# Patient Record
Sex: FEMALE | Race: WHITE | NOT HISPANIC OR LATINO | Employment: OTHER | URBAN - METROPOLITAN AREA
[De-identification: names, ages, dates, MRNs, and addresses within clinical notes are randomized per-mention and may not be internally consistent; named-entity substitution may affect disease eponyms.]

---

## 2023-03-10 ENCOUNTER — TELEPHONE (OUTPATIENT)
Dept: PRIMARY CARE | Facility: CLINIC | Age: 63
End: 2023-03-10
Payer: COMMERCIAL

## 2023-03-10 DIAGNOSIS — J06.9 UPPER RESPIRATORY TRACT INFECTION, UNSPECIFIED TYPE: Primary | ICD-10-CM

## 2023-03-10 DIAGNOSIS — R05.1 ACUTE COUGH: ICD-10-CM

## 2023-03-10 PROBLEM — U07.1 COVID-19: Status: ACTIVE | Noted: 2023-03-10

## 2023-03-10 PROBLEM — H81.93 VESTIBULAR DYSFUNCTION OF BOTH EARS: Status: ACTIVE | Noted: 2023-03-10

## 2023-03-10 PROBLEM — F41.9 ANXIETY DISORDER: Status: ACTIVE | Noted: 2023-03-10

## 2023-03-10 PROBLEM — M50.30 BULGING OF CERVICAL INTERVERTEBRAL DISC: Status: ACTIVE | Noted: 2023-03-10

## 2023-03-10 PROBLEM — M79.89 SOFT TISSUE MASS: Status: ACTIVE | Noted: 2023-03-10

## 2023-03-10 PROBLEM — E55.9 VITAMIN D DEFICIENCY: Status: ACTIVE | Noted: 2023-03-10

## 2023-03-10 PROBLEM — E78.00 HYPERCHOLESTEREMIA: Status: ACTIVE | Noted: 2023-03-10

## 2023-03-10 PROBLEM — H81.90 UNSPECIFIED DISORDER OF VESTIBULAR FUNCTION, UNSPECIFIED EAR: Status: ACTIVE | Noted: 2023-03-10

## 2023-03-10 PROBLEM — M54.12 CERVICAL RADICULOPATHY DUE TO TRAUMA: Status: ACTIVE | Noted: 2023-03-10

## 2023-03-10 PROBLEM — H81.13 BENIGN PAROXYSMAL POSITIONAL VERTIGO DUE TO BILATERAL VESTIBULAR DISORDER: Status: ACTIVE | Noted: 2023-03-10

## 2023-03-10 PROBLEM — I10 HYPERTENSION: Status: ACTIVE | Noted: 2023-03-10

## 2023-03-10 PROBLEM — M54.2 CHRONIC NECK PAIN: Status: ACTIVE | Noted: 2023-03-10

## 2023-03-10 PROBLEM — F43.21 GRIEF REACTION: Status: ACTIVE | Noted: 2023-03-10

## 2023-03-10 PROBLEM — M62.838 CERVICAL PARASPINOUS MUSCLE SPASM: Status: ACTIVE | Noted: 2023-03-10

## 2023-03-10 PROBLEM — S16.1XXA NECK STRAIN: Status: ACTIVE | Noted: 2023-03-10

## 2023-03-10 PROBLEM — F34.1: Status: ACTIVE | Noted: 2023-03-10

## 2023-03-10 PROBLEM — G89.29 CHRONIC NECK PAIN: Status: ACTIVE | Noted: 2023-03-10

## 2023-03-10 PROBLEM — H04.123 BILATERAL DRY EYES: Status: ACTIVE | Noted: 2023-03-10

## 2023-03-10 PROBLEM — F32.A DEPRESSION: Status: ACTIVE | Noted: 2023-03-10

## 2023-03-10 PROBLEM — H53.9 VISUAL CHANGES: Status: ACTIVE | Noted: 2023-03-10

## 2023-03-10 PROBLEM — S13.4XXA WHIPLASH INJURY TO NECK: Status: ACTIVE | Noted: 2023-03-10

## 2023-03-10 PROBLEM — G47.00 INSOMNIA: Status: ACTIVE | Noted: 2023-03-10

## 2023-03-10 PROBLEM — F43.89 STRESS REACTION, CHRONIC: Status: ACTIVE | Noted: 2023-03-10

## 2023-03-10 PROBLEM — F43.10 POST TRAUMATIC STRESS DISORDER (PTSD): Status: ACTIVE | Noted: 2023-03-10

## 2023-03-10 PROBLEM — F43.20 GRIEF REACTION: Status: ACTIVE | Noted: 2023-03-10

## 2023-03-10 RX ORDER — DOXYCYCLINE 100 MG/1
100 CAPSULE ORAL 2 TIMES DAILY
Qty: 14 CAPSULE | Refills: 0 | Status: SHIPPED | OUTPATIENT
Start: 2023-03-10 | End: 2023-03-17

## 2023-03-10 NOTE — TELEPHONE ENCOUNTER
Pt saw you last week for cold symptoms - she feels like its getting worse and moving down to her chest. Can feel rumbling and wheezing. Coughing up mucus. Would like to know if you could send in abx to pharmacy

## 2023-03-14 DIAGNOSIS — J06.9 UPPER RESPIRATORY TRACT INFECTION, UNSPECIFIED TYPE: ICD-10-CM

## 2023-03-14 DIAGNOSIS — R05.1 ACUTE COUGH: ICD-10-CM

## 2023-03-14 DIAGNOSIS — M50.30 BULGING OF CERVICAL INTERVERTEBRAL DISC: ICD-10-CM

## 2023-03-14 DIAGNOSIS — M54.12 CERVICAL RADICULOPATHY DUE TO TRAUMA: Primary | ICD-10-CM

## 2023-03-14 RX ORDER — PREGABALIN 150 MG/1
150 CAPSULE ORAL 2 TIMES DAILY
Qty: 60 CAPSULE | Refills: 2 | Status: CANCELLED | OUTPATIENT
Start: 2023-03-14

## 2023-03-14 RX ORDER — PREGABALIN 150 MG/1
1 CAPSULE ORAL 2 TIMES DAILY
COMMUNITY
Start: 2023-02-01 | End: 2023-10-26 | Stop reason: SINTOL

## 2023-03-14 RX ORDER — OXYCODONE AND ACETAMINOPHEN 10; 325 MG/1; MG/1
1 TABLET ORAL EVERY 6 HOURS PRN
Qty: 120 TABLET | Refills: 0 | Status: SHIPPED | OUTPATIENT
Start: 2023-03-14 | End: 2023-04-13 | Stop reason: SDUPTHER

## 2023-03-14 RX ORDER — DULOXETINE HYDROCHLORIDE 60 MG/1
60 CAPSULE, DELAYED RELEASE ORAL DAILY
COMMUNITY
End: 2023-05-23

## 2023-03-14 RX ORDER — OXYCODONE AND ACETAMINOPHEN 10; 325 MG/1; MG/1
1 TABLET ORAL EVERY 6 HOURS PRN
Qty: 120 TABLET | Refills: 0 | Status: CANCELLED | OUTPATIENT
Start: 2023-03-14

## 2023-03-14 RX ORDER — LISINOPRIL AND HYDROCHLOROTHIAZIDE 10; 12.5 MG/1; MG/1
1 TABLET ORAL EVERY MORNING
COMMUNITY
End: 2023-10-25

## 2023-03-14 RX ORDER — LAMOTRIGINE 100 MG/1
1.5 TABLET ORAL NIGHTLY
COMMUNITY

## 2023-03-14 RX ORDER — OXYCODONE AND ACETAMINOPHEN 10; 325 MG/1; MG/1
1 TABLET ORAL EVERY 6 HOURS PRN
COMMUNITY
End: 2023-03-14 | Stop reason: SDUPTHER

## 2023-03-14 NOTE — TELEPHONE ENCOUNTER
Pt called and stated insurance will not cover inhaler for 6 puffs a day, but will cover it for 4. Can you please re-write the rx for 4x a day?  (Combivent)  I see frequency says 4 times, but in the sig it looks like 6

## 2023-03-19 DIAGNOSIS — E11.9 TYPE 2 DIABETES MELLITUS WITHOUT COMPLICATIONS (MULTI): ICD-10-CM

## 2023-03-20 RX ORDER — BLOOD-GLUCOSE METER
EACH MISCELLANEOUS
Qty: 50 STRIP | Refills: 2 | Status: SHIPPED | OUTPATIENT
Start: 2023-03-20 | End: 2023-09-06

## 2023-04-13 DIAGNOSIS — M54.12 CERVICAL RADICULOPATHY DUE TO TRAUMA: ICD-10-CM

## 2023-04-13 DIAGNOSIS — M50.30 BULGING OF CERVICAL INTERVERTEBRAL DISC: ICD-10-CM

## 2023-04-13 RX ORDER — OXYCODONE AND ACETAMINOPHEN 10; 325 MG/1; MG/1
1 TABLET ORAL EVERY 6 HOURS PRN
Qty: 120 TABLET | Refills: 0 | Status: SHIPPED | OUTPATIENT
Start: 2023-04-13 | End: 2023-05-09 | Stop reason: SDUPTHER

## 2023-05-05 ENCOUNTER — APPOINTMENT (OUTPATIENT)
Dept: PRIMARY CARE | Facility: CLINIC | Age: 63
End: 2023-05-05
Payer: COMMERCIAL

## 2023-05-09 ENCOUNTER — OFFICE VISIT (OUTPATIENT)
Dept: PRIMARY CARE | Facility: CLINIC | Age: 63
End: 2023-05-09
Payer: COMMERCIAL

## 2023-05-09 VITALS
OXYGEN SATURATION: 94 % | TEMPERATURE: 97.5 F | BODY MASS INDEX: 31.76 KG/M2 | WEIGHT: 185 LBS | DIASTOLIC BLOOD PRESSURE: 80 MMHG | HEART RATE: 98 BPM | SYSTOLIC BLOOD PRESSURE: 138 MMHG

## 2023-05-09 DIAGNOSIS — E11.9 CONTROLLED TYPE 2 DIABETES MELLITUS WITHOUT COMPLICATION, WITHOUT LONG-TERM CURRENT USE OF INSULIN (MULTI): ICD-10-CM
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
## 2023-05-09 DIAGNOSIS — R07.81 RIB PAIN ON RIGHT SIDE: ICD-10-CM

## 2023-05-09 DIAGNOSIS — G89.29 CHRONIC NECK PAIN: Primary | ICD-10-CM

## 2023-05-09 DIAGNOSIS — M50.30 BULGING OF CERVICAL INTERVERTEBRAL DISC: ICD-10-CM

## 2023-05-09 DIAGNOSIS — M54.12 CERVICAL RADICULOPATHY DUE TO TRAUMA: ICD-10-CM

## 2023-05-09 DIAGNOSIS — M54.2 CHRONIC NECK PAIN: Primary | ICD-10-CM

## 2023-05-09 DIAGNOSIS — E11.9 TYPE 2 DIABETES MELLITUS WITHOUT COMPLICATION, WITHOUT LONG-TERM CURRENT USE OF INSULIN (MULTI): ICD-10-CM

## 2023-05-09 PROCEDURE — 1036F TOBACCO NON-USER: CPT | Performed by: STUDENT IN AN ORGANIZED HEALTH CARE EDUCATION/TRAINING PROGRAM

## 2023-05-09 PROCEDURE — 3079F DIAST BP 80-89 MM HG: CPT | Performed by: STUDENT IN AN ORGANIZED HEALTH CARE EDUCATION/TRAINING PROGRAM

## 2023-05-09 PROCEDURE — 3046F HEMOGLOBIN A1C LEVEL >9.0%: CPT | Performed by: STUDENT IN AN ORGANIZED HEALTH CARE EDUCATION/TRAINING PROGRAM

## 2023-05-09 PROCEDURE — 3075F SYST BP GE 130 - 139MM HG: CPT | Performed by: STUDENT IN AN ORGANIZED HEALTH CARE EDUCATION/TRAINING PROGRAM

## 2023-05-09 PROCEDURE — 99214 OFFICE O/P EST MOD 30 MIN: CPT | Performed by: STUDENT IN AN ORGANIZED HEALTH CARE EDUCATION/TRAINING PROGRAM

## 2023-05-09 PROCEDURE — 3008F BODY MASS INDEX DOCD: CPT | Performed by: STUDENT IN AN ORGANIZED HEALTH CARE EDUCATION/TRAINING PROGRAM

## 2023-05-09 RX ORDER — OXYCODONE AND ACETAMINOPHEN 10; 325 MG/1; MG/1
1 TABLET ORAL EVERY 6 HOURS PRN
Qty: 120 TABLET | Refills: 0 | Status: SHIPPED | OUTPATIENT
Start: 2023-05-12 | End: 2023-05-23 | Stop reason: SDUPTHER

## 2023-05-09 ASSESSMENT — ENCOUNTER SYMPTOMS
BACK PAIN: 1
ARTHRALGIAS: 1
SHORTNESS OF BREATH: 0
ACTIVITY CHANGE: 1
NECK PAIN: 1
NUMBNESS: 0
CONSTIPATION: 0
DYSPHORIC MOOD: 1
FATIGUE: 1
COUGH: 0
NERVOUS/ANXIOUS: 1
PALPITATIONS: 0
DIARRHEA: 0
DIZZINESS: 0
NECK STIFFNESS: 1
SLEEP DISTURBANCE: 1
CHEST TIGHTNESS: 0
HEADACHES: 0

## 2023-05-09 ASSESSMENT — PATIENT HEALTH QUESTIONNAIRE - PHQ9
SUM OF ALL RESPONSES TO PHQ9 QUESTIONS 1 AND 2: 0
1. LITTLE INTEREST OR PLEASURE IN DOING THINGS: NOT AT ALL
2. FEELING DOWN, DEPRESSED OR HOPELESS: NOT AT ALL

## 2023-05-09 NOTE — PROGRESS NOTES
"Subjective   Patient ID: Cristal Pollard is a 62 y.o. female who presents for Follow-up (Follow up visit /Recent fall/Contusion on rib/ cartilage tear).  Does not want her a1c checked today. States she knows she has been off track. Forgets her metformin sometimes. Stress eating, eating late at night. Fasting blood sugars 160. Averages around 112-120's. Initial a1c last visit 10.7%    Fell 2 weeks ago on her right ribs. Scans were unremarkable in ER. Feeling dizzy more often since her fall. Did not hit her head.     Did sweeping the day prior and thinks she \"ripped something.\" Notes ribs are starting to feel better. Fall did aggravate her chronic right hip and right neck pain.     Not tolerating Lyrica, making her very sleepy. Stopped taking this several weeks ago. Has not followed up with her pain management doctor.     Feeling better mentally, joined a [employer] and met a lot of new people. Thinks this is helping.       Opioids:  What is the patient's goal of therapy? Improved pain, functioning, and increased mobility  Is this being achieved with current treatment? yes    Currently sees specialist and pain management    I feel that it is clinically indicated to continue this current medication regimen after consideration of alternative therapies, and other non-opioid treatment.    Opioid Risk Screening:  No data recorded    Pain Assessment:  No data recorded    Review of Systems   Constitutional:  Positive for activity change and fatigue.   Eyes:  Negative for visual disturbance.   Respiratory:  Negative for cough, chest tightness and shortness of breath.    Cardiovascular:  Negative for chest pain and palpitations.   Gastrointestinal:  Negative for constipation and diarrhea.   Musculoskeletal:  Positive for arthralgias, back pain, neck pain and neck stiffness.   Neurological:  Negative for dizziness, numbness and headaches.   Psychiatric/Behavioral:  Positive for dysphoric mood and sleep disturbance. The patient is " nervous/anxious.        Objective   Physical Exam  Constitutional:       General: She is not in acute distress.     Appearance: She is not toxic-appearing.   HENT:      Head: Normocephalic.   Eyes:      Pupils: Pupils are equal, round, and reactive to light.   Cardiovascular:      Rate and Rhythm: Normal rate and regular rhythm.   Pulmonary:      Effort: Pulmonary effort is normal. No respiratory distress.      Breath sounds: Normal breath sounds. No wheezing or rhonchi.   Abdominal:      Palpations: Abdomen is soft.      Tenderness: There is no abdominal tenderness.   Musculoskeletal:         General: Tenderness present.      Comments: Right ribs, mild tenderness to palpation   Neurological:      General: No focal deficit present.      Mental Status: She is alert. Mental status is at baseline.   Psychiatric:         Mood and Affect: Mood normal.         Behavior: Behavior normal.       Current Outpatient Medications:     blood sugar diagnostic (OneTouch Verio test strips) strip, TEST ONCE DAILY, Disp: 50 strip, Rfl: 2    Cymbalta 60 mg DR capsule, Take 1 capsule (60 mg) by mouth once daily., Disp: , Rfl:     ipratropium-albuteroL (Combivent Respimat)  mcg/actuation inhaler, 2 puffs twice daily, Disp: 41.4 g, Rfl: 0    lamoTRIgine (LaMICtal) 100 mg tablet, Take 1.5 tablets (150 mg) by mouth once daily. Take 1 tablet by mouth in the Morning and 1/2 tablet at bedtime, Disp: , Rfl:     lisinopriL-hydrochlorothiazide 10-12.5 mg tablet, Take 1 tablet by mouth once daily., Disp: , Rfl:     pregabalin (Lyrica) 150 mg capsule, Take 1 capsule (150 mg) by mouth 2 times a day., Disp: , Rfl:     [START ON 5/12/2023] oxyCODONE-acetaminophen (Percocet)  mg tablet, Take 1 tablet by mouth every 6 hours if needed for severe pain (7 - 10). Do not start before May 12, 2023., Disp: 120 tablet, Rfl: 0      Assessment/Plan   Diagnoses and all orders for this visit:  Chronic neck pain  Type 2 diabetes mellitus without  complication, without long-term current use of insulin (CMS/[facility])  Comments:  diagnosed last visit  mostly compliant with metformin  Average -160  A1c next month, deferred today  Controlled type 2 diabetes mellitus without complication, without long-term current use of insulin (CMS/[facility])  Bulging of cervical intervertebral disc  -     oxyCODONE-acetaminophen (Percocet)  mg tablet; Take 1 tablet by mouth every 6 hours if needed for severe pain (7 - 10). Do not start before May 12, 2023.  Cervical radiculopathy due to trauma  -     oxyCODONE-acetaminophen (Percocet)  mg tablet; Take 1 tablet by mouth every 6 hours if needed for severe pain (7 - 10). Do not start before May 12, 2023.  BMI 31.0-31.9,adult    The patient received Provided instructions on dietary changes because they have an above normal BMI.      OARRS report reviewed for Cristal Pollard today and is consistent with prescribed therapy.  We weighed the risks and benefit of this therapy and will continue with the current plan      Follow up in 1 month    Jessica Diehl DO

## 2023-05-23 DIAGNOSIS — M50.30 BULGING OF CERVICAL INTERVERTEBRAL DISC: ICD-10-CM

## 2023-05-23 DIAGNOSIS — M54.12 CERVICAL RADICULOPATHY DUE TO TRAUMA: ICD-10-CM

## 2023-05-23 DIAGNOSIS — F41.9 ANXIETY DISORDER, UNSPECIFIED: ICD-10-CM

## 2023-05-23 RX ORDER — DULOXETIN HYDROCHLORIDE 60 MG/1
CAPSULE, DELAYED RELEASE ORAL
Qty: 90 CAPSULE | Refills: 3 | Status: SHIPPED | OUTPATIENT
Start: 2023-05-23 | End: 2024-02-06 | Stop reason: ALTCHOICE

## 2023-05-23 RX ORDER — OXYCODONE AND ACETAMINOPHEN 10; 325 MG/1; MG/1
1 TABLET ORAL EVERY 6 HOURS PRN
Qty: 28 TABLET | Refills: 0 | Status: SHIPPED | OUTPATIENT
Start: 2023-05-23 | End: 2023-05-30 | Stop reason: SDUPTHER

## 2023-05-30 DIAGNOSIS — M54.12 CERVICAL RADICULOPATHY DUE TO TRAUMA: ICD-10-CM

## 2023-05-30 DIAGNOSIS — M50.30 BULGING OF CERVICAL INTERVERTEBRAL DISC: ICD-10-CM

## 2023-05-30 RX ORDER — OXYCODONE AND ACETAMINOPHEN 10; 325 MG/1; MG/1
1 TABLET ORAL EVERY 6 HOURS PRN
Qty: 120 TABLET | Refills: 0 | Status: SHIPPED | OUTPATIENT
Start: 2023-05-30 | End: 2023-06-30 | Stop reason: SDUPTHER

## 2023-06-30 ENCOUNTER — CLINICAL SUPPORT (OUTPATIENT)
Dept: PRIMARY CARE | Facility: CLINIC | Age: 63
End: 2023-06-30
Payer: COMMERCIAL

## 2023-06-30 DIAGNOSIS — E11.9 TYPE 2 DIABETES MELLITUS WITHOUT COMPLICATION, WITHOUT LONG-TERM CURRENT USE OF INSULIN (MULTI): ICD-10-CM

## 2023-06-30 DIAGNOSIS — M54.12 CERVICAL RADICULOPATHY DUE TO TRAUMA: ICD-10-CM

## 2023-06-30 DIAGNOSIS — M50.30 BULGING OF CERVICAL INTERVERTEBRAL DISC: ICD-10-CM

## 2023-06-30 LAB — HBA1C MFR BLD: 7.3 % (ref 4.2–6.5)

## 2023-06-30 PROCEDURE — 83036 HEMOGLOBIN GLYCOSYLATED A1C: CPT

## 2023-06-30 RX ORDER — OXYCODONE AND ACETAMINOPHEN 10; 325 MG/1; MG/1
1 TABLET ORAL EVERY 6 HOURS PRN
Qty: 120 TABLET | Refills: 0 | Status: SHIPPED | OUTPATIENT
Start: 2023-06-30 | End: 2023-08-04 | Stop reason: SDUPTHER

## 2023-06-30 NOTE — PROGRESS NOTES
Patient ID: Cristal Pollard is a 63 y.o. female.    Procedures Patient had blood drawn for testing. Tolerated well.

## 2023-07-10 ENCOUNTER — TELEPHONE (OUTPATIENT)
Dept: PRIMARY CARE | Facility: CLINIC | Age: 63
End: 2023-07-10
Payer: COMMERCIAL

## 2023-07-10 NOTE — TELEPHONE ENCOUNTER
Patient calling- Went to the bathroom just now and there was a lot of blood in the toilet ( urine)   She said it was extremely painful to urinate. She doesn't know what to do

## 2023-07-12 ENCOUNTER — OFFICE VISIT (OUTPATIENT)
Dept: PRIMARY CARE | Facility: CLINIC | Age: 63
End: 2023-07-12
Payer: COMMERCIAL

## 2023-07-12 VITALS
DIASTOLIC BLOOD PRESSURE: 70 MMHG | OXYGEN SATURATION: 98 % | HEART RATE: 94 BPM | SYSTOLIC BLOOD PRESSURE: 128 MMHG | WEIGHT: 181 LBS | BODY MASS INDEX: 31.07 KG/M2

## 2023-07-12 DIAGNOSIS — M50.30 BULGING OF CERVICAL INTERVERTEBRAL DISC: ICD-10-CM

## 2023-07-12 DIAGNOSIS — M54.12 CERVICAL RADICULOPATHY DUE TO TRAUMA: ICD-10-CM

## 2023-07-12 DIAGNOSIS — Z79.891 CHRONIC PRESCRIPTION OPIATE USE: ICD-10-CM

## 2023-07-12 DIAGNOSIS — M54.2 CHRONIC NECK PAIN: ICD-10-CM

## 2023-07-12 DIAGNOSIS — E11.9 TYPE 2 DIABETES MELLITUS WITHOUT COMPLICATION, WITHOUT LONG-TERM CURRENT USE OF INSULIN (MULTI): ICD-10-CM

## 2023-07-12 DIAGNOSIS — N30.91 HEMATURIA DUE TO CYSTITIS: ICD-10-CM

## 2023-07-12 DIAGNOSIS — B37.2 YEAST DERMATITIS: Primary | ICD-10-CM

## 2023-07-12 DIAGNOSIS — G89.29 CHRONIC NECK PAIN: ICD-10-CM

## 2023-07-12 PROCEDURE — 3008F BODY MASS INDEX DOCD: CPT | Performed by: STUDENT IN AN ORGANIZED HEALTH CARE EDUCATION/TRAINING PROGRAM

## 2023-07-12 PROCEDURE — 3051F HG A1C>EQUAL 7.0%<8.0%: CPT | Performed by: STUDENT IN AN ORGANIZED HEALTH CARE EDUCATION/TRAINING PROGRAM

## 2023-07-12 PROCEDURE — 3074F SYST BP LT 130 MM HG: CPT | Performed by: STUDENT IN AN ORGANIZED HEALTH CARE EDUCATION/TRAINING PROGRAM

## 2023-07-12 PROCEDURE — 99214 OFFICE O/P EST MOD 30 MIN: CPT | Performed by: STUDENT IN AN ORGANIZED HEALTH CARE EDUCATION/TRAINING PROGRAM

## 2023-07-12 PROCEDURE — 1036F TOBACCO NON-USER: CPT | Performed by: STUDENT IN AN ORGANIZED HEALTH CARE EDUCATION/TRAINING PROGRAM

## 2023-07-12 PROCEDURE — 3078F DIAST BP <80 MM HG: CPT | Performed by: STUDENT IN AN ORGANIZED HEALTH CARE EDUCATION/TRAINING PROGRAM

## 2023-07-12 RX ORDER — NYSTATIN AND TRIAMCINOLONE ACETONIDE 100000; 1 [USP'U]/G; MG/G
OINTMENT TOPICAL
Qty: 15 G | Refills: 1 | Status: ON HOLD | OUTPATIENT
Start: 2023-07-12 | End: 2024-03-15 | Stop reason: ALTCHOICE

## 2023-07-12 RX ORDER — NYSTATIN 100000 [USP'U]/G
POWDER TOPICAL
Qty: 30 G | Refills: 2 | Status: SHIPPED | OUTPATIENT
Start: 2023-07-12 | End: 2023-11-07

## 2023-07-12 RX ORDER — CEFDINIR 300 MG/1
300 CAPSULE ORAL EVERY 12 HOURS
Qty: 14 CAPSULE | Refills: 0 | COMMUNITY
Start: 2023-07-10 | End: 2023-07-17

## 2023-07-12 ASSESSMENT — ENCOUNTER SYMPTOMS
PSYCHIATRIC NEGATIVE: 1
NEUROLOGICAL NEGATIVE: 1
HEMATURIA: 1
DYSURIA: 1
ARTHRALGIAS: 1

## 2023-07-12 NOTE — PROGRESS NOTES
Subjective   Patient ID: Cristal Pollard is a 63 y.o. female who presents for Rash (Rash on lower stomach , between skin.  Describes it as painful and tender. ).  Rash under pannus. Has been there for several months. Has tried otc creams and keeping it dry. Not improving. Is tender and painful. Weeps sometimes. Has had similar rash in the past after playing tennis or sweating more.     Robert hematuria on Monday. Went to urgent care was given an antibiotic cefdinir. No further episodes. Symptoms improving.     Last A1c improved from 10.1% to 7.7%. Blood sugars have been better lately.     Compliant with her chronic pain medications.             Review of Systems   Genitourinary:  Positive for dysuria and hematuria.   Musculoskeletal:  Positive for arthralgias.   Skin:  Positive for rash.   Neurological: Negative.    Psychiatric/Behavioral: Negative.     All other systems reviewed and are negative.      Objective   Physical Exam  Vitals reviewed.   Constitutional:       Appearance: Normal appearance.   HENT:      Head: Normocephalic.   Eyes:      Pupils: Pupils are equal, round, and reactive to light.   Pulmonary:      Effort: Pulmonary effort is normal.   Abdominal:      Palpations: Abdomen is soft.   Skin:     Comments: Erythematous rash under abdominal pannus with darkened border and slight weeping   Neurological:      General: No focal deficit present.      Mental Status: She is alert.   Psychiatric:         Mood and Affect: Mood normal.         Behavior: Behavior normal.           Current Outpatient Medications:     blood sugar diagnostic (OneTouch Verio test strips) strip, TEST ONCE DAILY, Disp: 50 strip, Rfl: 2    cefdinir (Omnicef) 300 mg capsule, Take 1 capsule (300 mg) by mouth every 12 hours., Disp: 14 capsule, Rfl: 0    DULoxetine (Cymbalta) 60 mg DR capsule, TAKE 1 CAPSULE BY MOUTH EVERY DAY, Disp: 90 capsule, Rfl: 3    ipratropium-albuteroL (Combivent Respimat)  mcg/actuation inhaler, 2 puffs twice  daily, Disp: 41.4 g, Rfl: 0    lamoTRIgine (LaMICtal) 100 mg tablet, Take 1.5 tablets (150 mg) by mouth once daily. Take 1 tablet by mouth in the Morning and 1/2 tablet at bedtime, Disp: , Rfl:     lisinopriL-hydrochlorothiazide 10-12.5 mg tablet, Take 1 tablet by mouth once daily., Disp: , Rfl:     oxyCODONE-acetaminophen (Percocet)  mg tablet, Take 1 tablet by mouth every 6 hours if needed for severe pain (7 - 10)., Disp: 120 tablet, Rfl: 0    pregabalin (Lyrica) 150 mg capsule, Take 1 capsule (150 mg) by mouth 2 times a day., Disp: , Rfl:     nystatin (Mycostatin) 100,000 unit/gram powder, Apply layer of powder over affected area twice daily, Disp: 30 g, Rfl: 2    nystatin-triamcinolone (Mycolog II) ointment, Apply thin layer to affected area two times daily, Disp: 15 g, Rfl: 1      Assessment/Plan   Diagnoses and all orders for this visit:  Yeast dermatitis  -     nystatin (Mycostatin) 100,000 unit/gram powder; Apply layer of powder over affected area twice daily  -     nystatin-triamcinolone (Mycolog II) ointment; Apply thin layer to affected area two times daily  Hematuria due to cystitis  Comments:  doing better on cefdinir  Type 2 diabetes mellitus without complication, without long-term current use of insulin (CMS/[facility])  Comments:  a1c decreased from 10.1 to 7.7%  congratulated her efforts   BG in the 120's-140's typically  Cervical radiculopathy due to trauma  Bulging of cervical intervertebral disc  Chronic neck pain  Chronic prescription opiate use  Comments:  compliant with CSA  UDS 1/2023    The patient received Provided instructions on dietary changes because they have an above normal BMI. Doing well with her diabetic control.       Follow up in 3 months    Jessica Diehl DO

## 2023-07-14 ENCOUNTER — TELEPHONE (OUTPATIENT)
Dept: PRIMARY CARE | Facility: CLINIC | Age: 63
End: 2023-07-14
Payer: COMMERCIAL

## 2023-07-14 RX ORDER — CIPROFLOXACIN 500 MG/1
500 TABLET ORAL 2 TIMES DAILY
Qty: 10 TABLET | Refills: 0 | Status: SHIPPED | OUTPATIENT
Start: 2023-07-14 | End: 2023-07-19

## 2023-07-14 NOTE — TELEPHONE ENCOUNTER
Patient calling- is having bad reaction to antibiotic given- feeling very sick. Wants to know if you can send something different in  to the pharmacy?

## 2023-07-24 ENCOUNTER — APPOINTMENT (OUTPATIENT)
Dept: PRIMARY CARE | Facility: CLINIC | Age: 63
End: 2023-07-24
Payer: COMMERCIAL

## 2023-08-04 DIAGNOSIS — M50.30 BULGING OF CERVICAL INTERVERTEBRAL DISC: ICD-10-CM

## 2023-08-04 DIAGNOSIS — M54.12 CERVICAL RADICULOPATHY DUE TO TRAUMA: ICD-10-CM

## 2023-08-04 RX ORDER — OXYCODONE AND ACETAMINOPHEN 10; 325 MG/1; MG/1
1 TABLET ORAL EVERY 6 HOURS PRN
Qty: 120 TABLET | Refills: 0 | Status: SHIPPED | OUTPATIENT
Start: 2023-08-04 | End: 2023-09-05 | Stop reason: SDUPTHER

## 2023-08-26 DIAGNOSIS — E11.9 TYPE 2 DIABETES MELLITUS WITHOUT COMPLICATIONS (MULTI): ICD-10-CM

## 2023-08-26 RX ORDER — METFORMIN HYDROCHLORIDE 500 MG/1
500 TABLET, EXTENDED RELEASE ORAL
Qty: 90 TABLET | Refills: 1 | Status: SHIPPED | OUTPATIENT
Start: 2023-08-26 | End: 2024-02-23

## 2023-09-05 DIAGNOSIS — M54.12 CERVICAL RADICULOPATHY DUE TO TRAUMA: ICD-10-CM

## 2023-09-05 DIAGNOSIS — M50.30 BULGING OF CERVICAL INTERVERTEBRAL DISC: ICD-10-CM

## 2023-09-05 RX ORDER — OXYCODONE AND ACETAMINOPHEN 10; 325 MG/1; MG/1
1 TABLET ORAL EVERY 6 HOURS PRN
Qty: 120 TABLET | Refills: 0 | Status: SHIPPED | OUTPATIENT
Start: 2023-09-05 | End: 2023-10-09 | Stop reason: SDUPTHER

## 2023-09-06 DIAGNOSIS — E11.9 TYPE 2 DIABETES MELLITUS WITHOUT COMPLICATIONS (MULTI): ICD-10-CM

## 2023-09-06 RX ORDER — BLOOD-GLUCOSE METER
EACH MISCELLANEOUS
Qty: 50 STRIP | Refills: 2 | Status: SHIPPED | OUTPATIENT
Start: 2023-09-06 | End: 2024-02-04

## 2023-09-29 DIAGNOSIS — E11.9 TYPE 2 DIABETES MELLITUS WITHOUT COMPLICATION, WITHOUT LONG-TERM CURRENT USE OF INSULIN (MULTI): Primary | ICD-10-CM

## 2023-09-29 RX ORDER — LANCETS 33 GAUGE
EACH MISCELLANEOUS
Qty: 90 EACH | Refills: 1 | Status: SHIPPED | OUTPATIENT
Start: 2023-09-29

## 2023-10-06 DIAGNOSIS — R52 PAIN: Primary | ICD-10-CM

## 2023-10-09 DIAGNOSIS — M50.30 BULGING OF CERVICAL INTERVERTEBRAL DISC: ICD-10-CM

## 2023-10-09 DIAGNOSIS — M54.12 CERVICAL RADICULOPATHY DUE TO TRAUMA: ICD-10-CM

## 2023-10-09 RX ORDER — OXYCODONE AND ACETAMINOPHEN 10; 325 MG/1; MG/1
1 TABLET ORAL EVERY 6 HOURS PRN
Qty: 120 TABLET | Refills: 0 | Status: SHIPPED | OUTPATIENT
Start: 2023-10-09 | End: 2023-11-07 | Stop reason: SDUPTHER

## 2023-10-13 ENCOUNTER — TELEPHONE (OUTPATIENT)
Dept: NEUROSURGERY | Facility: CLINIC | Age: 63
End: 2023-10-13
Payer: COMMERCIAL

## 2023-10-13 ENCOUNTER — HOSPITAL ENCOUNTER (OUTPATIENT)
Dept: RADIOLOGY | Facility: HOSPITAL | Age: 63
Discharge: HOME | End: 2023-10-13
Payer: COMMERCIAL

## 2023-10-13 DIAGNOSIS — R29.898 OTHER SYMPTOMS AND SIGNS INVOLVING THE MUSCULOSKELETAL SYSTEM: ICD-10-CM

## 2023-10-13 DIAGNOSIS — M54.12 CERVICAL RADICULOPATHY DUE TO TRAUMA: Primary | ICD-10-CM

## 2023-10-19 ASSESSMENT — ENCOUNTER SYMPTOMS
SEIZURES: 0
BLACKOUTS: 0
TREMORS: 0
HEADACHES: 0
BLURRED VISION: 0
NERVOUS/ANXIOUS: 1
DIZZINESS: 1
POLYDIPSIA: 0
POLYPHAGIA: 0
CONFUSION: 1
FATIGUE: 1
HUNGER: 1
VISUAL CHANGE: 0
SWEATS: 1
SPEECH DIFFICULTY: 0
WEIGHT LOSS: 0
WEAKNESS: 1

## 2023-10-20 ENCOUNTER — TELEPHONE (OUTPATIENT)
Dept: NEUROLOGY | Facility: CLINIC | Age: 63
End: 2023-10-20
Payer: COMMERCIAL

## 2023-10-22 PROCEDURE — 99285 EMERGENCY DEPT VISIT HI MDM: CPT | Mod: 25

## 2023-10-22 ASSESSMENT — COLUMBIA-SUICIDE SEVERITY RATING SCALE - C-SSRS
6. HAVE YOU EVER DONE ANYTHING, STARTED TO DO ANYTHING, OR PREPARED TO DO ANYTHING TO END YOUR LIFE?: NO
1. IN THE PAST MONTH, HAVE YOU WISHED YOU WERE DEAD OR WISHED YOU COULD GO TO SLEEP AND NOT WAKE UP?: NO
2. HAVE YOU ACTUALLY HAD ANY THOUGHTS OF KILLING YOURSELF?: NO

## 2023-10-22 ASSESSMENT — PAIN SCALES - GENERAL: PAINLEVEL_OUTOF10: 7

## 2023-10-22 ASSESSMENT — PAIN - FUNCTIONAL ASSESSMENT: PAIN_FUNCTIONAL_ASSESSMENT: 0-10

## 2023-10-23 ENCOUNTER — APPOINTMENT (OUTPATIENT)
Dept: CARDIOLOGY | Facility: HOSPITAL | Age: 63
End: 2023-10-23
Payer: COMMERCIAL

## 2023-10-23 ENCOUNTER — APPOINTMENT (OUTPATIENT)
Dept: RADIOLOGY | Facility: HOSPITAL | Age: 63
End: 2023-10-23
Payer: COMMERCIAL

## 2023-10-23 ENCOUNTER — HOSPITAL ENCOUNTER (OUTPATIENT)
Facility: HOSPITAL | Age: 63
Setting detail: OBSERVATION
Discharge: HOME | End: 2023-10-24
Attending: STUDENT IN AN ORGANIZED HEALTH CARE EDUCATION/TRAINING PROGRAM
Payer: COMMERCIAL

## 2023-10-23 DIAGNOSIS — R07.9 CHEST PAIN, UNSPECIFIED TYPE: Primary | ICD-10-CM

## 2023-10-23 LAB
ALBUMIN SERPL BCP-MCNC: 4.5 G/DL (ref 3.4–5)
ALP SERPL-CCNC: 59 U/L (ref 33–136)
ALT SERPL W P-5'-P-CCNC: 24 U/L (ref 7–45)
ANION GAP SERPL CALC-SCNC: 13 MMOL/L (ref 10–20)
AST SERPL W P-5'-P-CCNC: 18 U/L (ref 9–39)
BASOPHILS # BLD AUTO: 0.08 X10*3/UL (ref 0–0.1)
BASOPHILS NFR BLD AUTO: 0.8 %
BILIRUB SERPL-MCNC: 0.4 MG/DL (ref 0–1.2)
BUN SERPL-MCNC: 14 MG/DL (ref 6–23)
CALCIUM SERPL-MCNC: 10.3 MG/DL (ref 8.6–10.3)
CARDIAC TROPONIN I PNL SERPL HS: <3 NG/L (ref 0–13)
CHLORIDE SERPL-SCNC: 103 MMOL/L (ref 98–107)
CHOLEST SERPL-MCNC: 183 MG/DL (ref 0–199)
CHOLESTEROL/HDL RATIO: 6.4
CO2 SERPL-SCNC: 24 MMOL/L (ref 21–32)
CREAT SERPL-MCNC: 0.7 MG/DL (ref 0.5–1.05)
EOSINOPHIL # BLD AUTO: 0.1 X10*3/UL (ref 0–0.7)
EOSINOPHIL NFR BLD AUTO: 1 %
ERYTHROCYTE [DISTWIDTH] IN BLOOD BY AUTOMATED COUNT: 12.8 % (ref 11.5–14.5)
EST. AVERAGE GLUCOSE BLD GHB EST-MCNC: 174 MG/DL
GFR SERPL CREATININE-BSD FRML MDRD: >90 ML/MIN/1.73M*2
GLUCOSE BLD MANUAL STRIP-MCNC: 173 MG/DL (ref 74–99)
GLUCOSE SERPL-MCNC: 154 MG/DL (ref 74–99)
HBA1C MFR BLD: 7.7 %
HCT VFR BLD AUTO: 42.4 % (ref 36–46)
HDLC SERPL-MCNC: 28.7 MG/DL
HGB BLD-MCNC: 14.4 G/DL (ref 12–16)
IMM GRANULOCYTES # BLD AUTO: 0.04 X10*3/UL (ref 0–0.7)
IMM GRANULOCYTES NFR BLD AUTO: 0.4 % (ref 0–0.9)
LDLC SERPL CALC-MCNC: ABNORMAL MG/DL
LYMPHOCYTES # BLD AUTO: 3.24 X10*3/UL (ref 1.2–4.8)
LYMPHOCYTES NFR BLD AUTO: 32.8 %
MAGNESIUM SERPL-MCNC: 1.81 MG/DL (ref 1.6–2.4)
MCH RBC QN AUTO: 31 PG (ref 26–34)
MCHC RBC AUTO-ENTMCNC: 34 G/DL (ref 32–36)
MCV RBC AUTO: 91 FL (ref 80–100)
MONOCYTES # BLD AUTO: 0.58 X10*3/UL (ref 0.1–1)
MONOCYTES NFR BLD AUTO: 5.9 %
NEUTROPHILS # BLD AUTO: 5.84 X10*3/UL (ref 1.2–7.7)
NEUTROPHILS NFR BLD AUTO: 59.1 %
NON HDL CHOLESTEROL: 154 MG/DL (ref 0–149)
NRBC BLD-RTO: 0 /100 WBCS (ref 0–0)
PLATELET # BLD AUTO: 269 X10*3/UL (ref 150–450)
PMV BLD AUTO: 8.9 FL (ref 7.5–11.5)
POTASSIUM SERPL-SCNC: 4.2 MMOL/L (ref 3.5–5.3)
PROT SERPL-MCNC: 7.1 G/DL (ref 6.4–8.2)
RBC # BLD AUTO: 4.64 X10*6/UL (ref 4–5.2)
SODIUM SERPL-SCNC: 136 MMOL/L (ref 136–145)
TRIGL SERPL-MCNC: 437 MG/DL (ref 0–149)
TSH SERPL-ACNC: 2.35 MIU/L (ref 0.44–3.98)
VLDL: ABNORMAL
WBC # BLD AUTO: 9.9 X10*3/UL (ref 4.4–11.3)

## 2023-10-23 PROCEDURE — 2500000001 HC RX 250 WO HCPCS SELF ADMINISTERED DRUGS (ALT 637 FOR MEDICARE OP)

## 2023-10-23 PROCEDURE — 83036 HEMOGLOBIN GLYCOSYLATED A1C: CPT

## 2023-10-23 PROCEDURE — 71046 X-RAY EXAM CHEST 2 VIEWS: CPT | Mod: FOREIGN READ | Performed by: RADIOLOGY

## 2023-10-23 PROCEDURE — 2500000004 HC RX 250 GENERAL PHARMACY W/ HCPCS (ALT 636 FOR OP/ED): Performed by: PHYSICIAN ASSISTANT

## 2023-10-23 PROCEDURE — 84484 ASSAY OF TROPONIN QUANT: CPT | Performed by: PHYSICIAN ASSISTANT

## 2023-10-23 PROCEDURE — 99223 1ST HOSP IP/OBS HIGH 75: CPT

## 2023-10-23 PROCEDURE — 84484 ASSAY OF TROPONIN QUANT: CPT | Mod: 59

## 2023-10-23 PROCEDURE — 2500000001 HC RX 250 WO HCPCS SELF ADMINISTERED DRUGS (ALT 637 FOR MEDICARE OP): Performed by: STUDENT IN AN ORGANIZED HEALTH CARE EDUCATION/TRAINING PROGRAM

## 2023-10-23 PROCEDURE — 85025 COMPLETE CBC W/AUTO DIFF WBC: CPT | Performed by: PHYSICIAN ASSISTANT

## 2023-10-23 PROCEDURE — 80053 COMPREHEN METABOLIC PANEL: CPT | Performed by: PHYSICIAN ASSISTANT

## 2023-10-23 PROCEDURE — 93005 ELECTROCARDIOGRAM TRACING: CPT

## 2023-10-23 PROCEDURE — 93005 ELECTROCARDIOGRAM TRACING: CPT | Mod: 59

## 2023-10-23 PROCEDURE — 96375 TX/PRO/DX INJ NEW DRUG ADDON: CPT

## 2023-10-23 PROCEDURE — 36415 COLL VENOUS BLD VENIPUNCTURE: CPT | Performed by: PHYSICIAN ASSISTANT

## 2023-10-23 PROCEDURE — 2500000001 HC RX 250 WO HCPCS SELF ADMINISTERED DRUGS (ALT 637 FOR MEDICARE OP): Performed by: PHYSICIAN ASSISTANT

## 2023-10-23 PROCEDURE — 71046 X-RAY EXAM CHEST 2 VIEWS: CPT | Mod: FY,FR

## 2023-10-23 PROCEDURE — 96374 THER/PROPH/DIAG INJ IV PUSH: CPT

## 2023-10-23 PROCEDURE — 82947 ASSAY GLUCOSE BLOOD QUANT: CPT | Mod: 59

## 2023-10-23 PROCEDURE — 99223 1ST HOSP IP/OBS HIGH 75: CPT | Performed by: STUDENT IN AN ORGANIZED HEALTH CARE EDUCATION/TRAINING PROGRAM

## 2023-10-23 PROCEDURE — 96372 THER/PROPH/DIAG INJ SC/IM: CPT | Mod: XU

## 2023-10-23 PROCEDURE — 80061 LIPID PANEL: CPT

## 2023-10-23 PROCEDURE — 2500000004 HC RX 250 GENERAL PHARMACY W/ HCPCS (ALT 636 FOR OP/ED)

## 2023-10-23 PROCEDURE — G0378 HOSPITAL OBSERVATION PER HR: HCPCS

## 2023-10-23 PROCEDURE — 2500000002 HC RX 250 W HCPCS SELF ADMINISTERED DRUGS (ALT 637 FOR MEDICARE OP, ALT 636 FOR OP/ED)

## 2023-10-23 PROCEDURE — 83735 ASSAY OF MAGNESIUM: CPT | Performed by: PHYSICIAN ASSISTANT

## 2023-10-23 PROCEDURE — 84443 ASSAY THYROID STIM HORMONE: CPT

## 2023-10-23 RX ORDER — HEPARIN SODIUM 5000 [USP'U]/ML
5000 INJECTION, SOLUTION INTRAVENOUS; SUBCUTANEOUS EVERY 8 HOURS SCHEDULED
Status: DISCONTINUED | OUTPATIENT
Start: 2023-10-23 | End: 2023-10-24 | Stop reason: HOSPADM

## 2023-10-23 RX ORDER — BISMUTH SUBSALICYLATE 262 MG
1 TABLET,CHEWABLE ORAL DAILY
COMMUNITY

## 2023-10-23 RX ORDER — OXYCODONE AND ACETAMINOPHEN 5; 325 MG/1; MG/1
2 TABLET ORAL ONCE
Status: COMPLETED | OUTPATIENT
Start: 2023-10-23 | End: 2023-10-23

## 2023-10-23 RX ORDER — HYDROCHLOROTHIAZIDE 25 MG/1
12.5 TABLET ORAL DAILY
Status: DISCONTINUED | OUTPATIENT
Start: 2023-10-23 | End: 2023-10-24 | Stop reason: HOSPADM

## 2023-10-23 RX ORDER — DEXTROSE 50 % IN WATER (D50W) INTRAVENOUS SYRINGE
25
Status: DISCONTINUED | OUTPATIENT
Start: 2023-10-23 | End: 2023-10-24 | Stop reason: HOSPADM

## 2023-10-23 RX ORDER — METFORMIN HYDROCHLORIDE 500 MG/1
500 TABLET, EXTENDED RELEASE ORAL
Status: DISCONTINUED | OUTPATIENT
Start: 2023-10-24 | End: 2023-10-24 | Stop reason: HOSPADM

## 2023-10-23 RX ORDER — NITROGLYCERIN 0.4 MG/1
0.4 TABLET SUBLINGUAL ONCE
Status: DISCONTINUED | OUTPATIENT
Start: 2023-10-23 | End: 2023-10-23

## 2023-10-23 RX ORDER — INSULIN LISPRO 100 [IU]/ML
0-10 INJECTION, SOLUTION INTRAVENOUS; SUBCUTANEOUS
Status: DISCONTINUED | OUTPATIENT
Start: 2023-10-23 | End: 2023-10-24 | Stop reason: HOSPADM

## 2023-10-23 RX ORDER — NYSTATIN AND TRIAMCINOLONE ACETONIDE 100000; 1 [USP'U]/G; MG/G
OINTMENT TOPICAL 2 TIMES DAILY
Status: DISCONTINUED | OUTPATIENT
Start: 2023-10-23 | End: 2023-10-24 | Stop reason: HOSPADM

## 2023-10-23 RX ORDER — NAPROXEN SODIUM 220 MG/1
324 TABLET, FILM COATED ORAL ONCE
Status: COMPLETED | OUTPATIENT
Start: 2023-10-23 | End: 2023-10-23

## 2023-10-23 RX ORDER — NYSTATIN 100000 [USP'U]/G
POWDER TOPICAL 2 TIMES DAILY
Status: DISCONTINUED | OUTPATIENT
Start: 2023-10-23 | End: 2023-10-24 | Stop reason: HOSPADM

## 2023-10-23 RX ORDER — OXYCODONE HYDROCHLORIDE 5 MG/1
10 TABLET ORAL ONCE
Status: COMPLETED | OUTPATIENT
Start: 2023-10-23 | End: 2023-10-23

## 2023-10-23 RX ORDER — DEXTROSE MONOHYDRATE 100 MG/ML
0.3 INJECTION, SOLUTION INTRAVENOUS ONCE AS NEEDED
Status: DISCONTINUED | OUTPATIENT
Start: 2023-10-23 | End: 2023-10-24 | Stop reason: HOSPADM

## 2023-10-23 RX ORDER — DULOXETIN HYDROCHLORIDE 30 MG/1
60 CAPSULE, DELAYED RELEASE ORAL DAILY
Status: DISCONTINUED | OUTPATIENT
Start: 2023-10-23 | End: 2023-10-24 | Stop reason: HOSPADM

## 2023-10-23 RX ORDER — LISINOPRIL 10 MG/1
10 TABLET ORAL DAILY
Status: DISCONTINUED | OUTPATIENT
Start: 2023-10-23 | End: 2023-10-24 | Stop reason: HOSPADM

## 2023-10-23 RX ORDER — ONDANSETRON HYDROCHLORIDE 2 MG/ML
4 INJECTION, SOLUTION INTRAVENOUS ONCE
Status: COMPLETED | OUTPATIENT
Start: 2023-10-23 | End: 2023-10-23

## 2023-10-23 RX ORDER — POLYETHYLENE GLYCOL 3350 17 G/17G
17 POWDER, FOR SOLUTION ORAL DAILY
Status: DISCONTINUED | OUTPATIENT
Start: 2023-10-23 | End: 2023-10-24 | Stop reason: HOSPADM

## 2023-10-23 RX ORDER — MORPHINE SULFATE 4 MG/ML
4 INJECTION, SOLUTION INTRAMUSCULAR; INTRAVENOUS ONCE
Status: COMPLETED | OUTPATIENT
Start: 2023-10-23 | End: 2023-10-23

## 2023-10-23 RX ORDER — OXYCODONE HYDROCHLORIDE 5 MG/1
5 TABLET ORAL ONCE
Status: DISCONTINUED | OUTPATIENT
Start: 2023-10-23 | End: 2023-10-23

## 2023-10-23 RX ADMIN — OXYCODONE HYDROCHLORIDE AND ACETAMINOPHEN 2 TABLET: 5; 325 TABLET ORAL at 06:33

## 2023-10-23 RX ADMIN — HEPARIN SODIUM 5000 UNITS: 5000 INJECTION INTRAVENOUS; SUBCUTANEOUS at 05:11

## 2023-10-23 RX ADMIN — ASPIRIN 81 MG 324 MG: 81 TABLET ORAL at 00:55

## 2023-10-23 RX ADMIN — MORPHINE SULFATE 4 MG: 4 INJECTION, SOLUTION INTRAMUSCULAR; INTRAVENOUS at 02:36

## 2023-10-23 RX ADMIN — ONDANSETRON 4 MG: 2 INJECTION INTRAMUSCULAR; INTRAVENOUS at 02:36

## 2023-10-23 RX ADMIN — LAMOTRIGINE 100 MG: 100 TABLET ORAL at 09:15

## 2023-10-23 RX ADMIN — LISINOPRIL 10 MG: 10 TABLET ORAL at 09:16

## 2023-10-23 RX ADMIN — INSULIN LISPRO 2 UNITS: 100 INJECTION, SOLUTION INTRAVENOUS; SUBCUTANEOUS at 09:17

## 2023-10-23 RX ADMIN — HYDROCHLOROTHIAZIDE 12.5 MG: 25 TABLET ORAL at 09:17

## 2023-10-23 RX ADMIN — HEPARIN SODIUM 5000 UNITS: 5000 INJECTION INTRAVENOUS; SUBCUTANEOUS at 14:56

## 2023-10-23 RX ADMIN — OXYCODONE HYDROCHLORIDE 10 MG: 5 TABLET ORAL at 23:51

## 2023-10-23 SDOH — SOCIAL STABILITY: SOCIAL INSECURITY: ARE YOU OR HAVE YOU BEEN THREATENED OR ABUSED PHYSICALLY, EMOTIONALLY, OR SEXUALLY BY ANYONE?: NO

## 2023-10-23 SDOH — SOCIAL STABILITY: SOCIAL INSECURITY: ABUSE: ADULT

## 2023-10-23 SDOH — SOCIAL STABILITY: SOCIAL INSECURITY: WERE YOU ABLE TO COMPLETE ALL THE BEHAVIORAL HEALTH SCREENINGS?: YES

## 2023-10-23 SDOH — SOCIAL STABILITY: SOCIAL INSECURITY: DO YOU FEEL UNSAFE GOING BACK TO THE PLACE WHERE YOU ARE LIVING?: NO

## 2023-10-23 SDOH — SOCIAL STABILITY: SOCIAL INSECURITY: HAS ANYONE EVER THREATENED TO HURT YOUR FAMILY OR YOUR PETS?: NO

## 2023-10-23 SDOH — SOCIAL STABILITY: SOCIAL INSECURITY: HAVE YOU HAD THOUGHTS OF HARMING ANYONE ELSE?: NO

## 2023-10-23 SDOH — SOCIAL STABILITY: SOCIAL INSECURITY: DOES ANYONE TRY TO KEEP YOU FROM HAVING/CONTACTING OTHER FRIENDS OR DOING THINGS OUTSIDE YOUR HOME?: NO

## 2023-10-23 SDOH — SOCIAL STABILITY: SOCIAL INSECURITY: ARE THERE ANY APPARENT SIGNS OF INJURIES/BEHAVIORS THAT COULD BE RELATED TO ABUSE/NEGLECT?: NO

## 2023-10-23 SDOH — SOCIAL STABILITY: SOCIAL INSECURITY: DO YOU FEEL ANYONE HAS EXPLOITED OR TAKEN ADVANTAGE OF YOU FINANCIALLY OR OF YOUR PERSONAL PROPERTY?: NO

## 2023-10-23 ASSESSMENT — COGNITIVE AND FUNCTIONAL STATUS - GENERAL
MOBILITY SCORE: 22
CLIMB 3 TO 5 STEPS WITH RAILING: A LITTLE
PATIENT BASELINE BEDBOUND: NO
DAILY ACTIVITIY SCORE: 24
WALKING IN HOSPITAL ROOM: A LITTLE

## 2023-10-23 ASSESSMENT — LIFESTYLE VARIABLES
SUBSTANCE_ABUSE_PAST_12_MONTHS: NO
HOW MANY STANDARD DRINKS CONTAINING ALCOHOL DO YOU HAVE ON A TYPICAL DAY: PATIENT DOES NOT DRINK
HAVE PEOPLE ANNOYED YOU BY CRITICIZING YOUR DRINKING: NO
HOW OFTEN DO YOU HAVE 6 OR MORE DRINKS ON ONE OCCASION: NEVER
PRESCIPTION_ABUSE_PAST_12_MONTHS: NO
HOW OFTEN DO YOU HAVE A DRINK CONTAINING ALCOHOL: NEVER
EVER FELT BAD OR GUILTY ABOUT YOUR DRINKING: NO
SKIP TO QUESTIONS 9-10: 1
AUDIT-C TOTAL SCORE: 0
AUDIT-C TOTAL SCORE: 0
HAVE YOU EVER FELT YOU SHOULD CUT DOWN ON YOUR DRINKING: NO
EVER HAD A DRINK FIRST THING IN THE MORNING TO STEADY YOUR NERVES TO GET RID OF A HANGOVER: NO
REASON UNABLE TO ASSESS: NO

## 2023-10-23 ASSESSMENT — HEART SCORE
HISTORY: MODERATELY SUSPICIOUS
TROPONIN: LESS THAN OR EQUAL TO NORMAL LIMIT
HEART SCORE: 4
ECG: NON-SPECIFIC REPOLARIZATION DISTURBANCE
RISK FACTORS: 1-2 RISK FACTORS
AGE: 45-64

## 2023-10-23 ASSESSMENT — ACTIVITIES OF DAILY LIVING (ADL)
FEEDING YOURSELF: INDEPENDENT
TOILETING: INDEPENDENT
DRESSING YOURSELF: INDEPENDENT
ADEQUATE_TO_COMPLETE_ADL: YES
PATIENT'S MEMORY ADEQUATE TO SAFELY COMPLETE DAILY ACTIVITIES?: YES
WALKS IN HOME: INDEPENDENT
HEARING - LEFT EAR: FUNCTIONAL
HEARING - RIGHT EAR: FUNCTIONAL
LACK_OF_TRANSPORTATION: NO
GROOMING: INDEPENDENT
JUDGMENT_ADEQUATE_SAFELY_COMPLETE_DAILY_ACTIVITIES: YES
BATHING: INDEPENDENT

## 2023-10-23 ASSESSMENT — PATIENT HEALTH QUESTIONNAIRE - PHQ9
SUM OF ALL RESPONSES TO PHQ9 QUESTIONS 1 & 2: 0
2. FEELING DOWN, DEPRESSED OR HOPELESS: NOT AT ALL
1. LITTLE INTEREST OR PLEASURE IN DOING THINGS: NOT AT ALL

## 2023-10-23 ASSESSMENT — PAIN SCALES - GENERAL: PAINLEVEL_OUTOF10: 10 - WORST POSSIBLE PAIN
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
## 2023-10-23 NOTE — PROGRESS NOTES
This TCC met with patient at bedside, introduced self and explained role.  Demographic information and insurance verified.  Patient is independent, from home w/ significant other; drives.  Denies SW needs at this time.  Patient plans to return home at discharge, no needs, with transportation provided by patient's significant other.  TCC will continue to follow care progression for discharge planning needs.     10/23/23 1224   Discharge Planning   Living Arrangements Spouse/significant other   Support Systems Spouse/significant other;Children;Friends/neighbors  (1 daughter in [place]; 1 daughter in [place])   Assistance Needed Independent, drives. Denies use of assistive devices.   Type of Residence Private residence  (Ranch w/basement)   Number of Stairs to Enter Residence 5   Number of Stairs Within Residence 12   Do you have animals or pets at home? Yes   Type of Animals or Pets Cat   Who is requesting discharge planning?   (CCT workflow)   Home or Post Acute Services None   Patient expects to be discharged to: Home   Does the patient need discharge transport arranged? No   Financial Resource Strain   How hard is it for you to pay for the very basics like food, housing, medical care, and heating? Not hard   Housing Stability   In the last 12 months, was there a time when you were not able to pay the mortgage or rent on time? N   In the last 12 months, how many places have you lived? 1   In the last 12 months, was there a time when you did not have a steady place to sleep or slept in a shelter (including now)? N   Transportation Needs   In the past 12 months, has lack of transportation kept you from medical appointments or from getting medications? no   In the past 12 months, has lack of transportation kept you from meetings, work, or from getting things needed for daily living? No       Milka Gonsales, W[facility] ED TCC
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
## 2023-10-23 NOTE — PROGRESS NOTES
Pharmacy Medication History Review    Cristal Pollard is a 63 y.o. female admitted for Chest pain. Pharmacy reviewed the patient's [id number] medications and allergies for accuracy.    The list below reflectives the updated PTA list. Please review each medication in order reconciliation for additional clarification and justification.  Prior to Admission Medications   Prescriptions Last Dose Informant Patient Reported? Taking?   DULoxetine (Cymbalta) 60 mg DR capsule 10/21/2023 Self No No   Sig: TAKE 1 CAPSULE BY MOUTH EVERY DAY   Patient taking differently: Take 1 capsule (60 mg) by mouth once daily at bedtime.   OneTouch Delica Plus Lancet 30 gauge misc n/a  No No   Sig: USE TO TEST ONCE DAILY   OneTouch Verio test strips strip n/a  No No   Sig: USE TO TEST ONCE DAILY   calcium carbonate-vitamin D3 600 mg-10 mcg (400 unit) chewable tablet 10/22/2023 Self Yes No   Sig: Chew 1 tablet 2 times a day.   ipratropium-albuteroL (Combivent Respimat)  mcg/actuation inhaler   No No   Si puffs twice daily   lamoTRIgine (LaMICtal) 100 mg tablet 10/21/2023 Self Yes No   Sig: Take 1.5 tablets (150 mg) by mouth once daily at bedtime. Take 1 tablet by mouth in the Morning and 1 and 1/2 tablet at bedtime   lisinopriL-hydrochlorothiazide 10-12.5 mg tablet 10/21/2023 Self Yes No   Sig: Take 1 tablet by mouth once daily in the morning.   metFORMIN XR (Glucophage-XR) 500 mg 24 hr tablet 10/21/2023 Self No No   Sig: TAKE 1 TABLET BY MOUTH EVERY DAY WITH EVENING MEAL   Patient taking differently: Take 1 tablet (500 mg) by mouth once daily at bedtime.   multivitamin tablet 10/22/2023 Self Yes No   Sig: Take 1 tablet by mouth once daily.   nystatin (Mycostatin) 100,000 unit/gram powder n/a  No No   Sig: Apply layer of powder over affected area twice daily   nystatin-triamcinolone (Mycolog II) ointment   No No   Sig: Apply thin layer to affected area two times daily   oxyCODONE-acetaminophen (Percocet)  mg tablet  10/22/2023 at 2300  No No   Sig: Take 1 tablet by mouth every 6 hours if needed for severe pain (7 - 10).   pregabalin (Lyrica) 150 mg capsule   Yes No   Sig: Take 1 capsule (150 mg) by mouth 2 times a day.      Facility-Administered Medications: None        The list below reflectives the updated allergy list. Please review each documented allergy for additional clarification and justification.  Allergies  Reviewed by Shayan Christopher RN on 10/22/2023        Severity Reactions Comments    Hydrocodone Not Specified Nausea Only     Ultram [tramadol] Not Specified Seizure     Amoxicillin Low Rash     Lidocaine Low Hives, Rash patch            Below are additional concerns with the patient's PTA list.      Piper Stewart CPhT
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
## 2023-10-24 ENCOUNTER — APPOINTMENT (OUTPATIENT)
Dept: PRIMARY CARE | Facility: CLINIC | Age: 63
End: 2023-10-24
Payer: COMMERCIAL

## 2023-10-24 VITALS
RESPIRATION RATE: 18 BRPM | WEIGHT: 180 LBS | HEART RATE: 87 BPM | HEIGHT: 64 IN | OXYGEN SATURATION: 97 % | TEMPERATURE: 97.9 F | SYSTOLIC BLOOD PRESSURE: 144 MMHG | DIASTOLIC BLOOD PRESSURE: 89 MMHG | BODY MASS INDEX: 30.73 KG/M2

## 2023-10-24 LAB — GLUCOSE BLD MANUAL STRIP-MCNC: 127 MG/DL (ref 74–99)

## 2023-10-24 PROCEDURE — 2500000001 HC RX 250 WO HCPCS SELF ADMINISTERED DRUGS (ALT 637 FOR MEDICARE OP): Performed by: STUDENT IN AN ORGANIZED HEALTH CARE EDUCATION/TRAINING PROGRAM

## 2023-10-24 PROCEDURE — 2500000001 HC RX 250 WO HCPCS SELF ADMINISTERED DRUGS (ALT 637 FOR MEDICARE OP)

## 2023-10-24 PROCEDURE — G0378 HOSPITAL OBSERVATION PER HR: HCPCS

## 2023-10-24 PROCEDURE — 82947 ASSAY GLUCOSE BLOOD QUANT: CPT

## 2023-10-24 PROCEDURE — 99222 1ST HOSP IP/OBS MODERATE 55: CPT | Performed by: INTERNAL MEDICINE

## 2023-10-24 PROCEDURE — 99238 HOSP IP/OBS DSCHRG MGMT 30/<: CPT | Performed by: STUDENT IN AN ORGANIZED HEALTH CARE EDUCATION/TRAINING PROGRAM

## 2023-10-24 RX ORDER — LAMOTRIGINE 100 MG/1
100 TABLET ORAL DAILY
Status: DISCONTINUED | OUTPATIENT
Start: 2023-10-24 | End: 2023-10-24 | Stop reason: HOSPADM

## 2023-10-24 RX ORDER — OXYCODONE HYDROCHLORIDE 5 MG/1
10 TABLET ORAL EVERY 4 HOURS PRN
Status: DISCONTINUED | OUTPATIENT
Start: 2023-10-24 | End: 2023-10-24 | Stop reason: HOSPADM

## 2023-10-24 RX ORDER — OXYCODONE HYDROCHLORIDE 5 MG/1
10 TABLET ORAL ONCE
Status: DISCONTINUED | OUTPATIENT
Start: 2023-10-24 | End: 2023-10-24

## 2023-10-24 RX ORDER — ATORVASTATIN CALCIUM 40 MG/1
40 TABLET, FILM COATED ORAL NIGHTLY
Qty: 90 TABLET | Refills: 0 | Status: SHIPPED | OUTPATIENT
Start: 2023-10-24 | End: 2024-02-06 | Stop reason: WASHOUT

## 2023-10-24 RX ORDER — ATORVASTATIN CALCIUM 40 MG/1
40 TABLET, FILM COATED ORAL NIGHTLY
Status: DISCONTINUED | OUTPATIENT
Start: 2023-10-24 | End: 2023-10-24 | Stop reason: HOSPADM

## 2023-10-24 RX ADMIN — LISINOPRIL 10 MG: 10 TABLET ORAL at 09:31

## 2023-10-24 RX ADMIN — LAMOTRIGINE 100 MG: 100 TABLET ORAL at 09:47

## 2023-10-24 RX ADMIN — HYDROCHLOROTHIAZIDE 12.5 MG: 25 TABLET ORAL at 09:31

## 2023-10-24 ASSESSMENT — PAIN SCALES - GENERAL
PAINLEVEL_OUTOF10: 0 - NO PAIN

## 2023-10-24 ASSESSMENT — PAIN - FUNCTIONAL ASSESSMENT: PAIN_FUNCTIONAL_ASSESSMENT: 0-10

## 2023-10-24 NOTE — DISCHARGE SUMMARY
Discharge Diagnosis  Chest pain    Issues Requiring Follow-Up  Chest pain    Test Results Pending At Discharge  Pending Labs       No current pending labs.            Hospital Course   63 year old female with past medical history of DMII, BPPV, HTN, HLD, anxiety, depression, seizure disorder, who [facility] from [facility] with chest pain. States left sided chest pain started this morning and radiated to left arm and left jaw with mild dyspnea with exertion and dizziness. Rates the pain 8/10. States she had palpitations for about 10 minutes but resolved on its own. States she was napping for 6 hours and woke up with the chest pain. She takes Percocet for chronic pain and this helped the chest pain also. Cardiology consulted. Patient will be hospitalized for further medical management.     #Chest Pain   Admit OBS/Tele per Dr. Mckinley  Consult cardiology and appreciate recs  See imaging results  Aspirin given in ED  Troponin negative x 2. Will trend.  Echocardiogram 2/17/23: EF 60-65%  NPO. Attending to resume as appropriate.  Repeat labs (TSH, lipid panel, hemoglobin a1c) in AM     Chronic issues  #HTN  #HLD  #DMII  #BPPV  #Anxiety  #Depression  #Seizure disorder  Continue home meds as appropriate when nursing completes home med rec  SSI with hypoglycemic protocol  Full code     #DVT prophylaxis  Heparin subcutaneous    Cleard by cards    Pertinent Physical Exam At Time of Discharge  Physical Exam  Constitutional:       Appearance: Normal appearance.   HENT:      Head: Normocephalic and atraumatic.      Right Ear: Tympanic membrane and ear canal normal.      Left Ear: Tympanic membrane and ear canal normal.      Mouth/Throat:      Mouth: Mucous membranes are moist.      Pharynx: Oropharynx is clear.   Eyes:      Extraocular Movements: Extraocular movements intact.      Conjunctiva/sclera: Conjunctivae normal.      Pupils: Pupils are equal, round, and reactive to light.   Cardiovascular:      Rate and Rhythm:  Normal rate and regular rhythm.      Pulses: Normal pulses.      Heart sounds: Normal heart sounds.   Pulmonary:      Effort: Pulmonary effort is normal.      Breath sounds: Normal breath sounds.   Abdominal:      General: Abdomen is flat. Bowel sounds are normal.      Palpations: Abdomen is soft.   Musculoskeletal:         General: Normal range of motion.      Cervical back: Normal range of motion and neck supple.   Skin:     General: Skin is warm and dry.      Capillary Refill: Capillary refill takes 2 to 3 seconds.   Neurological:      General: No focal deficit present.      Mental Status: She is alert and oriented to person, place, and time. Mental status is at baseline.   Psychiatric:         Mood and Affect: Mood normal.         Behavior: Behavior normal.         Thought Content: Thought content normal.         Judgment: Judgment normal.         Home Medications     Medication List      START taking these medications     atorvastatin 40 mg tablet; Commonly known as: Lipitor; Take 1 tablet (40   mg) by mouth once daily at bedtime.     CHANGE how you take these medications     DULoxetine 60 mg DR capsule; Commonly known as: Cymbalta; TAKE 1 CAPSULE   BY MOUTH EVERY DAY; What changed: when to take this   metFORMIN  mg 24 hr tablet; Commonly known as: Glucophage-XR; TAKE   1 TABLET BY MOUTH EVERY DAY WITH EVENING MEAL; What changed: when to take   this     CONTINUE taking these medications     calcium carbonate-vitamin D3 600 mg-10 mcg (400 unit) chewable tablet   ipratropium-albuteroL  mcg/actuation inhaler; Commonly known as:   Combivent Respimat; 2 puffs twice daily   lamoTRIgine 100 mg tablet; Commonly known as: LaMICtal   lisinopriL-hydrochlorothiazide 10-12.5 mg tablet   multivitamin tablet   nystatin 100,000 unit/gram powder; Commonly known as: Mycostatin; Apply   layer of powder over affected area twice daily   nystatin-triamcinolone ointment; Commonly known as: Mycolog II; Apply   thin layer  to affected area two times daily   OneTouch Delica Plus Lancet 30 gauge misc; Generic drug: lancets; USE TO   TEST ONCE DAILY   OneTouch Verio test strips strip; Generic drug: blood sugar diagnostic;   USE TO TEST ONCE DAILY   oxyCODONE-acetaminophen  mg tablet; Commonly known as: Percocet;   Take 1 tablet by mouth every 6 hours if needed for severe pain (7 - 10).   pregabalin 150 mg capsule; Commonly known as: Lyrica       Outpatient Follow-Up  Future Appointments   Date Time Provider Department Center   11/20/2023  9:20 AM Arnulfo Reynolds MD [id number]       Khari Mckinley DO
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
## 2023-10-25 ENCOUNTER — PATIENT OUTREACH (OUTPATIENT)
Dept: CARE COORDINATION | Facility: CLINIC | Age: 63
End: 2023-10-25
Payer: COMMERCIAL

## 2023-10-25 DIAGNOSIS — I10 ESSENTIAL (PRIMARY) HYPERTENSION: ICD-10-CM

## 2023-10-25 DIAGNOSIS — R07.9 CHEST PAIN, UNSPECIFIED TYPE: ICD-10-CM

## 2023-10-25 RX ORDER — LISINOPRIL AND HYDROCHLOROTHIAZIDE 10; 12.5 MG/1; MG/1
1 TABLET ORAL DAILY
Qty: 90 TABLET | Refills: 2 | Status: SHIPPED | OUTPATIENT
Start: 2023-10-25

## 2023-10-25 NOTE — PROGRESS NOTES
Discharge Facility:[facility]  Discharge Diagnosis:chest pain  Admission Date:10.22.23  Discharge Date: 10.23.23    PCP Appointment Date:messaged office  Specialist Appointment Date:   Hospital Encounter and Summary: Linked   See discharge assessment below for further details   Engagement  Call Start Time: 1524 (10/25/2023  3:24 PM)    Medications  Medications reviewed with patient/caregiver?: Yes (10/25/2023  3:24 PM)  Is the patient having any side effects they believe may be caused by any medication additions or changes?: No (10/25/2023  3:24 PM)  Does the patient have all medications ordered at discharge?: Yes (10/25/2023  3:24 PM)  Care Management Interventions: No intervention needed (10/25/2023  3:24 PM)  Is the patient taking all medications as directed (includes completed medication regime)?: Yes (10/25/2023  3:24 PM)  Care Management Interventions: Provided patient education (10/25/2023  3:24 PM)    Appointments  Does the patient have a primary care provider?: Yes (10/25/2023  3:24 PM)  Care Management Interventions: Educated patient on importance of making appointment (appt not available within 14 days. Messaged office) (10/25/2023  3:24 PM)  Has the patient kept scheduled appointments due by today?: Yes (10/25/2023  3:24 PM)    Self Management  What is the home health agency?: n/a (10/25/2023  3:24 PM)  What Durable Medical Equipment (DME) was ordered?: n/a (10/25/2023  3:24 PM)    Patient Teaching  What is the patient's perception of their health status since discharge?: Same (10/25/2023  3:24 PM)  Is the patient/caregiver able to teach back the hierarchy of who to call/visit for symptoms/problems? PCP, Specialist, Home Health nurse, Urgent Care, ED, 911: Yes (10/25/2023  3:24 PM)  Patient/Caregiver Education Comments: Spoke with patient. Continues to have CP. States diagnosis from hospital not available to her. No med changes except addition of Lipitor. Tried to make an appt with PCP but no available  appts. Messaged office to follow up. (10/25/2023  3:24 PM)

## 2023-10-26 ENCOUNTER — TELEPHONE (OUTPATIENT)
Dept: PRIMARY CARE | Facility: CLINIC | Age: 63
End: 2023-10-26
Payer: COMMERCIAL

## 2023-10-26 ENCOUNTER — HOSPITAL ENCOUNTER (OUTPATIENT)
Dept: CARDIOLOGY | Facility: HOSPITAL | Age: 63
Discharge: HOME | End: 2023-10-26
Payer: COMMERCIAL

## 2023-10-26 DIAGNOSIS — R00.2 PALPITATIONS: Primary | ICD-10-CM

## 2023-10-26 DIAGNOSIS — R42 DIZZINESS: ICD-10-CM

## 2023-10-26 LAB
ATRIAL RATE: 74 BPM
P AXIS: -19 DEGREES
PR INTERVAL: 198 MS
Q ONSET: 249 MS
QRS COUNT: 12 BEATS
QRS DURATION: 92 MS
QT INTERVAL: 382 MS
QTC CALCULATION(BAZETT): 421 MS
QTC FREDERICIA: 408 MS
R AXIS: -28 DEGREES
T AXIS: 37 DEGREES
T OFFSET: 440 MS
VENTRICULAR RATE: 73 BPM

## 2023-10-26 PROCEDURE — 93005 ELECTROCARDIOGRAM TRACING: CPT

## 2023-10-26 NOTE — PROGRESS NOTES
Subjective   Patient ID: Cristal Pollard is a 63 y.o. female who presents for No chief complaint on file..  HPI    Review of Systems    Objective   Physical Exam    Assessment/Plan

## 2023-10-26 NOTE — TELEPHONE ENCOUNTER
Patient was recently in the hospital - Is not feeling well still. Feeling SOB , rapid heart rate dizzy and tired. She wants to see you asap.   I see she is currently checked in with a cardiology apt so I am hoping they are taking care of this problem for her.   Please let me know what you think

## 2023-10-29 PROBLEM — M77.8 SHOULDER TENDINITIS, RIGHT: Status: ACTIVE | Noted: 2023-10-29

## 2023-10-29 PROBLEM — Z86.0100 HISTORY OF COLON POLYPS: Status: ACTIVE | Noted: 2023-10-29

## 2023-10-29 PROBLEM — E11.9 TYPE 2 DIABETES MELLITUS (MULTI): Status: ACTIVE | Noted: 2023-10-29

## 2023-10-29 PROBLEM — R29.898 WEAKNESS OF BOTH UPPER EXTREMITIES: Status: ACTIVE | Noted: 2023-10-29

## 2023-10-29 PROBLEM — D18.01 HEMANGIOMA OF SKIN AND SUBCUTANEOUS TISSUE: Status: ACTIVE | Noted: 2021-08-26

## 2023-10-29 PROBLEM — L85.3 XEROSIS CUTIS: Status: ACTIVE | Noted: 2021-08-26

## 2023-10-29 PROBLEM — R53.83 FATIGUE: Status: ACTIVE | Noted: 2023-10-29

## 2023-10-29 PROBLEM — M51.379 DDD (DEGENERATIVE DISC DISEASE), LUMBOSACRAL: Status: ACTIVE | Noted: 2023-10-29

## 2023-10-29 PROBLEM — D49.2 NEOPLASM OF UNSPECIFIED BEHAVIOR OF BONE, SOFT TISSUE, AND SKIN: Status: ACTIVE | Noted: 2021-08-26

## 2023-10-29 PROBLEM — Z98.890 STATUS POST SURGERY: Status: ACTIVE | Noted: 2023-10-29

## 2023-10-29 PROBLEM — M75.101 ROTATOR CUFF SYNDROME OF RIGHT SHOULDER: Status: ACTIVE | Noted: 2023-10-29

## 2023-10-29 PROBLEM — F54 PSYCHOLOGICAL FACTOR AFFECTING PHYSICAL CONDITION: Status: ACTIVE | Noted: 2023-10-29

## 2023-10-29 PROBLEM — M77.01 MEDIAL EPICONDYLITIS OF RIGHT ELBOW: Status: ACTIVE | Noted: 2023-10-29

## 2023-10-29 PROBLEM — M54.17 RIGHT LUMBOSACRAL RADICULOPATHY: Status: ACTIVE | Noted: 2023-10-29

## 2023-10-29 PROBLEM — D22.10 MELANOCYTIC NEVI OF UNSPECIFIED EYELID, INCLUDING CANTHUS: Status: ACTIVE | Noted: 2021-08-26

## 2023-10-29 PROBLEM — L57.0 ACTINIC KERATOSIS: Status: ACTIVE | Noted: 2021-08-26

## 2023-10-29 PROBLEM — M47.816 LUMBAR FACET ARTHROPATHY: Status: ACTIVE | Noted: 2023-10-29

## 2023-10-29 PROBLEM — R41.3 SHORT-TERM MEMORY LOSS: Status: ACTIVE | Noted: 2023-10-29

## 2023-10-29 PROBLEM — L57.3 POIKILODERMA OF CIVATTE: Status: ACTIVE | Noted: 2021-08-26

## 2023-10-29 PROBLEM — Z86.010 HISTORY OF COLON POLYPS: Status: ACTIVE | Noted: 2023-10-29

## 2023-10-29 PROBLEM — M50.20 HNP (HERNIATED NUCLEUS PULPOSUS), CERVICAL: Status: ACTIVE | Noted: 2023-10-29

## 2023-10-29 PROBLEM — H43.812 PVD (POSTERIOR VITREOUS DETACHMENT), LEFT EYE: Status: ACTIVE | Noted: 2023-10-29

## 2023-10-29 PROBLEM — M51.37 DDD (DEGENERATIVE DISC DISEASE), LUMBOSACRAL: Status: ACTIVE | Noted: 2023-10-29

## 2023-10-29 PROBLEM — D22.60 MELANOCYTIC NEVI OF UNSPECIFIED UPPER LIMB, INCLUDING SHOULDER: Status: ACTIVE | Noted: 2021-08-26

## 2023-10-29 PROBLEM — M48.062 SPINAL STENOSIS OF LUMBAR REGION WITH NEUROGENIC CLAUDICATION: Status: ACTIVE | Noted: 2023-10-29

## 2023-10-29 PROBLEM — M47.812 DJD (DEGENERATIVE JOINT DISEASE) OF CERVICAL SPINE: Status: ACTIVE | Noted: 2023-10-29

## 2023-10-29 PROBLEM — F45.42 PAIN DISORDER ASSOCIATED WITH PSYCHOLOGICAL AND PHYSICAL FACTORS: Status: ACTIVE | Noted: 2023-10-29

## 2023-10-29 PROBLEM — R10.13 DYSPEPSIA: Status: ACTIVE | Noted: 2023-10-29

## 2023-10-29 PROBLEM — D22.30 MELANOCYTIC NEVI OF UNSPECIFIED PART OF FACE: Status: ACTIVE | Noted: 2021-08-26

## 2023-10-29 PROBLEM — M75.41 INTERNAL IMPINGEMENT OF RIGHT SHOULDER: Status: ACTIVE | Noted: 2023-10-29

## 2023-10-29 PROBLEM — G40.909 SEIZURE DISORDER (MULTI): Status: ACTIVE | Noted: 2023-10-29

## 2023-10-29 PROBLEM — L82.1 OTHER SEBORRHEIC KERATOSIS: Status: ACTIVE | Noted: 2021-08-26

## 2023-10-29 PROBLEM — H02.889 MGD (MEIBOMIAN GLAND DISEASE): Status: ACTIVE | Noted: 2023-10-29

## 2023-10-29 PROBLEM — L71.9 ROSACEA, UNSPECIFIED: Status: ACTIVE | Noted: 2021-08-26

## 2023-10-29 PROBLEM — D22.5 MELANOCYTIC NEVI OF TRUNK: Status: ACTIVE | Noted: 2021-08-26

## 2023-10-29 PROBLEM — R00.2 PALPITATIONS: Status: ACTIVE | Noted: 2023-10-29

## 2023-10-29 PROBLEM — R73.9 ELEVATED BLOOD SUGAR: Status: ACTIVE | Noted: 2023-10-29

## 2023-10-29 PROBLEM — F41.0 PANIC ATTACKS: Status: ACTIVE | Noted: 2023-10-29

## 2023-10-29 PROBLEM — D17.21 BENIGN LIPOMATOUS NEOPLASM OF SKIN AND SUBCUTANEOUS TISSUE OF RIGHT ARM: Status: ACTIVE | Noted: 2021-08-26

## 2023-10-29 PROBLEM — E66.01 MORBID OBESITY DUE TO EXCESS CALORIES (MULTI): Status: ACTIVE | Noted: 2023-10-29

## 2023-10-29 PROBLEM — D22.20 MELANOCYTIC NEVI OF UNSPECIFIED EAR AND EXTERNAL AURICULAR CANAL: Status: ACTIVE | Noted: 2021-08-26

## 2023-10-29 PROBLEM — R51.9 HEADACHE: Status: ACTIVE | Noted: 2023-10-29

## 2023-10-29 PROBLEM — L57.8 OTHER SKIN CHANGES DUE TO CHRONIC EXPOSURE TO NONIONIZING RADIATION: Status: ACTIVE | Noted: 2021-08-26

## 2023-10-29 PROBLEM — M48.00 CENTRAL STENOSIS OF SPINAL CANAL: Status: ACTIVE | Noted: 2023-10-29

## 2023-10-29 PROBLEM — L81.4 OTHER MELANIN HYPERPIGMENTATION: Status: ACTIVE | Noted: 2021-08-26

## 2023-10-29 PROBLEM — L30.4 ERYTHEMA INTERTRIGO: Status: ACTIVE | Noted: 2021-08-26

## 2023-10-29 PROBLEM — D22.70 MELANOCYTIC NEVI OF UNSPECIFIED LOWER LIMB, INCLUDING HIP: Status: ACTIVE | Noted: 2021-08-26

## 2023-10-29 PROBLEM — D22.4 MELANOCYTIC NEVI OF SCALP AND NECK: Status: ACTIVE | Noted: 2021-08-26

## 2023-10-29 RX ORDER — PANTOPRAZOLE SODIUM 40 MG/1
TABLET, DELAYED RELEASE ORAL AS NEEDED
COMMUNITY
Start: 2018-04-09 | End: 2023-10-31 | Stop reason: ALTCHOICE

## 2023-10-29 RX ORDER — METRONIDAZOLE 7.5 MG/G
1 CREAM TOPICAL
COMMUNITY
Start: 2020-11-07 | End: 2023-10-31 | Stop reason: ALTCHOICE

## 2023-10-29 RX ORDER — DICYCLOMINE HYDROCHLORIDE 10 MG/1
10 CAPSULE ORAL
COMMUNITY
Start: 2018-04-12 | End: 2023-10-31 | Stop reason: ALTCHOICE

## 2023-10-29 RX ORDER — ERGOCALCIFEROL 1.25 MG/1
50000 CAPSULE ORAL
COMMUNITY
Start: 2020-02-05 | End: 2024-02-16 | Stop reason: ENTERED-IN-ERROR

## 2023-10-29 RX ORDER — ASPIRIN 81 MG/1
1 TABLET ORAL DAILY
COMMUNITY
Start: 2018-06-11 | End: 2023-10-31 | Stop reason: ALTCHOICE

## 2023-10-29 RX ORDER — CLOPIDOGREL BISULFATE 75 MG/1
75 TABLET ORAL
COMMUNITY
Start: 2014-10-10 | End: 2023-10-31 | Stop reason: ALTCHOICE

## 2023-10-29 RX ORDER — NITROGLYCERIN 0.4 MG/1
1 TABLET SUBLINGUAL EVERY 5 MIN PRN
COMMUNITY

## 2023-10-29 RX ORDER — ONDANSETRON 4 MG/1
1 TABLET, FILM COATED ORAL EVERY 6 HOURS PRN
COMMUNITY
Start: 2022-02-11 | End: 2023-10-31 | Stop reason: ALTCHOICE

## 2023-10-29 RX ORDER — OXYCODONE AND ACETAMINOPHEN 7.5; 325 MG/1; MG/1
1 TABLET ORAL
COMMUNITY
End: 2023-10-31 | Stop reason: ALTCHOICE

## 2023-10-29 RX ORDER — LISINOPRIL 10 MG/1
10 TABLET ORAL DAILY
COMMUNITY
End: 2023-10-31 | Stop reason: ALTCHOICE

## 2023-10-29 RX ORDER — GABAPENTIN 300 MG/1
300 CAPSULE ORAL 3 TIMES DAILY
COMMUNITY
End: 2023-10-31 | Stop reason: ALTCHOICE

## 2023-10-29 RX ORDER — SERTRALINE HYDROCHLORIDE 100 MG/1
100 TABLET, FILM COATED ORAL
COMMUNITY
End: 2023-10-31 | Stop reason: ALTCHOICE

## 2023-10-29 RX ORDER — MELOXICAM 15 MG/1
1 TABLET ORAL DAILY
COMMUNITY
Start: 2022-02-28 | End: 2023-10-31 | Stop reason: ALTCHOICE

## 2023-10-29 RX ORDER — METOPROLOL SUCCINATE 25 MG/1
25 TABLET, EXTENDED RELEASE ORAL
COMMUNITY
Start: 2014-10-10 | End: 2023-10-31 | Stop reason: ALTCHOICE

## 2023-10-31 ENCOUNTER — OFFICE VISIT (OUTPATIENT)
Dept: CARDIOLOGY | Facility: CLINIC | Age: 63
End: 2023-10-31
Payer: COMMERCIAL

## 2023-10-31 ENCOUNTER — HOSPITAL ENCOUNTER (OUTPATIENT)
Dept: CARDIOLOGY | Facility: CLINIC | Age: 63
Discharge: HOME | End: 2023-10-31
Payer: COMMERCIAL

## 2023-10-31 VITALS
HEIGHT: 65 IN | SYSTOLIC BLOOD PRESSURE: 144 MMHG | OXYGEN SATURATION: 96 % | DIASTOLIC BLOOD PRESSURE: 80 MMHG | WEIGHT: 185.4 LBS | BODY MASS INDEX: 30.89 KG/M2

## 2023-10-31 DIAGNOSIS — E11.9 TYPE 2 DIABETES MELLITUS WITHOUT COMPLICATION, WITHOUT LONG-TERM CURRENT USE OF INSULIN (MULTI): ICD-10-CM

## 2023-10-31 DIAGNOSIS — R00.2 PALPITATIONS: ICD-10-CM

## 2023-10-31 DIAGNOSIS — I10 PRIMARY HYPERTENSION: ICD-10-CM

## 2023-10-31 DIAGNOSIS — R07.2 PRECORDIAL PAIN: Primary | ICD-10-CM

## 2023-10-31 DIAGNOSIS — E78.00 HYPERCHOLESTEREMIA: ICD-10-CM

## 2023-10-31 DIAGNOSIS — R42 DIZZINESS: ICD-10-CM

## 2023-10-31 DIAGNOSIS — F41.9 ANXIETY DISORDER, UNSPECIFIED TYPE: ICD-10-CM

## 2023-10-31 DIAGNOSIS — D15.1 ATRIAL MYXOMA (HHS-HCC): ICD-10-CM

## 2023-10-31 DIAGNOSIS — E66.09 CLASS 1 OBESITY DUE TO EXCESS CALORIES WITHOUT SERIOUS COMORBIDITY WITH BODY MASS INDEX (BMI) OF 30.0 TO 30.9 IN ADULT: ICD-10-CM

## 2023-10-31 PROBLEM — E66.811 CLASS 1 OBESITY DUE TO EXCESS CALORIES WITHOUT SERIOUS COMORBIDITY WITH BODY MASS INDEX (BMI) OF 30.0 TO 30.9 IN ADULT: Status: ACTIVE | Noted: 2023-10-31

## 2023-10-31 PROCEDURE — 99215 OFFICE O/P EST HI 40 MIN: CPT | Performed by: INTERNAL MEDICINE

## 2023-10-31 PROCEDURE — 93225 XTRNL ECG REC<48 HRS REC: CPT

## 2023-10-31 PROCEDURE — 3051F HG A1C>EQUAL 7.0%<8.0%: CPT | Performed by: INTERNAL MEDICINE

## 2023-10-31 PROCEDURE — 1036F TOBACCO NON-USER: CPT | Performed by: INTERNAL MEDICINE

## 2023-10-31 PROCEDURE — 3077F SYST BP >= 140 MM HG: CPT | Performed by: INTERNAL MEDICINE

## 2023-10-31 PROCEDURE — 3008F BODY MASS INDEX DOCD: CPT | Performed by: INTERNAL MEDICINE

## 2023-10-31 PROCEDURE — 3079F DIAST BP 80-89 MM HG: CPT | Performed by: INTERNAL MEDICINE

## 2023-10-31 ASSESSMENT — PAIN SCALES - GENERAL: PAINLEVEL: 0-NO PAIN

## 2023-10-31 NOTE — PROGRESS NOTES
Notes  chewt pressure with heavy heart beat.  She also noted left chest pressure. With left arm numbness..  Under a rmenous amout of stress.  Also has neck pain.  Not able to wlak on treadmill      Review of Systems   All other systems reviewed and are negative.       Physical Exam  Constitutional:       Appearance: She is obese.   HENT:      Head: Normocephalic and atraumatic.   Cardiovascular:      Rate and Rhythm: Normal rate and regular rhythm.      Heart sounds: S1 normal and S2 normal.   Musculoskeletal:      Right lower leg: No edema.      Left lower leg: No edema.   Neurological:      Mental Status: She is alert.          Problem List Items Addressed This Visit          Cardiac and Vasculature    Hypercholesteremia    Hypertension    Chest pain - Primary    Relevant Orders    Nuclear Stress Test    Follow Up In Cardiology    Palpitations    Relevant Orders    Holter or Event Cardiac Monitor    Follow Up In Cardiology       Endocrine/Metabolic    Type 2 diabetes mellitus (CMS/HCC)    Class 1 obesity due to excess calories without serious comorbidity with body mass index (BMI) of 30.0 to 30.9 in adult       Hematology and Neoplasia    Atrial myxoma    Overview     Formatting of this note might be different from the original. s/p resection 9/11         Current Assessment & Plan     Left atrial myxoma remove 10 years ago with waire left in place.  2/17/23 echocardiogram no myxoma            Mental Health    Anxiety disorder     Other Visit Diagnoses       Dizziness

## 2023-10-31 NOTE — ASSESSMENT & PLAN NOTE
Left atrial myxoma remove 10 years ago with waire left in place.  2/17/23 echocardiogram no myxoma

## 2023-11-07 DIAGNOSIS — M50.30 BULGING OF CERVICAL INTERVERTEBRAL DISC: ICD-10-CM

## 2023-11-07 DIAGNOSIS — M54.12 CERVICAL RADICULOPATHY DUE TO TRAUMA: ICD-10-CM

## 2023-11-07 DIAGNOSIS — B37.2 YEAST DERMATITIS: ICD-10-CM

## 2023-11-07 RX ORDER — NYSTATIN 100000 [USP'U]/G
POWDER TOPICAL
Qty: 30 G | Refills: 2 | Status: ON HOLD | OUTPATIENT
Start: 2023-11-07 | End: 2024-03-15 | Stop reason: WASHOUT

## 2023-11-07 RX ORDER — OXYCODONE AND ACETAMINOPHEN 10; 325 MG/1; MG/1
1 TABLET ORAL EVERY 6 HOURS PRN
Qty: 120 TABLET | Refills: 0 | Status: SHIPPED | OUTPATIENT
Start: 2023-11-07 | End: 2023-12-11 | Stop reason: SDUPTHER

## 2023-11-07 NOTE — TELEPHONE ENCOUNTER
Left voicemail with patient that she was overdue. You have a slot open 12/11 at 3:15 that I put her in and let her know I did that- so the spot wasn't missed. If it doesn't work she was alerted to let me know.

## 2023-11-08 ENCOUNTER — PATIENT OUTREACH (OUTPATIENT)
Dept: CARE COORDINATION | Facility: CLINIC | Age: 63
End: 2023-11-08
Payer: COMMERCIAL

## 2023-11-08 LAB — BODY SURFACE AREA: 1.96 M2

## 2023-11-08 PROCEDURE — 93227 XTRNL ECG REC<48 HR R&I: CPT | Performed by: INTERNAL MEDICINE

## 2023-11-08 NOTE — PROGRESS NOTES
Call regarding appt. with PCP on 11.8.23 after hospitalization.  At time of outreach call the patient feels as if their condition has improved returned to baseline) since last visit.  Reviewed with patient the PCP appointment and any questions or concerns regarding the appt.

## 2023-11-10 DIAGNOSIS — M54.12 CERVICAL RADICULOPATHY: Primary | ICD-10-CM

## 2023-11-13 DIAGNOSIS — M54.12 CERVICAL RADICULOPATHY DUE TO TRAUMA: ICD-10-CM

## 2023-11-13 DIAGNOSIS — R29.898 WEAKNESS OF BOTH LOWER EXTREMITIES: Primary | ICD-10-CM

## 2023-11-20 ENCOUNTER — APPOINTMENT (OUTPATIENT)
Dept: NEUROSURGERY | Facility: CLINIC | Age: 63
End: 2023-11-20
Payer: COMMERCIAL

## 2023-11-21 ENCOUNTER — PATIENT OUTREACH (OUTPATIENT)
Dept: CARE COORDINATION | Facility: CLINIC | Age: 63
End: 2023-11-21
Payer: COMMERCIAL

## 2023-11-21 NOTE — PROGRESS NOTES
Unable to reach patient for one month post discharge follow up call.               LVM with call back number for patient to call if needed

## 2023-11-22 ENCOUNTER — APPOINTMENT (OUTPATIENT)
Dept: RADIOLOGY | Facility: CLINIC | Age: 63
End: 2023-11-22
Payer: COMMERCIAL

## 2023-11-22 ENCOUNTER — APPOINTMENT (OUTPATIENT)
Dept: CARDIOLOGY | Facility: CLINIC | Age: 63
End: 2023-11-22
Payer: COMMERCIAL

## 2023-11-27 DIAGNOSIS — M48.00 SPINAL STENOSIS, SITE UNSPECIFIED: ICD-10-CM

## 2023-11-28 RX ORDER — GABAPENTIN 300 MG/1
300 CAPSULE ORAL 3 TIMES DAILY
Qty: 90 CAPSULE | Refills: 0 | Status: SHIPPED | OUTPATIENT
Start: 2023-11-28 | End: 2023-12-30

## 2023-12-01 DIAGNOSIS — Z87.828 HISTORY OF CERVICAL SPINE TRAUMA: ICD-10-CM

## 2023-12-01 DIAGNOSIS — R29.898 BILATERAL ARM WEAKNESS: ICD-10-CM

## 2023-12-01 DIAGNOSIS — M54.12 CERVICAL RADICULOPATHY: Primary | ICD-10-CM

## 2023-12-11 ENCOUNTER — OFFICE VISIT (OUTPATIENT)
Dept: PRIMARY CARE | Facility: CLINIC | Age: 63
End: 2023-12-11
Payer: COMMERCIAL

## 2023-12-11 VITALS
WEIGHT: 178 LBS | BODY MASS INDEX: 29.66 KG/M2 | DIASTOLIC BLOOD PRESSURE: 76 MMHG | HEIGHT: 65 IN | SYSTOLIC BLOOD PRESSURE: 124 MMHG

## 2023-12-11 DIAGNOSIS — G89.29 CHRONIC NECK PAIN: ICD-10-CM

## 2023-12-11 DIAGNOSIS — M54.2 CHRONIC NECK PAIN: ICD-10-CM

## 2023-12-11 DIAGNOSIS — G40.309 GENERALIZED TONIC CLONIC EPILEPSY (MULTI): ICD-10-CM

## 2023-12-11 DIAGNOSIS — Z79.891 CHRONIC PRESCRIPTION OPIATE USE: ICD-10-CM

## 2023-12-11 DIAGNOSIS — F41.9 ANXIETY DISORDER, UNSPECIFIED TYPE: ICD-10-CM

## 2023-12-11 DIAGNOSIS — E11.9 CONTROLLED TYPE 2 DIABETES MELLITUS WITHOUT COMPLICATION, WITHOUT LONG-TERM CURRENT USE OF INSULIN (MULTI): Chronic | ICD-10-CM

## 2023-12-11 DIAGNOSIS — M50.30 BULGING OF CERVICAL INTERVERTEBRAL DISC: Chronic | ICD-10-CM

## 2023-12-11 DIAGNOSIS — M54.12 CERVICAL RADICULOPATHY DUE TO TRAUMA: Primary | ICD-10-CM

## 2023-12-11 PROCEDURE — 3051F HG A1C>EQUAL 7.0%<8.0%: CPT | Performed by: STUDENT IN AN ORGANIZED HEALTH CARE EDUCATION/TRAINING PROGRAM

## 2023-12-11 PROCEDURE — 3078F DIAST BP <80 MM HG: CPT | Performed by: STUDENT IN AN ORGANIZED HEALTH CARE EDUCATION/TRAINING PROGRAM

## 2023-12-11 PROCEDURE — 99213 OFFICE O/P EST LOW 20 MIN: CPT | Performed by: STUDENT IN AN ORGANIZED HEALTH CARE EDUCATION/TRAINING PROGRAM

## 2023-12-11 PROCEDURE — 1036F TOBACCO NON-USER: CPT | Performed by: STUDENT IN AN ORGANIZED HEALTH CARE EDUCATION/TRAINING PROGRAM

## 2023-12-11 PROCEDURE — 3074F SYST BP LT 130 MM HG: CPT | Performed by: STUDENT IN AN ORGANIZED HEALTH CARE EDUCATION/TRAINING PROGRAM

## 2023-12-11 PROCEDURE — 3008F BODY MASS INDEX DOCD: CPT | Performed by: STUDENT IN AN ORGANIZED HEALTH CARE EDUCATION/TRAINING PROGRAM

## 2023-12-11 RX ORDER — OXYCODONE AND ACETAMINOPHEN 10; 325 MG/1; MG/1
1 TABLET ORAL EVERY 6 HOURS PRN
Qty: 120 TABLET | Refills: 0 | Status: SHIPPED | OUTPATIENT
Start: 2023-12-11 | End: 2024-01-09 | Stop reason: SDUPTHER

## 2023-12-11 ASSESSMENT — ENCOUNTER SYMPTOMS
ARTHRALGIAS: 1
NECK PAIN: 1
CARDIOVASCULAR NEGATIVE: 1
NECK STIFFNESS: 1
RESPIRATORY NEGATIVE: 1
PSYCHIATRIC NEGATIVE: 1
CONSTITUTIONAL NEGATIVE: 1
NUMBNESS: 1

## 2023-12-11 NOTE — PROGRESS NOTES
Subjective   Patient ID: Cristal Pollard is a 63 y.o. female who presents for Follow-up (Neck issue).  Chronic severe neck pain. Says she has been doing more lately.     UDS 1/2023. CSA 5/2023    Chronic opioid use. Percocet working well for her. Controlling her pain. Bowels normal, no side effects.     Still trying to get the myelogram scheduled.     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     Went to ER in October for chest pain. Normal troponins. Says the chest pain is related to stress at home.     Seeing a counselor regularly. Every 2 weeks. This has been helping.             Review of Systems   Constitutional: Negative.    HENT: Negative.     Respiratory: Negative.     Cardiovascular: Negative.    Musculoskeletal:  Positive for arthralgias, neck pain and neck stiffness.   Neurological:  Positive for numbness.   Psychiatric/Behavioral: Negative.     All other systems reviewed and are negative.      Objective   Physical Exam  Vitals reviewed.   Constitutional:       Appearance: Normal appearance.   HENT:      Head: Normocephalic.      Mouth/Throat:      Mouth: Mucous membranes are moist.   Eyes:      Pupils: Pupils are equal, round, and reactive to light.   Cardiovascular:      Rate and Rhythm: Normal rate and regular rhythm.   Pulmonary:      Effort: Pulmonary effort is normal. No respiratory distress.      Breath sounds: Normal breath sounds. No wheezing or rhonchi.   Musculoskeletal:         General: Tenderness present.   Skin:     General: Skin is warm and dry.   Neurological:      Mental Status: She is alert. Mental status is at baseline.   Psychiatric:         Mood and Affect: Mood normal.         Behavior: Behavior normal.         Body mass index is 30.08 kg/m².      Current Outpatient Medications:     atorvastatin (Lipitor) 40 mg tablet, Take 1 tablet (40 mg) by mouth once daily at bedtime., Disp: 90 tablet, Rfl: 0    benzoyl peroxide 5 % lotion, 1 Application., Disp: , Rfl:     calcium  carbonate-vitamin D3 600 mg-10 mcg (400 unit) chewable tablet, Chew 1 tablet 2 times a day., Disp: , Rfl:     DULoxetine (Cymbalta) 60 mg DR capsule, TAKE 1 CAPSULE BY MOUTH EVERY DAY (Patient taking differently: Take 1 capsule (60 mg) by mouth once daily at bedtime.), Disp: 90 capsule, Rfl: 3    ergocalciferol (Vitamin D-2) 1.25 MG (54409 UT) capsule, Take 1 capsule (50,000 Units) by mouth 1 (one) time per week., Disp: , Rfl:     gabapentin (Neurontin) 300 mg capsule, TAKE 1 CAPSULE BY MOUTH THREE TIMES A DAY, Disp: 90 capsule, Rfl: 0    lamoTRIgine (LaMICtal) 100 mg tablet, Take 1.5 tablets (150 mg) by mouth once daily at bedtime. Take 1 tablet by mouth in the Morning and 1 and 1/2 tablet at bedtime, Disp: , Rfl:     lisinopriL-hydrochlorothiazide 10-12.5 mg tablet, TAKE 1 TABLET BY MOUTH EVERY DAY, Disp: 90 tablet, Rfl: 2    metFORMIN XR (Glucophage-XR) 500 mg 24 hr tablet, TAKE 1 TABLET BY MOUTH EVERY DAY WITH EVENING MEAL (Patient taking differently: Take 1 tablet (500 mg) by mouth once daily at bedtime.), Disp: 90 tablet, Rfl: 1    multivitamin tablet, Take 1 tablet by mouth once daily., Disp: , Rfl:     nitroglycerin (Nitrostat) 0.4 mg SL tablet, Place 1 tablet (0.4 mg) under the tongue every 5 minutes if needed (FOR UP TO 3 DOSES AS NEEDED FOR CHEST PAIN.CALL 911 IF PAIN PERSISTS.)., Disp: , Rfl:     nystatin (Mycostatin) 100,000 unit/gram powder, APPLY LAYER OF POWDER OVER AFFECTED AREA TWICE DAILY, Disp: 30 g, Rfl: 2    nystatin-triamcinolone (Mycolog II) ointment, Apply thin layer to affected area two times daily, Disp: 15 g, Rfl: 1    OneTouch Delica Plus Lancet 30 gauge misc, USE TO TEST ONCE DAILY, Disp: 90 each, Rfl: 1    OneTouch Verio test strips strip, USE TO TEST ONCE DAILY, Disp: 50 strip, Rfl: 2    oxyCODONE-acetaminophen (Percocet)  mg tablet, Take 1 tablet by mouth every 6 hours if needed for severe pain (7 - 10)., Disp: 120 tablet, Rfl: 0      Assessment/Plan   Diagnoses and all orders  for this visit:  Cervical radiculopathy due to trauma  -     oxyCODONE-acetaminophen (Percocet)  mg tablet; Take 1 tablet by mouth every 6 hours if needed for severe pain (7 - 10).  Bulging of cervical intervertebral disc  Comments:  UTD on CSA, due 5/24  UTD on UDS, due next visit  no side effects from meds  Orders:  -     oxyCODONE-acetaminophen (Percocet)  mg tablet; Take 1 tablet by mouth every 6 hours if needed for severe pain (7 - 10).  Generalized tonic clonic epilepsy (CMS/HCC)  Comments:  no recent events  Controlled type 2 diabetes mellitus without complication, without long-term current use of insulin (CMS/HCC)  Comments:  doing better  last a1c 7.7  Chronic neck pain  Chronic prescription opiate use  Anxiety disorder, unspecified type  Comments:  seeing counselor now, this has been helping    [facility] report reviewed for Cristal Pollard today and is consistent with prescribed therapy.  We weighed the risks and benefit of this therapy and will continue with the current plan       Follow up in 3 months    Jessica Diehl DO 12/11/23 4:46 PM

## 2023-12-13 DIAGNOSIS — M54.12 CERVICAL RADICULOPATHY DUE TO TRAUMA: Primary | ICD-10-CM

## 2023-12-19 LAB
ATRIAL RATE: 69 BPM
ATRIAL RATE: 81 BPM
P AXIS: 12 DEGREES
P AXIS: 13 DEGREES
P OFFSET: 175 MS
P ONSET: 126 MS
PR INTERVAL: 174 MS
PR INTERVAL: 207 MS
Q ONSET: 213 MS
Q ONSET: 249 MS
QRS COUNT: 11 BEATS
QRS COUNT: 13 BEATS
QRS DURATION: 84 MS
QRS DURATION: 94 MS
QT INTERVAL: 360 MS
QT INTERVAL: 386 MS
QTC CALCULATION(BAZETT): 414 MS
QTC CALCULATION(BAZETT): 418 MS
QTC FREDERICIA: 397 MS
QTC FREDERICIA: 404 MS
R AXIS: -19 DEGREES
R AXIS: -20 DEGREES
T AXIS: 42 DEGREES
T AXIS: 44 DEGREES
T OFFSET: 393 MS
T OFFSET: 442 MS
VENTRICULAR RATE: 69 BPM
VENTRICULAR RATE: 81 BPM

## 2023-12-27 DIAGNOSIS — S13.4XXS WHIPLASH INJURY TO NECK, SEQUELA: ICD-10-CM

## 2023-12-27 DIAGNOSIS — S16.1XXS STRAIN OF NECK MUSCLE, SEQUELA: Primary | ICD-10-CM

## 2023-12-28 ENCOUNTER — LAB (OUTPATIENT)
Dept: LAB | Facility: LAB | Age: 63
End: 2023-12-28
Payer: COMMERCIAL

## 2023-12-28 DIAGNOSIS — S13.4XXS WHIPLASH INJURY TO NECK, SEQUELA: ICD-10-CM

## 2023-12-28 DIAGNOSIS — R29.898 BILATERAL ARM WEAKNESS: ICD-10-CM

## 2023-12-28 DIAGNOSIS — S16.1XXS STRAIN OF NECK MUSCLE, SEQUELA: ICD-10-CM

## 2023-12-28 DIAGNOSIS — Z87.828 HISTORY OF CERVICAL SPINE TRAUMA: ICD-10-CM

## 2023-12-28 DIAGNOSIS — M54.12 CERVICAL RADICULOPATHY DUE TO TRAUMA: ICD-10-CM

## 2023-12-28 DIAGNOSIS — M54.12 CERVICAL RADICULOPATHY: ICD-10-CM

## 2023-12-28 LAB
CREAT SERPL-MCNC: 0.8 MG/DL (ref 0.5–1.05)
ERYTHROCYTE [DISTWIDTH] IN BLOOD BY AUTOMATED COUNT: 12.7 % (ref 11.5–14.5)
GFR SERPL CREATININE-BSD FRML MDRD: 82 ML/MIN/1.73M*2
HCT VFR BLD AUTO: 45.1 % (ref 36–46)
HGB BLD-MCNC: 15 G/DL (ref 12–16)
INR PPP: 1.1 (ref 0.9–1.1)
MCH RBC QN AUTO: 30.8 PG (ref 26–34)
MCHC RBC AUTO-ENTMCNC: 33.3 G/DL (ref 32–36)
MCV RBC AUTO: 93 FL (ref 80–100)
NRBC BLD-RTO: 0 /100 WBCS (ref 0–0)
PLATELET # BLD AUTO: 318 X10*3/UL (ref 150–450)
PROTHROMBIN TIME: 11.9 SECONDS (ref 9.8–12.8)
RBC # BLD AUTO: 4.87 X10*6/UL (ref 4–5.2)
WBC # BLD AUTO: 10.3 X10*3/UL (ref 4.4–11.3)

## 2023-12-28 PROCEDURE — 36415 COLL VENOUS BLD VENIPUNCTURE: CPT

## 2023-12-28 PROCEDURE — 85027 COMPLETE CBC AUTOMATED: CPT

## 2023-12-28 PROCEDURE — 85610 PROTHROMBIN TIME: CPT

## 2023-12-28 PROCEDURE — 82565 ASSAY OF CREATININE: CPT

## 2023-12-29 ENCOUNTER — HOSPITAL ENCOUNTER (OUTPATIENT)
Dept: RADIOLOGY | Facility: HOSPITAL | Age: 63
Discharge: HOME | End: 2023-12-29
Payer: COMMERCIAL

## 2023-12-29 ENCOUNTER — ANESTHESIA (OUTPATIENT)
Dept: RADIOLOGY | Facility: HOSPITAL | Age: 63
End: 2023-12-29
Payer: COMMERCIAL

## 2023-12-29 ENCOUNTER — ANESTHESIA EVENT (OUTPATIENT)
Dept: RADIOLOGY | Facility: HOSPITAL | Age: 63
End: 2023-12-29
Payer: COMMERCIAL

## 2023-12-29 VITALS
DIASTOLIC BLOOD PRESSURE: 87 MMHG | SYSTOLIC BLOOD PRESSURE: 138 MMHG | RESPIRATION RATE: 16 BRPM | OXYGEN SATURATION: 96 % | TEMPERATURE: 97.3 F | HEART RATE: 99 BPM | BODY MASS INDEX: 30.07 KG/M2 | WEIGHT: 177.91 LBS

## 2023-12-29 VITALS
DIASTOLIC BLOOD PRESSURE: 55 MMHG | RESPIRATION RATE: 13 BRPM | SYSTOLIC BLOOD PRESSURE: 105 MMHG | HEART RATE: 61 BPM | OXYGEN SATURATION: 98 % | TEMPERATURE: 97.2 F

## 2023-12-29 DIAGNOSIS — Z87.828 HISTORY OF CERVICAL SPINE TRAUMA: ICD-10-CM

## 2023-12-29 DIAGNOSIS — M54.12 CERVICAL RADICULOPATHY: ICD-10-CM

## 2023-12-29 DIAGNOSIS — M54.12 CERVICAL RADICULOPATHY DUE TO TRAUMA: ICD-10-CM

## 2023-12-29 DIAGNOSIS — R29.898 BILATERAL ARM WEAKNESS: ICD-10-CM

## 2023-12-29 PROCEDURE — 2500000004 HC RX 250 GENERAL PHARMACY W/ HCPCS (ALT 636 FOR OP/ED): Mod: SE

## 2023-12-29 PROCEDURE — 3700000001 HC GENERAL ANESTHESIA TIME - INITIAL BASE CHARGE

## 2023-12-29 PROCEDURE — 2500000001 HC RX 250 WO HCPCS SELF ADMINISTERED DRUGS (ALT 637 FOR MEDICARE OP): Mod: SE | Performed by: STUDENT IN AN ORGANIZED HEALTH CARE EDUCATION/TRAINING PROGRAM

## 2023-12-29 PROCEDURE — 2550000001 HC RX 255 CONTRASTS: Mod: SE | Performed by: STUDENT IN AN ORGANIZED HEALTH CARE EDUCATION/TRAINING PROGRAM

## 2023-12-29 PROCEDURE — 72126 CT NECK SPINE W/DYE: CPT | Performed by: RADIOLOGY

## 2023-12-29 PROCEDURE — A72126: Performed by: STUDENT IN AN ORGANIZED HEALTH CARE EDUCATION/TRAINING PROGRAM

## 2023-12-29 PROCEDURE — 72126 CT NECK SPINE W/DYE: CPT

## 2023-12-29 PROCEDURE — 62302 MYELOGRAPHY LUMBAR INJECTION: CPT

## 2023-12-29 PROCEDURE — 62302 MYELOGRAPHY LUMBAR INJECTION: CPT | Performed by: RADIOLOGY

## 2023-12-29 PROCEDURE — 3700000002 HC GENERAL ANESTHESIA TIME - EACH INCREMENTAL 1 MINUTE

## 2023-12-29 PROCEDURE — A72126

## 2023-12-29 PROCEDURE — 2500000004 HC RX 250 GENERAL PHARMACY W/ HCPCS (ALT 636 FOR OP/ED): Mod: SE | Performed by: STUDENT IN AN ORGANIZED HEALTH CARE EDUCATION/TRAINING PROGRAM

## 2023-12-29 PROCEDURE — 2500000005 HC RX 250 GENERAL PHARMACY W/O HCPCS: Mod: SE

## 2023-12-29 RX ORDER — ALBUTEROL SULFATE 0.83 MG/ML
2.5 SOLUTION RESPIRATORY (INHALATION) ONCE AS NEEDED
Status: CANCELLED | OUTPATIENT
Start: 2023-12-29

## 2023-12-29 RX ORDER — LIDOCAINE HYDROCHLORIDE 10 MG/ML
10 INJECTION, SOLUTION EPIDURAL; INFILTRATION; INTRACAUDAL; PERINEURAL ONCE
Status: DISCONTINUED | OUTPATIENT
Start: 2023-12-29 | End: 2023-12-30 | Stop reason: HOSPADM

## 2023-12-29 RX ORDER — ROCURONIUM BROMIDE 10 MG/ML
INJECTION, SOLUTION INTRAVENOUS AS NEEDED
Status: DISCONTINUED | OUTPATIENT
Start: 2023-12-29 | End: 2023-12-29

## 2023-12-29 RX ORDER — ACETAMINOPHEN 325 MG/1
650 TABLET ORAL EVERY 4 HOURS PRN
Status: DISCONTINUED | OUTPATIENT
Start: 2023-12-29 | End: 2023-12-30 | Stop reason: HOSPADM

## 2023-12-29 RX ORDER — OXYCODONE HYDROCHLORIDE 10 MG/1
10 TABLET ORAL EVERY 4 HOURS PRN
Status: CANCELLED | OUTPATIENT
Start: 2023-12-29

## 2023-12-29 RX ORDER — FENTANYL CITRATE 50 UG/ML
INJECTION, SOLUTION INTRAMUSCULAR; INTRAVENOUS AS NEEDED
Status: DISCONTINUED | OUTPATIENT
Start: 2023-12-29 | End: 2023-12-29

## 2023-12-29 RX ORDER — NORETHINDRONE AND ETHINYL ESTRADIOL 0.5-0.035
KIT ORAL CONTINUOUS PRN
Status: DISCONTINUED | OUTPATIENT
Start: 2023-12-29 | End: 2023-12-29

## 2023-12-29 RX ORDER — HYDROMORPHONE HYDROCHLORIDE 1 MG/ML
0.5 INJECTION, SOLUTION INTRAMUSCULAR; INTRAVENOUS; SUBCUTANEOUS EVERY 5 MIN PRN
Status: CANCELLED | OUTPATIENT
Start: 2023-12-29

## 2023-12-29 RX ORDER — ONDANSETRON HYDROCHLORIDE 2 MG/ML
4 INJECTION, SOLUTION INTRAVENOUS ONCE AS NEEDED
Status: CANCELLED | OUTPATIENT
Start: 2023-12-29

## 2023-12-29 RX ORDER — LIDOCAINE HYDROCHLORIDE 20 MG/ML
INJECTION, SOLUTION INFILTRATION; PERINEURAL AS NEEDED
Status: DISCONTINUED | OUTPATIENT
Start: 2023-12-29 | End: 2023-12-29

## 2023-12-29 RX ORDER — PHENYLEPHRINE HYDROCHLORIDE 10 MG/ML
INJECTION INTRAVENOUS AS NEEDED
Status: DISCONTINUED | OUTPATIENT
Start: 2023-12-29 | End: 2023-12-29

## 2023-12-29 RX ORDER — ONDANSETRON HYDROCHLORIDE 2 MG/ML
4 INJECTION, SOLUTION INTRAVENOUS ONCE AS NEEDED
Status: COMPLETED | OUTPATIENT
Start: 2023-12-29 | End: 2023-12-29

## 2023-12-29 RX ORDER — MIDAZOLAM HYDROCHLORIDE 1 MG/ML
INJECTION, SOLUTION INTRAMUSCULAR; INTRAVENOUS AS NEEDED
Status: DISCONTINUED | OUTPATIENT
Start: 2023-12-29 | End: 2023-12-29

## 2023-12-29 RX ORDER — OXYCODONE HYDROCHLORIDE 5 MG/1
5 TABLET ORAL EVERY 4 HOURS PRN
Status: DISCONTINUED | OUTPATIENT
Start: 2023-12-29 | End: 2023-12-30 | Stop reason: HOSPADM

## 2023-12-29 RX ORDER — ALBUTEROL SULFATE 0.83 MG/ML
2.5 SOLUTION RESPIRATORY (INHALATION) ONCE AS NEEDED
Status: DISCONTINUED | OUTPATIENT
Start: 2023-12-29 | End: 2023-12-30 | Stop reason: HOSPADM

## 2023-12-29 RX ORDER — PROPOFOL 10 MG/ML
INJECTION, EMULSION INTRAVENOUS AS NEEDED
Status: DISCONTINUED | OUTPATIENT
Start: 2023-12-29 | End: 2023-12-29

## 2023-12-29 RX ORDER — OXYCODONE HYDROCHLORIDE 10 MG/1
10 TABLET ORAL EVERY 4 HOURS PRN
Status: DISCONTINUED | OUTPATIENT
Start: 2023-12-29 | End: 2023-12-30 | Stop reason: HOSPADM

## 2023-12-29 RX ORDER — OXYCODONE AND ACETAMINOPHEN 5; 325 MG/1; MG/1
1 TABLET ORAL ONCE
Status: COMPLETED | OUTPATIENT
Start: 2023-12-29 | End: 2023-12-29

## 2023-12-29 RX ORDER — SODIUM CHLORIDE, SODIUM LACTATE, POTASSIUM CHLORIDE, CALCIUM CHLORIDE 600; 310; 30; 20 MG/100ML; MG/100ML; MG/100ML; MG/100ML
100 INJECTION, SOLUTION INTRAVENOUS CONTINUOUS
Status: DISCONTINUED | OUTPATIENT
Start: 2023-12-29 | End: 2023-12-30 | Stop reason: HOSPADM

## 2023-12-29 RX ORDER — SODIUM CHLORIDE, SODIUM LACTATE, POTASSIUM CHLORIDE, CALCIUM CHLORIDE 600; 310; 30; 20 MG/100ML; MG/100ML; MG/100ML; MG/100ML
100 INJECTION, SOLUTION INTRAVENOUS CONTINUOUS
Status: CANCELLED | OUTPATIENT
Start: 2023-12-29

## 2023-12-29 RX ORDER — METHOCARBAMOL 100 MG/ML
500 INJECTION, SOLUTION INTRAMUSCULAR; INTRAVENOUS ONCE
Status: DISCONTINUED | OUTPATIENT
Start: 2023-12-29 | End: 2023-12-30 | Stop reason: HOSPADM

## 2023-12-29 RX ORDER — HYDROMORPHONE HYDROCHLORIDE 1 MG/ML
0.5 INJECTION, SOLUTION INTRAMUSCULAR; INTRAVENOUS; SUBCUTANEOUS EVERY 5 MIN PRN
Status: DISCONTINUED | OUTPATIENT
Start: 2023-12-29 | End: 2023-12-30 | Stop reason: HOSPADM

## 2023-12-29 RX ORDER — ONDANSETRON HYDROCHLORIDE 2 MG/ML
INJECTION, SOLUTION INTRAVENOUS AS NEEDED
Status: DISCONTINUED | OUTPATIENT
Start: 2023-12-29 | End: 2023-12-29

## 2023-12-29 RX ORDER — MIDAZOLAM HYDROCHLORIDE 1 MG/ML
1 INJECTION, SOLUTION INTRAMUSCULAR; INTRAVENOUS ONCE
Status: DISCONTINUED | OUTPATIENT
Start: 2023-12-29 | End: 2023-12-30 | Stop reason: HOSPADM

## 2023-12-29 RX ORDER — ACETAMINOPHEN 325 MG/1
650 TABLET ORAL EVERY 4 HOURS PRN
Status: CANCELLED | OUTPATIENT
Start: 2023-12-29

## 2023-12-29 RX ORDER — OXYCODONE HYDROCHLORIDE 5 MG/1
5 TABLET ORAL EVERY 4 HOURS PRN
Status: CANCELLED | OUTPATIENT
Start: 2023-12-29

## 2023-12-29 RX ORDER — PROPOFOL 10 MG/ML
INJECTION, EMULSION INTRAVENOUS CONTINUOUS PRN
Status: DISCONTINUED | OUTPATIENT
Start: 2023-12-29 | End: 2023-12-29

## 2023-12-29 RX ADMIN — HYDROMORPHONE HYDROCHLORIDE 0.5 MG: 1 INJECTION, SOLUTION INTRAMUSCULAR; INTRAVENOUS; SUBCUTANEOUS at 12:57

## 2023-12-29 RX ADMIN — OXYCODONE HYDROCHLORIDE 10 MG: 5 TABLET ORAL at 13:02

## 2023-12-29 RX ADMIN — SODIUM CHLORIDE, SODIUM LACTATE, POTASSIUM CHLORIDE, AND CALCIUM CHLORIDE: 600; 310; 30; 20 INJECTION, SOLUTION INTRAVENOUS at 10:16

## 2023-12-29 RX ADMIN — SUGAMMADEX 200 MG: 100 INJECTION, SOLUTION INTRAVENOUS at 11:19

## 2023-12-29 RX ADMIN — ONDANSETRON 4 MG: 2 INJECTION INTRAMUSCULAR; INTRAVENOUS at 12:57

## 2023-12-29 RX ADMIN — ROCURONIUM BROMIDE 50 MG: 10 INJECTION INTRAVENOUS at 10:26

## 2023-12-29 RX ADMIN — ACETAMINOPHEN 650 MG: 325 TABLET ORAL at 13:02

## 2023-12-29 RX ADMIN — HYDROMORPHONE HYDROCHLORIDE 0.5 MG: 1 INJECTION, SOLUTION INTRAMUSCULAR; INTRAVENOUS; SUBCUTANEOUS at 13:45

## 2023-12-29 RX ADMIN — MIDAZOLAM 1 MG: 1 INJECTION INTRAMUSCULAR; INTRAVENOUS at 10:11

## 2023-12-29 RX ADMIN — PROPOFOL 150 MG: 10 INJECTION, EMULSION INTRAVENOUS at 10:25

## 2023-12-29 RX ADMIN — FENTANYL CITRATE 50 MCG: 50 INJECTION, SOLUTION INTRAMUSCULAR; INTRAVENOUS at 11:30

## 2023-12-29 RX ADMIN — IOHEXOL 57600 MG: 240 INJECTION, SOLUTION INTRATHECAL; INTRAVASCULAR; INTRAVENOUS; ORAL at 11:37

## 2023-12-29 RX ADMIN — PHENYLEPHRINE HYDROCHLORIDE 80 MCG: 10 INJECTION INTRAVENOUS at 10:49

## 2023-12-29 RX ADMIN — ROCURONIUM BROMIDE 10 MG: 10 INJECTION INTRAVENOUS at 10:46

## 2023-12-29 RX ADMIN — OXYCODONE HYDROCHLORIDE AND ACETAMINOPHEN 1 TABLET: 5; 325 TABLET ORAL at 09:45

## 2023-12-29 RX ADMIN — FENTANYL CITRATE 50 MCG: 50 INJECTION, SOLUTION INTRAMUSCULAR; INTRAVENOUS at 10:25

## 2023-12-29 RX ADMIN — ONDANSETRON 4 MG: 2 INJECTION INTRAMUSCULAR; INTRAVENOUS at 11:12

## 2023-12-29 RX ADMIN — SODIUM CHLORIDE, POTASSIUM CHLORIDE, SODIUM LACTATE AND CALCIUM CHLORIDE 100 ML/HR: 600; 310; 30; 20 INJECTION, SOLUTION INTRAVENOUS at 13:06

## 2023-12-29 RX ADMIN — MIDAZOLAM 1 MG: 1 INJECTION INTRAMUSCULAR; INTRAVENOUS at 10:18

## 2023-12-29 RX ADMIN — PROPOFOL 100 MCG/KG/MIN: 10 INJECTION, EMULSION INTRAVENOUS at 10:25

## 2023-12-29 RX ADMIN — LIDOCAINE HYDROCHLORIDE 50 ML: 20 INJECTION, SOLUTION INFILTRATION; PERINEURAL at 10:25

## 2023-12-29 RX ADMIN — SUGAMMADEX 200 MG: 100 INJECTION, SOLUTION INTRAVENOUS at 11:27

## 2023-12-29 SDOH — HEALTH STABILITY: MENTAL HEALTH: CURRENT SMOKER: 0

## 2023-12-29 ASSESSMENT — PAIN SCALES - GENERAL
PAINLEVEL_OUTOF10: 9
PAINLEVEL_OUTOF10: 6
PAINLEVEL_OUTOF10: 9
PAINLEVEL_OUTOF10: 7
PAINLEVEL_OUTOF10: 0 - NO PAIN

## 2023-12-29 ASSESSMENT — PAIN - FUNCTIONAL ASSESSMENT
PAIN_FUNCTIONAL_ASSESSMENT: 0-10

## 2023-12-29 NOTE — POST-PROCEDURE NOTE
Fluoroscopic Guided Lumbar Puncture Postprocedure Note    Attending: Elvis Hatfield MD    Resident: Chava Barrera MD    Diagnosis:   1. Cervical radiculopathy due to trauma  FL myelogram cervical w lumbar puncture    FL myelogram cervical w lumbar puncture          Description of procedure: Written and verbal informed consent was obtained from the patient. The patient was placed in the prone position on the exam table. A timeout was performed prior to the procedure. Using fluoroscopic guidance, appropriate anatomic landmarks were identified and surgical site was marked. Site was prepped and draped in the usual sterile fashion. Local anesthesia was obtained using approximately 5 mL 1% Lidocaine.  Under intermittent fluoroscopic guidance, a 22 Gauge  3.5 inch spinal needle was advanced into the thecal sac at the L3-L4, however with no return of CSF.     A second site was prepped and draped in the usual sterile fashion. Local anesthesia was obtained using approximately 5 mL 1% Lidocaine.  Under intermittent fluoroscopic guidance, a 22 Gauge  3.5 inch spinal needle was advanced into the thecal sac at the L5-S1 until return of cerebral spinal fluid. 10 ml of Omnipaque 300 was then injected under intermittent fluoroscopic imaging.    The stylet was replaced and the needle was removed. The patient tolerated procedure well without any immediate or clinically apparent complications.     Estimated Blood Loss: None.    IMPRESSION:   Successful fluoroscopic guided lumbar puncture and fluoroscopic myelogram. See detailed result report with images in PACS.    The patient tolerated the procedure well without incident or complication and is in stable condition.
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
## 2023-12-29 NOTE — ANESTHESIA POSTPROCEDURE EVALUATION
Patient: Cristal Pollard    Procedure Summary       Date: 12/29/23 Room / Location: [facility]    Anesthesia Start: 1009 Anesthesia Stop: 1212    Procedure: FL MYELOGRAM CERVICAL W/ LUMBAR PUNCTURE Diagnosis:       Cervical radiculopathy due to trauma      (weakness)    Scheduled Providers:  Responsible Provider: Prosper Downs DO    Anesthesia Type: general ASA Status: 3            Anesthesia Type: general    Vitals Value Taken Time   /62 12/29/23 1215   Temp 36.2 °C (97.2 °F) 12/29/23 1215   Pulse 61 12/29/23 1215   Resp 14 12/29/23 1215   SpO2 100 12/29/23 1219       Anesthesia Post Evaluation    Patient location during evaluation: PACU  Patient participation: complete - patient participated  Level of consciousness: awake  Pain management: adequate  Multimodal analgesia pain management approach  Airway patency: patent  Two or more strategies used to mitigate risk of obstructive sleep apnea  Cardiovascular status: acceptable  Respiratory status: face mask  Hydration status: acceptable  Postoperative Nausea and Vomiting: none        There were no known notable events for this encounter.

## 2023-12-29 NOTE — ANESTHESIA PROCEDURE NOTES
Airway  Date/Time: 12/29/2023 10:27 AM  Urgency: elective    Airway not difficult    Staffing  Performed: CRNA   Authorized by: Prosper Downs DO    Performed by: DREW Frankel-SARAH  Patient location during procedure: OR    Indications and Patient Condition  Indications for airway management: anesthesia and airway protection  Spontaneous Ventilation: absent  Sedation level: deep  Preoxygenated: yes  Patient position: sniffing  MILS maintained throughout  Mask difficulty assessment: 1 - vent by mask  Planned trial extubation    Final Airway Details  Final airway type: endotracheal airway      Successful airway: ETT  Cuffed: yes   Successful intubation technique: video laryngoscopy  Facilitating devices/methods: intubating stylet  Endotracheal tube insertion site: oral  Blade: Shanta  Blade size: #3  ETT size (mm): 7.0  Cormack-Lehane Classification: grade I - full view of glottis  Placement verified by: chest auscultation and capnometry   Cuff volume (mL): 7  Measured from: lips  ETT to lips (cm): 21  Number of attempts at approach: 1

## 2023-12-29 NOTE — ADDENDUM NOTE
Addendum  created 12/29/23 1235 by DREW Frankel-CRNA    Narcotic reconciliation edited, Orders acknowledged in Narrator

## 2023-12-30 DIAGNOSIS — M48.00 SPINAL STENOSIS, SITE UNSPECIFIED: ICD-10-CM

## 2023-12-30 RX ORDER — GABAPENTIN 300 MG/1
300 CAPSULE ORAL 3 TIMES DAILY
Qty: 270 CAPSULE | Refills: 1 | Status: SHIPPED | OUTPATIENT
Start: 2023-12-30 | End: 2024-03-15 | Stop reason: WASHOUT

## 2024-01-03 ENCOUNTER — TELEPHONE (OUTPATIENT)
Dept: PRIMARY CARE | Facility: CLINIC | Age: 64
End: 2024-01-03
Payer: COMMERCIAL

## 2024-01-03 DIAGNOSIS — M50.30 BULGING OF CERVICAL INTERVERTEBRAL DISC: ICD-10-CM

## 2024-01-03 DIAGNOSIS — M54.12 CERVICAL RADICULOPATHY DUE TO TRAUMA: Primary | ICD-10-CM

## 2024-01-03 NOTE — TELEPHONE ENCOUNTER
Cristal Calling- Letting me know about her second myelogram recently. She woke up during the procedure for the 2nd time. She said she has been in so much pain- has been going through her medication faster than usual. She said at the rate she's going she will run out before the 10th ( which is when she is due for her next fill)   She wants to know if you will re fill her pain medication for her

## 2024-01-05 ENCOUNTER — TELEPHONE (OUTPATIENT)
Dept: PAIN MEDICINE | Facility: CLINIC | Age: 64
End: 2024-01-05
Payer: COMMERCIAL

## 2024-01-05 RX ORDER — OXYCODONE AND ACETAMINOPHEN 10; 325 MG/1; MG/1
1 TABLET ORAL EVERY 6 HOURS PRN
Qty: 10 TABLET | Refills: 0 | Status: SHIPPED | OUTPATIENT
Start: 2024-01-05 | End: 2024-01-12

## 2024-01-09 DIAGNOSIS — M50.30 BULGING OF CERVICAL INTERVERTEBRAL DISC: Chronic | ICD-10-CM

## 2024-01-09 DIAGNOSIS — M54.12 CERVICAL RADICULOPATHY DUE TO TRAUMA: ICD-10-CM

## 2024-01-09 RX ORDER — OXYCODONE AND ACETAMINOPHEN 10; 325 MG/1; MG/1
1 TABLET ORAL EVERY 6 HOURS PRN
Qty: 120 TABLET | Refills: 0 | Status: SHIPPED | OUTPATIENT
Start: 2024-01-09 | End: 2024-02-07 | Stop reason: SDUPTHER

## 2024-01-15 ENCOUNTER — OFFICE VISIT (OUTPATIENT)
Dept: NEUROSURGERY | Facility: CLINIC | Age: 64
End: 2024-01-15
Payer: COMMERCIAL

## 2024-01-15 VITALS
WEIGHT: 180 LBS | HEART RATE: 77 BPM | SYSTOLIC BLOOD PRESSURE: 142 MMHG | BODY MASS INDEX: 30.73 KG/M2 | HEIGHT: 64 IN | DIASTOLIC BLOOD PRESSURE: 82 MMHG | TEMPERATURE: 97.8 F

## 2024-01-15 DIAGNOSIS — M54.12 CERVICAL RADICULOPATHY: Primary | ICD-10-CM

## 2024-01-15 PROCEDURE — 3008F BODY MASS INDEX DOCD: CPT | Performed by: STUDENT IN AN ORGANIZED HEALTH CARE EDUCATION/TRAINING PROGRAM

## 2024-01-15 PROCEDURE — 1036F TOBACCO NON-USER: CPT | Performed by: STUDENT IN AN ORGANIZED HEALTH CARE EDUCATION/TRAINING PROGRAM

## 2024-01-15 PROCEDURE — 3077F SYST BP >= 140 MM HG: CPT | Performed by: STUDENT IN AN ORGANIZED HEALTH CARE EDUCATION/TRAINING PROGRAM

## 2024-01-15 PROCEDURE — 3079F DIAST BP 80-89 MM HG: CPT | Performed by: STUDENT IN AN ORGANIZED HEALTH CARE EDUCATION/TRAINING PROGRAM

## 2024-01-15 PROCEDURE — 99214 OFFICE O/P EST MOD 30 MIN: CPT | Performed by: STUDENT IN AN ORGANIZED HEALTH CARE EDUCATION/TRAINING PROGRAM

## 2024-01-15 ASSESSMENT — PATIENT HEALTH QUESTIONNAIRE - PHQ9
8. MOVING OR SPEAKING SO SLOWLY THAT OTHER PEOPLE COULD HAVE NOTICED. OR THE OPPOSITE, BEING SO FIGETY OR RESTLESS THAT YOU HAVE BEEN MOVING AROUND A LOT MORE THAN USUAL: NEARLY EVERY DAY
3. TROUBLE FALLING OR STAYING ASLEEP OR SLEEPING TOO MUCH: NEARLY EVERY DAY
7. TROUBLE CONCENTRATING ON THINGS, SUCH AS READING THE NEWSPAPER OR WATCHING TELEVISION: NEARLY EVERY DAY
1. LITTLE INTEREST OR PLEASURE IN DOING THINGS: NEARLY EVERY DAY
2. FEELING DOWN, DEPRESSED OR HOPELESS: NEARLY EVERY DAY
SUM OF ALL RESPONSES TO PHQ QUESTIONS 1-9: 24
SUM OF ALL RESPONSES TO PHQ9 QUESTIONS 1 AND 2: 6
10. IF YOU CHECKED OFF ANY PROBLEMS, HOW DIFFICULT HAVE THESE PROBLEMS MADE IT FOR YOU TO DO YOUR WORK, TAKE CARE OF THINGS AT HOME, OR GET ALONG WITH OTHER PEOPLE: SOMEWHAT DIFFICULT
4. FEELING TIRED OR HAVING LITTLE ENERGY: NEARLY EVERY DAY
6. FEELING BAD ABOUT YOURSELF - OR THAT YOU ARE A FAILURE OR HAVE LET YOURSELF OR YOUR FAMILY DOWN: NEARLY EVERY DAY
5. POOR APPETITE OR OVEREATING: NEARLY EVERY DAY
9. THOUGHTS THAT YOU WOULD BE BETTER OFF DEAD, OR OF HURTING YOURSELF: NOT AT ALL

## 2024-01-15 ASSESSMENT — PAIN SCALES - GENERAL: PAINLEVEL: 8

## 2024-01-15 ASSESSMENT — ENCOUNTER SYMPTOMS: OCCASIONAL FEELINGS OF UNSTEADINESS: 0
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
## 2024-01-16 ENCOUNTER — TELEPHONE (OUTPATIENT)
Dept: CARDIOLOGY | Facility: CLINIC | Age: 64
End: 2024-01-16
Payer: COMMERCIAL

## 2024-01-16 DIAGNOSIS — R07.2 PRECORDIAL PAIN: ICD-10-CM

## 2024-01-16 NOTE — TELEPHONE ENCOUNTER
Clearance received from Dr. Arnulfo Reynolds's office for procedure. Reviewed with Dr. Fall. Per Dr. Fall, patient needs stress done that was ordered last OV. Spoke to patient and made aware. Patient unable to do exercise test d/t pain. Per Dr. Fall: Ok to order Lexiscan.

## 2024-01-17 NOTE — TELEPHONE ENCOUNTER
Discussed with patient. Reviewed with Dr. Fall. Ok to reorder exercise stress test. Patient aware.

## 2024-01-23 ENCOUNTER — TELEPHONE (OUTPATIENT)
Dept: PRIMARY CARE | Facility: CLINIC | Age: 64
End: 2024-01-23
Payer: COMMERCIAL

## 2024-01-23 NOTE — TELEPHONE ENCOUNTER
Patient calling stating she has been very constipated- she believes its been over a week since she's had a bowel movement. Has been using dulcolax at home but not having any luck. She wanted to know if there was something prescription strength you could send to the pharmacy for her to help get things moving for her

## 2024-01-24 DIAGNOSIS — M54.12 CERVICAL RADICULOPATHY: Primary | ICD-10-CM

## 2024-01-24 RX ORDER — ACETAMINOPHEN 325 MG/1
975 TABLET ORAL ONCE
Status: CANCELLED | OUTPATIENT
Start: 2024-01-24 | End: 2024-01-24

## 2024-01-24 RX ORDER — CELECOXIB 50 MG/1
400 CAPSULE ORAL ONCE
Status: CANCELLED | OUTPATIENT
Start: 2024-01-24 | End: 2024-01-24

## 2024-01-24 RX ORDER — GABAPENTIN 300 MG/1
600 CAPSULE ORAL ONCE
Status: CANCELLED | OUTPATIENT
Start: 2024-01-24 | End: 2024-01-24

## 2024-01-24 RX ORDER — TRANEXAMIC ACID 650 MG/1
1300 TABLET ORAL ONCE
Status: CANCELLED | OUTPATIENT
Start: 2024-01-24 | End: 2024-01-24

## 2024-01-29 ENCOUNTER — OFFICE VISIT (OUTPATIENT)
Dept: NEUROSURGERY | Facility: CLINIC | Age: 64
End: 2024-01-29
Payer: COMMERCIAL

## 2024-01-29 VITALS
BODY MASS INDEX: 30.39 KG/M2 | OXYGEN SATURATION: 94 % | TEMPERATURE: 96.8 F | RESPIRATION RATE: 16 BRPM | HEART RATE: 78 BPM | HEIGHT: 64 IN | WEIGHT: 178 LBS | SYSTOLIC BLOOD PRESSURE: 126 MMHG | DIASTOLIC BLOOD PRESSURE: 86 MMHG

## 2024-01-29 DIAGNOSIS — G40.919 INTRACTABLE EPILEPSY WITHOUT STATUS EPILEPTICUS, UNSPECIFIED EPILEPSY TYPE (MULTI): Primary | ICD-10-CM

## 2024-01-29 PROCEDURE — 1036F TOBACCO NON-USER: CPT | Performed by: NURSE PRACTITIONER

## 2024-01-29 PROCEDURE — 3074F SYST BP LT 130 MM HG: CPT | Performed by: NURSE PRACTITIONER

## 2024-01-29 PROCEDURE — 3079F DIAST BP 80-89 MM HG: CPT | Performed by: NURSE PRACTITIONER

## 2024-01-29 PROCEDURE — 99214 OFFICE O/P EST MOD 30 MIN: CPT | Performed by: NURSE PRACTITIONER

## 2024-01-29 PROCEDURE — 3008F BODY MASS INDEX DOCD: CPT | Performed by: NURSE PRACTITIONER

## 2024-01-29 ASSESSMENT — PATIENT HEALTH QUESTIONNAIRE - PHQ9
SUM OF ALL RESPONSES TO PHQ9 QUESTIONS 1 AND 2: 3
9. THOUGHTS THAT YOU WOULD BE BETTER OFF DEAD, OR OF HURTING YOURSELF: NOT AT ALL
8. MOVING OR SPEAKING SO SLOWLY THAT OTHER PEOPLE COULD HAVE NOTICED. OR THE OPPOSITE, BEING SO FIGETY OR RESTLESS THAT YOU HAVE BEEN MOVING AROUND A LOT MORE THAN USUAL: SEVERAL DAYS
4. FEELING TIRED OR HAVING LITTLE ENERGY: NOT AT ALL
SUM OF ALL RESPONSES TO PHQ QUESTIONS 1-9: 5
5. POOR APPETITE OR OVEREATING: NOT AT ALL
6. FEELING BAD ABOUT YOURSELF - OR THAT YOU ARE A FAILURE OR HAVE LET YOURSELF OR YOUR FAMILY DOWN: NOT AT ALL
1. LITTLE INTEREST OR PLEASURE IN DOING THINGS: SEVERAL DAYS
3. TROUBLE FALLING OR STAYING ASLEEP OR SLEEPING TOO MUCH: NOT AT ALL
2. FEELING DOWN, DEPRESSED OR HOPELESS: MORE THAN HALF THE DAYS
7. TROUBLE CONCENTRATING ON THINGS, SUCH AS READING THE NEWSPAPER OR WATCHING TELEVISION: SEVERAL DAYS

## 2024-01-29 ASSESSMENT — COLUMBIA-SUICIDE SEVERITY RATING SCALE - C-SSRS
2. HAVE YOU ACTUALLY HAD ANY THOUGHTS OF KILLING YOURSELF?: NO
6. HAVE YOU EVER DONE ANYTHING, STARTED TO DO ANYTHING, OR PREPARED TO DO ANYTHING TO END YOUR LIFE?: NO
1. IN THE PAST MONTH, HAVE YOU WISHED YOU WERE DEAD OR WISHED YOU COULD GO TO SLEEP AND NOT WAKE UP?: NO

## 2024-01-29 ASSESSMENT — ENCOUNTER SYMPTOMS
DEPRESSION: 1
LOSS OF SENSATION IN FEET: 0
OCCASIONAL FEELINGS OF UNSTEADINESS: 1

## 2024-01-29 NOTE — PROGRESS NOTES
"Cristal Pollard is here today in follow up for cervical radiculopathy and to discuss questions she has prior to surgery. To review, she was last evaluated on 01/15/2024, by Dr Arnulfo Reynolds who recommended ACDF C4 - 7. She reported progression of her symptoms. She is scheduled for ACDF on 02/28/2024.    Today, has a few questions about what to expect after surgery. I answered her questions to her verbal satisfaction and let her ask any questions not related to the surgery, that she had as well. She reports that she had a seizure during the myelopathy, and wishes to see a Neurologist and I said I will refer her to Dr. Jessica Greco or someone Dr. Greco's on team.     TREATMENTS:  Pain Management: Cervical JAMIN  PT  Home Exercise Program    SMOKER: No  ANTICOAGULANT USE: No    ROS x 10 is, otherwise, negative unless documented in HPI above    /86   Pulse 78   Temp 36 °C (96.8 °F)   Resp 16   Ht 1.626 m (5' 4\")   Wt 80.7 kg (178 lb)   SpO2 94%   BMI 30.55 kg/m²     On Exam: Appears comfortable  A&O x 4, speech clear / fluent  Respirations even / unlabored  Abdomen without distension  GRIFFIN  Gait steady    Ms Pollard agrees to let me know if she has any questions prior to her surgery, through [employer] or she can call with questions that I'd he happy to answer through our office team. She verbalizes understanding and agreement.  Torrie Kim, APRN-CNP           "

## 2024-02-01 ENCOUNTER — PATIENT OUTREACH (OUTPATIENT)
Dept: CARE COORDINATION | Facility: CLINIC | Age: 64
End: 2024-02-01
Payer: COMMERCIAL

## 2024-02-01 NOTE — PROGRESS NOTES
At time of outreach call the patient feels as if their condition has              returned to baseline since initial visit with PCP or specialist.             Questions or concerns regarding recovery period addressed at this time.              Reviewed any PCP or specialists progress notes/labs/radiology reports if applicable             and addressed any questions or concerns.  
[facility]

## 2024-02-03 DIAGNOSIS — E11.9 TYPE 2 DIABETES MELLITUS WITHOUT COMPLICATIONS (MULTI): ICD-10-CM

## 2024-02-04 RX ORDER — BLOOD-GLUCOSE METER
EACH MISCELLANEOUS
Qty: 50 STRIP | Refills: 2 | Status: SHIPPED | OUTPATIENT
Start: 2024-02-04 | End: 2024-05-31

## 2024-02-05 ENCOUNTER — APPOINTMENT (OUTPATIENT)
Dept: PREADMISSION TESTING | Facility: HOSPITAL | Age: 64
End: 2024-02-05
Payer: COMMERCIAL

## 2024-02-06 ENCOUNTER — TELEPHONE (OUTPATIENT)
Dept: PRIMARY CARE | Facility: CLINIC | Age: 64
End: 2024-02-06

## 2024-02-06 ENCOUNTER — OFFICE VISIT (OUTPATIENT)
Dept: PRIMARY CARE | Facility: CLINIC | Age: 64
End: 2024-02-06
Payer: COMMERCIAL

## 2024-02-06 VITALS
DIASTOLIC BLOOD PRESSURE: 80 MMHG | SYSTOLIC BLOOD PRESSURE: 120 MMHG | WEIGHT: 182 LBS | HEART RATE: 65 BPM | BODY MASS INDEX: 31.07 KG/M2 | OXYGEN SATURATION: 99 % | HEIGHT: 64 IN

## 2024-02-06 DIAGNOSIS — Z79.891 CHRONIC PRESCRIPTION OPIATE USE: ICD-10-CM

## 2024-02-06 DIAGNOSIS — M54.12 CERVICAL RADICULOPATHY DUE TO TRAUMA: Primary | ICD-10-CM

## 2024-02-06 DIAGNOSIS — M50.30 BULGING OF CERVICAL INTERVERTEBRAL DISC: ICD-10-CM

## 2024-02-06 DIAGNOSIS — E11.9 TYPE 2 DIABETES MELLITUS WITHOUT COMPLICATION, WITHOUT LONG-TERM CURRENT USE OF INSULIN (MULTI): ICD-10-CM

## 2024-02-06 DIAGNOSIS — D15.1 ATRIAL MYXOMA (HHS-HCC): ICD-10-CM

## 2024-02-06 LAB — HBA1C MFR BLD: 7.6 % (ref 4.2–6.5)

## 2024-02-06 PROCEDURE — 99214 OFFICE O/P EST MOD 30 MIN: CPT | Performed by: STUDENT IN AN ORGANIZED HEALTH CARE EDUCATION/TRAINING PROGRAM

## 2024-02-06 PROCEDURE — 3079F DIAST BP 80-89 MM HG: CPT | Performed by: STUDENT IN AN ORGANIZED HEALTH CARE EDUCATION/TRAINING PROGRAM

## 2024-02-06 PROCEDURE — 3074F SYST BP LT 130 MM HG: CPT | Performed by: STUDENT IN AN ORGANIZED HEALTH CARE EDUCATION/TRAINING PROGRAM

## 2024-02-06 PROCEDURE — 3051F HG A1C>EQUAL 7.0%<8.0%: CPT | Performed by: STUDENT IN AN ORGANIZED HEALTH CARE EDUCATION/TRAINING PROGRAM

## 2024-02-06 PROCEDURE — 1036F TOBACCO NON-USER: CPT | Performed by: STUDENT IN AN ORGANIZED HEALTH CARE EDUCATION/TRAINING PROGRAM

## 2024-02-06 PROCEDURE — 3008F BODY MASS INDEX DOCD: CPT | Performed by: STUDENT IN AN ORGANIZED HEALTH CARE EDUCATION/TRAINING PROGRAM

## 2024-02-06 PROCEDURE — 83036 HEMOGLOBIN GLYCOSYLATED A1C: CPT | Mod: CLIA WAIVED TEST | Performed by: STUDENT IN AN ORGANIZED HEALTH CARE EDUCATION/TRAINING PROGRAM

## 2024-02-06 RX ORDER — OXYCODONE AND ACETAMINOPHEN 5; 325 MG/1; MG/1
1 TABLET ORAL EVERY 6 HOURS PRN
Qty: 12 TABLET | Refills: 0 | Status: SHIPPED | OUTPATIENT
Start: 2024-02-06 | End: 2024-02-09

## 2024-02-06 ASSESSMENT — ENCOUNTER SYMPTOMS
NAUSEA: 1
ARTHRALGIAS: 1
NECK PAIN: 1
SLEEP DISTURBANCE: 1
CARDIOVASCULAR NEGATIVE: 1
NECK STIFFNESS: 1
BACK PAIN: 1
RESPIRATORY NEGATIVE: 1
NERVOUS/ANXIOUS: 1
DYSPHORIC MOOD: 1
FATIGUE: 1

## 2024-02-06 ASSESSMENT — PATIENT HEALTH QUESTIONNAIRE - PHQ9
1. LITTLE INTEREST OR PLEASURE IN DOING THINGS: NOT AT ALL
2. FEELING DOWN, DEPRESSED OR HOPELESS: NOT AT ALL
SUM OF ALL RESPONSES TO PHQ9 QUESTIONS 1 AND 2: 0

## 2024-02-06 NOTE — TELEPHONE ENCOUNTER
Cristal left a vm stating she forgot to mention today that she was also having some hip issues. She said she can come in for a separate apt for this if you would like.     She is also very concerned about going down to the 5mg of oxycodone. She feels like this is a drastic jump down, and is wanting to consider the 7.5 instead. She would like to know your thoughts

## 2024-02-06 NOTE — PROGRESS NOTES
"Subjective   Patient ID: Cristal Pollard is a 63 y.o. female who presents for surgical clearance (Follow up/ surgical clearance).  Last A1c 7.3%.     BG has been between 112-120. Has been stress eating.     Nervous about her upcoming surgery 2/28. Is hopeful this will improve her pain.     Pain and nausea has been very bad. Is completely out of her percocet. Took her last one this morning. Has taken 12 extra pills. This happened last month too. She was told that this was not appropriate at that time and failed to notify again.     States she \"is just trying to survive\" and does not think about calling or messaging when her pain is worse and she starts taking more than allotted. Despite this happening one month ago.     Talks to her therapist every 2 weeks. Has a good support network through her [employer].     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 Is requesting an extension on her credit card payment.     States significant amount of emotional distress due to home situation and lack of support from partner. Also due to financial issues.         Review of Systems   Constitutional:  Positive for fatigue.   HENT: Negative.     Respiratory: Negative.     Cardiovascular: Negative.    Gastrointestinal:  Positive for nausea.   Musculoskeletal:  Positive for arthralgias, back pain, neck pain and neck stiffness.   Psychiatric/Behavioral:  Positive for dysphoric mood and sleep disturbance. The patient is nervous/anxious.    All other systems reviewed and are negative.      Objective   Physical Exam  Vitals reviewed.   Constitutional:       Appearance: Normal appearance.   HENT:      Head: Normocephalic.      Mouth/Throat:      Mouth: Mucous membranes are moist.   Eyes:      Pupils: Pupils are equal, round, and reactive to light.   Pulmonary:      Effort: No respiratory distress.   Skin:     General: Skin is warm and dry.   Neurological:      General: No focal deficit present.      Mental Status: She is alert. Mental status is at " baseline.   Psychiatric:         Mood and Affect: Mood normal.         Behavior: Behavior normal.         Body mass index is 31.24 kg/m².      Current Outpatient Medications:     calcium carbonate-vitamin D3 600 mg-10 mcg (400 unit) chewable tablet, Chew 1 tablet 2 times a day., Disp: , Rfl:     ergocalciferol (Vitamin D-2) 1.25 MG (15122 UT) capsule, Take 1 capsule (50,000 Units) by mouth 1 (one) time per week., Disp: , Rfl:     gabapentin (Neurontin) 300 mg capsule, TAKE 1 CAPSULE BY MOUTH THREE TIMES A DAY (Patient taking differently: Take 1 capsule (300 mg) by mouth 2 times a day.), Disp: 270 capsule, Rfl: 1    lamoTRIgine (LaMICtal) 100 mg tablet, Take 1.5 tablets (150 mg) by mouth once daily at bedtime. Take 1 tablet by mouth in the Morning and 1 and 1/2 tablet at bedtime, Disp: , Rfl:     lisinopriL-hydrochlorothiazide 10-12.5 mg tablet, TAKE 1 TABLET BY MOUTH EVERY DAY, Disp: 90 tablet, Rfl: 2    metFORMIN XR (Glucophage-XR) 500 mg 24 hr tablet, TAKE 1 TABLET BY MOUTH EVERY DAY WITH EVENING MEAL (Patient taking differently: Take 1 tablet (500 mg) by mouth once daily at bedtime.), Disp: 90 tablet, Rfl: 1    multivitamin tablet, Take 1 tablet by mouth once daily., Disp: , Rfl:     nitroglycerin (Nitrostat) 0.4 mg SL tablet, Place 1 tablet (0.4 mg) under the tongue every 5 minutes if needed (FOR UP TO 3 DOSES AS NEEDED FOR CHEST PAIN.CALL 911 IF PAIN PERSISTS.)., Disp: , Rfl:     nystatin (Mycostatin) 100,000 unit/gram powder, APPLY LAYER OF POWDER OVER AFFECTED AREA TWICE DAILY, Disp: 30 g, Rfl: 2    OneTouch Delica Plus Lancet 30 gauge misc, USE TO TEST ONCE DAILY, Disp: 90 each, Rfl: 1    OneTouch Verio test strips strip, USE TO TEST ONCE DAILY, Disp: 50 strip, Rfl: 2    oxyCODONE-acetaminophen (Percocet)  mg tablet, Take 1 tablet by mouth every 6 hours if needed for severe pain (7 - 10)., Disp: 120 tablet, Rfl: 0    nystatin-triamcinolone (Mycolog II) ointment, Apply thin layer to affected area two  times daily, Disp: 15 g, Rfl: 1    oxyCODONE-acetaminophen (Percocet) 5-325 mg tablet, Take 1 tablet by mouth every 6 hours if needed for severe pain (7 - 10) for up to 3 days., Disp: 12 tablet, Rfl: 0      Assessment/Plan   Diagnoses and all orders for this visit:  Cervical radiculopathy due to trauma  -     oxyCODONE-acetaminophen (Percocet) 5-325 mg tablet; Take 1 tablet by mouth every 6 hours if needed for severe pain (7 - 10) for up to 3 days.  Type 2 diabetes mellitus without complication, without long-term current use of insulin (CMS/[facility])  Comments:  a1c increased from 7.3 to 7.6  has been stress eating  taking her metformin  Orders:  -     POCT Glycosylated Hemoglobin (HGB A1C) docked device  Atrial myxoma  Bulging of cervical intervertebral disc  -     oxyCODONE-acetaminophen (Percocet) 5-325 mg tablet; Take 1 tablet by mouth every 6 hours if needed for severe pain (7 - 10) for up to 3 days.  Chronic prescription opiate use  Comments:  long discussion about medication overuse   discussed this is her final warning  will NEVER refill early again  will need to go to ER  Other orders  -     POCT GLYCOSYLATED HEMOGLOBIN (HGB A1C)      High potential for abuse of controlled substances. Second month in a row she has overused her meds. First time this occurred was related to severe pain following myelogram. Discussed with her to avoid withdrawal will send 12 lower dose percocet and that this is her final warning. If she overuses again she will be discharged as a patient.     OARRS reviewed.      Follow up in 3 months    Jessica Diehl DO 02/06/24 12:53 PM

## 2024-02-07 DIAGNOSIS — M25.551 RIGHT HIP PAIN: Primary | ICD-10-CM

## 2024-02-07 DIAGNOSIS — M50.30 BULGING OF CERVICAL INTERVERTEBRAL DISC: Chronic | ICD-10-CM

## 2024-02-07 DIAGNOSIS — M54.12 CERVICAL RADICULOPATHY DUE TO TRAUMA: ICD-10-CM

## 2024-02-07 RX ORDER — OXYCODONE AND ACETAMINOPHEN 10; 325 MG/1; MG/1
1 TABLET ORAL EVERY 6 HOURS PRN
Qty: 120 TABLET | Refills: 0 | Status: SHIPPED | OUTPATIENT
Start: 2024-02-07 | End: 2024-03-08 | Stop reason: SDUPTHER

## 2024-02-09 ENCOUNTER — APPOINTMENT (OUTPATIENT)
Dept: CARDIOLOGY | Facility: CLINIC | Age: 64
End: 2024-02-09
Payer: COMMERCIAL

## 2024-02-09 ENCOUNTER — APPOINTMENT (OUTPATIENT)
Dept: RADIOLOGY | Facility: CLINIC | Age: 64
End: 2024-02-09
Payer: COMMERCIAL

## 2024-02-12 ENCOUNTER — PRE-ADMISSION TESTING (OUTPATIENT)
Dept: PREADMISSION TESTING | Facility: HOSPITAL | Age: 64
End: 2024-02-12
Payer: COMMERCIAL

## 2024-02-12 ENCOUNTER — ANESTHESIA EVENT (OUTPATIENT)
Dept: OPERATING ROOM | Facility: HOSPITAL | Age: 64
End: 2024-02-12
Payer: COMMERCIAL

## 2024-02-12 VITALS
TEMPERATURE: 98.3 F | HEIGHT: 64 IN | BODY MASS INDEX: 31.26 KG/M2 | DIASTOLIC BLOOD PRESSURE: 73 MMHG | WEIGHT: 183.1 LBS | SYSTOLIC BLOOD PRESSURE: 115 MMHG | HEART RATE: 91 BPM | OXYGEN SATURATION: 97 %

## 2024-02-12 DIAGNOSIS — M54.12 CERVICAL RADICULOPATHY: ICD-10-CM

## 2024-02-12 DIAGNOSIS — Z01.818 PREOPERATIVE CLEARANCE: Primary | ICD-10-CM

## 2024-02-12 LAB
ABO GROUP (TYPE) IN BLOOD: NORMAL
ANION GAP SERPL CALC-SCNC: 16 MMOL/L (ref 10–20)
ANTIBODY SCREEN: NORMAL
APPEARANCE UR: ABNORMAL
APTT PPP: 34 SECONDS (ref 27–38)
BASOPHILS # BLD AUTO: 0.09 X10*3/UL (ref 0–0.1)
BASOPHILS NFR BLD AUTO: 1 %
BILIRUB UR STRIP.AUTO-MCNC: NEGATIVE MG/DL
BUN SERPL-MCNC: 11 MG/DL (ref 6–23)
CALCIUM SERPL-MCNC: 10.7 MG/DL (ref 8.6–10.6)
CHLORIDE SERPL-SCNC: 100 MMOL/L (ref 98–107)
CO2 SERPL-SCNC: 28 MMOL/L (ref 21–32)
COLOR UR: ABNORMAL
CREAT SERPL-MCNC: 0.72 MG/DL (ref 0.5–1.05)
EGFRCR SERPLBLD CKD-EPI 2021: >90 ML/MIN/1.73M*2
EOSINOPHIL # BLD AUTO: 0.17 X10*3/UL (ref 0–0.7)
EOSINOPHIL NFR BLD AUTO: 1.8 %
ERYTHROCYTE [DISTWIDTH] IN BLOOD BY AUTOMATED COUNT: 12.8 % (ref 11.5–14.5)
EST. AVERAGE GLUCOSE BLD GHB EST-MCNC: 180 MG/DL
GLUCOSE SERPL-MCNC: 174 MG/DL (ref 74–99)
GLUCOSE UR STRIP.AUTO-MCNC: ABNORMAL MG/DL
HBA1C MFR BLD: 7.9 %
HCT VFR BLD AUTO: 44.1 % (ref 36–46)
HGB BLD-MCNC: 14.7 G/DL (ref 12–16)
IMM GRANULOCYTES # BLD AUTO: 0.05 X10*3/UL (ref 0–0.7)
IMM GRANULOCYTES NFR BLD AUTO: 0.5 % (ref 0–0.9)
INR PPP: 1.1 (ref 0.9–1.1)
KETONES UR STRIP.AUTO-MCNC: NEGATIVE MG/DL
LEUKOCYTE ESTERASE UR QL STRIP.AUTO: NEGATIVE
LYMPHOCYTES # BLD AUTO: 3.16 X10*3/UL (ref 1.2–4.8)
LYMPHOCYTES NFR BLD AUTO: 34.3 %
MCH RBC QN AUTO: 30.6 PG (ref 26–34)
MCHC RBC AUTO-ENTMCNC: 33.3 G/DL (ref 32–36)
MCV RBC AUTO: 92 FL (ref 80–100)
MONOCYTES # BLD AUTO: 0.57 X10*3/UL (ref 0.1–1)
MONOCYTES NFR BLD AUTO: 6.2 %
NEUTROPHILS # BLD AUTO: 5.18 X10*3/UL (ref 1.2–7.7)
NEUTROPHILS NFR BLD AUTO: 56.2 %
NITRITE UR QL STRIP.AUTO: NEGATIVE
NRBC BLD-RTO: 0 /100 WBCS (ref 0–0)
PH UR STRIP.AUTO: 5 [PH]
PLATELET # BLD AUTO: 290 X10*3/UL (ref 150–450)
POTASSIUM SERPL-SCNC: 4.6 MMOL/L (ref 3.5–5.3)
PREALB SERPL-MCNC: 28.1 MG/DL (ref 18–40)
PROT UR STRIP.AUTO-MCNC: NEGATIVE MG/DL
PROTHROMBIN TIME: 12.2 SECONDS (ref 9.8–12.8)
RBC # BLD AUTO: 4.81 X10*6/UL (ref 4–5.2)
RBC # UR STRIP.AUTO: NEGATIVE /UL
RH FACTOR (ANTIGEN D): NORMAL
SODIUM SERPL-SCNC: 139 MMOL/L (ref 136–145)
SP GR UR STRIP.AUTO: 1.02
UROBILINOGEN UR STRIP.AUTO-MCNC: NORMAL MG/DL
WBC # BLD AUTO: 9.2 X10*3/UL (ref 4.4–11.3)

## 2024-02-12 PROCEDURE — 36415 COLL VENOUS BLD VENIPUNCTURE: CPT

## 2024-02-12 PROCEDURE — 83036 HEMOGLOBIN GLYCOSYLATED A1C: CPT

## 2024-02-12 PROCEDURE — 81003 URINALYSIS AUTO W/O SCOPE: CPT

## 2024-02-12 PROCEDURE — 87081 CULTURE SCREEN ONLY: CPT

## 2024-02-12 PROCEDURE — 99205 OFFICE O/P NEW HI 60 MIN: CPT | Performed by: NURSE ANESTHETIST, CERTIFIED REGISTERED

## 2024-02-12 PROCEDURE — 85610 PROTHROMBIN TIME: CPT

## 2024-02-12 PROCEDURE — 84134 ASSAY OF PREALBUMIN: CPT

## 2024-02-12 PROCEDURE — 85025 COMPLETE CBC W/AUTO DIFF WBC: CPT

## 2024-02-12 PROCEDURE — 86901 BLOOD TYPING SEROLOGIC RH(D): CPT

## 2024-02-12 PROCEDURE — 80048 BASIC METABOLIC PNL TOTAL CA: CPT

## 2024-02-12 RX ORDER — CHLORHEXIDINE GLUCONATE ORAL RINSE 1.2 MG/ML
15 SOLUTION DENTAL AS NEEDED
Qty: 120 ML | Refills: 0 | Status: SHIPPED | OUTPATIENT
Start: 2024-02-12 | End: 2024-03-01 | Stop reason: HOSPADM

## 2024-02-12 RX ORDER — CHLORHEXIDINE GLUCONATE 40 MG/ML
SOLUTION TOPICAL DAILY PRN
Qty: 473 ML | Refills: 0 | Status: SHIPPED | OUTPATIENT
Start: 2024-02-12 | End: 2024-02-15

## 2024-02-12 ASSESSMENT — DUKE ACTIVITY SCORE INDEX (DASI)
CAN YOU PARTICIPATE IN STRENOUS SPORTS LIKE SWIMMING, SINGLES TENNIS, FOOTBALL, BASKETBALL, OR SKIING: NO
TOTAL_SCORE: 18.95
CAN YOU PARTICIPATE IN MODERATE RECREATIONAL ACTIVITIES LIKE GOLF, BOWLING, DANCING, DOUBLES TENNIS OR THROWING A BASEBALL OR FOOTBALL: NO
CAN YOU CLIMB A FLIGHT OF STAIRS OR WALK UP A HILL: YES
CAN YOU DO YARD WORK LIKE RAKING LEAVES, WEEDING OR PUSHING A MOWER: NO
CAN YOU WALK A BLOCK OR TWO ON LEVEL GROUND: YES
CAN YOU DO HEAVY WORK AROUND THE HOUSE LIKE SCRUBBING FLOORS OR LIFTING AND MOVING HEAVY FURNITURE: NO
CAN YOU DO MODERATE WORK AROUND THE HOUSE LIKE VACUUMING, SWEEPING FLOORS OR CARRYING GROCERIES: YES
CAN YOU WALK INDOORS, SUCH AS AROUND YOUR HOUSE: YES
CAN YOU TAKE CARE OF YOURSELF (EAT, DRESS, BATHE, OR USE TOILET): YES
CAN YOU HAVE SEXUAL RELATIONS: NO
CAN YOU DO LIGHT WORK AROUND THE HOUSE LIKE DUSTING OR WASHING DISHES: YES
CAN YOU RUN A SHORT DISTANCE: NO
DASI METS SCORE: 5.1

## 2024-02-12 ASSESSMENT — ENCOUNTER SYMPTOMS
GASTROINTESTINAL NEGATIVE: 1
CARDIOVASCULAR NEGATIVE: 1
MUSCULOSKELETAL NEGATIVE: 1
TROUBLE SWALLOWING: 1
CONSTITUTIONAL NEGATIVE: 1
ENDOCRINE NEGATIVE: 1
EYES NEGATIVE: 1
LIMITED RANGE OF MOTION: 1
NECK NEGATIVE: 1
NUMBNESS: 1
RESPIRATORY NEGATIVE: 1

## 2024-02-12 ASSESSMENT — CHADS2 SCORE
HYPERTENSION: YES
DIABETES: YES
CHADS2 SCORE: 4
PRIOR STROKE OR TIA OR THROMBOEMBOLISM: YES
AGE GREATER THAN OR EQUAL TO 75: NO
CHF: NO

## 2024-02-12 ASSESSMENT — ACTIVITIES OF DAILY LIVING (ADL): ADL_SCORE: 0

## 2024-02-12 ASSESSMENT — LIFESTYLE VARIABLES: SMOKING_STATUS: NONSMOKER
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
## 2024-02-12 NOTE — PREPROCEDURE INSTRUCTIONS
NPO Instructions:    Do not eat any food after midnight the night before your surgery/procedure.  You may have clear liquids until TWO hours before surgery/procedure. This includes water, black tea/coffee, (no milk or cream) apple juice and electrolyte drinks (Gatorade).    Additional Instructions:     Five Days before Surgery:  Review your medication instructions, stop indicated medications  Begin using your Hibiclens
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
## 2024-02-13 LAB — HOLD SPECIMEN: NORMAL

## 2024-02-14 LAB — STAPHYLOCOCCUS SPEC CULT: NORMAL

## 2024-02-16 ENCOUNTER — HOSPITAL ENCOUNTER (EMERGENCY)
Facility: HOSPITAL | Age: 64
Discharge: HOME | End: 2024-02-16
Attending: EMERGENCY MEDICINE
Payer: COMMERCIAL

## 2024-02-16 ENCOUNTER — APPOINTMENT (OUTPATIENT)
Dept: RADIOLOGY | Facility: CLINIC | Age: 64
End: 2024-02-16
Payer: COMMERCIAL

## 2024-02-16 ENCOUNTER — APPOINTMENT (OUTPATIENT)
Dept: RADIOLOGY | Facility: HOSPITAL | Age: 64
End: 2024-02-16
Payer: COMMERCIAL

## 2024-02-16 ENCOUNTER — APPOINTMENT (OUTPATIENT)
Dept: CARDIOLOGY | Facility: CLINIC | Age: 64
End: 2024-02-16
Payer: COMMERCIAL

## 2024-02-16 VITALS
SYSTOLIC BLOOD PRESSURE: 141 MMHG | BODY MASS INDEX: 31.24 KG/M2 | RESPIRATION RATE: 21 BRPM | HEIGHT: 64 IN | DIASTOLIC BLOOD PRESSURE: 69 MMHG | TEMPERATURE: 96.8 F | HEART RATE: 66 BPM | OXYGEN SATURATION: 95 % | WEIGHT: 183 LBS

## 2024-02-16 DIAGNOSIS — R11.2 NAUSEA AND VOMITING, UNSPECIFIED VOMITING TYPE: Primary | ICD-10-CM

## 2024-02-16 LAB
ALBUMIN SERPL BCP-MCNC: 4.6 G/DL (ref 3.4–5)
ALP SERPL-CCNC: 56 U/L (ref 33–136)
ALT SERPL W P-5'-P-CCNC: 23 U/L (ref 7–45)
ANION GAP SERPL CALC-SCNC: 20 MMOL/L (ref 10–20)
AST SERPL W P-5'-P-CCNC: 21 U/L (ref 9–39)
BASOPHILS # BLD AUTO: 0.07 X10*3/UL (ref 0–0.1)
BASOPHILS NFR BLD AUTO: 0.7 %
BILIRUB SERPL-MCNC: 0.6 MG/DL (ref 0–1.2)
BUN SERPL-MCNC: 11 MG/DL (ref 6–23)
CALCIUM SERPL-MCNC: 10.5 MG/DL (ref 8.6–10.3)
CHLORIDE SERPL-SCNC: 103 MMOL/L (ref 98–107)
CO2 SERPL-SCNC: 22 MMOL/L (ref 21–32)
CREAT SERPL-MCNC: 0.74 MG/DL (ref 0.5–1.05)
EGFRCR SERPLBLD CKD-EPI 2021: 90 ML/MIN/1.73M*2
EOSINOPHIL # BLD AUTO: 0.09 X10*3/UL (ref 0–0.7)
EOSINOPHIL NFR BLD AUTO: 0.9 %
ERYTHROCYTE [DISTWIDTH] IN BLOOD BY AUTOMATED COUNT: 12.8 % (ref 11.5–14.5)
GLUCOSE SERPL-MCNC: 131 MG/DL (ref 74–99)
HCT VFR BLD AUTO: 43 % (ref 36–46)
HGB BLD-MCNC: 15 G/DL (ref 12–16)
IMM GRANULOCYTES # BLD AUTO: 0.03 X10*3/UL (ref 0–0.7)
IMM GRANULOCYTES NFR BLD AUTO: 0.3 % (ref 0–0.9)
LIPASE SERPL-CCNC: 6 U/L (ref 9–82)
LYMPHOCYTES # BLD AUTO: 3.21 X10*3/UL (ref 1.2–4.8)
LYMPHOCYTES NFR BLD AUTO: 33.3 %
MCH RBC QN AUTO: 31.5 PG (ref 26–34)
MCHC RBC AUTO-ENTMCNC: 34.9 G/DL (ref 32–36)
MCV RBC AUTO: 90 FL (ref 80–100)
MONOCYTES # BLD AUTO: 0.6 X10*3/UL (ref 0.1–1)
MONOCYTES NFR BLD AUTO: 6.2 %
NEUTROPHILS # BLD AUTO: 5.63 X10*3/UL (ref 1.2–7.7)
NEUTROPHILS NFR BLD AUTO: 58.6 %
NRBC BLD-RTO: 0 /100 WBCS (ref 0–0)
PLATELET # BLD AUTO: 279 X10*3/UL (ref 150–450)
POTASSIUM SERPL-SCNC: 3.5 MMOL/L (ref 3.5–5.3)
PROT SERPL-MCNC: 7.6 G/DL (ref 6.4–8.2)
RBC # BLD AUTO: 4.76 X10*6/UL (ref 4–5.2)
SODIUM SERPL-SCNC: 141 MMOL/L (ref 136–145)
WBC # BLD AUTO: 9.6 X10*3/UL (ref 4.4–11.3)

## 2024-02-16 PROCEDURE — 96374 THER/PROPH/DIAG INJ IV PUSH: CPT

## 2024-02-16 PROCEDURE — 2500000001 HC RX 250 WO HCPCS SELF ADMINISTERED DRUGS (ALT 637 FOR MEDICARE OP): Performed by: EMERGENCY MEDICINE

## 2024-02-16 PROCEDURE — 85025 COMPLETE CBC W/AUTO DIFF WBC: CPT

## 2024-02-16 PROCEDURE — 2550000001 HC RX 255 CONTRASTS: Performed by: EMERGENCY MEDICINE

## 2024-02-16 PROCEDURE — 2500000004 HC RX 250 GENERAL PHARMACY W/ HCPCS (ALT 636 FOR OP/ED)

## 2024-02-16 PROCEDURE — 99284 EMERGENCY DEPT VISIT MOD MDM: CPT | Mod: 25 | Performed by: EMERGENCY MEDICINE

## 2024-02-16 PROCEDURE — 99284 EMERGENCY DEPT VISIT MOD MDM: CPT | Mod: 25

## 2024-02-16 PROCEDURE — 74176 CT ABD & PELVIS W/O CONTRAST: CPT

## 2024-02-16 PROCEDURE — 74176 CT ABD & PELVIS W/O CONTRAST: CPT | Mod: FOREIGN READ | Performed by: RADIOLOGY

## 2024-02-16 PROCEDURE — 36415 COLL VENOUS BLD VENIPUNCTURE: CPT

## 2024-02-16 PROCEDURE — 83690 ASSAY OF LIPASE: CPT

## 2024-02-16 PROCEDURE — 80053 COMPREHEN METABOLIC PANEL: CPT

## 2024-02-16 PROCEDURE — 96361 HYDRATE IV INFUSION ADD-ON: CPT

## 2024-02-16 RX ORDER — DOCUSATE SODIUM 100 MG/1
100 CAPSULE, LIQUID FILLED ORAL EVERY 12 HOURS
Qty: 28 CAPSULE | Refills: 0 | Status: SHIPPED | OUTPATIENT
Start: 2024-02-16 | End: 2024-03-01 | Stop reason: HOSPADM

## 2024-02-16 RX ORDER — ADHESIVE BANDAGE
5 BANDAGE TOPICAL DAILY PRN
Qty: 360 ML | Refills: 0 | Status: ON HOLD | OUTPATIENT
Start: 2024-02-16 | End: 2024-02-28 | Stop reason: ALTCHOICE

## 2024-02-16 RX ORDER — ONDANSETRON 4 MG/1
4 TABLET, FILM COATED ORAL EVERY 6 HOURS
Qty: 52 TABLET | Refills: 0 | Status: SHIPPED | OUTPATIENT
Start: 2024-02-16 | End: 2024-03-01 | Stop reason: HOSPADM

## 2024-02-16 RX ORDER — DULOXETIN HYDROCHLORIDE 60 MG/1
60 CAPSULE, DELAYED RELEASE ORAL DAILY
COMMUNITY
End: 2024-05-16 | Stop reason: WASHOUT

## 2024-02-16 RX ORDER — OXYCODONE AND ACETAMINOPHEN 5; 325 MG/1; MG/1
2 TABLET ORAL EVERY 6 HOURS PRN
Status: DISCONTINUED | OUTPATIENT
Start: 2024-02-16 | End: 2024-02-17 | Stop reason: HOSPADM

## 2024-02-16 RX ORDER — LAMOTRIGINE 100 MG/1
1 TABLET ORAL EVERY MORNING
COMMUNITY

## 2024-02-16 RX ORDER — ONDANSETRON HYDROCHLORIDE 2 MG/ML
4 INJECTION, SOLUTION INTRAVENOUS ONCE
Status: COMPLETED | OUTPATIENT
Start: 2024-02-16 | End: 2024-02-16

## 2024-02-16 RX ADMIN — IOHEXOL 75 ML: 350 INJECTION, SOLUTION INTRAVENOUS at 20:04

## 2024-02-16 RX ADMIN — ONDANSETRON 4 MG: 2 INJECTION INTRAMUSCULAR; INTRAVENOUS at 20:01

## 2024-02-16 RX ADMIN — OXYCODONE HYDROCHLORIDE AND ACETAMINOPHEN 2 TABLET: 5; 325 TABLET ORAL at 21:44

## 2024-02-16 RX ADMIN — SODIUM CHLORIDE, POTASSIUM CHLORIDE, SODIUM LACTATE AND CALCIUM CHLORIDE 500 ML: 600; 310; 30; 20 INJECTION, SOLUTION INTRAVENOUS at 20:01

## 2024-02-16 ASSESSMENT — PAIN - FUNCTIONAL ASSESSMENT
PAIN_FUNCTIONAL_ASSESSMENT: 0-10
PAIN_FUNCTIONAL_ASSESSMENT: 0-10

## 2024-02-16 ASSESSMENT — LIFESTYLE VARIABLES
HAVE PEOPLE ANNOYED YOU BY CRITICIZING YOUR DRINKING: NO
EVER HAD A DRINK FIRST THING IN THE MORNING TO STEADY YOUR NERVES TO GET RID OF A HANGOVER: NO
EVER FELT BAD OR GUILTY ABOUT YOUR DRINKING: NO
HAVE YOU EVER FELT YOU SHOULD CUT DOWN ON YOUR DRINKING: NO

## 2024-02-16 ASSESSMENT — PAIN DESCRIPTION - PAIN TYPE: TYPE: ACUTE PAIN

## 2024-02-16 ASSESSMENT — PAIN SCALES - GENERAL
PAINLEVEL_OUTOF10: 7
PAINLEVEL_OUTOF10: 8
PAINLEVEL_OUTOF10: 6

## 2024-02-16 ASSESSMENT — PAIN DESCRIPTION - LOCATION
LOCATION: ABDOMEN
LOCATION: ABDOMEN

## 2024-02-16 ASSESSMENT — PAIN DESCRIPTION - DESCRIPTORS
DESCRIPTORS: ACHING;STABBING
DESCRIPTORS: BURNING;PRESSURE

## 2024-02-16 ASSESSMENT — PAIN DESCRIPTION - FREQUENCY: FREQUENCY: CONSTANT/CONTINUOUS

## 2024-02-17 NOTE — ED PROVIDER NOTES
HPI   Chief Complaint   Patient presents with    Vomiting     Pt was told to come here by her PCP. Pt has been on pain meds for awhile and now her PCP thinks she has an SBO. Pt hasn't had a normal BM in quite some time. Pt having surgery on feb 28 on neck.        HPI    Cristal Pollard is a 63-year-old female with a past medical history of type 2 diabetes, atrial myxoma, anxiety/depression, hypertension, seizure, TIA, cholecystectomy.  Patient presents to the ED at the advice of her PCP for concern of an SBO.  Patient reports that for the last month she has been having epigastric abdominal pain with right upper quadrant pain as well as nausea and several episodes of vomiting.  Patient reports that over the last 2 weeks she has not had a bowel movement. Reports passing gas.  Patient currently takes Percocet daily and has been doing so for the last year for neck pain, she is undergoing surgery of the neck on February 28.  Patient states prior to the past month she has a baseline constipation of 1 bowel movement every 1-3 days.  Patient endorses increased distention in her abdomen.  Patient endorses decreased appetite and several episodes of vomiting over the past month.  Patient denies any chest pain, shortness of breath, cough, fevers, chills, urinary changes, or edema.  Patient denies any smoking or drinking history.                  [place] Coma Scale Score: 15                     Patient History   Past Medical History:   Diagnosis Date    Abnormal ECG 10/26/2023    Sinus rhythm LVH by voltage Inferior infarct, old Anterior Q waves, possibly due to LVH    Angina pectoris (CMS/HCC)     Anxiety     Cervical disc disease     Cervical radiculopathy     Neuro: Arnulfo PEREZ 1/15/24    Depression     Diabetes mellitus (CMS/HCC)     A1C: 2/6/24 -7.6%    History of Holter monitoring 10/2023    24 -48 hour monitor on 10/31/23, No abnormal findings    Hypertension     Incisional hernia with obstruction, without gangrene  05/24/2017    Incarcerated incisional hernia    Joint pain     Mastodynia 05/14/2020    Breast pain in female    Palpitations     Stress test scheduled for 2/9/24    PONV (postoperative nausea and vomiting)     Seizure disorder (CMS/HCC)     Last seizure 2/2023    TIA (transient ischemic attack)     Several years ago, no residual symptoms    Vertigo      Past Surgical History:   Procedure Laterality Date    CARPAL TUNNEL RELEASE Right     CATARACT EXTRACTION      CHOLECYSTECTOMY  11/14/2014    Cholecystectomy    COLONOSCOPY  05/17/2018    Complete Colonoscopy    CT CHEST WO IV CONTRAST  04/21/2023    No acute intrathoracic abnormalities. No thoracic aortic aneurysm or subintimal hematoma.    ECHOCARDIOGRAM 2 D M MODE PANEL  02/17/2023    Left ventricular systolic function is normal with a 60-65% estimated ejection fraction.    HERNIA REPAIR  05/24/2017    Hernia Repair    MANDIBLE SURGERY Bilateral     OTHER SURGICAL HISTORY  11/14/2014    Surgery Intracardiac Mass Removal Left Atrial Myxoma    OTHER SURGICAL HISTORY      Xiphoidectomy    STERNOTOMY  2011     Family History   Problem Relation Name Age of Onset    Cancer Mother      Hypertension Mother      Heart disease Father      Cancer Father      Hypertension Father      Glaucoma Father      Macular degeneration Father      Heart attack Father      Cancer Sister      Diabetes Sister      Diabetes Maternal Grandfather       Social History     Tobacco Use    Smoking status: Never    Smokeless tobacco: Never   Vaping Use    Vaping Use: Never used   Substance Use Topics    Alcohol use: Yes     Comment: Rarelu    Drug use: Never       Physical Exam   ED Triage Vitals [02/16/24 1825]   Temperature Heart Rate Respirations BP   36 °C (96.8 °F) 90 18 160/70      Pulse Ox Temp src Heart Rate Source Patient Position   98 % -- -- --      BP Location FiO2 (%)     -- --       Physical Exam    Physical Exam:  General:  Pleasant and cooperative. No apparent distress.   Overweight.  HEENT:  Normocephalic, atraumatic  Chest:  Clear to auscultation bilaterally. No wheezes, rales, or rhonchi.  CV:  Regular rate and rhythm. No murmurs    Abdomen: Abdomen is soft, tender to epigastrium and right upper quadrant, distended, bowel sounds positive  Extremities: Bilateral lower extremity edema  Neurological:  AAOx3. No focal deficits.  Skin:  Warm and dry.    Diagnoses as of 02/23/24 1816   Nausea and vomiting, unspecified vomiting type         Medical Decision Making    Patient is a 63-year-old female who presented with concern of SBO from PCP.  Patient was hemodynamically stable during course of hospitalization patient's CBC and CMP were within normal limit.  Patient's CT abdomen pelvis revealed moderate amounts of stool.  Patient was discussed with ED attending who continued care.    Procedure  Procedures     Yosvany Casanova MD  Resident  02/23/24 1816

## 2024-02-19 ENCOUNTER — TELEPHONE (OUTPATIENT)
Dept: PRIMARY CARE | Facility: CLINIC | Age: 64
End: 2024-02-19
Payer: COMMERCIAL

## 2024-02-19 DIAGNOSIS — T40.2X5A CONSTIPATION DUE TO OPIOID THERAPY: Primary | ICD-10-CM

## 2024-02-19 DIAGNOSIS — K59.03 CONSTIPATION DUE TO OPIOID THERAPY: Primary | ICD-10-CM

## 2024-02-19 NOTE — TELEPHONE ENCOUNTER
Please place referral to GI- Patient went to hospital as directed for extreme constipation. The magnesium had a bad reaction with her gabapentin and she was very sick  Was given milk of magnesia to help with her stomach- is still not getting relief. She is asking at this time for a referral to someone and for help with directions for the milk of magnesia because the directions on bottle are conflicting with directions she was given in the hospital

## 2024-02-20 ENCOUNTER — HOSPITAL ENCOUNTER (OUTPATIENT)
Dept: CARDIOLOGY | Facility: HOSPITAL | Age: 64
Discharge: HOME | End: 2024-02-20
Payer: COMMERCIAL

## 2024-02-20 ENCOUNTER — HOSPITAL ENCOUNTER (OUTPATIENT)
Dept: RADIOLOGY | Facility: HOSPITAL | Age: 64
Discharge: HOME | End: 2024-02-20
Payer: COMMERCIAL

## 2024-02-20 DIAGNOSIS — I10 HYPERTENSION: ICD-10-CM

## 2024-02-20 DIAGNOSIS — R07.9 CHEST PAIN: ICD-10-CM

## 2024-02-20 DIAGNOSIS — E78.00 HYPERCHOLESTEREMIA: ICD-10-CM

## 2024-02-20 DIAGNOSIS — E78.5 HYPERLIPIDEMIA, UNSPECIFIED: ICD-10-CM

## 2024-02-20 DIAGNOSIS — M54.12 CERVICAL RADICULOPATHY: ICD-10-CM

## 2024-02-20 PROCEDURE — 71046 X-RAY EXAM CHEST 2 VIEWS: CPT

## 2024-02-20 PROCEDURE — 93017 CV STRESS TEST TRACING ONLY: CPT

## 2024-02-20 PROCEDURE — 93018 CV STRESS TEST I&R ONLY: CPT | Performed by: INTERNAL MEDICINE

## 2024-02-20 PROCEDURE — 93016 CV STRESS TEST SUPVJ ONLY: CPT | Performed by: INTERNAL MEDICINE

## 2024-02-22 ENCOUNTER — OFFICE VISIT (OUTPATIENT)
Dept: GASTROENTEROLOGY | Facility: CLINIC | Age: 64
End: 2024-02-22
Payer: COMMERCIAL

## 2024-02-22 VITALS
OXYGEN SATURATION: 95 % | SYSTOLIC BLOOD PRESSURE: 118 MMHG | HEIGHT: 64 IN | WEIGHT: 179.6 LBS | TEMPERATURE: 96.9 F | DIASTOLIC BLOOD PRESSURE: 78 MMHG | BODY MASS INDEX: 30.66 KG/M2 | HEART RATE: 88 BPM | RESPIRATION RATE: 18 BRPM

## 2024-02-22 DIAGNOSIS — K59.03 CONSTIPATION DUE TO OPIOID THERAPY: Primary | ICD-10-CM

## 2024-02-22 DIAGNOSIS — R11.0 NAUSEA: ICD-10-CM

## 2024-02-22 DIAGNOSIS — T40.2X5A CONSTIPATION DUE TO OPIOID THERAPY: Primary | ICD-10-CM

## 2024-02-22 DIAGNOSIS — Z86.010 HISTORY OF COLON POLYPS: ICD-10-CM

## 2024-02-22 PROCEDURE — 3008F BODY MASS INDEX DOCD: CPT | Performed by: INTERNAL MEDICINE

## 2024-02-22 PROCEDURE — 3074F SYST BP LT 130 MM HG: CPT | Performed by: INTERNAL MEDICINE

## 2024-02-22 PROCEDURE — 3078F DIAST BP <80 MM HG: CPT | Performed by: INTERNAL MEDICINE

## 2024-02-22 PROCEDURE — 3051F HG A1C>EQUAL 7.0%<8.0%: CPT | Performed by: INTERNAL MEDICINE

## 2024-02-22 PROCEDURE — 99204 OFFICE O/P NEW MOD 45 MIN: CPT | Performed by: INTERNAL MEDICINE

## 2024-02-22 RX ORDER — OMEPRAZOLE 40 MG/1
40 CAPSULE, DELAYED RELEASE ORAL DAILY
Qty: 30 CAPSULE | Refills: 2 | Status: SHIPPED | OUTPATIENT
Start: 2024-02-22 | End: 2024-03-24

## 2024-02-22 NOTE — PATIENT INSTRUCTIONS
Constipation due to opioid therapy  -     Referral to Gastroenterology  Nausea  History of colon polyps  Keep taking colace.  1 capsule 1-2 times day.   Keep taking prune daily.    You will need upper endoscopy and colonoscopy after the surgery.   Call to schedule.

## 2024-02-22 NOTE — PROGRESS NOTES
Subjective   Patient ID: Cristal Pollard is a 63 y.o. female who presents for Constipation (Has been constipation for d/t taking percocet for 2 years. Has tried OTC medications for constipation with no relief. ED visit on 02/16/24).  HPI    On chronic narcotics for abdominal pain. Understands that narcotics is causing constipation. However she is going for neck surgery for management.   She had a sz during procedure ( Myelogram)  Recent CT scan   BOWEL:  Moderate amount of stool throughout the colon is present.   Constipation is not excluded. There is no evidence of acute  appendicitis.   Previous laxatives.   Miralax   Milk of magnesia.   She is so sick to stomach.     She has nausea all the time.     Father had colon cancer. In 60's  Colonoscopy 2018: multiple polyps  Recommended repeat in 3 years.   Current Outpatient Medications on File Prior to Visit   Medication Sig Dispense Refill    chlorhexidine (Peridex) 0.12 % solution Use 15 mL in the mouth or throat if needed for wound care (swish for 30 seconds and spit 2 times a day) for up to 14 days. 120 mL 0    docusate sodium (Colace) 100 mg capsule Take 1 capsule (100 mg) by mouth every 12 hours for 14 days. 28 capsule 0    lamoTRIgine (LaMICtal) 100 mg tablet Take 1.5 tablets (150 mg) by mouth once daily at bedtime.      lamoTRIgine (LaMICtal) 100 mg tablet Take 1 tablet (100 mg) by mouth once daily in the morning.      lisinopriL-hydrochlorothiazide 10-12.5 mg tablet TAKE 1 TABLET BY MOUTH EVERY DAY 90 tablet 2    metFORMIN XR (Glucophage-XR) 500 mg 24 hr tablet TAKE 1 TABLET BY MOUTH EVERY DAY WITH EVENING MEAL (Patient taking differently: Take 1 tablet (500 mg) by mouth once daily at bedtime.) 90 tablet 1    multivitamin tablet Take 1 tablet by mouth once daily.      nitroglycerin (Nitrostat) 0.4 mg SL tablet Place 1 tablet (0.4 mg) under the tongue every 5 minutes if needed (FOR UP TO 3 DOSES AS NEEDED FOR CHEST PAIN.CALL 911 IF PAIN PERSISTS.).      ondansetron  (Zofran) 4 mg tablet Take 1 tablet (4 mg) by mouth every 6 hours for 14 days. 52 tablet 0    OneTouch Delica Plus Lancet 30 gauge misc USE TO TEST ONCE DAILY 90 each 1    OneTouch Verio test strips strip USE TO TEST ONCE DAILY 50 strip 2    oxyCODONE-acetaminophen (Percocet)  mg tablet Take 1 tablet by mouth every 6 hours if needed for severe pain (7 - 10). 120 tablet 0    calcium carbonate-vitamin D3 600 mg-10 mcg (400 unit) chewable tablet Chew 1 tablet 2 times a day.      [] chlorhexidine (Hibiclens) 4 % external liquid Apply topically once daily as needed for wound care for up to 3 days. 473 mL 0    DULoxetine (Cymbalta) 60 mg DR capsule Take 1 capsule (60 mg) by mouth once daily. Do not crush or chew.      gabapentin (Neurontin) 300 mg capsule TAKE 1 CAPSULE BY MOUTH THREE TIMES A DAY (Patient not taking: Reported on 2024) 270 capsule 1    magnesium hydroxide (Milk of Magnesia) 400 mg/5 mL suspension Take 5 mL by mouth once daily as needed for constipation for up to 14 days. (Patient not taking: Reported on 2024) 360 mL 0    nystatin (Mycostatin) 100,000 unit/gram powder APPLY LAYER OF POWDER OVER AFFECTED AREA TWICE DAILY (Patient not taking: Reported on 2024) 30 g 2    nystatin-triamcinolone (Mycolog II) ointment Apply thin layer to affected area two times daily (Patient not taking: Reported on 2024) 15 g 1    [DISCONTINUED] ergocalciferol (Vitamin D-2) 1.25 MG (80954 UT) capsule Take 1 capsule (50,000 Units) by mouth 1 (one) time per week.       No current facility-administered medications on file prior to visit.        Review of Systems   Constitutional: Negative.    Respiratory: Negative.     Cardiovascular: Negative.    Gastrointestinal:  Positive for constipation.   Endocrine: Negative.    Genitourinary: Negative.    Musculoskeletal:  Positive for neck pain.   Skin: Negative.    Neurological: Negative.        Objective   Physical Exam  Vitals reviewed.   Constitutional:   "     General: She is awake.      Appearance: Normal appearance.   HENT:      Head: Normocephalic and atraumatic.      Nose: Nose normal.      Mouth/Throat:      Mouth: Mucous membranes are moist.   Eyes:      Pupils: Pupils are equal, round, and reactive to light.   Cardiovascular:      Rate and Rhythm: Normal rate.   Pulmonary:      Effort: Pulmonary effort is normal.   Neurological:      Mental Status: She is alert and oriented to person, place, and time. Mental status is at baseline.   Psychiatric:         Attention and Perception: Attention and perception normal.         Mood and Affect: Mood normal.         Behavior: Behavior normal.       /78 (BP Location: Right arm, Patient Position: Sitting, BP Cuff Size: Adult)   Pulse 88   Temp 36.1 °C (96.9 °F) (Temporal)   Resp 18   Ht 1.626 m (5' 4\")   Wt 81.5 kg (179 lb 9.6 oz)   SpO2 95%   BMI 30.83 kg/m²      Lab Results   Component Value Date    WBC 9.6 02/16/2024    HGB 15.0 02/16/2024    HCT 43.0 02/16/2024    MCV 90 02/16/2024     02/16/2024     Results from last 7 days   Lab Units 02/16/24  1943   SODIUM mmol/L 141   POTASSIUM mmol/L 3.5   CHLORIDE mmol/L 103   CO2 mmol/L 22   BUN mg/dL 11   CREATININE mg/dL 0.74   CALCIUM mg/dL 10.5*   PROTEIN TOTAL g/dL 7.6   BILIRUBIN TOTAL mg/dL 0.6   ALK PHOS U/L 56   ALT U/L 23   AST U/L 21   GLUCOSE mg/dL 131*       No results found for: \"AFP\"  Lab Results   Component Value Date    TSH 2.35 10/23/2023         Assessment/Plan   Diagnoses and all orders for this visit:  Constipation due to opioid therapy  -     Referral to Gastroenterology  Nausea  History of colon polyps  Keep taking colace.  1 capsule 1-2 times day.   Keep taking prune daily.    You will need upper endoscopy and colonoscopy after the surgery.   Call to schedule.              "

## 2024-02-23 DIAGNOSIS — E11.9 TYPE 2 DIABETES MELLITUS WITHOUT COMPLICATIONS (MULTI): ICD-10-CM

## 2024-02-23 DIAGNOSIS — G40.909 EPILEPSY, UNSPECIFIED, NOT INTRACTABLE, WITHOUT STATUS EPILEPTICUS (MULTI): ICD-10-CM

## 2024-02-23 RX ORDER — METFORMIN HYDROCHLORIDE 500 MG/1
500 TABLET, EXTENDED RELEASE ORAL
Qty: 90 TABLET | Refills: 1 | Status: SHIPPED | OUTPATIENT
Start: 2024-02-23

## 2024-02-23 RX ORDER — LAMOTRIGINE 100 MG/1
TABLET ORAL
Qty: 225 TABLET | Refills: 3 | Status: ON HOLD | OUTPATIENT
Start: 2024-02-23 | End: 2024-02-28 | Stop reason: ALTCHOICE

## 2024-02-28 ENCOUNTER — ANESTHESIA (OUTPATIENT)
Dept: OPERATING ROOM | Facility: HOSPITAL | Age: 64
End: 2024-02-28
Payer: COMMERCIAL

## 2024-02-28 ENCOUNTER — HOSPITAL ENCOUNTER (OUTPATIENT)
Facility: HOSPITAL | Age: 64
Discharge: HOME HEALTH | End: 2024-03-01
Attending: STUDENT IN AN ORGANIZED HEALTH CARE EDUCATION/TRAINING PROGRAM | Admitting: STUDENT IN AN ORGANIZED HEALTH CARE EDUCATION/TRAINING PROGRAM
Payer: COMMERCIAL

## 2024-02-28 ENCOUNTER — APPOINTMENT (OUTPATIENT)
Dept: RADIOLOGY | Facility: HOSPITAL | Age: 64
End: 2024-02-28
Payer: COMMERCIAL

## 2024-02-28 DIAGNOSIS — G89.18 POSTOPERATIVE PAIN: ICD-10-CM

## 2024-02-28 DIAGNOSIS — M54.12 CERVICAL RADICULOPATHY: Primary | ICD-10-CM

## 2024-02-28 LAB
ABO GROUP (TYPE) IN BLOOD: NORMAL
GLUCOSE BLD MANUAL STRIP-MCNC: 142 MG/DL (ref 74–99)
GLUCOSE BLD MANUAL STRIP-MCNC: 201 MG/DL (ref 74–99)
GLUCOSE BLD MANUAL STRIP-MCNC: 202 MG/DL (ref 74–99)
POC FINGERSTICK BLOOD GLUCOSE: 142 MG/DL (ref 70–100)
RH FACTOR (ANTIGEN D): NORMAL

## 2024-02-28 PROCEDURE — A22551 PR ARTHRODESIS ANT INTERBODY INC DISCECTOMY, CERVICAL BELOW C2: Performed by: ANESTHESIOLOGY

## 2024-02-28 PROCEDURE — 2500000002 HC RX 250 W HCPCS SELF ADMINISTERED DRUGS (ALT 637 FOR MEDICARE OP, ALT 636 FOR OP/ED): Mod: SE

## 2024-02-28 PROCEDURE — 2500000002 HC RX 250 W HCPCS SELF ADMINISTERED DRUGS (ALT 637 FOR MEDICARE OP, ALT 636 FOR OP/ED): Mod: SE | Performed by: STUDENT IN AN ORGANIZED HEALTH CARE EDUCATION/TRAINING PROGRAM

## 2024-02-28 PROCEDURE — A22551 PR ARTHRODESIS ANT INTERBODY INC DISCECTOMY, CERVICAL BELOW C2

## 2024-02-28 PROCEDURE — 2500000004 HC RX 250 GENERAL PHARMACY W/ HCPCS (ALT 636 FOR OP/ED): Mod: SE | Performed by: ANESTHESIOLOGY

## 2024-02-28 PROCEDURE — 2500000001 HC RX 250 WO HCPCS SELF ADMINISTERED DRUGS (ALT 637 FOR MEDICARE OP): Mod: SE | Performed by: STUDENT IN AN ORGANIZED HEALTH CARE EDUCATION/TRAINING PROGRAM

## 2024-02-28 PROCEDURE — 7100000011 HC EXTENDED STAY RECOVERY HOURLY - NURSING UNIT

## 2024-02-28 PROCEDURE — 22551 ARTHRD ANT NTRBDY CERVICAL: CPT | Performed by: STUDENT IN AN ORGANIZED HEALTH CARE EDUCATION/TRAINING PROGRAM

## 2024-02-28 PROCEDURE — 2500000001 HC RX 250 WO HCPCS SELF ADMINISTERED DRUGS (ALT 637 FOR MEDICARE OP): Mod: SE

## 2024-02-28 PROCEDURE — 82947 ASSAY GLUCOSE BLOOD QUANT: CPT | Mod: 91

## 2024-02-28 PROCEDURE — 2500000005 HC RX 250 GENERAL PHARMACY W/O HCPCS: Mod: SE

## 2024-02-28 PROCEDURE — 2500000004 HC RX 250 GENERAL PHARMACY W/ HCPCS (ALT 636 FOR OP/ED): Mod: SE

## 2024-02-28 PROCEDURE — 20931 SP BONE ALGRFT STRUCT ADD-ON: CPT | Performed by: STUDENT IN AN ORGANIZED HEALTH CARE EDUCATION/TRAINING PROGRAM

## 2024-02-28 PROCEDURE — 7100000002 HC RECOVERY ROOM TIME - EACH INCREMENTAL 1 MINUTE: Performed by: STUDENT IN AN ORGANIZED HEALTH CARE EDUCATION/TRAINING PROGRAM

## 2024-02-28 PROCEDURE — 22552 ARTHRD ANT NTRBD CERVICAL EA: CPT | Performed by: STUDENT IN AN ORGANIZED HEALTH CARE EDUCATION/TRAINING PROGRAM

## 2024-02-28 PROCEDURE — 82962 GLUCOSE BLOOD TEST: CPT | Performed by: STUDENT IN AN ORGANIZED HEALTH CARE EDUCATION/TRAINING PROGRAM

## 2024-02-28 PROCEDURE — 2500000005 HC RX 250 GENERAL PHARMACY W/O HCPCS: Mod: SE | Performed by: STUDENT IN AN ORGANIZED HEALTH CARE EDUCATION/TRAINING PROGRAM

## 2024-02-28 PROCEDURE — 3600000018 HC OR TIME - INITIAL BASE CHARGE - PROCEDURE LEVEL SIX: Performed by: STUDENT IN AN ORGANIZED HEALTH CARE EDUCATION/TRAINING PROGRAM

## 2024-02-28 PROCEDURE — 3700000001 HC GENERAL ANESTHESIA TIME - INITIAL BASE CHARGE: Performed by: STUDENT IN AN ORGANIZED HEALTH CARE EDUCATION/TRAINING PROGRAM

## 2024-02-28 PROCEDURE — 7100000001 HC RECOVERY ROOM TIME - INITIAL BASE CHARGE: Performed by: STUDENT IN AN ORGANIZED HEALTH CARE EDUCATION/TRAINING PROGRAM

## 2024-02-28 PROCEDURE — 2500000004 HC RX 250 GENERAL PHARMACY W/ HCPCS (ALT 636 FOR OP/ED): Mod: SE | Performed by: STUDENT IN AN ORGANIZED HEALTH CARE EDUCATION/TRAINING PROGRAM

## 2024-02-28 PROCEDURE — 3600000017 HC OR TIME - EACH INCREMENTAL 1 MINUTE - PROCEDURE LEVEL SIX: Performed by: STUDENT IN AN ORGANIZED HEALTH CARE EDUCATION/TRAINING PROGRAM

## 2024-02-28 PROCEDURE — 2780000003 HC OR 278 NO HCPCS: Performed by: STUDENT IN AN ORGANIZED HEALTH CARE EDUCATION/TRAINING PROGRAM

## 2024-02-28 PROCEDURE — 3700000002 HC GENERAL ANESTHESIA TIME - EACH INCREMENTAL 1 MINUTE: Performed by: STUDENT IN AN ORGANIZED HEALTH CARE EDUCATION/TRAINING PROGRAM

## 2024-02-28 PROCEDURE — 2720000007 HC OR 272 NO HCPCS: Performed by: STUDENT IN AN ORGANIZED HEALTH CARE EDUCATION/TRAINING PROGRAM

## 2024-02-28 PROCEDURE — C9359 IMPLNT,BON VOID FILLER-PUTTY: HCPCS | Performed by: STUDENT IN AN ORGANIZED HEALTH CARE EDUCATION/TRAINING PROGRAM

## 2024-02-28 PROCEDURE — 22846 INSERT SPINE FIXATION DEVICE: CPT | Performed by: STUDENT IN AN ORGANIZED HEALTH CARE EDUCATION/TRAINING PROGRAM

## 2024-02-28 PROCEDURE — C1713 ANCHOR/SCREW BN/BN,TIS/BN: HCPCS | Performed by: STUDENT IN AN ORGANIZED HEALTH CARE EDUCATION/TRAINING PROGRAM

## 2024-02-28 DEVICE — SCREW, ACP, SELF DRILL, 3.5 X 17MM, VARIABLE: Type: IMPLANTABLE DEVICE | Site: SPINE CERVICAL | Status: FUNCTIONAL

## 2024-02-28 DEVICE — SCREW, ACP, SELF DRILL, 3.5 X 15MM, VARIABLE: Type: IMPLANTABLE DEVICE | Site: SPINE CERVICAL | Status: FUNCTIONAL

## 2024-02-28 DEVICE — ALLOGRAFT, TRIAD LORDOTIC 7 X 11 X 14: Type: IMPLANTABLE DEVICE | Site: SPINE CERVICAL | Status: FUNCTIONAL

## 2024-02-28 DEVICE — IMPLANTABLE DEVICE: Type: IMPLANTABLE DEVICE | Site: SPINE CERVICAL | Status: FUNCTIONAL

## 2024-02-28 RX ORDER — DEXTROSE 50 % IN WATER (D50W) INTRAVENOUS SYRINGE
25
Status: DISCONTINUED | OUTPATIENT
Start: 2024-02-28 | End: 2024-03-01 | Stop reason: HOSPADM

## 2024-02-28 RX ORDER — OXYCODONE HYDROCHLORIDE 5 MG/1
5 TABLET ORAL EVERY 4 HOURS PRN
Status: DISCONTINUED | OUTPATIENT
Start: 2024-02-28 | End: 2024-02-28 | Stop reason: HOSPADM

## 2024-02-28 RX ORDER — DEXTROSE MONOHYDRATE 100 MG/ML
0.3 INJECTION, SOLUTION INTRAVENOUS ONCE AS NEEDED
Status: DISCONTINUED | OUTPATIENT
Start: 2024-02-28 | End: 2024-02-28 | Stop reason: SDUPTHER

## 2024-02-28 RX ORDER — ONDANSETRON 4 MG/1
4 TABLET, FILM COATED ORAL EVERY 8 HOURS PRN
Status: DISCONTINUED | OUTPATIENT
Start: 2024-02-28 | End: 2024-03-01 | Stop reason: HOSPADM

## 2024-02-28 RX ORDER — ROCURONIUM BROMIDE 10 MG/ML
INJECTION, SOLUTION INTRAVENOUS AS NEEDED
Status: DISCONTINUED | OUTPATIENT
Start: 2024-02-28 | End: 2024-02-28

## 2024-02-28 RX ORDER — HYDROMORPHONE HYDROCHLORIDE 1 MG/ML
INJECTION, SOLUTION INTRAMUSCULAR; INTRAVENOUS; SUBCUTANEOUS AS NEEDED
Status: DISCONTINUED | OUTPATIENT
Start: 2024-02-28 | End: 2024-02-28

## 2024-02-28 RX ORDER — METHOCARBAMOL 100 MG/ML
1000 INJECTION, SOLUTION INTRAMUSCULAR; INTRAVENOUS ONCE
Status: COMPLETED | OUTPATIENT
Start: 2024-02-28 | End: 2024-02-28

## 2024-02-28 RX ORDER — CEFAZOLIN 1 G/1
INJECTION, POWDER, FOR SOLUTION INTRAVENOUS AS NEEDED
Status: DISCONTINUED | OUTPATIENT
Start: 2024-02-28 | End: 2024-02-28

## 2024-02-28 RX ORDER — LIDOCAINE HYDROCHLORIDE 10 MG/ML
0.1 INJECTION INFILTRATION; PERINEURAL ONCE
Status: DISCONTINUED | OUTPATIENT
Start: 2024-02-28 | End: 2024-02-28 | Stop reason: HOSPADM

## 2024-02-28 RX ORDER — ALBUTEROL SULFATE 0.83 MG/ML
2.5 SOLUTION RESPIRATORY (INHALATION) ONCE AS NEEDED
Status: DISCONTINUED | OUTPATIENT
Start: 2024-02-28 | End: 2024-02-28 | Stop reason: HOSPADM

## 2024-02-28 RX ORDER — ONDANSETRON HYDROCHLORIDE 2 MG/ML
INJECTION, SOLUTION INTRAVENOUS AS NEEDED
Status: DISCONTINUED | OUTPATIENT
Start: 2024-02-28 | End: 2024-02-28

## 2024-02-28 RX ORDER — TRANEXAMIC ACID 650 MG/1
1300 TABLET ORAL ONCE
Status: COMPLETED | OUTPATIENT
Start: 2024-02-28 | End: 2024-02-28

## 2024-02-28 RX ORDER — POLYETHYLENE GLYCOL 3350 17 G/17G
17 POWDER, FOR SOLUTION ORAL DAILY
Status: DISCONTINUED | OUTPATIENT
Start: 2024-02-28 | End: 2024-02-29

## 2024-02-28 RX ORDER — LIDOCAINE 560 MG/1
1 PATCH PERCUTANEOUS; TOPICAL; TRANSDERMAL DAILY
Status: DISCONTINUED | OUTPATIENT
Start: 2024-02-28 | End: 2024-03-01

## 2024-02-28 RX ORDER — DEXTROSE 50 % IN WATER (D50W) INTRAVENOUS SYRINGE
25
Status: DISCONTINUED | OUTPATIENT
Start: 2024-02-28 | End: 2024-02-28 | Stop reason: SDUPTHER

## 2024-02-28 RX ORDER — PROPOFOL 10 MG/ML
INJECTION, EMULSION INTRAVENOUS CONTINUOUS PRN
Status: DISCONTINUED | OUTPATIENT
Start: 2024-02-28 | End: 2024-02-28

## 2024-02-28 RX ORDER — GABAPENTIN 300 MG/1
300 CAPSULE ORAL 3 TIMES DAILY
Status: DISCONTINUED | OUTPATIENT
Start: 2024-02-28 | End: 2024-03-01 | Stop reason: HOSPADM

## 2024-02-28 RX ORDER — ONDANSETRON HYDROCHLORIDE 2 MG/ML
4 INJECTION, SOLUTION INTRAVENOUS ONCE AS NEEDED
Status: DISCONTINUED | OUTPATIENT
Start: 2024-02-28 | End: 2024-02-28 | Stop reason: HOSPADM

## 2024-02-28 RX ORDER — HYDROMORPHONE HYDROCHLORIDE 1 MG/ML
0.4 INJECTION, SOLUTION INTRAMUSCULAR; INTRAVENOUS; SUBCUTANEOUS EVERY 4 HOURS PRN
Status: DISCONTINUED | OUTPATIENT
Start: 2024-02-28 | End: 2024-03-01 | Stop reason: HOSPADM

## 2024-02-28 RX ORDER — DEXTROSE MONOHYDRATE 100 MG/ML
0.3 INJECTION, SOLUTION INTRAVENOUS ONCE AS NEEDED
Status: DISCONTINUED | OUTPATIENT
Start: 2024-02-28 | End: 2024-03-01 | Stop reason: HOSPADM

## 2024-02-28 RX ORDER — MIDAZOLAM HYDROCHLORIDE 1 MG/ML
INJECTION INTRAMUSCULAR; INTRAVENOUS AS NEEDED
Status: DISCONTINUED | OUTPATIENT
Start: 2024-02-28 | End: 2024-02-28

## 2024-02-28 RX ORDER — DIPHENHYDRAMINE HYDROCHLORIDE 50 MG/ML
25 INJECTION INTRAMUSCULAR; INTRAVENOUS ONCE AS NEEDED
Status: DISCONTINUED | OUTPATIENT
Start: 2024-02-28 | End: 2024-02-28 | Stop reason: HOSPADM

## 2024-02-28 RX ORDER — OXYCODONE HYDROCHLORIDE 5 MG/1
10 TABLET ORAL EVERY 4 HOURS PRN
Status: DISCONTINUED | OUTPATIENT
Start: 2024-02-28 | End: 2024-03-01 | Stop reason: HOSPADM

## 2024-02-28 RX ORDER — CELECOXIB 200 MG/1
400 CAPSULE ORAL ONCE
Status: COMPLETED | OUTPATIENT
Start: 2024-02-28 | End: 2024-02-28

## 2024-02-28 RX ORDER — PHENYLEPHRINE HCL IN 0.9% NACL 0.4MG/10ML
SYRINGE (ML) INTRAVENOUS AS NEEDED
Status: DISCONTINUED | OUTPATIENT
Start: 2024-02-28 | End: 2024-02-28

## 2024-02-28 RX ORDER — SODIUM CHLORIDE 9 MG/ML
75 INJECTION, SOLUTION INTRAVENOUS CONTINUOUS
Status: ACTIVE | OUTPATIENT
Start: 2024-02-28 | End: 2024-02-29

## 2024-02-28 RX ORDER — ACETAMINOPHEN 325 MG/1
975 TABLET ORAL ONCE
Status: COMPLETED | OUTPATIENT
Start: 2024-02-28 | End: 2024-02-28

## 2024-02-28 RX ORDER — DEXMEDETOMIDINE HYDROCHLORIDE 4 UG/ML
INJECTION, SOLUTION INTRAVENOUS CONTINUOUS PRN
Status: DISCONTINUED | OUTPATIENT
Start: 2024-02-28 | End: 2024-02-28

## 2024-02-28 RX ORDER — APREPITANT 40 MG/1
CAPSULE ORAL AS NEEDED
Status: DISCONTINUED | OUTPATIENT
Start: 2024-02-28 | End: 2024-02-28

## 2024-02-28 RX ORDER — LAMOTRIGINE 150 MG/1
150 TABLET ORAL NIGHTLY
Status: DISCONTINUED | OUTPATIENT
Start: 2024-02-28 | End: 2024-03-01 | Stop reason: HOSPADM

## 2024-02-28 RX ORDER — LIDOCAINE HYDROCHLORIDE 20 MG/ML
INJECTION, SOLUTION INFILTRATION; PERINEURAL AS NEEDED
Status: DISCONTINUED | OUTPATIENT
Start: 2024-02-28 | End: 2024-02-28

## 2024-02-28 RX ORDER — ACETAMINOPHEN 325 MG/1
650 TABLET ORAL EVERY 6 HOURS
Status: DISCONTINUED | OUTPATIENT
Start: 2024-02-28 | End: 2024-03-01 | Stop reason: HOSPADM

## 2024-02-28 RX ORDER — OXYCODONE HYDROCHLORIDE 5 MG/1
5 TABLET ORAL EVERY 4 HOURS PRN
Status: DISCONTINUED | OUTPATIENT
Start: 2024-02-28 | End: 2024-03-01 | Stop reason: HOSPADM

## 2024-02-28 RX ORDER — DEXAMETHASONE SODIUM PHOSPHATE 4 MG/ML
INJECTION, SOLUTION INTRA-ARTICULAR; INTRALESIONAL; INTRAMUSCULAR; INTRAVENOUS; SOFT TISSUE AS NEEDED
Status: DISCONTINUED | OUTPATIENT
Start: 2024-02-28 | End: 2024-02-28

## 2024-02-28 RX ORDER — NITROGLYCERIN 0.4 MG/1
0.4 TABLET SUBLINGUAL EVERY 5 MIN PRN
Status: DISCONTINUED | OUTPATIENT
Start: 2024-02-28 | End: 2024-03-01 | Stop reason: HOSPADM

## 2024-02-28 RX ORDER — LAMOTRIGINE 100 MG/1
100 TABLET ORAL EVERY MORNING
Status: DISCONTINUED | OUTPATIENT
Start: 2024-02-29 | End: 2024-03-01 | Stop reason: HOSPADM

## 2024-02-28 RX ORDER — LABETALOL HYDROCHLORIDE 5 MG/ML
5 INJECTION, SOLUTION INTRAVENOUS ONCE AS NEEDED
Status: DISCONTINUED | OUTPATIENT
Start: 2024-02-28 | End: 2024-02-28 | Stop reason: HOSPADM

## 2024-02-28 RX ORDER — HYDROMORPHONE HYDROCHLORIDE 1 MG/ML
0.2 INJECTION, SOLUTION INTRAMUSCULAR; INTRAVENOUS; SUBCUTANEOUS EVERY 4 HOURS PRN
Status: DISCONTINUED | OUTPATIENT
Start: 2024-02-28 | End: 2024-02-28

## 2024-02-28 RX ORDER — SODIUM CHLORIDE, SODIUM LACTATE, POTASSIUM CHLORIDE, CALCIUM CHLORIDE 600; 310; 30; 20 MG/100ML; MG/100ML; MG/100ML; MG/100ML
100 INJECTION, SOLUTION INTRAVENOUS CONTINUOUS
Status: DISCONTINUED | OUTPATIENT
Start: 2024-02-28 | End: 2024-02-28 | Stop reason: HOSPADM

## 2024-02-28 RX ORDER — HYDROMORPHONE HYDROCHLORIDE 1 MG/ML
0.5 INJECTION, SOLUTION INTRAMUSCULAR; INTRAVENOUS; SUBCUTANEOUS EVERY 5 MIN PRN
Status: DISCONTINUED | OUTPATIENT
Start: 2024-02-28 | End: 2024-02-28 | Stop reason: HOSPADM

## 2024-02-28 RX ORDER — SODIUM CHLORIDE, SODIUM LACTATE, POTASSIUM CHLORIDE, CALCIUM CHLORIDE 600; 310; 30; 20 MG/100ML; MG/100ML; MG/100ML; MG/100ML
INJECTION, SOLUTION INTRAVENOUS CONTINUOUS PRN
Status: DISCONTINUED | OUTPATIENT
Start: 2024-02-28 | End: 2024-02-28

## 2024-02-28 RX ORDER — ONDANSETRON HYDROCHLORIDE 2 MG/ML
4 INJECTION, SOLUTION INTRAVENOUS EVERY 8 HOURS PRN
Status: DISCONTINUED | OUTPATIENT
Start: 2024-02-28 | End: 2024-03-01 | Stop reason: HOSPADM

## 2024-02-28 RX ORDER — HEPARIN SODIUM 5000 [USP'U]/ML
5000 INJECTION, SOLUTION INTRAVENOUS; SUBCUTANEOUS EVERY 8 HOURS
Status: DISCONTINUED | OUTPATIENT
Start: 2024-02-29 | End: 2024-03-01 | Stop reason: HOSPADM

## 2024-02-28 RX ORDER — POLYMYXIN B 500000 [USP'U]/1
INJECTION, POWDER, LYOPHILIZED, FOR SOLUTION INTRAMUSCULAR; INTRATHECAL; INTRAVENOUS; OPHTHALMIC AS NEEDED
Status: DISCONTINUED | OUTPATIENT
Start: 2024-02-28 | End: 2024-02-28 | Stop reason: HOSPADM

## 2024-02-28 RX ORDER — DULOXETIN HYDROCHLORIDE 60 MG/1
60 CAPSULE, DELAYED RELEASE ORAL DAILY
Status: DISCONTINUED | OUTPATIENT
Start: 2024-02-28 | End: 2024-03-01 | Stop reason: HOSPADM

## 2024-02-28 RX ORDER — NALOXONE HYDROCHLORIDE 0.4 MG/ML
0.2 INJECTION, SOLUTION INTRAMUSCULAR; INTRAVENOUS; SUBCUTANEOUS EVERY 5 MIN PRN
Status: DISCONTINUED | OUTPATIENT
Start: 2024-02-28 | End: 2024-03-01 | Stop reason: HOSPADM

## 2024-02-28 RX ORDER — CYCLOBENZAPRINE HCL 10 MG
5 TABLET ORAL 3 TIMES DAILY
Status: DISCONTINUED | OUTPATIENT
Start: 2024-02-28 | End: 2024-03-01 | Stop reason: HOSPADM

## 2024-02-28 RX ORDER — HYDROMORPHONE HYDROCHLORIDE 1 MG/ML
0.2 INJECTION, SOLUTION INTRAMUSCULAR; INTRAVENOUS; SUBCUTANEOUS EVERY 5 MIN PRN
Status: DISCONTINUED | OUTPATIENT
Start: 2024-02-28 | End: 2024-02-28 | Stop reason: HOSPADM

## 2024-02-28 RX ORDER — GABAPENTIN 300 MG/1
600 CAPSULE ORAL ONCE
Status: DISCONTINUED | OUTPATIENT
Start: 2024-02-28 | End: 2024-02-28

## 2024-02-28 RX ORDER — PANTOPRAZOLE SODIUM 40 MG/1
40 TABLET, DELAYED RELEASE ORAL
Status: DISCONTINUED | OUTPATIENT
Start: 2024-02-29 | End: 2024-03-01 | Stop reason: HOSPADM

## 2024-02-28 RX ORDER — FENTANYL CITRATE 50 UG/ML
INJECTION, SOLUTION INTRAMUSCULAR; INTRAVENOUS AS NEEDED
Status: DISCONTINUED | OUTPATIENT
Start: 2024-02-28 | End: 2024-02-28

## 2024-02-28 RX ORDER — AMOXICILLIN 250 MG
2 CAPSULE ORAL 2 TIMES DAILY
Status: DISCONTINUED | OUTPATIENT
Start: 2024-02-28 | End: 2024-03-01 | Stop reason: HOSPADM

## 2024-02-28 RX ORDER — SCOLOPAMINE TRANSDERMAL SYSTEM 1 MG/1
PATCH, EXTENDED RELEASE TRANSDERMAL AS NEEDED
Status: DISCONTINUED | OUTPATIENT
Start: 2024-02-28 | End: 2024-02-28

## 2024-02-28 RX ORDER — PROPOFOL 10 MG/ML
INJECTION, EMULSION INTRAVENOUS AS NEEDED
Status: DISCONTINUED | OUTPATIENT
Start: 2024-02-28 | End: 2024-02-28

## 2024-02-28 RX ADMIN — LAMOTRIGINE 150 MG: 150 TABLET ORAL at 23:14

## 2024-02-28 RX ADMIN — ACETAMINOPHEN 650 MG: 325 TABLET ORAL at 20:54

## 2024-02-28 RX ADMIN — SODIUM CHLORIDE 75 ML/HR: 9 INJECTION, SOLUTION INTRAVENOUS at 15:28

## 2024-02-28 RX ADMIN — INSULIN HUMAN 6 UNITS: 100 INJECTION, SOLUTION PARENTERAL at 16:40

## 2024-02-28 RX ADMIN — Medication 80 MCG: at 11:39

## 2024-02-28 RX ADMIN — HYDROMORPHONE HYDROCHLORIDE 0.5 MG: 1 INJECTION, SOLUTION INTRAMUSCULAR; INTRAVENOUS; SUBCUTANEOUS at 13:12

## 2024-02-28 RX ADMIN — TRANEXAMIC ACID 1300 MG: 650 TABLET ORAL at 07:01

## 2024-02-28 RX ADMIN — ROCURONIUM BROMIDE 20 MG: 10 INJECTION INTRAVENOUS at 08:50

## 2024-02-28 RX ADMIN — HYDROMORPHONE HYDROCHLORIDE 0.5 MG: 1 INJECTION, SOLUTION INTRAMUSCULAR; INTRAVENOUS; SUBCUTANEOUS at 11:55

## 2024-02-28 RX ADMIN — ROCURONIUM BROMIDE 10 MG: 10 INJECTION INTRAVENOUS at 11:15

## 2024-02-28 RX ADMIN — CEFAZOLIN 2 G: 330 INJECTION, POWDER, FOR SOLUTION INTRAMUSCULAR; INTRAVENOUS at 08:40

## 2024-02-28 RX ADMIN — OXYCODONE HYDROCHLORIDE 10 MG: 5 TABLET ORAL at 23:14

## 2024-02-28 RX ADMIN — FENTANYL CITRATE 50 MCG: 50 INJECTION, SOLUTION INTRAMUSCULAR; INTRAVENOUS at 08:38

## 2024-02-28 RX ADMIN — DEXAMETHASONE SODIUM PHOSPHATE 8 MG: 4 INJECTION, SOLUTION INTRA-ARTICULAR; INTRALESIONAL; INTRAMUSCULAR; INTRAVENOUS; SOFT TISSUE at 08:50

## 2024-02-28 RX ADMIN — CELECOXIB 400 MG: 200 CAPSULE ORAL at 07:01

## 2024-02-28 RX ADMIN — ONDANSETRON 4 MG: 2 INJECTION INTRAMUSCULAR; INTRAVENOUS at 21:32

## 2024-02-28 RX ADMIN — SUGAMMADEX 200 MG: 100 INJECTION, SOLUTION INTRAVENOUS at 12:01

## 2024-02-28 RX ADMIN — CYCLOBENZAPRINE 5 MG: 10 TABLET, FILM COATED ORAL at 20:54

## 2024-02-28 RX ADMIN — FENTANYL CITRATE 50 MCG: 50 INJECTION, SOLUTION INTRAMUSCULAR; INTRAVENOUS at 09:26

## 2024-02-28 RX ADMIN — HYDROMORPHONE HYDROCHLORIDE 0.5 MG: 1 INJECTION, SOLUTION INTRAMUSCULAR; INTRAVENOUS; SUBCUTANEOUS at 12:58

## 2024-02-28 RX ADMIN — SODIUM CHLORIDE, POTASSIUM CHLORIDE, SODIUM LACTATE AND CALCIUM CHLORIDE: 600; 310; 30; 20 INJECTION, SOLUTION INTRAVENOUS at 08:32

## 2024-02-28 RX ADMIN — ONDANSETRON 4 MG: 2 INJECTION INTRAMUSCULAR; INTRAVENOUS at 11:42

## 2024-02-28 RX ADMIN — ONDANSETRON 4 MG: 2 INJECTION INTRAMUSCULAR; INTRAVENOUS at 15:27

## 2024-02-28 RX ADMIN — LIDOCAINE HYDROCHLORIDE 100 MG: 20 INJECTION, SOLUTION INFILTRATION; PERINEURAL at 08:38

## 2024-02-28 RX ADMIN — HYDROMORPHONE HYDROCHLORIDE 0.5 MG: 1 INJECTION, SOLUTION INTRAMUSCULAR; INTRAVENOUS; SUBCUTANEOUS at 13:05

## 2024-02-28 RX ADMIN — SCOPALAMINE 1 PATCH: 1 PATCH, EXTENDED RELEASE TRANSDERMAL at 08:15

## 2024-02-28 RX ADMIN — HYDROMORPHONE HYDROCHLORIDE 0.4 MG: 1 INJECTION, SOLUTION INTRAMUSCULAR; INTRAVENOUS; SUBCUTANEOUS at 21:31

## 2024-02-28 RX ADMIN — HYDROMORPHONE HYDROCHLORIDE 0.2 MG: 1 INJECTION, SOLUTION INTRAMUSCULAR; INTRAVENOUS; SUBCUTANEOUS at 15:27

## 2024-02-28 RX ADMIN — PROPOFOL 50 MCG/KG/MIN: 10 INJECTION, EMULSION INTRAVENOUS at 08:39

## 2024-02-28 RX ADMIN — HYDROMORPHONE HYDROCHLORIDE 0.5 MG: 1 INJECTION, SOLUTION INTRAMUSCULAR; INTRAVENOUS; SUBCUTANEOUS at 12:50

## 2024-02-28 RX ADMIN — Medication 80 MCG: at 11:15

## 2024-02-28 RX ADMIN — HYDROMORPHONE HYDROCHLORIDE 0.5 MG: 1 INJECTION, SOLUTION INTRAMUSCULAR; INTRAVENOUS; SUBCUTANEOUS at 09:48

## 2024-02-28 RX ADMIN — OXYCODONE HYDROCHLORIDE 10 MG: 5 TABLET ORAL at 16:40

## 2024-02-28 RX ADMIN — ROCURONIUM BROMIDE 60 MG: 10 INJECTION INTRAVENOUS at 08:39

## 2024-02-28 RX ADMIN — POLYETHYLENE GLYCOL 3350 17 G: 17 POWDER, FOR SOLUTION ORAL at 15:32

## 2024-02-28 RX ADMIN — ROCURONIUM BROMIDE 30 MG: 10 INJECTION INTRAVENOUS at 09:45

## 2024-02-28 RX ADMIN — MINERAL OIL AND PETROLATUM 1 APPLICATION: 150; 830 OINTMENT OPHTHALMIC at 08:38

## 2024-02-28 RX ADMIN — MIDAZOLAM HYDROCHLORIDE 2 MG: 1 INJECTION, SOLUTION INTRAMUSCULAR; INTRAVENOUS at 08:25

## 2024-02-28 RX ADMIN — ACETAMINOPHEN 975 MG: 325 TABLET ORAL at 07:02

## 2024-02-28 RX ADMIN — METHOCARBAMOL 1000 MG: 1000 INJECTION, SOLUTION INTRAMUSCULAR; INTRAVENOUS at 13:22

## 2024-02-28 RX ADMIN — HEPARIN SODIUM 5000 UNITS: 5000 INJECTION INTRAVENOUS; SUBCUTANEOUS at 23:14

## 2024-02-28 RX ADMIN — APREPITANT 40 MG: 40 CAPSULE ORAL at 08:15

## 2024-02-28 RX ADMIN — PROPOFOL 200.5 MG: 10 INJECTION, EMULSION INTRAVENOUS at 08:38

## 2024-02-28 RX ADMIN — DEXMEDETOMIDINE HYDROCHLORIDE 0.4 MCG/KG/HR: 400 INJECTION INTRAVENOUS at 10:20

## 2024-02-28 RX ADMIN — SENNOSIDES AND DOCUSATE SODIUM 2 TABLET: 8.6; 5 TABLET ORAL at 15:27

## 2024-02-28 SDOH — SOCIAL STABILITY: SOCIAL INSECURITY: WERE YOU ABLE TO COMPLETE ALL THE BEHAVIORAL HEALTH SCREENINGS?: YES

## 2024-02-28 SDOH — SOCIAL STABILITY: SOCIAL INSECURITY: DOES ANYONE TRY TO KEEP YOU FROM HAVING/CONTACTING OTHER FRIENDS OR DOING THINGS OUTSIDE YOUR HOME?: NO

## 2024-02-28 SDOH — SOCIAL STABILITY: SOCIAL INSECURITY: HAS ANYONE EVER THREATENED TO HURT YOUR FAMILY OR YOUR PETS?: NO

## 2024-02-28 SDOH — SOCIAL STABILITY: SOCIAL INSECURITY: DO YOU FEEL UNSAFE GOING BACK TO THE PLACE WHERE YOU ARE LIVING?: NO

## 2024-02-28 SDOH — SOCIAL STABILITY: SOCIAL INSECURITY: ARE YOU OR HAVE YOU BEEN THREATENED OR ABUSED PHYSICALLY, EMOTIONALLY, OR SEXUALLY BY ANYONE?: NO

## 2024-02-28 SDOH — SOCIAL STABILITY: SOCIAL INSECURITY: DO YOU FEEL ANYONE HAS EXPLOITED OR TAKEN ADVANTAGE OF YOU FINANCIALLY OR OF YOUR PERSONAL PROPERTY?: NO

## 2024-02-28 SDOH — SOCIAL STABILITY: SOCIAL INSECURITY: HAVE YOU HAD THOUGHTS OF HARMING ANYONE ELSE?: NO

## 2024-02-28 SDOH — SOCIAL STABILITY: SOCIAL INSECURITY: ABUSE: ADULT

## 2024-02-28 SDOH — SOCIAL STABILITY: SOCIAL INSECURITY: ARE THERE ANY APPARENT SIGNS OF INJURIES/BEHAVIORS THAT COULD BE RELATED TO ABUSE/NEGLECT?: NO

## 2024-02-28 ASSESSMENT — PATIENT HEALTH QUESTIONNAIRE - PHQ9
1. LITTLE INTEREST OR PLEASURE IN DOING THINGS: NOT AT ALL
2. FEELING DOWN, DEPRESSED OR HOPELESS: NOT AT ALL
SUM OF ALL RESPONSES TO PHQ9 QUESTIONS 1 & 2: 0
2. FEELING DOWN, DEPRESSED OR HOPELESS: NOT AT ALL
SUM OF ALL RESPONSES TO PHQ9 QUESTIONS 1 & 2: 0
1. LITTLE INTEREST OR PLEASURE IN DOING THINGS: NOT AT ALL

## 2024-02-28 ASSESSMENT — LIFESTYLE VARIABLES
HOW OFTEN DO YOU HAVE 6 OR MORE DRINKS ON ONE OCCASION: NEVER
AUDIT-C TOTAL SCORE: 0
AUDIT-C TOTAL SCORE: 0
HOW MANY STANDARD DRINKS CONTAINING ALCOHOL DO YOU HAVE ON A TYPICAL DAY: PATIENT DOES NOT DRINK
HOW OFTEN DO YOU HAVE A DRINK CONTAINING ALCOHOL: NEVER
SKIP TO QUESTIONS 9-10: 1

## 2024-02-28 ASSESSMENT — PAIN - FUNCTIONAL ASSESSMENT
PAIN_FUNCTIONAL_ASSESSMENT: 0-10

## 2024-02-28 ASSESSMENT — COGNITIVE AND FUNCTIONAL STATUS - GENERAL
EATING MEALS: A LITTLE
TURNING FROM BACK TO SIDE WHILE IN FLAT BAD: A LITTLE
DRESSING REGULAR LOWER BODY CLOTHING: A LITTLE
HELP NEEDED FOR BATHING: A LITTLE
MOVING FROM LYING ON BACK TO SITTING ON SIDE OF FLAT BED WITH BEDRAILS: A LITTLE
MOBILITY SCORE: 18
CLIMB 3 TO 5 STEPS WITH RAILING: A LITTLE
PERSONAL GROOMING: A LITTLE
STANDING UP FROM CHAIR USING ARMS: A LITTLE
DAILY ACTIVITIY SCORE: 18
TOILETING: A LITTLE
WALKING IN HOSPITAL ROOM: A LITTLE
MOVING TO AND FROM BED TO CHAIR: A LITTLE
DRESSING REGULAR UPPER BODY CLOTHING: A LITTLE

## 2024-02-28 ASSESSMENT — PAIN SCALES - GENERAL
PAINLEVEL_OUTOF10: 10 - WORST POSSIBLE PAIN
PAINLEVEL_OUTOF10: 9
PAINLEVEL_OUTOF10: 9
PAINLEVEL_OUTOF10: 6
PAINLEVEL_OUTOF10: 10 - WORST POSSIBLE PAIN
PAINLEVEL_OUTOF10: 6
PAINLEVEL_OUTOF10: 6
PAINLEVEL_OUTOF10: 10 - WORST POSSIBLE PAIN
PAINLEVEL_OUTOF10: 5 - MODERATE PAIN

## 2024-02-28 ASSESSMENT — PAIN DESCRIPTION - LOCATION: LOCATION: NECK

## 2024-02-28 ASSESSMENT — PAIN DESCRIPTION - ORIENTATION: ORIENTATION: RIGHT;LEFT

## 2024-02-28 NOTE — ANESTHESIA POSTPROCEDURE EVALUATION
Patient: Cristal Pollard    Procedure Summary       Date: 02/28/24 Room / Location: [facility] OR 26 / [facility] OR    Anesthesia Start: 0815 Anesthesia Stop: 1303    Procedure: C4-C5, C5-C6, C6-C7 Anterior Cervical Discectomy and Fusion with plating Diagnosis:       Cervical radiculopathy      (Cervical radiculopathy [M54.12])    Surgeons: Arnulfo Reynolds MD Responsible Provider: Gold Arceo MD    Anesthesia Type: general ASA Status: 3            Anesthesia Type: general    Vitals Value Taken Time   /63 02/28/24 1300   Temp 36.3 °C (97.3 °F) 02/28/24 1215   Pulse 58 02/28/24 1314   Resp 6 02/28/24 1314   SpO2 97 % 02/28/24 1314   Vitals shown include unvalidated device data.    Anesthesia Post Evaluation    Patient location during evaluation: PACU  Patient participation: complete - patient participated  Level of consciousness: awake  Pain management: adequate  Airway patency: patent  Cardiovascular status: acceptable  Respiratory status: acceptable  Hydration status: acceptable  Postoperative Nausea and Vomiting: none        No notable events documented.

## 2024-02-28 NOTE — ANESTHESIA PREPROCEDURE EVALUATION
Patient: Cristal Pollard    Procedure Information       Date/Time: 02/28/24 0815    Procedure: C4-C5, C5-C6, C6-C7 Anterior Cervical Discectomy and Fusion with plating - 05023 x's 2, 83962, 81893 x's 3    Location: [facility] OR 26 / [facility] OR    Surgeons: Arnulfo Reynolds MD            Relevant Problems   Cardiovascular   (+) Chest pain   (+) Hypercholesteremia   (+) Hypertension      Endocrine   (+) Class 1 obesity due to excess calories without serious comorbidity with body mass index (BMI) of 30.0 to 30.9 in adult   (+) Morbid obesity due to excess calories (CMS/HCC)   (+) Type 2 diabetes mellitus (CMS/HCC)      Neuro/Psych   (+) Anxiety disorder   (+) Carpal tunnel syndrome on right   (+) Cervical radiculopathy   (+) Cervical radiculopathy due to trauma   (+) Depression   (+) Depressive personality disorder   (+) Generalized tonic clonic epilepsy (CMS/HCC)   (+) Occipital neuralgia   (+) Panic attacks   (+) Post traumatic stress disorder (PTSD)   (+) Right lumbosacral radiculopathy   (+) Seizure disorder (CMS/HCC)   (+) Stress reaction, chronic      Musculoskeletal   (+) Carpal tunnel syndrome on right   (+) Central stenosis of spinal canal   (+) Chronic neck pain   (+) DDD (degenerative disc disease), lumbosacral   (+) DJD (degenerative joint disease) of cervical spine   (+) Spinal stenosis of lumbar region with neurogenic claudication       Clinical information reviewed:   Tobacco  Allergies  Meds   Med Hx  Surg Hx  OB Status  Fam Hx  Soc   Hx        NPO Detail:  NPO/Void Status  Carbohydrate Drink Given Prior to Surgery? : N  Date of Last Liquid: 02/27/24  Time of Last Liquid: 1900  Date of Last Solid: 02/27/24  Time of Last Solid: 1900  Last Intake Type: Clear fluids  Time of Last Void: 0652         Physical Exam    Airway  Mallampati: III  TM distance: >3 FB  Neck ROM: limited     Cardiovascular - normal exam     Dental    Pulmonary - normal exam     Abdominal            Anesthesia  Plan    History of general anesthesia?: yes  History of complications of general anesthesia?: no    ASA 3     general     Anesthetic plan and risks discussed with patient.  Use of blood products discussed with patient who.    Plan discussed with CAA.

## 2024-02-28 NOTE — ANESTHESIA PROCEDURE NOTES
Airway  Date/Time: 2/28/2024 9:04 AM  Urgency: elective    Airway not difficult    Staffing  Performed: ALEXA   Authorized by: Gold Arceo MD    Performed by: ABEL Kapadia  Patient location during procedure: OR    Indications and Patient Condition  Indications for airway management: anesthesia  Spontaneous Ventilation: absent  Sedation level: deep  Preoxygenated: yes  Patient position: sniffing  MILS maintained throughout  Mask difficulty assessment: 1 - vent by mask    Final Airway Details  Final airway type: endotracheal airway      Successful airway: ETT  Cuffed: yes   Successful intubation technique: video laryngoscopy  Facilitating devices/methods: intubating stylet  Endotracheal tube insertion site: oral  Blade: Shanta  Blade size: #3  ETT size (mm): 7.0  Cormack-Lehane Classification: grade I - full view of glottis  Placement verified by: chest auscultation and capnometry   Measured from: lips  ETT to lips (cm): 22  Number of attempts at approach: 1    Additional Comments  Did not move neck to intubate with videoscope. Ointment was placed on cracked lip prior to intubation.

## 2024-02-28 NOTE — OP NOTE
C4-C5, C5-C6, C6-C7 Anterior Cervical Discectomy and Fusion with plating Operative Note     Date: 2024  OR Location: [facility] OR    Name: Cristal Pollard, : 1960, Age: 63 y.o., MRN: [id number], Sex: female    Diagnosis  Pre-op Diagnosis     * Cervical radiculopathy [M54.12] Post-op Diagnosis     * Cervical radiculopathy [M54.12]     Procedures  C4-C5, C5-C6, C6-C7 Anterior Cervical Discectomy and Fusion with plating  84158 - WI ARTHRD ANT INTERBODY DECOMPRESS CERVICAL BELW C2    WI ARTHRD ANT INTERDY CERVCL BELW C2 EA ADDL NTRSPC [20972]  WI ANTERIOR INSTRUMENTATION 4-7 VERTEBRAL SEGMENTS [38670]  WI ALLOGRAFT FOR SPINE SURGERY ONLY STRUCTURAL []  Surgeons      * Arnulfo Reynolds - Primary    Resident/Fellow/Other Assistant:  Surgeon(s) and Role:     * Hay Montejo MD - Fellow    Procedure Summary  Anesthesia: General  ASA: III  Anesthesia Staff: Anesthesiologist: Gold Arceo MD  C-AA: ABEL Cortés; ABEL Kapadia  ALEXA: Azra Alvarez  Estimated Blood Loss: 50mL  Intra-op Medications:   Administrations occurring from 0815 to 1130 on 24:   Medication Name Total Dose   lidocaine-epinephrine PF (Xylocaine W/EPI) 1 %-1:200,000 injection 2 mL   polymyxin B injection 500,000 Units              Anesthesia Record               Intraprocedure I/O Totals          Intake    Dexmedetomidine 0.00 mL    The total shown is the total volume documented since Anesthesia Start was filed.    Propofol Drip 0.00 mL    The total shown is the total volume documented since Anesthesia Start was filed.    Total Intake 0 mL       Output    Urine 200 mL    Est. Blood Loss 50 mL    Total Output 250 mL       Net    Net Volume -250 mL          Specimen: No specimens collected     Staff:   Circulator: Marychuy Patel RN  Relief Scrub: Angelita Thacker RN  Scrub Person: Pratibha Angeles; Maggie Beauchamp RN         Drains and/or Catheters:   Closed/Suction Drain Anterior Neck Bulb 10 Fr. (Active)       [REMOVED] Urethral  Catheter Double-lumen 16 Fr. (Removed)       Implants:  Implants       Type Name Action Serial No.      Spinal Hardware ALLOGRAFT, TRIAD LORDOTIC 7 X 11 X 14 - [id number] - [id number] Implanted [id number]     Spinal Hardware ALLOGRAFT, TRIAD LORDOTIC 7 X 11 X 14 - [id number] - [id number] Implanted [id number]     Spinal Hardware ALLOGRAFT, TRIAD LORDOTIC 7 X 11 X 14 - [id number] - [id number] Implanted [id number]     Spinal Hardware PLATE, ACP, 1.9H, 3 LEVEL, 54MM - [id number] Implanted      Spinal Hardware SCREW, ACP, SELF DRILL, 3.5 X 15MM, VARIABLE - [id number] Implanted      Spinal Hardware SCREW, ACP, SELF DRILL, 3.5 X 17MM, VARIABLE - [id number] Implanted                     Informed Consent:  The risks, benefits, complications, and alternatives were discussed with the patient. I have explained the surgical procedure in detail with expected duration and extent of recovery along risks of surgery that include, but is not limited to bleeding, infection, blood vessel injury or damage, loss of sensation, loss of bladder, bowel or sexual function, nerve injury/damage resulting in weakness/paralysis, malunion, nonunion, CSF leak, brachial plexus injury, peripheral vision blindness, failure of implants/fusion, failure to relieve symptoms, recurrent disease, adjacent segment disease, need to reoperate for any reason and general anesthesia reaction such as stroke, coma, heart attack, delirium, confusion, death as well as worsening of preexisted medical conditions.     I clearly emphasized that while the goal of surgery is to decompress the spinal cord so as to ARREST the progression of neurological deficits - preexisting deficits may or may not improve after surgery. We discussed that many patients do clinically improve in functional and neurological outcomes following decompression of the spinal elements in patients but the extent of which is variable and depends on the severity of pain, numbness, tingling, or weakness.  With improvement seen of those symptoms in that order. We did discuss the goal of surgery to alleviate pain first and foremost and hope for recovery of all neurologic function with time.     All questions were answered and the patient was amenable to proceed.     INDICATIONS FOR THE PROCEDURE: Cristal Pollard is an 63 y.o. female who is having surgery for Cervical radiculopathy [M54.12].       DESCRIPTION OF THE OPERATION:   The patient was brought to the operating room theater. A verbal Huddle was performed confirming the patient by name, date of birth, medical record number, site of surgery. After all team members were in agreement, they underwent anesthesia induction without complication. Two large bore IVs were placed as well as endotracheal tube. Perioperative antibiotic administration was confirmed. A horizontal incision from the midline of the trachea over to the medial aspect of the sternocleidomastoid was marked at approximately the C4-C7 disc spaces and this was confirmed with lateral fluoroscopy. Next, we prepped and draped the neck in a sterile fashion and infiltrated the skin with lidocaine with epinephrine.    We then began the procedure by opening the skin with a 15 blade and using combination of Bovie electrocautery and blunt dissection, we exposed the platysma muscle and this was bisected in a horizontal fashion. Staying along the medial aspect of the sternocleidomastoid we used blunt dissection to retract the carotid sheath medially, omohyoid muscle inferiorly, and trachea/esophagus medially exposing the precervical fascia. We then exposed the anterior aspect of the cervical spine with blunt dissection. Using Bovie electrocautery, we reflected the longus colli muscles over the our disc space and used lateral fluoroscopy to confirm our level of interest.     We then placed our self-retaining retractor in and [place] pins in and placed the C6-7 disc space under distraction. We then performed an annulotomy  with an 11 blade followed by Kerrison rongeurs and curettes. We performed a complete total discectomy under microscopic assistance. After completion of the discectomy, we removed the posterior longitudinal ligament using microsurgical technique with a nerve hook, curettes, and kerrison rongeurs. We then burred out the uncinate processes bilaterally and performed foraminotomies with a Kerrison rongeur. This was confirmed with a large blunt nerve hook bilaterally.    FloSeal and bipolar cautery were next used to achieve hemostasis. Contouring and arthrodesis of the disc space was then finally performed with a high-speed drill. Anterior osteophytes were removed, and a trial was then inserted in the disc space and a 7mm allograft interbody was placed without complication under fluoroscopic guidance.    We then turned our attention to C5-6 and performed the same surgical technique for discectomy, foraminotomy, and confirmation of our decompression. Contouring and arthrodesis of the disc space was then finally performed with a high-speed drill. Anterior osteophytes were removed, and a trial was then inserted in the disc space and a 7mm allograft interbody was placed without complication under fluoroscopic guidance at the second level as well.    We then turned our attention to C4-5 and performed the same surgical technique for discectomy, foraminotomy, and confirmation of our decompression. Contouring and arthrodesis of the disc space was then finally performed with a high-speed drill. Anterior osteophytes were removed, and a trial was then inserted in the disc space and a 7mm allograft interbody was placed without complication under fluoroscopic guidance at the second level as well.    We then used an anterior plate and secured this with 17 and 15mm screws to fixate the spine in place. This was final tightened and secured to the vertebral bodies of C4 - C7. Copious amounts of irrigation were used and FloSeal and Bipolar  caudery for hemostasis. Final x-rays confirmed accurate placement of all hardware across the C4-C5, C5-6, C6-7 disc spaces. The platysma and dermal layers were then approximated with 3-0 Vicryl sutures. Skin was approximated with a Biosyn stitch in a subcuticular fashion followed by Dermabond on the skin. A 10fr drain was left in the subplatysmal space. The patient was then extubated and returned to PACU in stable condition.    Disposition: PACU - hemodynamically stable.    Condition: stable    Attending Attestation: I was present and scrubbed for the key portions of the procedure.      Arnulfo Reynolds M.D.  Director of Spine [place]  [facility]   of Neurological Surgery  [employer]  Office: [phone]  Fax: [phone]

## 2024-02-28 NOTE — CARE PLAN
Problem: Pain  Goal: My pain/discomfort is manageable  Outcome: Progressing     Problem: Safety  Goal: I will remain free of falls  Outcome: Progressing

## 2024-02-29 ENCOUNTER — APPOINTMENT (OUTPATIENT)
Dept: RADIOLOGY | Facility: HOSPITAL | Age: 64
End: 2024-02-29
Payer: COMMERCIAL

## 2024-02-29 LAB
ANION GAP SERPL CALC-SCNC: 12 MMOL/L (ref 10–20)
BUN SERPL-MCNC: 11 MG/DL (ref 6–23)
CALCIUM SERPL-MCNC: 9.6 MG/DL (ref 8.6–10.6)
CHLORIDE SERPL-SCNC: 105 MMOL/L (ref 98–107)
CO2 SERPL-SCNC: 26 MMOL/L (ref 21–32)
CREAT SERPL-MCNC: 0.68 MG/DL (ref 0.5–1.05)
EGFRCR SERPLBLD CKD-EPI 2021: >90 ML/MIN/1.73M*2
ERYTHROCYTE [DISTWIDTH] IN BLOOD BY AUTOMATED COUNT: 13.2 % (ref 11.5–14.5)
GLUCOSE BLD MANUAL STRIP-MCNC: 116 MG/DL (ref 74–99)
GLUCOSE BLD MANUAL STRIP-MCNC: 150 MG/DL (ref 74–99)
GLUCOSE SERPL-MCNC: 104 MG/DL (ref 74–99)
HCT VFR BLD AUTO: 37.6 % (ref 36–46)
HGB BLD-MCNC: 11.8 G/DL (ref 12–16)
MCH RBC QN AUTO: 29.8 PG (ref 26–34)
MCHC RBC AUTO-ENTMCNC: 31.4 G/DL (ref 32–36)
MCV RBC AUTO: 95 FL (ref 80–100)
NRBC BLD-RTO: 0 /100 WBCS (ref 0–0)
PLATELET # BLD AUTO: 245 X10*3/UL (ref 150–450)
POTASSIUM SERPL-SCNC: 3.9 MMOL/L (ref 3.5–5.3)
RBC # BLD AUTO: 3.96 X10*6/UL (ref 4–5.2)
SODIUM SERPL-SCNC: 139 MMOL/L (ref 136–145)
WBC # BLD AUTO: 10.4 X10*3/UL (ref 4.4–11.3)

## 2024-02-29 PROCEDURE — 2500000004 HC RX 250 GENERAL PHARMACY W/ HCPCS (ALT 636 FOR OP/ED): Mod: SE | Performed by: STUDENT IN AN ORGANIZED HEALTH CARE EDUCATION/TRAINING PROGRAM

## 2024-02-29 PROCEDURE — 2500000004 HC RX 250 GENERAL PHARMACY W/ HCPCS (ALT 636 FOR OP/ED): Mod: SE | Performed by: NURSE PRACTITIONER

## 2024-02-29 PROCEDURE — 2500000002 HC RX 250 W HCPCS SELF ADMINISTERED DRUGS (ALT 637 FOR MEDICARE OP, ALT 636 FOR OP/ED): Mod: SE | Performed by: STUDENT IN AN ORGANIZED HEALTH CARE EDUCATION/TRAINING PROGRAM

## 2024-02-29 PROCEDURE — 2500000001 HC RX 250 WO HCPCS SELF ADMINISTERED DRUGS (ALT 637 FOR MEDICARE OP): Mod: SE

## 2024-02-29 PROCEDURE — 72040 X-RAY EXAM NECK SPINE 2-3 VW: CPT

## 2024-02-29 PROCEDURE — 7100000011 HC EXTENDED STAY RECOVERY HOURLY - NURSING UNIT

## 2024-02-29 PROCEDURE — 36415 COLL VENOUS BLD VENIPUNCTURE: CPT | Performed by: STUDENT IN AN ORGANIZED HEALTH CARE EDUCATION/TRAINING PROGRAM

## 2024-02-29 PROCEDURE — 92610 EVALUATE SWALLOWING FUNCTION: CPT | Mod: GN

## 2024-02-29 PROCEDURE — 80048 BASIC METABOLIC PNL TOTAL CA: CPT | Performed by: STUDENT IN AN ORGANIZED HEALTH CARE EDUCATION/TRAINING PROGRAM

## 2024-02-29 PROCEDURE — 2500000004 HC RX 250 GENERAL PHARMACY W/ HCPCS (ALT 636 FOR OP/ED): Mod: SE

## 2024-02-29 PROCEDURE — 2500000005 HC RX 250 GENERAL PHARMACY W/O HCPCS: Mod: SE

## 2024-02-29 PROCEDURE — 97165 OT EVAL LOW COMPLEX 30 MIN: CPT | Mod: GO

## 2024-02-29 PROCEDURE — 2500000001 HC RX 250 WO HCPCS SELF ADMINISTERED DRUGS (ALT 637 FOR MEDICARE OP): Mod: SE | Performed by: STUDENT IN AN ORGANIZED HEALTH CARE EDUCATION/TRAINING PROGRAM

## 2024-02-29 PROCEDURE — 82947 ASSAY GLUCOSE BLOOD QUANT: CPT | Mod: 91

## 2024-02-29 PROCEDURE — 85027 COMPLETE CBC AUTOMATED: CPT | Performed by: STUDENT IN AN ORGANIZED HEALTH CARE EDUCATION/TRAINING PROGRAM

## 2024-02-29 PROCEDURE — 72040 X-RAY EXAM NECK SPINE 2-3 VW: CPT | Performed by: RADIOLOGY

## 2024-02-29 PROCEDURE — 97161 PT EVAL LOW COMPLEX 20 MIN: CPT | Mod: GP

## 2024-02-29 RX ORDER — POLYETHYLENE GLYCOL 3350 17 G/17G
17 POWDER, FOR SOLUTION ORAL EVERY 12 HOURS
Status: DISCONTINUED | OUTPATIENT
Start: 2024-02-29 | End: 2024-03-01 | Stop reason: HOSPADM

## 2024-02-29 RX ORDER — DEXAMETHASONE SODIUM PHOSPHATE 4 MG/ML
2 INJECTION, SOLUTION INTRA-ARTICULAR; INTRALESIONAL; INTRAMUSCULAR; INTRAVENOUS; SOFT TISSUE EVERY 8 HOURS
Status: DISCONTINUED | OUTPATIENT
Start: 2024-03-01 | End: 2024-03-01 | Stop reason: HOSPADM

## 2024-02-29 RX ORDER — SODIUM CHLORIDE 9 MG/ML
75 INJECTION, SOLUTION INTRAVENOUS CONTINUOUS
Status: DISCONTINUED | OUTPATIENT
Start: 2024-02-29 | End: 2024-03-01

## 2024-02-29 RX ORDER — DEXAMETHASONE SODIUM PHOSPHATE 100 MG/10ML
10 INJECTION INTRAMUSCULAR; INTRAVENOUS ONCE
Status: COMPLETED | OUTPATIENT
Start: 2024-02-29 | End: 2024-02-29

## 2024-02-29 RX ADMIN — LIDOCAINE 1 PATCH: 4 PATCH TOPICAL at 08:22

## 2024-02-29 RX ADMIN — CYCLOBENZAPRINE 5 MG: 10 TABLET, FILM COATED ORAL at 08:19

## 2024-02-29 RX ADMIN — POLYETHYLENE GLYCOL 3350 17 G: 17 POWDER, FOR SOLUTION ORAL at 20:36

## 2024-02-29 RX ADMIN — GABAPENTIN 300 MG: 300 CAPSULE ORAL at 20:36

## 2024-02-29 RX ADMIN — ACETAMINOPHEN 650 MG: 325 TABLET ORAL at 03:31

## 2024-02-29 RX ADMIN — LAMOTRIGINE 100 MG: 100 TABLET ORAL at 08:19

## 2024-02-29 RX ADMIN — HEPARIN SODIUM 5000 UNITS: 5000 INJECTION INTRAVENOUS; SUBCUTANEOUS at 23:05

## 2024-02-29 RX ADMIN — HEPARIN SODIUM 5000 UNITS: 5000 INJECTION INTRAVENOUS; SUBCUTANEOUS at 08:20

## 2024-02-29 RX ADMIN — SENNOSIDES AND DOCUSATE SODIUM 2 TABLET: 8.6; 5 TABLET ORAL at 08:20

## 2024-02-29 RX ADMIN — ACETAMINOPHEN 650 MG: 325 TABLET ORAL at 20:35

## 2024-02-29 RX ADMIN — HYDROMORPHONE HYDROCHLORIDE 0.4 MG: 1 INJECTION, SOLUTION INTRAMUSCULAR; INTRAVENOUS; SUBCUTANEOUS at 16:06

## 2024-02-29 RX ADMIN — CYCLOBENZAPRINE 5 MG: 10 TABLET, FILM COATED ORAL at 20:36

## 2024-02-29 RX ADMIN — GABAPENTIN 300 MG: 300 CAPSULE ORAL at 08:19

## 2024-02-29 RX ADMIN — ACETAMINOPHEN 650 MG: 325 TABLET ORAL at 08:15

## 2024-02-29 RX ADMIN — SODIUM CHLORIDE 75 ML/HR: 9 INJECTION, SOLUTION INTRAVENOUS at 14:45

## 2024-02-29 RX ADMIN — DEXAMETHASONE SODIUM PHOSPHATE 10 MG: 10 INJECTION INTRAMUSCULAR; INTRAVENOUS at 12:37

## 2024-02-29 RX ADMIN — OXYCODONE HYDROCHLORIDE 10 MG: 5 TABLET ORAL at 03:31

## 2024-02-29 RX ADMIN — SENNOSIDES AND DOCUSATE SODIUM 2 TABLET: 8.6; 5 TABLET ORAL at 20:36

## 2024-02-29 RX ADMIN — LAMOTRIGINE 150 MG: 150 TABLET ORAL at 20:34

## 2024-02-29 RX ADMIN — POLYETHYLENE GLYCOL 3350 17 G: 17 POWDER, FOR SOLUTION ORAL at 08:15

## 2024-02-29 RX ADMIN — HYDROMORPHONE HYDROCHLORIDE 0.4 MG: 1 INJECTION, SOLUTION INTRAMUSCULAR; INTRAVENOUS; SUBCUTANEOUS at 10:15

## 2024-02-29 RX ADMIN — OXYCODONE HYDROCHLORIDE 10 MG: 5 TABLET ORAL at 17:53

## 2024-02-29 RX ADMIN — OXYCODONE HYDROCHLORIDE 10 MG: 5 TABLET ORAL at 08:20

## 2024-02-29 RX ADMIN — OXYCODONE HYDROCHLORIDE 10 MG: 5 TABLET ORAL at 12:37

## 2024-02-29 RX ADMIN — HEPARIN SODIUM 5000 UNITS: 5000 INJECTION INTRAVENOUS; SUBCUTANEOUS at 16:05

## 2024-02-29 RX ADMIN — OXYCODONE HYDROCHLORIDE 10 MG: 5 TABLET ORAL at 22:08

## 2024-02-29 RX ADMIN — HYDROMORPHONE HYDROCHLORIDE 0.4 MG: 1 INJECTION, SOLUTION INTRAMUSCULAR; INTRAVENOUS; SUBCUTANEOUS at 01:33

## 2024-02-29 RX ADMIN — HYDROMORPHONE HYDROCHLORIDE 0.4 MG: 1 INJECTION, SOLUTION INTRAMUSCULAR; INTRAVENOUS; SUBCUTANEOUS at 20:42

## 2024-02-29 SDOH — ECONOMIC STABILITY: HOUSING INSECURITY: IN THE LAST 12 MONTHS, HOW MANY PLACES HAVE YOU LIVED?: 1

## 2024-02-29 SDOH — ECONOMIC STABILITY: FOOD INSECURITY: WITHIN THE PAST 12 MONTHS, THE FOOD YOU BOUGHT JUST DIDN'T LAST AND YOU DIDN'T HAVE MONEY TO GET MORE.: NEVER TRUE

## 2024-02-29 SDOH — ECONOMIC STABILITY: FOOD INSECURITY: WITHIN THE PAST 12 MONTHS, YOU WORRIED THAT YOUR FOOD WOULD RUN OUT BEFORE YOU GOT MONEY TO BUY MORE.: NEVER TRUE

## 2024-02-29 SDOH — ECONOMIC STABILITY: TRANSPORTATION INSECURITY
IN THE PAST 12 MONTHS, HAS LACK OF TRANSPORTATION KEPT YOU FROM MEETINGS, WORK, OR FROM GETTING THINGS NEEDED FOR DAILY LIVING?: NO

## 2024-02-29 SDOH — SOCIAL STABILITY: SOCIAL INSECURITY: WITHIN THE LAST YEAR, HAVE YOU BEEN AFRAID OF YOUR PARTNER OR EX-PARTNER?: NO

## 2024-02-29 SDOH — SOCIAL STABILITY: SOCIAL INSECURITY
WITHIN THE LAST YEAR, HAVE TO BEEN RAPED OR FORCED TO HAVE ANY KIND OF SEXUAL ACTIVITY BY YOUR PARTNER OR EX-PARTNER?: NO

## 2024-02-29 SDOH — SOCIAL STABILITY: SOCIAL INSECURITY: WITHIN THE LAST YEAR, HAVE YOU BEEN HUMILIATED OR EMOTIONALLY ABUSED IN OTHER WAYS BY YOUR PARTNER OR EX-PARTNER?: YES

## 2024-02-29 SDOH — SOCIAL STABILITY: SOCIAL INSECURITY
WITHIN THE LAST YEAR, HAVE YOU BEEN KICKED, HIT, SLAPPED, OR OTHERWISE PHYSICALLY HURT BY YOUR PARTNER OR EX-PARTNER?: NO

## 2024-02-29 SDOH — ECONOMIC STABILITY: TRANSPORTATION INSECURITY
IN THE PAST 12 MONTHS, HAS THE LACK OF TRANSPORTATION KEPT YOU FROM MEDICAL APPOINTMENTS OR FROM GETTING MEDICATIONS?: NO

## 2024-02-29 SDOH — ECONOMIC STABILITY: HOUSING INSECURITY
IN THE LAST 12 MONTHS, WAS THERE A TIME WHEN YOU DID NOT HAVE A STEADY PLACE TO SLEEP OR SLEPT IN A SHELTER (INCLUDING NOW)?: NO

## 2024-02-29 SDOH — ECONOMIC STABILITY: INCOME INSECURITY: IN THE LAST 12 MONTHS, WAS THERE A TIME WHEN YOU WERE NOT ABLE TO PAY THE MORTGAGE OR RENT ON TIME?: NO

## 2024-02-29 SDOH — ECONOMIC STABILITY: INCOME INSECURITY: HOW HARD IS IT FOR YOU TO PAY FOR THE VERY BASICS LIKE FOOD, HOUSING, MEDICAL CARE, AND HEATING?: NOT HARD AT ALL

## 2024-02-29 ASSESSMENT — PAIN DESCRIPTION - ORIENTATION
ORIENTATION: POSTERIOR
ORIENTATION: ANTERIOR
ORIENTATION: ANTERIOR

## 2024-02-29 ASSESSMENT — PAIN DESCRIPTION - LOCATION
LOCATION: NECK

## 2024-02-29 ASSESSMENT — PAIN SCALES - GENERAL
PAINLEVEL_OUTOF10: 7
PAINLEVEL_OUTOF10: 8
PAINLEVEL_OUTOF10: 2
PAINLEVEL_OUTOF10: 2
PAINLEVEL_OUTOF10: 7
PAINLEVEL_OUTOF10: 6
PAINLEVEL_OUTOF10: 6
PAINLEVEL_OUTOF10: 7
PAINLEVEL_OUTOF10: 8
PAINLEVEL_OUTOF10: 8
PAINLEVEL_OUTOF10: 9
PAINLEVEL_OUTOF10: 7
PAINLEVEL_OUTOF10: 8
PAINLEVEL_OUTOF10: 6
PAINLEVEL_OUTOF10: 6
PAINLEVEL_OUTOF10: 7
PAINLEVEL_OUTOF10: 7
PAINLEVEL_OUTOF10: 6

## 2024-02-29 ASSESSMENT — PAIN - FUNCTIONAL ASSESSMENT

## 2024-02-29 ASSESSMENT — COGNITIVE AND FUNCTIONAL STATUS - GENERAL
DRESSING REGULAR UPPER BODY CLOTHING: A LOT
PERSONAL GROOMING: A LITTLE
MOBILITY SCORE: 11
TOILETING: A LITTLE
WALKING IN HOSPITAL ROOM: A LOT
DRESSING REGULAR LOWER BODY CLOTHING: A LOT
CLIMB 3 TO 5 STEPS WITH RAILING: TOTAL
DAILY ACTIVITIY SCORE: 16
STANDING UP FROM CHAIR USING ARMS: A LOT
TURNING FROM BACK TO SIDE WHILE IN FLAT BAD: A LOT
MOVING TO AND FROM BED TO CHAIR: A LOT
PERSONAL GROOMING: A LITTLE
DAILY ACTIVITIY SCORE: 16
MOVING TO AND FROM BED TO CHAIR: A LOT
MOVING FROM LYING ON BACK TO SITTING ON SIDE OF FLAT BED WITH BEDRAILS: A LOT
TOILETING: A LITTLE
CLIMB 3 TO 5 STEPS WITH RAILING: TOTAL
HELP NEEDED FOR BATHING: A LOT
STANDING UP FROM CHAIR USING ARMS: A LOT
WALKING IN HOSPITAL ROOM: A LOT
MOBILITY SCORE: 11
HELP NEEDED FOR BATHING: A LOT
MOVING FROM LYING ON BACK TO SITTING ON SIDE OF FLAT BED WITH BEDRAILS: A LOT
DRESSING REGULAR LOWER BODY CLOTHING: A LOT
TURNING FROM BACK TO SIDE WHILE IN FLAT BAD: A LOT
DRESSING REGULAR UPPER BODY CLOTHING: A LOT

## 2024-02-29 ASSESSMENT — ACTIVITIES OF DAILY LIVING (ADL)
ADL_ASSISTANCE: INDEPENDENT
ADL_ASSISTANCE: INDEPENDENT
BATHING_ASSISTANCE: MODERATE

## 2024-02-29 NOTE — PROGRESS NOTES
I met with Cristal at the bedside regarding discharge planning and home going needs. Patient lives at home with her significant other [phone] where she is usually independent with ADL's without assistive devices. Patient is pending post- op x-ray and therapy evaluation for discharge recommendations. I will continue to follow with a safe discharge plan.
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
## 2024-02-29 NOTE — PROGRESS NOTES
"Physical Therapy    Physical Therapy Evaluation    Patient Name: Cristal Pollard  MRN: [id number]  Today's Date: 2/29/2024   Time Calculation  Start Time: 1135  Stop Time: 1155  Time Calculation (min): 20 min    Assessment/Plan   PT Assessment  PT Assessment Results: Decreased strength, Decreased range of motion, Decreased endurance, Impaired balance, Decreased mobility, Impaired judgement  Rehab Prognosis: Good  Barriers to Discharge: none  Evaluation/Treatment Tolerance: Patient limited by fatigue, Patient limited by pain  Medical Staff Made Aware: Yes  End of Session Communication: Bedside nurse  Assessment Comment: 63 y.o. s/p cervical ACDF now demonstrating impaired strength, balance, and function. Will benefit from continued PT in house and after discharge to address deficits.  End of Session Patient Position: Bed, 3 rail up, Alarm off, not on at start of session  IP OR SWING BED PT PLAN  Inpatient or Swing Bed: Inpatient  PT Plan  Treatment/Interventions: Bed mobility, Transfer training, Gait training, Balance training, Strengthening, Endurance training, Therapeutic exercise, Therapeutic activity  PT Plan: Skilled PT  PT Frequency: 6 times per week  PT Discharge Recommendations: Moderate intensity level of continued care  PT - OK to Discharge: Yes      Subjective   General Visit Information:  General  Reason for Referral: ACDF on 2/28/24  Past Medical History Relevant to Rehab: 63-year-old female with a past medical history of type 2 diabetes, atrial myxoma, anxiety/depression, hypertension, seizure, TIA, cholecystectomy.  Prior to Session Communication: Bedside nurse  General Comment: Pt resting in bed upon entry. Reports having been up multiple times this morning to x-ray, to bathroom, up in chair with OT. Willing to work with PT though limited by fatigue and neck discomfort.  Home Living:  Home Living  Type of Home: House  Lives With:  (Pt reports boyfriend, \"Carlitos\" though per patient is unreliable and unable to " "provide sufficient care.)  Home Adaptive Equipment: None  Home Layout: One level  Prior Level of Function:  Prior Function Per Pt/Caregiver Report  Level of [place]: Independent with ADLs and functional transfers  ADL Assistance: Independent  Homemaking Assistance: Independent  Ambulatory Assistance: Independent  Precautions:  Precautions  Medical Precautions: Fall precautions  Post-Surgical Precautions: Spinal precautions  Precautions Comment: Pt reports having fallen 3 months ago and suffered rib injuries.  Vital Signs:       Objective   Pain:  Pain Assessment  Pain Assessment: 0-10  Pain Score:  (\"9.5\")  Pain Type: Surgical pain  Pain Location: Neck  Cognition:  Cognition  Overall Cognitive Status: Within Functional Limits  Orientation Level: Oriented X4    General Assessments:    Activity Tolerance  Endurance: Decreased tolerance for upright activites    Sensation  Light Touch: No apparent deficits    Strength  Strength Comments: BLE grossly 4/5  Strength  Strength Comments: BLE grossly 4/5    Coordination  Movements are Fluid and Coordinated: Yes    Postural Control  Postural Control: Within Functional Limits    Static Sitting Balance  Static Sitting-Balance Support: Bilateral upper extremity supported  Static Sitting-Level of Assistance: Contact guard  Static Sitting-Comment/Number of Minutes: 5 minutes EOB prior to return to supine due to persistent fatigue/discomfort.    Static Standing Balance  Static Standing-Balance Support:  (Pt not able to tolerate attempts on this visit w PT though reportedly required assistance and a wheeled walker.)  Functional Assessments:       Bed Mobility  Bed Mobility: Yes  Bed Mobility 1  Bed Mobility 1: Supine to sitting, Sitting to supine  Level of Assistance 1: Moderate assistance    Transfers  Transfer: No  Transfer 1  Transfer Level of Assistance 1:  (Pt unable to tolerate attempts though anticipate would require min/mod assist.)    Outcome Measures:  [facility] Basic " Mobility  Turning from your back to your side while in a flat bed without using bedrails: A lot  Moving from lying on your back to sitting on the side of a flat bed without using bedrails: A lot  Moving to and from bed to chair (including a wheelchair): A lot  Standing up from a chair using your arms (e.g. wheelchair or bedside chair): A lot  To walk in hospital room: A lot  Climbing 3-5 steps with railing: Total  Basic Mobility - Total Score: 11    Encounter Problems       Encounter Problems (Active)       Mobility       STG - Patient will ambulate >50ft, wheeled walker, CGA       Start:  02/29/24    Expected End:  03/14/24               Transfers       STG - Patient to transfer to and from sit to supine min assist       Start:  02/29/24    Expected End:  03/14/24            STG - Patient will transfer sit to and from stand min assist       Start:  02/29/24    Expected End:  03/14/24                   Education Documentation  Precautions, taught by Yair Mack, PT at 2/29/2024 12:26 PM.  Learner: Patient  Readiness: Acceptance  Method: Explanation  Response: Verbalizes Understanding  Comment: log roll, up with assist, DC recs    Mobility Training, taught by Yair Mack, PT at 2/29/2024 12:26 PM.  Learner: Patient  Readiness: Acceptance  Method: Explanation  Response: Verbalizes Understanding  Comment: log roll, up with assist, DC recs    Education Comments  No comments found.

## 2024-02-29 NOTE — CARE PLAN
The patient's goals for the shift include      The clinical goals for the shift include pt will remain free from injury thoughout shift      Problem: Pain  Goal: My pain/discomfort is manageable  Outcome: Progressing     Problem: Safety  Goal: I will remain free of falls  Outcome: Progressing

## 2024-02-29 NOTE — HOSPITAL COURSE
63 year old female, with Hx of HTN, HLD, seizures who presented to clinic with BUE radiculopathy.  She was admitted for planned OR.    Hospital Course as follows:  2/28 C4-7 ACDF  2/29 Uprights completed--> hardware in position; drain removed    PT/OT eval recommend SNF but patient will be discharged to home with Home Care    Discharged with scheduled follow up.

## 2024-02-29 NOTE — CARE PLAN
The patient's goals for the shift include      The clinical goals for the shift include Patient will remain fall free through end of shift.    Over the shift, the patient did not make progress toward the following goals. Barriers to progression include . Recommendations to address these barriers include .

## 2024-02-29 NOTE — PROGRESS NOTES
"Occupational Therapy    Occupational Therapy    Evaluation    Patient Name: Cristal Pollard  MRN: [id number]  Today's Date: 2/29/2024  Time Calculation  Start Time: 0805  Stop Time: 0830  Time Calculation (min): 25 min    Assessment  IP OT Assessment  OT Assessment: Pt presents with impaired ROM, strength, balance and act tolerance, pt reports limited assistance at home, requires to be fully independent with ADLs and functional mobility prior returning home.  Prognosis: Good  End of Session Communication: Bedside nurse  End of Session Patient Position: Up in chair  Plan:  Treatment Interventions: ADL retraining, Functional transfer training, Endurance training, Compensatory technique education, UE strengthening/ROM  OT Frequency: 3 times per week  OT Discharge Recommendations: Moderate intensity level of continued care  OT - OK to Discharge: Yes    Subjective   Current Problem:    General:  Reason for Referral: s/p C4-7 ACDF  Past Medical History Relevant to Rehab: HTN, HLD, Seizures, p/w B UE radiculopathy  Prior to Session Communication: Bedside nurse  Patient Position Received: Bed, 2 rail up  Family/Caregiver Present: No  General Comment: Pt in supine on arrival, cooperative and willing to particiapte in OT eval.   Precautions:  Hearing/Visual Limitations: WFL  Medical Precautions: Fall precautions  Post-Surgical Precautions: Spinal precautions  Precautions Comment: Pt edcuated on C spine precautions and log roll technique.  Vital Signs:  Heart Rate: 62  SpO2: 96 %  Pain:  Pain Assessment  Pain Assessment: 0-10  Pain Score: 9  Pain Type: Acute pain  Pain Location: Neck  Pain Interventions: Repositioned (RN medicated at end of session)  Lines/Tubes/Drains:         Objective   Cognition:  Overall Cognitive Status: Within Functional Limits  Orientation Level: Oriented X4  Cognition Test Scores  Cognition Tests:  (4 AT negative)        Home Living:  Type of Home: House  Lives With:  (\"Carlitos\")  Home Adaptive Equipment: " "None  Home Layout: One level, Laundry in basement  Home Access: Stairs to enter with rails  Entrance Stairs-Number of Steps: 4  Bathroom Shower/Tub: Tub/shower unit  Bathroom Toilet: Standard  Bathroom Equipment: None   Prior Function:  Level of [place]: Independent with ADLs and functional transfers  ADL Assistance: Independent  Homemaking Assistance: Independent  Ambulatory Assistance: Independent  Vocational: Retired  Hand Dominance: Right  IADL History:  Homemaking Responsibilities: Yes  Meal Prep Responsibility: Primary  Laundry Responsibility: Primary  Cleaning Responsibility: Primary  Bill Paying/Finance Responsibility: Primary  Shopping Responsibility: Primary  Homemaking Comments: Pt reports \"Carlitos helps on \"his terms\" and is not reliable helper  Current License: Yes  Mode of Transportation: Car  ADL:  Eating Assistance: Stand by  Eating Deficit: Setup  Grooming Assistance: Minimal  Bathing Assistance: Moderate  UE Dressing Assistance: Moderate  LE Dressing Assistance: Moderate  Toileting Assistance with Device: Moderate  Activity Tolerance:  Endurance: Tolerates 10 - 20 min exercise with multiple rests  Balance:     Bed Mobility/Transfers: Bed Mobility  Bed Mobility: Yes  Bed Mobility 1  Bed Mobility 1: Supine to sitting  Level of Assistance 1: Moderate assistance  Bed Mobility Comments 1: pt used HOB elevated ~ 45 degree, step by step cues for log roll   and Transfers  Transfer: Yes  Transfer 1  Transfer From 1: Sit to  Transfer to 1: Stand  Technique 1: Sit to stand  Transfer Device 1: Walker  Transfer Level of Assistance 1: Minimum assistance  Transfers 2  Transfer From 2: Stand to  Transfer to 2: Sit  Technique 2: Stand to sit  Transfer Device 2: Walker  Transfer Level of Assistance 2: Minimum assistance  IADL's:   Homemaking Responsibilities: Yes  Meal Prep Responsibility: Primary  Laundry Responsibility: Primary  Cleaning Responsibility: Primary  Bill Paying/Finance Responsibility: " "Primary  Shopping Responsibility: Primary  Homemaking Comments: Pt reports \"Carlitos helps on \"his terms\" and is not reliable helper  Current License: Yes  Mode of Transportation: Car  Vision: Vision - Basic Assessment  Current Vision: No visual deficits   and    Sensation:  Light Touch:  (pt reports numbness in B hands)  Strength:  Strength Comments: fromal MMT deferred 2/2 C spine precautions, pt reports weakness in shoulders, presents with limited AROM        Hand Function:  Hand Function  Gross Grasp: Functional  Coordination: Functional  Extremities: RUE   RUE : Exceptions to WFL  RUE AROM (degrees)  R Shoulder Extension  0-60:  (limited to ~ 60 d/t pain and spasms), LUE   LUE: Exceptions to WFL  LUE AROM (degrees)  L Shoulder Extension  0-60:  (limited to ~ 60 degree 2/2 pain and spasms), RLE   RLE : Within Functional Limits, and LLE   LLE : Within Functional Limits    Outcome Measures: [facility] Daily Activity  Putting on and taking off regular lower body clothing: A lot  Bathing (including washing, rinsing, drying): A lot  Putting on and taking off regular upper body clothing: A lot  Toileting, which includes using toilet, bedpan or urinal: A little  Taking care of personal grooming such as brushing teeth: A little  Eating Meals: None  Daily Activity - Total Score: 16         ,          Education Documentation  Body Mechanics, taught by Jacqueline Currie OT at 2/29/2024 11:08 AM.  Learner: Patient  Readiness: Eager  Method: Explanation  Response: Needs Reinforcement    Precautions, taught by Jacqueline Currie OT at 2/29/2024 11:08 AM.  Learner: Patient  Readiness: Eager  Method: Explanation  Response: Needs Reinforcement    ADL Training, taught by Jacqueline Currie OT at 2/29/2024 11:08 AM.  Learner: Patient  Readiness: Eager  Method: Explanation  Response: Needs Reinforcement    Education Comments  No comments found.        Goals:     Encounter Problems       Encounter Problems (Active)       ADLs       Patient " will perform UB and LB bathing  with stand by assist level of assistance and shower chair and long-handled sponge. (Progressing)       Start:  02/29/24    Expected End:  03/14/24            Patient with complete upper body dressing with stand by assist level of assistance donning and doffing all UE clothes with PRN adaptive equipment while edge of bed  (Progressing)       Start:  02/29/24    Expected End:  03/14/24            Patient with complete lower body dressing with stand by assist level of assistance donning and doffing all LE clothes  with PRN adaptive equipment while edge of bed  (Progressing)       Start:  02/29/24    Expected End:  03/14/24            Patient will complete daily grooming tasks brushing teeth and washing face/hair with independent level of assistance and PRN adaptive equipment while standing. (Progressing)       Start:  02/29/24    Expected End:  03/14/24            Patient will complete toileting including hygiene clothing management/hygiene with stand by assist level of assistance and raised toilet seat. (Progressing)       Start:  02/29/24    Expected End:  03/14/24               COGNITION/SAFETY       Patient will recall and adhere to C spine precautions during all functional mobility/ADL tasks in order to demonstrate improved understanding and promote healing post op (Progressing)       Start:  02/29/24    Expected End:  03/14/24               EXERCISE/STRENGTHENING       Patient will increase BUE AROM to WFL for independence with ADLs and functional transfers. (Progressing)       Start:  02/29/24    Expected End:  03/14/24               MOBILITY       Patient will perform Functional mobility Household distances with stand by assist level of assistance and least restrictive device in order to improve safety and functional mobility. (Progressing)       Start:  02/29/24    Expected End:  03/14/24 02/29/24 at 11:11 AM   Jacqueline Currie OT   Rehab Office: [phone]

## 2024-02-29 NOTE — PROGRESS NOTES
"Cristal Pollard is a 63 y.o. female on day 0 of admission presenting with Cervical radiculopathy.    Subjective   Doing well this morning. No events overnight. Pain is well controlled. Radiculopathy  is same to preop       Objective     Physical Exam  AOx3  RUE D5 / B5 / T5 / HG 5/ IO 5  LUE D5 / B5 / T5 / HG 5/ IO 5  Negative richardson    RLE HF5 / KE 5/ DF 5/ PF 5  LLE HF5 / KE 5/ DF 5/ PF 5  No clonus    Incision is c/d/i    Last Recorded Vitals  Blood pressure 109/66, pulse 56, temperature 35.7 °C (96.3 °F), temperature source Tympanic, resp. rate 16, height 1.63 m (5' 4.17\"), weight 82.4 kg (181 lb 10.5 oz), SpO2 97 %.  Intake/Output last 3 Shifts:  I/O last 3 completed shifts:  In: 250 (3 mL/kg) [I.V.:250 (3 mL/kg)]  Out: 285 (3.5 mL/kg) [Urine:200 (0.1 mL/kg/hr); Drains:35; Blood:50]  Weight: 82.4 kg     Relevant Results                  Results for orders placed or performed during the hospital encounter of 02/28/24 (from the past 24 hour(s))   POCT GLUCOSE   Result Value Ref Range    POCT Glucose 142 (H) 74 - 99 mg/dL   POCT glucose bedside   Result Value Ref Range    POC Fingerstick Blood Glucose 142 (A) 70 - 100 mg/dl   Verify Abo/Rh Group Test (VERAB)   Result Value Ref Range    ABO TYPE A     Rh TYPE NEG    POCT GLUCOSE   Result Value Ref Range    POCT Glucose 202 (H) 74 - 99 mg/dL   POCT GLUCOSE   Result Value Ref Range    POCT Glucose 201 (H) 74 - 99 mg/dL                Assessment/Plan   Principal Problem:    Cervical radiculopathy    Patient is 63F with h/o HTN, HLD, seziures (on lamictal), p/w BUE radiculopathy, 2/28 s/p C4-7 ACDF     Plan  Floor  Uprights this AM  AM labs  Dc drain  PTOT  SCDs, SQH  Out of bedx3    Danie Kumar MD      "

## 2024-03-01 ENCOUNTER — DOCUMENTATION (OUTPATIENT)
Dept: HOME HEALTH SERVICES | Facility: HOME HEALTH | Age: 64
End: 2024-03-01
Payer: COMMERCIAL

## 2024-03-01 ENCOUNTER — HOME HEALTH ADMISSION (OUTPATIENT)
Dept: HOME HEALTH SERVICES | Facility: HOME HEALTH | Age: 64
End: 2024-03-01
Payer: COMMERCIAL

## 2024-03-01 VITALS
TEMPERATURE: 97.9 F | HEIGHT: 64 IN | SYSTOLIC BLOOD PRESSURE: 100 MMHG | RESPIRATION RATE: 18 BRPM | WEIGHT: 181.66 LBS | OXYGEN SATURATION: 95 % | DIASTOLIC BLOOD PRESSURE: 59 MMHG | HEART RATE: 61 BPM | BODY MASS INDEX: 31.01 KG/M2

## 2024-03-01 LAB
GLUCOSE BLD MANUAL STRIP-MCNC: 163 MG/DL (ref 74–99)
GLUCOSE BLD MANUAL STRIP-MCNC: 97 MG/DL (ref 74–99)

## 2024-03-01 PROCEDURE — 2500000004 HC RX 250 GENERAL PHARMACY W/ HCPCS (ALT 636 FOR OP/ED): Mod: SE | Performed by: STUDENT IN AN ORGANIZED HEALTH CARE EDUCATION/TRAINING PROGRAM

## 2024-03-01 PROCEDURE — 82947 ASSAY GLUCOSE BLOOD QUANT: CPT

## 2024-03-01 PROCEDURE — 2500000005 HC RX 250 GENERAL PHARMACY W/O HCPCS: Mod: SE

## 2024-03-01 PROCEDURE — 2500000002 HC RX 250 W HCPCS SELF ADMINISTERED DRUGS (ALT 637 FOR MEDICARE OP, ALT 636 FOR OP/ED): Mod: SE | Performed by: STUDENT IN AN ORGANIZED HEALTH CARE EDUCATION/TRAINING PROGRAM

## 2024-03-01 PROCEDURE — 2500000004 HC RX 250 GENERAL PHARMACY W/ HCPCS (ALT 636 FOR OP/ED): Mod: SE | Performed by: NURSE PRACTITIONER

## 2024-03-01 PROCEDURE — 2500000001 HC RX 250 WO HCPCS SELF ADMINISTERED DRUGS (ALT 637 FOR MEDICARE OP): Mod: SE

## 2024-03-01 PROCEDURE — 97110 THERAPEUTIC EXERCISES: CPT | Mod: GP,CQ

## 2024-03-01 PROCEDURE — 7100000011 HC EXTENDED STAY RECOVERY HOURLY - NURSING UNIT

## 2024-03-01 PROCEDURE — 99231 SBSQ HOSP IP/OBS SF/LOW 25: CPT | Performed by: NURSE PRACTITIONER

## 2024-03-01 PROCEDURE — 2500000001 HC RX 250 WO HCPCS SELF ADMINISTERED DRUGS (ALT 637 FOR MEDICARE OP): Mod: SE | Performed by: STUDENT IN AN ORGANIZED HEALTH CARE EDUCATION/TRAINING PROGRAM

## 2024-03-01 PROCEDURE — 97116 GAIT TRAINING THERAPY: CPT | Mod: GP,CQ

## 2024-03-01 RX ORDER — ACETAMINOPHEN 325 MG/1
650 TABLET ORAL EVERY 4 HOURS PRN
Start: 2024-03-01

## 2024-03-01 RX ORDER — CYCLOBENZAPRINE HCL 5 MG
5 TABLET ORAL 3 TIMES DAILY PRN
Qty: 21 TABLET | Refills: 0 | Status: SHIPPED | OUTPATIENT
Start: 2024-03-01 | End: 2024-04-11 | Stop reason: WASHOUT

## 2024-03-01 RX ORDER — METHYLPREDNISOLONE 4 MG/1
TABLET ORAL
Qty: 21 TABLET | Refills: 0 | Status: SHIPPED | OUTPATIENT
Start: 2024-03-01 | End: 2024-03-15 | Stop reason: ALTCHOICE

## 2024-03-01 RX ORDER — ACETAMINOPHEN 325 MG/1
650 TABLET ORAL EVERY 6 HOURS
Status: CANCELLED
Start: 2024-03-01

## 2024-03-01 RX ORDER — OXYCODONE HYDROCHLORIDE 5 MG/1
5 TABLET ORAL EVERY 6 HOURS PRN
Qty: 20 TABLET | Refills: 0 | Status: SHIPPED | OUTPATIENT
Start: 2024-03-04 | End: 2024-03-15 | Stop reason: WASHOUT

## 2024-03-01 RX ADMIN — LAMOTRIGINE 100 MG: 100 TABLET ORAL at 09:42

## 2024-03-01 RX ADMIN — HYDROCHLOROTHIAZIDE: 25 TABLET ORAL at 09:42

## 2024-03-01 RX ADMIN — OXYCODONE HYDROCHLORIDE 10 MG: 5 TABLET ORAL at 16:54

## 2024-03-01 RX ADMIN — GABAPENTIN 300 MG: 300 CAPSULE ORAL at 14:38

## 2024-03-01 RX ADMIN — HEPARIN SODIUM 5000 UNITS: 5000 INJECTION INTRAVENOUS; SUBCUTANEOUS at 13:55

## 2024-03-01 RX ADMIN — OXYCODONE HYDROCHLORIDE 10 MG: 5 TABLET ORAL at 06:51

## 2024-03-01 RX ADMIN — OXYCODONE HYDROCHLORIDE 10 MG: 5 TABLET ORAL at 11:14

## 2024-03-01 RX ADMIN — ACETAMINOPHEN 650 MG: 325 TABLET ORAL at 02:39

## 2024-03-01 RX ADMIN — GABAPENTIN 300 MG: 300 CAPSULE ORAL at 09:46

## 2024-03-01 RX ADMIN — DULOXETINE HYDROCHLORIDE 60 MG: 60 CAPSULE, DELAYED RELEASE ORAL at 09:42

## 2024-03-01 RX ADMIN — CYCLOBENZAPRINE 5 MG: 10 TABLET, FILM COATED ORAL at 09:44

## 2024-03-01 RX ADMIN — POLYETHYLENE GLYCOL 3350 17 G: 17 POWDER, FOR SOLUTION ORAL at 09:50

## 2024-03-01 RX ADMIN — INSULIN HUMAN 3 UNITS: 100 INJECTION, SOLUTION PARENTERAL at 13:52

## 2024-03-01 RX ADMIN — CYCLOBENZAPRINE 5 MG: 10 TABLET, FILM COATED ORAL at 14:38

## 2024-03-01 RX ADMIN — HEPARIN SODIUM 5000 UNITS: 5000 INJECTION INTRAVENOUS; SUBCUTANEOUS at 16:55

## 2024-03-01 RX ADMIN — PANTOPRAZOLE SODIUM 40 MG: 40 TABLET, DELAYED RELEASE ORAL at 06:02

## 2024-03-01 RX ADMIN — ACETAMINOPHEN 650 MG: 325 TABLET ORAL at 09:42

## 2024-03-01 RX ADMIN — SENNOSIDES AND DOCUSATE SODIUM 2 TABLET: 8.6; 5 TABLET ORAL at 09:42

## 2024-03-01 RX ADMIN — ACETAMINOPHEN 650 MG: 325 TABLET ORAL at 14:38

## 2024-03-01 RX ADMIN — OXYCODONE HYDROCHLORIDE 10 MG: 5 TABLET ORAL at 02:40

## 2024-03-01 ASSESSMENT — COGNITIVE AND FUNCTIONAL STATUS - GENERAL
DAILY ACTIVITIY SCORE: 16
MOVING TO AND FROM BED TO CHAIR: A LITTLE
TURNING FROM BACK TO SIDE WHILE IN FLAT BAD: A LITTLE
PERSONAL GROOMING: A LITTLE
WALKING IN HOSPITAL ROOM: A LITTLE
MOBILITY SCORE: 17
DRESSING REGULAR LOWER BODY CLOTHING: A LOT
MOVING TO AND FROM BED TO CHAIR: A LITTLE
STANDING UP FROM CHAIR USING ARMS: A LITTLE
WALKING IN HOSPITAL ROOM: A LITTLE
HELP NEEDED FOR BATHING: A LOT
MOBILITY SCORE: 17
CLIMB 3 TO 5 STEPS WITH RAILING: A LOT
DRESSING REGULAR UPPER BODY CLOTHING: A LOT
MOVING FROM LYING ON BACK TO SITTING ON SIDE OF FLAT BED WITH BEDRAILS: A LITTLE
CLIMB 3 TO 5 STEPS WITH RAILING: A LOT
STANDING UP FROM CHAIR USING ARMS: A LITTLE
TURNING FROM BACK TO SIDE WHILE IN FLAT BAD: A LITTLE
MOVING FROM LYING ON BACK TO SITTING ON SIDE OF FLAT BED WITH BEDRAILS: A LITTLE
TOILETING: A LITTLE

## 2024-03-01 ASSESSMENT — PAIN SCALES - GENERAL
PAINLEVEL_OUTOF10: 8
PAINLEVEL_OUTOF10: 3
PAINLEVEL_OUTOF10: 6
PAINLEVEL_OUTOF10: 7
PAINLEVEL_OUTOF10: 9
PAINLEVEL_OUTOF10: 8

## 2024-03-01 ASSESSMENT — PAIN DESCRIPTION - ORIENTATION
ORIENTATION: ANTERIOR
ORIENTATION: ANTERIOR

## 2024-03-01 ASSESSMENT — PAIN - FUNCTIONAL ASSESSMENT
PAIN_FUNCTIONAL_ASSESSMENT: 0-10

## 2024-03-01 ASSESSMENT — PAIN DESCRIPTION - LOCATION
LOCATION: NECK

## 2024-03-01 NOTE — PROGRESS NOTES
Patient is medically cleared for discharge today home with [facility] services processing for SOC within 48 hours [place] 3/3/24. Patient will discharge home via private transportation. I will continue to follow until discharged.

## 2024-03-01 NOTE — PROGRESS NOTES
"Cristal Pollard is a 63 y.o. female on day 0 of admission presenting with Cervical radiculopathy.    Subjective   Doing well this morning. No events overnight. Pain is well controlled. Radiculopathy  is same to preop       Objective     Physical Exam  AOx3  RUE D5 / B5 / T5 / HG 5/ IO 5  LUE D5 / B5 / T5 / HG 5/ IO 5  Negative richardson    RLE HF5 / KE 5/ DF 5/ PF 5  LLE HF5 / KE 5/ DF 5/ PF 5  No clonus    Incision is c/d/i    Last Recorded Vitals  Blood pressure 121/64, pulse 55, temperature 36.5 °C (97.7 °F), temperature source Temporal, resp. rate 16, height 1.63 m (5' 4.17\"), weight 82.4 kg (181 lb 10.5 oz), SpO2 95 %.  Intake/Output last 3 Shifts:  I/O last 3 completed shifts:  In: - (0 mL/kg)   Out: 815 (9.9 mL/kg) [Urine:800 (0.3 mL/kg/hr); Drains:15]  Weight: 82.4 kg     Relevant Results                  Results for orders placed or performed during the hospital encounter of 02/28/24 (from the past 24 hour(s))   CBC   Result Value Ref Range    WBC 10.4 4.4 - 11.3 x10*3/uL    nRBC 0.0 0.0 - 0.0 /100 WBCs    RBC 3.96 (L) 4.00 - 5.20 x10*6/uL    Hemoglobin 11.8 (L) 12.0 - 16.0 g/dL    Hematocrit 37.6 36.0 - 46.0 %    MCV 95 80 - 100 fL    MCH 29.8 26.0 - 34.0 pg    MCHC 31.4 (L) 32.0 - 36.0 g/dL    RDW 13.2 11.5 - 14.5 %    Platelets 245 150 - 450 x10*3/uL   Basic metabolic panel   Result Value Ref Range    Glucose 104 (H) 74 - 99 mg/dL    Sodium 139 136 - 145 mmol/L    Potassium 3.9 3.5 - 5.3 mmol/L    Chloride 105 98 - 107 mmol/L    Bicarbonate 26 21 - 32 mmol/L    Anion Gap 12 10 - 20 mmol/L    Urea Nitrogen 11 6 - 23 mg/dL    Creatinine 0.68 0.50 - 1.05 mg/dL    eGFR >90 >60 mL/min/1.73m*2    Calcium 9.6 8.6 - 10.6 mg/dL   POCT GLUCOSE   Result Value Ref Range    POCT Glucose 150 (H) 74 - 99 mg/dL                Assessment/Plan   Principal Problem:    Cervical radiculopathy  Patient is 63F with h/o HTN, HLD, seziures (on lamictal), p/w BUE radiculopathy, 2/28 s/p C4-7 ACDF , 2/29 uprights hardware in posndrain, " drain removed       Plan  rosio  AM labs  PTOT  SCDs, SQH  Out of bedx3    Erin Gutiérrez MD

## 2024-03-01 NOTE — CARE PLAN
The patient's goals for the shift include      The clinical goals for the shift include Patient will remain safe and use call light      Problem: Pain  Goal: My pain/discomfort is manageable  Outcome: Progressing     Problem: Safety  Goal: I will remain free of falls  Outcome: Progressing

## 2024-03-01 NOTE — PROGRESS NOTES
Physical Therapy    Physical Therapy Treatment    Patient Name: Cristal Pollard  MRN: [id number]  Today's Date: 3/1/2024  Time Calculation  Start Time: 0916  Stop Time: 0941  Time Calculation (min): 25 min       Assessment/Plan   PT Assessment  End of Session Communication: Bedside nurse  Assessment Comment: Pt tolerated PT session well, performed supine exercises and tolerated increased ambulation within room and in hallway. Pt continues to remain appropriate for Moderate intensity PT upon D/C. Spoke with supervising PT on pt's progress, may progress to low intensity with continued  PT.  End of Session Patient Position: Up in chair, Alarm on  PT Plan  Inpatient/Swing Bed or Outpatient: Inpatient  PT Plan  Treatment/Interventions: Bed mobility, Transfer training, Gait training, Balance training, Strengthening, Endurance training, Therapeutic exercise, Therapeutic activity  PT Plan: Skilled PT  PT Frequency: 6 times per week  PT Discharge Recommendations: Moderate intensity level of continued care (Spoke with supervising PT on pt's improved progress, may progress to low intensity with continued PT.)  Equipment Recommended upon Discharge: Wheeled walker  PT - OK to Discharge: Yes      General Visit Information:   PT  Visit  PT Received On: 03/01/24  General  Missed Visit: No  Missed Visit Reason:  (n/a)  Family/Caregiver Present: No  Prior to Session Communication: Bedside nurse  Patient Position Received: Bed, 2 rail up, Alarm on  General Comment: Pt supine in bed on arrival, agreeable to work with PT.    Subjective   Precautions:  Precautions  Medical Precautions: Fall precautions  Post-Surgical Precautions: Spinal precautions  Precautions Comment: Reviewed spine precautions prior to OOB mobility.  Vital Signs:       Objective   Pain:  Pain Assessment  Pain Assessment: 0-10  Pain Score: 8  Pain Type: Surgical pain  Pain Location: Neck    Activity Tolerance:  Activity Tolerance  Endurance: Tolerates 10 - 20 min exercise  with multiple rests  Treatments:  Therapeutic Exercise  Therapeutic Exercise Performed: Yes  Therapeutic Exercise Activity 1: Supine: AP, HS, SAQ x10 BLE    Bed Mobility  Bed Mobility: Yes  Bed Mobility 1  Bed Mobility 1: Supine to sitting  Level of Assistance 1: Minimum assistance, Minimal verbal cues  Bed Mobility Comments 1: Cues for log roll sequencing, HOB elevated, assist with trunk.    Ambulation/Gait Training  Ambulation/Gait Training Performed: Yes  Ambulation/Gait Training 1  Surface 1: Level tile  Device 1: Rolling walker  Assistance 1: Contact guard, Minimal verbal cues  Quality of Gait 1: Narrow base of support, Diminished heel strike, Decreased step length (Decreased janice, slight lean to L however no LOBs noted)  Comments/Distance (ft) 1: x10ft, x40ft. Cues for upright posture and to relax shoulders.    Transfers  Transfer: Yes  Transfer 1  Transfer From 1: Bed to  Transfer to 1: Stand  Technique 1: Sit to stand  Transfer Device 1: Walker  Transfer Level of Assistance 1: Minimum assistance, Minimal verbal cues  Trials/Comments 1: x1, Cues for hand placement  Transfers 2  Transfer From 2: Stand to, Toilet to  Transfer to 2: Toilet, Stand  Technique 2: Stand to sit, Sit to stand  Transfer Device 2: Walker (grab bar)  Transfer Level of Assistance 2:  (CGA to sit, Min/CGA to stand)  Trials/Comments 2: Cues for safe hand placement.  Transfers 3  Transfer From 3: Stand to  Transfer to 3: Chair with arms  Technique 3: Stand to sit  Transfer Device 3: Walker  Transfer Level of Assistance 3: Contact guard, Minimal verbal cues  Trials/Comments 3: Cues for safe hand placement, x2 performed.    Stairs  Stairs: No    Outcome Measures:  [facility] Basic Mobility  Turning from your back to your side while in a flat bed without using bedrails: A little  Moving from lying on your back to sitting on the side of a flat bed without using bedrails: A little  Moving to and from bed to chair (including a wheelchair): A  little  Standing up from a chair using your arms (e.g. wheelchair or bedside chair): A little  To walk in hospital room: A little  Climbing 3-5 steps with railing: A lot  Basic Mobility - Total Score: 17    Education Documentation  Precautions, taught by Yu Wilder PTA at 3/1/2024 10:03 AM.  Learner: Patient  Readiness: Acceptance  Method: Explanation  Response: Verbalizes Understanding    Mobility Training, taught by Yu Wilder PTA at 3/1/2024 10:03 AM.  Learner: Patient  Readiness: Acceptance  Method: Explanation  Response: Verbalizes Understanding    Education Comments  No comments found.        OP EDUCATION:       Encounter Problems       Encounter Problems (Active)       Mobility       STG - Patient will ambulate >50ft, wheeled walker, CGA (Progressing)       Start:  02/29/24    Expected End:  03/14/24               Transfers       STG - Patient to transfer to and from sit to supine min assist (Progressing)       Start:  02/29/24    Expected End:  03/14/24            STG - Patient will transfer sit to and from stand min assist (Progressing)       Start:  02/29/24    Expected End:  03/14/24                 Yu Wilder PTA  Rehab Office [phone]

## 2024-03-01 NOTE — HH CARE COORDINATION
Home Care received a Referral for Nursing, Physical Therapy, and Occupational Therapy. We have processed the referral for a Start of Care on 3/2 to 3/3/24.     If you have any questions or concerns, please feel free to contact us at [phone]. Follow the prompts, enter your five digit zip code, and you will be directed to your care team on WEST 3.

## 2024-03-01 NOTE — CARE PLAN
The patient's goals for the shift include      The clinical goals for the shift include pt will remain free from injury thoughout shift

## 2024-03-01 NOTE — DISCHARGE SUMMARY
Discharge Diagnosis  Cervical radiculopathy    Issues Requiring Follow-Up  none    Test Results Pending At Discharge  Pending Labs       No current pending labs.            Hospital Course  63 year old female, with Hx of HTN, HLD, seizures who presented to clinic with BUE radiculopathy.  She was admitted for planned OR.    Hospital Course as follows:  2/28 C4-7 ACDF  2/29 Uprights completed--> hardware in position; drain removed    PT/OT eval recommend SNF but patient will be discharged to home with Home Care    Discharged with scheduled follow up.            Pertinent Physical Exam At Time of Discharge  Physical Exam  General: in bed, no distress  HEENT: normocephalic; RAULITO; tongue midline  Cardiac: S1 & S2 regular  Resp: equal chest expansion, lungs clear bilaterally  Abd: BS+ x4, soft, non-tender  Extr: no edema, pulses palpable x4  Neuro: A&Ox3, follows commands, GRIFFIN's, 5/5 strength to all extremities, bilateral upper extremity numbness/tingling  Skin: anterior neck incision clean, dry, intact with glue  Psyche: calm, cooperative    Home Medications     Medication List      START taking these medications     acetaminophen 325 mg tablet; Commonly known as: Tylenol; Take 2 tablets   (650 mg) by mouth every 4 hours if needed for mild pain (1 - 3). Do Not   Take with Home Percocet as it also contains Acetaminophen   cyclobenzaprine 5 mg tablet; Commonly known as: Flexeril; Take 1 tablet   (5 mg) by mouth 3 times a day as needed for muscle spasms for up to 7   days.   methylPREDNISolone 4 mg tablets; Commonly known as: Medrol Dospak;   Follow schedule on package instructions     CONTINUE taking these medications     calcium carbonate-vitamin D3 600 mg-10 mcg (400 unit) chewable tablet   DULoxetine 60 mg DR capsule; Commonly known as: Cymbalta   gabapentin 300 mg capsule; Commonly known as: Neurontin; TAKE 1 CAPSULE   BY MOUTH THREE TIMES A DAY   * lamoTRIgine 100 mg tablet; Commonly known as: LaMICtal   * lamoTRIgine  100 mg tablet; Commonly known as: LaMICtal   lisinopriL-hydrochlorothiazide 10-12.5 mg tablet; TAKE 1 TABLET BY MOUTH   EVERY DAY   metFORMIN  mg 24 hr tablet; Commonly known as: Glucophage-XR; TAKE   1 TABLET BY MOUTH EVERY DAY WITH EVENING MEAL   multivitamin tablet   Nitrostat 0.4 mg SL tablet; Generic drug: nitroglycerin   omeprazole 40 mg DR capsule; Commonly known as: PriLOSEC; Take 1 capsule   (40 mg) by mouth once daily. Do not crush or chew.   OneTouch Delica Plus Lancet 30 gauge misc; Generic drug: lancets; USE TO   TEST ONCE DAILY   OneTouch Verio test strips strip; Generic drug: blood sugar diagnostic;   USE TO TEST ONCE DAILY   oxyCODONE-acetaminophen  mg tablet; Commonly known as: Percocet;   Take 1 tablet by mouth every 6 hours if needed for severe pain (7 - 10).  * This list has 2 medication(s) that are the same as other medications   prescribed for you. Read the directions carefully, and ask your doctor or   other care provider to review them with you.     STOP taking these medications     chlorhexidine 0.12 % solution; Commonly known as: Peridex   docusate sodium 100 mg capsule; Commonly known as: Colace   ondansetron 4 mg tablet; Commonly known as: Zofran       Outpatient Follow-Up  Future Appointments   Date Time Provider Department Center   3/15/2024  2:00 PM Torrie Kim APRN-CNP [id number] [place]   5/1/2024  9:30 AM Kylie CARY MD [id number] [facility]   5/13/2024  1:00 PM Arnulfo Reynolds MD [id number] [facility]       Nicole Harris APRN-CNP

## 2024-03-01 NOTE — PROGRESS NOTES
Speech-Language Pathology                 Therapy Communication Note    Patient Name: Cristal Pollard  MRN: [id number]  Today's Date: 3/1/2024     Discipline: Speech Language Pathology    Missed Visit Reason:  Spoke w/ Attending and Resident this AM as MBSS orders were cancelled. Per medical team, current deficits are to be expected and MD does not wish to pursue MBSS at this time. They will revisit at 2-week follow-up. SLP to sign-off.     Missed Time: Attempt

## 2024-03-01 NOTE — PROGRESS NOTES
SW met with patient at bedside. Patient states she prefers to discharge home with homecare. When prompted, patient declined resources. SW updated TCC. No further SW needs at this time.    ROZINA Latham

## 2024-03-03 ENCOUNTER — HOME CARE VISIT (OUTPATIENT)
Dept: HOME HEALTH SERVICES | Facility: HOME HEALTH | Age: 64
End: 2024-03-03
Payer: COMMERCIAL

## 2024-03-03 VITALS
DIASTOLIC BLOOD PRESSURE: 68 MMHG | OXYGEN SATURATION: 97 % | SYSTOLIC BLOOD PRESSURE: 102 MMHG | TEMPERATURE: 97.3 F | BODY MASS INDEX: 30.73 KG/M2 | HEART RATE: 59 BPM | WEIGHT: 180 LBS | RESPIRATION RATE: 22 BRPM

## 2024-03-03 PROCEDURE — 0023 HH SOC

## 2024-03-03 PROCEDURE — G0299 HHS/HOSPICE OF RN EA 15 MIN: HCPCS

## 2024-03-03 ASSESSMENT — ENCOUNTER SYMPTOMS
DYSPNEA ACTIVITY LEVEL: AT REST
SHORTNESS OF BREATH: 1
APPETITE LEVEL: FAIR
PERSON REPORTING PAIN: PATIENT
TROUBLE SWALLOWING: 1
LOWEST PAIN SEVERITY IN PAST 24 HOURS: 4/10
HIGHEST PAIN SEVERITY IN PAST 24 HOURS: 8/10
MUSCLE WEAKNESS: 1
LAST BOWEL MOVEMENT: 66901
PAIN LOCATION: NECK
PAIN LOCATION - PAIN SEVERITY: 8/10
PAIN: 1

## 2024-03-03 ASSESSMENT — ACTIVITIES OF DAILY LIVING (ADL)
ENTERING_EXITING_HOME: DEPENDENT
OASIS_M1830: 03

## 2024-03-03 ASSESSMENT — LIFESTYLE VARIABLES: SMOKING_STATUS: 0

## 2024-03-03 NOTE — HOME HEALTH
SOC COMPLETE. 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 PT IS S/P Anterior Cervical Discectomy and Fusion with plating. PAIN LOCATED IN NECK AND SHOULDER AREA. INCISION IS OPEN TO AIR AND HAS NO S/S OF INFECTION. PT IS A DIABETIC AND TAKES METFORMIN, DOES NOT MONITOR BS. LIVES W BOYFRIEND, TIFFANIE, WHO IS PRIMARY CAREGIVER. VSS. DISCUSSED INSICION CARE AND DEEP BREATHING EXERCISES. WILL NEED TO BE SEEN BY PT OT AND SN.

## 2024-03-04 ENCOUNTER — HOME CARE VISIT (OUTPATIENT)
Dept: HOME HEALTH SERVICES | Facility: HOME HEALTH | Age: 64
End: 2024-03-04
Payer: COMMERCIAL

## 2024-03-04 PROCEDURE — G0151 HHCP-SERV OF PT,EA 15 MIN: HCPCS

## 2024-03-04 PROCEDURE — G0152 HHCP-SERV OF OT,EA 15 MIN: HCPCS

## 2024-03-04 ASSESSMENT — ENCOUNTER SYMPTOMS
PAIN: 1
PAIN: 1
HIGHEST PAIN SEVERITY IN PAST 24 HOURS: 6/10
LOWEST PAIN SEVERITY IN PAST 24 HOURS: 4/10
LOWEST PAIN SEVERITY IN PAST 24 HOURS: 3/10
PERSON REPORTING PAIN: PATIENT
HIGHEST PAIN SEVERITY IN PAST 24 HOURS: 8/10
PERSON REPORTING PAIN: PATIENT

## 2024-03-04 ASSESSMENT — ACTIVITIES OF DAILY LIVING (ADL)
PHYSICAL TRANSFERS ASSESSED: 1
TOILETING: INDEPENDENT
LAUNDRY: DEPENDENT
BATHING_CURRENT_FUNCTION: MODERATE ASSIST
DRESSING_UB_CURRENT_FUNCTION: SUPERVISION
FEEDING ASSESSED: 1
AMBULATION ASSISTANCE ON FLAT SURFACES: 1
CURRENT_FUNCTION: MODERATE ASSIST
FEEDING: INDEPENDENT
GROOMING_CURRENT_FUNCTION: INDEPENDENT
GROOMING ASSESSED: 1
LAUNDRY ASSESSED: 1
TOILETING: 1
PREPARING MEALS: DEPENDENT
BATHING ASSESSED: 1
DRESSING_LB_CURRENT_FUNCTION: MINIMUM ASSIST

## 2024-03-04 NOTE — HOME HEALTH
Patient seen with her significant other Carlitos for OT evaluation visit. Patient was recently hospitalized with anterior cervical dissection and fusion with plating C4-7. Lives with significant other in a ranch home with 5 steps to enter the front door. Stays on 1st floor with bedroom and full bathroom with a tub shower.     Medical history of seizures, DM, HTN.     Patient has a standard walker, suction cup grab bars    Prior to hospitalization, was independent with ADLs and IADLs, drove. She is retired. She didn't use an ambulatory device.     Barthel index-85 out of 100.     UE AROM and strength WNL. States she has pain and numbness through her arms-is the same as before her surgery.     Patient in agreement with OT POC. Plan to see 2w1, 1w1 to address showering, tub transfer, LE dressing and light meal prep.

## 2024-03-04 NOTE — HOME HEALTH
patient seen for pt reina today with her bf present.  she went to the hospital to have cervical  surgery on 2.28.  prior to this she walked with no device , indep with dressing , bathing , driving .  they live in a ranch home with 4 steps to exit .    she currently states she has not noticed any change in her symtoms .  the hospital gave her a std walker instead of a ww which is what she really needs.

## 2024-03-05 ENCOUNTER — TELEPHONE (OUTPATIENT)
Dept: PRIMARY CARE | Facility: CLINIC | Age: 64
End: 2024-03-05
Payer: COMMERCIAL

## 2024-03-05 DIAGNOSIS — F41.9 ANXIETY: Primary | ICD-10-CM

## 2024-03-05 RX ORDER — HYDROXYZINE HYDROCHLORIDE 25 MG/1
25 TABLET, FILM COATED ORAL EVERY 8 HOURS PRN
Qty: 60 TABLET | Refills: 0 | Status: SHIPPED | OUTPATIENT
Start: 2024-03-05 | End: 2024-04-04

## 2024-03-05 RX ORDER — BUSPIRONE HYDROCHLORIDE 5 MG/1
5 TABLET ORAL 2 TIMES DAILY
Qty: 60 TABLET | Refills: 1 | Status: SHIPPED | OUTPATIENT
Start: 2024-03-05 | End: 2024-03-27

## 2024-03-05 NOTE — TELEPHONE ENCOUNTER
Patient left voicemail - Is having anxiety and a hard time with boyfriend post back surgery. Asking for something to help calm her.   Currently on steroids and percocet Q 4 hours

## 2024-03-05 NOTE — CASE COMMUNICATION
OT evaluation completed 3.4.24. Plan to see 2w1, 1w1 to address showering, LE dressing, tub transfer and light meal prep.

## 2024-03-06 ENCOUNTER — HOME CARE VISIT (OUTPATIENT)
Dept: HOME HEALTH SERVICES | Facility: HOME HEALTH | Age: 64
End: 2024-03-06
Payer: COMMERCIAL

## 2024-03-06 PROCEDURE — G0152 HHCP-SERV OF OT,EA 15 MIN: HCPCS

## 2024-03-06 ASSESSMENT — ENCOUNTER SYMPTOMS
HIGHEST PAIN SEVERITY IN PAST 24 HOURS: 9/10
LOWEST PAIN SEVERITY IN PAST 24 HOURS: 4/10
PAIN: 1
PERSON REPORTING PAIN: PATIENT

## 2024-03-06 NOTE — HOME HEALTH
Patient seen with her significant other Carlitos for OT visit.     Plan for next visit-planned DC visit, IADL training-light meal prep, ADL training-showering, instruct on tub transfer technique, review home safety.

## 2024-03-07 ENCOUNTER — HOME CARE VISIT (OUTPATIENT)
Dept: HOME HEALTH SERVICES | Facility: HOME HEALTH | Age: 64
End: 2024-03-07
Payer: COMMERCIAL

## 2024-03-07 PROCEDURE — G0157 HHC PT ASSISTANT EA 15: HCPCS

## 2024-03-07 ASSESSMENT — ENCOUNTER SYMPTOMS
PAIN: 1
PERSON REPORTING PAIN: PATIENT
LOWEST PAIN SEVERITY IN PAST 24 HOURS: 3/10
HIGHEST PAIN SEVERITY IN PAST 24 HOURS: 4/10
SUBJECTIVE PAIN PROGRESSION: GRADUALLY IMPROVING
PAIN LOCATION: NECK
PAIN SEVERITY GOAL: 0/10

## 2024-03-08 DIAGNOSIS — M54.12 CERVICAL RADICULOPATHY DUE TO TRAUMA: ICD-10-CM

## 2024-03-08 DIAGNOSIS — M50.30 BULGING OF CERVICAL INTERVERTEBRAL DISC: Chronic | ICD-10-CM

## 2024-03-08 RX ORDER — OXYCODONE AND ACETAMINOPHEN 10; 325 MG/1; MG/1
1 TABLET ORAL EVERY 6 HOURS PRN
Qty: 120 TABLET | Refills: 0 | Status: SHIPPED | OUTPATIENT
Start: 2024-03-08 | End: 2024-04-01 | Stop reason: ALTCHOICE

## 2024-03-11 ENCOUNTER — HOME CARE VISIT (OUTPATIENT)
Dept: HOME HEALTH SERVICES | Facility: HOME HEALTH | Age: 64
End: 2024-03-11
Payer: COMMERCIAL

## 2024-03-11 PROCEDURE — G0152 HHCP-SERV OF OT,EA 15 MIN: HCPCS

## 2024-03-11 ASSESSMENT — ENCOUNTER SYMPTOMS
LOWEST PAIN SEVERITY IN PAST 24 HOURS: 6/10
PAIN: 1
PERSON REPORTING PAIN: PATIENT
HIGHEST PAIN SEVERITY IN PAST 24 HOURS: 8/10

## 2024-03-11 NOTE — HOME HEALTH
Patient seen alone for OT DC visit. She states she is having a lot of pain today. She reached too low to reach a low item in the fridge, we reviewed no bending precautions. No further OT visits indicated, patient in agreement. OT DC at this date, goals met. SN and PT services are still active.     Barthel index-95 out of 100.

## 2024-03-13 ENCOUNTER — HOSPITAL ENCOUNTER (EMERGENCY)
Facility: HOSPITAL | Age: 64
Discharge: HOME | End: 2024-03-13
Attending: EMERGENCY MEDICINE
Payer: COMMERCIAL

## 2024-03-13 ENCOUNTER — APPOINTMENT (OUTPATIENT)
Dept: RADIOLOGY | Facility: HOSPITAL | Age: 64
End: 2024-03-13
Payer: COMMERCIAL

## 2024-03-13 ENCOUNTER — HOME CARE VISIT (OUTPATIENT)
Dept: HOME HEALTH SERVICES | Facility: HOME HEALTH | Age: 64
End: 2024-03-13
Payer: COMMERCIAL

## 2024-03-13 VITALS
DIASTOLIC BLOOD PRESSURE: 59 MMHG | BODY MASS INDEX: 30.39 KG/M2 | HEART RATE: 95 BPM | OXYGEN SATURATION: 97 % | TEMPERATURE: 98.1 F | WEIGHT: 178 LBS | RESPIRATION RATE: 18 BRPM | SYSTOLIC BLOOD PRESSURE: 126 MMHG | HEIGHT: 64 IN

## 2024-03-13 VITALS
RESPIRATION RATE: 18 BRPM | TEMPERATURE: 97.8 F | SYSTOLIC BLOOD PRESSURE: 138 MMHG | DIASTOLIC BLOOD PRESSURE: 78 MMHG | OXYGEN SATURATION: 96 % | HEART RATE: 83 BPM

## 2024-03-13 DIAGNOSIS — R10.84 GENERALIZED ABDOMINAL PAIN: ICD-10-CM

## 2024-03-13 DIAGNOSIS — K59.03 DRUG-INDUCED CONSTIPATION: Primary | ICD-10-CM

## 2024-03-13 LAB
ALBUMIN SERPL BCP-MCNC: 4.7 G/DL (ref 3.4–5)
ALP SERPL-CCNC: 83 U/L (ref 33–136)
ALT SERPL W P-5'-P-CCNC: 18 U/L (ref 7–45)
ANION GAP SERPL CALC-SCNC: 17 MMOL/L (ref 10–20)
APPEARANCE UR: CLEAR
AST SERPL W P-5'-P-CCNC: 15 U/L (ref 9–39)
BASOPHILS # BLD AUTO: 0.08 X10*3/UL (ref 0–0.1)
BASOPHILS NFR BLD AUTO: 0.8 %
BILIRUB SERPL-MCNC: 0.7 MG/DL (ref 0–1.2)
BILIRUB UR STRIP.AUTO-MCNC: NEGATIVE MG/DL
BUN SERPL-MCNC: 14 MG/DL (ref 6–23)
CALCIUM SERPL-MCNC: 10.5 MG/DL (ref 8.6–10.3)
CHLORIDE SERPL-SCNC: 96 MMOL/L (ref 98–107)
CO2 SERPL-SCNC: 26 MMOL/L (ref 21–32)
COLOR UR: YELLOW
CREAT SERPL-MCNC: 0.76 MG/DL (ref 0.5–1.05)
EGFRCR SERPLBLD CKD-EPI 2021: 88 ML/MIN/1.73M*2
EOSINOPHIL # BLD AUTO: 0.07 X10*3/UL (ref 0–0.7)
EOSINOPHIL NFR BLD AUTO: 0.7 %
ERYTHROCYTE [DISTWIDTH] IN BLOOD BY AUTOMATED COUNT: 12.6 % (ref 11.5–14.5)
GLUCOSE SERPL-MCNC: 158 MG/DL (ref 74–99)
GLUCOSE UR STRIP.AUTO-MCNC: NEGATIVE MG/DL
HCT VFR BLD AUTO: 44.1 % (ref 36–46)
HGB BLD-MCNC: 15.2 G/DL (ref 12–16)
IMM GRANULOCYTES # BLD AUTO: 0.04 X10*3/UL (ref 0–0.7)
IMM GRANULOCYTES NFR BLD AUTO: 0.4 % (ref 0–0.9)
KETONES UR STRIP.AUTO-MCNC: ABNORMAL MG/DL
LACTATE SERPL-SCNC: 1.5 MMOL/L (ref 0.4–2)
LEUKOCYTE ESTERASE UR QL STRIP.AUTO: NEGATIVE
LIPASE SERPL-CCNC: 9 U/L (ref 9–82)
LYMPHOCYTES # BLD AUTO: 2.68 X10*3/UL (ref 1.2–4.8)
LYMPHOCYTES NFR BLD AUTO: 25.2 %
MAGNESIUM SERPL-MCNC: 1.95 MG/DL (ref 1.6–2.4)
MCH RBC QN AUTO: 31.1 PG (ref 26–34)
MCHC RBC AUTO-ENTMCNC: 34.5 G/DL (ref 32–36)
MCV RBC AUTO: 90 FL (ref 80–100)
MONOCYTES # BLD AUTO: 0.5 X10*3/UL (ref 0.1–1)
MONOCYTES NFR BLD AUTO: 4.7 %
NEUTROPHILS # BLD AUTO: 7.26 X10*3/UL (ref 1.2–7.7)
NEUTROPHILS NFR BLD AUTO: 68.2 %
NITRITE UR QL STRIP.AUTO: NEGATIVE
NRBC BLD-RTO: 0 /100 WBCS (ref 0–0)
PH UR STRIP.AUTO: 7 [PH]
PLATELET # BLD AUTO: 324 X10*3/UL (ref 150–450)
POTASSIUM SERPL-SCNC: 3.9 MMOL/L (ref 3.5–5.3)
PROT SERPL-MCNC: 7.6 G/DL (ref 6.4–8.2)
PROT UR STRIP.AUTO-MCNC: NEGATIVE MG/DL
RBC # BLD AUTO: 4.88 X10*6/UL (ref 4–5.2)
RBC # UR STRIP.AUTO: NEGATIVE /UL
SODIUM SERPL-SCNC: 135 MMOL/L (ref 136–145)
SP GR UR STRIP.AUTO: 1.05
UROBILINOGEN UR STRIP.AUTO-MCNC: 2 MG/DL
WBC # BLD AUTO: 10.6 X10*3/UL (ref 4.4–11.3)

## 2024-03-13 PROCEDURE — G0300 HHS/HOSPICE OF LPN EA 15 MIN: HCPCS

## 2024-03-13 PROCEDURE — 96375 TX/PRO/DX INJ NEW DRUG ADDON: CPT

## 2024-03-13 PROCEDURE — 2500000004 HC RX 250 GENERAL PHARMACY W/ HCPCS (ALT 636 FOR OP/ED)

## 2024-03-13 PROCEDURE — 99284 EMERGENCY DEPT VISIT MOD MDM: CPT | Mod: 25

## 2024-03-13 PROCEDURE — 96374 THER/PROPH/DIAG INJ IV PUSH: CPT | Mod: 59

## 2024-03-13 PROCEDURE — 85025 COMPLETE CBC W/AUTO DIFF WBC: CPT

## 2024-03-13 PROCEDURE — 2550000001 HC RX 255 CONTRASTS: Performed by: EMERGENCY MEDICINE

## 2024-03-13 PROCEDURE — 81003 URINALYSIS AUTO W/O SCOPE: CPT

## 2024-03-13 PROCEDURE — 74177 CT ABD & PELVIS W/CONTRAST: CPT

## 2024-03-13 PROCEDURE — 36415 COLL VENOUS BLD VENIPUNCTURE: CPT

## 2024-03-13 PROCEDURE — 74177 CT ABD & PELVIS W/CONTRAST: CPT | Performed by: STUDENT IN AN ORGANIZED HEALTH CARE EDUCATION/TRAINING PROGRAM

## 2024-03-13 PROCEDURE — 80053 COMPREHEN METABOLIC PANEL: CPT

## 2024-03-13 PROCEDURE — 83690 ASSAY OF LIPASE: CPT

## 2024-03-13 PROCEDURE — 83735 ASSAY OF MAGNESIUM: CPT

## 2024-03-13 PROCEDURE — 83605 ASSAY OF LACTIC ACID: CPT

## 2024-03-13 PROCEDURE — 96361 HYDRATE IV INFUSION ADD-ON: CPT

## 2024-03-13 RX ORDER — ONDANSETRON HYDROCHLORIDE 2 MG/ML
4 INJECTION, SOLUTION INTRAVENOUS ONCE
Status: COMPLETED | OUTPATIENT
Start: 2024-03-13 | End: 2024-03-13

## 2024-03-13 RX ORDER — POLYETHYLENE GLYCOL-3350 AND ELECTROLYTES WITH FLAVOR PACK 240; 5.84; 2.98; 6.72; 22.72 G/278.26G; G/278.26G; G/278.26G; G/278.26G; G/278.26G
4000 POWDER, FOR SOLUTION ORAL ONCE
Qty: 4000 ML | Refills: 0 | Status: SHIPPED | OUTPATIENT
Start: 2024-03-13 | End: 2024-03-13

## 2024-03-13 RX ORDER — MORPHINE SULFATE 4 MG/ML
4 INJECTION, SOLUTION INTRAMUSCULAR; INTRAVENOUS ONCE
Status: DISCONTINUED | OUTPATIENT
Start: 2024-03-13 | End: 2024-03-13

## 2024-03-13 RX ADMIN — HYDROMORPHONE HYDROCHLORIDE 0.2 MG: 0.2 INJECTION, SOLUTION INTRAMUSCULAR; INTRAVENOUS; SUBCUTANEOUS at 18:51

## 2024-03-13 RX ADMIN — IOHEXOL 75 ML: 350 INJECTION, SOLUTION INTRAVENOUS at 20:24

## 2024-03-13 RX ADMIN — SODIUM CHLORIDE 1000 ML: 9 INJECTION, SOLUTION INTRAVENOUS at 17:23

## 2024-03-13 RX ADMIN — ONDANSETRON 4 MG: 2 INJECTION INTRAMUSCULAR; INTRAVENOUS at 17:23

## 2024-03-13 SDOH — ECONOMIC STABILITY: GENERAL

## 2024-03-13 ASSESSMENT — COLUMBIA-SUICIDE SEVERITY RATING SCALE - C-SSRS
6. HAVE YOU EVER DONE ANYTHING, STARTED TO DO ANYTHING, OR PREPARED TO DO ANYTHING TO END YOUR LIFE?: NO
2. HAVE YOU ACTUALLY HAD ANY THOUGHTS OF KILLING YOURSELF?: NO
1. IN THE PAST MONTH, HAVE YOU WISHED YOU WERE DEAD OR WISHED YOU COULD GO TO SLEEP AND NOT WAKE UP?: NO

## 2024-03-13 ASSESSMENT — PAIN SCALES - GENERAL
PAINLEVEL_OUTOF10: 6
PAINLEVEL_OUTOF10: 8

## 2024-03-13 ASSESSMENT — ENCOUNTER SYMPTOMS
CONSTIPATION: 1
APPETITE LEVEL: GOOD
DENIES PAIN: 1
CHANGE IN APPETITE: UNCHANGED

## 2024-03-13 ASSESSMENT — ACTIVITIES OF DAILY LIVING (ADL): MONEY MANAGEMENT (EXPENSES/BILLS): INDEPENDENT

## 2024-03-13 ASSESSMENT — PAIN - FUNCTIONAL ASSESSMENT
PAIN_FUNCTIONAL_ASSESSMENT: 0-10
PAIN_FUNCTIONAL_ASSESSMENT: 0-10

## 2024-03-13 ASSESSMENT — PAIN DESCRIPTION - LOCATION: LOCATION: NECK

## 2024-03-13 ASSESSMENT — PAIN DESCRIPTION - PAIN TYPE: TYPE: ACUTE PAIN

## 2024-03-13 NOTE — ED PROVIDER NOTES
HPI   Chief Complaint   Patient presents with    Constipation       Cristal is a 63-year-old female with a past medical history of cervical disc disease, cervical radiculopathy, and chronic opioid use presenting to the emergency department with constipation.  Patient has struggled with constipation in the past as she is a chronic opioid user.  She was seen on 2/16/2024 in the emergency room for this same complaint and CT found moderate stool burden without impaction or small bowel obstruction.  She was prescribed multiple constipation medications, but has not taken them.  Patient then followed up with Dr. Ruiz who recommended colonoscopy and endoscopy after her cervical discectomy.  He also recommended constipation medications, but she has not taken those either.  The cervical discectomy was performed on 2/28/2024.  Patient has been taking several pain medications since her surgery including Percocet and a muscle relaxant.  Her last bowel movement was Thursday, 3/7/2024.  She is still passing gas and admits to crampy diffuse abdominal pain.  Yesterday she attempted milk of magnesia and became severely nauseous and had one episode of nonbloody nonbilious emesis. She endorses nausea today with no episodes of emesis since yesterday.  The only thing she is doing for her constipation is drinking prune juice daily.  No back pain, chest pain, shortness of breath, lightheadedness, dizziness, numbness, tingling, weakness, fever, chills, urinary symptoms.  Denies history of small bowel obstruction.  Abdominal procedures include umbilical hernia repair.                          [place] Coma Scale Score: 15                     Patient History   Past Medical History:   Diagnosis Date    Abnormal ECG 10/26/2023    Sinus rhythm LVH by voltage Inferior infarct, old Anterior Q waves, possibly due to LVH    Angina pectoris (CMS/[facility])     Anxiety     Cervical disc disease     Cervical radiculopathy     Neuro: Arnulfo PEREZ  1/15/24    Depression     Diabetes mellitus (CMS/[facility])     A1C: 2/6/24 -7.6%    GERD (gastroesophageal reflux disease)     History of Holter monitoring 10/2023    24 -48 hour monitor on 10/31/23, No abnormal findings    Hypertension     Incisional hernia with obstruction, without gangrene 05/24/2017    Incarcerated incisional hernia    Joint pain     Mastodynia 05/14/2020    Breast pain in female    Palpitations     Stress test scheduled for 2/9/24    PONV (postoperative nausea and vomiting)     Seizure disorder (CMS/[facility])     Last seizure 2/2023    TIA (transient ischemic attack)     Several years ago, no residual symptoms    Vertigo     Vision loss      Past Surgical History:   Procedure Laterality Date    CARPAL TUNNEL RELEASE Right     CATARACT EXTRACTION      CHOLECYSTECTOMY  11/14/2014    Cholecystectomy    COLONOSCOPY  05/17/2018    Complete Colonoscopy    CT CHEST WO IV CONTRAST  04/21/2023    No acute intrathoracic abnormalities. No thoracic aortic aneurysm or subintimal hematoma.    ECHOCARDIOGRAM 2 D M MODE PANEL  02/17/2023    Left ventricular systolic function is normal with a 60-65% estimated ejection fraction.    HERNIA REPAIR  05/24/2017    Hernia Repair    MANDIBLE SURGERY Bilateral     OTHER SURGICAL HISTORY  11/14/2014    Surgery Intracardiac Mass Removal Left Atrial Myxoma    OTHER SURGICAL HISTORY      Xiphoidectomy    STERNOTOMY  2011     Family History   Problem Relation Name Age of Onset    Cancer Mother      Hypertension Mother      Heart disease Father      Cancer Father      Hypertension Father      Glaucoma Father      Macular degeneration Father      Heart attack Father      Cancer Sister      Diabetes Sister      Diabetes Maternal Grandfather       Social History     Tobacco Use    Smoking status: Never    Smokeless tobacco: Never   Vaping Use    Vaping Use: Never used   Substance Use Topics    Alcohol use: Yes    Drug use: Never       Physical Exam   ED Triage Vitals [03/13/24 1608]    Temperature Heart Rate Respirations BP   36.7 °C (98.1 °F) 95 18 126/59      Pulse Ox Temp Source Heart Rate Source Patient Position   97 % Tympanic Monitor Sitting      BP Location FiO2 (%)     Left arm --       Physical Exam  Constitutional:       Appearance: Normal appearance.   HENT:      Mouth/Throat:      Mouth: Mucous membranes are dry.      Pharynx: Oropharynx is clear.   Eyes:      Extraocular Movements: Extraocular movements intact.   Cardiovascular:      Rate and Rhythm: Normal rate and regular rhythm.      Pulses: Normal pulses.      Heart sounds: Normal heart sounds.   Pulmonary:      Effort: Pulmonary effort is normal.      Breath sounds: Normal breath sounds.   Abdominal:      General: Abdomen is flat. There is no distension.      Palpations: Abdomen is soft.      Tenderness: There is abdominal tenderness. There is no guarding or rebound.      Comments: Diffuse tenderness to palpation.    Neurological:      Mental Status: She is alert.         ED Course & [facility]   ED Course as of 03/13/24 2117   Wed Mar 13, 2024   1813 GLUCOSE(!): 158 [AH]   1813 SODIUM(!): 135 [AH]   1813 CHLORIDE(!): 96 [AH]   1813 Calcium(!): 10.5 [AH]   2107 Specific Gravity, Urine(!): 1.051 [AH]   2107 Ketones, Urine(!): 20 (1+) [AH]   2107 Urobilinogen, Urine(!): 2.0 [AH]   2107 CT abdomen pelvis w IV contrast  1.  No acute findings in the abdomen and pelvis.  2. Extensive colonic stool with fecal impaction.   [AH]      ED Course User Index  [AH] Bernadine Manuel PA-C         Diagnoses as of 03/13/24 2117   Drug-induced constipation   Generalized abdominal pain       Medical Decision Making  rCistal is a 63-year-old female with a past medical history of cervical disc disease, cervical radiculopathy, and chronic opioid use presenting to the emergency department with constipation.  Last BM Thursday, 3/7/2024.  Recent surgery on 2/28/2024 and daily opioid use since surgery.  Patient has tried prune juice daily and one dose of milk of  "magnesia yesterday which caused nausea and vomiting.  She is still passing gas.    On initial examination, patient is generally well-appearing.  Her vital signs are stable.  Abdomen does not appear to be distended, but she has generalized tenderness.  Differentials include constipation, SBO, impaction, etc.  Workup included CBC, CMP, magnesium, lipase, lactate, CT abdomen pelvis with IV contrast.  Patient initially treated with 1000 L normal saline bolus and Zofran for nausea. She requests pain medication prior to CT. We discussed this will only worsen her constipation. Morphine ordered initially, but patient states this does nothing for her pain. Dilaudid then ordered.     CT demonstrated no acute findings in the abdomen pelvis with extensive colonic stool burden.  There does not appear to be any stool in the vault, so at this time digital disimpaction would be nontherapeutic.  Patient to be sent home with prescription for \"Golytely\".  We discussed proper way to take medication and that it is essential she follow-up with a GI specialist to initiate a daily regimen for her constipation.  Informed Cristal her chronic opioid use is likely the source of her constipation and if she does not start a daily regimen or d/c the opioids (safely and as directed by the prescriber) she will continue to have these issues.  Encouraged her to follow-up with her primary care provider as well.  Reasons to return to the emergency room include worsening abdominal pain, present nausea/vomiting or \"coffee ground emesis \", inability to pass gas or have a bowel movement. Patient agreeable to this plan and all questions and concerns addressed.        Procedure  Procedures     Bernadine Manuel PA-C  03/13/24 2120    "

## 2024-03-13 NOTE — ED TRIAGE NOTES
Pt presents to ED for constipation. Pt states last bowel movement was last Thursday. PT recently had surgery on neck on 2/28 and is on Percocet daily. Pt reports nausea and vomiting. PT denies abdominal pain, CP, SOB.

## 2024-03-13 NOTE — HOME HEALTH
arrived to pt home and pt was uncomfortable. she stated she has been taking percocet for years. pt is unaware of her last bm. she has been violently vomiting and nauseaous. pt bowel sounds were positice in left upper quad, all other quads were hypoactive. pt is not able to hold anything down. spoke to dr arora and it was agreed upon pt going to er. pt lungs clear. respirations unlabored. pain moderate. taking percocet.

## 2024-03-14 ENCOUNTER — APPOINTMENT (OUTPATIENT)
Dept: HOME HEALTH SERVICES | Facility: HOME HEALTH | Age: 64
End: 2024-03-14
Payer: COMMERCIAL

## 2024-03-14 NOTE — DISCHARGE INSTRUCTIONS
"You were seen in the emergency room today for generalized abdominal pain and constipation.  Your constipation is likely related to your chronic opioid use for pain management.  Opioids have an adverse side effect of constipation. At this time, you will be sent home with a prescription for \"Golytely\".  This is used for colonoscopy prep and should help clear your stool.  Please take medication as directed on the prescription.  It is essential you follow-up with a GI specialist to initiate a daily regimen for better management of your constipation.  May follow-up with Dr. Jeter who you have seen in the past.  Please also follow-up with your primary care provider.  Reasons to return to the emergency room include worsening abdominal pain, persistent nausea/vomiting or black emesis, and if you stop passing gas.  "

## 2024-03-15 ENCOUNTER — OFFICE VISIT (OUTPATIENT)
Dept: NEUROSURGERY | Facility: CLINIC | Age: 64
End: 2024-03-15
Payer: COMMERCIAL

## 2024-03-15 VITALS — TEMPERATURE: 96.9 F

## 2024-03-15 DIAGNOSIS — R11.0 NAUSEA: ICD-10-CM

## 2024-03-15 DIAGNOSIS — Z98.1 S/P CERVICAL SPINAL FUSION: Primary | ICD-10-CM

## 2024-03-15 PROCEDURE — 3051F HG A1C>EQUAL 7.0%<8.0%: CPT | Performed by: NURSE PRACTITIONER

## 2024-03-15 PROCEDURE — 3008F BODY MASS INDEX DOCD: CPT | Performed by: NURSE PRACTITIONER

## 2024-03-15 PROCEDURE — 99024 POSTOP FOLLOW-UP VISIT: CPT | Performed by: NURSE PRACTITIONER

## 2024-03-15 PROCEDURE — 1036F TOBACCO NON-USER: CPT | Performed by: NURSE PRACTITIONER

## 2024-03-15 ASSESSMENT — PATIENT HEALTH QUESTIONNAIRE - PHQ9
1. LITTLE INTEREST OR PLEASURE IN DOING THINGS: NOT AT ALL
2. FEELING DOWN, DEPRESSED OR HOPELESS: NOT AT ALL
SUM OF ALL RESPONSES TO PHQ9 QUESTIONS 1 & 2: 0

## 2024-03-15 NOTE — PROGRESS NOTES
Cristal Pollard is a 63 y.o. female who is here for her 1st post op visit. She underwent ACDF C4 - 7 on 02/28/2024, at [facility] by Dr. Arnulfo Reynolds. She was discharged home on 03/01/2024.    She presented to the [facility] ED on 03/13/2024, with drug induced constipation. She was discharged home with GoLytely.     Since discharge from the hospital, she feels [place]. She is tolerating pain medications (has not yet BM). Activity level - she is able to complete ADLs without assistance     Smoker: No  Anticoagulation: No    Temp 36.1 °C (96.9 °F) (Temporal)   LMP  (LMP Unknown)     On exam:  A&O x 3, speech clear / fluent  Respirations even / unlabored  GRIFFIN well. Able to elevate BUE above head readily  SURGICAL INCISION is: well approximated, no erythema / swelling / drainage  Gait is steady with use of walker today. (She states she is tired today)    Cristal Pollard is progressing ell post operatively. She agrees to follow up with Dr. Reynolds as previously scheduled, 05/13/2024, with cervical spine x-rays prior to visit.  Torrie Kim, APRN-CNP

## 2024-03-18 NOTE — TELEPHONE ENCOUNTER
Left message for patient to call back to schedule a follow up with Dr. Ruiz from her recent emergency visit.

## 2024-03-19 ENCOUNTER — HOME CARE VISIT (OUTPATIENT)
Dept: HOME HEALTH SERVICES | Facility: HOME HEALTH | Age: 64
End: 2024-03-19
Payer: COMMERCIAL

## 2024-03-19 PROCEDURE — G0157 HHC PT ASSISTANT EA 15: HCPCS

## 2024-03-19 ASSESSMENT — ENCOUNTER SYMPTOMS
LOWEST PAIN SEVERITY IN PAST 24 HOURS: 2/10
PERSON REPORTING PAIN: PATIENT
PAIN LOCATION: NECK
PAIN SEVERITY GOAL: 0/10
SUBJECTIVE PAIN PROGRESSION: GRADUALLY IMPROVING
PAIN: 1
HIGHEST PAIN SEVERITY IN PAST 24 HOURS: 3/10

## 2024-03-20 ENCOUNTER — HOME CARE VISIT (OUTPATIENT)
Dept: HOME HEALTH SERVICES | Facility: HOME HEALTH | Age: 64
End: 2024-03-20
Payer: COMMERCIAL

## 2024-03-20 VITALS
SYSTOLIC BLOOD PRESSURE: 120 MMHG | DIASTOLIC BLOOD PRESSURE: 64 MMHG | OXYGEN SATURATION: 99 % | RESPIRATION RATE: 20 BRPM | HEART RATE: 69 BPM | TEMPERATURE: 97.6 F

## 2024-03-20 PROCEDURE — G0300 HHS/HOSPICE OF LPN EA 15 MIN: HCPCS

## 2024-03-20 SDOH — ECONOMIC STABILITY: GENERAL

## 2024-03-20 ASSESSMENT — ENCOUNTER SYMPTOMS
LAST BOWEL MOVEMENT: 66919
APPETITE LEVEL: GOOD
DENIES PAIN: 1
SHORTNESS OF BREATH: 1

## 2024-03-20 ASSESSMENT — ACTIVITIES OF DAILY LIVING (ADL): MONEY MANAGEMENT (EXPENSES/BILLS): INDEPENDENT

## 2024-03-20 NOTE — HOME HEALTH
pt seen for routine visit. pt was seen in the ER last wednesday for constipation and nausea and vomiting. prior to that she hadnt moved her bowels in a week. pt had a ct scan which showed some stool burden. they decided to discharge her with golytely. pt stated she has moved bowels 3 times since monday. she did not do the golytely. there is a severe adverse reaction to gabapentin and milk of magnesia confirmed by the pharmacist. pt took a stool softner, prune juice and fruit. she is no longer having problems with bowels. neck incision is wnl. azalea.

## 2024-03-22 ENCOUNTER — HOME CARE VISIT (OUTPATIENT)
Dept: HOME HEALTH SERVICES | Facility: HOME HEALTH | Age: 64
End: 2024-03-22
Payer: COMMERCIAL

## 2024-03-24 RX ORDER — OMEPRAZOLE 40 MG/1
40 CAPSULE, DELAYED RELEASE ORAL DAILY
Qty: 90 CAPSULE | Refills: 1 | Status: SHIPPED | OUTPATIENT
Start: 2024-03-24 | End: 2024-09-20

## 2024-03-25 ENCOUNTER — HOME CARE VISIT (OUTPATIENT)
Dept: HOME HEALTH SERVICES | Facility: HOME HEALTH | Age: 64
End: 2024-03-25
Payer: COMMERCIAL

## 2024-03-25 PROCEDURE — G0151 HHCP-SERV OF PT,EA 15 MIN: HCPCS

## 2024-03-25 SDOH — HEALTH STABILITY: PHYSICAL HEALTH: EXERCISE TYPE: SITTING , STANDING EXS X 20 REPS

## 2024-03-25 ASSESSMENT — ENCOUNTER SYMPTOMS
OCCASIONAL FEELINGS OF UNSTEADINESS: 0
PAIN: 1
PERSON REPORTING PAIN: PATIENT
PAIN LOCATION: BACK
HIGHEST PAIN SEVERITY IN PAST 24 HOURS: 7/10
LOWEST PAIN SEVERITY IN PAST 24 HOURS: 2/10

## 2024-03-25 ASSESSMENT — ACTIVITIES OF DAILY LIVING (ADL): AMBULATION ASSISTANCE ON FLAT SURFACES: 1

## 2024-03-25 NOTE — HOME HEALTH
patient seen for pt dc today.  she states she is happy with her [facility] services and the care that has been provided.  she denies any questions or concerns re her hep or activity.  she agrees with dc today.

## 2024-03-27 ENCOUNTER — HOME CARE VISIT (OUTPATIENT)
Dept: HOME HEALTH SERVICES | Facility: HOME HEALTH | Age: 64
End: 2024-03-27
Payer: COMMERCIAL

## 2024-03-27 VITALS
DIASTOLIC BLOOD PRESSURE: 84 MMHG | SYSTOLIC BLOOD PRESSURE: 142 MMHG | OXYGEN SATURATION: 98 % | TEMPERATURE: 98 F | HEART RATE: 74 BPM | RESPIRATION RATE: 18 BRPM

## 2024-03-27 DIAGNOSIS — F41.9 ANXIETY: ICD-10-CM

## 2024-03-27 PROCEDURE — G0300 HHS/HOSPICE OF LPN EA 15 MIN: HCPCS

## 2024-03-27 RX ORDER — BUSPIRONE HYDROCHLORIDE 5 MG/1
5 TABLET ORAL 2 TIMES DAILY
Qty: 180 TABLET | Refills: 1 | Status: SHIPPED | OUTPATIENT
Start: 2024-03-27

## 2024-03-27 SDOH — ECONOMIC STABILITY: GENERAL

## 2024-03-27 ASSESSMENT — ENCOUNTER SYMPTOMS
LOWEST PAIN SEVERITY IN PAST 24 HOURS: 1/10
PAIN LOCATION: NECK
CHANGE IN APPETITE: UNCHANGED
PAIN LOCATION - PAIN FREQUENCY: INTERMITTENT
LAST BOWEL MOVEMENT: 66926
HIGHEST PAIN SEVERITY IN PAST 24 HOURS: 6/10
PAIN: 1
APPETITE LEVEL: GOOD
SUBJECTIVE PAIN PROGRESSION: UNCHANGED
PAIN LOCATION - PAIN SEVERITY: 5/10
PAIN SEVERITY GOAL: 0/10

## 2024-03-27 ASSESSMENT — ACTIVITIES OF DAILY LIVING (ADL): MONEY MANAGEMENT (EXPENSES/BILLS): INDEPENDENT

## 2024-03-27 NOTE — HOME HEALTH
pt seen for routine visit. pt neck incision azalea healing wnl. adhesive closure present. appetite is good. checks blood sugars every couple days, runs about 120. pt is having no problems voiding or moving bowels. pain today is a 2. lungs clear. respirations unlabored. pt sees surgeon in may. skin warm and dry.

## 2024-04-01 ENCOUNTER — PATIENT MESSAGE (OUTPATIENT)
Dept: PRIMARY CARE | Facility: CLINIC | Age: 64
End: 2024-04-01
Payer: COMMERCIAL

## 2024-04-01 DIAGNOSIS — M50.30 BULGING OF CERVICAL INTERVERTEBRAL DISC: Chronic | ICD-10-CM

## 2024-04-01 DIAGNOSIS — M54.12 CERVICAL RADICULOPATHY DUE TO TRAUMA: ICD-10-CM

## 2024-04-01 DIAGNOSIS — G89.18 ACUTE POST-OPERATIVE PAIN: ICD-10-CM

## 2024-04-01 DIAGNOSIS — Z98.1 S/P CERVICAL SPINAL FUSION: Primary | ICD-10-CM

## 2024-04-01 RX ORDER — OXYCODONE HYDROCHLORIDE 5 MG/1
5 TABLET ORAL EVERY 6 HOURS PRN
Qty: 20 TABLET | Refills: 0 | Status: SHIPPED | OUTPATIENT
Start: 2024-04-03 | End: 2024-04-03 | Stop reason: SDUPTHER

## 2024-04-01 RX ORDER — OXYCODONE AND ACETAMINOPHEN 7.5; 325 MG/1; MG/1
1 TABLET ORAL EVERY 6 HOURS PRN
Qty: 120 TABLET | Refills: 0 | Status: SHIPPED | OUTPATIENT
Start: 2024-04-04 | End: 2024-04-05 | Stop reason: DRUGHIGH

## 2024-04-01 ASSESSMENT — ENCOUNTER SYMPTOMS
CARDIOVASCULAR NEGATIVE: 1
CONSTIPATION: 1
ENDOCRINE NEGATIVE: 1
RESPIRATORY NEGATIVE: 1
CONSTITUTIONAL NEGATIVE: 1
NEUROLOGICAL NEGATIVE: 1
NECK PAIN: 1

## 2024-04-01 NOTE — PROGRESS NOTES
I have personally reviewed the OARRS report. No suspicious activity noted. This report is scanned into the electronic medical record. I have considered the risks of abuse, dependence, addiction and diversion.     Patient with recent history of major reconstructive surgery (ACDF C4 - 7 on 02/28/2024, at [facility] by Dr. Arnulfo Reynolds) necessitating narcotic medications at higher doses during the post-operative period of 6 weeks.     Will continue with close monitoring.   Torrie Kim, APRN-CNP

## 2024-04-03 ENCOUNTER — HOME CARE VISIT (OUTPATIENT)
Dept: HOME HEALTH SERVICES | Facility: HOME HEALTH | Age: 64
End: 2024-04-03
Payer: COMMERCIAL

## 2024-04-03 VITALS
OXYGEN SATURATION: 96 % | HEART RATE: 65 BPM | TEMPERATURE: 98.3 F | DIASTOLIC BLOOD PRESSURE: 70 MMHG | RESPIRATION RATE: 20 BRPM | SYSTOLIC BLOOD PRESSURE: 120 MMHG

## 2024-04-03 DIAGNOSIS — Z98.1 S/P CERVICAL SPINAL FUSION: ICD-10-CM

## 2024-04-03 DIAGNOSIS — G89.18 ACUTE POST-OPERATIVE PAIN: ICD-10-CM

## 2024-04-03 PROCEDURE — G0300 HHS/HOSPICE OF LPN EA 15 MIN: HCPCS

## 2024-04-03 PROCEDURE — 0023 HH SOC

## 2024-04-03 RX ORDER — OXYCODONE HYDROCHLORIDE 5 MG/1
5 TABLET ORAL EVERY 6 HOURS PRN
Qty: 20 TABLET | Refills: 0 | Status: SHIPPED | OUTPATIENT
Start: 2024-04-03 | End: 2024-04-10

## 2024-04-03 SDOH — ECONOMIC STABILITY: GENERAL

## 2024-04-03 ASSESSMENT — ENCOUNTER SYMPTOMS
LOWEST PAIN SEVERITY IN PAST 24 HOURS: 4/10
PAIN LOCATION - PAIN QUALITY: SHARP
PAIN LOCATION - PAIN SEVERITY: 4/10
APPETITE LEVEL: GOOD
PAIN LOCATION - EXACERBATING FACTORS: MOVEMENTS
PAIN LOCATION - PAIN DURATION: 1 MINUTE
HIGHEST PAIN SEVERITY IN PAST 24 HOURS: 9/10
PAIN LOCATION - PAIN FREQUENCY: INTERMITTENT
LAST BOWEL MOVEMENT: 66933
PAIN SEVERITY GOAL: 4/10
PAIN LOCATION: NECK
PAIN: 1
CHANGE IN APPETITE: UNCHANGED
PAIN LOCATION - RELIEVING FACTORS: PAIN MEDS

## 2024-04-03 ASSESSMENT — ACTIVITIES OF DAILY LIVING (ADL): MONEY MANAGEMENT (EXPENSES/BILLS): INDEPENDENT

## 2024-04-03 NOTE — HOME HEALTH
pt seen for routine visit. pt incision azalea and healing. still having some sharp pains in neck, some co nerve pain. pt is taking pain meds. pt appetite is good. no problems voiding or moving bowels. pt lungs are clear. respirations unlabored.

## 2024-04-03 NOTE — PROGRESS NOTES
I have personally reviewed the OARRS report. This report is scanned into the electronic medical record. I have considered the risks of abuse, dependence, addiction and diversion.     Patient with recent history of major reconstructive surgery (ACDF C4 - 7 on 02/28/2024, at [facility] by Dr. Arnulfo Reynolds) necessitating narcotic medications at higher doses during the post-operative period of 6 weeks.     Will continue with close monitoring.  Torrie Kim, APRN-CNP

## 2024-04-05 ENCOUNTER — PATIENT MESSAGE (OUTPATIENT)
Dept: PRIMARY CARE | Facility: CLINIC | Age: 64
End: 2024-04-05
Payer: COMMERCIAL

## 2024-04-05 DIAGNOSIS — M54.12 CERVICAL RADICULOPATHY DUE TO TRAUMA: Primary | ICD-10-CM

## 2024-04-05 RX ORDER — OXYCODONE AND ACETAMINOPHEN 10; 325 MG/1; MG/1
1 TABLET ORAL EVERY 4 HOURS PRN
Qty: 180 TABLET | Refills: 0 | Status: SHIPPED | OUTPATIENT
Start: 2024-04-05 | End: 2024-05-08 | Stop reason: SDUPTHER

## 2024-04-10 ENCOUNTER — HOME CARE VISIT (OUTPATIENT)
Dept: HOME HEALTH SERVICES | Facility: HOME HEALTH | Age: 64
End: 2024-04-10
Payer: COMMERCIAL

## 2024-04-10 VITALS
DIASTOLIC BLOOD PRESSURE: 66 MMHG | RESPIRATION RATE: 18 BRPM | OXYGEN SATURATION: 93 % | TEMPERATURE: 98.7 F | SYSTOLIC BLOOD PRESSURE: 108 MMHG | HEART RATE: 72 BPM

## 2024-04-10 PROCEDURE — G0300 HHS/HOSPICE OF LPN EA 15 MIN: HCPCS

## 2024-04-10 SDOH — ECONOMIC STABILITY: GENERAL

## 2024-04-10 ASSESSMENT — ENCOUNTER SYMPTOMS
APPETITE LEVEL: GOOD
CHANGE IN APPETITE: UNCHANGED
PAIN: 1
LAST BOWEL MOVEMENT: 66940

## 2024-04-10 ASSESSMENT — ACTIVITIES OF DAILY LIVING (ADL): MONEY MANAGEMENT (EXPENSES/BILLS): INDEPENDENT

## 2024-04-10 NOTE — HOME HEALTH
pt seen for routine. pt pain today 3. described as soreness with intermittent sharp pains. pt sees surgeon may 12. pt lungs clear. respirations unlabored. incision is wnl. adhesive glue almost completely off. skin warm and dry. appetite is good. no problems voiding or moving bowels. ambulates without assistance.

## 2024-04-17 ENCOUNTER — HOME CARE VISIT (OUTPATIENT)
Dept: HOME HEALTH SERVICES | Facility: HOME HEALTH | Age: 64
End: 2024-04-17
Payer: COMMERCIAL

## 2024-04-17 VITALS
TEMPERATURE: 98.1 F | RESPIRATION RATE: 18 BRPM | DIASTOLIC BLOOD PRESSURE: 70 MMHG | OXYGEN SATURATION: 96 % | HEART RATE: 85 BPM | SYSTOLIC BLOOD PRESSURE: 142 MMHG

## 2024-04-17 PROCEDURE — G0300 HHS/HOSPICE OF LPN EA 15 MIN: HCPCS

## 2024-04-17 SDOH — ECONOMIC STABILITY: GENERAL

## 2024-04-17 ASSESSMENT — ACTIVITIES OF DAILY LIVING (ADL): MONEY MANAGEMENT (EXPENSES/BILLS): INDEPENDENT

## 2024-04-17 ASSESSMENT — ENCOUNTER SYMPTOMS
LAST BOWEL MOVEMENT: 66947
DENIES PAIN: 1
CHANGE IN APPETITE: UNCHANGED
APPETITE LEVEL: GOOD

## 2024-04-17 NOTE — HOME HEALTH
pt seen for routine visit. has no pain current, she co more discomfort. pt takes percocet and it helps. incision is azalea and healing without difficulty. pt appetite is good. no problems voiding or moving bowels.skin warm and dry. lungs clear, respirations unlabored. ambulating wihtout assistance. pt follows up with surgeon 2nd week in may.

## 2024-04-24 ENCOUNTER — HOME CARE VISIT (OUTPATIENT)
Dept: HOME HEALTH SERVICES | Facility: HOME HEALTH | Age: 64
End: 2024-04-24
Payer: COMMERCIAL

## 2024-04-30 ENCOUNTER — HOME CARE VISIT (OUTPATIENT)
Dept: HOME HEALTH SERVICES | Facility: HOME HEALTH | Age: 64
End: 2024-04-30
Payer: COMMERCIAL

## 2024-04-30 VITALS
SYSTOLIC BLOOD PRESSURE: 114 MMHG | TEMPERATURE: 98.2 F | HEART RATE: 64 BPM | DIASTOLIC BLOOD PRESSURE: 70 MMHG | RESPIRATION RATE: 16 BRPM | OXYGEN SATURATION: 97 %

## 2024-04-30 PROCEDURE — G0299 HHS/HOSPICE OF RN EA 15 MIN: HCPCS

## 2024-04-30 ASSESSMENT — ENCOUNTER SYMPTOMS
LOWEST PAIN SEVERITY IN PAST 24 HOURS: 1/10
PERSON REPORTING PAIN: PATIENT
HIGHEST PAIN SEVERITY IN PAST 24 HOURS: 7/10
PAIN: 1

## 2024-04-30 ASSESSMENT — ACTIVITIES OF DAILY LIVING (ADL)
HOME_HEALTH_OASIS: 00
OASIS_M1830: 00

## 2024-04-30 NOTE — HOME HEALTH
PT INCISION HAS HEALED. PAIN IS CONTROLLED WITH PERCOCET. MEDS REVIEWED AND UPDATED. PT FOLLOWING UP WITH SURGEON ON MAY 13TH. PT AGREEABLE TO DC,NO FURTHER NEEDS.

## 2024-05-08 DIAGNOSIS — M54.12 CERVICAL RADICULOPATHY DUE TO TRAUMA: ICD-10-CM

## 2024-05-08 RX ORDER — OXYCODONE AND ACETAMINOPHEN 10; 325 MG/1; MG/1
1 TABLET ORAL EVERY 4 HOURS PRN
Qty: 180 TABLET | Refills: 0 | Status: SHIPPED | OUTPATIENT
Start: 2024-05-08 | End: 2024-06-07 | Stop reason: SDUPTHER

## 2024-05-10 ENCOUNTER — HOSPITAL ENCOUNTER (OUTPATIENT)
Dept: RADIOLOGY | Facility: HOSPITAL | Age: 64
Discharge: HOME | End: 2024-05-10
Payer: COMMERCIAL

## 2024-05-10 DIAGNOSIS — Z98.1 S/P CERVICAL SPINAL FUSION: ICD-10-CM

## 2024-05-10 PROCEDURE — 72040 X-RAY EXAM NECK SPINE 2-3 VW: CPT | Performed by: RADIOLOGY

## 2024-05-10 PROCEDURE — 72040 X-RAY EXAM NECK SPINE 2-3 VW: CPT

## 2024-05-13 ENCOUNTER — APPOINTMENT (OUTPATIENT)
Dept: NEUROSURGERY | Facility: CLINIC | Age: 64
End: 2024-05-13
Payer: COMMERCIAL

## 2024-05-13 ENCOUNTER — TELEPHONE (OUTPATIENT)
Dept: PRIMARY CARE | Facility: CLINIC | Age: 64
End: 2024-05-13

## 2024-05-13 NOTE — TELEPHONE ENCOUNTER
Patient lvm stating buspar is making her sick. She would like to know if you have recommendations for another medication for an antidepressant or anxiety? She said she is still unable to drive and needs help with a lot of things and its making things very difficult for her.    normal...

## 2024-05-16 DIAGNOSIS — F33.1 MODERATE EPISODE OF RECURRENT MAJOR DEPRESSIVE DISORDER (MULTI): ICD-10-CM

## 2024-05-16 DIAGNOSIS — F41.9 ANXIETY DISORDER, UNSPECIFIED TYPE: Primary | ICD-10-CM

## 2024-05-16 RX ORDER — FLUOXETINE 10 MG/1
CAPSULE ORAL DAILY
Qty: 21 CAPSULE | Refills: 0 | Status: SHIPPED | OUTPATIENT
Start: 2024-05-16 | End: 2024-05-30

## 2024-05-16 RX ORDER — FLUOXETINE HYDROCHLORIDE 40 MG/1
40 CAPSULE ORAL DAILY
Qty: 30 CAPSULE | Refills: 1 | Status: SHIPPED | OUTPATIENT
Start: 2024-05-16 | End: 2024-06-07

## 2024-05-20 ENCOUNTER — OFFICE VISIT (OUTPATIENT)
Dept: NEUROSURGERY | Facility: CLINIC | Age: 64
End: 2024-05-20
Payer: COMMERCIAL

## 2024-05-20 VITALS
WEIGHT: 178 LBS | HEART RATE: 80 BPM | DIASTOLIC BLOOD PRESSURE: 70 MMHG | HEIGHT: 64 IN | SYSTOLIC BLOOD PRESSURE: 124 MMHG | BODY MASS INDEX: 30.39 KG/M2 | TEMPERATURE: 97.7 F

## 2024-05-20 DIAGNOSIS — Z98.1 S/P CERVICAL SPINAL FUSION: ICD-10-CM

## 2024-05-20 DIAGNOSIS — M54.12 CERVICAL RADICULOPATHY: Primary | ICD-10-CM

## 2024-05-20 PROCEDURE — 99024 POSTOP FOLLOW-UP VISIT: CPT | Performed by: STUDENT IN AN ORGANIZED HEALTH CARE EDUCATION/TRAINING PROGRAM

## 2024-05-20 PROCEDURE — 3074F SYST BP LT 130 MM HG: CPT | Performed by: STUDENT IN AN ORGANIZED HEALTH CARE EDUCATION/TRAINING PROGRAM

## 2024-05-20 PROCEDURE — 3051F HG A1C>EQUAL 7.0%<8.0%: CPT | Performed by: STUDENT IN AN ORGANIZED HEALTH CARE EDUCATION/TRAINING PROGRAM

## 2024-05-20 PROCEDURE — 3008F BODY MASS INDEX DOCD: CPT | Performed by: STUDENT IN AN ORGANIZED HEALTH CARE EDUCATION/TRAINING PROGRAM

## 2024-05-20 PROCEDURE — 1036F TOBACCO NON-USER: CPT | Performed by: STUDENT IN AN ORGANIZED HEALTH CARE EDUCATION/TRAINING PROGRAM

## 2024-05-20 PROCEDURE — 3078F DIAST BP <80 MM HG: CPT | Performed by: STUDENT IN AN ORGANIZED HEALTH CARE EDUCATION/TRAINING PROGRAM

## 2024-05-20 ASSESSMENT — LIFESTYLE VARIABLES
AUDIT-C TOTAL SCORE: -1
HOW OFTEN DO YOU HAVE SIX OR MORE DRINKS ON ONE OCCASION: PATIENT DECLINED
HOW MANY STANDARD DRINKS CONTAINING ALCOHOL DO YOU HAVE ON A TYPICAL DAY: PATIENT DECLINED
SKIP TO QUESTIONS 9-10: 0
HOW OFTEN DO YOU HAVE A DRINK CONTAINING ALCOHOL: PATIENT DECLINED

## 2024-05-20 ASSESSMENT — PATIENT HEALTH QUESTIONNAIRE - PHQ9
2. FEELING DOWN, DEPRESSED OR HOPELESS: NOT AT ALL
SUM OF ALL RESPONSES TO PHQ9 QUESTIONS 1 & 2: 0
1. LITTLE INTEREST OR PLEASURE IN DOING THINGS: NOT AT ALL

## 2024-05-20 ASSESSMENT — PAIN SCALES - GENERAL: PAINLEVEL: 6

## 2024-05-20 NOTE — PROGRESS NOTES
[facility] [place]  Department of Neurological Surgery  Post Operative Patient Visit      History of Present Illness:  Cristal Pollard is a 63 y.o. year old female who presents to the spine clinic in a post operative visit. Since surgery they are are still having neck pain and some intermittent radiculopathy down the right arm in the C6 and C7 distribution.  She states that the pain has not resolved since surgery.  She is also having some clicking and grinding and cracking when she moves her head.  Her x-rays show hardware in good position.  I counseled her upon the fact I can take the pressure off nerves I cannot sometimes force them to heal and that this could be nerve damage but it can take up to 9 to 12 months for us to confirm this.  She is hesitant to perform any further imaging such as a CT myelogram or an EMG as she has had seizure-like activity and events after these testing in the past.  I did discuss a CT scan without contrast to evaluate the hardware in further detail and the bony foraminal areas.  Will order that for her today and I will call her with results.  She will keep an eye on her symptoms and we will see if she sees some improvement with further time from surgery.      The above clinical summary has been dictated with voice recognition software. It has not been proofread for grammatical errors, typographical mistakes, or other semantic inconsistencies.    Thank you for visiting our office today. It was our pleasure to take part in your healthcare.     Do not hesitate to call with any questions regarding your plan of care after leaving at [phone] M-F 8am-4pm.     To clinicians, thank you very much for this kind referral. It is a privilege to partner with you in the care of your patients. My office would be delighted to assist you with any further consultations or with questions regarding the plan of care outlined. Do not hesitate to call the office or contact me directly.        Sincerely,      Arnulfo Reynolds MD, [facility]  Spine , [facility]  Qasim Bardales and Subha Bardales Chair in Spinal Neurosurgery  Neurosurgery , [facility] and [facility]  Complex Spine Surgery Fellowship Director   of Neurological Surgery  [employer]    [facility]  [address]  [place] [address]    [facility]  [address] [place] [address]  [address]  [place] [address]    Phone: [phone]  Fax: [phone]        Scribe Attestation  By signing my name below, I, Tavia Wynn , Scribe   attest that this documentation has been prepared under the direction and in the presence of Arnulfo Reynolds MD.

## 2024-05-31 DIAGNOSIS — E11.9 TYPE 2 DIABETES MELLITUS WITHOUT COMPLICATIONS (MULTI): ICD-10-CM

## 2024-05-31 RX ORDER — BLOOD-GLUCOSE METER
EACH MISCELLANEOUS
Qty: 50 STRIP | Refills: 2 | Status: SHIPPED | OUTPATIENT
Start: 2024-05-31

## 2024-06-07 ENCOUNTER — APPOINTMENT (OUTPATIENT)
Dept: RADIOLOGY | Facility: HOSPITAL | Age: 64
End: 2024-06-07
Payer: MEDICARE

## 2024-06-07 DIAGNOSIS — F33.1 MODERATE EPISODE OF RECURRENT MAJOR DEPRESSIVE DISORDER (MULTI): ICD-10-CM

## 2024-06-07 DIAGNOSIS — F41.9 ANXIETY DISORDER, UNSPECIFIED TYPE: ICD-10-CM

## 2024-06-07 DIAGNOSIS — M54.12 CERVICAL RADICULOPATHY DUE TO TRAUMA: ICD-10-CM

## 2024-06-07 RX ORDER — FLUOXETINE HYDROCHLORIDE 40 MG/1
40 CAPSULE ORAL DAILY
Qty: 90 CAPSULE | Refills: 1 | Status: SHIPPED | OUTPATIENT
Start: 2024-06-07 | End: 2024-12-04

## 2024-06-07 RX ORDER — OXYCODONE AND ACETAMINOPHEN 10; 325 MG/1; MG/1
1 TABLET ORAL EVERY 4 HOURS PRN
Qty: 180 TABLET | Refills: 0 | Status: SHIPPED | OUTPATIENT
Start: 2024-06-07 | End: 2024-07-07

## 2024-06-14 ENCOUNTER — HOSPITAL ENCOUNTER (OUTPATIENT)
Dept: RADIOLOGY | Facility: HOSPITAL | Age: 64
Discharge: HOME | End: 2024-06-14
Payer: MEDICARE

## 2024-06-14 DIAGNOSIS — M54.12 CERVICAL RADICULOPATHY: ICD-10-CM

## 2024-06-14 PROCEDURE — 72125 CT NECK SPINE W/O DYE: CPT

## 2024-06-27 ENCOUNTER — TELEPHONE (OUTPATIENT)
Dept: NEUROSURGERY | Facility: CLINIC | Age: 64
End: 2024-06-27
Payer: MEDICARE

## 2024-07-01 ENCOUNTER — TELEPHONE (OUTPATIENT)
Dept: PRIMARY CARE | Facility: CLINIC | Age: 64
End: 2024-07-01
Payer: MEDICARE

## 2024-07-01 DIAGNOSIS — F32.A DEPRESSION, UNSPECIFIED DEPRESSION TYPE: ICD-10-CM

## 2024-07-01 DIAGNOSIS — F41.9 ANXIETY DISORDER, UNSPECIFIED TYPE: Primary | ICD-10-CM

## 2024-07-01 NOTE — TELEPHONE ENCOUNTER
Pt left voicemail asking if you could put in a referral to psych for her ( [facility])   She is trying to make an apt to see someone.

## 2024-07-09 ENCOUNTER — APPOINTMENT (OUTPATIENT)
Dept: PRIMARY CARE | Facility: CLINIC | Age: 64
End: 2024-07-09
Payer: MEDICARE

## 2024-07-09 VITALS
BODY MASS INDEX: 30.39 KG/M2 | HEART RATE: 88 BPM | DIASTOLIC BLOOD PRESSURE: 88 MMHG | OXYGEN SATURATION: 97 % | HEIGHT: 64 IN | SYSTOLIC BLOOD PRESSURE: 130 MMHG | WEIGHT: 178 LBS

## 2024-07-09 DIAGNOSIS — G89.29 CHRONIC NECK PAIN: ICD-10-CM

## 2024-07-09 DIAGNOSIS — M25.551 PAIN OF RIGHT HIP: ICD-10-CM

## 2024-07-09 DIAGNOSIS — E11.9 TYPE 2 DIABETES MELLITUS WITHOUT COMPLICATION, WITHOUT LONG-TERM CURRENT USE OF INSULIN (MULTI): Chronic | ICD-10-CM

## 2024-07-09 DIAGNOSIS — R30.0 BURNING WITH URINATION: ICD-10-CM

## 2024-07-09 DIAGNOSIS — F33.9 DEPRESSION, RECURRENT (CMS-HCC): ICD-10-CM

## 2024-07-09 DIAGNOSIS — R41.89 NEUROCOGNITIVE DEFICITS: ICD-10-CM

## 2024-07-09 DIAGNOSIS — N30.00 ACUTE CYSTITIS WITHOUT HEMATURIA: Primary | ICD-10-CM

## 2024-07-09 DIAGNOSIS — I10 PRIMARY HYPERTENSION: ICD-10-CM

## 2024-07-09 DIAGNOSIS — M54.2 CHRONIC NECK PAIN: ICD-10-CM

## 2024-07-09 DIAGNOSIS — F41.9 ANXIETY DISORDER, UNSPECIFIED TYPE: ICD-10-CM

## 2024-07-09 DIAGNOSIS — R29.818 NEUROCOGNITIVE DEFICITS: ICD-10-CM

## 2024-07-09 DIAGNOSIS — M54.12 CERVICAL RADICULOPATHY DUE TO TRAUMA: ICD-10-CM

## 2024-07-09 PROBLEM — E66.01 MORBID OBESITY DUE TO EXCESS CALORIES (MULTI): Status: RESOLVED | Noted: 2023-10-29 | Resolved: 2024-07-09

## 2024-07-09 LAB
APPEARANCE UR: ABNORMAL
BILIRUB UR QL STRIP: NEGATIVE
COLOR UR: ABNORMAL
GLUCOSE UR STRIP-MCNC: NEGATIVE MG/DL
HBA1C MFR BLD: 7.2 % (ref 4.2–6.5)
HGB UR QL STRIP: ABNORMAL
KETONES UR STRIP-MCNC: ABNORMAL MG/DL
LEUKOCYTE ESTERASE UR QL STRIP: ABNORMAL
NITRITE UR QL STRIP: POSITIVE
PH UR STRIP: 5.5 [PH]
PROT UR STRIP-MCNC: NEGATIVE MG/DL
SP GR UR STRIP.AUTO: >=1.03
UROBILINOGEN UR STRIP-ACNC: 0.2 E.U./DL

## 2024-07-09 PROCEDURE — 81003 URINALYSIS AUTO W/O SCOPE: CPT | Performed by: STUDENT IN AN ORGANIZED HEALTH CARE EDUCATION/TRAINING PROGRAM

## 2024-07-09 PROCEDURE — 1036F TOBACCO NON-USER: CPT | Performed by: STUDENT IN AN ORGANIZED HEALTH CARE EDUCATION/TRAINING PROGRAM

## 2024-07-09 PROCEDURE — 3075F SYST BP GE 130 - 139MM HG: CPT | Performed by: STUDENT IN AN ORGANIZED HEALTH CARE EDUCATION/TRAINING PROGRAM

## 2024-07-09 PROCEDURE — 3008F BODY MASS INDEX DOCD: CPT | Performed by: STUDENT IN AN ORGANIZED HEALTH CARE EDUCATION/TRAINING PROGRAM

## 2024-07-09 PROCEDURE — 3051F HG A1C>EQUAL 7.0%<8.0%: CPT | Performed by: STUDENT IN AN ORGANIZED HEALTH CARE EDUCATION/TRAINING PROGRAM

## 2024-07-09 PROCEDURE — 3079F DIAST BP 80-89 MM HG: CPT | Performed by: STUDENT IN AN ORGANIZED HEALTH CARE EDUCATION/TRAINING PROGRAM

## 2024-07-09 PROCEDURE — 99215 OFFICE O/P EST HI 40 MIN: CPT | Performed by: STUDENT IN AN ORGANIZED HEALTH CARE EDUCATION/TRAINING PROGRAM

## 2024-07-09 PROCEDURE — 83036 HEMOGLOBIN GLYCOSYLATED A1C: CPT | Mod: CLIA WAIVED TEST | Performed by: STUDENT IN AN ORGANIZED HEALTH CARE EDUCATION/TRAINING PROGRAM

## 2024-07-09 PROCEDURE — 87186 SC STD MICRODIL/AGAR DIL: CPT

## 2024-07-09 PROCEDURE — 1159F MED LIST DOCD IN RCRD: CPT | Performed by: STUDENT IN AN ORGANIZED HEALTH CARE EDUCATION/TRAINING PROGRAM

## 2024-07-09 PROCEDURE — 87086 URINE CULTURE/COLONY COUNT: CPT

## 2024-07-09 PROCEDURE — 1160F RVW MEDS BY RX/DR IN RCRD: CPT | Performed by: STUDENT IN AN ORGANIZED HEALTH CARE EDUCATION/TRAINING PROGRAM

## 2024-07-09 RX ORDER — NITROFURANTOIN 25; 75 MG/1; MG/1
100 CAPSULE ORAL 2 TIMES DAILY
Qty: 10 CAPSULE | Refills: 0 | Status: SHIPPED | OUTPATIENT
Start: 2024-07-09 | End: 2024-07-14

## 2024-07-09 RX ORDER — OXYCODONE AND ACETAMINOPHEN 7.5; 325 MG/1; MG/1
1 TABLET ORAL EVERY 4 HOURS PRN
Qty: 180 TABLET | Refills: 0 | Status: SHIPPED | OUTPATIENT
Start: 2024-07-09 | End: 2024-08-08

## 2024-07-09 ASSESSMENT — ENCOUNTER SYMPTOMS
CONSTIPATION: 0
SHORTNESS OF BREATH: 0
ACTIVITY CHANGE: 0
SLEEP DISTURBANCE: 0
DYSPHORIC MOOD: 0
FATIGUE: 1
DIZZINESS: 0
NECK PAIN: 1
HEADACHES: 0
GASTROINTESTINAL NEGATIVE: 1
DECREASED CONCENTRATION: 1
NERVOUS/ANXIOUS: 1
ARTHRALGIAS: 1
CHEST TIGHTNESS: 0
DYSURIA: 1

## 2024-07-09 NOTE — PROGRESS NOTES
Subjective   Patient ID: Cristal Pollard is a 64 y.o. female who presents for Follow-up (Follow up on mental health/ prozac , med refills/A1c/Thinks she may have a bladder infection that started in the last few days /Wants to talk about dosage for percocet ).  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 Is excited about this.     Last A1c 7.6%. Taking her metformin as prescribed.     Had tough transition on prozac, doing better now. Sleeping better. Less outbursts and panic attacks. No current side effects.     Has been still having the same neck pain since her surgery. CT showed hardware was in the correct place. Still having radicular pain. Will be following up with neurosurgery again to see next steps.     Has been able to spread her percocet out to every 5-5.5 hours instead of every 4. Wants to try decreasing her dose from 10mg to 7.5mg.     Had constipation after both myelograms, not due to opioids. Bowels have had no issues since.     Thinks she has a UTI. Burning when she urinates, dark color. Started about 3-4 days ago. Only allergic to penicillin.     Seeing behavioral health tomorrow for therapy.  Also interested in neuropsych testing. Reports changes in her cognition.     Hearing is coming up in August 2024.     No other concerns today.         Review of Systems   Constitutional:  Positive for fatigue. Negative for activity change.   HENT: Negative.     Respiratory:  Negative for chest tightness and shortness of breath.    Cardiovascular:  Negative for chest pain.   Gastrointestinal: Negative.  Negative for constipation.   Genitourinary:  Positive for dysuria and urgency.   Musculoskeletal:  Positive for arthralgias and neck pain.   Neurological:  Negative for dizziness and headaches.   Psychiatric/Behavioral:  Positive for decreased concentration. Negative for dysphoric mood and sleep disturbance. The patient is nervous/anxious.    All other systems reviewed and are negative.      Objective   Physical  Exam  Vitals reviewed.   Constitutional:       General: She is not in acute distress.     Appearance: Normal appearance.   HENT:      Head: Normocephalic.   Eyes:      Pupils: Pupils are equal, round, and reactive to light.   Cardiovascular:      Rate and Rhythm: Normal rate and regular rhythm.   Pulmonary:      Effort: Pulmonary effort is normal. No respiratory distress.   Musculoskeletal:         General: Normal range of motion.   Skin:     General: Skin is warm and dry.   Neurological:      Mental Status: She is alert. Mental status is at baseline.   Psychiatric:         Mood and Affect: Mood normal.         Behavior: Behavior normal.         Body mass index is 30.55 kg/m².      Current Outpatient Medications:     FLUoxetine (PROzac) 40 mg capsule, TAKE 1 CAPSULE (40 MG) BY MOUTH ONCE DAILY. TO START AFTER COMPLETING 10MG AND 20MG DOSES, Disp: 90 capsule, Rfl: 1    lamoTRIgine (LaMICtal) 100 mg tablet, Take 1.5 tablets (150 mg) by mouth once daily at bedtime., Disp: , Rfl:     lamoTRIgine (LaMICtal) 100 mg tablet, Take 1 tablet (100 mg) by mouth once daily in the morning., Disp: , Rfl:     lisinopriL-hydrochlorothiazide 10-12.5 mg tablet, TAKE 1 TABLET BY MOUTH EVERY DAY, Disp: 90 tablet, Rfl: 2    metFORMIN  mg 24 hr tablet, TAKE 1 TABLET BY MOUTH EVERY DAY WITH EVENING MEAL, Disp: 90 tablet, Rfl: 1    multivitamin tablet, Take 1 tablet by mouth once daily., Disp: , Rfl:     nitroglycerin (Nitrostat) 0.4 mg SL tablet, Place 1 tablet (0.4 mg) under the tongue every 5 minutes if needed (FOR UP TO 3 DOSES AS NEEDED FOR CHEST PAIN.CALL 911 IF PAIN PERSISTS.)., Disp: , Rfl:     omeprazole (PriLOSEC) 40 mg DR capsule, TAKE 1 CAPSULE (40 MG) BY MOUTH ONCE DAILY. DO NOT CRUSH OR CHEW., Disp: 90 capsule, Rfl: 1    OneTouch Delica Plus Lancet 30 gauge misc, USE TO TEST ONCE DAILY, Disp: 90 each, Rfl: 1    OneTouch Verio test strips strip, USE TO TEST ONCE DAILY, Disp: 50 strip, Rfl: 2    acetaminophen (Tylenol) 325  mg tablet, Take 2 tablets (650 mg) by mouth every 4 hours if needed for mild pain (1 - 3). Do Not Take with Home Percocet as it also contains Acetaminophen, Disp: , Rfl:     calcium carbonate-vitamin D3 600 mg-10 mcg (400 unit) chewable tablet, Chew 1 tablet 2 times a day., Disp: , Rfl:     hydrOXYzine HCL (Atarax) 25 mg tablet, Take 1 tablet (25 mg) by mouth every 8 hours if needed for anxiety., Disp: 60 tablet, Rfl: 0    nitrofurantoin, macrocrystal-monohydrate, (Macrobid) 100 mg capsule, Take 1 capsule (100 mg) by mouth 2 times a day for 5 days., Disp: 10 capsule, Rfl: 0    oxyCODONE-acetaminophen (Percocet) 7.5-325 mg tablet, Take 1 tablet by mouth every 4 hours if needed for severe pain (7 - 10)., Disp: 180 tablet, Rfl: 0      Assessment/Plan   Diagnoses and all orders for this visit:  Acute cystitis without hematuria  Comments:  macrobid sent  urine culture pending  Orders:  -     nitrofurantoin, macrocrystal-monohydrate, (Macrobid) 100 mg capsule; Take 1 capsule (100 mg) by mouth 2 times a day for 5 days.  Burning with urination  -     POCT UA (Automated) docked device  -     Urine Culture  Type 2 diabetes mellitus without complication, without long-term current use of insulin (Multi)  Comments:  improvement from 7.6% to 7.2%  UTD on eye exams  continue metformin  Orders:  -     POCT Glycosylated Hemoglobin (HGB A1C) docked device  Depression, recurrent (CMS-HCC)  Cervical radiculopathy due to trauma  Comments:  s/p surgery  continue following with neurosurgery  Orders:  -     oxyCODONE-acetaminophen (Percocet) 7.5-325 mg tablet; Take 1 tablet by mouth every 4 hours if needed for severe pain (7 - 10).  Neurocognitive deficits  Comments:  referral for neuropsych testing  Orders:  -     Referral to Adult Neuropsychology; Future  Chronic neck pain  Comments:  CSA completed today  interested in tryng to scale down her percocet dose  decrease to 7.5mg q4 hours  Orders:  -     oxyCODONE-acetaminophen (Percocet)  7.5-325 mg tablet; Take 1 tablet by mouth every 4 hours if needed for severe pain (7 - 10).  Pain of right hip  Comments:  xray ordered  Orders:  -     XR hip right with pelvis when performed 2 or 3 views; Future  Anxiety disorder, unspecified type  Comments:  doing much better on 40mg fluoxetine  seeing therapist tomorrow  Primary hypertension  Comments:  well controlled  BMI 30.0-30.9,adult  Comments:  lost a little weight, doing well  watching her diet  Other orders  -     POCT GLYCOSYLATED HEMOGLOBIN (HGB A1C)  -     POCT URINALYSIS AUTOMATED    [facility] report reviewed for Cristal Pollard today and is consistent with prescribed therapy.  We weighed the risks and benefit of this therapy and will continue with the current plan      Follow up in 3 months       Jessica Diehl DO 07/09/24 10:04 PM

## 2024-07-11 DIAGNOSIS — Z98.1 S/P CERVICAL SPINAL FUSION: ICD-10-CM

## 2024-07-11 DIAGNOSIS — M54.12 CERVICAL RADICULOPATHY: Primary | ICD-10-CM

## 2024-07-11 LAB — BACTERIA UR CULT: ABNORMAL

## 2024-07-23 ENCOUNTER — APPOINTMENT (OUTPATIENT)
Dept: PSYCHOLOGY | Facility: CLINIC | Age: 64
End: 2024-07-23
Payer: MEDICARE

## 2024-07-27 DIAGNOSIS — E11.9 TYPE 2 DIABETES MELLITUS WITHOUT COMPLICATIONS (MULTI): ICD-10-CM

## 2024-07-27 DIAGNOSIS — R11.0 NAUSEA: ICD-10-CM

## 2024-07-27 DIAGNOSIS — I10 ESSENTIAL (PRIMARY) HYPERTENSION: ICD-10-CM

## 2024-07-29 DIAGNOSIS — G40.909 SEIZURE DISORDER (MULTI): Primary | ICD-10-CM

## 2024-07-29 RX ORDER — METFORMIN HYDROCHLORIDE 500 MG/1
500 TABLET, EXTENDED RELEASE ORAL
Qty: 90 TABLET | Refills: 1 | Status: SHIPPED | OUTPATIENT
Start: 2024-07-29

## 2024-07-29 RX ORDER — LISINOPRIL AND HYDROCHLOROTHIAZIDE 10; 12.5 MG/1; MG/1
1 TABLET ORAL DAILY
Qty: 90 TABLET | Refills: 2 | Status: SHIPPED | OUTPATIENT
Start: 2024-07-29

## 2024-07-29 RX ORDER — LAMOTRIGINE 100 MG/1
TABLET ORAL
Qty: 225 TABLET | Refills: 1 | Status: SHIPPED | OUTPATIENT
Start: 2024-07-29

## 2024-07-31 RX ORDER — OMEPRAZOLE 40 MG/1
40 CAPSULE, DELAYED RELEASE ORAL DAILY
Qty: 90 CAPSULE | Refills: 1 | Status: SHIPPED | OUTPATIENT
Start: 2024-07-31 | End: 2025-01-27

## 2024-08-12 DIAGNOSIS — M54.12 CERVICAL RADICULOPATHY DUE TO TRAUMA: ICD-10-CM

## 2024-08-12 DIAGNOSIS — G89.29 CHRONIC NECK PAIN: ICD-10-CM

## 2024-08-12 DIAGNOSIS — M54.2 CHRONIC NECK PAIN: ICD-10-CM

## 2024-08-12 RX ORDER — OXYCODONE AND ACETAMINOPHEN 7.5; 325 MG/1; MG/1
1 TABLET ORAL EVERY 4 HOURS PRN
Qty: 180 TABLET | Refills: 0 | Status: SHIPPED | OUTPATIENT
Start: 2024-08-12 | End: 2024-09-11

## 2024-09-16 DIAGNOSIS — M54.12 CERVICAL RADICULOPATHY DUE TO TRAUMA: ICD-10-CM

## 2024-09-16 DIAGNOSIS — M54.2 CHRONIC NECK PAIN: ICD-10-CM

## 2024-09-16 DIAGNOSIS — G89.29 CHRONIC NECK PAIN: ICD-10-CM

## 2024-09-16 RX ORDER — OXYCODONE AND ACETAMINOPHEN 7.5; 325 MG/1; MG/1
1 TABLET ORAL EVERY 4 HOURS PRN
Qty: 180 TABLET | Refills: 0 | Status: SHIPPED | OUTPATIENT
Start: 2024-09-16 | End: 2024-10-16

## 2024-09-16 NOTE — PROGRESS NOTES
Subjective   Patient ID: Cristal Pollard is a 64 y.o. female who presents for No chief complaint on file..  HPI    Review of Systems    Objective   Physical Exam    Assessment/Plan            Jessica Diehl DO 09/16/24 4:11 PM

## 2024-09-23 ENCOUNTER — APPOINTMENT (OUTPATIENT)
Dept: NEUROSURGERY | Facility: CLINIC | Age: 64
End: 2024-09-23
Payer: MEDICARE

## 2024-09-23 VITALS
TEMPERATURE: 96.4 F | WEIGHT: 178 LBS | BODY MASS INDEX: 29.66 KG/M2 | HEIGHT: 65 IN | DIASTOLIC BLOOD PRESSURE: 76 MMHG | HEART RATE: 84 BPM | SYSTOLIC BLOOD PRESSURE: 99 MMHG

## 2024-09-23 DIAGNOSIS — M54.12 CERVICAL RADICULOPATHY: Primary | ICD-10-CM

## 2024-09-23 ASSESSMENT — PATIENT HEALTH QUESTIONNAIRE - PHQ9
2. FEELING DOWN, DEPRESSED OR HOPELESS: NOT AT ALL
1. LITTLE INTEREST OR PLEASURE IN DOING THINGS: NOT AT ALL
SUM OF ALL RESPONSES TO PHQ9 QUESTIONS 1 & 2: 0

## 2024-09-23 ASSESSMENT — PAIN SCALES - GENERAL: PAINLEVEL: 5

## 2024-09-23 NOTE — PROGRESS NOTES
[facility] [place]  Department of Neurological Surgery  Established Patient Visit    History of Present Illness:  Cristal Pollard is a 64 y.o. year old female who presents to the spine clinic in follow up with neck and shoulder pain. She has intermittent cervical radiculopathy. Recently has become more active. Starting to wean down pain medications, is interested in getting off percocets and is working with pain management doctor to do so. She notices some cracking and grinding with motion through the neck. The more she does, the more she hurts.    Patient's BMI is Body mass index is 29.62 kg/m².    Diabetic: yes   Diabetes Composite Score: 3   Values used to calculate this score:    Points  Metrics       1        Blood Pressure: 99/76                Prescribed Statins: No LDL information on file       1        Hemoglobin A1c: 7.2%       1        Smokes Tobacco: No       0        Prescribed Aspirin: No      Osteoporosis: no  No DXA results found for the past 12 months    Review of Systems:  14/14 systems reviewed and negative other than what is listed in the history of present illness    Patient Active Problem List   Diagnosis    Anxiety disorder    Depression, recurrent (CMS-HCC)    Depressive personality disorder    Neck strain    Post traumatic stress disorder (PTSD)    Whiplash injury to neck    Stress reaction, chronic    Benign paroxysmal positional vertigo due to bilateral vestibular disorder    Chronic neck pain    Cervical radiculopathy due to trauma    Bulging of cervical intervertebral disc    Cervical paraspinous muscle spasm    Bilateral dry eyes    Soft tissue mass    Grief reaction    Hypercholesteremia    Hypertension    Insomnia    Vestibular dysfunction of both ears    Visual changes    Vitamin D deficiency    Chest pain    Headache    Central stenosis of spinal canal    DJD (degenerative joint disease) of cervical spine    Facet arthropathy, cervical    HNP (herniated nucleus  pulposus), cervical    Lumbar facet arthropathy    Right lumbosacral radiculopathy    Spinal stenosis of lumbar region with neurogenic claudication    Xerosis cutis    Weakness of both upper extremities    Type 2 diabetes mellitus (Multi)    Status post surgery    Shoulder tendinitis, right    Rotator cuff syndrome of right shoulder    Short-term memory loss    Seizure disorder (Multi)    Rosacea, unspecified    PVD (posterior vitreous detachment), left eye    Psychological factor affecting physical condition    Poikiloderma of [place]    Panic attacks    Palpitations    Pain disorder associated with psychological and physical factors    Other melanin hyperpigmentation    Occipital neuralgia    Neoplasm of unspecified behavior of bone, soft tissue, and skin    MGD (meibomian gland disease)    Melanocytic nevi of unspecified eyelid, including canthus    Melanocytic nevi of unspecified ear and external auricular canal    Melanocytic nevi of unspecified upper limb, including shoulder    Melanocytic nevi of unspecified part of face    Melanocytic nevi of unspecified lower limb, including hip    Melanocytic nevi of trunk    Melanocytic nevi of scalp and neck    Medial epicondylitis of right elbow    Late effects of CVA (cerebrovascular accident)    Internal impingement of right shoulder    History of colon polyps    Hemangioma of skin and subcutaneous tissue    Benign lipomatous neoplasm of skin and subcutaneous tissue of right arm    Generalized tonic clonic epilepsy (Multi)    Fatigue    Elevated blood sugar    Dyspepsia    DDD (degenerative disc disease), lumbosacral    Carpal tunnel syndrome on right    Atrial myxoma (HHS-HCC)    Other skin changes due to chronic exposure to nonionizing radiation    Other seborrheic keratosis    Erythema intertrigo    Actinic keratosis    Class 1 obesity due to excess calories without serious comorbidity with body mass index (BMI) of 30.0 to 30.9 in adult    Cervical radiculopathy      Past Medical History:   Diagnosis Date    Abnormal ECG 10/26/2023    Sinus rhythm LVH by voltage Inferior infarct, old Anterior Q waves, possibly due to LVH    Angina pectoris (CMS-HCC)     Anxiety     Cervical disc disease     Cervical radiculopathy     Neuro: Arnulfo Reynolds LOV 1/15/24    Depression     Diabetes mellitus (Multi)     A1C: 2/6/24 -7.6%    GERD (gastroesophageal reflux disease)     History of Holter monitoring 10/2023    24 -48 hour monitor on 10/31/23, No abnormal findings    Hypertension     Incisional hernia with obstruction, without gangrene 05/24/2017    Incarcerated incisional hernia    Joint pain     Mastodynia 05/14/2020    Breast pain in female    Palpitations     Stress test scheduled for 2/9/24    PONV (postoperative nausea and vomiting)     Seizure disorder (Multi)     Last seizure 2/2023    TIA (transient ischemic attack)     Several years ago, no residual symptoms    Vertigo     Vision loss      Past Surgical History:   Procedure Laterality Date    CARPAL TUNNEL RELEASE Right     CATARACT EXTRACTION      CHOLECYSTECTOMY  11/14/2014    Cholecystectomy    COLONOSCOPY  05/17/2018    Complete Colonoscopy    CT CHEST WO IV CONTRAST  04/21/2023    No acute intrathoracic abnormalities. No thoracic aortic aneurysm or subintimal hematoma.    ECHOCARDIOGRAM 2 D M MODE PANEL  02/17/2023    Left ventricular systolic function is normal with a 60-65% estimated ejection fraction.    HERNIA REPAIR  05/24/2017    Hernia Repair    MANDIBLE SURGERY Bilateral     OTHER SURGICAL HISTORY  11/14/2014    Surgery Intracardiac Mass Removal Left Atrial Myxoma    OTHER SURGICAL HISTORY      Xiphoidectomy    STERNOTOMY  2011     Social History     Tobacco Use    Smoking status: Never    Smokeless tobacco: Never   Substance Use Topics    Alcohol use: Yes     family history includes Cancer in her father, mother, and sister; Diabetes in her maternal grandfather and sister; Glaucoma in her father; Heart attack in  her father; Heart disease in her father; Hypertension in her father and mother; Macular degeneration in her father.    Current Outpatient Medications:     calcium carbonate-vitamin D3 600 mg-10 mcg (400 unit) chewable tablet, Chew 1 tablet 2 times a day., Disp: , Rfl:     FLUoxetine (PROzac) 40 mg capsule, TAKE 1 CAPSULE (40 MG) BY MOUTH ONCE DAILY. TO START AFTER COMPLETING 10MG AND 20MG DOSES, Disp: 90 capsule, Rfl: 1    lamoTRIgine (LaMICtal) 100 mg tablet, Take 1 tablet by mouth in the Morning  and 1.5 tablets by mouth at bedtime, Disp: 225 tablet, Rfl: 1    lisinopriL-hydrochlorothiazide 10-12.5 mg tablet, TAKE 1 TABLET BY MOUTH EVERY DAY, Disp: 90 tablet, Rfl: 2    metFORMIN  mg 24 hr tablet, TAKE 1 TABLET BY MOUTH EVERY DAY WITH EVENING MEAL, Disp: 90 tablet, Rfl: 1    multivitamin tablet, Take 1 tablet by mouth once daily., Disp: , Rfl:     nitroglycerin (Nitrostat) 0.4 mg SL tablet, Place 1 tablet (0.4 mg) under the tongue every 5 minutes if needed (FOR UP TO 3 DOSES AS NEEDED FOR CHEST PAIN.CALL 911 IF PAIN PERSISTS.)., Disp: , Rfl:     omeprazole (PriLOSEC) 40 mg DR capsule, TAKE 1 CAPSULE (40 MG) BY MOUTH ONCE DAILY. DO NOT CRUSH OR CHEW., Disp: 90 capsule, Rfl: 1    OneTouch Delica Plus Lancet 30 gauge misc, USE TO TEST ONCE DAILY, Disp: 90 each, Rfl: 1    OneTouch Verio test strips strip, USE TO TEST ONCE DAILY, Disp: 50 strip, Rfl: 2    oxyCODONE-acetaminophen (Percocet) 7.5-325 mg tablet, Take 1 tablet by mouth every 4 hours if needed for severe pain (7 - 10)., Disp: 180 tablet, Rfl: 0    acetaminophen (Tylenol) 325 mg tablet, Take 2 tablets (650 mg) by mouth every 4 hours if needed for mild pain (1 - 3). Do Not Take with Home Percocet as it also contains Acetaminophen (Patient not taking: Reported on 9/23/2024), Disp: , Rfl:     hydrOXYzine HCL (Atarax) 25 mg tablet, Take 1 tablet (25 mg) by mouth every 8 hours if needed for anxiety., Disp: 60 tablet, Rfl: 0    lamoTRIgine (LaMICtal) 100 mg  tablet, Take 1.5 tablets (150 mg) by mouth once daily at bedtime., Disp: , Rfl:   Allergies   Allergen Reactions    Tramadol Seizure     seizure while hospitalized. Tolerates Percocet per hx    Amoxicillin Rash    Ampicillin Hives     shakey    Lidocaine Hives, Rash and Nausea/vomiting     patch    Meperidine Hcl Unknown    Tramadol-Acetaminophen Unknown       Physical Examination:    General: Well developed, awake/alert/oriented x3, no distress, alert and cooperative  Skin: Warm and dry, no lesions, no rashes  ENMT: Mucous membranes moist, no apparent injury, no lesions seen  Head/Neck: Neck Supple, no apparent injury  Respiratory/Thorax: Normal breath sounds with good chest expansion, thorax symmetric  Cardiovascular: No pitting edema, no JVD    Motor Strength: 5/5 Throughout all extremities    Muscle Bulk: Normal and symmetric in all extremities    Posture:   -- Cervical: Normal  -- Thoracic: Normal  -- Lumbar : Normal  Paraspinal muscle spasm/tenderness absent.     Sensation: intact to light touch    Cervical neck pain  No radiculopathy    Results:  I personally reviewed and interpreted the imaging results which included CT showing hardware in good position but signs of possible formation of nonunion as there appears to be very little fusion occurring at the top of the disc space at C4-5.    Assessment and Plan:      Cristal Pollard is a 64 y.o. year old female who presents to the spine clinic in follow up with chronic neck pain and intermittent cervical radiculopathy. She has been becoming more active. She will start trying to wean off her medications. Will continue with conservative care and follow up with me in 3 months.       I have reviewed all prior documentation and reviewed the electronic medical record since admission. I have personally have reviewed all advanced imaging not just the reports and used my interpretation as documented as the relevant findings. I have reviewed the risks and benefits of all  treatment recommendations listed in this note with the patient and family. I spent a total of 45 minutes in service to this patient's care during this date of service.      The above clinical summary has been dictated with voice recognition software. It has not been proofread for grammatical errors, typographical mistakes, or other semantic inconsistencies.    Thank you for visiting our office today. It was our pleasure to take part in your healthcare.     Do not hesitate to call with any questions regarding your plan of care after leaving at [phone] M-F 8am-4pm.     To clinicians, thank you very much for this kind referral. It is a privilege to partner with you in the care of your patients. My office would be delighted to assist you with any further consultations or with questions regarding the plan of care outlined. Do not hesitate to call the office or contact me directly.       Sincerely,      Arnulfo Reynolds MD, [facility]  Spine , [facility]  Qasim Bardales Chair in Spinal Neurosurgery  Complex Spine Surgery Fellowship Director   of Neurological Surgery  [employer] [employer] [employer]  Phone: [phone]  Fax: [phone]        Scribe Attestation  By signing my name below, I, Tavia Tianna , Scrjennifer   attest that this documentation has been prepared under the direction and in the presence of Arnulfo Reynolds MD.

## 2024-10-18 DIAGNOSIS — M54.12 CERVICAL RADICULOPATHY DUE TO TRAUMA: ICD-10-CM

## 2024-10-18 DIAGNOSIS — G89.29 CHRONIC NECK PAIN: ICD-10-CM

## 2024-10-18 DIAGNOSIS — M54.2 CHRONIC NECK PAIN: ICD-10-CM

## 2024-10-18 RX ORDER — OXYCODONE AND ACETAMINOPHEN 7.5; 325 MG/1; MG/1
1 TABLET ORAL EVERY 4 HOURS PRN
Qty: 180 TABLET | Refills: 0 | Status: SHIPPED | OUTPATIENT
Start: 2024-10-18 | End: 2024-11-17

## 2024-11-20 DIAGNOSIS — M54.12 CERVICAL RADICULOPATHY DUE TO TRAUMA: ICD-10-CM

## 2024-11-20 DIAGNOSIS — G89.29 CHRONIC NECK PAIN: ICD-10-CM

## 2024-11-20 DIAGNOSIS — M54.2 CHRONIC NECK PAIN: ICD-10-CM

## 2024-11-20 RX ORDER — OXYCODONE AND ACETAMINOPHEN 7.5; 325 MG/1; MG/1
1 TABLET ORAL EVERY 4 HOURS PRN
Qty: 180 TABLET | Refills: 0 | Status: SHIPPED | OUTPATIENT
Start: 2024-11-20 | End: 2024-12-20

## 2024-11-25 ENCOUNTER — APPOINTMENT (OUTPATIENT)
Dept: PRIMARY CARE | Facility: CLINIC | Age: 64
End: 2024-11-25
Payer: COMMERCIAL

## 2024-11-26 ENCOUNTER — APPOINTMENT (OUTPATIENT)
Dept: NEUROLOGY | Facility: HOSPITAL | Age: 64
End: 2024-11-26
Payer: COMMERCIAL

## 2024-12-02 ENCOUNTER — OFFICE VISIT (OUTPATIENT)
Facility: CLINIC | Age: 64
End: 2024-12-02
Payer: COMMERCIAL

## 2024-12-02 VITALS
BODY MASS INDEX: 29.99 KG/M2 | TEMPERATURE: 97.7 F | WEIGHT: 180 LBS | SYSTOLIC BLOOD PRESSURE: 130 MMHG | DIASTOLIC BLOOD PRESSURE: 72 MMHG | HEIGHT: 65 IN

## 2024-12-02 DIAGNOSIS — M54.12 CERVICAL RADICULOPATHY: Primary | ICD-10-CM

## 2024-12-02 DIAGNOSIS — Z98.1 S/P CERVICAL SPINAL FUSION: ICD-10-CM

## 2024-12-02 ASSESSMENT — PAIN SCALES - GENERAL: PAINLEVEL_OUTOF10: 3

## 2024-12-02 NOTE — PROGRESS NOTES
[facility] [place]  Department of Neurological Surgery  Established Patient Visit    History of Present Illness:  Cristal Pollard is a 64 y.o. year old female who presents to the spine clinic in follow up with 9-10 months s/p C4-7 ACDF on 2/28/24.    At 5/20/24 visit, since surgery they are are still having neck pain and some intermittent radiculopathy down the right arm in the C6 and C7 distribution. She states that the pain has not resolved since surgery. She is also having some clicking and grinding and cracking when she moves her head. Her x-rays show hardware in good position.    Since her last visit, she has now recovered and is doing very well today. She has 75% resolution of her symptoms. She has minimal neck and arm pain. She gets stiffness and soreness when overexerting herself.    Patient's BMI is Body mass index is 29.95 kg/m².    Diabetic: yes   Diabetes Composite Score: 3   Values used to calculate this score:    Points  Metrics       1        Blood Pressure: 130/72                Prescribed Statins: No LDL information on file       1        Hemoglobin A1c: 7.2%       1        Smokes Tobacco: No       0        Prescribed Aspirin: No      Osteoporosis: no  No DXA results found for the past 12 months    Review of Systems:  14/14 systems reviewed and negative other than what is listed in the history of present illness    Patient Active Problem List   Diagnosis    Anxiety disorder    Depression, recurrent (CMS-HCC)    Depressive personality disorder    Neck strain    Post traumatic stress disorder (PTSD)    Whiplash injury to neck    Stress reaction, chronic    Benign paroxysmal positional vertigo due to bilateral vestibular disorder    Chronic neck pain    Cervical radiculopathy due to trauma    Bulging of cervical intervertebral disc    Cervical paraspinous muscle spasm    Bilateral dry eyes    Soft tissue mass    Grief reaction    Hypercholesteremia    Hypertension    Insomnia     Vestibular dysfunction of both ears    Visual changes    Vitamin D deficiency    Chest pain    Headache    Central stenosis of spinal canal    DJD (degenerative joint disease) of cervical spine    Facet arthropathy, cervical    HNP (herniated nucleus pulposus), cervical    Lumbar facet arthropathy    Right lumbosacral radiculopathy    Spinal stenosis of lumbar region with neurogenic claudication    Xerosis cutis    Weakness of both upper extremities    Type 2 diabetes mellitus    Status post surgery    Shoulder tendinitis, right    Rotator cuff syndrome of right shoulder    Short-term memory loss    Seizure disorder (Multi)    Rosacea, unspecified    PVD (posterior vitreous detachment), left eye    Psychological factor affecting physical condition    Poikiloderma of [place]    Panic attacks    Palpitations    Pain disorder associated with psychological and physical factors    Other melanin hyperpigmentation    Occipital neuralgia    Neoplasm of unspecified behavior of bone, soft tissue, and skin    MGD (meibomian gland disease)    Melanocytic nevi of unspecified eyelid, including canthus    Melanocytic nevi of unspecified ear and external auricular canal    Melanocytic nevi of unspecified upper limb, including shoulder    Melanocytic nevi of unspecified part of face    Melanocytic nevi of unspecified lower limb, including hip    Melanocytic nevi of trunk    Melanocytic nevi of scalp and neck    Medial epicondylitis of right elbow    Late effects of CVA (cerebrovascular accident)    Internal impingement of right shoulder    History of colon polyps    Hemangioma of skin and subcutaneous tissue    Benign lipomatous neoplasm of skin and subcutaneous tissue of right arm    Generalized tonic clonic epilepsy (Multi)    Fatigue    Elevated blood sugar    Dyspepsia    DDD (degenerative disc disease), lumbosacral    Carpal tunnel syndrome on right    Atrial myxoma (HHS-HCC)    Other skin changes due to chronic exposure to  nonionizing radiation    Other seborrheic keratosis    Erythema intertrigo    Actinic keratosis    Class 1 obesity due to excess calories without serious comorbidity with body mass index (BMI) of 30.0 to 30.9 in adult    Cervical radiculopathy     Past Medical History:   Diagnosis Date    Abnormal ECG 10/26/2023    Sinus rhythm LVH by voltage Inferior infarct, old Anterior Q waves, possibly due to LVH    Angina pectoris     Anxiety     Cervical disc disease     Cervical radiculopathy     Neuro: Arnulfo Reynolds LOV 1/15/24    Depression     Diabetes mellitus (Multi)     A1C: 2/6/24 -7.6%    GERD (gastroesophageal reflux disease)     History of Holter monitoring 10/2023    24 -48 hour monitor on 10/31/23, No abnormal findings    Hypertension     Incisional hernia with obstruction, without gangrene 05/24/2017    Incarcerated incisional hernia    Joint pain     Mastodynia 05/14/2020    Breast pain in female    Palpitations     Stress test scheduled for 2/9/24    PONV (postoperative nausea and vomiting)     Seizure disorder (Multi)     Last seizure 2/2023    TIA (transient ischemic attack)     Several years ago, no residual symptoms    Vertigo     Vision loss      Past Surgical History:   Procedure Laterality Date    CARPAL TUNNEL RELEASE Right     CATARACT EXTRACTION      CHOLECYSTECTOMY  11/14/2014    Cholecystectomy    COLONOSCOPY  05/17/2018    Complete Colonoscopy    CT CHEST WO IV CONTRAST  04/21/2023    No acute intrathoracic abnormalities. No thoracic aortic aneurysm or subintimal hematoma.    ECHOCARDIOGRAM 2 D M MODE PANEL  02/17/2023    Left ventricular systolic function is normal with a 60-65% estimated ejection fraction.    HERNIA REPAIR  05/24/2017    Hernia Repair    MANDIBLE SURGERY Bilateral     OTHER SURGICAL HISTORY  11/14/2014    Surgery Intracardiac Mass Removal Left Atrial Myxoma    OTHER SURGICAL HISTORY      Xiphoidectomy    STERNOTOMY  2011     Social History     Tobacco Use    Smoking status:  Never    Smokeless tobacco: Never   Substance Use Topics    Alcohol use: Yes     family history includes Cancer in her father, mother, and sister; Diabetes in her maternal grandfather and sister; Glaucoma in her father; Heart attack in her father; Heart disease in her father; Hypertension in her father and mother; Macular degeneration in her father.    Current Outpatient Medications:     calcium carbonate-vitamin D3 600 mg-10 mcg (400 unit) chewable tablet, Chew 1 tablet 2 times a day., Disp: , Rfl:     FLUoxetine (PROzac) 40 mg capsule, TAKE 1 CAPSULE (40 MG) BY MOUTH ONCE DAILY. TO START AFTER COMPLETING 10MG AND 20MG DOSES, Disp: 90 capsule, Rfl: 1    lamoTRIgine (LaMICtal) 100 mg tablet, Take 1.5 tablets (150 mg) by mouth once daily at bedtime., Disp: , Rfl:     lamoTRIgine (LaMICtal) 100 mg tablet, Take 1 tablet by mouth in the Morning  and 1.5 tablets by mouth at bedtime, Disp: 225 tablet, Rfl: 1    lisinopriL-hydrochlorothiazide 10-12.5 mg tablet, TAKE 1 TABLET BY MOUTH EVERY DAY, Disp: 90 tablet, Rfl: 2    metFORMIN  mg 24 hr tablet, TAKE 1 TABLET BY MOUTH EVERY DAY WITH EVENING MEAL, Disp: 90 tablet, Rfl: 1    multivitamin tablet, Take 1 tablet by mouth once daily., Disp: , Rfl:     nitroglycerin (Nitrostat) 0.4 mg SL tablet, Place 1 tablet (0.4 mg) under the tongue every 5 minutes if needed (FOR UP TO 3 DOSES AS NEEDED FOR CHEST PAIN.CALL 911 IF PAIN PERSISTS.)., Disp: , Rfl:     omeprazole (PriLOSEC) 40 mg DR capsule, TAKE 1 CAPSULE (40 MG) BY MOUTH ONCE DAILY. DO NOT CRUSH OR CHEW., Disp: 90 capsule, Rfl: 1    OneTouch Delica Plus Lancet 30 gauge misc, USE TO TEST ONCE DAILY, Disp: 90 each, Rfl: 1    OneTouch Verio test strips strip, USE TO TEST ONCE DAILY, Disp: 50 strip, Rfl: 2    oxyCODONE-acetaminophen (Percocet) 7.5-325 mg tablet, Take 1 tablet by mouth every 4 hours if needed for severe pain (7 - 10)., Disp: 180 tablet, Rfl: 0    acetaminophen (Tylenol) 325 mg tablet, Take 2 tablets (650 mg)  by mouth every 4 hours if needed for mild pain (1 - 3). Do Not Take with Home Percocet as it also contains Acetaminophen (Patient not taking: Reported on 12/2/2024), Disp: , Rfl:     hydrOXYzine HCL (Atarax) 25 mg tablet, Take 1 tablet (25 mg) by mouth every 8 hours if needed for anxiety., Disp: 60 tablet, Rfl: 0  Allergies   Allergen Reactions    Tramadol Seizure     seizure while hospitalized. Tolerates Percocet per hx    Amoxicillin Rash    Ampicillin Hives     shakey    Lidocaine Hives, Rash and Nausea/vomiting     patch    Meperidine Hcl Unknown    Tramadol-Acetaminophen Unknown       Physical Examination:    General: Well developed, awake/alert/oriented x3, no distress, alert and cooperative  Skin: Warm and dry, no lesions, no rashes  ENMT: Mucous membranes moist, no apparent injury, no lesions seen  Head/Neck: Neck Supple, no apparent injury  Respiratory/Thorax: Normal breath sounds with good chest expansion, thorax symmetric  Cardiovascular: No pitting edema, no JVD    Motor Strength: 5/5 Throughout all extremities    Muscle Bulk: Normal and symmetric in all extremities    Posture:   -- Cervical: Normal  -- Thoracic: Normal  -- Lumbar : Normal  Paraspinal muscle spasm/tenderness absent.     Sensation: intact to light touch      Results:  I personally reviewed and interpreted the imaging results which included no new imaging.    Assessment and Plan:      Cristal Pollard is a 64 y.o. year old female who presents to the spine clinic in follow up with 9-10 months s/p C4-7 ACDF on 2/28/24. Since her last visit, she has now recovered and is doing very well today. She has 75% resolution of her symptoms. She has minimal neck and arm pain. She gets stiffness and soreness when overexerting herself. At this time, she is healing well. I will order XR and she will follow up as needed.       I have reviewed all prior documentation and reviewed the electronic medical record since admission. I have personally have reviewed all  advanced imaging not just the reports and used my interpretation as documented as the relevant findings. I have reviewed the risks and benefits of all treatment recommendations listed in this note with the patient and family.       The above clinical summary has been dictated with voice recognition software. It has not been proofread for grammatical errors, typographical mistakes, or other semantic inconsistencies.    Thank you for visiting our office today. It was our pleasure to take part in your healthcare.     Do not hesitate to call with any questions regarding your plan of care after leaving at [phone] M-F 8am-4pm.     To clinicians, thank you very much for this kind referral. It is a privilege to partner with you in the care of your patients. My office would be delighted to assist you with any further consultations or with questions regarding the plan of care outlined. Do not hesitate to call the office or contact me directly.       Sincerely,      Arnulfo Reynolds MD, [facility]  Spine , [facility]  Qasim Bardales Chair in Spinal Neurosurgery  Complex Spine Surgery Fellowship Director   of Neurological Surgery  [facility] [employer]  Phone: [phone]  Fax: [phone]        Scribe Attestation  By signing my name below, I, Ivy Colin   attest that this documentation has been prepared under the direction and in the presence of Arnulfo Reynolds MD.

## 2024-12-04 DIAGNOSIS — E11.9 TYPE 2 DIABETES MELLITUS WITHOUT COMPLICATIONS (MULTI): ICD-10-CM

## 2024-12-04 RX ORDER — BLOOD-GLUCOSE METER
EACH MISCELLANEOUS
Qty: 100 STRIP | Refills: 1 | Status: SHIPPED | OUTPATIENT
Start: 2024-12-04

## 2024-12-07 DIAGNOSIS — F41.9 ANXIETY DISORDER, UNSPECIFIED TYPE: ICD-10-CM

## 2024-12-07 DIAGNOSIS — F33.1 MODERATE EPISODE OF RECURRENT MAJOR DEPRESSIVE DISORDER: ICD-10-CM

## 2024-12-07 RX ORDER — FLUOXETINE HYDROCHLORIDE 40 MG/1
40 CAPSULE ORAL DAILY
Qty: 90 CAPSULE | Refills: 1 | Status: SHIPPED | OUTPATIENT
Start: 2024-12-07 | End: 2025-06-05

## 2024-12-10 ENCOUNTER — APPOINTMENT (OUTPATIENT)
Dept: PRIMARY CARE | Facility: CLINIC | Age: 64
End: 2024-12-10
Payer: COMMERCIAL

## 2024-12-13 ENCOUNTER — APPOINTMENT (OUTPATIENT)
Dept: PRIMARY CARE | Facility: CLINIC | Age: 64
End: 2024-12-13
Payer: COMMERCIAL

## 2024-12-18 ENCOUNTER — APPOINTMENT (OUTPATIENT)
Dept: PRIMARY CARE | Facility: CLINIC | Age: 64
End: 2024-12-18
Payer: COMMERCIAL

## 2024-12-18 VITALS
DIASTOLIC BLOOD PRESSURE: 70 MMHG | OXYGEN SATURATION: 96 % | SYSTOLIC BLOOD PRESSURE: 128 MMHG | HEIGHT: 65 IN | HEART RATE: 83 BPM | BODY MASS INDEX: 29.99 KG/M2 | WEIGHT: 180 LBS

## 2024-12-18 DIAGNOSIS — F43.89 STRESS REACTION, CHRONIC: ICD-10-CM

## 2024-12-18 DIAGNOSIS — R53.83 OTHER FATIGUE: Chronic | ICD-10-CM

## 2024-12-18 DIAGNOSIS — G89.29 CHRONIC NECK PAIN: ICD-10-CM

## 2024-12-18 DIAGNOSIS — E11.9 TYPE 2 DIABETES MELLITUS WITHOUT COMPLICATION, WITHOUT LONG-TERM CURRENT USE OF INSULIN (MULTI): Chronic | ICD-10-CM

## 2024-12-18 DIAGNOSIS — M54.12 CERVICAL RADICULOPATHY DUE TO TRAUMA: ICD-10-CM

## 2024-12-18 DIAGNOSIS — Z12.31 BREAST CANCER SCREENING BY MAMMOGRAM: ICD-10-CM

## 2024-12-18 DIAGNOSIS — M54.2 CHRONIC NECK PAIN: ICD-10-CM

## 2024-12-18 DIAGNOSIS — E55.9 VITAMIN D DEFICIENCY: Primary | Chronic | ICD-10-CM

## 2024-12-18 DIAGNOSIS — F41.9 ANXIETY DISORDER, UNSPECIFIED TYPE: Chronic | ICD-10-CM

## 2024-12-18 DIAGNOSIS — I10 PRIMARY HYPERTENSION: Chronic | ICD-10-CM

## 2024-12-18 DIAGNOSIS — Z13.29 THYROID DISORDER SCREENING: ICD-10-CM

## 2024-12-18 DIAGNOSIS — E61.1 LOW IRON: ICD-10-CM

## 2024-12-18 DIAGNOSIS — F33.1 MODERATE EPISODE OF RECURRENT MAJOR DEPRESSIVE DISORDER: ICD-10-CM

## 2024-12-18 DIAGNOSIS — G40.309 GENERALIZED TONIC CLONIC EPILEPSY (MULTI): ICD-10-CM

## 2024-12-18 LAB
25(OH)D3 SERPL-MCNC: 26 NG/ML (ref 30–100)
ALBUMIN SERPL BCP-MCNC: 4.3 G/DL (ref 3.4–5)
ALP SERPL-CCNC: 55 U/L (ref 33–136)
ALT SERPL W P-5'-P-CCNC: 20 U/L (ref 7–45)
ANION GAP SERPL CALC-SCNC: 17 MMOL/L (ref 10–20)
AST SERPL W P-5'-P-CCNC: 16 U/L (ref 9–39)
BASOPHILS # BLD AUTO: 0.09 X10*3/UL (ref 0–0.1)
BASOPHILS NFR BLD AUTO: 1.1 %
BILIRUB SERPL-MCNC: 0.4 MG/DL (ref 0–1.2)
BUN SERPL-MCNC: 15 MG/DL (ref 6–23)
CALCIUM SERPL-MCNC: 9.6 MG/DL (ref 8.6–10.6)
CHLORIDE SERPL-SCNC: 102 MMOL/L (ref 98–107)
CO2 SERPL-SCNC: 22 MMOL/L (ref 21–32)
CORTIS SERPL-MCNC: 10 UG/DL (ref 2.5–20)
CREAT SERPL-MCNC: 0.76 MG/DL (ref 0.5–1.05)
EGFRCR SERPLBLD CKD-EPI 2021: 87 ML/MIN/1.73M*2
EOSINOPHIL # BLD AUTO: 0.12 X10*3/UL (ref 0–0.7)
EOSINOPHIL NFR BLD AUTO: 1.5 %
ERYTHROCYTE [DISTWIDTH] IN BLOOD BY AUTOMATED COUNT: 13 % (ref 11.5–14.5)
GLUCOSE SERPL-MCNC: 241 MG/DL (ref 74–99)
HBA1C MFR BLD: 7 % (ref 4.2–6.5)
HCT VFR BLD AUTO: 42.9 % (ref 36–46)
HGB BLD-MCNC: 13.8 G/DL (ref 12–16)
IMM GRANULOCYTES # BLD AUTO: 0.04 X10*3/UL (ref 0–0.7)
IMM GRANULOCYTES NFR BLD AUTO: 0.5 % (ref 0–0.9)
IRON SATN MFR SERPL: 26 % (ref 25–45)
IRON SERPL-MCNC: 90 UG/DL (ref 35–150)
LYMPHOCYTES # BLD AUTO: 2.78 X10*3/UL (ref 1.2–4.8)
LYMPHOCYTES NFR BLD AUTO: 35 %
MCH RBC QN AUTO: 30.6 PG (ref 26–34)
MCHC RBC AUTO-ENTMCNC: 32.2 G/DL (ref 32–36)
MCV RBC AUTO: 95 FL (ref 80–100)
MONOCYTES # BLD AUTO: 0.42 X10*3/UL (ref 0.1–1)
MONOCYTES NFR BLD AUTO: 5.3 %
NEUTROPHILS # BLD AUTO: 4.5 X10*3/UL (ref 1.2–7.7)
NEUTROPHILS NFR BLD AUTO: 56.6 %
NRBC BLD-RTO: 0 /100 WBCS (ref 0–0)
PLATELET # BLD AUTO: 260 X10*3/UL (ref 150–450)
POTASSIUM SERPL-SCNC: 3.7 MMOL/L (ref 3.5–5.3)
PROT SERPL-MCNC: 6.9 G/DL (ref 6.4–8.2)
RBC # BLD AUTO: 4.51 X10*6/UL (ref 4–5.2)
SODIUM SERPL-SCNC: 137 MMOL/L (ref 136–145)
TIBC SERPL-MCNC: 348 UG/DL (ref 240–445)
TSH SERPL-ACNC: 2.55 MIU/L (ref 0.44–3.98)
UIBC SERPL-MCNC: 258 UG/DL (ref 110–370)
WBC # BLD AUTO: 8 X10*3/UL (ref 4.4–11.3)

## 2024-12-18 PROCEDURE — 3078F DIAST BP <80 MM HG: CPT | Performed by: STUDENT IN AN ORGANIZED HEALTH CARE EDUCATION/TRAINING PROGRAM

## 2024-12-18 PROCEDURE — 1160F RVW MEDS BY RX/DR IN RCRD: CPT | Performed by: STUDENT IN AN ORGANIZED HEALTH CARE EDUCATION/TRAINING PROGRAM

## 2024-12-18 PROCEDURE — 82306 VITAMIN D 25 HYDROXY: CPT

## 2024-12-18 PROCEDURE — 3074F SYST BP LT 130 MM HG: CPT | Performed by: STUDENT IN AN ORGANIZED HEALTH CARE EDUCATION/TRAINING PROGRAM

## 2024-12-18 PROCEDURE — 3051F HG A1C>EQUAL 7.0%<8.0%: CPT | Performed by: STUDENT IN AN ORGANIZED HEALTH CARE EDUCATION/TRAINING PROGRAM

## 2024-12-18 PROCEDURE — G2211 COMPLEX E/M VISIT ADD ON: HCPCS | Performed by: STUDENT IN AN ORGANIZED HEALTH CARE EDUCATION/TRAINING PROGRAM

## 2024-12-18 PROCEDURE — 3008F BODY MASS INDEX DOCD: CPT | Performed by: STUDENT IN AN ORGANIZED HEALTH CARE EDUCATION/TRAINING PROGRAM

## 2024-12-18 PROCEDURE — 83540 ASSAY OF IRON: CPT

## 2024-12-18 PROCEDURE — 80053 COMPREHEN METABOLIC PANEL: CPT

## 2024-12-18 PROCEDURE — 82533 TOTAL CORTISOL: CPT

## 2024-12-18 PROCEDURE — 85025 COMPLETE CBC W/AUTO DIFF WBC: CPT

## 2024-12-18 PROCEDURE — 83036 HEMOGLOBIN GLYCOSYLATED A1C: CPT | Mod: CLIA WAIVED TEST | Performed by: STUDENT IN AN ORGANIZED HEALTH CARE EDUCATION/TRAINING PROGRAM

## 2024-12-18 PROCEDURE — 83550 IRON BINDING TEST: CPT

## 2024-12-18 PROCEDURE — 99214 OFFICE O/P EST MOD 30 MIN: CPT | Performed by: STUDENT IN AN ORGANIZED HEALTH CARE EDUCATION/TRAINING PROGRAM

## 2024-12-18 PROCEDURE — 1159F MED LIST DOCD IN RCRD: CPT | Performed by: STUDENT IN AN ORGANIZED HEALTH CARE EDUCATION/TRAINING PROGRAM

## 2024-12-18 PROCEDURE — 84443 ASSAY THYROID STIM HORMONE: CPT

## 2024-12-18 ASSESSMENT — PATIENT HEALTH QUESTIONNAIRE - PHQ9
1. LITTLE INTEREST OR PLEASURE IN DOING THINGS: NOT AT ALL
SUM OF ALL RESPONSES TO PHQ9 QUESTIONS 1 AND 2: 0
2. FEELING DOWN, DEPRESSED OR HOPELESS: NOT AT ALL

## 2024-12-18 NOTE — PROGRESS NOTES
"Subjective   Patient ID: Cristal Pollard is a 64 y.o. female who presents for Follow-up (Follow up /A1C/Low energy levels- interested in checking hormonal levels or vitamin D levels ).  Reports multiple \"huge\" stress events lately regarding family members.     States she saw a counselor in July and it was not a good fit. Wants to find someone who is specialized for her needs. Stopped trying due to frustration. Willing to try again.     Is on disability.     Wants to check cortisol and vitamin d levels. Is concerned this is affecting her fatigue. Reports hx of vitamin d deficiency.     Reports started a cortisol supplement (AD health) and an organic multivitamin (contains vitamin d 1000 international units.)    Taking an art class for herself, enjoying this.     Working on stretching out her percocet to 5 hours. Doing well with this. Thinking she may want to try lowering dose to 5mg next visit.    Last A1c 7.2%. Reports taking her metformin as prescribed.      No other concerns today.                 Review of Systems   Constitutional:  Positive for fatigue.   Respiratory: Negative.     Cardiovascular: Negative.    Gastrointestinal: Negative.    Musculoskeletal:  Positive for arthralgias, neck pain and neck stiffness.   Neurological:  Positive for numbness.   Psychiatric/Behavioral:  Positive for dysphoric mood and sleep disturbance. The patient is nervous/anxious.    All other systems reviewed and are negative.      Objective   Physical Exam  Vitals reviewed.   Constitutional:       General: She is not in acute distress.     Appearance: Normal appearance.   Eyes:      Pupils: Pupils are equal, round, and reactive to light.   Cardiovascular:      Rate and Rhythm: Normal rate and regular rhythm.   Pulmonary:      Effort: Pulmonary effort is normal. No respiratory distress.   Musculoskeletal:         General: Normal range of motion.   Skin:     General: Skin is warm and dry.   Neurological:      Mental Status: She is " alert. Mental status is at baseline.   Psychiatric:         Mood and Affect: Mood normal.         Behavior: Behavior normal.         Body mass index is 29.95 kg/m².      Current Outpatient Medications:     acetaminophen (Tylenol) 325 mg tablet, Take 2 tablets (650 mg) by mouth every 4 hours if needed for mild pain (1 - 3). Do Not Take with Home Percocet as it also contains Acetaminophen (Patient not taking: Reported on 12/2/2024), Disp: , Rfl:     calcium carbonate-vitamin D3 600 mg-10 mcg (400 unit) chewable tablet, Chew 1 tablet 2 times a day., Disp: , Rfl:     FLUoxetine (PROzac) 40 mg capsule, TAKE 1 CAPSULE (40 MG) BY MOUTH ONCE DAILY. TO START AFTER COMPLETING 10MG AND 20MG DOSES, Disp: 90 capsule, Rfl: 1    hydrOXYzine HCL (Atarax) 25 mg tablet, Take 1 tablet (25 mg) by mouth every 8 hours if needed for anxiety., Disp: 60 tablet, Rfl: 0    lamoTRIgine (LaMICtal) 100 mg tablet, Take 1.5 tablets (150 mg) by mouth once daily at bedtime., Disp: , Rfl:     lamoTRIgine (LaMICtal) 100 mg tablet, Take 1 tablet by mouth in the Morning  and 1.5 tablets by mouth at bedtime, Disp: 225 tablet, Rfl: 1    lisinopriL-hydrochlorothiazide 10-12.5 mg tablet, TAKE 1 TABLET BY MOUTH EVERY DAY, Disp: 90 tablet, Rfl: 2    metFORMIN  mg 24 hr tablet, TAKE 1 TABLET BY MOUTH EVERY DAY WITH EVENING MEAL, Disp: 90 tablet, Rfl: 1    multivitamin tablet, Take 1 tablet by mouth once daily., Disp: , Rfl:     nitroglycerin (Nitrostat) 0.4 mg SL tablet, Place 1 tablet (0.4 mg) under the tongue every 5 minutes if needed (FOR UP TO 3 DOSES AS NEEDED FOR CHEST PAIN.CALL 911 IF PAIN PERSISTS.)., Disp: , Rfl:     omeprazole (PriLOSEC) 40 mg DR capsule, TAKE 1 CAPSULE (40 MG) BY MOUTH ONCE DAILY. DO NOT CRUSH OR CHEW., Disp: 90 capsule, Rfl: 1    OneTouch Delica Plus Lancet 30 gauge misc, USE TO TEST ONCE DAILY, Disp: 90 each, Rfl: 1    OneTouch Verio test strips strip, USE TO TEST ONCE DAILY, Disp: 100 strip, Rfl: 1     oxyCODONE-acetaminophen (Percocet) 7.5-325 mg tablet, Take 1 tablet by mouth every 4 hours if needed for severe pain (7 - 10)., Disp: 180 tablet, Rfl: 0    Assessment/Plan   Diagnoses and all orders for this visit:  Vitamin D deficiency  Comments:  started taking a supplement for this, contains 1000 IU daily  check levels  Orders:  -     Vitamin D 25-Hydroxy,Total (for eval of Vitamin D levels)  Type 2 diabetes mellitus without complication, without long-term current use of insulin (Multi)  Comments:  a1c improved from 7.2 t0 7.0  congratulated her progress  continue metformin  Orders:  -     POCT Glycosylated Hemoglobin (HGB A1C) docked device  Anxiety disorder, unspecified type  Comments:  strongly encouraged seeing counselor, has had difficulty finding one  stable on 40mg fluoxetine  consider increasing  wants to check labs first  Moderate episode of recurrent major depressive disorder  Stress reaction, chronic  -     Cortisol  Primary hypertension  Comments:  stable, well controlled  Chronic neck pain  Cervical radiculopathy due to trauma  Generalized tonic clonic epilepsy (Multi)  Thyroid disorder screening  -     TSH with reflex to Free T4 if abnormal  Other fatigue  Comments:  suspect multifactorial and related to chronic illness, chronic pain, anxiety, stress, and diabetes  Orders:  -     Iron and TIBC  -     CBC and Auto Differential  -     Comprehensive metabolic panel  Breast cancer screening by mammogram  -     BI mammo bilateral screening tomosynthesis; Future  Low iron  -     Iron and TIBC  Other orders  -     POCT GLYCOSYLATED HEMOGLOBIN (HGB A1C)    Discussed necessity of keeping her 3 month apointments       Jessica Diehl,  12/19/24 12:58 PM

## 2024-12-19 ASSESSMENT — ENCOUNTER SYMPTOMS
NECK STIFFNESS: 1
NERVOUS/ANXIOUS: 1
GASTROINTESTINAL NEGATIVE: 1
FATIGUE: 1
NUMBNESS: 1
DYSPHORIC MOOD: 1
CARDIOVASCULAR NEGATIVE: 1
SLEEP DISTURBANCE: 1
RESPIRATORY NEGATIVE: 1
NECK PAIN: 1
ARTHRALGIAS: 1

## 2024-12-30 DIAGNOSIS — M54.12 CERVICAL RADICULOPATHY DUE TO TRAUMA: ICD-10-CM

## 2024-12-30 DIAGNOSIS — M54.2 CHRONIC NECK PAIN: Primary | ICD-10-CM

## 2024-12-30 DIAGNOSIS — G89.29 CHRONIC NECK PAIN: Primary | ICD-10-CM

## 2024-12-30 RX ORDER — OXYCODONE AND ACETAMINOPHEN 7.5; 325 MG/1; MG/1
1 TABLET ORAL EVERY 4 HOURS PRN
Qty: 180 TABLET | Refills: 0 | Status: SHIPPED | OUTPATIENT
Start: 2024-12-30 | End: 2025-01-29

## 2025-01-25 DIAGNOSIS — G40.909 SEIZURE DISORDER (MULTI): ICD-10-CM

## 2025-01-25 DIAGNOSIS — E11.9 TYPE 2 DIABETES MELLITUS WITHOUT COMPLICATIONS (MULTI): ICD-10-CM

## 2025-01-25 RX ORDER — METFORMIN HYDROCHLORIDE 500 MG/1
500 TABLET, EXTENDED RELEASE ORAL
Qty: 90 TABLET | Refills: 1 | Status: SHIPPED | OUTPATIENT
Start: 2025-01-25

## 2025-01-25 RX ORDER — LAMOTRIGINE 100 MG/1
TABLET ORAL
Qty: 225 TABLET | Refills: 1 | Status: SHIPPED | OUTPATIENT
Start: 2025-01-25

## 2025-02-03 DIAGNOSIS — M54.2 CHRONIC NECK PAIN: ICD-10-CM

## 2025-02-03 DIAGNOSIS — G89.29 CHRONIC NECK PAIN: ICD-10-CM

## 2025-02-03 DIAGNOSIS — M54.12 CERVICAL RADICULOPATHY DUE TO TRAUMA: ICD-10-CM

## 2025-02-03 RX ORDER — OXYCODONE AND ACETAMINOPHEN 7.5; 325 MG/1; MG/1
1 TABLET ORAL EVERY 4 HOURS PRN
Qty: 180 TABLET | Refills: 0 | Status: SHIPPED | OUTPATIENT
Start: 2025-02-03 | End: 2025-03-05

## 2025-02-06 ENCOUNTER — APPOINTMENT (OUTPATIENT)
Dept: ORTHOPEDIC SURGERY | Facility: CLINIC | Age: 65
End: 2025-02-06
Payer: COMMERCIAL

## 2025-02-10 ENCOUNTER — APPOINTMENT (OUTPATIENT)
Dept: PRIMARY CARE | Facility: CLINIC | Age: 65
End: 2025-02-10
Payer: COMMERCIAL

## 2025-02-11 ENCOUNTER — OFFICE VISIT (OUTPATIENT)
Dept: ORTHOPEDIC SURGERY | Facility: CLINIC | Age: 65
End: 2025-02-11
Payer: COMMERCIAL

## 2025-02-11 ENCOUNTER — HOSPITAL ENCOUNTER (OUTPATIENT)
Dept: RADIOLOGY | Facility: CLINIC | Age: 65
Discharge: HOME | End: 2025-02-11
Payer: COMMERCIAL

## 2025-02-11 VITALS — WEIGHT: 175 LBS | BODY MASS INDEX: 29.16 KG/M2 | HEIGHT: 65 IN

## 2025-02-11 DIAGNOSIS — G89.29 CHRONIC RIGHT HIP PAIN: ICD-10-CM

## 2025-02-11 DIAGNOSIS — M70.61 TROCHANTERIC BURSITIS OF RIGHT HIP: Primary | ICD-10-CM

## 2025-02-11 DIAGNOSIS — M25.551 CHRONIC RIGHT HIP PAIN: ICD-10-CM

## 2025-02-11 PROCEDURE — 73502 X-RAY EXAM HIP UNI 2-3 VIEWS: CPT | Mod: RT

## 2025-02-11 PROCEDURE — 99214 OFFICE O/P EST MOD 30 MIN: CPT | Mod: 25 | Performed by: STUDENT IN AN ORGANIZED HEALTH CARE EDUCATION/TRAINING PROGRAM

## 2025-02-11 PROCEDURE — 99214 OFFICE O/P EST MOD 30 MIN: CPT | Performed by: STUDENT IN AN ORGANIZED HEALTH CARE EDUCATION/TRAINING PROGRAM

## 2025-02-11 PROCEDURE — 1036F TOBACCO NON-USER: CPT | Performed by: STUDENT IN AN ORGANIZED HEALTH CARE EDUCATION/TRAINING PROGRAM

## 2025-02-11 PROCEDURE — 20610 DRAIN/INJ JOINT/BURSA W/O US: CPT | Mod: RT | Performed by: STUDENT IN AN ORGANIZED HEALTH CARE EDUCATION/TRAINING PROGRAM

## 2025-02-11 PROCEDURE — 3008F BODY MASS INDEX DOCD: CPT | Performed by: STUDENT IN AN ORGANIZED HEALTH CARE EDUCATION/TRAINING PROGRAM

## 2025-02-11 PROCEDURE — 2500000004 HC RX 250 GENERAL PHARMACY W/ HCPCS (ALT 636 FOR OP/ED): Performed by: STUDENT IN AN ORGANIZED HEALTH CARE EDUCATION/TRAINING PROGRAM

## 2025-02-11 RX ORDER — TRIAMCINOLONE ACETONIDE 40 MG/ML
1 INJECTION, SUSPENSION INTRA-ARTICULAR; INTRAMUSCULAR
Status: COMPLETED | OUTPATIENT
Start: 2025-02-11 | End: 2025-02-11

## 2025-02-11 RX ADMIN — TRIAMCINOLONE ACETONIDE 1 ML: 40 INJECTION, SUSPENSION INTRA-ARTICULAR; INTRAMUSCULAR at 18:42

## 2025-02-11 NOTE — PROGRESS NOTES
Patient ID: Cristal Pollard is a 64 y.o. female.    L Inj/Asp: R greater trochanteric bursa on 2/11/2025 6:42 PM  Indications: pain  Details: 22 G (Spinal Needle) needle, lateral approach  Medications: 1 mL triamcinolone acetonide 40 mg/mL      Greater Trochanteric bursitis cortisone injection procedure note:  Discussion:  I discussed the conservative treatment options for Greater Trochanteric Bursitis including but not limited to physical therapy, oral NSAIDS, activity and lifestyle modification, and corticosteroid injections. Pt has elected to try a cortisone injection today. I have explained the risk and benefits of an injection including the possibility of joint infection, bleeding, damage to cartilage, allergic reaction. Patient verbalized understanding and gave verbal consent wishes to proceed with a intraarticular cortisone injection for their peribursal area. We discussed the risks and benefits and potential morbidity related to the treatment, and to the prescription medication administered in the injection    Procedure    With the patient's informed verbal consent, the right hip greater trochanteric area was prepped in standard sterile fashion with Chlorhexidine in a sidelying position. The skin was then anesthetized with ethyl chloride spray and cleaned again with Chlorhexidine. The peritrochanteric soft tissue was then injected with a prefilled spinal needle syringe of 80 mg Kenalog +8 ml Lidocaine without complications.  The patient tolerated this well and felt immediate initial relief of symptoms. A bandaid was applied and the patient ambulated out of the clinic on ther own accord without difficulty. Patient should contact the office if any signs of of infection appear: redness, fever, chills, drainage, swelling or warmth to the knees.  Pt understands that the injections can be repeated no sooner than 3 months.      Procedure, treatment alternatives, risks and benefits explained, specific risks discussed.  Consent was given by the patient. Immediately prior to procedure a time out was called to verify the correct patient, procedure, equipment, support staff and site/side marked as required. Patient was prepped and draped in the usual sterile fashion.

## 2025-02-11 NOTE — PROGRESS NOTES
Department of Orthopaedic Surgery  Division of Adult Reconstruction    Chief Complaint: Right lateral hip pain    HPI:  Cristal Pollard is a pleasant 64 y.o. year-old female who is seen today for evaluation of right hip pain.  Patient reports pain in the lateral aspect of the right hip.  It is aggravated by trying to sleep on that side.  There is also painful with ambulation.  She does not report pain with the usual intra-articular hip provocative activities like putting on shoes and socks and getting in and out of cars.  She denies pain deep in the groin.  In addition to this, she has been working through severe cervical radiculopathy after a trauma.  She also has lumbar stenosis and radicular symptoms.      Review of Symptoms:  The patient denies any fever, chills, chest pain, shortness of breath or difficulty breathing.      Adult patient history sheet was filled out by the patient today in clinic and will be scanned into the EMR.  I personally reviewed this form which will be scanned into the EMR.  This includes Past Medical History, Past Surgical History, Medications, Allergies, Social History, Family History and 12 point review of systems.    Past Medical History:    Past Medical History:   Diagnosis Date    Abnormal ECG 10/26/2023    Sinus rhythm LVH by voltage Inferior infarct, old Anterior Q waves, possibly due to LVH    Angina pectoris     Anxiety     Cervical disc disease     Cervical radiculopathy     Neuro: Arnulfo PEREZ 1/15/24    Depression     Diabetes mellitus (Multi)     A1C: 2/6/24 -7.6%    GERD (gastroesophageal reflux disease)     History of Holter monitoring 10/2023    24 -48 hour monitor on 10/31/23, No abnormal findings    Hypertension     Incisional hernia with obstruction, without gangrene 05/24/2017    Incarcerated incisional hernia    Joint pain     Mastodynia 05/14/2020    Breast pain in female    Palpitations     Stress test scheduled for 2/9/24    PONV (postoperative nausea and  vomiting)     Seizure disorder (Multi)     Last seizure 2/2023    TIA (transient ischemic attack)     Several years ago, no residual symptoms    Vertigo     Vision loss        Past Surgical History:    Past Surgical History:   Procedure Laterality Date    CARPAL TUNNEL RELEASE Right     CATARACT EXTRACTION      CHOLECYSTECTOMY  11/14/2014    Cholecystectomy    COLONOSCOPY  05/17/2018    Complete Colonoscopy    CT CHEST WO IV CONTRAST  04/21/2023    No acute intrathoracic abnormalities. No thoracic aortic aneurysm or subintimal hematoma.    ECHOCARDIOGRAM 2 D M MODE PANEL  02/17/2023    Left ventricular systolic function is normal with a 60-65% estimated ejection fraction.    HERNIA REPAIR  05/24/2017    Hernia Repair    MANDIBLE SURGERY Bilateral     OTHER SURGICAL HISTORY  11/14/2014    Surgery Intracardiac Mass Removal Left Atrial Myxoma    OTHER SURGICAL HISTORY      Xiphoidectomy    STERNOTOMY  2011       Allergies:    Allergies   Allergen Reactions    Tramadol Seizure     seizure while hospitalized. Tolerates Percocet per hx    Amoxicillin Rash    Ampicillin Hives     shakey    Lidocaine Hives, Rash and Nausea/vomiting     patch    Meperidine Hcl Unknown    Tramadol-Acetaminophen Unknown       Medications:    Current Outpatient Medications on File Prior to Visit   Medication Sig Dispense Refill    acetaminophen (Tylenol) 325 mg tablet Take 2 tablets (650 mg) by mouth every 4 hours if needed for mild pain (1 - 3). Do Not Take with Home Percocet as it also contains Acetaminophen (Patient not taking: Reported on 12/2/2024)      calcium carbonate-vitamin D3 600 mg-10 mcg (400 unit) chewable tablet Chew 1 tablet 2 times a day.      FLUoxetine (PROzac) 40 mg capsule TAKE 1 CAPSULE (40 MG) BY MOUTH ONCE DAILY. TO START AFTER COMPLETING 10MG AND 20MG DOSES 90 capsule 1    hydrOXYzine HCL (Atarax) 25 mg tablet Take 1 tablet (25 mg) by mouth every 8 hours if needed for anxiety. 60 tablet 0    lamoTRIgine (LaMICtal) 100  mg tablet Take 1.5 tablets (150 mg) by mouth once daily at bedtime.      lamoTRIgine (LaMICtal) 100 mg tablet TAKE 1 TABLET BY MOUTH IN THE MORNING AND 1.5 TABLETS BY MOUTH AT BEDTIME 225 tablet 1    lisinopriL-hydrochlorothiazide 10-12.5 mg tablet TAKE 1 TABLET BY MOUTH EVERY DAY 90 tablet 2    metFORMIN  mg 24 hr tablet TAKE 1 TABLET BY MOUTH EVERY DAY WITH EVENING MEAL 90 tablet 1    multivitamin tablet Take 1 tablet by mouth once daily.      nitroglycerin (Nitrostat) 0.4 mg SL tablet Place 1 tablet (0.4 mg) under the tongue every 5 minutes if needed (FOR UP TO 3 DOSES AS NEEDED FOR CHEST PAIN.CALL 911 IF PAIN PERSISTS.).      omeprazole (PriLOSEC) 40 mg DR capsule TAKE 1 CAPSULE (40 MG) BY MOUTH ONCE DAILY. DO NOT CRUSH OR CHEW. 90 capsule 1    OneTouch Delica Plus Lancet 30 gauge misc USE TO TEST ONCE DAILY 90 each 1    OneTouch Verio test strips strip USE TO TEST ONCE DAILY 100 strip 1    oxyCODONE-acetaminophen (Percocet) 7.5-325 mg tablet Take 1 tablet by mouth every 4 hours if needed for severe pain (7 - 10). 180 tablet 0     No current facility-administered medications on file prior to visit.       Social History:    Social History     Occupational History    Not on file   Tobacco Use    Smoking status: Never    Smokeless tobacco: Never   Vaping Use    Vaping status: Never Used   Substance and Sexual Activity    Alcohol use: Yes     Comment: RARELY    Drug use: Never    Sexual activity: Defer       Family History:    Family History   Problem Relation Name Age of Onset    Cancer Mother      Hypertension Mother      Heart disease Father      Cancer Father      Hypertension Father      Glaucoma Father      Macular degeneration Father      Heart attack Father      Cancer Sister      Diabetes Sister      Diabetes Maternal Grandfather         Exam:  General: Well-appearing female in no acute distress.  Awake, alert and oriented.  Pleasant and cooperative.  Respiratory: Non-labored breathing  Mood: Euthymic    Gait: Normal  Assistive Device: None     Affected Right Hip  Range of motion: Painless, smooth range of motion.  No pain with flexion internal rotation.  Flexion: 110  Internal Rotation: 20  External Rotation: 40  Hip Flexor Strength: 5/5  Abductor Strength: 5/5  Adductor Strength: 5/5  Tenderness: Pain to palpation over the greater trochanter  Sensation: Intact to light touch distally  Motor function: Able to fire TA, EHL, G/S  Pulses: Palpable DP pulse    Imaging interpretation:   X-rays of the right hip demonstrate mild degenerative changes with very mild acetabular protrusio.    Assessment and Plan:   64-year-old female with right lateral hip pain.  We discussed the different pain generators from the hip including intra-articular, lumbar spine, and trochanteric bursitis.  Her symptoms do correlate best with trochanteric bursitis.  We discussed attempting a injection in clinic today but will both be diagnostic and hopefully therapeutic.  She wished to proceed with the injection.  She tolerated well.  She will return to clinic on a needed basis.    Chris Masterson MD  Department of Orthopaedic Surgery  Division of Adult Reconstruction  , [facility]

## 2025-02-18 ENCOUNTER — TELEPHONE (OUTPATIENT)
Dept: ORTHOPEDIC SURGERY | Facility: CLINIC | Age: 65
End: 2025-02-18
Payer: COMMERCIAL

## 2025-02-18 NOTE — TELEPHONE ENCOUNTER
Pt would like you to call her when its convenient for you. She is in extreme pain after the shot. I did make her a follow up for Thursday,but she would like to talk to you in regards to her pain    [phone]

## 2025-02-20 ENCOUNTER — OFFICE VISIT (OUTPATIENT)
Dept: ORTHOPEDIC SURGERY | Facility: CLINIC | Age: 65
End: 2025-02-20
Payer: COMMERCIAL

## 2025-02-20 DIAGNOSIS — G89.29 CHRONIC RIGHT HIP PAIN: ICD-10-CM

## 2025-02-20 DIAGNOSIS — M25.551 CHRONIC RIGHT HIP PAIN: ICD-10-CM

## 2025-02-20 DIAGNOSIS — M70.61 TROCHANTERIC BURSITIS OF RIGHT HIP: ICD-10-CM

## 2025-02-20 DIAGNOSIS — M48.062 LUMBAR STENOSIS WITH NEUROGENIC CLAUDICATION: Primary | ICD-10-CM

## 2025-02-20 PROCEDURE — 99213 OFFICE O/P EST LOW 20 MIN: CPT | Performed by: STUDENT IN AN ORGANIZED HEALTH CARE EDUCATION/TRAINING PROGRAM

## 2025-02-20 NOTE — PROGRESS NOTES
Orthopaedic Surgery Progress Note    Chief complaint: Right lateral hip pain, right lower extremity pain    Subjective:    returns to clinic today about 1 week after a corticosteroid injection at the right trochanteric bursa.  Patient states that she has done very poorly since the injection.  Her pain is worse.  She characterizes the pain as both local at the trochanteric bursa and also radiating down the thigh with a burning and numb character.  She denies fevers and chills.  She denies erythema at the injection site.    Objective:  Exam of the right hip demonstrates no pain with flexion and internal rotation of the hip joint.  She continues to have pain to palpation at the greater trochanter.  There is a small bruise at the injection site but no erythema, no swelling, no induration.    A/P:   64-year-old female with pain about the right hip girdle radiating down the right lower extremity.  For subjective today was making sure she did not have an infection at the injection site, and there is no evidence that an infection is present.  As we talked about at last visit I believe there are a few etiologies to her discomfort.  She has lumbar spondylosis with apparent radicular symptoms in the right lower extremity.  There is also a component of trochanteric bursitis, which seems to have not improved at all with the injection.  Exam for intra-articular pathology today was benign, I do not think is the major source of her discomfort.  Patient has had poor responses to corticosteroid injection and other invasive interventions in the past.  Discussed that it is unfortunate that her pain seemed to worsen after the injection, but this also provide some diagnostic information as well, and in that regard, her complaint in terms of location and character today is much more in line with a lumbar spine and radicular etiology.  I offered a referral to pain medicine and she was agreed with that plan.    Chris Masterson,  MD  Department of Orthopaedic Surgery  Division of Adult Reconstruction  , [facility]

## 2025-02-21 ENCOUNTER — TELEPHONE (OUTPATIENT)
Dept: PRIMARY CARE | Facility: CLINIC | Age: 65
End: 2025-02-21
Payer: COMMERCIAL

## 2025-02-21 ENCOUNTER — TELEPHONE (OUTPATIENT)
Dept: NEUROLOGY | Facility: CLINIC | Age: 65
End: 2025-02-21
Payer: COMMERCIAL

## 2025-02-21 NOTE — TELEPHONE ENCOUNTER
Cristal left a vm stating she went to see her ortho about her hip and he told her to take aleve and she wanted to know if this was ok to take with her other pain medications?

## 2025-02-27 ENCOUNTER — APPOINTMENT (OUTPATIENT)
Dept: ORTHOPEDIC SURGERY | Facility: HOSPITAL | Age: 65
End: 2025-02-27
Payer: COMMERCIAL

## 2025-02-27 ENCOUNTER — HOSPITAL ENCOUNTER (OUTPATIENT)
Facility: HOSPITAL | Age: 65
Setting detail: OBSERVATION
LOS: 1 days | Discharge: ANOTHER HOSPITAL | End: 2025-03-02
Attending: STUDENT IN AN ORGANIZED HEALTH CARE EDUCATION/TRAINING PROGRAM | Admitting: STUDENT IN AN ORGANIZED HEALTH CARE EDUCATION/TRAINING PROGRAM
Payer: COMMERCIAL

## 2025-02-27 ENCOUNTER — APPOINTMENT (OUTPATIENT)
Dept: RADIOLOGY | Facility: HOSPITAL | Age: 65
End: 2025-02-27
Payer: COMMERCIAL

## 2025-02-27 DIAGNOSIS — M25.551 RIGHT HIP PAIN: Primary | ICD-10-CM

## 2025-02-27 DIAGNOSIS — M54.50 LOW BACK PAIN, UNSPECIFIED BACK PAIN LATERALITY, UNSPECIFIED CHRONICITY, UNSPECIFIED WHETHER SCIATICA PRESENT: ICD-10-CM

## 2025-02-27 PROBLEM — G89.29 CHRONIC RIGHT HIP PAIN: Status: ACTIVE | Noted: 2025-02-27

## 2025-02-27 PROBLEM — E11.9 DIABETES MELLITUS (MULTI): Status: ACTIVE | Noted: 2025-02-27

## 2025-02-27 PROBLEM — R26.2 DIFFICULTY IN WALKING: Status: ACTIVE | Noted: 2025-02-27

## 2025-02-27 LAB
ALBUMIN SERPL BCP-MCNC: 4.2 G/DL (ref 3.4–5)
ALP SERPL-CCNC: 52 U/L (ref 33–136)
ALT SERPL W P-5'-P-CCNC: 20 U/L (ref 7–45)
ANION GAP SERPL CALC-SCNC: 13 MMOL/L (ref 10–20)
AST SERPL W P-5'-P-CCNC: 16 U/L (ref 9–39)
BASOPHILS # BLD AUTO: 0.07 X10*3/UL (ref 0–0.1)
BASOPHILS NFR BLD AUTO: 0.7 %
BILIRUB SERPL-MCNC: 0.5 MG/DL (ref 0–1.2)
BUN SERPL-MCNC: 17 MG/DL (ref 6–23)
CALCIUM SERPL-MCNC: 9.6 MG/DL (ref 8.6–10.3)
CHLORIDE SERPL-SCNC: 106 MMOL/L (ref 98–107)
CO2 SERPL-SCNC: 23 MMOL/L (ref 21–32)
CREAT SERPL-MCNC: 0.7 MG/DL (ref 0.5–1.05)
EGFRCR SERPLBLD CKD-EPI 2021: >90 ML/MIN/1.73M*2
EOSINOPHIL # BLD AUTO: 0.08 X10*3/UL (ref 0–0.7)
EOSINOPHIL NFR BLD AUTO: 0.8 %
ERYTHROCYTE [DISTWIDTH] IN BLOOD BY AUTOMATED COUNT: 12.6 % (ref 11.5–14.5)
ERYTHROCYTE [SEDIMENTATION RATE] IN BLOOD BY WESTERGREN METHOD: 9 MM/H (ref 0–30)
GLUCOSE BLD MANUAL STRIP-MCNC: 169 MG/DL (ref 74–99)
GLUCOSE SERPL-MCNC: 134 MG/DL (ref 74–99)
HCT VFR BLD AUTO: 42.3 % (ref 36–46)
HGB BLD-MCNC: 14.3 G/DL (ref 12–16)
IMM GRANULOCYTES # BLD AUTO: 0.06 X10*3/UL (ref 0–0.7)
IMM GRANULOCYTES NFR BLD AUTO: 0.6 % (ref 0–0.9)
LYMPHOCYTES # BLD AUTO: 2.52 X10*3/UL (ref 1.2–4.8)
LYMPHOCYTES NFR BLD AUTO: 25.7 %
MCH RBC QN AUTO: 30.7 PG (ref 26–34)
MCHC RBC AUTO-ENTMCNC: 33.8 G/DL (ref 32–36)
MCV RBC AUTO: 91 FL (ref 80–100)
MONOCYTES # BLD AUTO: 0.52 X10*3/UL (ref 0.1–1)
MONOCYTES NFR BLD AUTO: 5.3 %
NEUTROPHILS # BLD AUTO: 6.57 X10*3/UL (ref 1.2–7.7)
NEUTROPHILS NFR BLD AUTO: 66.9 %
NRBC BLD-RTO: 0 /100 WBCS (ref 0–0)
PLATELET # BLD AUTO: 243 X10*3/UL (ref 150–450)
POTASSIUM SERPL-SCNC: 3.6 MMOL/L (ref 3.5–5.3)
PROT SERPL-MCNC: 6.8 G/DL (ref 6.4–8.2)
RBC # BLD AUTO: 4.66 X10*6/UL (ref 4–5.2)
SODIUM SERPL-SCNC: 138 MMOL/L (ref 136–145)
WBC # BLD AUTO: 9.8 X10*3/UL (ref 4.4–11.3)

## 2025-02-27 PROCEDURE — 85652 RBC SED RATE AUTOMATED: CPT

## 2025-02-27 PROCEDURE — 85025 COMPLETE CBC W/AUTO DIFF WBC: CPT

## 2025-02-27 PROCEDURE — G0378 HOSPITAL OBSERVATION PER HR: HCPCS

## 2025-02-27 PROCEDURE — 36415 COLL VENOUS BLD VENIPUNCTURE: CPT

## 2025-02-27 PROCEDURE — 82947 ASSAY GLUCOSE BLOOD QUANT: CPT

## 2025-02-27 PROCEDURE — 84075 ASSAY ALKALINE PHOSPHATASE: CPT

## 2025-02-27 PROCEDURE — 99285 EMERGENCY DEPT VISIT HI MDM: CPT | Mod: 25 | Performed by: STUDENT IN AN ORGANIZED HEALTH CARE EDUCATION/TRAINING PROGRAM

## 2025-02-27 PROCEDURE — 2500000001 HC RX 250 WO HCPCS SELF ADMINISTERED DRUGS (ALT 637 FOR MEDICARE OP): Performed by: PHYSICIAN ASSISTANT

## 2025-02-27 PROCEDURE — 2500000004 HC RX 250 GENERAL PHARMACY W/ HCPCS (ALT 636 FOR OP/ED)

## 2025-02-27 PROCEDURE — 73700 CT LOWER EXTREMITY W/O DYE: CPT | Mod: RT

## 2025-02-27 PROCEDURE — 72131 CT LUMBAR SPINE W/O DYE: CPT | Performed by: RADIOLOGY

## 2025-02-27 PROCEDURE — 96374 THER/PROPH/DIAG INJ IV PUSH: CPT

## 2025-02-27 PROCEDURE — 96375 TX/PRO/DX INJ NEW DRUG ADDON: CPT

## 2025-02-27 PROCEDURE — 2500000001 HC RX 250 WO HCPCS SELF ADMINISTERED DRUGS (ALT 637 FOR MEDICARE OP)

## 2025-02-27 PROCEDURE — 2500000004 HC RX 250 GENERAL PHARMACY W/ HCPCS (ALT 636 FOR OP/ED): Mod: JZ | Performed by: PHYSICIAN ASSISTANT

## 2025-02-27 PROCEDURE — 72131 CT LUMBAR SPINE W/O DYE: CPT

## 2025-02-27 PROCEDURE — 72192 CT PELVIS W/O DYE: CPT

## 2025-02-27 PROCEDURE — 73700 CT LOWER EXTREMITY W/O DYE: CPT | Performed by: RADIOLOGY

## 2025-02-27 PROCEDURE — 96372 THER/PROPH/DIAG INJ SC/IM: CPT | Performed by: PHYSICIAN ASSISTANT

## 2025-02-27 PROCEDURE — 72192 CT PELVIS W/O DYE: CPT | Performed by: RADIOLOGY

## 2025-02-27 RX ORDER — DEXTROSE 50 % IN WATER (D50W) INTRAVENOUS SYRINGE
25
Status: DISCONTINUED | OUTPATIENT
Start: 2025-02-27 | End: 2025-03-02 | Stop reason: HOSPADM

## 2025-02-27 RX ORDER — LAMOTRIGINE 100 MG/1
100 TABLET ORAL EVERY MORNING
Status: DISCONTINUED | OUTPATIENT
Start: 2025-02-28 | End: 2025-03-02 | Stop reason: HOSPADM

## 2025-02-27 RX ORDER — HYDROMORPHONE HYDROCHLORIDE 1 MG/ML
1 INJECTION, SOLUTION INTRAMUSCULAR; INTRAVENOUS; SUBCUTANEOUS EVERY 4 HOURS PRN
Status: DISCONTINUED | OUTPATIENT
Start: 2025-02-27 | End: 2025-03-02 | Stop reason: HOSPADM

## 2025-02-27 RX ORDER — ACETAMINOPHEN 325 MG/1
650 TABLET ORAL EVERY 4 HOURS PRN
Status: DISCONTINUED | OUTPATIENT
Start: 2025-02-27 | End: 2025-03-02 | Stop reason: HOSPADM

## 2025-02-27 RX ORDER — INSULIN LISPRO 100 [IU]/ML
0-10 INJECTION, SOLUTION INTRAVENOUS; SUBCUTANEOUS
Status: DISCONTINUED | OUTPATIENT
Start: 2025-02-28 | End: 2025-03-02 | Stop reason: HOSPADM

## 2025-02-27 RX ORDER — PANTOPRAZOLE SODIUM 40 MG/1
40 TABLET, DELAYED RELEASE ORAL
Status: DISCONTINUED | OUTPATIENT
Start: 2025-02-28 | End: 2025-03-02 | Stop reason: HOSPADM

## 2025-02-27 RX ORDER — MORPHINE SULFATE 4 MG/ML
4 INJECTION, SOLUTION INTRAMUSCULAR; INTRAVENOUS ONCE
Status: COMPLETED | OUTPATIENT
Start: 2025-02-27 | End: 2025-02-27

## 2025-02-27 RX ORDER — ACETAMINOPHEN 650 MG/1
650 SUPPOSITORY RECTAL EVERY 4 HOURS PRN
Status: DISCONTINUED | OUTPATIENT
Start: 2025-02-27 | End: 2025-03-02 | Stop reason: HOSPADM

## 2025-02-27 RX ORDER — FLUOXETINE HYDROCHLORIDE 20 MG/1
40 CAPSULE ORAL NIGHTLY
Status: DISCONTINUED | OUTPATIENT
Start: 2025-02-27 | End: 2025-03-02 | Stop reason: HOSPADM

## 2025-02-27 RX ORDER — ONDANSETRON 4 MG/1
4 TABLET, FILM COATED ORAL EVERY 8 HOURS PRN
Status: DISCONTINUED | OUTPATIENT
Start: 2025-02-27 | End: 2025-03-02 | Stop reason: HOSPADM

## 2025-02-27 RX ORDER — HYDROCHLOROTHIAZIDE 12.5 MG/1
12.5 TABLET ORAL DAILY
Status: DISCONTINUED | OUTPATIENT
Start: 2025-02-27 | End: 2025-03-02 | Stop reason: HOSPADM

## 2025-02-27 RX ORDER — LAMOTRIGINE 100 MG/1
150 TABLET ORAL NIGHTLY
Status: DISCONTINUED | OUTPATIENT
Start: 2025-02-27 | End: 2025-03-02 | Stop reason: HOSPADM

## 2025-02-27 RX ORDER — ENOXAPARIN SODIUM 100 MG/ML
40 INJECTION SUBCUTANEOUS EVERY 24 HOURS
Status: DISCONTINUED | OUTPATIENT
Start: 2025-02-27 | End: 2025-03-02 | Stop reason: HOSPADM

## 2025-02-27 RX ORDER — TALC
3 POWDER (GRAM) TOPICAL NIGHTLY PRN
Status: DISCONTINUED | OUTPATIENT
Start: 2025-02-27 | End: 2025-03-02 | Stop reason: HOSPADM

## 2025-02-27 RX ORDER — LISINOPRIL 10 MG/1
10 TABLET ORAL DAILY
Status: DISCONTINUED | OUTPATIENT
Start: 2025-02-27 | End: 2025-03-02 | Stop reason: HOSPADM

## 2025-02-27 RX ORDER — ONDANSETRON HYDROCHLORIDE 2 MG/ML
4 INJECTION, SOLUTION INTRAVENOUS EVERY 8 HOURS PRN
Status: DISCONTINUED | OUTPATIENT
Start: 2025-02-27 | End: 2025-03-02 | Stop reason: HOSPADM

## 2025-02-27 RX ORDER — DEXTROSE 50 % IN WATER (D50W) INTRAVENOUS SYRINGE
12.5
Status: DISCONTINUED | OUTPATIENT
Start: 2025-02-27 | End: 2025-03-02 | Stop reason: HOSPADM

## 2025-02-27 RX ORDER — ACETAMINOPHEN 160 MG/5ML
650 SOLUTION ORAL EVERY 4 HOURS PRN
Status: DISCONTINUED | OUTPATIENT
Start: 2025-02-27 | End: 2025-03-02 | Stop reason: HOSPADM

## 2025-02-27 RX ORDER — NAPROXEN 250 MG/1
375 TABLET ORAL ONCE
Status: COMPLETED | OUTPATIENT
Start: 2025-02-27 | End: 2025-02-27

## 2025-02-27 RX ADMIN — HYDROCHLOROTHIAZIDE 12.5 MG: 12.5 TABLET ORAL at 21:11

## 2025-02-27 RX ADMIN — FLUOXETINE HYDROCHLORIDE 40 MG: 20 CAPSULE ORAL at 21:12

## 2025-02-27 RX ADMIN — MORPHINE SULFATE 4 MG: 4 INJECTION, SOLUTION INTRAMUSCULAR; INTRAVENOUS at 16:27

## 2025-02-27 RX ADMIN — HYDROMORPHONE HYDROCHLORIDE 1 MG: 1 INJECTION, SOLUTION INTRAMUSCULAR; INTRAVENOUS; SUBCUTANEOUS at 21:12

## 2025-02-27 RX ADMIN — LAMOTRIGINE 150 MG: 100 TABLET ORAL at 21:12

## 2025-02-27 RX ADMIN — ENOXAPARIN SODIUM 40 MG: 40 INJECTION SUBCUTANEOUS at 21:11

## 2025-02-27 RX ADMIN — NAPROXEN 375 MG: 250 TABLET ORAL at 16:26

## 2025-02-27 RX ADMIN — LISINOPRIL 10 MG: 10 TABLET ORAL at 21:12

## 2025-02-27 SDOH — SOCIAL STABILITY: SOCIAL INSECURITY: HAS ANYONE EVER THREATENED TO HURT YOUR FAMILY OR YOUR PETS?: NO

## 2025-02-27 SDOH — SOCIAL STABILITY: SOCIAL INSECURITY: WITHIN THE LAST YEAR, HAVE YOU BEEN AFRAID OF YOUR PARTNER OR EX-PARTNER?: NO

## 2025-02-27 SDOH — ECONOMIC STABILITY: HOUSING INSECURITY: IN THE PAST 12 MONTHS, HOW MANY TIMES HAVE YOU MOVED WHERE YOU WERE LIVING?: 0

## 2025-02-27 SDOH — ECONOMIC STABILITY: HOUSING INSECURITY: AT ANY TIME IN THE PAST 12 MONTHS, WERE YOU HOMELESS OR LIVING IN A SHELTER (INCLUDING NOW)?: NO

## 2025-02-27 SDOH — ECONOMIC STABILITY: INCOME INSECURITY: IN THE PAST 12 MONTHS HAS THE ELECTRIC, GAS, OIL, OR WATER COMPANY THREATENED TO SHUT OFF SERVICES IN YOUR HOME?: NO

## 2025-02-27 SDOH — ECONOMIC STABILITY: FOOD INSECURITY: WITHIN THE PAST 12 MONTHS, THE FOOD YOU BOUGHT JUST DIDN'T LAST AND YOU DIDN'T HAVE MONEY TO GET MORE.: NEVER TRUE

## 2025-02-27 SDOH — ECONOMIC STABILITY: TRANSPORTATION INSECURITY: IN THE PAST 12 MONTHS, HAS LACK OF TRANSPORTATION KEPT YOU FROM MEDICAL APPOINTMENTS OR FROM GETTING MEDICATIONS?: NO

## 2025-02-27 SDOH — SOCIAL STABILITY: SOCIAL INSECURITY: DO YOU FEEL ANYONE HAS EXPLOITED OR TAKEN ADVANTAGE OF YOU FINANCIALLY OR OF YOUR PERSONAL PROPERTY?: NO

## 2025-02-27 SDOH — SOCIAL STABILITY: SOCIAL INSECURITY: WITHIN THE LAST YEAR, HAVE YOU BEEN HUMILIATED OR EMOTIONALLY ABUSED IN OTHER WAYS BY YOUR PARTNER OR EX-PARTNER?: NO

## 2025-02-27 SDOH — SOCIAL STABILITY: SOCIAL INSECURITY: DOES ANYONE TRY TO KEEP YOU FROM HAVING/CONTACTING OTHER FRIENDS OR DOING THINGS OUTSIDE YOUR HOME?: NO

## 2025-02-27 SDOH — SOCIAL STABILITY: SOCIAL INSECURITY
WITHIN THE LAST YEAR, HAVE YOU BEEN RAPED OR FORCED TO HAVE ANY KIND OF SEXUAL ACTIVITY BY YOUR PARTNER OR EX-PARTNER?: NO

## 2025-02-27 SDOH — SOCIAL STABILITY: SOCIAL INSECURITY: ABUSE: ADULT

## 2025-02-27 SDOH — ECONOMIC STABILITY: HOUSING INSECURITY: IN THE LAST 12 MONTHS, WAS THERE A TIME WHEN YOU WERE NOT ABLE TO PAY THE MORTGAGE OR RENT ON TIME?: NO

## 2025-02-27 SDOH — SOCIAL STABILITY: SOCIAL INSECURITY: WERE YOU ABLE TO COMPLETE ALL THE BEHAVIORAL HEALTH SCREENINGS?: YES

## 2025-02-27 SDOH — ECONOMIC STABILITY: FOOD INSECURITY: WITHIN THE PAST 12 MONTHS, YOU WORRIED THAT YOUR FOOD WOULD RUN OUT BEFORE YOU GOT THE MONEY TO BUY MORE.: NEVER TRUE

## 2025-02-27 SDOH — ECONOMIC STABILITY: FOOD INSECURITY: HOW HARD IS IT FOR YOU TO PAY FOR THE VERY BASICS LIKE FOOD, HOUSING, MEDICAL CARE, AND HEATING?: NOT HARD AT ALL

## 2025-02-27 SDOH — SOCIAL STABILITY: SOCIAL INSECURITY: ARE YOU OR HAVE YOU BEEN THREATENED OR ABUSED PHYSICALLY, EMOTIONALLY, OR SEXUALLY BY ANYONE?: NO

## 2025-02-27 SDOH — SOCIAL STABILITY: SOCIAL INSECURITY: HAVE YOU HAD THOUGHTS OF HARMING ANYONE ELSE?: NO

## 2025-02-27 SDOH — SOCIAL STABILITY: SOCIAL INSECURITY: DO YOU FEEL UNSAFE GOING BACK TO THE PLACE WHERE YOU ARE LIVING?: NO

## 2025-02-27 SDOH — SOCIAL STABILITY: SOCIAL INSECURITY: HAVE YOU HAD ANY THOUGHTS OF HARMING ANYONE ELSE?: NO

## 2025-02-27 SDOH — SOCIAL STABILITY: SOCIAL INSECURITY: ARE THERE ANY APPARENT SIGNS OF INJURIES/BEHAVIORS THAT COULD BE RELATED TO ABUSE/NEGLECT?: NO

## 2025-02-27 ASSESSMENT — COGNITIVE AND FUNCTIONAL STATUS - GENERAL
MOVING TO AND FROM BED TO CHAIR: A LITTLE
TURNING FROM BACK TO SIDE WHILE IN FLAT BAD: A LITTLE
DRESSING REGULAR LOWER BODY CLOTHING: A LITTLE
TOILETING: A LITTLE
CLIMB 3 TO 5 STEPS WITH RAILING: A LITTLE
HELP NEEDED FOR BATHING: A LITTLE
DAILY ACTIVITIY SCORE: 21
WALKING IN HOSPITAL ROOM: A LITTLE
MOBILITY SCORE: 19
STANDING UP FROM CHAIR USING ARMS: A LITTLE
PATIENT BASELINE BEDBOUND: NO

## 2025-02-27 ASSESSMENT — PAIN SCALES - GENERAL
PAINLEVEL_OUTOF10: 7
PAINLEVEL_OUTOF10: 8

## 2025-02-27 ASSESSMENT — PAIN DESCRIPTION - LOCATION: LOCATION: HIP

## 2025-02-27 ASSESSMENT — ACTIVITIES OF DAILY LIVING (ADL)
BATHING: NEEDS ASSISTANCE
FEEDING YOURSELF: INDEPENDENT
HEARING - RIGHT EAR: FUNCTIONAL
TOILETING: NEEDS ASSISTANCE
ADEQUATE_TO_COMPLETE_ADL: YES
JUDGMENT_ADEQUATE_SAFELY_COMPLETE_DAILY_ACTIVITIES: YES
DRESSING YOURSELF: NEEDS ASSISTANCE
LACK_OF_TRANSPORTATION: NO
LACK_OF_TRANSPORTATION: NO
HEARING - LEFT EAR: FUNCTIONAL
PATIENT'S MEMORY ADEQUATE TO SAFELY COMPLETE DAILY ACTIVITIES?: YES
WALKS IN HOME: NEEDS ASSISTANCE
GROOMING: INDEPENDENT

## 2025-02-27 ASSESSMENT — LIFESTYLE VARIABLES
HOW MANY STANDARD DRINKS CONTAINING ALCOHOL DO YOU HAVE ON A TYPICAL DAY: PATIENT DOES NOT DRINK
HOW OFTEN DO YOU HAVE 6 OR MORE DRINKS ON ONE OCCASION: NEVER
HOW OFTEN DO YOU HAVE A DRINK CONTAINING ALCOHOL: NEVER
AUDIT-C TOTAL SCORE: 0
SKIP TO QUESTIONS 9-10: 1
AUDIT-C TOTAL SCORE: 0

## 2025-02-27 NOTE — ED NOTES
Pt able to walk about 15 ft, but pain became worse and pt states after the walk knee felt tighter. Was relying on walker a lot during the test.     Terence Boyd RN  02/27/25 [id number]

## 2025-02-27 NOTE — ED TRIAGE NOTES
Pt BIBA from home with complaints of worsening right hip, knee and back pain. Pt states received an injection in right hip about a week ago and since then pain has increased.

## 2025-02-27 NOTE — PROGRESS NOTES
Pharmacy Medication History Review    Cristal Pollard is a 64 y.o. female admitted for No Principal Problem: There is no principal problem currently on the Problem List. Please update the Problem List and refresh.. Pharmacy reviewed the patient's [id number] medications and allergies for accuracy.    The list below reflectives the updated PTA list. Please review each medication in order reconciliation for additional clarification and justification.  Prior to Admission medications    Medication Sig Start Date End Date Authorizing Provider   calcium carbonate-vitamin D3 600 mg-10 mcg (400 unit) chewable tablet Chew 1 tablet once daily.   Historical Provider, MD   FLUoxetine (PROzac) 40 mg capsule Take 1 capsule (40 mg) by mouth once daily at bedtime.    Jessica Diehl, DO   lamoTRIgine (LaMICtal) 100 mg tablet Take 1.5 tablets (150 mg) by mouth once daily at bedtime.   Historical Provider, MD   lamoTRIgine (LaMICtal) 100 mg tablet Take 1 tablet (100 mg) by mouth once daily in the morning.   Jessica Diehl DO   lisinopriL-hydrochlorothiazide 10-12.5 mg tablet TAKE 1 TABLET BY MOUTH EVERY DAY   Jessica Diehl, DO   metFORMIN  mg 24 hr tablet Take 1 tablet (500 mg) by mouth once daily at bedtime.   Jessica Diehl DO   multivitamin tablet Take 1 tablet by mouth once daily.   Historical Provider, MD   omeprazole (PriLOSEC) 40 mg DR capsule Take 1 capsule (40 mg) by mouth once daily as needed. Do not crush or chew.   Yves Ruiz MD   oxyCODONE-acetaminophen (Percocet) 7.5-325 mg tablet Take 1 tablet by mouth every 4 hours if needed for severe pain (7 - 10).   Jessica Diehl, DO   nitroglycerin (Nitrostat) 0.4 mg SL tablet Place 1 tablet (0.4 mg) under the tongue every 5 minutes if needed for chest pain (FOR UP TO 3 DOSES AS NEEDED FOR CHEST PAIN.CALL 911 IF PAIN PERSISTS.).   Historical Provider, MD        The list below reflectives the updated allergy list. Please review each documented allergy for  additional clarification and justification.  Allergies  Reviewed by Neal Reynolds LPN on 2/27/2025        Severity Reactions Comments    Tramadol High Seizure seizure while hospitalized. Tolerates Percocet per hx    Amoxicillin Medium Rash     Ampicillin Medium Hives shakey    Lidocaine Medium Hives, Rash, Nausea/vomiting patch    Meperidine Hcl Not Specified Unknown             Below are additional concerns with the patient's PTA list.      Leonarda Pham

## 2025-02-27 NOTE — ED PROVIDER NOTES
HPI   Chief Complaint   Patient presents with    Hip Pain    Knee Pain    Back Pain       HPI  Cristal Pollard is a 64 year old female with a history of T2DM, lumbar stenosis with neurogenic claudication, chronic right hip pain, trochanteric bursitis of right hip, seizures, anxiety presents with right lower back pain, hip, and knee pain. Patient described burning and tingling sensation from right hip down right leg that has become more persistent today. Patient says pain started a week ago after receiving a corticosteroid short during an ortho follow up. Patient says she has not been able to bare weight and walk since today due to pain. She was scheduled to follow up with a spine specialist today and pain management next week. Took percocet this morning with no alleviation of pain. Patient denies incontinence or saddle anesthesia. Patient denies fevers, chills, SOB, chest pain, abdominal pain, urinary/bowel changes.     Patient History   Past Medical History:   Diagnosis Date    Abnormal ECG 10/26/2023    Sinus rhythm LVH by voltage Inferior infarct, old Anterior Q waves, possibly due to LVH    Angina pectoris     Anxiety     Cervical disc disease     Cervical radiculopathy     Neuro: Arnulfo Reynolds LOV 1/15/24    Depression     Diabetes mellitus (Multi)     A1C: 2/6/24 -7.6%    GERD (gastroesophageal reflux disease)     History of Holter monitoring 10/2023    24 -48 hour monitor on 10/31/23, No abnormal findings    Hypertension     Incisional hernia with obstruction, without gangrene 05/24/2017    Incarcerated incisional hernia    Joint pain     Mastodynia 05/14/2020    Breast pain in female    Palpitations     Stress test scheduled for 2/9/24    PONV (postoperative nausea and vomiting)     Seizure disorder (Multi)     Last seizure 2/2023    TIA (transient ischemic attack)     Several years ago, no residual symptoms    Vertigo     Vision loss      Past Surgical History:   Procedure Laterality Date    CARPAL TUNNEL RELEASE  Right     CATARACT EXTRACTION      CHOLECYSTECTOMY  11/14/2014    Cholecystectomy    COLONOSCOPY  05/17/2018    Complete Colonoscopy    CT CHEST WO IV CONTRAST  04/21/2023    No acute intrathoracic abnormalities. No thoracic aortic aneurysm or subintimal hematoma.    ECHOCARDIOGRAM 2 D M MODE PANEL  02/17/2023    Left ventricular systolic function is normal with a 60-65% estimated ejection fraction.    HERNIA REPAIR  05/24/2017    Hernia Repair    MANDIBLE SURGERY Bilateral     OTHER SURGICAL HISTORY  11/14/2014    Surgery Intracardiac Mass Removal Left Atrial Myxoma    OTHER SURGICAL HISTORY      Xiphoidectomy    STERNOTOMY  2011     Family History   Problem Relation Name Age of Onset    Cancer Mother      Hypertension Mother      Heart disease Father      Cancer Father      Hypertension Father      Glaucoma Father      Macular degeneration Father      Heart attack Father      Cancer Sister      Diabetes Sister      Diabetes Maternal Grandfather       Social History     Tobacco Use    Smoking status: Never    Smokeless tobacco: Never   Vaping Use    Vaping status: Never Used   Substance Use Topics    Alcohol use: Yes     Comment: RARELY    Drug use: Never       Physical Exam   ED Triage Vitals [02/27/25 1356]   Temperature Heart Rate Respirations BP   36.4 °C (97.5 °F) 90 18 150/72      Pulse Ox Temp src Heart Rate Source Patient Position   97 % -- -- --      BP Location FiO2 (%)     -- --       Physical Exam:  General:  Pleasant and cooperative. Moderate distress.  HEENT:  Normocephalic, atraumatic  Chest:  Clear to auscultation bilaterally. No wheezes, rales, or rhonchi.  CV:  Regular rate and rhythm. No murmurs    Abdomen: Abdomen is soft, non-tender, non-distended. BS +   Musculoskeletal:  No lower extremity edema or cyanosis. Limited flexion of right hip secondary to pain. No sensory loss of lower extremities observed.  Neurological:  AAOx3. No focal deficits.  Skin:  Warm and dry.    ED Course & MDM   Cristal  Latrice is a 64 year old female with a history of T2DM, lumbar stenosis with neurogenic claudication, chronic right hip pain, trochanteric bursitis of right hip, seizures, anxiety presents with right lower back pain, hip, and knee pain. Ordered CBC, CMP, ESR. CT pelvis, right hip, lumbar spine ordered. CBC, CMP, ESR were unremarkable. CT pelvis/hip showed no acute changes and degenerative changes of bilateral hips, sacroiliac joints, and lower lumbar spine. CT lumbar spine showed no acute fracture or traumatic malalignment. Patient given morphine and naproxen. Attempt was made to see if patient could walk but patient endorsed significant pain. Discussed work up findings with patient. Patient requested to be admitted. Discussed patient was Dr. Mckinley who has agreed to accept her under his care.     Procedure  Procedures     Eric Dsouza DO  Resident  02/27/25 [id number]

## 2025-02-28 ENCOUNTER — APPOINTMENT (OUTPATIENT)
Dept: RADIOLOGY | Facility: HOSPITAL | Age: 65
End: 2025-02-28
Payer: COMMERCIAL

## 2025-02-28 LAB
ANION GAP SERPL CALC-SCNC: 13 MMOL/L (ref 10–20)
BUN SERPL-MCNC: 16 MG/DL (ref 6–23)
CALCIUM SERPL-MCNC: 9.3 MG/DL (ref 8.6–10.3)
CHLORIDE SERPL-SCNC: 105 MMOL/L (ref 98–107)
CO2 SERPL-SCNC: 24 MMOL/L (ref 21–32)
CREAT SERPL-MCNC: 0.74 MG/DL (ref 0.5–1.05)
EGFRCR SERPLBLD CKD-EPI 2021: 90 ML/MIN/1.73M*2
ERYTHROCYTE [DISTWIDTH] IN BLOOD BY AUTOMATED COUNT: 13.1 % (ref 11.5–14.5)
GLUCOSE BLD MANUAL STRIP-MCNC: 135 MG/DL (ref 74–99)
GLUCOSE BLD MANUAL STRIP-MCNC: 151 MG/DL (ref 74–99)
GLUCOSE BLD MANUAL STRIP-MCNC: 173 MG/DL (ref 74–99)
GLUCOSE BLD MANUAL STRIP-MCNC: 222 MG/DL (ref 74–99)
GLUCOSE SERPL-MCNC: 125 MG/DL (ref 74–99)
HCT VFR BLD AUTO: 43.7 % (ref 36–46)
HGB BLD-MCNC: 13.9 G/DL (ref 12–16)
MCH RBC QN AUTO: 30.6 PG (ref 26–34)
MCHC RBC AUTO-ENTMCNC: 31.8 G/DL (ref 32–36)
MCV RBC AUTO: 96 FL (ref 80–100)
NRBC BLD-RTO: 0 /100 WBCS (ref 0–0)
PLATELET # BLD AUTO: 182 X10*3/UL (ref 150–450)
POTASSIUM SERPL-SCNC: 4.1 MMOL/L (ref 3.5–5.3)
RBC # BLD AUTO: 4.54 X10*6/UL (ref 4–5.2)
SODIUM SERPL-SCNC: 138 MMOL/L (ref 136–145)
WBC # BLD AUTO: 9.4 X10*3/UL (ref 4.4–11.3)

## 2025-02-28 PROCEDURE — 85027 COMPLETE CBC AUTOMATED: CPT | Performed by: PHYSICIAN ASSISTANT

## 2025-02-28 PROCEDURE — 96376 TX/PRO/DX INJ SAME DRUG ADON: CPT

## 2025-02-28 PROCEDURE — G0378 HOSPITAL OBSERVATION PER HR: HCPCS

## 2025-02-28 PROCEDURE — 2500000004 HC RX 250 GENERAL PHARMACY W/ HCPCS (ALT 636 FOR OP/ED): Mod: JZ | Performed by: PHYSICIAN ASSISTANT

## 2025-02-28 PROCEDURE — 36415 COLL VENOUS BLD VENIPUNCTURE: CPT | Performed by: PHYSICIAN ASSISTANT

## 2025-02-28 PROCEDURE — 82947 ASSAY GLUCOSE BLOOD QUANT: CPT

## 2025-02-28 PROCEDURE — 99223 1ST HOSP IP/OBS HIGH 75: CPT | Performed by: NURSE PRACTITIONER

## 2025-02-28 PROCEDURE — 99223 1ST HOSP IP/OBS HIGH 75: CPT | Performed by: STUDENT IN AN ORGANIZED HEALTH CARE EDUCATION/TRAINING PROGRAM

## 2025-02-28 PROCEDURE — 96372 THER/PROPH/DIAG INJ SC/IM: CPT | Performed by: PHYSICIAN ASSISTANT

## 2025-02-28 PROCEDURE — 97161 PT EVAL LOW COMPLEX 20 MIN: CPT | Mod: GP

## 2025-02-28 PROCEDURE — 2500000004 HC RX 250 GENERAL PHARMACY W/ HCPCS (ALT 636 FOR OP/ED): Performed by: NURSE PRACTITIONER

## 2025-02-28 PROCEDURE — 80048 BASIC METABOLIC PNL TOTAL CA: CPT | Performed by: PHYSICIAN ASSISTANT

## 2025-02-28 PROCEDURE — 97165 OT EVAL LOW COMPLEX 30 MIN: CPT | Mod: GO

## 2025-02-28 PROCEDURE — 2500000001 HC RX 250 WO HCPCS SELF ADMINISTERED DRUGS (ALT 637 FOR MEDICARE OP): Performed by: PHYSICIAN ASSISTANT

## 2025-02-28 RX ORDER — METHYLPREDNISOLONE 4 MG/1
4 TABLET ORAL ONCE
Status: DISCONTINUED | OUTPATIENT
Start: 2025-03-05 | End: 2025-03-02 | Stop reason: HOSPADM

## 2025-02-28 RX ORDER — METHYLPREDNISOLONE 4 MG/1
8 TABLET ORAL ONCE
Status: DISCONTINUED | OUTPATIENT
Start: 2025-03-04 | End: 2025-03-02 | Stop reason: HOSPADM

## 2025-02-28 RX ORDER — METHYLPREDNISOLONE 4 MG/1
16 TABLET ORAL ONCE
Status: DISCONTINUED | OUTPATIENT
Start: 2025-03-02 | End: 2025-03-02 | Stop reason: HOSPADM

## 2025-02-28 RX ORDER — METHYLPREDNISOLONE 4 MG/1
24 TABLET ORAL ONCE
Status: COMPLETED | OUTPATIENT
Start: 2025-02-28 | End: 2025-02-28

## 2025-02-28 RX ORDER — METHYLPREDNISOLONE 4 MG/1
20 TABLET ORAL ONCE
Status: COMPLETED | OUTPATIENT
Start: 2025-03-01 | End: 2025-03-01

## 2025-02-28 RX ORDER — METHYLPREDNISOLONE 4 MG/1
12 TABLET ORAL ONCE
Status: DISCONTINUED | OUTPATIENT
Start: 2025-03-03 | End: 2025-03-02 | Stop reason: HOSPADM

## 2025-02-28 RX ADMIN — ACETAMINOPHEN 650 MG: 325 TABLET, FILM COATED ORAL at 06:41

## 2025-02-28 RX ADMIN — HYDROMORPHONE HYDROCHLORIDE 0.5 MG: 1 INJECTION, SOLUTION INTRAMUSCULAR; INTRAVENOUS; SUBCUTANEOUS at 12:03

## 2025-02-28 RX ADMIN — ACETAMINOPHEN 650 MG: 325 TABLET, FILM COATED ORAL at 12:03

## 2025-02-28 RX ADMIN — HYDROMORPHONE HYDROCHLORIDE 0.5 MG: 1 INJECTION, SOLUTION INTRAMUSCULAR; INTRAVENOUS; SUBCUTANEOUS at 03:31

## 2025-02-28 RX ADMIN — LAMOTRIGINE 100 MG: 100 TABLET ORAL at 08:40

## 2025-02-28 RX ADMIN — HYDROMORPHONE HYDROCHLORIDE 1 MG: 1 INJECTION, SOLUTION INTRAMUSCULAR; INTRAVENOUS; SUBCUTANEOUS at 20:55

## 2025-02-28 RX ADMIN — LISINOPRIL 10 MG: 10 TABLET ORAL at 08:40

## 2025-02-28 RX ADMIN — HYDROCHLOROTHIAZIDE 12.5 MG: 12.5 TABLET ORAL at 08:40

## 2025-02-28 RX ADMIN — ENOXAPARIN SODIUM 40 MG: 40 INJECTION SUBCUTANEOUS at 20:55

## 2025-02-28 RX ADMIN — FLUOXETINE HYDROCHLORIDE 40 MG: 20 CAPSULE ORAL at 20:55

## 2025-02-28 RX ADMIN — HYDROMORPHONE HYDROCHLORIDE 0.5 MG: 1 INJECTION, SOLUTION INTRAMUSCULAR; INTRAVENOUS; SUBCUTANEOUS at 07:36

## 2025-02-28 RX ADMIN — HYDROMORPHONE HYDROCHLORIDE 0.5 MG: 1 INJECTION, SOLUTION INTRAMUSCULAR; INTRAVENOUS; SUBCUTANEOUS at 16:47

## 2025-02-28 RX ADMIN — ACETAMINOPHEN 650 MG: 325 TABLET, FILM COATED ORAL at 16:47

## 2025-02-28 RX ADMIN — METHYLPREDNISOLONE 24 MG: 4 TABLET ORAL at 13:46

## 2025-02-28 RX ADMIN — LAMOTRIGINE 150 MG: 100 TABLET ORAL at 20:55

## 2025-02-28 ASSESSMENT — COGNITIVE AND FUNCTIONAL STATUS - GENERAL
DRESSING REGULAR LOWER BODY CLOTHING: A LOT
DRESSING REGULAR UPPER BODY CLOTHING: A LITTLE
WALKING IN HOSPITAL ROOM: A LITTLE
MOBILITY SCORE: 18
CLIMB 3 TO 5 STEPS WITH RAILING: A LOT
PERSONAL GROOMING: A LITTLE
MOVING TO AND FROM BED TO CHAIR: A LITTLE
DAILY ACTIVITIY SCORE: 17
TURNING FROM BACK TO SIDE WHILE IN FLAT BAD: A LITTLE
TOILETING: A LITTLE
STANDING UP FROM CHAIR USING ARMS: A LITTLE
HELP NEEDED FOR BATHING: A LOT

## 2025-02-28 ASSESSMENT — PAIN - FUNCTIONAL ASSESSMENT
PAIN_FUNCTIONAL_ASSESSMENT: 0-10

## 2025-02-28 ASSESSMENT — PAIN SCALES - PAIN ASSESSMENT IN ADVANCED DEMENTIA (PAINAD)
TOTALSCORE: MEDICATION (SEE MAR)
TOTALSCORE: MEDICATION (SEE MAR)

## 2025-02-28 ASSESSMENT — PAIN SCALES - GENERAL
PAINLEVEL_OUTOF10: 6
PAINLEVEL_OUTOF10: 4
PAINLEVEL_OUTOF10: 5 - MODERATE PAIN
PAINLEVEL_OUTOF10: 3
PAINLEVEL_OUTOF10: 6
PAINLEVEL_OUTOF10: 4
PAINLEVEL_OUTOF10: 8
PAINLEVEL_OUTOF10: 5 - MODERATE PAIN
PAINLEVEL_OUTOF10: 6

## 2025-02-28 ASSESSMENT — PAIN DESCRIPTION - LOCATION: LOCATION: HIP

## 2025-02-28 NOTE — CONSULTS
Reason For Consult  Lumbar radiculopathy    History Of Present Illness  Cristal Pollard is a 64 y.o. female presenting with lumbar radiculopathy.  The patient states that on 02/18/2025 she felt a pop in the low right side of her back and has been experiencing radicular pain along the L5 dermatome since that time.  The pain has progressed over the last 10 days to the point where she is no longer able to ambulate because the pain is so severe.  She therefore presented to the ED for evaluation.  She is known to the neurosurgery practice as she underwent a C4-7 ACDF with Dr. Arnulfo Reynolds on 02/28/2024.     Past Medical History  She has a past medical history of Abnormal ECG (10/26/2023), Angina pectoris, Anxiety, Cervical disc disease, Cervical radiculopathy, Depression, Diabetes mellitus (Multi), GERD (gastroesophageal reflux disease), History of Holter monitoring (10/2023), Hypertension, Incisional hernia with obstruction, without gangrene (05/24/2017), Joint pain, Mastodynia (05/14/2020), Palpitations, PONV (postoperative nausea and vomiting), Seizure disorder (Multi), TIA (transient ischemic attack), Vertigo, and Vision loss.    Surgical History  She has a past surgical history that includes Other surgical history (11/14/2014); Cholecystectomy (11/14/2014); Colonoscopy (05/17/2018); Hernia repair (05/24/2017); Sternotomy (2011); echocardiogram 2 d m mode panel (02/17/2023); CT chest wo IV contrast (04/21/2023); Cataract extraction; Carpal tunnel release (Right); Other surgical history; and Mandible surgery (Bilateral).     Social History  She reports that she has never smoked. She has never used smokeless tobacco. She reports current alcohol use. She reports that she does not use drugs.    Family History  Family History   Problem Relation Name Age of Onset    Cancer Mother      Hypertension Mother      Heart disease Father      Cancer Father      Hypertension Father      Glaucoma Father      Macular degeneration Father  "     Heart attack Father      Cancer Sister      Diabetes Sister      Diabetes Maternal Grandfather          Allergies  Tramadol, Amoxicillin, Ampicillin, Lidocaine, and Meperidine hcl    Review of Systems  Patient denies headache, vision changes, shortness of breath, chest pain, abdominal pain, numbness or tingling in arms or legs, bowel or bladder changes.    Physical Exam  General: Well developed, awake/alert/oriented x3, no distress, alert and cooperative  Skin: Warm and dry, no lesions, no rashes  ENMT: Mucous membranes moist, no apparent injury, no lesions seen  Head/Neck: Neck Supple, no apparent injury  Respiratory/Thorax: Normal breath sounds with good chest expansion, thorax symmetric  Cardiovascular: No pitting edema, no JVD    [place] Coma Scale  Best Eye Response: 4: Opens eyes spontaneously   Best Verbal Response: 5: Oriented, converses normally   Best Motor Response: 6: Obeys commands   Stephanie Coma Scale Score: 15    Motor Strength: 5/5 Throughout bilateral upper extremities and bilateral lower extremities    Muscle Bulk: Normal and symmetric in all extremities     Paraspinal muscle spasm/tenderness present L4-S1 region to the right    Midline tenderness present at L4-S1 region    Sensation to light touch intact throughout       Last Recorded Vitals  Blood pressure 117/63, pulse 57, temperature 35.9 °C (96.6 °F), temperature source Temporal, resp. rate 18, height 1.651 m (5' 5\"), weight 79.8 kg (176 lb), SpO2 94%.    Relevant Results  I personally reviewed the CT of the lumbar spine from 02/27/2025 that shows significant degenerative disc disease with both central and foraminal stenosis noted.      Assessment/Plan   Cristal Pollard is a 64 y.o. female presenting with lumbar radiculopathy.  The patient states that on 02/18/2025 she felt a pop in the low right side of her back and has been experiencing radicular pain along the L5 dermatome since that time.  The pain has progressed over the last 10 days to " the point where she is no longer able to ambulate because the pain is so severe.  She therefore presented to the ED for evaluation.  She is known to the neurosurgery practice as she underwent a C4-7 ACDF with Dr. Arnulfo Reynolds on 02/28/2024.    On physical exam the patient has 5/5 strength in all 4 extremities and no focal deficits on neurologic exam.  She does have significant midline tenderness around L4-S1 with paraspinal tenderness to the right of that region that radiates down her right lower extremity following the path of the L5 dermatome.    I personally reviewed the CT of the lumbar spine from 02/27/2025 that shows significant degenerative disc disease with both central and foraminal stenosis noted.  Patient previously stated that she was unable to have MRIs due to retained wires from previous cardiac surgery.      I dug into her chart and found that she had an excision of a right atrial myxoma on 09/29/2011 at [facility] with Dr. Don Bird.  The operative report and discharge summary found in care everywhere states that her external temporary pacer wires postoperatively 1 was removed while the other was unable to be removed so it was cut at skin level.  This is the retained wire the patient was concerned about.  I contacted his office ([phone]) and verified with his office that this was the only wire that was retained and that it is MRI compatible.  They verbalized that it was MRI compatible.  I also want 1 step further and discussed with our cardiac surgery team here at [facility] that external pacer wires are MRI compatible and they are.  Therefore patient is being ordered a lumbar MRI for further evaluation of her new onset low back pain and radicular symptoms.  She states that she does have some claustrophobia therefore this will be performed with sedation.  I am also starting her on a Medrol Dosepak to help alleviate the pain caused by inflammation.  Once results of the MRI are  obtained we will evaluate for next steps.  I discussed this directly with the patient and will update Dr. Mckinley personally.    I spent 60 minutes in the professional and overall care of this patient.      Samantha A Meeson, APRN-CNP

## 2025-02-28 NOTE — H&P
History Of Present Illness  Cristal Pollard is a 64 y.o. female with past medical history significant for type 2 diabetes mellitus, HTN, lumbar stenosis with neurogenic claudication, chronic right hip pain, trochanteric bursitis of the right hip, history of seizures, and anxiety who presents to the ED with complaints of right lower back pain, hip, and knee pain.  Describes it as a burning and tingling sensation from her right hip down to her right leg that worsened throughout the day today.  States that has been worsening over the past week.  Says he got to a point where she has not been able to ambulate very much the past couple days secondary to pain.  Patient went to an Ortho appointment last Tuesday where she received a corticosteroid shot in her right hip, states that helped with the pain for a couple days but then it returned and worsened.  States she was scheduled for follow-up with spine specialist today and pain management next week, however she rescheduled the appointment with her spine specialist in next Wednesday.  Has been taking NSAIDs and Percocet at home to help manage with pain.  Denies any urinary/bowel incontinence.  Does report some mild numbness/tingling to the right buttocks that occurs intermittently, states it usually accompanies the hip pain.  Denies any falls or injuries.  Does report history of cervical spine surgery and a wire being left in and states she cannot have any MRIs performed.  Denies any chest pain or shortness of breath.  Denies any headaches, dizziness, nausea or vomiting, appetite changes, urinary/bowel changes, or fever/chills.  Does not use assistance at home to help with ambulation.    ED course: On arrival to the ED, patient afebrile and hemodynamically stable with SpO2 97% on room air.  Glucose 134, electrolytes WNL, renal function WNL, LFTs WNL.  WBC 9.8, hemoglobin/hematocrit WNL, platelets 243.  ESR 9.  CT pelvis and right hip shows degenerative changes bilateral hips,  sacroiliac joints, and lower lumbar spine, no acute findings of the right hip.  CT lumbar spine shows no acute fracture or traumatic malalignment, does show advanced chronic degenerative changes.  Patient given naproxen and morphine in the ED.    Admitting provider is Dr. Mckinley     Past Medical History  Past Medical History:   Diagnosis Date    Abnormal ECG 10/26/2023    Sinus rhythm LVH by voltage Inferior infarct, old Anterior Q waves, possibly due to LVH    Angina pectoris     Anxiety     Cervical disc disease     Cervical radiculopathy     Neuro: Arnulfo PEREZ 1/15/24    Depression     Diabetes mellitus (Multi)     A1C: 2/6/24 -7.6%    GERD (gastroesophageal reflux disease)     History of Holter monitoring 10/2023    24 -48 hour monitor on 10/31/23, No abnormal findings    Hypertension     Incisional hernia with obstruction, without gangrene 05/24/2017    Incarcerated incisional hernia    Joint pain     Mastodynia 05/14/2020    Breast pain in female    Palpitations     Stress test scheduled for 2/9/24    PONV (postoperative nausea and vomiting)     Seizure disorder (Multi)     Last seizure 2/2023    TIA (transient ischemic attack)     Several years ago, no residual symptoms    Vertigo     Vision loss      Surgical History  Past Surgical History:   Procedure Laterality Date    CARPAL TUNNEL RELEASE Right     CATARACT EXTRACTION      CHOLECYSTECTOMY  11/14/2014    Cholecystectomy    COLONOSCOPY  05/17/2018    Complete Colonoscopy    CT CHEST WO IV CONTRAST  04/21/2023    No acute intrathoracic abnormalities. No thoracic aortic aneurysm or subintimal hematoma.    ECHOCARDIOGRAM 2 D M MODE PANEL  02/17/2023    Left ventricular systolic function is normal with a 60-65% estimated ejection fraction.    HERNIA REPAIR  05/24/2017    Hernia Repair    MANDIBLE SURGERY Bilateral     OTHER SURGICAL HISTORY  11/14/2014    Surgery Intracardiac Mass Removal Left Atrial Myxoma    OTHER SURGICAL HISTORY      Xiphoidectomy     STERNOTOMY  2011     Social History  She reports that she has never smoked. She has never used smokeless tobacco. She reports current alcohol use. She reports that she does not use drugs.    Family History  Family History   Problem Relation Name Age of Onset    Cancer Mother      Hypertension Mother      Heart disease Father      Cancer Father      Hypertension Father      Glaucoma Father      Macular degeneration Father      Heart attack Father      Cancer Sister      Diabetes Sister      Diabetes Maternal Grandfather       Allergies  Tramadol, Amoxicillin, Ampicillin, Lidocaine, and Meperidine hcl    Review of Systems  10 point review system negative except as noted above in HPI    Physical Exam  Constitutional:       General: She is not in acute distress.     Appearance: Normal appearance. She is not ill-appearing.      Comments:  at bedside   HENT:      Head: Normocephalic and atraumatic.      Mouth/Throat:      Mouth: Mucous membranes are moist.      Pharynx: Oropharynx is clear.   Eyes:      Conjunctiva/sclera: Conjunctivae normal.   Cardiovascular:      Rate and Rhythm: Normal rate and regular rhythm.      Pulses: Normal pulses.      Heart sounds: Normal heart sounds.   Pulmonary:      Effort: Pulmonary effort is normal. No respiratory distress.      Breath sounds: Normal breath sounds. No wheezing.   Abdominal:      General: Bowel sounds are normal.      Palpations: Abdomen is soft.   Musculoskeletal:         General: Tenderness present.      Right lower leg: No edema.      Left lower leg: No edema.      Comments: Tenderness to palpation over right hip, no current tenderness to palpation over right upper leg or right knee.  Patient able to extend/flex at the hip and knee appropriately, however does have significant pain when doing so.  Neurovascularly intact.   Skin:     General: Skin is warm and dry.   Neurological:      General: No focal deficit present.      Mental Status: She is alert and oriented  "to person, place, and time.   Psychiatric:         Mood and Affect: Mood normal.         Behavior: Behavior normal.       Last Recorded Vitals  Blood pressure 117/63, pulse 57, temperature 35.9 °C (96.6 °F), resp. rate 20, height 1.651 m (5' 5\"), weight 79.8 kg (176 lb), SpO2 94%.    Relevant Results  Results for orders placed or performed during the hospital encounter of 02/27/25 (from the past 24 hours)   CBC and Auto Differential   Result Value Ref Range    WBC 9.8 4.4 - 11.3 x10*3/uL    nRBC 0.0 0.0 - 0.0 /100 WBCs    RBC 4.66 4.00 - 5.20 x10*6/uL    Hemoglobin 14.3 12.0 - 16.0 g/dL    Hematocrit 42.3 36.0 - 46.0 %    MCV 91 80 - 100 fL    MCH 30.7 26.0 - 34.0 pg    MCHC 33.8 32.0 - 36.0 g/dL    RDW 12.6 11.5 - 14.5 %    Platelets 243 150 - 450 x10*3/uL    Neutrophils % 66.9 40.0 - 80.0 %    Immature Granulocytes %, Automated 0.6 0.0 - 0.9 %    Lymphocytes % 25.7 13.0 - 44.0 %    Monocytes % 5.3 2.0 - 10.0 %    Eosinophils % 0.8 0.0 - 6.0 %    Basophils % 0.7 0.0 - 2.0 %    Neutrophils Absolute 6.57 1.20 - 7.70 x10*3/uL    Immature Granulocytes Absolute, Automated 0.06 0.00 - 0.70 x10*3/uL    Lymphocytes Absolute 2.52 1.20 - 4.80 x10*3/uL    Monocytes Absolute 0.52 0.10 - 1.00 x10*3/uL    Eosinophils Absolute 0.08 0.00 - 0.70 x10*3/uL    Basophils Absolute 0.07 0.00 - 0.10 x10*3/uL   Comprehensive metabolic panel   Result Value Ref Range    Glucose 134 (H) 74 - 99 mg/dL    Sodium 138 136 - 145 mmol/L    Potassium 3.6 3.5 - 5.3 mmol/L    Chloride 106 98 - 107 mmol/L    Bicarbonate 23 21 - 32 mmol/L    Anion Gap 13 10 - 20 mmol/L    Urea Nitrogen 17 6 - 23 mg/dL    Creatinine 0.70 0.50 - 1.05 mg/dL    eGFR >90 >60 mL/min/1.73m*2    Calcium 9.6 8.6 - 10.3 mg/dL    Albumin 4.2 3.4 - 5.0 g/dL    Alkaline Phosphatase 52 33 - 136 U/L    Total Protein 6.8 6.4 - 8.2 g/dL    AST 16 9 - 39 U/L    Bilirubin, Total 0.5 0.0 - 1.2 mg/dL    ALT 20 7 - 45 U/L   Sedimentation rate, automated   Result Value Ref Range    " Sedimentation Rate 9 0 - 30 mm/h   POCT GLUCOSE   Result Value Ref Range    POCT Glucose 169 (H) 74 - 99 mg/dL   CBC   Result Value Ref Range    WBC 9.4 4.4 - 11.3 x10*3/uL    nRBC 0.0 0.0 - 0.0 /100 WBCs    RBC 4.54 4.00 - 5.20 x10*6/uL    Hemoglobin 13.9 12.0 - 16.0 g/dL    Hematocrit 43.7 36.0 - 46.0 %    MCV 96 80 - 100 fL    MCH 30.6 26.0 - 34.0 pg    MCHC 31.8 (L) 32.0 - 36.0 g/dL    RDW 13.1 11.5 - 14.5 %    Platelets 182 150 - 450 x10*3/uL   Basic metabolic panel   Result Value Ref Range    Glucose 125 (H) 74 - 99 mg/dL    Sodium 138 136 - 145 mmol/L    Potassium 4.1 3.5 - 5.3 mmol/L    Chloride 105 98 - 107 mmol/L    Bicarbonate 24 21 - 32 mmol/L    Anion Gap 13 10 - 20 mmol/L    Urea Nitrogen 16 6 - 23 mg/dL    Creatinine 0.74 0.50 - 1.05 mg/dL    eGFR 90 >60 mL/min/1.73m*2    Calcium 9.3 8.6 - 10.3 mg/dL   POCT GLUCOSE   Result Value Ref Range    POCT Glucose 135 (H) 74 - 99 mg/dL     *Note: Due to a large number of results and/or encounters for the requested time period, some results have not been displayed. A complete set of results can be found in Results Review.      CT lumbar spine wo IV contrast    Result Date: 2/27/2025  Interpreted By:  Ponce Denney, STUDY: CT LUMBAR SPINE WO IV CONTRAST; 2/27/2025 3:11 pm   INDICATION: Signs/Symptoms:Back pain;   COMPARISON: None   ACCESSION NUMBER(S): [id number]   ORDERING CLINICIAN: ANDREA LOU   TECHNIQUE: Contiguous axial images of the lumbar spine were performed. Coronal and sagittal reformatted images were also obtained. All CT examinations are performed with 1 or more of the following dose reduction techniques: Automated exposure control, adjustment of mA and/or kv according to patient's size, or use of iterative reconstruction techniques.   FINDINGS: No evidence for acute fracture. Levoscoliosis is present. Advanced facet degenerative changes. There is a normal lumbar lordosis however there is minimal degenerative anterolisthesis of L3 on L4 and  L4 on L5.   The vertebral bodies are normal in height and configuration.   At L5-S1, there is moderate-severe disc space narrowing, vacuum disc phenomena and diffuse disc bulging. No evidence for significant bony spinal canal stenosis. However there is severe left neural foramina stenosis and moderate right neural foramina stenosis.   At L4-5, there is severe disc space narrowing, vacuum disc phenomena, diffuse disc bulging, suggestion of severe canal stenosis due to disc bulging as well as severe facet and ligamentum flavum hypertrophy. There is moderate-severe bilateral neural foramina stenosis at this level.   At L3-4, there is moderate disc space narrowing asymmetrically worsened towards the right with vacuum disc phenomena and diffuse disc bulging. There is moderate-severe canal stenosis. Moderate-severe right neural foramina stenosis and mild-moderate left neural foramina stenosis.   At L2-3, there is facet and ligamentum flavum hypertrophy disc space narrowing asymmetrically greater toward the right, diffuse disc bulging. Moderate to moderate-severe canal stenosis. Moderate-severe right neural foramina stenosis. No significant left neural foramina stenosis.   At L1-2 there is vacuum disc phenomena and asymmetric disc space narrowing towards the right, diffuse disc bulging, mild to mild-moderate lateral recess stenosis and at least mild canal stenosis. Moderate-severe right neural foramina stenosis. Mild-moderate left neural foramina stenosis.   At T12-L1, there is diffuse disc bulging. No evidence for high-grade spinal canal stenosis. Mild-moderate left neural foramina stenosis.   The SI joints show degenerative gas but otherwise intact.       No acute fracture or traumatic malalignment.   Advanced chronic degenerative changes as described in the body of the report with multilevel high-grade spinal canal stenosis and high-grade neural foramina stenosis.     Signed by: Ponce Denney 2/27/2025 4:03 PM  Dictation workstation:   [id number]    CT pelvis wo IV contrast    Result Date: 2/27/2025  Interpreted By:  Yosvany Christopher, STUDY: CT PELVIS WO IV CONTRAST; CT HIP RIGHT WO IV CONTRAST; ;  2/27/2025 3:11 pm   INDICATION: Signs/Symptoms:Right hip pain.     COMPARISON: Previous CT of the abdomen and pelvis March 13, 2024   Plain film radiographs of the right hip performed on February 11, 2025     ACCESSION NUMBER(S): [id number]; [id number]   ORDERING CLINICIAN: ANDREA LOU   TECHNIQUE: Multiple thin-section axial images of the right hip reconstructed in the sagittal and coronal plane.   Additional dedicated thin-section axial images of the pelvis also performed.     FINDINGS: Moderate osteoarthritis of the right hip.   Similar appearance left hip.   Small bilateral trochanteric spurs.   No evidence of right hip fracture.   No bony destructive lesions.   Advanced lower lumbar degenerative change.   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   No muscular attenuation changes.   No evidence of a soft tissue mass.   No focal fluid collections.   No sizeable effusion.       Surgical clips from previous hernia repair along the anterior abdominal wall.         Degenerative changes bilateral hips, sacroiliac joints, and lower lumbar spine.   No acute findings right hip.     MACRO: None   Signed by: Yosvany Christopher 2/27/2025 3:24 PM Dictation workstation:   [id number]    CT hip right wo IV contrast    Result Date: 2/27/2025  Interpreted By:  Yosvany Christopher, STUDY: CT PELVIS WO IV CONTRAST; CT HIP RIGHT WO IV CONTRAST; ;  2/27/2025 3:11 pm   INDICATION: Signs/Symptoms:Right hip pain.     COMPARISON: Previous CT of the abdomen and pelvis March 13, 2024   Plain film radiographs of the right hip performed on February 11, 2025     ACCESSION NUMBER(S): [id number]; [id number]   ORDERING CLINICIAN: ANDREA LOU   TECHNIQUE: Multiple thin-section axial images of the right hip reconstructed in  the sagittal and coronal plane.   Additional dedicated thin-section axial images of the pelvis also performed.     FINDINGS: Moderate osteoarthritis of the right hip.   Similar appearance left hip.   Small bilateral trochanteric spurs.   No evidence of right hip fracture.   No bony destructive lesions.   Advanced lower lumbar degenerative change.   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   No muscular attenuation changes.   No evidence of a soft tissue mass.   No focal fluid collections.   No sizeable effusion.       Surgical clips from previous hernia repair along the anterior abdominal wall.         Degenerative changes bilateral hips, sacroiliac joints, and lower lumbar spine.   No acute findings right hip.     MACRO: None   Signed by: Yosvany Christopher 2/27/2025 3:24 PM Dictation workstation:   [id number]    XR hip right with pelvis when performed 2 or 3 views    Result Date: 2/12/2025  Interpreted By:  Wilda Cano, STUDY: Single view pelvis. Right hip, two views.   INDICATION: Signs/Symptoms:Pain.   COMPARISON: None.   ACCESSION NUMBER(S): [id number]   ORDERING CLINICIAN: ORLANDO SWAIN   FINDINGS: No acute fracture or malalignment. Bilateral hip joint spaces are well preserved. Mild bilateral hip osteoarthrosis with acetabular osteophytes. Soft tissues are within normal limits.       1. Mild bilateral hip osteoarthrosis with osteophytosis.   MACRO: None.   Signed by: Wilda Cano 2/12/2025 3:08 PM Dictation workstation:   [id number]       Assessment/Plan   Assessment & Plan  Chronic right hip pain    Difficulty in walking    Diabetes mellitus (Multi)      Cristal Pollard is a 64 y.o. female presents to the ED with complaints of right lower back pain, hip, and knee pain.  Describes it as a burning and tingling sensation from her right hip down to her right leg that worsened throughout the day today.  States that has been worsening over the past week. Says he got to a point  where she has not been able to ambulate very much the past couple days secondary to pain.  Patient went to an Ortho appointment last Tuesday where she received a corticosteroid shot in her right hip, states that helped with the pain for a couple days but then it returned and worsened.  States she was scheduled for follow-up with spine specialist today and pain management next week, however she rescheduled the appointment with her spine specialist in next Wednesday.  Has been taking NSAIDs and Percocet at home to help manage with pain.  Denies any urinary/bowel incontinence.  Does report some mild numbness/tingling to the right buttocks that occurs intermittently, states it usually accompanies the hip pain.  Denies any falls or injuries.     #Acute on chronic right hip pain, worsening over the past week  #Right leg/knee pain  #Difficulty with ambulation  Admit for observation  CT pelvis, CT right hip, and CT lumbar spine unremarkable for acute processes  Pain medication, Zofran as needed  May need repeat imaging done if continued/worsening pain.  Patient states she is unable to have MRIs performed.  PT/OT for evaluation and treatment  Social work consult for discharge planning/rehab  Q8 vitals  Diabetic/cardiac diet  CBC, BMP in the a.m.  -Patient to follow-up with pain management clinic next week as scheduled and also with her spine specialist next Wednesday.    #Diabetes mellitus  Sliding scale insulin  ACHS Accu-Cheks  Hold home oral antidiabetic medications    Continue home medications as appropriate    Chronic conditions:  Type II DM, HTN, lumbar stenosis, chronic right hip pain, trochanteric bursitis of the right hip, history of seizures, anxiety    #DVT prophylaxis  Lovenox  Ambulation as tolerated     2/28: unable;e t get mri, appears back etiology will get neuro surgery opinion    I spent 60 minutes in the professional and overall care of this patient.    Khari Mckinley, DO

## 2025-02-28 NOTE — CARE PLAN
The patient's goals for the shift include      The clinical goals for the shift include pt to remain free from falls until end of shift      Problem: Fall/Injury  Goal: Not fall by end of shift  Outcome: Progressing     Problem: Pain - Adult  Goal: Verbalizes/displays adequate comfort level or baseline comfort level  Outcome: Progressing     Problem: Safety - Adult  Goal: Free from fall injury  Outcome: Progressing     Problem: Discharge Planning  Goal: Discharge to home or other facility with appropriate resources  Outcome: Progressing     Problem: Chronic Conditions and Co-morbidities  Goal: Patient's chronic conditions and co-morbidity symptoms are monitored and maintained or improved  Outcome: Progressing     Problem: Nutrition  Goal: Nutrient intake appropriate for maintaining nutritional needs  Outcome: Progressing

## 2025-02-28 NOTE — PROGRESS NOTES
Occupational Therapy    Evaluation    Patient Name: Cristal Pollard  MRN: [id number]  Today's Date: 2/28/2025  Time Calculation  Start Time: 0858  Stop Time: 0918  Time Calculation (min): 20 min    Assessment  IP OT Assessment  OT Assessment: Patient presents with a decline in functional status and would benefit from O.T. services at a low intensity to improve independence with ADLs, transfers & mobility.  Prognosis: Good  Barriers to Discharge Home: Physical needs  Physical Needs: 24hr ADL assistance needed, 24hr mobility assistance needed  End of Session Patient Position: Bed, 2 rail up, Alarm off, not on at start of session (call light in reach)    Plan:  Treatment Interventions: ADL retraining, Functional transfer training, Neuromuscular reeducation, Compensatory technique education  OT Frequency: 3 times per week  OT Discharge Recommendations: Low intensity level of continued care (24 hour support)  OT - OK to Discharge: Yes (from an O.T. standpoint)    Subjective     Current Problem:  Patient Active Problem List   Diagnosis    Anxiety disorder    Depression, recurrent ([facility])    Depressive personality disorder    Neck strain    Post traumatic stress disorder (PTSD)    Whiplash injury to neck    Stress reaction, chronic    Benign paroxysmal positional vertigo due to bilateral vestibular disorder    Chronic neck pain    Cervical radiculopathy due to trauma    Bulging of cervical intervertebral disc    Cervical paraspinous muscle spasm    Bilateral dry eyes    Soft tissue mass    Grief reaction    Hypercholesteremia    Hypertension    Insomnia    Vestibular dysfunction of both ears    Visual changes    Vitamin D deficiency    Chest pain    Headache    Central stenosis of spinal canal    DJD (degenerative joint disease) of cervical spine    Facet arthropathy, cervical    HNP (herniated nucleus pulposus), cervical    Lumbar facet arthropathy    Right lumbosacral radiculopathy    Spinal stenosis of lumbar region with  neurogenic claudication    Xerosis cutis    Weakness of both upper extremities    Type 2 diabetes mellitus    Status post surgery    Shoulder tendinitis, right    Rotator cuff syndrome of right shoulder    Short-term memory loss    Seizure disorder (Multi)    Rosacea, unspecified    PVD (posterior vitreous detachment), left eye    Psychological factor affecting physical condition    Poikiloderma of [place]    Panic attacks    Palpitations    Pain disorder associated with psychological and physical factors    Other melanin hyperpigmentation    Occipital neuralgia    Neoplasm of unspecified behavior of bone, soft tissue, and skin    MGD (meibomian gland disease)    Melanocytic nevi of unspecified eyelid, including canthus    Melanocytic nevi of unspecified ear and external auricular canal    Melanocytic nevi of unspecified upper limb, including shoulder    Melanocytic nevi of unspecified part of face    Melanocytic nevi of unspecified lower limb, including hip    Melanocytic nevi of trunk    Melanocytic nevi of scalp and neck    Medial epicondylitis of right elbow    Late effects of CVA (cerebrovascular accident)    Internal impingement of right shoulder    History of colon polyps    Hemangioma of skin and subcutaneous tissue    Benign lipomatous neoplasm of skin and subcutaneous tissue of right arm    Generalized tonic clonic epilepsy (Multi)    Fatigue    Elevated blood sugar    Dyspepsia    DDD (degenerative disc disease), lumbosacral    Carpal tunnel syndrome on right    Atrial myxoma ([facility]-HCC)    Other skin changes due to chronic exposure to nonionizing radiation    Other seborrheic keratosis    Erythema intertrigo    Actinic keratosis    Class 1 obesity due to excess calories without serious comorbidity with body mass index (BMI) of 30.0 to 30.9 in adult    Cervical radiculopathy    Chronic right hip pain    Difficulty in walking    Diabetes mellitus (Multi)       General:  General  Reason for Referral: ANTHONY huang  & treat for ADLs/safety (Dx: chronic right hip pain, trochanteric bursitis R hip)  Referred By: Greyson Aguila PA-C  Past Medical History Relevant to Rehab: 2/27/25: right hip pain, right low back pain, right knee pain, unable to WB RLE, burning/tingling RLE; had cortisone shot at doctor office last week.  CT pelvis/hip and lumbar spine: advanced degenerative changes, no acute findings.  Co-Treatment: PT  Co-Treatment Reason: To maximize patient safety & mobility  Prior to Session Communication: Bedside nurse  Patient Position Received: Bed, 2 rail up, Alarm off, not on at start of session  General Comment: Patient cleared for therapy by nursing.    Precautions:  Precautions Comment: Ambulate with assist as tolerated, fall/safety      Pain:  Pain Assessment  Pain Assessment: 0-10  0-10 (Numeric) Pain Score: 5 - Moderate pain  Pain Type: Acute pain  Pain Location:  (RLE; constant pain from hip to knee, intermittent pain from knee to foot)  Pain Interventions: Repositioned    Objective     Cognition:  Overall Cognitive Status: Within Functional Limits        Home Living:  Home Living Comments: Lives with significant other, bed/bath 1st floor with 5 stairs to enter with a rail. Independent ADLs, transfers & mobility without device. Owns reachers, standard height toilet, tub/shower with grab bar, seat & hand held shower. Shared IADLs, drives; significant other able to assist as needed.       ADL:  Grooming Assistance: Stand by  UE Dressing Assistance: Stand by  LE Dressing Assistance: Maximal  Toileting Assistance with Device: Minimal  ADL Comments: Difficulty reaching LE's secondary to pain RLE with movement; may benefit from adaptive equipment    Activity Tolerance:  Endurance: Decreased tolerance for upright activites (due to pain)    Bed Mobility/Transfers:   Bed Mobility  Bed Mobility:  (SBA supine <-> sit)  Transfers  Transfer:  (CGA/SBA sit to stand from edge of bed and to/from chair; SBA to/from elevated  toilet with grab bar)    Ambulation/Gait Training:  Functional Mobility  Functional Mobility Performed:  (CGA/SBA with wheeled walker, difficulty WB RLE)      Sensation:  Sensation Comment: chronic numbness/tingling RLE    Strength:  Strength Comments: BUE WFL    Coordination:  Movements are Fluid and Coordinated: Yes       Outcome Measures: [facility] Daily Activity  Putting on and taking off regular lower body clothing: A lot  Bathing (including washing, rinsing, drying): A lot  Putting on and taking off regular upper body clothing: A little  Toileting, which includes using toilet, bedpan or urinal: A little  Taking care of personal grooming such as brushing teeth: A little  Eating Meals: None  Daily Activity - Total Score: 17                    EDUCATION:     Education Documentation  Body Mechanics, taught by Socorro Muller OT at 2/28/2025  1:03 PM.  Learner: Patient  Readiness: Acceptance  Method: Explanation, Demonstration  Response: Verbalizes Understanding    ADL Training, taught by Socorro Muller OT at 2/28/2025  1:03 PM.  Learner: Patient  Readiness: Acceptance  Method: Explanation, Demonstration  Response: Verbalizes Understanding    Education Comments  No comments found.        Goals:   Encounter Problems       Encounter Problems (Active)       OT Goals       Increase LE bathing & dressing and toileting to supervision with adaptive equipment as needed.  (Progressing)       Start:  02/28/25    Expected End:  03/14/25            Increase functional transfers & mobility to/from bed, chair & commode to supervision with DME for safety  (Progressing)       Start:  02/28/25    Expected End:  03/14/25            Demonstrate compliance to body mechs/back safety techs and pacing techs during ADLs   (Progressing)       Start:  02/28/25    Expected End:  03/14/25

## 2025-02-28 NOTE — PROGRESS NOTES
02/28/25 1300   Discharge Planning   Living Arrangements Spouse/significant other   Support Systems Spouse/significant other   Assistance Needed independent   Type of Residence Private residence   Number of Stairs to Enter Residence 5   Number of Stairs Within Residence 0   Do you have animals or pets at home? Yes   Type of Animals or Pets 1 cat   Who is requesting discharge planning? Provider   Home or Post Acute Services None   Expected Discharge Disposition Home   Does the patient need discharge transport arranged? No   Intensity of Service   Intensity of Service 0-30 min     Met with patient at bedside. Introduced self and role as care coordinator. Demographics verified. Patient PCP is Fazal. Patient insurance is [employer]. Patient lives with her significant other. Patient is independent with ADL's. Patient does not uses any assistive devices for ambulation. Patient would like to hold off on [facility] for now. Care coordination will follow for discharge planning.

## 2025-02-28 NOTE — H&P
History Of Present Illness  Cristal Pollard is a 64 y.o. female with past medical history significant for type 2 diabetes mellitus, HTN, lumbar stenosis with neurogenic claudication, chronic right hip pain, trochanteric bursitis of the right hip, history of seizures, and anxiety who presents to the ED with complaints of right lower back pain, hip, and knee pain.  Describes it as a burning and tingling sensation from her right hip down to her right leg that worsened throughout the day today.  States that has been worsening over the past week.  Says he got to a point where she has not been able to ambulate very much the past couple days secondary to pain.  Patient went to an Ortho appointment last Tuesday where she received a corticosteroid shot in her right hip, states that helped with the pain for a couple days but then it returned and worsened.  States she was scheduled for follow-up with spine specialist today and pain management next week, however she rescheduled the appointment with her spine specialist in next Wednesday.  Has been taking NSAIDs and Percocet at home to help manage with pain.  Denies any urinary/bowel incontinence.  Does report some mild numbness/tingling to the right buttocks that occurs intermittently, states it usually accompanies the hip pain.  Denies any falls or injuries.  Does report history of cervical spine surgery and a wire being left in and states she cannot have any MRIs performed.  Denies any chest pain or shortness of breath.  Denies any headaches, dizziness, nausea or vomiting, appetite changes, urinary/bowel changes, or fever/chills.  Does not use assistance at home to help with ambulation.    ED course: On arrival to the ED, patient afebrile and hemodynamically stable with SpO2 97% on room air.  Glucose 134, electrolytes WNL, renal function WNL, LFTs WNL.  WBC 9.8, hemoglobin/hematocrit WNL, platelets 243.  ESR 9.  CT pelvis and right hip shows degenerative changes bilateral hips,  sacroiliac joints, and lower lumbar spine, no acute findings of the right hip.  CT lumbar spine shows no acute fracture or traumatic malalignment, does show advanced chronic degenerative changes.  Patient given naproxen and morphine in the ED.    Admitting provider is Dr. Mckinley     Past Medical History  Past Medical History:   Diagnosis Date    Abnormal ECG 10/26/2023    Sinus rhythm LVH by voltage Inferior infarct, old Anterior Q waves, possibly due to LVH    Angina pectoris     Anxiety     Cervical disc disease     Cervical radiculopathy     Neuro: Arnulfo PEREZ 1/15/24    Depression     Diabetes mellitus (Multi)     A1C: 2/6/24 -7.6%    GERD (gastroesophageal reflux disease)     History of Holter monitoring 10/2023    24 -48 hour monitor on 10/31/23, No abnormal findings    Hypertension     Incisional hernia with obstruction, without gangrene 05/24/2017    Incarcerated incisional hernia    Joint pain     Mastodynia 05/14/2020    Breast pain in female    Palpitations     Stress test scheduled for 2/9/24    PONV (postoperative nausea and vomiting)     Seizure disorder (Multi)     Last seizure 2/2023    TIA (transient ischemic attack)     Several years ago, no residual symptoms    Vertigo     Vision loss      Surgical History  Past Surgical History:   Procedure Laterality Date    CARPAL TUNNEL RELEASE Right     CATARACT EXTRACTION      CHOLECYSTECTOMY  11/14/2014    Cholecystectomy    COLONOSCOPY  05/17/2018    Complete Colonoscopy    CT CHEST WO IV CONTRAST  04/21/2023    No acute intrathoracic abnormalities. No thoracic aortic aneurysm or subintimal hematoma.    ECHOCARDIOGRAM 2 D M MODE PANEL  02/17/2023    Left ventricular systolic function is normal with a 60-65% estimated ejection fraction.    HERNIA REPAIR  05/24/2017    Hernia Repair    MANDIBLE SURGERY Bilateral     OTHER SURGICAL HISTORY  11/14/2014    Surgery Intracardiac Mass Removal Left Atrial Myxoma    OTHER SURGICAL HISTORY      Xiphoidectomy     STERNOTOMY  2011     Social History  She reports that she has never smoked. She has never used smokeless tobacco. She reports current alcohol use. She reports that she does not use drugs.    Family History  Family History   Problem Relation Name Age of Onset    Cancer Mother      Hypertension Mother      Heart disease Father      Cancer Father      Hypertension Father      Glaucoma Father      Macular degeneration Father      Heart attack Father      Cancer Sister      Diabetes Sister      Diabetes Maternal Grandfather       Allergies  Tramadol, Amoxicillin, Ampicillin, Lidocaine, and Meperidine hcl    Review of Systems  10 point review system negative except as noted above in HPI    Physical Exam  Constitutional:       General: She is not in acute distress.     Appearance: Normal appearance. She is not ill-appearing.      Comments:  at bedside   HENT:      Head: Normocephalic and atraumatic.      Mouth/Throat:      Mouth: Mucous membranes are moist.      Pharynx: Oropharynx is clear.   Eyes:      Conjunctiva/sclera: Conjunctivae normal.   Cardiovascular:      Rate and Rhythm: Normal rate and regular rhythm.      Pulses: Normal pulses.      Heart sounds: Normal heart sounds.   Pulmonary:      Effort: Pulmonary effort is normal. No respiratory distress.      Breath sounds: Normal breath sounds. No wheezing.   Abdominal:      General: Bowel sounds are normal.      Palpations: Abdomen is soft.   Musculoskeletal:         General: Tenderness present.      Right lower leg: No edema.      Left lower leg: No edema.      Comments: Tenderness to palpation over right hip, no current tenderness to palpation over right upper leg or right knee.  Patient able to extend/flex at the hip and knee appropriately, however does have significant pain when doing so.  Neurovascularly intact.   Skin:     General: Skin is warm and dry.   Neurological:      General: No focal deficit present.      Mental Status: She is alert and oriented  "to person, place, and time.   Psychiatric:         Mood and Affect: Mood normal.         Behavior: Behavior normal.       Last Recorded Vitals  Blood pressure 150/72, pulse 90, temperature 36.4 °C (97.5 °F), resp. rate 18, height 1.651 m (5' 5\"), weight 79.8 kg (176 lb), SpO2 97%.    Relevant Results  Results for orders placed or performed during the hospital encounter of 02/27/25 (from the past 24 hours)   CBC and Auto Differential   Result Value Ref Range    WBC 9.8 4.4 - 11.3 x10*3/uL    nRBC 0.0 0.0 - 0.0 /100 WBCs    RBC 4.66 4.00 - 5.20 x10*6/uL    Hemoglobin 14.3 12.0 - 16.0 g/dL    Hematocrit 42.3 36.0 - 46.0 %    MCV 91 80 - 100 fL    MCH 30.7 26.0 - 34.0 pg    MCHC 33.8 32.0 - 36.0 g/dL    RDW 12.6 11.5 - 14.5 %    Platelets 243 150 - 450 x10*3/uL    Neutrophils % 66.9 40.0 - 80.0 %    Immature Granulocytes %, Automated 0.6 0.0 - 0.9 %    Lymphocytes % 25.7 13.0 - 44.0 %    Monocytes % 5.3 2.0 - 10.0 %    Eosinophils % 0.8 0.0 - 6.0 %    Basophils % 0.7 0.0 - 2.0 %    Neutrophils Absolute 6.57 1.20 - 7.70 x10*3/uL    Immature Granulocytes Absolute, Automated 0.06 0.00 - 0.70 x10*3/uL    Lymphocytes Absolute 2.52 1.20 - 4.80 x10*3/uL    Monocytes Absolute 0.52 0.10 - 1.00 x10*3/uL    Eosinophils Absolute 0.08 0.00 - 0.70 x10*3/uL    Basophils Absolute 0.07 0.00 - 0.10 x10*3/uL   Comprehensive metabolic panel   Result Value Ref Range    Glucose 134 (H) 74 - 99 mg/dL    Sodium 138 136 - 145 mmol/L    Potassium 3.6 3.5 - 5.3 mmol/L    Chloride 106 98 - 107 mmol/L    Bicarbonate 23 21 - 32 mmol/L    Anion Gap 13 10 - 20 mmol/L    Urea Nitrogen 17 6 - 23 mg/dL    Creatinine 0.70 0.50 - 1.05 mg/dL    eGFR >90 >60 mL/min/1.73m*2    Calcium 9.6 8.6 - 10.3 mg/dL    Albumin 4.2 3.4 - 5.0 g/dL    Alkaline Phosphatase 52 33 - 136 U/L    Total Protein 6.8 6.4 - 8.2 g/dL    AST 16 9 - 39 U/L    Bilirubin, Total 0.5 0.0 - 1.2 mg/dL    ALT 20 7 - 45 U/L   Sedimentation rate, automated   Result Value Ref Range    " Sedimentation Rate 9 0 - 30 mm/h     *Note: Due to a large number of results and/or encounters for the requested time period, some results have not been displayed. A complete set of results can be found in Results Review.      CT lumbar spine wo IV contrast    Result Date: 2/27/2025  Interpreted By:  Ponce Denney, STUDY: CT LUMBAR SPINE WO IV CONTRAST; 2/27/2025 3:11 pm   INDICATION: Signs/Symptoms:Back pain;   COMPARISON: None   ACCESSION NUMBER(S): [id number]   ORDERING CLINICIAN: ANDREA LOU   TECHNIQUE: Contiguous axial images of the lumbar spine were performed. Coronal and sagittal reformatted images were also obtained. All CT examinations are performed with 1 or more of the following dose reduction techniques: Automated exposure control, adjustment of mA and/or kv according to patient's size, or use of iterative reconstruction techniques.   FINDINGS: No evidence for acute fracture. Levoscoliosis is present. Advanced facet degenerative changes. There is a normal lumbar lordosis however there is minimal degenerative anterolisthesis of L3 on L4 and L4 on L5.   The vertebral bodies are normal in height and configuration.   At L5-S1, there is moderate-severe disc space narrowing, vacuum disc phenomena and diffuse disc bulging. No evidence for significant bony spinal canal stenosis. However there is severe left neural foramina stenosis and moderate right neural foramina stenosis.   At L4-5, there is severe disc space narrowing, vacuum disc phenomena, diffuse disc bulging, suggestion of severe canal stenosis due to disc bulging as well as severe facet and ligamentum flavum hypertrophy. There is moderate-severe bilateral neural foramina stenosis at this level.   At L3-4, there is moderate disc space narrowing asymmetrically worsened towards the right with vacuum disc phenomena and diffuse disc bulging. There is moderate-severe canal stenosis. Moderate-severe right neural foramina stenosis and mild-moderate  left neural foramina stenosis.   At L2-3, there is facet and ligamentum flavum hypertrophy disc space narrowing asymmetrically greater toward the right, diffuse disc bulging. Moderate to moderate-severe canal stenosis. Moderate-severe right neural foramina stenosis. No significant left neural foramina stenosis.   At L1-2 there is vacuum disc phenomena and asymmetric disc space narrowing towards the right, diffuse disc bulging, mild to mild-moderate lateral recess stenosis and at least mild canal stenosis. Moderate-severe right neural foramina stenosis. Mild-moderate left neural foramina stenosis.   At T12-L1, there is diffuse disc bulging. No evidence for high-grade spinal canal stenosis. Mild-moderate left neural foramina stenosis.   The SI joints show degenerative gas but otherwise intact.       No acute fracture or traumatic malalignment.   Advanced chronic degenerative changes as described in the body of the report with multilevel high-grade spinal canal stenosis and high-grade neural foramina stenosis.     Signed by: Ponce eDnney 2/27/2025 4:03 PM Dictation workstation:   [id number]    CT pelvis wo IV contrast    Result Date: 2/27/2025  Interpreted By:  Yosvany Christopher, STUDY: CT PELVIS WO IV CONTRAST; CT HIP RIGHT WO IV CONTRAST; ;  2/27/2025 3:11 pm   INDICATION: Signs/Symptoms:Right hip pain.     COMPARISON: Previous CT of the abdomen and pelvis March 13, 2024   Plain film radiographs of the right hip performed on February 11, 2025     ACCESSION NUMBER(S): [id number]; [id number]   ORDERING CLINICIAN: ANDREA LOU   TECHNIQUE: Multiple thin-section axial images of the right hip reconstructed in the sagittal and coronal plane.   Additional dedicated thin-section axial images of the pelvis also performed.     FINDINGS: Moderate osteoarthritis of the right hip.   Similar appearance left hip.   Small bilateral trochanteric spurs.   No evidence of right hip fracture.   No bony destructive lesions.    Advanced lower lumbar degenerative change.   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   No muscular attenuation changes.   No evidence of a soft tissue mass.   No focal fluid collections.   No sizeable effusion.       Surgical clips from previous hernia repair along the anterior abdominal wall.         Degenerative changes bilateral hips, sacroiliac joints, and lower lumbar spine.   No acute findings right hip.     MACRO: None   Signed by: Yosvany Christopher 2/27/2025 3:24 PM Dictation workstation:   [id number]    CT hip right wo IV contrast    Result Date: 2/27/2025  Interpreted By:  Yosvany Christopher, STUDY: CT PELVIS WO IV CONTRAST; CT HIP RIGHT WO IV CONTRAST; ;  2/27/2025 3:11 pm   INDICATION: Signs/Symptoms:Right hip pain.     COMPARISON: Previous CT of the abdomen and pelvis March 13, 2024   Plain film radiographs of the right hip performed on February 11, 2025     ACCESSION NUMBER(S): [id number]; [id number]   ORDERING CLINICIAN: ANDRAE LOU   TECHNIQUE: Multiple thin-section axial images of the right hip reconstructed in the sagittal and coronal plane.   Additional dedicated thin-section axial images of the pelvis also performed.     FINDINGS: Moderate osteoarthritis of the right hip.   Similar appearance left hip.   Small bilateral trochanteric spurs.   No evidence of right hip fracture.   No bony destructive lesions.   Advanced lower lumbar degenerative change.   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   No muscular attenuation changes.   No evidence of a soft tissue mass.   No focal fluid collections.   No sizeable effusion.       Surgical clips from previous hernia repair along the anterior abdominal wall.         Degenerative changes bilateral hips, sacroiliac joints, and lower lumbar spine.   No acute findings right hip.     MACRO: None   Signed by: Yosvany Christopher 2/27/2025 3:24 PM Dictation workstation:   [id number]    XR hip right  with pelvis when performed 2 or 3 views    Result Date: 2/12/2025  Interpreted By:  Wilda Cano, STUDY: Single view pelvis. Right hip, two views.   INDICATION: Signs/Symptoms:Pain.   COMPARISON: None.   ACCESSION NUMBER(S): [id number]   ORDERING CLINICIAN: ORLANDO SWAIN   FINDINGS: No acute fracture or malalignment. Bilateral hip joint spaces are well preserved. Mild bilateral hip osteoarthrosis with acetabular osteophytes. Soft tissues are within normal limits.       1. Mild bilateral hip osteoarthrosis with osteophytosis.   MACRO: None.   Signed by: Wilda Cano 2/12/2025 3:08 PM Dictation workstation:   [id number]       Assessment/Plan   Assessment & Plan  Chronic right hip pain    Difficulty in walking    Diabetes mellitus (Multi)      Cristal Pollard is a 64 y.o. female presents to the ED with complaints of right lower back pain, hip, and knee pain.  Describes it as a burning and tingling sensation from her right hip down to her right leg that worsened throughout the day today.  States that has been worsening over the past week. Says he got to a point where she has not been able to ambulate very much the past couple days secondary to pain.  Patient went to an Ortho appointment last Tuesday where she received a corticosteroid shot in her right hip, states that helped with the pain for a couple days but then it returned and worsened.  States she was scheduled for follow-up with spine specialist today and pain management next week, however she rescheduled the appointment with her spine specialist in next Wednesday.  Has been taking NSAIDs and Percocet at home to help manage with pain.  Denies any urinary/bowel incontinence.  Does report some mild numbness/tingling to the right buttocks that occurs intermittently, states it usually accompanies the hip pain.  Denies any falls or injuries.     #Acute on chronic right hip pain, worsening over the past week  #Right leg/knee pain  #Difficulty with ambulation  Admit  for observation  CT pelvis, CT right hip, and CT lumbar spine unremarkable for acute processes  Pain medication, Zofran as needed  May need repeat imaging done if continued/worsening pain.  Patient states she is unable to have MRIs performed.  PT/OT for evaluation and treatment  Social work consult for discharge planning/rehab  Q8 vitals  Diabetic/cardiac diet  CBC, BMP in the a.m.  -Patient to follow-up with pain management clinic next week as scheduled and also with her spine specialist next Wednesday.    #Diabetes mellitus  Sliding scale insulin  ACHS Accu-Cheks  Hold home oral antidiabetic medications    Continue home medications as appropriate    Chronic conditions:  Type II DM, HTN, lumbar stenosis, chronic right hip pain, trochanteric bursitis of the right hip, history of seizures, anxiety    #DVT prophylaxis  Lovenox  Ambulation as tolerated       I spent 60 minutes in the professional and overall care of this patient.    Greyson Aguila PA-C

## 2025-02-28 NOTE — PROGRESS NOTES
Physical Therapy    Physical Therapy Evaluation    Patient Name: Cristal Pollard  MRN: [id number]  Today's Date: 2/28/2025   Time Calculation  Start Time: 0859  Stop Time: 0920  Time Calculation (min): 21 min  733/733-A    Assessment/Plan   PT Assessment  PT Assessment Results: Decreased strength, Decreased endurance, Impaired balance, Decreased mobility, Pain, Impaired sensation  Rehab Prognosis: Good  Evaluation/Treatment Tolerance: Patient limited by pain  Medical Staff Made Aware: Yes  Assessment Comment: Pt presents /c above impairments and decline from functional baseline 2nd RLE pain, altered mobility patterns. [facility] and maximize functional mobility.  End of Session Patient Position: Bed, 2 rail up, Alarm off, not on at start of session  IP OR SWING BED PT PLAN  Inpatient or Swing Bed: Inpatient  PT Plan  Treatment/Interventions: Bed mobility, Transfer training, Gait training, Balance training, Neuromuscular re-education, Strengthening, Endurance training, Therapeutic exercise, Therapeutic activity, Stair training  PT Plan: Ongoing PT  PT Frequency: 3 times per week  PT Discharge Recommendations: Low intensity level of continued care  Equipment Recommended upon Discharge: Wheeled walker  PT - OK to Discharge: Yes    Subjective     Current Problem:  1. Right hip pain        2. Low back pain, unspecified back pain laterality, unspecified chronicity, unspecified whether sciatica present          Patient Active Problem List   Diagnosis    Anxiety disorder    Depression, recurrent (CMS-HCC)    Depressive personality disorder    Neck strain    Post traumatic stress disorder (PTSD)    Whiplash injury to neck    Stress reaction, chronic    Benign paroxysmal positional vertigo due to bilateral vestibular disorder    Chronic neck pain    Cervical radiculopathy due to trauma    Bulging of cervical intervertebral disc    Cervical paraspinous muscle spasm     Bilateral dry eyes    Soft tissue mass    Grief reaction    Hypercholesteremia    Hypertension    Insomnia    Vestibular dysfunction of both ears    Visual changes    Vitamin D deficiency    Chest pain    Headache    Central stenosis of spinal canal    DJD (degenerative joint disease) of cervical spine    Facet arthropathy, cervical    HNP (herniated nucleus pulposus), cervical    Lumbar facet arthropathy    Right lumbosacral radiculopathy    Spinal stenosis of lumbar region with neurogenic claudication    Xerosis cutis    Weakness of both upper extremities    Type 2 diabetes mellitus    Status post surgery    Shoulder tendinitis, right    Rotator cuff syndrome of right shoulder    Short-term memory loss    Seizure disorder (Multi)    Rosacea, unspecified    PVD (posterior vitreous detachment), left eye    Psychological factor affecting physical condition    Poikiloderma of [place]    Panic attacks    Palpitations    Pain disorder associated with psychological and physical factors    Other melanin hyperpigmentation    Occipital neuralgia    Neoplasm of unspecified behavior of bone, soft tissue, and skin    MGD (meibomian gland disease)    Melanocytic nevi of unspecified eyelid, including canthus    Melanocytic nevi of unspecified ear and external auricular canal    Melanocytic nevi of unspecified upper limb, including shoulder    Melanocytic nevi of unspecified part of face    Melanocytic nevi of unspecified lower limb, including hip    Melanocytic nevi of trunk    Melanocytic nevi of scalp and neck    Medial epicondylitis of right elbow    Late effects of CVA (cerebrovascular accident)    Internal impingement of right shoulder    History of colon polyps    Hemangioma of skin and subcutaneous tissue    Benign lipomatous neoplasm of skin and subcutaneous tissue of right arm    Generalized tonic clonic epilepsy (Multi)    Fatigue    Elevated blood sugar    Dyspepsia    DDD (degenerative disc disease), lumbosacral     Carpal tunnel syndrome on right    Atrial myxoma (HHS-HCC)    Other skin changes due to chronic exposure to nonionizing radiation    Other seborrheic keratosis    Erythema intertrigo    Actinic keratosis    Class 1 obesity due to excess calories without serious comorbidity with body mass index (BMI) of 30.0 to 30.9 in adult    Cervical radiculopathy    Chronic right hip pain    Difficulty in walking    Diabetes mellitus (Multi)       General Visit Information:  General  Reason for Referral: PT eval and treat  Referred By: Ayla  Past Medical History Relevant to Rehab: DM, HTN, GERD, lumbar stenosis  Co-Treatment: OT  Co-Treatment Reason: possible two person assist, maximize functional mobility  Prior to Session Communication: Bedside nurse  Patient Position Received: Bed, 2 rail up, Alarm off, not on at start of session  General Comment: Pt presents with R hip/knee and LBP. Pt unable to WB RLE, n/t. Pt received an injection to R hip last Tuesday and states pain worsening since. CT pelvis, CT right hip, and CT lumbar spine unremarkable for acute processes. Pt cleared for therapy by nursing. Pt supine, agreeable.    Home Living:  Home Living  Home Living Comments: Pt and s/o in single story home. 5STE /c rail.    Prior Level of Function:  Prior Function Per Pt/Caregiver Report  Prior Function Comments: Indep /c all ADL/IADLs at baseline. Denies falls or AD use prior. Pt drives.    Precautions:  Precautions  Precautions Comment: falls       Objective     Pain:  Pain Assessment  Pain Assessment: 0-10  0-10 (Numeric) Pain Score: 5 - Moderate pain (Pt c/o continued RLE pain; intermittent; meds in place)    Cognition:  Cognition  Overall Cognitive Status: Within Functional Limits    General Assessments:  General Observation  General Observation: activity orders: ambulate /c A as tolerated lines: piv  skin integrity: WFL   Activity Tolerance  Endurance: Decreased tolerance for upright activites  Sensation  Sensation  Comment: reports intermittent n/t RLE  Strength  Strength Comments: BLE at least 3/5           Dynamic Sitting Balance  Dynamic Sitting-Comments: G  Dynamic Standing Balance  Dynamic Standing-Comments: F+    Functional Assessments:     Bed Mobility  Bed Mobility: Yes  Bed Mobility 1  Bed Mobility Comments 1: Supine<>Sit /c SBA, UE support  Transfers  Transfer: Yes  Transfer 1  Trials/Comments 1: Sit<>Stand /c CGA-SBA, min VCs for UE placement.  Ambulation/Gait Training  Ambulation/Gait Training Performed:  (Pt ambulates 15'x2 WW, CGA-SBA. Pt ambulates /c slightly antalgic pattern, narrow ANNIE, step through pattern and discontinuous movements.)          Outcome Measures:     [facility] Basic Mobility  Turning from your back to your side while in a flat bed without using bedrails: None  Moving from lying on your back to sitting on the side of a flat bed without using bedrails: A little  Moving to and from bed to chair (including a wheelchair): A little  Standing up from a chair using your arms (e.g. wheelchair or bedside chair): A little  To walk in hospital room: A little  Climbing 3-5 steps with railing: A lot  Basic Mobility - Total Score: 18                   Goals:  Encounter Problems       Encounter Problems (Active)       PT Problem       STG - Pt will transition supine <> sitting with mod I (Progressing)       Start:  02/28/25    Expected End:  03/14/25            STG - Pt will transfer STS with mod I (Progressing)       Start:  02/28/25    Expected End:  03/14/25            STG - Pt will amb >/=125' using LRAD with mod I (Progressing)       Start:  02/28/25    Expected End:  03/14/25            STG -  Pt will navigate 4 stairs using rail with mod I (Progressing)       Start:  02/28/25    Expected End:  03/14/25                 Education Documentation  Mobility Training, taught by Dianne Segura, PT at 2/28/2025 12:57 PM.  Learner: Patient  Readiness: Acceptance  Method: Explanation  Response: Verbalizes  Understanding, Needs Reinforcement    Education Comments  No comments found.

## 2025-03-01 VITALS
TEMPERATURE: 96.8 F | DIASTOLIC BLOOD PRESSURE: 55 MMHG | OXYGEN SATURATION: 93 % | HEART RATE: 70 BPM | BODY MASS INDEX: 29.32 KG/M2 | SYSTOLIC BLOOD PRESSURE: 104 MMHG | RESPIRATION RATE: 18 BRPM | HEIGHT: 65 IN | WEIGHT: 176 LBS

## 2025-03-01 PROBLEM — M25.551 RIGHT HIP PAIN: Status: ACTIVE | Noted: 2025-03-01

## 2025-03-01 PROBLEM — M54.10 BACK PAIN WITH RADICULOPATHY: Status: ACTIVE | Noted: 2025-03-01

## 2025-03-01 LAB
ERYTHROCYTE [DISTWIDTH] IN BLOOD BY AUTOMATED COUNT: 12.7 % (ref 11.5–14.5)
GLUCOSE BLD MANUAL STRIP-MCNC: 135 MG/DL (ref 74–99)
GLUCOSE BLD MANUAL STRIP-MCNC: 183 MG/DL (ref 74–99)
GLUCOSE BLD MANUAL STRIP-MCNC: 210 MG/DL (ref 74–99)
GLUCOSE BLD MANUAL STRIP-MCNC: 247 MG/DL (ref 74–99)
HCT VFR BLD AUTO: 42.6 % (ref 36–46)
HGB BLD-MCNC: 14.4 G/DL (ref 12–16)
HOLD SPECIMEN: NORMAL
MCH RBC QN AUTO: 30.8 PG (ref 26–34)
MCHC RBC AUTO-ENTMCNC: 33.8 G/DL (ref 32–36)
MCV RBC AUTO: 91 FL (ref 80–100)
NRBC BLD-RTO: 0 /100 WBCS (ref 0–0)
PLATELET # BLD AUTO: 271 X10*3/UL (ref 150–450)
RBC # BLD AUTO: 4.67 X10*6/UL (ref 4–5.2)
WBC # BLD AUTO: 13 X10*3/UL (ref 4.4–11.3)

## 2025-03-01 PROCEDURE — 2500000001 HC RX 250 WO HCPCS SELF ADMINISTERED DRUGS (ALT 637 FOR MEDICARE OP): Performed by: PHYSICIAN ASSISTANT

## 2025-03-01 PROCEDURE — 2500000004 HC RX 250 GENERAL PHARMACY W/ HCPCS (ALT 636 FOR OP/ED): Performed by: NURSE PRACTITIONER

## 2025-03-01 PROCEDURE — 2500000005 HC RX 250 GENERAL PHARMACY W/O HCPCS: Performed by: PHYSICIAN ASSISTANT

## 2025-03-01 PROCEDURE — 36415 COLL VENOUS BLD VENIPUNCTURE: CPT | Performed by: NURSE PRACTITIONER

## 2025-03-01 PROCEDURE — 99238 HOSP IP/OBS DSCHRG MGMT 30/<: CPT | Performed by: STUDENT IN AN ORGANIZED HEALTH CARE EDUCATION/TRAINING PROGRAM

## 2025-03-01 PROCEDURE — 2500000004 HC RX 250 GENERAL PHARMACY W/ HCPCS (ALT 636 FOR OP/ED): Mod: JZ | Performed by: PHYSICIAN ASSISTANT

## 2025-03-01 PROCEDURE — G0378 HOSPITAL OBSERVATION PER HR: HCPCS

## 2025-03-01 PROCEDURE — 96376 TX/PRO/DX INJ SAME DRUG ADON: CPT

## 2025-03-01 PROCEDURE — 82947 ASSAY GLUCOSE BLOOD QUANT: CPT

## 2025-03-01 PROCEDURE — 85027 COMPLETE CBC AUTOMATED: CPT | Performed by: NURSE PRACTITIONER

## 2025-03-01 PROCEDURE — 2500000001 HC RX 250 WO HCPCS SELF ADMINISTERED DRUGS (ALT 637 FOR MEDICARE OP): Performed by: NURSE PRACTITIONER

## 2025-03-01 RX ORDER — CYCLOBENZAPRINE HCL 10 MG
5 TABLET ORAL 3 TIMES DAILY PRN
Status: DISCONTINUED | OUTPATIENT
Start: 2025-03-01 | End: 2025-03-02 | Stop reason: HOSPADM

## 2025-03-01 RX ADMIN — Medication 3 MG: at 20:52

## 2025-03-01 RX ADMIN — HYDROMORPHONE HYDROCHLORIDE 1 MG: 1 INJECTION, SOLUTION INTRAMUSCULAR; INTRAVENOUS; SUBCUTANEOUS at 22:54

## 2025-03-01 RX ADMIN — HYDROMORPHONE HYDROCHLORIDE 1 MG: 1 INJECTION, SOLUTION INTRAMUSCULAR; INTRAVENOUS; SUBCUTANEOUS at 03:09

## 2025-03-01 RX ADMIN — LAMOTRIGINE 150 MG: 100 TABLET ORAL at 20:51

## 2025-03-01 RX ADMIN — METHYLPREDNISOLONE 20 MG: 4 TABLET ORAL at 09:06

## 2025-03-01 RX ADMIN — FLUOXETINE HYDROCHLORIDE 40 MG: 20 CAPSULE ORAL at 20:51

## 2025-03-01 RX ADMIN — HYDROMORPHONE HYDROCHLORIDE 1 MG: 1 INJECTION, SOLUTION INTRAMUSCULAR; INTRAVENOUS; SUBCUTANEOUS at 09:14

## 2025-03-01 RX ADMIN — HYDROMORPHONE HYDROCHLORIDE 1 MG: 1 INJECTION, SOLUTION INTRAMUSCULAR; INTRAVENOUS; SUBCUTANEOUS at 13:56

## 2025-03-01 RX ADMIN — LISINOPRIL 10 MG: 10 TABLET ORAL at 09:05

## 2025-03-01 RX ADMIN — HYDROCHLOROTHIAZIDE 12.5 MG: 12.5 TABLET ORAL at 09:05

## 2025-03-01 RX ADMIN — CYCLOBENZAPRINE 5 MG: 10 TABLET, FILM COATED ORAL at 09:13

## 2025-03-01 RX ADMIN — LAMOTRIGINE 100 MG: 100 TABLET ORAL at 09:05

## 2025-03-01 RX ADMIN — HYDROMORPHONE HYDROCHLORIDE 1 MG: 1 INJECTION, SOLUTION INTRAMUSCULAR; INTRAVENOUS; SUBCUTANEOUS at 18:47

## 2025-03-01 ASSESSMENT — COGNITIVE AND FUNCTIONAL STATUS - GENERAL
STANDING UP FROM CHAIR USING ARMS: A LITTLE
CLIMB 3 TO 5 STEPS WITH RAILING: A LOT
TURNING FROM BACK TO SIDE WHILE IN FLAT BAD: A LITTLE
MOVING TO AND FROM BED TO CHAIR: A LITTLE
DRESSING REGULAR LOWER BODY CLOTHING: A LITTLE
STANDING UP FROM CHAIR USING ARMS: A LITTLE
DAILY ACTIVITIY SCORE: 20
MOVING FROM LYING ON BACK TO SITTING ON SIDE OF FLAT BED WITH BEDRAILS: A LITTLE
DRESSING REGULAR UPPER BODY CLOTHING: A LITTLE
WALKING IN HOSPITAL ROOM: A LITTLE
DAILY ACTIVITIY SCORE: 20
MOVING TO AND FROM BED TO CHAIR: A LITTLE
MOVING FROM LYING ON BACK TO SITTING ON SIDE OF FLAT BED WITH BEDRAILS: A LITTLE
MOBILITY SCORE: 17
MOBILITY SCORE: 17
TOILETING: A LITTLE
TURNING FROM BACK TO SIDE WHILE IN FLAT BAD: A LITTLE
DRESSING REGULAR LOWER BODY CLOTHING: A LITTLE
TOILETING: A LITTLE
CLIMB 3 TO 5 STEPS WITH RAILING: A LOT
WALKING IN HOSPITAL ROOM: A LITTLE
HELP NEEDED FOR BATHING: A LITTLE
HELP NEEDED FOR BATHING: A LITTLE
DRESSING REGULAR UPPER BODY CLOTHING: A LITTLE

## 2025-03-01 ASSESSMENT — PAIN DESCRIPTION - ORIENTATION
ORIENTATION: RIGHT

## 2025-03-01 ASSESSMENT — PAIN SCALES - GENERAL
PAINLEVEL_OUTOF10: 8
PAINLEVEL_OUTOF10: 8
PAINLEVEL_OUTOF10: 0 - NO PAIN
PAINLEVEL_OUTOF10: 8
PAINLEVEL_OUTOF10: 7
PAINLEVEL_OUTOF10: 0 - NO PAIN
PAINLEVEL_OUTOF10: 8
PAINLEVEL_OUTOF10: 8
PAINLEVEL_OUTOF10: 0 - NO PAIN
PAINLEVEL_OUTOF10: 6

## 2025-03-01 ASSESSMENT — PAIN - FUNCTIONAL ASSESSMENT
PAIN_FUNCTIONAL_ASSESSMENT: 0-10

## 2025-03-01 ASSESSMENT — PAIN DESCRIPTION - LOCATION
LOCATION: LEG

## 2025-03-01 NOTE — CARE PLAN
This provider spoke with Neurosurgeon Volodymyr Pickard MD, PhD via secure chat    I will initiated transfer to higher level of care to [facility]   Per his request as patient will require MRI with sedation     Attending Dr Elías mills   DC order competed by this NP

## 2025-03-01 NOTE — CARE PLAN
The patient's goals for the shift include  pain at a tolerable level    The clinical goals for the shift include pt to remain free from falls until end of shift

## 2025-03-01 NOTE — CARE PLAN
The clinical goals for the shift include pt to remain free from falls until end of shift    Problem: Fall/Injury  Goal: Not fall by end of shift  Outcome: Progressing

## 2025-03-01 NOTE — DISCHARGE SUMMARY
Cristal Pollard is a 64 y.o. female       Subjective        Pt will be transferred to [facility] under   Neurosurgeon Volodymyr Pickard MD, PhD    Objective     Last Recorded Vitals  /60 (BP Location: Left arm, Patient Position: Lying)   Pulse 59   Temp 35.9 °C (96.6 °F) (Temporal)   Resp 17   Wt 79.8 kg (176 lb)   SpO2 94%   Intake/Output last 3 Shifts:  No intake or output data in the 24 hours ending 03/01/25 1155    Admission Weight  Weight: 79.8 kg (176 lb) (02/27/25 1356)    Daily Weight  02/27/25 : 79.8 kg (176 lb)    Image Results  CT lumbar spine wo IV contrast  Narrative: Interpreted By:  Ponce Denney,   STUDY:  CT LUMBAR SPINE WO IV CONTRAST; 2/27/2025 3:11 pm      INDICATION:  Signs/Symptoms:Back pain;      COMPARISON:  None      ACCESSION NUMBER(S):  [id number]      ORDERING CLINICIAN:  ANDREA LOU      TECHNIQUE:  Contiguous axial images of the lumbar spine were performed. Coronal  and sagittal reformatted images were also obtained. All CT  examinations are performed with 1 or more of the following dose  reduction techniques: Automated exposure control, adjustment of mA  and/or kv according to patient's size, or use of iterative  reconstruction techniques.      FINDINGS:  No evidence for acute fracture. Levoscoliosis is present. Advanced  facet degenerative changes. There is a normal lumbar lordosis however  there is minimal degenerative anterolisthesis of L3 on L4 and L4 on  L5.      The vertebral bodies are normal in height and configuration.      At L5-S1, there is moderate-severe disc space narrowing, vacuum disc  phenomena and diffuse disc bulging. No evidence for significant bony  spinal canal stenosis. However there is severe left neural foramina  stenosis and moderate right neural foramina stenosis.      At L4-5, there is severe disc space narrowing, vacuum disc phenomena,  diffuse disc bulging, suggestion of severe canal stenosis due to disc  bulging as well as severe facet and  ligamentum flavum hypertrophy.  There is moderate-severe bilateral neural foramina stenosis at this  level.      At L3-4, there is moderate disc space narrowing asymmetrically  worsened towards the right with vacuum disc phenomena and diffuse  disc bulging. There is moderate-severe canal stenosis.  Moderate-severe right neural foramina stenosis and mild-moderate left  neural foramina stenosis.      At L2-3, there is facet and ligamentum flavum hypertrophy disc space  narrowing asymmetrically greater toward the right, diffuse disc  bulging. Moderate to moderate-severe canal stenosis. Moderate-severe  right neural foramina stenosis. No significant left neural foramina  stenosis.      At L1-2 there is vacuum disc phenomena and asymmetric disc space  narrowing towards the right, diffuse disc bulging, mild to  mild-moderate lateral recess stenosis and at least mild canal  stenosis. Moderate-severe right neural foramina stenosis.  Mild-moderate left neural foramina stenosis.      At T12-L1, there is diffuse disc bulging. No evidence for high-grade  spinal canal stenosis. Mild-moderate left neural foramina stenosis.      The SI joints show degenerative gas but otherwise intact.      Impression: No acute fracture or traumatic malalignment.      Advanced chronic degenerative changes as described in the body of the  report with multilevel high-grade spinal canal stenosis and  high-grade neural foramina stenosis.          Signed by: Ponce Denney 2/27/2025 4:03 PM  Dictation workstation:   [id number]  CT pelvis wo IV contrast, CT hip right wo IV contrast  Narrative: Interpreted By:  Yosvany Christopher,   STUDY:  CT PELVIS WO IV CONTRAST; CT HIP RIGHT WO IV CONTRAST; ;  2/27/2025  3:11 pm      INDICATION:  Signs/Symptoms:Right hip pain.          COMPARISON:  Previous CT of the abdomen and pelvis March 13, 2024      Plain film radiographs of the right hip performed on February 11, 2025          ACCESSION NUMBER(S):  [id number];  [id number]      ORDERING CLINICIAN:  ANDREA LOU      TECHNIQUE:  Multiple thin-section axial images of the right hip reconstructed in  the sagittal and coronal plane.      Additional dedicated thin-section axial images of the pelvis also  performed.          FINDINGS:  Moderate osteoarthritis of the right hip.      Similar appearance left hip.      Small bilateral trochanteric spurs.      No evidence of right hip fracture.      No bony destructive lesions.      Advanced lower lumbar degenerative change.      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      No muscular attenuation changes.      No evidence of a soft tissue mass.      No focal fluid collections.      No sizeable effusion.              Surgical clips from previous hernia repair along the anterior  abdominal wall.          Impression: Degenerative changes bilateral hips, sacroiliac joints, and lower  lumbar spine.      No acute findings right hip.          MACRO:  None      Signed by: Yosvany Christopher 2/27/2025 3:24 PM  Dictation workstation:   [id number]      Physical Exam  Vitals (pt declined full exam) reviewed.   Cardiovascular:      Rate and Rhythm: Normal rate and regular rhythm.      Pulses: Normal pulses.      Heart sounds: Normal heart sounds.   Pulmonary:      Effort: Pulmonary effort is normal.      Breath sounds: Normal breath sounds.   Abdominal:      General: Bowel sounds are normal.      Palpations: Abdomen is soft.   Neurological:      Mental Status: She is oriented to person, place, and time.   Psychiatric:         Thought Content: Thought content normal.         Judgment: Judgment normal.      Comments: Appears anxious/ in pain         Relevant Results  Results for orders placed or performed during the hospital encounter of 02/27/25 (from the past 96 hours)   CBC and Auto Differential   Result Value Ref Range    WBC 9.8 4.4 - 11.3 x10*3/uL    nRBC 0.0 0.0 - 0.0 /100 WBCs    RBC 4.66 4.00 - 5.20  x10*6/uL    Hemoglobin 14.3 12.0 - 16.0 g/dL    Hematocrit 42.3 36.0 - 46.0 %    MCV 91 80 - 100 fL    MCH 30.7 26.0 - 34.0 pg    MCHC 33.8 32.0 - 36.0 g/dL    RDW 12.6 11.5 - 14.5 %    Platelets 243 150 - 450 x10*3/uL    Neutrophils % 66.9 40.0 - 80.0 %    Immature Granulocytes %, Automated 0.6 0.0 - 0.9 %    Lymphocytes % 25.7 13.0 - 44.0 %    Monocytes % 5.3 2.0 - 10.0 %    Eosinophils % 0.8 0.0 - 6.0 %    Basophils % 0.7 0.0 - 2.0 %    Neutrophils Absolute 6.57 1.20 - 7.70 x10*3/uL    Immature Granulocytes Absolute, Automated 0.06 0.00 - 0.70 x10*3/uL    Lymphocytes Absolute 2.52 1.20 - 4.80 x10*3/uL    Monocytes Absolute 0.52 0.10 - 1.00 x10*3/uL    Eosinophils Absolute 0.08 0.00 - 0.70 x10*3/uL    Basophils Absolute 0.07 0.00 - 0.10 x10*3/uL   Comprehensive metabolic panel   Result Value Ref Range    Glucose 134 (H) 74 - 99 mg/dL    Sodium 138 136 - 145 mmol/L    Potassium 3.6 3.5 - 5.3 mmol/L    Chloride 106 98 - 107 mmol/L    Bicarbonate 23 21 - 32 mmol/L    Anion Gap 13 10 - 20 mmol/L    Urea Nitrogen 17 6 - 23 mg/dL    Creatinine 0.70 0.50 - 1.05 mg/dL    eGFR >90 >60 mL/min/1.73m*2    Calcium 9.6 8.6 - 10.3 mg/dL    Albumin 4.2 3.4 - 5.0 g/dL    Alkaline Phosphatase 52 33 - 136 U/L    Total Protein 6.8 6.4 - 8.2 g/dL    AST 16 9 - 39 U/L    Bilirubin, Total 0.5 0.0 - 1.2 mg/dL    ALT 20 7 - 45 U/L   Sedimentation rate, automated   Result Value Ref Range    Sedimentation Rate 9 0 - 30 mm/h   POCT GLUCOSE   Result Value Ref Range    POCT Glucose 169 (H) 74 - 99 mg/dL   CBC   Result Value Ref Range    WBC 9.4 4.4 - 11.3 x10*3/uL    nRBC 0.0 0.0 - 0.0 /100 WBCs    RBC 4.54 4.00 - 5.20 x10*6/uL    Hemoglobin 13.9 12.0 - 16.0 g/dL    Hematocrit 43.7 36.0 - 46.0 %    MCV 96 80 - 100 fL    MCH 30.6 26.0 - 34.0 pg    MCHC 31.8 (L) 32.0 - 36.0 g/dL    RDW 13.1 11.5 - 14.5 %    Platelets 182 150 - 450 x10*3/uL   Basic metabolic panel   Result Value Ref Range    Glucose 125 (H) 74 - 99 mg/dL    Sodium 138 136 - 145  mmol/L    Potassium 4.1 3.5 - 5.3 mmol/L    Chloride 105 98 - 107 mmol/L    Bicarbonate 24 21 - 32 mmol/L    Anion Gap 13 10 - 20 mmol/L    Urea Nitrogen 16 6 - 23 mg/dL    Creatinine 0.74 0.50 - 1.05 mg/dL    eGFR 90 >60 mL/min/1.73m*2    Calcium 9.3 8.6 - 10.3 mg/dL   POCT GLUCOSE   Result Value Ref Range    POCT Glucose 135 (H) 74 - 99 mg/dL   POCT GLUCOSE   Result Value Ref Range    POCT Glucose 151 (H) 74 - 99 mg/dL   POCT GLUCOSE   Result Value Ref Range    POCT Glucose 173 (H) 74 - 99 mg/dL   POCT GLUCOSE   Result Value Ref Range    POCT Glucose 222 (H) 74 - 99 mg/dL   POCT GLUCOSE   Result Value Ref Range    POCT Glucose 135 (H) 74 - 99 mg/dL   CBC   Result Value Ref Range    WBC 13.0 (H) 4.4 - 11.3 x10*3/uL    nRBC 0.0 0.0 - 0.0 /100 WBCs    RBC 4.67 4.00 - 5.20 x10*6/uL    Hemoglobin 14.4 12.0 - 16.0 g/dL    Hematocrit 42.6 36.0 - 46.0 %    MCV 91 80 - 100 fL    MCH 30.8 26.0 - 34.0 pg    MCHC 33.8 32.0 - 36.0 g/dL    RDW 12.7 11.5 - 14.5 %    Platelets 271 150 - 450 x10*3/uL   PST Top   Result Value Ref Range    Extra Tube Hold for add-ons.       Scheduled medications  enoxaparin, 40 mg, subcutaneous, q24h  FLUoxetine, 40 mg, oral, Nightly  lisinopril, 10 mg, oral, Daily   And  hydroCHLOROthiazide, 12.5 mg, oral, Daily  insulin lispro, 0-10 Units, subcutaneous, TID AC  lamoTRIgine, 100 mg, oral, q AM  lamoTRIgine, 150 mg, oral, Nightly  [START ON 3/2/2025] methylPREDNISolone, 16 mg, oral, Once   Followed by  [START ON 3/3/2025] methylPREDNISolone, 12 mg, oral, Once   Followed by  [START ON 3/4/2025] methylPREDNISolone, 8 mg, oral, Once   Followed by  [START ON 3/5/2025] methylPREDNISolone, 4 mg, oral, Once  pantoprazole, 40 mg, oral, Daily before breakfast  psyllium, 1 packet, oral, Daily      Continuous medications     PRN medications  PRN medications: acetaminophen **OR** acetaminophen **OR** acetaminophen, cyclobenzaprine, dextrose, dextrose, glucagon, glucagon, HYDROmorphone **OR** HYDROmorphone,  melatonin, ondansetron **OR** ondansetron   CT lumbar spine wo IV contrast    Result Date: 2/27/2025  Interpreted By:  Ponce Denney, STUDY: CT LUMBAR SPINE WO IV CONTRAST; 2/27/2025 3:11 pm   INDICATION: Signs/Symptoms:Back pain;   COMPARISON: None   ACCESSION NUMBER(S): [id number]   ORDERING CLINICIAN: ANDREA LOU   TECHNIQUE: Contiguous axial images of the lumbar spine were performed. Coronal and sagittal reformatted images were also obtained. All CT examinations are performed with 1 or more of the following dose reduction techniques: Automated exposure control, adjustment of mA and/or kv according to patient's size, or use of iterative reconstruction techniques.   FINDINGS: No evidence for acute fracture. Levoscoliosis is present. Advanced facet degenerative changes. There is a normal lumbar lordosis however there is minimal degenerative anterolisthesis of L3 on L4 and L4 on L5.   The vertebral bodies are normal in height and configuration.   At L5-S1, there is moderate-severe disc space narrowing, vacuum disc phenomena and diffuse disc bulging. No evidence for significant bony spinal canal stenosis. However there is severe left neural foramina stenosis and moderate right neural foramina stenosis.   At L4-5, there is severe disc space narrowing, vacuum disc phenomena, diffuse disc bulging, suggestion of severe canal stenosis due to disc bulging as well as severe facet and ligamentum flavum hypertrophy. There is moderate-severe bilateral neural foramina stenosis at this level.   At L3-4, there is moderate disc space narrowing asymmetrically worsened towards the right with vacuum disc phenomena and diffuse disc bulging. There is moderate-severe canal stenosis. Moderate-severe right neural foramina stenosis and mild-moderate left neural foramina stenosis.   At L2-3, there is facet and ligamentum flavum hypertrophy disc space narrowing asymmetrically greater toward the right, diffuse disc bulging. Moderate  to moderate-severe canal stenosis. Moderate-severe right neural foramina stenosis. No significant left neural foramina stenosis.   At L1-2 there is vacuum disc phenomena and asymmetric disc space narrowing towards the right, diffuse disc bulging, mild to mild-moderate lateral recess stenosis and at least mild canal stenosis. Moderate-severe right neural foramina stenosis. Mild-moderate left neural foramina stenosis.   At T12-L1, there is diffuse disc bulging. No evidence for high-grade spinal canal stenosis. Mild-moderate left neural foramina stenosis.   The SI joints show degenerative gas but otherwise intact.       No acute fracture or traumatic malalignment.   Advanced chronic degenerative changes as described in the body of the report with multilevel high-grade spinal canal stenosis and high-grade neural foramina stenosis.     Signed by: Ponce Denney 2/27/2025 4:03 PM Dictation workstation:   [id number]    CT pelvis wo IV contrast    Result Date: 2/27/2025  Interpreted By:  Yosvany Christopher, STUDY: CT PELVIS WO IV CONTRAST; CT HIP RIGHT WO IV CONTRAST; ;  2/27/2025 3:11 pm   INDICATION: Signs/Symptoms:Right hip pain.     COMPARISON: Previous CT of the abdomen and pelvis March 13, 2024   Plain film radiographs of the right hip performed on February 11, 2025     ACCESSION NUMBER(S): [id number]; [id number]   ORDERING CLINICIAN: ANDREA LOU   TECHNIQUE: Multiple thin-section axial images of the right hip reconstructed in the sagittal and coronal plane.   Additional dedicated thin-section axial images of the pelvis also performed.     FINDINGS: Moderate osteoarthritis of the right hip.   Similar appearance left hip.   Small bilateral trochanteric spurs.   No evidence of right hip fracture.   No bony destructive lesions.   Advanced lower lumbar degenerative change.   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   No muscular attenuation changes.   No evidence of a soft tissue  mass.   No focal fluid collections.   No sizeable effusion.       Surgical clips from previous hernia repair along the anterior abdominal wall.         Degenerative changes bilateral hips, sacroiliac joints, and lower lumbar spine.   No acute findings right hip.     MACRO: None   Signed by: Yosvany Christopher 2/27/2025 3:24 PM Dictation workstation:   [id number]    CT hip right wo IV contrast    Result Date: 2/27/2025  Interpreted By:  Yosvany Christopher, STUDY: CT PELVIS WO IV CONTRAST; CT HIP RIGHT WO IV CONTRAST; ;  2/27/2025 3:11 pm   INDICATION: Signs/Symptoms:Right hip pain.     COMPARISON: Previous CT of the abdomen and pelvis March 13, 2024   Plain film radiographs of the right hip performed on February 11, 2025     ACCESSION NUMBER(S): [id number]; [id number]   ORDERING CLINICIAN: ANDREA LOU   TECHNIQUE: Multiple thin-section axial images of the right hip reconstructed in the sagittal and coronal plane.   Additional dedicated thin-section axial images of the pelvis also performed.     FINDINGS: Moderate osteoarthritis of the right hip.   Similar appearance left hip.   Small bilateral trochanteric spurs.   No evidence of right hip fracture.   No bony destructive lesions.   Advanced lower lumbar degenerative change.   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   No muscular attenuation changes.   No evidence of a soft tissue mass.   No focal fluid collections.   No sizeable effusion.       Surgical clips from previous hernia repair along the anterior abdominal wall.         Degenerative changes bilateral hips, sacroiliac joints, and lower lumbar spine.   No acute findings right hip.     MACRO: None   Signed by: Yosvany Christopher 2/27/2025 3:24 PM Dictation workstation:   [id number]             Assessment/Plan                  Assessment & Plan  Chronic right hip pain    Difficulty in walking    Diabetes mellitus (Multi)    Right hip pain    Back pain with radiculopathy    Worsening  back pain   Requiring transfer to [facility] for MRI with sedation   With FU of care   With Neurosurgeon Volodymyr Pickard MD, PhD    Subha Valladares, APRN-CNP

## 2025-03-01 NOTE — PROGRESS NOTES
Cristal Pollard is a 64 y.o. female on day 0 of admission presenting with Chronic right hip pain.      Subjective   Per patient neurosurgical team is planning transfer to [facility] to MRI due to need for general sedation?   lumbar MRI has been order due to  of her new onset low back pain and radicular symptoms. ( She is  refusing reevaluation by this provider; states the Neurosurgical team already saw her this am)  ( There is no note from Neurosurgical  team this am to confirm [place] [facility] :   Note from  yesterday;   Patient has pacemaker wires;  they are compatible  with  MRI patient does claustrophobia therefore this will be performed with sedation)  States her pain is so intense   - she denies any loss of bowel or bladder function          Objective     Last Recorded Vitals  /60 (BP Location: Left arm, Patient Position: Lying)   Pulse 59   Temp 35.9 °C (96.6 °F) (Temporal)   Resp 17   Wt 79.8 kg (176 lb)   SpO2 94%   Intake/Output last 3 Shifts:  No intake or output data in the 24 hours ending 03/01/25 1117    Admission Weight  Weight: 79.8 kg (176 lb) (02/27/25 1356)    Daily Weight  02/27/25 : 79.8 kg (176 lb)    Image Results  CT lumbar spine wo IV contrast  Narrative: Interpreted By:  Ponce Denney,   STUDY:  CT LUMBAR SPINE WO IV CONTRAST; 2/27/2025 3:11 pm      INDICATION:  Signs/Symptoms:Back pain;      COMPARISON:  None      ACCESSION NUMBER(S):  [id number]      ORDERING CLINICIAN:  ANDREA LOU      TECHNIQUE:  Contiguous axial images of the lumbar spine were performed. Coronal  and sagittal reformatted images were also obtained. All CT  examinations are performed with 1 or more of the following dose  reduction techniques: Automated exposure control, adjustment of mA  and/or kv according to patient's size, or use of iterative  reconstruction techniques.      FINDINGS:  No evidence for acute fracture. Levoscoliosis is present. Advanced  facet degenerative changes.  There is a normal lumbar lordosis however  there is minimal degenerative anterolisthesis of L3 on L4 and L4 on  L5.      The vertebral bodies are normal in height and configuration.      At L5-S1, there is moderate-severe disc space narrowing, vacuum disc  phenomena and diffuse disc bulging. No evidence for significant bony  spinal canal stenosis. However there is severe left neural foramina  stenosis and moderate right neural foramina stenosis.      At L4-5, there is severe disc space narrowing, vacuum disc phenomena,  diffuse disc bulging, suggestion of severe canal stenosis due to disc  bulging as well as severe facet and ligamentum flavum hypertrophy.  There is moderate-severe bilateral neural foramina stenosis at this  level.      At L3-4, there is moderate disc space narrowing asymmetrically  worsened towards the right with vacuum disc phenomena and diffuse  disc bulging. There is moderate-severe canal stenosis.  Moderate-severe right neural foramina stenosis and mild-moderate left  neural foramina stenosis.      At L2-3, there is facet and ligamentum flavum hypertrophy disc space  narrowing asymmetrically greater toward the right, diffuse disc  bulging. Moderate to moderate-severe canal stenosis. Moderate-severe  right neural foramina stenosis. No significant left neural foramina  stenosis.      At L1-2 there is vacuum disc phenomena and asymmetric disc space  narrowing towards the right, diffuse disc bulging, mild to  mild-moderate lateral recess stenosis and at least mild canal  stenosis. Moderate-severe right neural foramina stenosis.  Mild-moderate left neural foramina stenosis.      At T12-L1, there is diffuse disc bulging. No evidence for high-grade  spinal canal stenosis. Mild-moderate left neural foramina stenosis.      The SI joints show degenerative gas but otherwise intact.      Impression: No acute fracture or traumatic malalignment.      Advanced chronic degenerative changes as described in the  body of the  report with multilevel high-grade spinal canal stenosis and  high-grade neural foramina stenosis.          Signed by: Ponce Denney 2/27/2025 4:03 PM  Dictation workstation:   [id number]  CT pelvis wo IV contrast, CT hip right wo IV contrast  Narrative: Interpreted By:  Yosvany Christopher,   STUDY:  CT PELVIS WO IV CONTRAST; CT HIP RIGHT WO IV CONTRAST; ;  2/27/2025  3:11 pm      INDICATION:  Signs/Symptoms:Right hip pain.          COMPARISON:  Previous CT of the abdomen and pelvis March 13, 2024      Plain film radiographs of the right hip performed on February 11, 2025          ACCESSION NUMBER(S):  [id number]; [id number]      ORDERING CLINICIAN:  ANDREA LOU      TECHNIQUE:  Multiple thin-section axial images of the right hip reconstructed in  the sagittal and coronal plane.      Additional dedicated thin-section axial images of the pelvis also  performed.          FINDINGS:  Moderate osteoarthritis of the right hip.      Similar appearance left hip.      Small bilateral trochanteric spurs.      No evidence of right hip fracture.      No bony destructive lesions.      Advanced lower lumbar degenerative change.      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      No muscular attenuation changes.      No evidence of a soft tissue mass.      No focal fluid collections.      No sizeable effusion.              Surgical clips from previous hernia repair along the anterior  abdominal wall.          Impression: Degenerative changes bilateral hips, sacroiliac joints, and lower  lumbar spine.      No acute findings right hip.          MACRO:  None      Signed by: Yosvany Christopher 2/27/2025 3:24 PM  Dictation workstation:   [id number]      Physical Exam  Vitals (pt declined full exam) reviewed.   Cardiovascular:      Rate and Rhythm: Normal rate and regular rhythm.      Pulses: Normal pulses.      Heart sounds: Normal heart sounds.   Pulmonary:      Effort: Pulmonary effort  is normal.      Breath sounds: Normal breath sounds.   Abdominal:      General: Bowel sounds are normal.      Palpations: Abdomen is soft.   Neurological:      Mental Status: She is oriented to person, place, and time.   Psychiatric:         Thought Content: Thought content normal.         Judgment: Judgment normal.      Comments: Appears anxious/ in pain         Relevant Results  Results for orders placed or performed during the hospital encounter of 02/27/25 (from the past 96 hours)   CBC and Auto Differential   Result Value Ref Range    WBC 9.8 4.4 - 11.3 x10*3/uL    nRBC 0.0 0.0 - 0.0 /100 WBCs    RBC 4.66 4.00 - 5.20 x10*6/uL    Hemoglobin 14.3 12.0 - 16.0 g/dL    Hematocrit 42.3 36.0 - 46.0 %    MCV 91 80 - 100 fL    MCH 30.7 26.0 - 34.0 pg    MCHC 33.8 32.0 - 36.0 g/dL    RDW 12.6 11.5 - 14.5 %    Platelets 243 150 - 450 x10*3/uL    Neutrophils % 66.9 40.0 - 80.0 %    Immature Granulocytes %, Automated 0.6 0.0 - 0.9 %    Lymphocytes % 25.7 13.0 - 44.0 %    Monocytes % 5.3 2.0 - 10.0 %    Eosinophils % 0.8 0.0 - 6.0 %    Basophils % 0.7 0.0 - 2.0 %    Neutrophils Absolute 6.57 1.20 - 7.70 x10*3/uL    Immature Granulocytes Absolute, Automated 0.06 0.00 - 0.70 x10*3/uL    Lymphocytes Absolute 2.52 1.20 - 4.80 x10*3/uL    Monocytes Absolute 0.52 0.10 - 1.00 x10*3/uL    Eosinophils Absolute 0.08 0.00 - 0.70 x10*3/uL    Basophils Absolute 0.07 0.00 - 0.10 x10*3/uL   Comprehensive metabolic panel   Result Value Ref Range    Glucose 134 (H) 74 - 99 mg/dL    Sodium 138 136 - 145 mmol/L    Potassium 3.6 3.5 - 5.3 mmol/L    Chloride 106 98 - 107 mmol/L    Bicarbonate 23 21 - 32 mmol/L    Anion Gap 13 10 - 20 mmol/L    Urea Nitrogen 17 6 - 23 mg/dL    Creatinine 0.70 0.50 - 1.05 mg/dL    eGFR >90 >60 mL/min/1.73m*2    Calcium 9.6 8.6 - 10.3 mg/dL    Albumin 4.2 3.4 - 5.0 g/dL    Alkaline Phosphatase 52 33 - 136 U/L    Total Protein 6.8 6.4 - 8.2 g/dL    AST 16 9 - 39 U/L    Bilirubin, Total 0.5 0.0 - 1.2 mg/dL    ALT 20  7 - 45 U/L   Sedimentation rate, automated   Result Value Ref Range    Sedimentation Rate 9 0 - 30 mm/h   POCT GLUCOSE   Result Value Ref Range    POCT Glucose 169 (H) 74 - 99 mg/dL   CBC   Result Value Ref Range    WBC 9.4 4.4 - 11.3 x10*3/uL    nRBC 0.0 0.0 - 0.0 /100 WBCs    RBC 4.54 4.00 - 5.20 x10*6/uL    Hemoglobin 13.9 12.0 - 16.0 g/dL    Hematocrit 43.7 36.0 - 46.0 %    MCV 96 80 - 100 fL    MCH 30.6 26.0 - 34.0 pg    MCHC 31.8 (L) 32.0 - 36.0 g/dL    RDW 13.1 11.5 - 14.5 %    Platelets 182 150 - 450 x10*3/uL   Basic metabolic panel   Result Value Ref Range    Glucose 125 (H) 74 - 99 mg/dL    Sodium 138 136 - 145 mmol/L    Potassium 4.1 3.5 - 5.3 mmol/L    Chloride 105 98 - 107 mmol/L    Bicarbonate 24 21 - 32 mmol/L    Anion Gap 13 10 - 20 mmol/L    Urea Nitrogen 16 6 - 23 mg/dL    Creatinine 0.74 0.50 - 1.05 mg/dL    eGFR 90 >60 mL/min/1.73m*2    Calcium 9.3 8.6 - 10.3 mg/dL   POCT GLUCOSE   Result Value Ref Range    POCT Glucose 135 (H) 74 - 99 mg/dL   POCT GLUCOSE   Result Value Ref Range    POCT Glucose 151 (H) 74 - 99 mg/dL   POCT GLUCOSE   Result Value Ref Range    POCT Glucose 173 (H) 74 - 99 mg/dL   POCT GLUCOSE   Result Value Ref Range    POCT Glucose 222 (H) 74 - 99 mg/dL   POCT GLUCOSE   Result Value Ref Range    POCT Glucose 135 (H) 74 - 99 mg/dL   CBC   Result Value Ref Range    WBC 13.0 (H) 4.4 - 11.3 x10*3/uL    nRBC 0.0 0.0 - 0.0 /100 WBCs    RBC 4.67 4.00 - 5.20 x10*6/uL    Hemoglobin 14.4 12.0 - 16.0 g/dL    Hematocrit 42.6 36.0 - 46.0 %    MCV 91 80 - 100 fL    MCH 30.8 26.0 - 34.0 pg    MCHC 33.8 32.0 - 36.0 g/dL    RDW 12.7 11.5 - 14.5 %    Platelets 271 150 - 450 x10*3/uL   PST Top   Result Value Ref Range    Extra Tube Hold for add-ons.      *Note: Due to a large number of results and/or encounters for the requested time period, some results have not been displayed. A complete set of results can be found in Results Review.      Scheduled medications  enoxaparin, 40 mg, subcutaneous,  q24h  FLUoxetine, 40 mg, oral, Nightly  lisinopril, 10 mg, oral, Daily   And  hydroCHLOROthiazide, 12.5 mg, oral, Daily  insulin lispro, 0-10 Units, subcutaneous, TID AC  lamoTRIgine, 100 mg, oral, q AM  lamoTRIgine, 150 mg, oral, Nightly  [START ON 3/2/2025] methylPREDNISolone, 16 mg, oral, Once   Followed by  [START ON 3/3/2025] methylPREDNISolone, 12 mg, oral, Once   Followed by  [START ON 3/4/2025] methylPREDNISolone, 8 mg, oral, Once   Followed by  [START ON 3/5/2025] methylPREDNISolone, 4 mg, oral, Once  pantoprazole, 40 mg, oral, Daily before breakfast  psyllium, 1 packet, oral, Daily      Continuous medications     PRN medications  PRN medications: acetaminophen **OR** acetaminophen **OR** acetaminophen, cyclobenzaprine, dextrose, dextrose, glucagon, glucagon, HYDROmorphone **OR** HYDROmorphone, melatonin, ondansetron **OR** ondansetron   CT lumbar spine wo IV contrast    Result Date: 2/27/2025  Interpreted By:  Ponce Denney, STUDY: CT LUMBAR SPINE WO IV CONTRAST; 2/27/2025 3:11 pm   INDICATION: Signs/Symptoms:Back pain;   COMPARISON: None   ACCESSION NUMBER(S): [id number]   ORDERING CLINICIAN: ANDREA LOU   TECHNIQUE: Contiguous axial images of the lumbar spine were performed. Coronal and sagittal reformatted images were also obtained. All CT examinations are performed with 1 or more of the following dose reduction techniques: Automated exposure control, adjustment of mA and/or kv according to patient's size, or use of iterative reconstruction techniques.   FINDINGS: No evidence for acute fracture. Levoscoliosis is present. Advanced facet degenerative changes. There is a normal lumbar lordosis however there is minimal degenerative anterolisthesis of L3 on L4 and L4 on L5.   The vertebral bodies are normal in height and configuration.   At L5-S1, there is moderate-severe disc space narrowing, vacuum disc phenomena and diffuse disc bulging. No evidence for significant bony spinal canal stenosis.  However there is severe left neural foramina stenosis and moderate right neural foramina stenosis.   At L4-5, there is severe disc space narrowing, vacuum disc phenomena, diffuse disc bulging, suggestion of severe canal stenosis due to disc bulging as well as severe facet and ligamentum flavum hypertrophy. There is moderate-severe bilateral neural foramina stenosis at this level.   At L3-4, there is moderate disc space narrowing asymmetrically worsened towards the right with vacuum disc phenomena and diffuse disc bulging. There is moderate-severe canal stenosis. Moderate-severe right neural foramina stenosis and mild-moderate left neural foramina stenosis.   At L2-3, there is facet and ligamentum flavum hypertrophy disc space narrowing asymmetrically greater toward the right, diffuse disc bulging. Moderate to moderate-severe canal stenosis. Moderate-severe right neural foramina stenosis. No significant left neural foramina stenosis.   At L1-2 there is vacuum disc phenomena and asymmetric disc space narrowing towards the right, diffuse disc bulging, mild to mild-moderate lateral recess stenosis and at least mild canal stenosis. Moderate-severe right neural foramina stenosis. Mild-moderate left neural foramina stenosis.   At T12-L1, there is diffuse disc bulging. No evidence for high-grade spinal canal stenosis. Mild-moderate left neural foramina stenosis.   The SI joints show degenerative gas but otherwise intact.       No acute fracture or traumatic malalignment.   Advanced chronic degenerative changes as described in the body of the report with multilevel high-grade spinal canal stenosis and high-grade neural foramina stenosis.     Signed by: Ponce Denney 2/27/2025 4:03 PM Dictation workstation:   [id number]    CT pelvis wo IV contrast    Result Date: 2/27/2025  Interpreted By:  Yosvany Christopher, STUDY: CT PELVIS WO IV CONTRAST; CT HIP RIGHT WO IV CONTRAST; ;  2/27/2025 3:11 pm   INDICATION: Signs/Symptoms:Right  hip pain.     COMPARISON: Previous CT of the abdomen and pelvis March 13, 2024   Plain film radiographs of the right hip performed on February 11, 2025     ACCESSION NUMBER(S): [id number]; [id number]   ORDERING CLINICIAN: ANDREA LOU   TECHNIQUE: Multiple thin-section axial images of the right hip reconstructed in the sagittal and coronal plane.   Additional dedicated thin-section axial images of the pelvis also performed.     FINDINGS: Moderate osteoarthritis of the right hip.   Similar appearance left hip.   Small bilateral trochanteric spurs.   No evidence of right hip fracture.   No bony destructive lesions.   Advanced lower lumbar degenerative change.   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   No muscular attenuation changes.   No evidence of a soft tissue mass.   No focal fluid collections.   No sizeable effusion.       Surgical clips from previous hernia repair along the anterior abdominal wall.         Degenerative changes bilateral hips, sacroiliac joints, and lower lumbar spine.   No acute findings right hip.     MACRO: None   Signed by: Yosvany Christopher 2/27/2025 3:24 PM Dictation workstation:   [id number]    CT hip right wo IV contrast    Result Date: 2/27/2025  Interpreted By:  Yosvany Christopher, STUDY: CT PELVIS WO IV CONTRAST; CT HIP RIGHT WO IV CONTRAST; ;  2/27/2025 3:11 pm   INDICATION: Signs/Symptoms:Right hip pain.     COMPARISON: Previous CT of the abdomen and pelvis March 13, 2024   Plain film radiographs of the right hip performed on February 11, 2025     ACCESSION NUMBER(S): [id number]; [id number]   ORDERING CLINICIAN: ANDREA LOU   TECHNIQUE: Multiple thin-section axial images of the right hip reconstructed in the sagittal and coronal plane.   Additional dedicated thin-section axial images of the pelvis also performed.     FINDINGS: Moderate osteoarthritis of the right hip.   Similar appearance left hip.   Small bilateral trochanteric spurs.   No  evidence of right hip fracture.   No bony destructive lesions.   Advanced lower lumbar degenerative change.   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   No muscular attenuation changes.   No evidence of a soft tissue mass.   No focal fluid collections.   No sizeable effusion.       Surgical clips from previous hernia repair along the anterior abdominal wall.         Degenerative changes bilateral hips, sacroiliac joints, and lower lumbar spine.   No acute findings right hip.     MACRO: None   Signed by: Yosvany Christopher 2/27/2025 3:24 PM Dictation workstation:   [id number]             Assessment/Plan                  Assessment & Plan  Chronic right hip pain    Difficulty in walking    Diabetes mellitus (Multi)    Right hip pain      Cristal Pollard is a 64 y.o. female presents to the ED with complaints of right lower back pain, hip, and knee pain.  Describes it as a burning and tingling sensation from her right hip down to her right leg that worsened throughout the day today.  States that has been worsening over the past week. Says he got to a point where she has not been able to ambulate very much the past couple days secondary to pain.  Patient went to an Ortho appointment last Tuesday where she received a corticosteroid shot in her right hip, states that helped with the pain for a couple days but then it returned and worsened.  States she was scheduled for follow-up with spine specialist today and pain management next week, however she rescheduled the appointment with her spine specialist in next Wednesday.  Has been taking NSAIDs and Percocet at home to help manage with pain.  Denies any urinary/bowel incontinence.  Does report some mild numbness/tingling to the right buttocks that occurs intermittently, states it usually accompanies the hip pain.  Denies any falls or injuries.      #Acute on chronic right hip pain, worsening over the past week  #Right leg/knee pain  #Difficulty with  ambulation  Admit for observation  CT pelvis, CT right hip, and CT lumbar spine unremarkable for acute processes  Pain medication, Zofran as needed  May need repeat imaging done if continued/worsening pain.  Patient states she is unable to have MRIs performed.  PT/OT for evaluation and treatment  Social work consult for discharge planning/rehab  Q8 vitals  Diabetic/cardiac diet  CBC, BMP in the a.m.  -Patient to follow-up with pain management clinic next week as scheduled and also with her spine specialist next Wednesday.     #Diabetes mellitus  Sliding scale insulin  ACHS Accu-Cheks  Hold home oral antidiabetic medications     Continue home medications as appropriate     Chronic conditions:  Type II DM, HTN, lumbar stenosis, chronic right hip pain, trochanteric bursitis of the right hip, history of seizures, anxiety     #DVT prophylaxis  Lovenox  Ambulation as tolerated  2/28: unable;e t get mri, appears back etiology will get neuro surgery opinion    3/1:   Patient will get MRI   Pacer wires are compatible with MRI machine,  NP will clarify via secure chat with neurosurgical team for further plan of care.   I have added on Flexeril for acute back spams;     Plan of care discussed with attending Dr. Khari Mckinley.       I spent 30 minutes in the professional and overall care of this patient.          Subha Valladares, APRN-CNP

## 2025-03-02 ENCOUNTER — ANESTHESIA EVENT (OUTPATIENT)
Dept: RADIOLOGY | Facility: HOSPITAL | Age: 65
End: 2025-03-02
Payer: COMMERCIAL

## 2025-03-02 ENCOUNTER — APPOINTMENT (OUTPATIENT)
Dept: RADIOLOGY | Facility: HOSPITAL | Age: 65
DRG: 448 | End: 2025-03-02
Payer: COMMERCIAL

## 2025-03-02 ENCOUNTER — HOSPITAL ENCOUNTER (INPATIENT)
Facility: HOSPITAL | Age: 65
End: 2025-03-02
Attending: STUDENT IN AN ORGANIZED HEALTH CARE EDUCATION/TRAINING PROGRAM | Admitting: STUDENT IN AN ORGANIZED HEALTH CARE EDUCATION/TRAINING PROGRAM
Payer: COMMERCIAL

## 2025-03-02 ENCOUNTER — ANESTHESIA (OUTPATIENT)
Dept: RADIOLOGY | Facility: HOSPITAL | Age: 65
End: 2025-03-02
Payer: COMMERCIAL

## 2025-03-02 DIAGNOSIS — Z98.890 S/P SPINAL SURGERY: ICD-10-CM

## 2025-03-02 DIAGNOSIS — M54.6 BACK PAIN OF THORACOLUMBAR REGION: Primary | ICD-10-CM

## 2025-03-02 DIAGNOSIS — M54.50 BACK PAIN OF THORACOLUMBAR REGION: Primary | ICD-10-CM

## 2025-03-02 DIAGNOSIS — M48.062 SPINAL STENOSIS OF LUMBAR REGION WITH NEUROGENIC CLAUDICATION: ICD-10-CM

## 2025-03-02 DIAGNOSIS — M54.10 BACK PAIN WITH RADICULOPATHY: ICD-10-CM

## 2025-03-02 DIAGNOSIS — G89.18 ACUTE POSTOPERATIVE PAIN: ICD-10-CM

## 2025-03-02 DIAGNOSIS — F34.1 DEPRESSIVE PERSONALITY DISORDER: ICD-10-CM

## 2025-03-02 DIAGNOSIS — M51.370 DEGENERATION OF INTERVERTEBRAL DISC OF LUMBOSACRAL REGION WITH DISCOGENIC BACK PAIN: ICD-10-CM

## 2025-03-02 DIAGNOSIS — Z74.09 IMPAIRED FUNCTIONAL MOBILITY, BALANCE, AND ENDURANCE: ICD-10-CM

## 2025-03-02 PROBLEM — I25.10 CAD (CORONARY ARTERY DISEASE): Status: ACTIVE | Noted: 2025-03-02

## 2025-03-02 PROBLEM — R11.2 PONV (POSTOPERATIVE NAUSEA AND VOMITING): Status: ACTIVE | Noted: 2025-03-02

## 2025-03-02 LAB
ABO GROUP (TYPE) IN BLOOD: NORMAL
ALBUMIN SERPL BCP-MCNC: 4.3 G/DL (ref 3.4–5)
ANION GAP SERPL CALC-SCNC: 15 MMOL/L (ref 10–20)
ANTIBODY SCREEN: NORMAL
APTT PPP: 30 SECONDS (ref 26–36)
BASOPHILS # BLD MANUAL: 0 X10*3/UL (ref 0–0.1)
BASOPHILS NFR BLD MANUAL: 0 %
BUN SERPL-MCNC: 22 MG/DL (ref 6–23)
CALCIUM SERPL-MCNC: 10.3 MG/DL (ref 8.6–10.6)
CHLORIDE SERPL-SCNC: 104 MMOL/L (ref 98–107)
CO2 SERPL-SCNC: 24 MMOL/L (ref 21–32)
CREAT SERPL-MCNC: 0.76 MG/DL (ref 0.5–1.05)
EGFRCR SERPLBLD CKD-EPI 2021: 87 ML/MIN/1.73M*2
EOSINOPHIL # BLD MANUAL: 0 X10*3/UL (ref 0–0.7)
EOSINOPHIL NFR BLD MANUAL: 0 %
ERYTHROCYTE [DISTWIDTH] IN BLOOD BY AUTOMATED COUNT: 12.7 % (ref 11.5–14.5)
GLUCOSE BLD MANUAL STRIP-MCNC: 127 MG/DL (ref 74–99)
GLUCOSE BLD MANUAL STRIP-MCNC: 170 MG/DL (ref 74–99)
GLUCOSE BLD MANUAL STRIP-MCNC: 194 MG/DL (ref 74–99)
GLUCOSE BLD MANUAL STRIP-MCNC: 249 MG/DL (ref 74–99)
GLUCOSE SERPL-MCNC: 172 MG/DL (ref 74–99)
HCT VFR BLD AUTO: 42.1 % (ref 36–46)
HGB BLD-MCNC: 14.3 G/DL (ref 12–16)
IMM GRANULOCYTES # BLD AUTO: 0.12 X10*3/UL (ref 0–0.7)
IMM GRANULOCYTES NFR BLD AUTO: 0.9 % (ref 0–0.9)
INR PPP: 1.1 (ref 0.9–1.1)
LYMPHOCYTES # BLD MANUAL: 3.61 X10*3/UL (ref 1.2–4.8)
LYMPHOCYTES NFR BLD MANUAL: 27.8 %
MCH RBC QN AUTO: 30.8 PG (ref 26–34)
MCHC RBC AUTO-ENTMCNC: 34 G/DL (ref 32–36)
MCV RBC AUTO: 91 FL (ref 80–100)
MONOCYTES # BLD MANUAL: 0.34 X10*3/UL (ref 0.1–1)
MONOCYTES NFR BLD MANUAL: 2.6 %
NEUTS SEG # BLD MANUAL: 8.71 X10*3/UL (ref 1.2–7)
NEUTS SEG NFR BLD MANUAL: 67 %
NRBC BLD-RTO: 0 /100 WBCS (ref 0–0)
PHOSPHATE SERPL-MCNC: 3.1 MG/DL (ref 2.5–4.9)
PLATELET # BLD AUTO: 259 X10*3/UL (ref 150–450)
POTASSIUM SERPL-SCNC: 4 MMOL/L (ref 3.5–5.3)
PROTHROMBIN TIME: 11.7 SECONDS (ref 9.8–12.4)
RBC # BLD AUTO: 4.65 X10*6/UL (ref 4–5.2)
RBC MORPH BLD: ABNORMAL
RH FACTOR (ANTIGEN D): NORMAL
SODIUM SERPL-SCNC: 139 MMOL/L (ref 136–145)
TOTAL CELLS COUNTED BLD: 115
VARIANT LYMPHS # BLD MANUAL: 0.34 X10*3/UL (ref 0–0.5)
VARIANT LYMPHS NFR BLD: 2.6 %
WBC # BLD AUTO: 13 X10*3/UL (ref 4.4–11.3)

## 2025-03-02 PROCEDURE — 85007 BL SMEAR W/DIFF WBC COUNT: CPT | Performed by: STUDENT IN AN ORGANIZED HEALTH CARE EDUCATION/TRAINING PROGRAM

## 2025-03-02 PROCEDURE — 86850 RBC ANTIBODY SCREEN: CPT | Performed by: STUDENT IN AN ORGANIZED HEALTH CARE EDUCATION/TRAINING PROGRAM

## 2025-03-02 PROCEDURE — A72148 CHG MRI, LUMBAR SPINE: Performed by: STUDENT IN AN ORGANIZED HEALTH CARE EDUCATION/TRAINING PROGRAM

## 2025-03-02 PROCEDURE — 72148 MRI LUMBAR SPINE W/O DYE: CPT

## 2025-03-02 PROCEDURE — 71045 X-RAY EXAM CHEST 1 VIEW: CPT | Performed by: RADIOLOGY

## 2025-03-02 PROCEDURE — 36415 COLL VENOUS BLD VENIPUNCTURE: CPT | Performed by: STUDENT IN AN ORGANIZED HEALTH CARE EDUCATION/TRAINING PROGRAM

## 2025-03-02 PROCEDURE — 3700000001 HC GENERAL ANESTHESIA TIME - INITIAL BASE CHARGE

## 2025-03-02 PROCEDURE — 1100000001 HC PRIVATE ROOM DAILY

## 2025-03-02 PROCEDURE — 2500000004 HC RX 250 GENERAL PHARMACY W/ HCPCS (ALT 636 FOR OP/ED)

## 2025-03-02 PROCEDURE — 2500000004 HC RX 250 GENERAL PHARMACY W/ HCPCS (ALT 636 FOR OP/ED): Performed by: STUDENT IN AN ORGANIZED HEALTH CARE EDUCATION/TRAINING PROGRAM

## 2025-03-02 PROCEDURE — 80069 RENAL FUNCTION PANEL: CPT | Performed by: STUDENT IN AN ORGANIZED HEALTH CARE EDUCATION/TRAINING PROGRAM

## 2025-03-02 PROCEDURE — G0378 HOSPITAL OBSERVATION PER HR: HCPCS

## 2025-03-02 PROCEDURE — 72120 X-RAY BEND ONLY L-S SPINE: CPT

## 2025-03-02 PROCEDURE — 72110 X-RAY EXAM L-2 SPINE 4/>VWS: CPT | Performed by: STUDENT IN AN ORGANIZED HEALTH CARE EDUCATION/TRAINING PROGRAM

## 2025-03-02 PROCEDURE — 2500000004 HC RX 250 GENERAL PHARMACY W/ HCPCS (ALT 636 FOR OP/ED): Mod: TB | Performed by: STUDENT IN AN ORGANIZED HEALTH CARE EDUCATION/TRAINING PROGRAM

## 2025-03-02 PROCEDURE — 2500000001 HC RX 250 WO HCPCS SELF ADMINISTERED DRUGS (ALT 637 FOR MEDICARE OP): Performed by: STUDENT IN AN ORGANIZED HEALTH CARE EDUCATION/TRAINING PROGRAM

## 2025-03-02 PROCEDURE — 85730 THROMBOPLASTIN TIME PARTIAL: CPT | Performed by: STUDENT IN AN ORGANIZED HEALTH CARE EDUCATION/TRAINING PROGRAM

## 2025-03-02 PROCEDURE — 85027 COMPLETE CBC AUTOMATED: CPT | Performed by: STUDENT IN AN ORGANIZED HEALTH CARE EDUCATION/TRAINING PROGRAM

## 2025-03-02 PROCEDURE — 86900 BLOOD TYPING SEROLOGIC ABO: CPT | Performed by: STUDENT IN AN ORGANIZED HEALTH CARE EDUCATION/TRAINING PROGRAM

## 2025-03-02 PROCEDURE — 7100000002 HC RECOVERY ROOM TIME - EACH INCREMENTAL 1 MINUTE

## 2025-03-02 PROCEDURE — 7100000001 HC RECOVERY ROOM TIME - INITIAL BASE CHARGE

## 2025-03-02 PROCEDURE — 2500000005 HC RX 250 GENERAL PHARMACY W/O HCPCS: Performed by: STUDENT IN AN ORGANIZED HEALTH CARE EDUCATION/TRAINING PROGRAM

## 2025-03-02 PROCEDURE — 71045 X-RAY EXAM CHEST 1 VIEW: CPT

## 2025-03-02 PROCEDURE — 2500000002 HC RX 250 W HCPCS SELF ADMINISTERED DRUGS (ALT 637 FOR MEDICARE OP, ALT 636 FOR OP/ED): Performed by: STUDENT IN AN ORGANIZED HEALTH CARE EDUCATION/TRAINING PROGRAM

## 2025-03-02 PROCEDURE — 72148 MRI LUMBAR SPINE W/O DYE: CPT | Performed by: RADIOLOGY

## 2025-03-02 PROCEDURE — 2500000004 HC RX 250 GENERAL PHARMACY W/ HCPCS (ALT 636 FOR OP/ED): Mod: JZ,TB | Performed by: STUDENT IN AN ORGANIZED HEALTH CARE EDUCATION/TRAINING PROGRAM

## 2025-03-02 PROCEDURE — 3700000002 HC GENERAL ANESTHESIA TIME - EACH INCREMENTAL 1 MINUTE

## 2025-03-02 PROCEDURE — 85610 PROTHROMBIN TIME: CPT | Performed by: STUDENT IN AN ORGANIZED HEALTH CARE EDUCATION/TRAINING PROGRAM

## 2025-03-02 PROCEDURE — 82947 ASSAY GLUCOSE BLOOD QUANT: CPT

## 2025-03-02 RX ORDER — LIDOCAINE HYDROCHLORIDE 10 MG/ML
0.1 INJECTION, SOLUTION INFILTRATION; PERINEURAL ONCE
Status: DISCONTINUED | OUTPATIENT
Start: 2025-03-02 | End: 2025-03-05

## 2025-03-02 RX ORDER — SODIUM CHLORIDE, SODIUM LACTATE, POTASSIUM CHLORIDE, CALCIUM CHLORIDE 600; 310; 30; 20 MG/100ML; MG/100ML; MG/100ML; MG/100ML
100 INJECTION, SOLUTION INTRAVENOUS CONTINUOUS
Status: ACTIVE | OUTPATIENT
Start: 2025-03-02 | End: 2025-03-02

## 2025-03-02 RX ORDER — NITROGLYCERIN 0.4 MG/1
0.4 TABLET SUBLINGUAL EVERY 5 MIN PRN
Status: DISCONTINUED | OUTPATIENT
Start: 2025-03-02 | End: 2025-03-10 | Stop reason: HOSPADM

## 2025-03-02 RX ORDER — PROPOFOL 10 MG/ML
INJECTION, EMULSION INTRAVENOUS CONTINUOUS PRN
Status: DISCONTINUED | OUTPATIENT
Start: 2025-03-02 | End: 2025-03-02

## 2025-03-02 RX ORDER — DEXTROSE 50 % IN WATER (D50W) INTRAVENOUS SYRINGE
25
Status: DISCONTINUED | OUTPATIENT
Start: 2025-03-02 | End: 2025-03-10 | Stop reason: HOSPADM

## 2025-03-02 RX ORDER — KETOROLAC TROMETHAMINE 30 MG/ML
30 INJECTION, SOLUTION INTRAMUSCULAR; INTRAVENOUS EVERY 6 HOURS
Status: COMPLETED | OUTPATIENT
Start: 2025-03-02 | End: 2025-03-04

## 2025-03-02 RX ORDER — PANTOPRAZOLE SODIUM 40 MG/1
40 TABLET, DELAYED RELEASE ORAL
Status: DISCONTINUED | OUTPATIENT
Start: 2025-03-02 | End: 2025-03-10 | Stop reason: HOSPADM

## 2025-03-02 RX ORDER — GABAPENTIN 300 MG/1
300 CAPSULE ORAL 3 TIMES DAILY
Status: DISCONTINUED | OUTPATIENT
Start: 2025-03-02 | End: 2025-03-02

## 2025-03-02 RX ORDER — POLYETHYLENE GLYCOL 3350 17 G/17G
17 POWDER, FOR SOLUTION ORAL DAILY
Status: DISCONTINUED | OUTPATIENT
Start: 2025-03-02 | End: 2025-03-03

## 2025-03-02 RX ORDER — METHOCARBAMOL 100 MG/ML
500 INJECTION, SOLUTION INTRAMUSCULAR; INTRAVENOUS ONCE
Status: COMPLETED | OUTPATIENT
Start: 2025-03-02 | End: 2025-03-02

## 2025-03-02 RX ORDER — HEPARIN SODIUM 5000 [USP'U]/ML
5000 INJECTION, SOLUTION INTRAVENOUS; SUBCUTANEOUS EVERY 8 HOURS
Status: DISCONTINUED | OUTPATIENT
Start: 2025-03-02 | End: 2025-03-10 | Stop reason: HOSPADM

## 2025-03-02 RX ORDER — FLUOXETINE HYDROCHLORIDE 20 MG/1
40 CAPSULE ORAL NIGHTLY
Status: DISCONTINUED | OUTPATIENT
Start: 2025-03-02 | End: 2025-03-10 | Stop reason: HOSPADM

## 2025-03-02 RX ORDER — METFORMIN HYDROCHLORIDE 500 MG/1
500 TABLET, EXTENDED RELEASE ORAL NIGHTLY
Status: DISCONTINUED | OUTPATIENT
Start: 2025-03-02 | End: 2025-03-10 | Stop reason: HOSPADM

## 2025-03-02 RX ORDER — LABETALOL HYDROCHLORIDE 5 MG/ML
5 INJECTION, SOLUTION INTRAVENOUS ONCE AS NEEDED
Status: DISCONTINUED | OUTPATIENT
Start: 2025-03-02 | End: 2025-03-05

## 2025-03-02 RX ORDER — FENTANYL CITRATE 50 UG/ML
INJECTION, SOLUTION INTRAMUSCULAR; INTRAVENOUS AS NEEDED
Status: DISCONTINUED | OUTPATIENT
Start: 2025-03-02 | End: 2025-03-02

## 2025-03-02 RX ORDER — OXYCODONE HYDROCHLORIDE 5 MG/1
5 TABLET ORAL EVERY 4 HOURS PRN
Status: DISCONTINUED | OUTPATIENT
Start: 2025-03-02 | End: 2025-03-05

## 2025-03-02 RX ORDER — CYCLOBENZAPRINE HCL 10 MG
10 TABLET ORAL 3 TIMES DAILY
Status: DISCONTINUED | OUTPATIENT
Start: 2025-03-02 | End: 2025-03-07

## 2025-03-02 RX ORDER — CYCLOBENZAPRINE HCL 10 MG
10 TABLET ORAL 3 TIMES DAILY
Status: DISCONTINUED | OUTPATIENT
Start: 2025-03-02 | End: 2025-03-02

## 2025-03-02 RX ORDER — LAMOTRIGINE 150 MG/1
150 TABLET ORAL NIGHTLY
Status: DISCONTINUED | OUTPATIENT
Start: 2025-03-02 | End: 2025-03-10 | Stop reason: HOSPADM

## 2025-03-02 RX ORDER — PSYLLIUM HUSK 0.4 G
1 CAPSULE ORAL DAILY
Status: DISCONTINUED | OUTPATIENT
Start: 2025-03-02 | End: 2025-03-10 | Stop reason: HOSPADM

## 2025-03-02 RX ORDER — INSULIN LISPRO 100 [IU]/ML
0-5 INJECTION, SOLUTION INTRAVENOUS; SUBCUTANEOUS
Status: DISCONTINUED | OUTPATIENT
Start: 2025-03-02 | End: 2025-03-03

## 2025-03-02 RX ORDER — LAMOTRIGINE 100 MG/1
100 TABLET ORAL EVERY MORNING
Status: DISCONTINUED | OUTPATIENT
Start: 2025-03-02 | End: 2025-03-10 | Stop reason: HOSPADM

## 2025-03-02 RX ORDER — OXYCODONE HYDROCHLORIDE 5 MG/1
10 TABLET ORAL EVERY 4 HOURS PRN
Status: DISCONTINUED | OUTPATIENT
Start: 2025-03-02 | End: 2025-03-10 | Stop reason: HOSPADM

## 2025-03-02 RX ORDER — LIDOCAINE HYDROCHLORIDE 20 MG/ML
INJECTION, SOLUTION INFILTRATION; PERINEURAL AS NEEDED
Status: DISCONTINUED | OUTPATIENT
Start: 2025-03-02 | End: 2025-03-02

## 2025-03-02 RX ORDER — ACETAMINOPHEN 325 MG/1
650 TABLET ORAL EVERY 4 HOURS PRN
Status: DISCONTINUED | OUTPATIENT
Start: 2025-03-02 | End: 2025-03-05

## 2025-03-02 RX ORDER — DEXMEDETOMIDINE HYDROCHLORIDE 4 UG/ML
INJECTION, SOLUTION INTRAVENOUS CONTINUOUS PRN
Status: DISCONTINUED | OUTPATIENT
Start: 2025-03-02 | End: 2025-03-02

## 2025-03-02 RX ORDER — ONDANSETRON HYDROCHLORIDE 2 MG/ML
INJECTION, SOLUTION INTRAVENOUS AS NEEDED
Status: DISCONTINUED | OUTPATIENT
Start: 2025-03-02 | End: 2025-03-02

## 2025-03-02 RX ORDER — HYDROMORPHONE HYDROCHLORIDE 1 MG/ML
1 INJECTION, SOLUTION INTRAMUSCULAR; INTRAVENOUS; SUBCUTANEOUS EVERY 5 MIN PRN
Status: DISCONTINUED | OUTPATIENT
Start: 2025-03-02 | End: 2025-03-05

## 2025-03-02 RX ORDER — ACETAMINOPHEN 325 MG/1
975 TABLET ORAL EVERY 8 HOURS
Status: DISCONTINUED | OUTPATIENT
Start: 2025-03-02 | End: 2025-03-10 | Stop reason: HOSPADM

## 2025-03-02 RX ORDER — GABAPENTIN 300 MG/1
300 CAPSULE ORAL 3 TIMES DAILY
Status: DISCONTINUED | OUTPATIENT
Start: 2025-03-02 | End: 2025-03-07

## 2025-03-02 RX ORDER — OXYCODONE HYDROCHLORIDE 5 MG/1
10 TABLET ORAL EVERY 4 HOURS PRN
Status: DISCONTINUED | OUTPATIENT
Start: 2025-03-02 | End: 2025-03-05

## 2025-03-02 RX ORDER — ONDANSETRON HYDROCHLORIDE 2 MG/ML
4 INJECTION, SOLUTION INTRAVENOUS ONCE AS NEEDED
Status: DISCONTINUED | OUTPATIENT
Start: 2025-03-02 | End: 2025-03-05

## 2025-03-02 RX ORDER — NALOXONE HYDROCHLORIDE 0.4 MG/ML
0.2 INJECTION, SOLUTION INTRAMUSCULAR; INTRAVENOUS; SUBCUTANEOUS EVERY 5 MIN PRN
Status: DISCONTINUED | OUTPATIENT
Start: 2025-03-02 | End: 2025-03-10 | Stop reason: HOSPADM

## 2025-03-02 RX ORDER — DEXTROSE 50 % IN WATER (D50W) INTRAVENOUS SYRINGE
12.5
Status: DISCONTINUED | OUTPATIENT
Start: 2025-03-02 | End: 2025-03-10 | Stop reason: HOSPADM

## 2025-03-02 RX ADMIN — CYCLOBENZAPRINE 10 MG: 10 TABLET, FILM COATED ORAL at 23:17

## 2025-03-02 RX ADMIN — HYDROMORPHONE HYDROCHLORIDE 0.5 MG: 1 INJECTION, SOLUTION INTRAMUSCULAR; INTRAVENOUS; SUBCUTANEOUS at 10:40

## 2025-03-02 RX ADMIN — HYDROMORPHONE HYDROCHLORIDE 0.5 MG: 1 INJECTION, SOLUTION INTRAMUSCULAR; INTRAVENOUS; SUBCUTANEOUS at 11:05

## 2025-03-02 RX ADMIN — Medication 4 L/MIN: at 12:58

## 2025-03-02 RX ADMIN — SODIUM CHLORIDE, SODIUM LACTATE, POTASSIUM CHLORIDE, AND CALCIUM CHLORIDE: 600; 310; 30; 20 INJECTION, SOLUTION INTRAVENOUS at 09:14

## 2025-03-02 RX ADMIN — HEPARIN SODIUM 5000 UNITS: 5000 INJECTION, SOLUTION INTRAVENOUS; SUBCUTANEOUS at 21:10

## 2025-03-02 RX ADMIN — PROPOFOL 180 MG: 10 INJECTION, EMULSION INTRAVENOUS at 09:14

## 2025-03-02 RX ADMIN — ACETAMINOPHEN 975 MG: 325 TABLET ORAL at 12:53

## 2025-03-02 RX ADMIN — HYDROMORPHONE HYDROCHLORIDE 0.5 MG: 1 INJECTION, SOLUTION INTRAMUSCULAR; INTRAVENOUS; SUBCUTANEOUS at 11:45

## 2025-03-02 RX ADMIN — LAMOTRIGINE 150 MG: 150 TABLET ORAL at 02:19

## 2025-03-02 RX ADMIN — GABAPENTIN 300 MG: 300 CAPSULE ORAL at 12:53

## 2025-03-02 RX ADMIN — PROPOFOL 10 MG: 10 INJECTION, EMULSION INTRAVENOUS at 09:24

## 2025-03-02 RX ADMIN — HYDROMORPHONE HYDROCHLORIDE 0.5 MG: 1 INJECTION, SOLUTION INTRAMUSCULAR; INTRAVENOUS; SUBCUTANEOUS at 11:32

## 2025-03-02 RX ADMIN — HYDROMORPHONE HYDROCHLORIDE 0.5 MG: 1 INJECTION, SOLUTION INTRAMUSCULAR; INTRAVENOUS; SUBCUTANEOUS at 10:57

## 2025-03-02 RX ADMIN — KETOROLAC TROMETHAMINE 30 MG: 30 INJECTION, SOLUTION INTRAMUSCULAR; INTRAVENOUS at 12:53

## 2025-03-02 RX ADMIN — INSULIN LISPRO 1 UNITS: 100 INJECTION, SOLUTION INTRAVENOUS; SUBCUTANEOUS at 18:05

## 2025-03-02 RX ADMIN — OXYCODONE HYDROCHLORIDE 10 MG: 5 TABLET ORAL at 12:53

## 2025-03-02 RX ADMIN — METHOCARBAMOL 500 MG: 1000 INJECTION, SOLUTION INTRAMUSCULAR; INTRAVENOUS at 11:50

## 2025-03-02 RX ADMIN — LIDOCAINE HYDROCHLORIDE 100 MG: 20 INJECTION, SOLUTION INFILTRATION; PERINEURAL at 09:14

## 2025-03-02 RX ADMIN — FLUOXETINE HYDROCHLORIDE 40 MG: 20 CAPSULE ORAL at 02:19

## 2025-03-02 RX ADMIN — HYDROMORPHONE HYDROCHLORIDE 0.5 MG: 1 INJECTION, SOLUTION INTRAMUSCULAR; INTRAVENOUS; SUBCUTANEOUS at 10:48

## 2025-03-02 RX ADMIN — CYCLOBENZAPRINE 10 MG: 10 TABLET, FILM COATED ORAL at 12:53

## 2025-03-02 RX ADMIN — LAMOTRIGINE 150 MG: 150 TABLET ORAL at 21:08

## 2025-03-02 RX ADMIN — Medication 4 L/MIN: at 10:30

## 2025-03-02 RX ADMIN — ACETAMINOPHEN 975 MG: 325 TABLET ORAL at 21:08

## 2025-03-02 RX ADMIN — FENTANYL CITRATE 50 MCG: 50 INJECTION, SOLUTION INTRAMUSCULAR; INTRAVENOUS at 09:14

## 2025-03-02 RX ADMIN — PROPOFOL 20 MG: 10 INJECTION, EMULSION INTRAVENOUS at 09:21

## 2025-03-02 RX ADMIN — CYCLOBENZAPRINE 10 MG: 10 TABLET, FILM COATED ORAL at 02:19

## 2025-03-02 RX ADMIN — LAMOTRIGINE 100 MG: 100 TABLET ORAL at 13:07

## 2025-03-02 RX ADMIN — Medication 4 L/MIN: at 12:57

## 2025-03-02 RX ADMIN — OXYCODONE HYDROCHLORIDE 10 MG: 5 TABLET ORAL at 21:08

## 2025-03-02 RX ADMIN — HYDROMORPHONE HYDROCHLORIDE 0.5 MG: 1 INJECTION, SOLUTION INTRAMUSCULAR; INTRAVENOUS; SUBCUTANEOUS at 11:21

## 2025-03-02 RX ADMIN — PROPOFOL 20 MG: 10 INJECTION, EMULSION INTRAVENOUS at 09:18

## 2025-03-02 RX ADMIN — DEXAMETHASONE SODIUM PHOSPHATE 4 MG: 4 INJECTION, SOLUTION INTRA-ARTICULAR; INTRALESIONAL; INTRAMUSCULAR; INTRAVENOUS; SOFT TISSUE at 09:39

## 2025-03-02 RX ADMIN — CYCLOBENZAPRINE 10 MG: 10 TABLET, FILM COATED ORAL at 18:48

## 2025-03-02 RX ADMIN — GABAPENTIN 300 MG: 300 CAPSULE ORAL at 18:47

## 2025-03-02 RX ADMIN — FENTANYL CITRATE 50 MCG: 50 INJECTION, SOLUTION INTRAMUSCULAR; INTRAVENOUS at 10:07

## 2025-03-02 RX ADMIN — FENTANYL CITRATE 50 MCG: 50 INJECTION, SOLUTION INTRAMUSCULAR; INTRAVENOUS at 09:10

## 2025-03-02 RX ADMIN — KETOROLAC TROMETHAMINE 30 MG: 30 INJECTION, SOLUTION INTRAMUSCULAR; INTRAVENOUS at 18:06

## 2025-03-02 RX ADMIN — PROPOFOL 30 MG: 10 INJECTION, EMULSION INTRAVENOUS at 10:04

## 2025-03-02 RX ADMIN — FLUOXETINE HYDROCHLORIDE 40 MG: 20 CAPSULE ORAL at 21:10

## 2025-03-02 RX ADMIN — DEXAMETHASONE SODIUM PHOSPHATE 2 MG: 4 INJECTION, SOLUTION INTRA-ARTICULAR; INTRALESIONAL; INTRAMUSCULAR; INTRAVENOUS; SOFT TISSUE at 17:10

## 2025-03-02 RX ADMIN — ONDANSETRON 4 MG: 2 INJECTION, SOLUTION INTRAMUSCULAR; INTRAVENOUS at 09:55

## 2025-03-02 RX ADMIN — INSULIN LISPRO 1 UNITS: 100 INJECTION, SOLUTION INTRAVENOUS; SUBCUTANEOUS at 15:06

## 2025-03-02 RX ADMIN — HYDROMORPHONE HYDROCHLORIDE 0.2 MG: 1 INJECTION, SOLUTION INTRAMUSCULAR; INTRAVENOUS; SUBCUTANEOUS at 07:43

## 2025-03-02 RX ADMIN — DEXAMETHASONE SODIUM PHOSPHATE 2 MG: 4 INJECTION, SOLUTION INTRA-ARTICULAR; INTRALESIONAL; INTRAMUSCULAR; INTRAVENOUS; SOFT TISSUE at 02:19

## 2025-03-02 RX ADMIN — OXYCODONE HYDROCHLORIDE 10 MG: 5 TABLET ORAL at 07:53

## 2025-03-02 RX ADMIN — PROPOFOL 20 MG: 10 INJECTION, EMULSION INTRAVENOUS at 10:06

## 2025-03-02 RX ADMIN — ACETAMINOPHEN 975 MG: 325 TABLET ORAL at 02:19

## 2025-03-02 RX ADMIN — GABAPENTIN 300 MG: 300 CAPSULE ORAL at 02:19

## 2025-03-02 RX ADMIN — FENTANYL CITRATE 100 MCG: 50 INJECTION, SOLUTION INTRAMUSCULAR; INTRAVENOUS at 09:08

## 2025-03-02 RX ADMIN — OXYCODONE HYDROCHLORIDE 10 MG: 5 TABLET ORAL at 17:08

## 2025-03-02 RX ADMIN — DEXMEDETOMIDINE HYDROCHLORIDE 0.5 MCG/KG/HR: 4 INJECTION, SOLUTION INTRAVENOUS at 09:26

## 2025-03-02 RX ADMIN — PROPOFOL 150 MCG/KG/MIN: 10 INJECTION, EMULSION INTRAVENOUS at 09:26

## 2025-03-02 RX ADMIN — HEPARIN SODIUM 5000 UNITS: 5000 INJECTION, SOLUTION INTRAVENOUS; SUBCUTANEOUS at 12:53

## 2025-03-02 RX ADMIN — HYDROMORPHONE HYDROCHLORIDE 0.5 MG: 1 INJECTION, SOLUTION INTRAMUSCULAR; INTRAVENOUS; SUBCUTANEOUS at 11:13

## 2025-03-02 RX ADMIN — KETOROLAC TROMETHAMINE 30 MG: 30 INJECTION, SOLUTION INTRAMUSCULAR; INTRAVENOUS at 02:20

## 2025-03-02 RX ADMIN — GABAPENTIN 300 MG: 300 CAPSULE ORAL at 23:18

## 2025-03-02 SDOH — SOCIAL STABILITY: SOCIAL INSECURITY: WITHIN THE LAST YEAR, HAVE YOU BEEN HUMILIATED OR EMOTIONALLY ABUSED IN OTHER WAYS BY YOUR PARTNER OR EX-PARTNER?: NO

## 2025-03-02 SDOH — SOCIAL STABILITY: SOCIAL INSECURITY: DOES ANYONE TRY TO KEEP YOU FROM HAVING/CONTACTING OTHER FRIENDS OR DOING THINGS OUTSIDE YOUR HOME?: NO

## 2025-03-02 SDOH — HEALTH STABILITY: PHYSICAL HEALTH
HOW OFTEN DO YOU NEED TO HAVE SOMEONE HELP YOU WHEN YOU READ INSTRUCTIONS, PAMPHLETS, OR OTHER WRITTEN MATERIAL FROM YOUR DOCTOR OR PHARMACY?: NEVER

## 2025-03-02 SDOH — SOCIAL STABILITY: SOCIAL INSECURITY: ABUSE: ADULT

## 2025-03-02 SDOH — SOCIAL STABILITY: SOCIAL INSECURITY: DO YOU FEEL ANYONE HAS EXPLOITED OR TAKEN ADVANTAGE OF YOU FINANCIALLY OR OF YOUR PERSONAL PROPERTY?: NO

## 2025-03-02 SDOH — SOCIAL STABILITY: SOCIAL INSECURITY: WITHIN THE LAST YEAR, HAVE YOU BEEN AFRAID OF YOUR PARTNER OR EX-PARTNER?: NO

## 2025-03-02 SDOH — ECONOMIC STABILITY: FOOD INSECURITY: WITHIN THE PAST 12 MONTHS, THE FOOD YOU BOUGHT JUST DIDN'T LAST AND YOU DIDN'T HAVE MONEY TO GET MORE.: NEVER TRUE

## 2025-03-02 SDOH — ECONOMIC STABILITY: INCOME INSECURITY: IN THE PAST 12 MONTHS HAS THE ELECTRIC, GAS, OIL, OR WATER COMPANY THREATENED TO SHUT OFF SERVICES IN YOUR HOME?: NO

## 2025-03-02 SDOH — SOCIAL STABILITY: SOCIAL INSECURITY: WERE YOU ABLE TO COMPLETE ALL THE BEHAVIORAL HEALTH SCREENINGS?: YES

## 2025-03-02 SDOH — ECONOMIC STABILITY: FOOD INSECURITY: WITHIN THE PAST 12 MONTHS, YOU WORRIED THAT YOUR FOOD WOULD RUN OUT BEFORE YOU GOT THE MONEY TO BUY MORE.: NEVER TRUE

## 2025-03-02 SDOH — SOCIAL STABILITY: SOCIAL INSECURITY: HAVE YOU HAD THOUGHTS OF HARMING ANYONE ELSE?: NO

## 2025-03-02 SDOH — SOCIAL STABILITY: SOCIAL INSECURITY: ARE THERE ANY APPARENT SIGNS OF INJURIES/BEHAVIORS THAT COULD BE RELATED TO ABUSE/NEGLECT?: NO

## 2025-03-02 SDOH — SOCIAL STABILITY: SOCIAL INSECURITY: HAVE YOU HAD ANY THOUGHTS OF HARMING ANYONE ELSE?: NO

## 2025-03-02 SDOH — SOCIAL STABILITY: SOCIAL INSECURITY: ARE YOU OR HAVE YOU BEEN THREATENED OR ABUSED PHYSICALLY, EMOTIONALLY, OR SEXUALLY BY ANYONE?: NO

## 2025-03-02 SDOH — SOCIAL STABILITY: SOCIAL INSECURITY: DO YOU FEEL UNSAFE GOING BACK TO THE PLACE WHERE YOU ARE LIVING?: NO

## 2025-03-02 SDOH — SOCIAL STABILITY: SOCIAL INSECURITY: HAS ANYONE EVER THREATENED TO HURT YOUR FAMILY OR YOUR PETS?: NO

## 2025-03-02 ASSESSMENT — PAIN - FUNCTIONAL ASSESSMENT
PAIN_FUNCTIONAL_ASSESSMENT: 0-10

## 2025-03-02 ASSESSMENT — ACTIVITIES OF DAILY LIVING (ADL)
ADEQUATE_TO_COMPLETE_ADL: YES
FEEDING YOURSELF: INDEPENDENT
BATHING: NEEDS ASSISTANCE
LACK_OF_TRANSPORTATION: NO
GROOMING: NEEDS ASSISTANCE
HEARING - LEFT EAR: FUNCTIONAL
ADEQUATE_TO_COMPLETE_ADL: YES
DRESSING YOURSELF: NEEDS ASSISTANCE
PATIENT'S MEMORY ADEQUATE TO SAFELY COMPLETE DAILY ACTIVITIES?: YES
ASSISTIVE_DEVICE: WALKER
WALKS IN HOME: NEEDS ASSISTANCE
HEARING - RIGHT EAR: FUNCTIONAL
JUDGMENT_ADEQUATE_SAFELY_COMPLETE_DAILY_ACTIVITIES: YES
BATHING: NEEDS ASSISTANCE
JUDGMENT_ADEQUATE_SAFELY_COMPLETE_DAILY_ACTIVITIES: YES
DRESSING YOURSELF: NEEDS ASSISTANCE
FEEDING YOURSELF: INDEPENDENT
GROOMING: NEEDS ASSISTANCE
TOILETING: NEEDS ASSISTANCE
LACK_OF_TRANSPORTATION: NO
PATIENT'S MEMORY ADEQUATE TO SAFELY COMPLETE DAILY ACTIVITIES?: YES

## 2025-03-02 ASSESSMENT — PAIN SCALES - GENERAL
PAINLEVEL_OUTOF10: 7
PAINLEVEL_OUTOF10: 3
PAINLEVEL_OUTOF10: 7
PAINLEVEL_OUTOF10: 5 - MODERATE PAIN
PAINLEVEL_OUTOF10: 5 - MODERATE PAIN
PAINLEVEL_OUTOF10: 7
PAINLEVEL_OUTOF10: 5 - MODERATE PAIN
PAINLEVEL_OUTOF10: 3
PAIN_LEVEL: 8
PAINLEVEL_OUTOF10: 7
PAINLEVEL_OUTOF10: 10 - WORST POSSIBLE PAIN
PAINLEVEL_OUTOF10: 9
PAINLEVEL_OUTOF10: 6
PAINLEVEL_OUTOF10: 9
PAINLEVEL_OUTOF10: 8
PAINLEVEL_OUTOF10: 8
PAINLEVEL_OUTOF10: 10 - WORST POSSIBLE PAIN
PAINLEVEL_OUTOF10: 8
PAINLEVEL_OUTOF10: 10 - WORST POSSIBLE PAIN
PAINLEVEL_OUTOF10: 5 - MODERATE PAIN
PAINLEVEL_OUTOF10: 8

## 2025-03-02 ASSESSMENT — COGNITIVE AND FUNCTIONAL STATUS - GENERAL
DRESSING REGULAR LOWER BODY CLOTHING: A LITTLE
MOVING TO AND FROM BED TO CHAIR: A LITTLE
CLIMB 3 TO 5 STEPS WITH RAILING: A LOT
STANDING UP FROM CHAIR USING ARMS: A LITTLE
TURNING FROM BACK TO SIDE WHILE IN FLAT BAD: A LITTLE
PATIENT BASELINE BEDBOUND: NO
DAILY ACTIVITIY SCORE: 20
MOVING FROM LYING ON BACK TO SITTING ON SIDE OF FLAT BED WITH BEDRAILS: A LITTLE
HELP NEEDED FOR BATHING: A LITTLE
WALKING IN HOSPITAL ROOM: A LITTLE
TOILETING: A LITTLE
MOBILITY SCORE: 17
DRESSING REGULAR UPPER BODY CLOTHING: A LITTLE

## 2025-03-02 ASSESSMENT — PAIN DESCRIPTION - ORIENTATION
ORIENTATION: RIGHT

## 2025-03-02 ASSESSMENT — PAIN DESCRIPTION - LOCATION
LOCATION: LEG

## 2025-03-02 ASSESSMENT — LIFESTYLE VARIABLES
HOW OFTEN DO YOU HAVE A DRINK CONTAINING ALCOHOL: MONTHLY OR LESS
SKIP TO QUESTIONS 9-10: 1
HOW MANY STANDARD DRINKS CONTAINING ALCOHOL DO YOU HAVE ON A TYPICAL DAY: 1 OR 2
AUDIT-C TOTAL SCORE: 1
HOW OFTEN DO YOU HAVE 6 OR MORE DRINKS ON ONE OCCASION: NEVER
AUDIT-C TOTAL SCORE: 1

## 2025-03-02 ASSESSMENT — PAIN DESCRIPTION - DESCRIPTORS: DESCRIPTORS: SHARP

## 2025-03-02 NOTE — PROGRESS NOTES
Physical Therapy                 Therapy Communication Note    Patient Name: Cristal Pollard  MRN: [id number]  [place] [facility] [place] [facility]  Room: 6070/6070-A  Today's Date: 3/2/2025     Discipline: Physical Therapy    PT Missed Visit: Yes     Missed Visit Reason: Missed Visit Reason:  (Pt off floor at MRI. Will follow up as able.)    Missed Time: Attempt

## 2025-03-02 NOTE — NURSING NOTE
Pt transferred to [facility] from [facility] for neuroswurgery consult and MRI under anesthesia. Pt alert and oriented, able to ambulate Ax1 with walker from Hallway into room. Pt updated on POC. MRI screening form completed. Pt is NPO at this time.

## 2025-03-02 NOTE — ANESTHESIA PROCEDURE NOTES
Airway  Date/Time: 3/2/2025 9:16 AM  Urgency: elective    Airway not difficult    Staffing  Performed: resident   Authorized by: Zhen Chinchilla DO    Performed by: Zhen Chinchilla DO  Patient location during procedure: OR    Indications and Patient Condition  Indications for airway management: anesthesia  Spontaneous ventilation: present  Sedation level: deep  Preoxygenated: yes  Patient position: sniffing  Mask difficulty assessment: 1 - vent by mask  Planned trial extubation    Final Airway Details  Final airway type: supraglottic airway      Successful airway: classic  Size 4     Number of attempts at approach: 1  Ventilation between attempts: supraglottic airway  Number of other approaches attempted: 0

## 2025-03-02 NOTE — H&P
History Of Present Illness  Cristal Pollard is a 64 y.o. female w/ h/o HTN, HLD, seziures (on lamictal), BUE radiculopathy, 2/28/24 s/p C4-7 ACDF (by Dr. Reynolds), p/w 5d acute on chronic back pain RLE (likely L4 radiculopathy), CT L-spine w/ multi-level spondylosis worse at L3-4, L4-5, L5-S1, no fx.    Patient with longstanding history of low back pain who has presented to [facility] with acute on chronic low back pain as well as acute onset right lower extremity and likely L4 distribution radiculopathy that started on Tuesday over the course of the week her radicular symptoms had worsened.  She reports on Tuesday she felt a pop in her back and then afterwards had a significant low back pain and right lower extremity radicular pain she denies any weakness or numbness associated with that.  She denies any loss of bowel or bladder function.  She denies any symptoms in her left leg.  Additionally on Tuesday she presented to her orthopedic surgeon and received a steroid shot to her hip without any significant change to her symptoms.  Her pain originates from her low back and goes across the anterior aspects of her thigh and stops at the knee.  She describes her pain as burning and tingling with occasional electric shocks.  Of note she had a prior ACDF surgery with Dr. Reynolds about a year ago that is doing really well from that.  She denies any neck discomfort or radicular arm symptoms.  Overall she is feeling that besides the low back pain and right lower extremity pain she does not have any major complaints.  She was instructed to undergo MRI lumbar spine at [place] however was unable to do so without sedation and therefore she has been transferred to [facility] for MRI with anesthesia.       Past Medical History  Past Medical History:   Diagnosis Date    Abnormal ECG 10/26/2023    Sinus rhythm LVH by voltage Inferior infarct, old Anterior Q waves, possibly due to LVH    Angina pectoris     Anxiety     Cervical disc disease      Cervical radiculopathy     Neuro: Arnulfo Reynolds LOV 1/15/24    Depression     Diabetes mellitus (Multi)     A1C: 2/6/24 -7.6%    GERD (gastroesophageal reflux disease)     History of Holter monitoring 10/2023    24 -48 hour monitor on 10/31/23, No abnormal findings    Hypertension     Incisional hernia with obstruction, without gangrene 05/24/2017    Incarcerated incisional hernia    Joint pain     Mastodynia 05/14/2020    Breast pain in female    Palpitations     Stress test scheduled for 2/9/24    PONV (postoperative nausea and vomiting)     Seizure disorder (Multi)     Last seizure 2/2023    TIA (transient ischemic attack)     Several years ago, no residual symptoms    Vertigo     Vision loss        Surgical History  Past Surgical History:   Procedure Laterality Date    CARPAL TUNNEL RELEASE Right     CATARACT EXTRACTION      CHOLECYSTECTOMY  11/14/2014    Cholecystectomy    COLONOSCOPY  05/17/2018    Complete Colonoscopy    CT CHEST WO IV CONTRAST  04/21/2023    No acute intrathoracic abnormalities. No thoracic aortic aneurysm or subintimal hematoma.    ECHOCARDIOGRAM 2 D M MODE PANEL  02/17/2023    Left ventricular systolic function is normal with a 60-65% estimated ejection fraction.    HERNIA REPAIR  05/24/2017    Hernia Repair    MANDIBLE SURGERY Bilateral     OTHER SURGICAL HISTORY  11/14/2014    Surgery Intracardiac Mass Removal Left Atrial Myxoma    OTHER SURGICAL HISTORY      Xiphoidectomy    STERNOTOMY  2011        Social History  She reports that she has never smoked. She has never used smokeless tobacco. She reports current alcohol use. She reports that she does not use drugs.    Family History  Family History   Problem Relation Name Age of Onset    Cancer Mother      Hypertension Mother      Heart disease Father      Cancer Father      Hypertension Father      Glaucoma Father      Macular degeneration Father      Heart attack Father      Cancer Sister      Diabetes Sister      Diabetes Maternal  Grandfather          Allergies  Tramadol, Amoxicillin, Ampicillin, Lidocaine, and Meperidine hcl    Review of Systems  10 point ROS is obtained and negative except the ones mentioned in the HPI    Physical Exam  Constitutional:       Appearance: She is obese.   HENT:      Head: Normocephalic and atraumatic.      Right Ear: External ear normal.      Left Ear: External ear normal.      Nose: Nose normal.      Mouth/Throat:      Mouth: Mucous membranes are moist.   Eyes:      Extraocular Movements: Extraocular movements intact.      Pupils: Pupils are equal, round, and reactive to light.          Last Recorded Vitals  Blood pressure 120/73, pulse 62, temperature 36.3 °C (97.3 °F), temperature source Temporal, resp. rate 18, SpO2 95%.    Relevant Results             Assessment/Plan   Assessment & Plan  Back pain of thoracolumbar region      Cristal Pollard is a 64 y.o. female w/ h/o HTN, HLD, seziures (on lamictal), BUE radiculopathy, 2/28/24 s/p C4-7 ACDF (by Dr. Reynolds), p/w 5d acute on chronic back pain RLE (likely L4 radiculopathy), CT L-spine w/ multi-level spondylosis worse at L3-4, L4-5, L5-S1, no fx.    Plan:  Floor  MRI with anesthesia in AM, ordered and anesthesia is aware, NPO since midnight  Home meds including seizure med (ACE's and metformin paused in the setting on hospitalization and need for general anesthesia)  Pain control  Gabapentin and steroid trial  SCDs, SQH  PTOT evaluation p  OOB x3             Abdon Tolbert MD

## 2025-03-02 NOTE — CARE PLAN
Pt sleeping well between care in NAD overnight. VSS.     The patient's goals for the shift include      The clinical goals for the shift include To have decreased pain    Problem: Pain - Adult  Goal: Verbalizes/displays adequate comfort level or baseline comfort level  Outcome: Not Progressing     Problem: Safety - Adult  Goal: Free from fall injury  Outcome: Progressing     Problem: Discharge Planning  Goal: Discharge to home or other facility with appropriate resources  Outcome: Progressing     Problem: Chronic Conditions and Co-morbidities  Goal: Patient's chronic conditions and co-morbidity symptoms are monitored and maintained or improved  Outcome: Progressing       Problem: Pain - Adult  Goal: Verbalizes/displays adequate comfort level or baseline comfort level  Outcome: Not Progressing

## 2025-03-02 NOTE — ANESTHESIA POSTPROCEDURE EVALUATION
Patient: Cristal Pollard    Procedure Summary       Date: 03/02/25 Room / Location: [facility]    Anesthesia Start: 0907 Anesthesia Stop: 1036    Procedure: MR LUMBAR SPINE WO CONTRAST Diagnosis: (low back pain, worsening leg/hip pain)    Scheduled Providers:  Responsible Provider: Zhen Chinchilla DO    Anesthesia Type: general ASA Status: 3            Anesthesia Type: general    Vitals Value Taken Time   /68 03/02/25 1116   Temp 36.7 03/02/25 1122   Pulse 64 03/02/25 1117   Resp 19 03/02/25 1117   SpO2 98 % 03/02/25 1117   Vitals shown include unfiled device data.    Anesthesia Post Evaluation    Patient location during evaluation: PACU  Patient participation: complete - patient participated  Level of consciousness: awake and alert  Pain score: 8  Pain management: inadequate  Multimodal analgesia pain management approach  Airway patency: patent  Two or more strategies used to mitigate risk of obstructive sleep apnea  Cardiovascular status: acceptable  Respiratory status: acceptable  Hydration status: acceptable  Postoperative Nausea and Vomiting: none        No notable events documented.

## 2025-03-02 NOTE — PROGRESS NOTES
Pharmacy Medication History Review    Cristal Pollard is a 64 y.o. female admitted for Back pain of thoracolumbar region. Pharmacy reviewed the patient's abaye-is-zlgezpfol medications and allergies for accuracy.    Medications ADDED:  N/A  Medications CHANGED:  N/A  Medications REMOVED:   Nitrostat 0.4 mg SL tablet     The list below reflects the updated PTA list.   Prior to Admission Medications   Prescriptions Last Dose Informant   FLUoxetine (PROzac) 40 mg capsule  Self   Sig: TAKE 1 CAPSULE (40 MG) BY MOUTH ONCE DAILY. TO START AFTER COMPLETING 10MG AND 20MG DOSES   Patient taking differently: Take 1 capsule (40 mg) by mouth once daily at bedtime. To start after completing 10mg and 20mg doses   OneTouch Delica Plus Lancet 30 gauge misc  Self   Sig: USE TO TEST ONCE DAILY   OneTouch Verio test strips strip  Self   Sig: USE TO TEST ONCE DAILY   acetaminophen (Tylenol) 325 mg tablet Not Taking Self   Sig: Take 2 tablets (650 mg) by mouth every 4 hours if needed for mild pain (1 - 3). Do Not Take with Home Percocet as it also contains Acetaminophen   Patient not taking: Reported on 3/2/2025   calcium carbonate-vitamin D3 600 mg-10 mcg (400 unit) chewable tablet  Self   Sig: Chew 1 tablet once daily.   hydrOXYzine HCL (Atarax) 25 mg tablet Not Taking Self   Sig: Take 1 tablet (25 mg) by mouth every 8 hours if needed for anxiety.   Patient not taking: Reported on 3/2/2025   lamoTRIgine (LaMICtal) 100 mg tablet  Self   Sig: TAKE 1 TABLET BY MOUTH IN THE MORNING AND 1.5 TABLETS BY MOUTH AT BEDTIME   lisinopriL-hydrochlorothiazide 10-12.5 mg tablet  Self   Sig: TAKE 1 TABLET BY MOUTH EVERY DAY   metFORMIN  mg 24 hr tablet  Self   Sig: TAKE 1 TABLET BY MOUTH EVERY DAY WITH EVENING MEAL   Patient taking differently: Take 1 tablet (500 mg) by mouth once daily at bedtime.   multivitamin tablet  Self   Sig: Take 1 tablet by mouth once daily.   omeprazole (PriLOSEC) 40 mg DR capsule  Self   Sig: TAKE 1 CAPSULE (40 MG) BY  "MOUTH ONCE DAILY. DO NOT CRUSH OR CHEW.   Patient taking differently: Take 1 capsule (40 mg) by mouth once daily as needed. Do not crush or chew.   oxyCODONE-acetaminophen (Percocet) 7.5-325 mg tablet  Self   Sig: Take 1 tablet by mouth every 4 hours if needed for severe pain (7 - 10).      Facility-Administered Medications: None        The list below reflects the updated allergy list. Please review each documented allergy for additional clarification and justification.  Allergies  Reviewed by Caryl Miller on 3/2/2025        Severity Reactions Comments    Tramadol High Seizure seizure while hospitalized. Tolerates Percocet per hx    Amoxicillin Medium Rash     Ampicillin Medium Hives shakey    Lidocaine Medium Hives, Rash, Nausea/vomiting patch    Meperidine Hcl Not Specified Unknown             Patient accepts M2B at discharge.     Sources:   [facility]  Pharmacy dispense history  Patient interview Good historian     Additional Comments:  N/A      CARYL MILLER  Pharmacy Technician  03/02/25     Secure Chat preferred   If no response call [phone] or [facility] \"Med Rec\"   "

## 2025-03-02 NOTE — CARE PLAN
Problem: Pain - Adult  Goal: Verbalizes/displays adequate comfort level or baseline comfort level  Outcome: Progressing     Problem: Discharge Planning  Goal: Discharge to home or other facility with appropriate resources  Outcome: Progressing     Problem: Pain  Goal: Turns in bed with improved pain control throughout the shift  Outcome: Progressing  Goal: Walks with improved pain control throughout the shift  Outcome: Progressing  Goal: Performs ADL's with improved pain control throughout shift  Outcome: Progressing  Goal: Participates in PT with improved pain control throughout the shift  Outcome: Progressing     Problem: Diabetes  Goal: Achieve decreasing blood glucose levels by end of shift  Outcome: Met  Goal: Increase stability of blood glucose readings by end of shift  Outcome: Met  Goal: Decrease in ketones present in urine by end of shift  Outcome: Met  Goal: Maintain electrolyte levels within acceptable range throughout shift  Outcome: Met  Goal: Maintain glucose levels >70mg/dl to <250mg/dl throughout shift  Outcome: Met  Goal: No changes in neurological exam by end of shift  Outcome: Met  Goal: Learn about and adhere to nutrition recommendations by end of shift  Outcome: Met  Goal: Vital signs within normal range for age by end of shift  Outcome: Met  Goal: Increase self care and/or family involovement by end of shift  Outcome: Met  Goal: Receive DSME education by end of shift  Outcome: Met     Problem: Safety - Adult  Goal: Free from fall injury  Outcome: Met     Problem: Chronic Conditions and Co-morbidities  Goal: Patient's chronic conditions and co-morbidity symptoms are monitored and maintained or improved  Outcome: Met     Problem: Nutrition  Goal: Nutrient intake appropriate for maintaining nutritional needs  Outcome: Met     Problem: Pain  Goal: Takes deep breaths with improved pain control throughout the shift  Outcome: Met  Goal: Free from opioid side effects throughout the shift  Outcome:  Met  Goal: Free from acute confusion related to pain meds throughout the shift  Outcome: Met   The patient's goals for the shift include      The clinical goals for the shift include patient will have adequate pain control by end of shift

## 2025-03-02 NOTE — ANESTHESIA PREPROCEDURE EVALUATION
Patient: Cristal Pollard    Procedure Information       Date/Time: 03/02/25 0820    Procedure: MR LUMBAR SPINE WO CONTRAST    Location: [facility]            Relevant Problems   Anesthesia   (+) PONV (postoperative nausea and vomiting)      Cardiac   (+) Chest pain   (+) Hypercholesteremia   (+) Hypertension      Neuro   (+) Anxiety disorder   (+) Carpal tunnel syndrome on right   (+) Cervical radiculopathy   (+) Cervical radiculopathy due to trauma   (+) Depression, recurrent (CMS-HCC)   (+) Depressive personality disorder   (+) Generalized tonic clonic epilepsy (Multi)   (+) Occipital neuralgia   (+) Panic attacks   (+) Post traumatic stress disorder (PTSD)   (+) Right lumbosacral radiculopathy   (+) Seizure disorder (Multi)   (+) Stress reaction, chronic      Endocrine   (+) Class 1 obesity due to excess calories without serious comorbidity with body mass index (BMI) of 30.0 to 30.9 in adult   (+) Type 2 diabetes mellitus      Musculoskeletal   (+) Carpal tunnel syndrome on right   (+) Central stenosis of spinal canal   (+) Chronic neck pain   (+) DDD (degenerative disc disease), lumbosacral   (+) DJD (degenerative joint disease) of cervical spine   (+) Spinal stenosis of lumbar region with neurogenic claudication       Clinical information reviewed:    Allergies  Meds               NPO Detail:  No data recorded     Physical Exam    Airway  Mallampati: II  TM distance: >3 FB     Cardiovascular   Rhythm: regular  Rate: normal     Dental    Pulmonary   Comments: Normal WOB   Abdominal            Anesthesia Plan    History of general anesthesia?: yes  History of complications of general anesthesia?: yes    ASA 3     general     intravenous induction   Postoperative administration of opioids is intended.  Trial extubation is planned.  Anesthetic plan and risks discussed with patient.

## 2025-03-03 ENCOUNTER — APPOINTMENT (OUTPATIENT)
Dept: RADIOLOGY | Facility: HOSPITAL | Age: 65
End: 2025-03-03
Payer: COMMERCIAL

## 2025-03-03 LAB
EST. AVERAGE GLUCOSE BLD GHB EST-MCNC: 151 MG/DL
GLUCOSE BLD MANUAL STRIP-MCNC: 158 MG/DL (ref 74–99)
GLUCOSE BLD MANUAL STRIP-MCNC: 167 MG/DL (ref 74–99)
GLUCOSE BLD MANUAL STRIP-MCNC: 242 MG/DL (ref 74–99)
HBA1C MFR BLD: 6.9 %

## 2025-03-03 PROCEDURE — 2500000001 HC RX 250 WO HCPCS SELF ADMINISTERED DRUGS (ALT 637 FOR MEDICARE OP): Performed by: STUDENT IN AN ORGANIZED HEALTH CARE EDUCATION/TRAINING PROGRAM

## 2025-03-03 PROCEDURE — 97161 PT EVAL LOW COMPLEX 20 MIN: CPT | Mod: GP

## 2025-03-03 PROCEDURE — 36415 COLL VENOUS BLD VENIPUNCTURE: CPT

## 2025-03-03 PROCEDURE — 2500000001 HC RX 250 WO HCPCS SELF ADMINISTERED DRUGS (ALT 637 FOR MEDICARE OP)

## 2025-03-03 PROCEDURE — 99222 1ST HOSP IP/OBS MODERATE 55: CPT

## 2025-03-03 PROCEDURE — G0378 HOSPITAL OBSERVATION PER HR: HCPCS

## 2025-03-03 PROCEDURE — 72131 CT LUMBAR SPINE W/O DYE: CPT | Performed by: RADIOLOGY

## 2025-03-03 PROCEDURE — 2500000004 HC RX 250 GENERAL PHARMACY W/ HCPCS (ALT 636 FOR OP/ED)

## 2025-03-03 PROCEDURE — 2500000002 HC RX 250 W HCPCS SELF ADMINISTERED DRUGS (ALT 637 FOR MEDICARE OP, ALT 636 FOR OP/ED): Performed by: STUDENT IN AN ORGANIZED HEALTH CARE EDUCATION/TRAINING PROGRAM

## 2025-03-03 PROCEDURE — 82947 ASSAY GLUCOSE BLOOD QUANT: CPT

## 2025-03-03 PROCEDURE — 1100000001 HC PRIVATE ROOM DAILY

## 2025-03-03 PROCEDURE — 2500000002 HC RX 250 W HCPCS SELF ADMINISTERED DRUGS (ALT 637 FOR MEDICARE OP, ALT 636 FOR OP/ED)

## 2025-03-03 PROCEDURE — 2500000004 HC RX 250 GENERAL PHARMACY W/ HCPCS (ALT 636 FOR OP/ED): Performed by: STUDENT IN AN ORGANIZED HEALTH CARE EDUCATION/TRAINING PROGRAM

## 2025-03-03 PROCEDURE — 83036 HEMOGLOBIN GLYCOSYLATED A1C: CPT

## 2025-03-03 PROCEDURE — 72131 CT LUMBAR SPINE W/O DYE: CPT

## 2025-03-03 RX ORDER — INSULIN LISPRO 100 [IU]/ML
0-10 INJECTION, SOLUTION INTRAVENOUS; SUBCUTANEOUS
Status: DISCONTINUED | OUTPATIENT
Start: 2025-03-03 | End: 2025-03-07

## 2025-03-03 RX ORDER — POLYETHYLENE GLYCOL 3350 17 G/17G
17 POWDER, FOR SOLUTION ORAL 2 TIMES DAILY
Status: DISCONTINUED | OUTPATIENT
Start: 2025-03-03 | End: 2025-03-10 | Stop reason: HOSPADM

## 2025-03-03 RX ORDER — BISACODYL 5 MG
5 TABLET, DELAYED RELEASE (ENTERIC COATED) ORAL DAILY PRN
Status: DISCONTINUED | OUTPATIENT
Start: 2025-03-03 | End: 2025-03-10 | Stop reason: HOSPADM

## 2025-03-03 RX ORDER — AMOXICILLIN 250 MG
2 CAPSULE ORAL 2 TIMES DAILY
Status: DISCONTINUED | OUTPATIENT
Start: 2025-03-03 | End: 2025-03-10 | Stop reason: HOSPADM

## 2025-03-03 RX ADMIN — POLYETHYLENE GLYCOL 3350 17 G: 17 POWDER, FOR SOLUTION ORAL at 08:18

## 2025-03-03 RX ADMIN — INSULIN LISPRO 2 UNITS: 100 INJECTION, SOLUTION INTRAVENOUS; SUBCUTANEOUS at 16:03

## 2025-03-03 RX ADMIN — ACETAMINOPHEN 975 MG: 325 TABLET ORAL at 06:02

## 2025-03-03 RX ADMIN — ACETAMINOPHEN 975 MG: 325 TABLET ORAL at 20:22

## 2025-03-03 RX ADMIN — INSULIN LISPRO 1 UNITS: 100 INJECTION, SOLUTION INTRAVENOUS; SUBCUTANEOUS at 08:17

## 2025-03-03 RX ADMIN — KETOROLAC TROMETHAMINE 30 MG: 30 INJECTION, SOLUTION INTRAMUSCULAR; INTRAVENOUS at 12:38

## 2025-03-03 RX ADMIN — HEPARIN SODIUM 5000 UNITS: 5000 INJECTION, SOLUTION INTRAVENOUS; SUBCUTANEOUS at 06:00

## 2025-03-03 RX ADMIN — OXYCODONE HYDROCHLORIDE 10 MG: 5 TABLET ORAL at 16:01

## 2025-03-03 RX ADMIN — KETOROLAC TROMETHAMINE 30 MG: 30 INJECTION, SOLUTION INTRAMUSCULAR; INTRAVENOUS at 18:49

## 2025-03-03 RX ADMIN — DEXAMETHASONE SODIUM PHOSPHATE 2 MG: 4 INJECTION, SOLUTION INTRA-ARTICULAR; INTRALESIONAL; INTRAMUSCULAR; INTRAVENOUS; SOFT TISSUE at 09:47

## 2025-03-03 RX ADMIN — POLYETHYLENE GLYCOL 3350 17 G: 17 POWDER, FOR SOLUTION ORAL at 20:23

## 2025-03-03 RX ADMIN — CYCLOBENZAPRINE 10 MG: 10 TABLET, FILM COATED ORAL at 15:52

## 2025-03-03 RX ADMIN — ACETAMINOPHEN 975 MG: 325 TABLET ORAL at 12:38

## 2025-03-03 RX ADMIN — SENNOSIDES AND DOCUSATE SODIUM 2 TABLET: 50; 8.6 TABLET ORAL at 20:22

## 2025-03-03 RX ADMIN — OXYCODONE HYDROCHLORIDE 10 MG: 5 TABLET ORAL at 20:27

## 2025-03-03 RX ADMIN — DEXAMETHASONE SODIUM PHOSPHATE 2 MG: 4 INJECTION, SOLUTION INTRA-ARTICULAR; INTRALESIONAL; INTRAMUSCULAR; INTRAVENOUS; SOFT TISSUE at 01:22

## 2025-03-03 RX ADMIN — CYCLOBENZAPRINE 10 MG: 10 TABLET, FILM COATED ORAL at 08:17

## 2025-03-03 RX ADMIN — PANTOPRAZOLE SODIUM 40 MG: 40 TABLET, DELAYED RELEASE ORAL at 06:01

## 2025-03-03 RX ADMIN — SENNOSIDES AND DOCUSATE SODIUM 2 TABLET: 50; 8.6 TABLET ORAL at 09:47

## 2025-03-03 RX ADMIN — OXYCODONE HYDROCHLORIDE 10 MG: 5 TABLET ORAL at 06:01

## 2025-03-03 RX ADMIN — HEPARIN SODIUM 5000 UNITS: 5000 INJECTION, SOLUTION INTRAVENOUS; SUBCUTANEOUS at 12:38

## 2025-03-03 RX ADMIN — FLUOXETINE HYDROCHLORIDE 40 MG: 20 CAPSULE ORAL at 20:22

## 2025-03-03 RX ADMIN — KETOROLAC TROMETHAMINE 30 MG: 30 INJECTION, SOLUTION INTRAMUSCULAR; INTRAVENOUS at 06:00

## 2025-03-03 RX ADMIN — DEXAMETHASONE SODIUM PHOSPHATE 2 MG: 4 INJECTION, SOLUTION INTRA-ARTICULAR; INTRALESIONAL; INTRAMUSCULAR; INTRAVENOUS; SOFT TISSUE at 17:20

## 2025-03-03 RX ADMIN — HEPARIN SODIUM 5000 UNITS: 5000 INJECTION, SOLUTION INTRAVENOUS; SUBCUTANEOUS at 20:22

## 2025-03-03 RX ADMIN — GABAPENTIN 300 MG: 300 CAPSULE ORAL at 20:22

## 2025-03-03 RX ADMIN — GABAPENTIN 300 MG: 300 CAPSULE ORAL at 08:16

## 2025-03-03 RX ADMIN — LAMOTRIGINE 150 MG: 150 TABLET ORAL at 20:23

## 2025-03-03 RX ADMIN — CYCLOBENZAPRINE 10 MG: 10 TABLET, FILM COATED ORAL at 20:22

## 2025-03-03 RX ADMIN — KETOROLAC TROMETHAMINE 30 MG: 30 INJECTION, SOLUTION INTRAMUSCULAR; INTRAVENOUS at 01:22

## 2025-03-03 RX ADMIN — GABAPENTIN 300 MG: 300 CAPSULE ORAL at 15:52

## 2025-03-03 RX ADMIN — LAMOTRIGINE 100 MG: 100 TABLET ORAL at 08:17

## 2025-03-03 ASSESSMENT — COGNITIVE AND FUNCTIONAL STATUS - GENERAL
MOVING TO AND FROM BED TO CHAIR: A LITTLE
WALKING IN HOSPITAL ROOM: A LITTLE
MOVING TO AND FROM BED TO CHAIR: A LITTLE
MOBILITY SCORE: 18
TURNING FROM BACK TO SIDE WHILE IN FLAT BAD: A LITTLE
TOILETING: A LITTLE
CLIMB 3 TO 5 STEPS WITH RAILING: A LITTLE
TURNING FROM BACK TO SIDE WHILE IN FLAT BAD: A LITTLE
DRESSING REGULAR LOWER BODY CLOTHING: A LITTLE
CLIMB 3 TO 5 STEPS WITH RAILING: A LITTLE
STANDING UP FROM CHAIR USING ARMS: A LITTLE
HELP NEEDED FOR BATHING: A LITTLE
DAILY ACTIVITIY SCORE: 21
STANDING UP FROM CHAIR USING ARMS: A LITTLE
WALKING IN HOSPITAL ROOM: A LITTLE
MOBILITY SCORE: 19
MOVING FROM LYING ON BACK TO SITTING ON SIDE OF FLAT BED WITH BEDRAILS: A LITTLE

## 2025-03-03 ASSESSMENT — PAIN - FUNCTIONAL ASSESSMENT
PAIN_FUNCTIONAL_ASSESSMENT: 0-10

## 2025-03-03 ASSESSMENT — PAIN SCALES - GENERAL
PAINLEVEL_OUTOF10: 0 - NO PAIN
PAINLEVEL_OUTOF10: 7
PAINLEVEL_OUTOF10: 6
PAINLEVEL_OUTOF10: 5 - MODERATE PAIN
PAINLEVEL_OUTOF10: 8
PAINLEVEL_OUTOF10: 0 - NO PAIN
PAINLEVEL_OUTOF10: 8
PAINLEVEL_OUTOF10: 8
PAINLEVEL_OUTOF10: 6

## 2025-03-03 ASSESSMENT — PAIN DESCRIPTION - ORIENTATION: ORIENTATION: RIGHT

## 2025-03-03 ASSESSMENT — ACTIVITIES OF DAILY LIVING (ADL)
ADL_ASSISTANCE: INDEPENDENT
LACK_OF_TRANSPORTATION: NO

## 2025-03-03 ASSESSMENT — PAIN DESCRIPTION - LOCATION: LOCATION: LEG

## 2025-03-03 NOTE — PROGRESS NOTES
"Cristal Pollard is a 64 y.o. female on day 1 of admission presenting with Back pain of thoracolumbar region.    Subjective   NAEON       Objective     Physical Exam  Aox3  BUE 5  LLE HF/KE?DF/PF 5  RLE HF/KE/DF/PF 5  w/ persistence (pain neely)  RLE L4 and L5 radic  Last Recorded Vitals  Blood pressure 111/67, pulse 92, temperature 37 °C (98.6 °F), temperature source Temporal, resp. rate 14, height 1.651 m (5' 5\"), weight 80.1 kg (176 lb 9.6 oz), SpO2 95%.  Intake/Output last 3 Shifts:  I/O last 3 completed shifts:  In: 300 (3.7 mL/kg) [IV Piggyback:300]  Out: 210 (2.6 mL/kg) [Urine:210 (0.1 mL/kg/hr)]  Weight: 80.1 kg     Relevant Results                              Assessment/Plan   Assessment & Plan  Back pain of thoracolumbar region    PONV (postoperative nausea and vomiting)    64 F h/o HTN, HLD, seziures (on lamictal), BUE radiculopathy, 2/28/24 s/p C4-7 ACDF (by Dr. Reynolds), p/w 5d acute on chronic back pain RLE (likely L4 radiculopathy), CT L-spine w/ multi-level spondylosis worse at L3-4, L4-5, L5-S1, no fx, MRI LS w L3-4, L4-5 CCS, L5-S1 L foraminal stenosis, Flex ex no pathologic motion    Floor  OR Planning L3-4, L4-5, L5-S1 TLIF  Home meds  Dex  SCDs, SQH           Tolu Navarro MD      "

## 2025-03-03 NOTE — HOSPITAL COURSE
Cristal Pollard is a 64 y.o. 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 Reynolds) on 2/28/2024. Patient presented to the [facility] ED on 2/27 with 5 days acute on chronic back pain and RLE (likely L4) radiculopathy. CT L-spine demonstrated  multi-level spondylosis worse at L3-4, L4-5, L5-S1, no fracture. MRI LS demonstrated L3-4, L4-5 CCS, L5-S1 L foraminal stenosis. Flex ex xrays with no pathologic motion. She was admitted to Neurosurgery for further evaluation and management.     3/3 CT thin cut L spine complete for surgical planning.   3/5 s/p MIS L3-4, L4-5 TLIF complicated by durotomy with secondary repair.   3/6 Quinn and PCA discontinued.     PT/OT evaluated patient and recommended low level of continued therapy for which referral placed for home health care.     On the day of discharge, the patient was seen and evaluated by the neurosurgery team and deemed suitable for discharge. The patient was given detailed discharge instructions and were scheduled to follow up as an outpatient.

## 2025-03-03 NOTE — PROGRESS NOTES
Physical Therapy    Physical Therapy Evaluation    Patient Name: Cristal Pollard  MRN: [id number]  Today's Date: 3/3/2025   Room: [address] [place], [place] [address]  Time Calculation  Start Time: 0936  Stop Time: 0958  Time Calculation (min): 22 min    Assessment/Plan   PT Assessment  PT Assessment Results: Decreased strength, Decreased endurance, Impaired balance, Decreased mobility, Pain  Rehab Prognosis: Good  Barriers to Discharge Home: No anticipated barriers  Evaluation/Treatment Tolerance: Patient limited by pain  Medical Staff Made Aware: Yes  Strengths: Attitude of self  Barriers to Participation: Comorbidities  End of Session Communication: Bedside nurse  Assessment Comment: 64 year old female admitted with 5d acute on chronic back pain RLE (likely L4 radiculopathy), CT L-spine w/ multi-level spondylosis worse at L3-4, L4-5, L5-S1, no fx, MRI LS w L3-4, L4-5 CCS, L5-S1 L foraminal stenosis, Flex ex no pathologic motion. Per neurosurgery, surgery planned this week, okay for PT eval prior to surgery. Pt presents with decreased strength, endurance and balance impacting functional mobility. Pt would benefit from continued PT while in hospital to improve functional mobility and return to PLOF.  End of Session Patient Position: Up in chair, Alarm off, not on at start of session  IP OR SWING BED PT PLAN  Inpatient or Swing Bed: Inpatient  PT Plan  Treatment/Interventions: Bed mobility, Transfer training, Gait training, Stair training, Balance training, Neuromuscular re-education, Strengthening, Endurance training, Therapeutic exercise, Therapeutic activity, Home exercise program, Positioning, Postural re-education  PT Plan: Ongoing PT  PT Frequency: 3 times per week  PT Discharge Recommendations: Low intensity level of continued care  Equipment Recommended upon Discharge: Wheeled walker  PT Recommended Transfer Status: Assist x1, Assistive device  PT - OK to Discharge: Yes    Subjective   General Visit Information:  Reason for Referral: 64  year old female admitted with 5d acute on chronic back pain RLE (likely L4 radiculopathy), CT L-spine w/ multi-level spondylosis worse at L3-4, L4-5, L5-S1, no fx, MRI LS w L3-4, L4-5 CCS, L5-S1 L foraminal stenosis, Flex ex no pathologic motion. Per neurosurgery, surgery planned this week, okay for PT eval prior to surgery.  Past Medical History Relevant to Rehab: HTN, HLD, seziures (on lamictal), BUE radiculopathy, 2/28/24 s/p C4-7 ACDF  Prior to Session Communication: Bedside nurse, Physician  Patient Position Received: Bed, 3 rail up, Alarm on  General Comment: Pt supine in bed upon entry to room. Pt pleasant, cooperative and willing to work with PT.   Home Living:  Home Living  Type of Home: House  Lives With: Significant other  Home Adaptive Equipment:  (shower stool)  Home Layout: One level  Home Access: Stairs to enter with rails  Entrance Stairs-Rails:  (1)  Entrance Stairs-Number of Steps: 5  Bathroom Shower/Tub: Tub/shower unit  Prior Level of Function:  Prior Function Per Pt/Caregiver Report  Level of [place]: Independent with ADLs and functional transfers, Independent with homemaking with ambulation  ADL Assistance: Independent  Homemaking Assistance: Independent  Ambulatory Assistance: Independent  Vocational: Retired  Prior Function Comments: pt independent prior to feb 2025, has had issues with pain and ambulation since then; denies any falls over the last 6 months  Precautions:  Precautions  Medical Precautions: Fall precautions  Post-Surgical Precautions: Spinal precautions      Objective     Pain:  Pain Assessment  Pain Assessment: 0-10  0-10 (Numeric) Pain Score:  (unrated RLE pain)  Cognition:  Cognition  Overall Cognitive Status: Within Functional Limits  Orientation Level: Oriented X4    Extremity/Trunk Assessments:  Strength:    RLE   RLE :  (grossly at least 4/5 based on functional observation, limited 2/2 to pain and spasms with AROM)  LLE   LLE : Within Functional Limits    General  Assessments:    Activity Tolerance  Endurance: Decreased tolerance for upright activites  Sensation  Light Touch:  (NT in RLE)     Static Sitting Balance  Static Sitting-Level of Assistance: Close supervision  Dynamic Sitting Balance  Dynamic Sitting-Level of Assistance: Close supervision  Static Standing Balance  Static Standing-Level of Assistance: Minimum assistance  Dynamic Standing Balance  Dynamic Standing-Level of Assistance: Minimum assistance    Functional Assessments:  Bed Mobility  Bed Mobility: Yes  Bed Mobility 1  Bed Mobility 1: Supine to sitting  Level of Assistance 1: Contact guard, Minimal verbal cues, Minimal tactile cues  Bed Mobility Comments 1: HOB partially elevated, verbal cuing for sequencing  Transfers  Transfer: Yes  Transfer 1  Technique 1: Sit to stand, Stand to sit  Transfer Device 1: Walker  Transfer Level of Assistance 1: Minimum assistance, Minimal verbal cues, Minimal tactile cues  Trials/Comments 1: noted R knee buckling  Ambulation/Gait Training  Ambulation/Gait Training Performed: Yes  Ambulation/Gait Training 1  Surface 1: Level tile  Device 1: Rolling walker  Assistance 1: Contact guard, Minimal verbal cues, Minimal tactile cues  Quality of Gait 1: Knee(s) buckle (decreased janice, noted R knee buckling)  Comments/Distance (ft) 1: 3ft to chair    Outcome Measures:  [facility] Basic Mobility  Turning from your back to your side while in a flat bed without using bedrails: A little  Moving from lying on your back to sitting on the side of a flat bed without using bedrails: A little  Moving to and from bed to chair (including a wheelchair): A little  Standing up from a chair using your arms (e.g. wheelchair or bedside chair): A little  To walk in hospital room: A little  Climbing 3-5 steps with railing: A little  Basic Mobility - Total Score: 18    Encounter Problems       Encounter Problems (Active)       Mobility       STG - Patient will ascend and descend >5 steps with LRAD and  supervision.        Start:  03/03/25    Expected End:  03/17/25            Pt will perform bed mobility with supervision.        Start:  03/03/25    Expected End:  03/17/25            Pt will ambulate >250ft with LRAD and supervision Assist with no LOB and VSS.         Start:  03/03/25    Expected End:  03/17/25            Pt will demonstrate WFL BLE strength to complete therapeutic exercise and participate in functional mobility.         Start:  03/03/25    Expected End:  03/17/25               PT Transfers       Pt will perform all functional transfers with LRAD and supervision assist.         Start:  03/03/25    Expected End:  03/17/25               Pain - Adult            Education Documentation  Mobility Training, taught by Sarah Franks, PT at 3/3/2025 11:25 AM.  Learner: Patient  Readiness: Acceptance  Method: Explanation  Response: Verbalizes Understanding  Comment: importance of functional mobility    Education Comments  No comments found.      03/03/25 at 11:27 AM   Sarah Franks, PT   Rehab Office: [phone]

## 2025-03-03 NOTE — PROGRESS NOTES
03/03/25 1200   Discharge Planning   Living Arrangements Spouse/significant other   Support Systems Spouse/significant other   Assistance Needed None   Type of Residence Private residence   Number of Stairs to Enter Residence 4   Number of Stairs Within Residence 0   Do you have animals or pets at home? Yes   Type of Animals or Pets Kitten   Who is requesting discharge planning? Provider   Home or Post Acute Services None   Expected Discharge Disposition Home   Does the patient need discharge transport arranged? Yes   RoundTrip coordination needed? Yes   Has discharge transport been arranged? No   Financial Resource Strain   How hard is it for you to pay for the very basics like food, housing, medical care, and heating? Not hard   Housing Stability   In the last 12 months, was there a time when you were not able to pay the mortgage or rent on time? N   In the past 12 months, how many times have you moved where you were living? 0   At any time in the past 12 months, were you homeless or living in a shelter (including now)? N   Transportation Needs   In the past 12 months, has lack of transportation kept you from medical appointments or from getting medications? no   In the past 12 months, has lack of transportation kept you from meetings, work, or from getting things needed for daily living? No   Stroke Family Assessment   Stroke Family Assessment Needed No   Intensity of Service   Intensity of Service 0-30 min     I spoke with Cristal regarding discharge planning and home going needs. Patient states that she lives home with here significant other [phone] where she is independent with ADL's without assistive devices. Patient is not medically cleared for discharge today pending surgery. I will continue to follow with a safe discharge plan.

## 2025-03-03 NOTE — CONSULTS
Consults    Reason For Consult  Pre-op evaluation    History Of Present Illness  64 F h/o HTN, HLD, seziures (on lamictal), BUE radiculopathy, 2/28/24 s/p C4-7 ACDF (by Dr. Reynolds), p/w 5d acute on chronic back pain RLE (likely L4 radiculopathy), CT L-spine w/ multi-level spondylosis worse at L3-4, L4-5, L5-S1, no fx, MRI LS w L3-4, L4-5 CCS, L5-S1 L foraminal stenosis, Flex ex no pathologic motion.    Patient doing well at bedside. Notes her last seizure to have been in 2/23. Also endorses Hx of TIA in 2016, as well as atrial myxoma removal in 2014. Notes her seizures to be well controlled on lamictal.     Past Medical History  She has a past medical history of Abnormal ECG (10/26/2023), Angina pectoris, Anxiety, Cervical disc disease, Cervical radiculopathy, Depression, Diabetes mellitus (Multi), GERD (gastroesophageal reflux disease), History of Holter monitoring (10/2023), Hypertension, Incisional hernia with obstruction, without gangrene (05/24/2017), Joint pain, Mastodynia (05/14/2020), Palpitations, PONV (postoperative nausea and vomiting), Seizure disorder (Multi), TIA (transient ischemic attack), Vertigo, and Vision loss.    Surgical History  She has a past surgical history that includes Other surgical history (11/14/2014); Cholecystectomy (11/14/2014); Colonoscopy (05/17/2018); Hernia repair (05/24/2017); Sternotomy (2011); echocardiogram 2 d m mode panel (02/17/2023); CT chest wo IV contrast (04/21/2023); Cataract extraction; Carpal tunnel release (Right); Other surgical history; and Mandible surgery (Bilateral).     Social History  She reports that she has never smoked. She has never used smokeless tobacco. She reports current alcohol use. She reports that she does not use drugs.    Family History  Family History   Problem Relation Name Age of Onset    Cancer Mother      Hypertension Mother      Heart disease Father      Cancer Father      Hypertension Father      Glaucoma Father      Macular degeneration  Father      Heart attack Father      Cancer Sister      Diabetes Sister      Diabetes Maternal Grandfather          Allergies  Tramadol, Amoxicillin, Ampicillin, Lidocaine, and Meperidine hcl       Physical Exam  Physical Exam  Constitutional:       General: She is not in acute distress.  HENT:      Head: Normocephalic and atraumatic.   Eyes:      General: No scleral icterus.     Extraocular Movements: Extraocular movements intact.   Cardiovascular:      Rate and Rhythm: Normal rate and regular rhythm.      Heart sounds: No murmur heard.     No friction rub. No gallop.   Pulmonary:      Effort: Pulmonary effort is normal. No respiratory distress.      Breath sounds: Normal breath sounds. No wheezing or rales.   Abdominal:      General: Abdomen is flat. There is no distension.      Palpations: Abdomen is soft.   Musculoskeletal:         General: No swelling or tenderness.      Right lower leg: No edema.      Left lower leg: No edema.   Skin:     General: Skin is warm and dry.   Neurological:      Mental Status: She is alert.             Last Recorded Vitals  /68 (BP Location: Right arm, Patient Position: Sitting)   Pulse 56   Temp 35.9 °C (96.6 °F) (Temporal)   Resp 18   Wt 80.1 kg (176 lb 9.6 oz)   SpO2 94%     Relevant Results  Exercise Stress Test 2/14/24  Summary:   1. Normal ST response to moderate peak workload max. ETT.   2. Systolic hypertensive response to exercise.   3. Adequate level of stress achieved.    Holter 10/31/23  The predominant rhythm during this holter recording is sinus rhythm with a minimum heart rate of 53 bpm, maximum heart rate 125 bpm, and average heart rate 73 bpm.  No episodes of atrial fibrillation or PSVT.  No episodes of high grade AV block.  No ventricular tachycardia.     Echo 2/17/23  CONCLUSIONS:  1. Left ventricular systolic function is normal with a 60-65% estimated ejection fraction.    Assessment/Plan     64 F h/o HTN, HLD, seziures (on lamictal), BUE  radiculopathy, 2/28/24 s/p C4-7 ACDF (by Dr. Reynolds), p/w 5d acute on chronic back pain RLE (likely L4 radiculopathy), CT L-spine w/ multi-level spondylosis worse at L3-4, L4-5, L5-S1, no fx, MRI LS w L3-4, L4-5 CCS, L5-S1 L foraminal stenosis, Flex ex no pathologic motion.    Patient doing well at bedside. Notes her last seizure to have been in 2/23. Also endorses Hx of TIA in 2016, as well as atrial myxoma removal in 2014. Notes her seizures to be well controlled on lamictal.    RCRI: 0  DASI: 28.7, 6.27 METs  ARISCAT: 19, low risk      -Agree with holding Metformin and lisinopril/HCTZ  -Medically optimized for the intended procedure.      Patient seen and discussed with attending physician Dr. Trujillo.      Evelio Montejo DO PGY1  Department of Anesthesiology

## 2025-03-03 NOTE — CARE PLAN
Problem: Pain - Adult  Goal: Verbalizes/displays adequate comfort level or baseline comfort level  Outcome: Progressing   The patient's goals for the shift include      The clinical goals for the shift include Pt will have pain controlled on my shift      Problem: Discharge Planning  Goal: Discharge to home or other facility with appropriate resources  Outcome: Progressing     Problem: Pain  Goal: Turns in bed with improved pain control throughout the shift  Outcome: Progressing

## 2025-03-03 NOTE — CARE PLAN
Problem: Pain - Adult  Goal: Verbalizes/displays adequate comfort level or baseline comfort level  3/3/2025 0833 by Izaiah Gutrhie RN  Outcome: Progressing  3/3/2025 0833 by Izaiah Guthrie RN  Outcome: Progressing     Problem: Pain  Goal: Turns in bed with improved pain control throughout the shift  3/3/2025 0833 by Izaiah Guthrie RN  Outcome: Progressing  3/3/2025 0833 by Izaiah Guthrie RN  Outcome: Progressing  Goal: Walks with improved pain control throughout the shift  3/3/2025 0833 by Izaiah Guthrie RN  Outcome: Progressing  3/3/2025 0833 by Izaiah Guthrie RN  Outcome: Progressing  Goal: Performs ADL's with improved pain control throughout shift  3/3/2025 0833 by Izaiah Guthrie RN  Outcome: Progressing  3/3/2025 0833 by Izaiah Guthrie RN  Outcome: Progressing  Goal: Participates in PT with improved pain control throughout the shift  3/3/2025 0833 by Izaiah Guthrie RN  Outcome: Progressing  3/3/2025 0833 by Izaiah Guthrie RN  Outcome: Progressing     Problem: Fall/Injury  Goal: Not fall by end of shift  Outcome: Progressing  Goal: Be free from injury by end of the shift  Outcome: Progressing  Goal: Verbalize understanding of personal risk factors for fall in the hospital  Outcome: Progressing  Goal: Verbalize understanding of risk factor reduction measures to prevent injury from fall in the home  Outcome: Progressing  Goal: Use assistive devices by end of the shift  Outcome: Progressing  Goal: Pace activities to prevent fatigue by end of the shift  Outcome: Progressing   The patient's goals for the shift include      The clinical goals for the shift include Pt's pain will be controlled throughout this shift.

## 2025-03-04 ENCOUNTER — ANESTHESIA EVENT (OUTPATIENT)
Dept: OPERATING ROOM | Facility: HOSPITAL | Age: 65
End: 2025-03-04
Payer: COMMERCIAL

## 2025-03-04 LAB
ALBUMIN SERPL BCP-MCNC: 3.9 G/DL (ref 3.4–5)
ANION GAP SERPL CALC-SCNC: 15 MMOL/L (ref 10–20)
APPEARANCE UR: CLEAR
BILIRUB UR STRIP.AUTO-MCNC: NEGATIVE MG/DL
BUN SERPL-MCNC: 22 MG/DL (ref 6–23)
CALCIUM SERPL-MCNC: 9.8 MG/DL (ref 8.6–10.6)
CAOX CRY #/AREA UR COMP ASSIST: ABNORMAL /HPF
CHLORIDE SERPL-SCNC: 107 MMOL/L (ref 98–107)
CO2 SERPL-SCNC: 22 MMOL/L (ref 21–32)
COLOR UR: YELLOW
CREAT SERPL-MCNC: 0.87 MG/DL (ref 0.5–1.05)
EGFRCR SERPLBLD CKD-EPI 2021: 74 ML/MIN/1.73M*2
ERYTHROCYTE [DISTWIDTH] IN BLOOD BY AUTOMATED COUNT: 12.4 % (ref 11.5–14.5)
GLUCOSE BLD MANUAL STRIP-MCNC: 145 MG/DL (ref 74–99)
GLUCOSE BLD MANUAL STRIP-MCNC: 212 MG/DL (ref 74–99)
GLUCOSE BLD MANUAL STRIP-MCNC: 223 MG/DL (ref 74–99)
GLUCOSE SERPL-MCNC: 140 MG/DL (ref 74–99)
GLUCOSE UR STRIP.AUTO-MCNC: ABNORMAL MG/DL
HCT VFR BLD AUTO: 43.4 % (ref 36–46)
HGB BLD-MCNC: 14.3 G/DL (ref 12–16)
KETONES UR STRIP.AUTO-MCNC: NEGATIVE MG/DL
LEUKOCYTE ESTERASE UR QL STRIP.AUTO: NEGATIVE
MCH RBC QN AUTO: 30.6 PG (ref 26–34)
MCHC RBC AUTO-ENTMCNC: 32.9 G/DL (ref 32–36)
MCV RBC AUTO: 93 FL (ref 80–100)
MUCOUS THREADS #/AREA URNS AUTO: ABNORMAL /LPF
NITRITE UR QL STRIP.AUTO: NEGATIVE
NRBC BLD-RTO: 0 /100 WBCS (ref 0–0)
PH UR STRIP.AUTO: 5.5 [PH]
PHOSPHATE SERPL-MCNC: 3.2 MG/DL (ref 2.5–4.9)
PLATELET # BLD AUTO: 242 X10*3/UL (ref 150–450)
POTASSIUM SERPL-SCNC: 4.2 MMOL/L (ref 3.5–5.3)
PROT UR STRIP.AUTO-MCNC: ABNORMAL MG/DL
RBC # BLD AUTO: 4.67 X10*6/UL (ref 4–5.2)
RBC # UR STRIP.AUTO: NEGATIVE MG/DL
RBC #/AREA URNS AUTO: ABNORMAL /HPF
SODIUM SERPL-SCNC: 140 MMOL/L (ref 136–145)
SP GR UR STRIP.AUTO: 1.03
UROBILINOGEN UR STRIP.AUTO-MCNC: NORMAL MG/DL
WBC # BLD AUTO: 9.8 X10*3/UL (ref 4.4–11.3)
WBC #/AREA URNS AUTO: ABNORMAL /HPF

## 2025-03-04 PROCEDURE — 36415 COLL VENOUS BLD VENIPUNCTURE: CPT

## 2025-03-04 PROCEDURE — 80069 RENAL FUNCTION PANEL: CPT

## 2025-03-04 PROCEDURE — 99233 SBSQ HOSP IP/OBS HIGH 50: CPT | Performed by: STUDENT IN AN ORGANIZED HEALTH CARE EDUCATION/TRAINING PROGRAM

## 2025-03-04 PROCEDURE — 2500000004 HC RX 250 GENERAL PHARMACY W/ HCPCS (ALT 636 FOR OP/ED): Performed by: STUDENT IN AN ORGANIZED HEALTH CARE EDUCATION/TRAINING PROGRAM

## 2025-03-04 PROCEDURE — 81001 URINALYSIS AUTO W/SCOPE: CPT

## 2025-03-04 PROCEDURE — 2500000001 HC RX 250 WO HCPCS SELF ADMINISTERED DRUGS (ALT 637 FOR MEDICARE OP): Performed by: STUDENT IN AN ORGANIZED HEALTH CARE EDUCATION/TRAINING PROGRAM

## 2025-03-04 PROCEDURE — 1100000001 HC PRIVATE ROOM DAILY

## 2025-03-04 PROCEDURE — G0378 HOSPITAL OBSERVATION PER HR: HCPCS

## 2025-03-04 PROCEDURE — 82947 ASSAY GLUCOSE BLOOD QUANT: CPT

## 2025-03-04 PROCEDURE — 2500000002 HC RX 250 W HCPCS SELF ADMINISTERED DRUGS (ALT 637 FOR MEDICARE OP, ALT 636 FOR OP/ED)

## 2025-03-04 PROCEDURE — 85027 COMPLETE CBC AUTOMATED: CPT

## 2025-03-04 PROCEDURE — 2500000004 HC RX 250 GENERAL PHARMACY W/ HCPCS (ALT 636 FOR OP/ED)

## 2025-03-04 PROCEDURE — 2500000001 HC RX 250 WO HCPCS SELF ADMINISTERED DRUGS (ALT 637 FOR MEDICARE OP)

## 2025-03-04 PROCEDURE — 2500000004 HC RX 250 GENERAL PHARMACY W/ HCPCS (ALT 636 FOR OP/ED): Mod: JZ,TB | Performed by: STUDENT IN AN ORGANIZED HEALTH CARE EDUCATION/TRAINING PROGRAM

## 2025-03-04 RX ORDER — BISACODYL 10 MG/1
10 SUPPOSITORY RECTAL DAILY
Status: DISCONTINUED | OUTPATIENT
Start: 2025-03-04 | End: 2025-03-10 | Stop reason: HOSPADM

## 2025-03-04 RX ADMIN — OXYCODONE HYDROCHLORIDE 10 MG: 5 TABLET ORAL at 11:08

## 2025-03-04 RX ADMIN — KETOROLAC TROMETHAMINE 30 MG: 30 INJECTION, SOLUTION INTRAMUSCULAR; INTRAVENOUS at 01:03

## 2025-03-04 RX ADMIN — HYDROMORPHONE HYDROCHLORIDE 0.5 MG: 1 INJECTION, SOLUTION INTRAMUSCULAR; INTRAVENOUS; SUBCUTANEOUS at 16:03

## 2025-03-04 RX ADMIN — FLUOXETINE HYDROCHLORIDE 40 MG: 20 CAPSULE ORAL at 21:25

## 2025-03-04 RX ADMIN — HEPARIN SODIUM 5000 UNITS: 5000 INJECTION, SOLUTION INTRAVENOUS; SUBCUTANEOUS at 05:15

## 2025-03-04 RX ADMIN — ACETAMINOPHEN 975 MG: 325 TABLET ORAL at 05:14

## 2025-03-04 RX ADMIN — SENNOSIDES AND DOCUSATE SODIUM 2 TABLET: 50; 8.6 TABLET ORAL at 21:25

## 2025-03-04 RX ADMIN — INSULIN LISPRO 4 UNITS: 100 INJECTION, SOLUTION INTRAVENOUS; SUBCUTANEOUS at 17:26

## 2025-03-04 RX ADMIN — CYCLOBENZAPRINE 10 MG: 10 TABLET, FILM COATED ORAL at 08:14

## 2025-03-04 RX ADMIN — CYCLOBENZAPRINE 10 MG: 10 TABLET, FILM COATED ORAL at 21:25

## 2025-03-04 RX ADMIN — POLYETHYLENE GLYCOL 3350 17 G: 17 POWDER, FOR SOLUTION ORAL at 21:26

## 2025-03-04 RX ADMIN — DEXAMETHASONE SODIUM PHOSPHATE 2 MG: 4 INJECTION, SOLUTION INTRA-ARTICULAR; INTRALESIONAL; INTRAMUSCULAR; INTRAVENOUS; SOFT TISSUE at 17:26

## 2025-03-04 RX ADMIN — OXYCODONE HYDROCHLORIDE 10 MG: 5 TABLET ORAL at 21:25

## 2025-03-04 RX ADMIN — LAMOTRIGINE 100 MG: 100 TABLET ORAL at 08:15

## 2025-03-04 RX ADMIN — DEXAMETHASONE SODIUM PHOSPHATE 2 MG: 4 INJECTION, SOLUTION INTRA-ARTICULAR; INTRALESIONAL; INTRAMUSCULAR; INTRAVENOUS; SOFT TISSUE at 11:09

## 2025-03-04 RX ADMIN — SENNOSIDES AND DOCUSATE SODIUM 2 TABLET: 50; 8.6 TABLET ORAL at 08:14

## 2025-03-04 RX ADMIN — GABAPENTIN 300 MG: 300 CAPSULE ORAL at 21:25

## 2025-03-04 RX ADMIN — ACETAMINOPHEN 975 MG: 325 TABLET ORAL at 21:25

## 2025-03-04 RX ADMIN — OXYCODONE HYDROCHLORIDE 10 MG: 5 TABLET ORAL at 05:29

## 2025-03-04 RX ADMIN — PANTOPRAZOLE SODIUM 40 MG: 40 TABLET, DELAYED RELEASE ORAL at 06:10

## 2025-03-04 RX ADMIN — ACETAMINOPHEN 975 MG: 325 TABLET ORAL at 13:38

## 2025-03-04 RX ADMIN — HEPARIN SODIUM 5000 UNITS: 5000 INJECTION, SOLUTION INTRAVENOUS; SUBCUTANEOUS at 13:42

## 2025-03-04 RX ADMIN — CYCLOBENZAPRINE 10 MG: 10 TABLET, FILM COATED ORAL at 15:42

## 2025-03-04 RX ADMIN — GABAPENTIN 300 MG: 300 CAPSULE ORAL at 15:42

## 2025-03-04 RX ADMIN — Medication 1 TABLET: at 08:15

## 2025-03-04 RX ADMIN — GABAPENTIN 300 MG: 300 CAPSULE ORAL at 08:15

## 2025-03-04 RX ADMIN — DEXAMETHASONE SODIUM PHOSPHATE 2 MG: 4 INJECTION, SOLUTION INTRA-ARTICULAR; INTRALESIONAL; INTRAMUSCULAR; INTRAVENOUS; SOFT TISSUE at 01:03

## 2025-03-04 SDOH — HEALTH STABILITY: MENTAL HEALTH: CURRENT SMOKER: 0

## 2025-03-04 ASSESSMENT — PAIN SCALES - GENERAL
PAINLEVEL_OUTOF10: 6
PAINLEVEL_OUTOF10: 8
PAINLEVEL_OUTOF10: 8
PAINLEVEL_OUTOF10: 6
PAINLEVEL_OUTOF10: 7

## 2025-03-04 ASSESSMENT — COGNITIVE AND FUNCTIONAL STATUS - GENERAL
TURNING FROM BACK TO SIDE WHILE IN FLAT BAD: A LITTLE
STANDING UP FROM CHAIR USING ARMS: A LITTLE
WALKING IN HOSPITAL ROOM: A LITTLE
TOILETING: A LITTLE
MOVING TO AND FROM BED TO CHAIR: A LITTLE
DRESSING REGULAR LOWER BODY CLOTHING: A LITTLE
HELP NEEDED FOR BATHING: A LITTLE
DRESSING REGULAR UPPER BODY CLOTHING: A LITTLE
MOBILITY SCORE: 18
MOVING FROM LYING ON BACK TO SITTING ON SIDE OF FLAT BED WITH BEDRAILS: A LITTLE
CLIMB 3 TO 5 STEPS WITH RAILING: A LITTLE
DAILY ACTIVITIY SCORE: 20

## 2025-03-04 ASSESSMENT — PAIN SCALES - WONG BAKER
WONGBAKER_NUMERICALRESPONSE: HURTS EVEN MORE
WONGBAKER_NUMERICALRESPONSE: HURTS LITTLE MORE

## 2025-03-04 NOTE — CARE PLAN
The patient's goals for the shift include  pt will be pain controlled throughout shift.    The clinical goals for the shift include pain control and safety    Over the shift, the patient did not make progress toward the following goals. Barriers to progression include none. Recommendations to address these barriers include none  .

## 2025-03-04 NOTE — PROGRESS NOTES
"Cristal Pollard is a 64 y.o. female on day 2 of admission presenting with Back pain of thoracolumbar region.    Subjective   NAEON       Objective     Physical Exam  Aox3  BUE 5  LLE HF/KE?DF/PF 5  RLE HF/KE4+ ow 5  RLE L4 and L5 radic  Last Recorded Vitals  Blood pressure 144/74, pulse 57, temperature 35.9 °C (96.6 °F), resp. rate 18, height 1.651 m (5' 5\"), weight 80.1 kg (176 lb 9.6 oz), SpO2 97%.  Intake/Output last 3 Shifts:  I/O last 3 completed shifts:  In: 600 (7.5 mL/kg) [P.O.:600]  Out: 2450 (30.6 mL/kg) [Urine:2450 (0.8 mL/kg/hr)]  Weight: 80.1 kg         Assessment/Plan   Assessment & Plan  Back pain of thoracolumbar region    PONV (postoperative nausea and vomiting)    Back pain with radiculopathy    64 F h/o HTN, HLD, seziures (on lamictal), BUE radiculopathy, 2/28/24 s/p C4-7 ACDF (by Dr. Reynolds), p/w 5d acute on chronic back pain RLE (likely L4 radiculopathy), CT L-spine w/ multi-level spondylosis worse at L3-4, L4-5, L5-S1, no fx, MRI LS w L3-4, L4-5 CCS, L5-S1 L foraminal stenosis, Flex ex no pathologic motion, 3/3 CT thin cut L spine done    Floor  OR Wed L3-4, L4-5, L5-S1 TLIF  Will continue dex 2q8  SCDs, SQH           Yordan Petersen MD          I have reviewed all prior documentation and reviewed the electronic medical record since admission. I have personally have reviewed all advanced imaging not just the reports and used my interpretation as documented as the relevant findings. We have reviewed the risks and benefits of all treatment recommendations listed in this note with the patient and family.       Arnulfo Reynolds MD, [facility]  Spine , [facility]  Qasim Bardales and Subha Bardales Chair in Spinal Neurosurgery  Neurosurgery , [facility]  Complex Spine Surgery Fellowship Director   of Neurological Surgery  [facility] [employer]  Office: [phone] [phone]  Fax: [phone]

## 2025-03-04 NOTE — ANESTHESIA PREPROCEDURE EVALUATION
Patient: Cristal Pollard    Procedure Information       Date/Time: 03/05/25 1120    Procedure: FUSION, SPINE, LUMBAR, TLIF, ROBOT-ASSISTED L3-5    Location: [facility] OR 10 / [facility] OR    Surgeons: Arnulfo Reynolds MD            Relevant Problems   Anesthesia   (+) PONV (postoperative nausea and vomiting)      Cardiac   (+) Chest pain   (+) Hypercholesteremia   (+) Hypertension      Neuro   (+) Anxiety disorder   (+) Carpal tunnel syndrome on right   (+) Cervical radiculopathy   (+) Depression, recurrent (CMS-HCC)   (+) Generalized tonic clonic epilepsy (Multi)   (+) Occipital neuralgia   (+) Panic attacks   (+) Post traumatic stress disorder (PTSD)   (+) Right lumbosacral radiculopathy   (+) Stress reaction, chronic      GI (within normal limits)      /Renal (within normal limits)      Liver (within normal limits)      Endocrine   (+) Class 1 obesity due to excess calories without serious comorbidity with body mass index (BMI) of 30.0 to 30.9 in adult   (+) Type 2 diabetes mellitus      Hematology (within normal limits)      Musculoskeletal   (+) Carpal tunnel syndrome on right   (+) Central stenosis of spinal canal   (+) Chronic neck pain   (+) DDD (degenerative disc disease), lumbosacral   (+) DJD (degenerative joint disease) of cervical spine       Clinical information reviewed:  Vitals:    03/04/25 0033   BP: 144/74   Pulse: 57   Resp: 18   Temp: 35.9 °C (96.6 °F)   SpO2: 97%       Past Surgical History:   Procedure Laterality Date    CARPAL TUNNEL RELEASE Right     CATARACT EXTRACTION      CHOLECYSTECTOMY  11/14/2014    Cholecystectomy    COLONOSCOPY  05/17/2018    Complete Colonoscopy    CT CHEST WO IV CONTRAST  04/21/2023    No acute intrathoracic abnormalities. No thoracic aortic aneurysm or subintimal hematoma.    ECHOCARDIOGRAM 2 D M MODE PANEL  02/17/2023    Left ventricular systolic function is normal with a 60-65% estimated ejection fraction.    HERNIA REPAIR  05/24/2017    Hernia Repair     MANDIBLE SURGERY Bilateral     OTHER SURGICAL HISTORY  11/14/2014    Surgery Intracardiac Mass Removal Left Atrial Myxoma    OTHER SURGICAL HISTORY      Xiphoidectomy    STERNOTOMY  2011     Past Medical History:   Diagnosis Date    Abnormal ECG 10/26/2023    Sinus rhythm LVH by voltage Inferior infarct, old Anterior Q waves, possibly due to LVH    Angina pectoris     Anxiety     Cervical disc disease     Cervical radiculopathy     Neuro: Arnulfo Reynolds LOV 1/15/24    Depression     Diabetes mellitus (Multi)     A1C: 2/6/24 -7.6%    GERD (gastroesophageal reflux disease)     History of Holter monitoring 10/2023    24 -48 hour monitor on 10/31/23, No abnormal findings    Hypertension     Incisional hernia with obstruction, without gangrene 05/24/2017    Incarcerated incisional hernia    Joint pain     Mastodynia 05/14/2020    Breast pain in female    Palpitations     Stress test scheduled for 2/9/24    PONV (postoperative nausea and vomiting)     Seizure disorder (Multi)     Last seizure 2/2023    TIA (transient ischemic attack)     Several years ago, no residual symptoms    Vertigo     Vision loss      Stress test 2/20/24  Summary:   1. Normal ST response to moderate peak workload max. ETT.   2. Systolic hypertensive response to exercise.   3. Adequate level of stress achieved.  4. EF 60%    Prior to Admission medications    Medication Sig Start Date End Date Taking? Authorizing Provider   lamoTRIgine (LaMICtal) 100 mg tablet TAKE 1 TABLET BY MOUTH IN THE MORNING AND 1.5 TABLETS BY MOUTH AT BEDTIME 1/25/25  Yes Jessica Diehl, DO   acetaminophen (Tylenol) 325 mg tablet Take 2 tablets (650 mg) by mouth every 4 hours if needed for mild pain (1 - 3). Do Not Take with Home Percocet as it also contains Acetaminophen  Patient not taking: Reported on 3/2/2025 3/1/24   Nicole Harris, APRN-CNP   calcium carbonate-vitamin D3 600 mg-10 mcg (400 unit) chewable tablet Chew 1 tablet once daily.    Historical Provider, MD    FLUoxetine (PROzac) 40 mg capsule TAKE 1 CAPSULE (40 MG) BY MOUTH ONCE DAILY. TO START AFTER COMPLETING 10MG AND 20MG DOSES  Patient taking differently: Take 1 capsule (40 mg) by mouth once daily at bedtime. To start after completing 10mg and 20mg doses 12/7/24 6/5/25  Jessica Diehl DO   hydrOXYzine HCL (Atarax) 25 mg tablet Take 1 tablet (25 mg) by mouth every 8 hours if needed for anxiety.  Patient not taking: Reported on 3/2/2025 3/5/24 4/4/24  Jessica Diehl DO   lisinopriL-hydrochlorothiazide 10-12.5 mg tablet TAKE 1 TABLET BY MOUTH EVERY DAY 7/29/24   Jessica Diehl DO   metFORMIN  mg 24 hr tablet TAKE 1 TABLET BY MOUTH EVERY DAY WITH EVENING MEAL  Patient taking differently: Take 1 tablet (500 mg) by mouth once daily at bedtime. 1/25/25   Jessica Diehl DO   multivitamin tablet Take 1 tablet by mouth once daily.    Historical Provider, MD   omeprazole (PriLOSEC) 40 mg DR capsule TAKE 1 CAPSULE (40 MG) BY MOUTH ONCE DAILY. DO NOT CRUSH OR CHEW.  Patient taking differently: Take 1 capsule (40 mg) by mouth once daily as needed. Do not crush or chew. 7/31/24 3/2/25  Yves Ruiz MD   OneTouch Delica Plus Lancet 30 gauge misc USE TO TEST ONCE DAILY 9/29/23   Jessica Diehl DO   OneTouch Verio test strips strip USE TO TEST ONCE DAILY 12/4/24   Jessica Diehl DO   oxyCODONE-acetaminophen (Percocet) 7.5-325 mg tablet Take 1 tablet by mouth every 4 hours if needed for severe pain (7 - 10). 2/3/25 3/5/25  Jessica Diehl DO   lamoTRIgine (LaMICtal) 100 mg tablet Take 1.5 tablets (150 mg) by mouth once daily at bedtime.  3/2/25  Historical Provider, MD   nitroglycerin (Nitrostat) 0.4 mg SL tablet Place 1 tablet (0.4 mg) under the tongue every 5 minutes if needed for chest pain (FOR UP TO 3 DOSES AS NEEDED FOR CHEST PAIN.CALL 911 IF PAIN PERSISTS.).  3/2/25  Historical Provider, MD     Allergies   Allergen Reactions    Tramadol Seizure     seizure while hospitalized. Tolerates Percocet per hx     Amoxicillin Rash    Ampicillin Hives     shakey    Lidocaine Hives, Rash and Nausea/vomiting     patch    Meperidine Hcl Unknown     Social History     Tobacco Use    Smoking status: Never    Smokeless tobacco: Never   Substance Use Topics    Alcohol use: Yes     Comment: RARELY         Chemistry    Lab Results   Component Value Date/Time     03/04/2025 0751    K 4.2 03/04/2025 0751     03/04/2025 0751    CO2 22 03/04/2025 0751    BUN 22 03/04/2025 0751    CREATININE 0.87 03/04/2025 0751    Lab Results   Component Value Date/Time    CALCIUM 9.8 03/04/2025 0751    ALKPHOS 52 02/27/2025 1516    AST 16 02/27/2025 1516    ALT 20 02/27/2025 1516    BILITOT 0.5 02/27/2025 1516          Lab Results   Component Value Date/Time    WBC 9.8 03/04/2025 0751    HGB 14.3 03/04/2025 0751    HCT 43.4 03/04/2025 0751     03/04/2025 0751     Lab Results   Component Value Date/Time    PROTIME 11.7 03/02/2025 0147    INR 1.1 03/02/2025 0147     No results found. However, due to the size of the patient record, not all encounters were searched. Please check Results Review for a complete set of results.  No results found for this or any previous visit from the past 1095 days.     Allergies  Meds               NPO Detail:  No data recorded     Physical Exam    Airway  Mallampati: III  TM distance: >3 FB  Neck ROM: limited  Comments: Videoscope with prior GA d/t cerv fusion   Cardiovascular - normal exam     Dental   Comments: Partial upper dentures   Pulmonary - normal exam     Abdominal - normal exam             Anesthesia Plan    History of general anesthesia?: yes  History of complications of general anesthesia?: yes    ASA 3     general   (PONV controlled with preop scop patch)  The patient is not a current smoker.  Education provided regarding risk of obstructive sleep apnea.  intravenous induction   Postoperative administration of opioids is intended.  Trial extubation is planned.  Anesthetic plan and risks  discussed with patient.  Use of blood products discussed with patient who consented to blood products.    Plan discussed with CRNA.

## 2025-03-05 ENCOUNTER — APPOINTMENT (OUTPATIENT)
Dept: RADIOLOGY | Facility: HOSPITAL | Age: 65
DRG: 448 | End: 2025-03-05
Payer: COMMERCIAL

## 2025-03-05 ENCOUNTER — APPOINTMENT (OUTPATIENT)
Dept: CARDIOLOGY | Facility: HOSPITAL | Age: 65
DRG: 448 | End: 2025-03-05
Payer: COMMERCIAL

## 2025-03-05 ENCOUNTER — ANESTHESIA (OUTPATIENT)
Dept: OPERATING ROOM | Facility: HOSPITAL | Age: 65
End: 2025-03-05
Payer: COMMERCIAL

## 2025-03-05 ENCOUNTER — APPOINTMENT (OUTPATIENT)
Dept: NEUROSURGERY | Facility: CLINIC | Age: 65
End: 2025-03-05
Payer: COMMERCIAL

## 2025-03-05 LAB
ALBUMIN SERPL BCP-MCNC: 4 G/DL (ref 3.4–5)
ANION GAP SERPL CALC-SCNC: 14 MMOL/L (ref 10–20)
BUN SERPL-MCNC: 19 MG/DL (ref 6–23)
CALCIUM SERPL-MCNC: 9.9 MG/DL (ref 8.6–10.6)
CHLORIDE SERPL-SCNC: 104 MMOL/L (ref 98–107)
CO2 SERPL-SCNC: 25 MMOL/L (ref 21–32)
CREAT SERPL-MCNC: 0.62 MG/DL (ref 0.5–1.05)
EGFRCR SERPLBLD CKD-EPI 2021: >90 ML/MIN/1.73M*2
ERYTHROCYTE [DISTWIDTH] IN BLOOD BY AUTOMATED COUNT: 12.2 % (ref 11.5–14.5)
GLUCOSE BLD MANUAL STRIP-MCNC: 161 MG/DL (ref 74–99)
GLUCOSE BLD MANUAL STRIP-MCNC: 210 MG/DL (ref 74–99)
GLUCOSE BLD MANUAL STRIP-MCNC: 236 MG/DL (ref 74–99)
GLUCOSE SERPL-MCNC: 171 MG/DL (ref 74–99)
HCT VFR BLD AUTO: 44.5 % (ref 36–46)
HGB BLD-MCNC: 14.8 G/DL (ref 12–16)
HOLD SPECIMEN: NORMAL
MCH RBC QN AUTO: 31.4 PG (ref 26–34)
MCHC RBC AUTO-ENTMCNC: 33.3 G/DL (ref 32–36)
MCV RBC AUTO: 94 FL (ref 80–100)
NRBC BLD-RTO: 0 /100 WBCS (ref 0–0)
PHOSPHATE SERPL-MCNC: 2.6 MG/DL (ref 2.5–4.9)
PLATELET # BLD AUTO: 233 X10*3/UL (ref 150–450)
POTASSIUM SERPL-SCNC: 4 MMOL/L (ref 3.5–5.3)
RBC # BLD AUTO: 4.72 X10*6/UL (ref 4–5.2)
SODIUM SERPL-SCNC: 139 MMOL/L (ref 136–145)
WBC # BLD AUTO: 9.4 X10*3/UL (ref 4.4–11.3)

## 2025-03-05 PROCEDURE — 2500000001 HC RX 250 WO HCPCS SELF ADMINISTERED DRUGS (ALT 637 FOR MEDICARE OP)

## 2025-03-05 PROCEDURE — 2500000004 HC RX 250 GENERAL PHARMACY W/ HCPCS (ALT 636 FOR OP/ED): Performed by: ANESTHESIOLOGY

## 2025-03-05 PROCEDURE — 80069 RENAL FUNCTION PANEL: CPT

## 2025-03-05 PROCEDURE — 36620 INSERTION CATHETER ARTERY: CPT | Performed by: ANESTHESIOLOGY

## 2025-03-05 PROCEDURE — C1889 IMPLANT/INSERT DEVICE, NOC: HCPCS | Performed by: STUDENT IN AN ORGANIZED HEALTH CARE EDUCATION/TRAINING PROGRAM

## 2025-03-05 PROCEDURE — 3600000018 HC OR TIME - INITIAL BASE CHARGE - PROCEDURE LEVEL SIX: Performed by: STUDENT IN AN ORGANIZED HEALTH CARE EDUCATION/TRAINING PROGRAM

## 2025-03-05 PROCEDURE — 2500000002 HC RX 250 W HCPCS SELF ADMINISTERED DRUGS (ALT 637 FOR MEDICARE OP, ALT 636 FOR OP/ED)

## 2025-03-05 PROCEDURE — 2500000004 HC RX 250 GENERAL PHARMACY W/ HCPCS (ALT 636 FOR OP/ED)

## 2025-03-05 PROCEDURE — P9045 ALBUMIN (HUMAN), 5%, 250 ML: HCPCS | Mod: JZ,TB

## 2025-03-05 PROCEDURE — 1100000001 HC PRIVATE ROOM DAILY

## 2025-03-05 PROCEDURE — C1713 ANCHOR/SCREW BN/BN,TIS/BN: HCPCS | Performed by: STUDENT IN AN ORGANIZED HEALTH CARE EDUCATION/TRAINING PROGRAM

## 2025-03-05 PROCEDURE — 2720000007 HC OR 272 NO HCPCS: Performed by: STUDENT IN AN ORGANIZED HEALTH CARE EDUCATION/TRAINING PROGRAM

## 2025-03-05 PROCEDURE — 22634 ARTHRD CMBN 1NTRSPC EA ADDL: CPT | Performed by: STUDENT IN AN ORGANIZED HEALTH CARE EDUCATION/TRAINING PROGRAM

## 2025-03-05 PROCEDURE — 3700000002 HC GENERAL ANESTHESIA TIME - EACH INCREMENTAL 1 MINUTE: Performed by: STUDENT IN AN ORGANIZED HEALTH CARE EDUCATION/TRAINING PROGRAM

## 2025-03-05 PROCEDURE — 51702 INSERT TEMP BLADDER CATH: CPT

## 2025-03-05 PROCEDURE — 63052 LAM FACETC/FRMT ARTHRD LUM 1: CPT | Performed by: STUDENT IN AN ORGANIZED HEALTH CARE EDUCATION/TRAINING PROGRAM

## 2025-03-05 PROCEDURE — 93005 ELECTROCARDIOGRAM TRACING: CPT

## 2025-03-05 PROCEDURE — 7100000002 HC RECOVERY ROOM TIME - EACH INCREMENTAL 1 MINUTE: Performed by: STUDENT IN AN ORGANIZED HEALTH CARE EDUCATION/TRAINING PROGRAM

## 2025-03-05 PROCEDURE — 2500000005 HC RX 250 GENERAL PHARMACY W/O HCPCS

## 2025-03-05 PROCEDURE — 2500000001 HC RX 250 WO HCPCS SELF ADMINISTERED DRUGS (ALT 637 FOR MEDICARE OP): Performed by: STUDENT IN AN ORGANIZED HEALTH CARE EDUCATION/TRAINING PROGRAM

## 2025-03-05 PROCEDURE — 3600000017 HC OR TIME - EACH INCREMENTAL 1 MINUTE - PROCEDURE LEVEL SIX: Performed by: STUDENT IN AN ORGANIZED HEALTH CARE EDUCATION/TRAINING PROGRAM

## 2025-03-05 PROCEDURE — G0378 HOSPITAL OBSERVATION PER HR: HCPCS

## 2025-03-05 PROCEDURE — 3700000001 HC GENERAL ANESTHESIA TIME - INITIAL BASE CHARGE: Performed by: STUDENT IN AN ORGANIZED HEALTH CARE EDUCATION/TRAINING PROGRAM

## 2025-03-05 PROCEDURE — 2500000005 HC RX 250 GENERAL PHARMACY W/O HCPCS: Performed by: STUDENT IN AN ORGANIZED HEALTH CARE EDUCATION/TRAINING PROGRAM

## 2025-03-05 PROCEDURE — 2780000003 HC OR 278 NO HCPCS: Performed by: STUDENT IN AN ORGANIZED HEALTH CARE EDUCATION/TRAINING PROGRAM

## 2025-03-05 PROCEDURE — 2500000004 HC RX 250 GENERAL PHARMACY W/ HCPCS (ALT 636 FOR OP/ED): Performed by: STUDENT IN AN ORGANIZED HEALTH CARE EDUCATION/TRAINING PROGRAM

## 2025-03-05 PROCEDURE — 85027 COMPLETE CBC AUTOMATED: CPT

## 2025-03-05 PROCEDURE — A22630 PR ARTHRODESIS POSTERIOR INTERBODY LUMBAR

## 2025-03-05 PROCEDURE — A22630 PR ARTHRODESIS POSTERIOR INTERBODY LUMBAR: Performed by: ANESTHESIOLOGY

## 2025-03-05 PROCEDURE — 7100000001 HC RECOVERY ROOM TIME - INITIAL BASE CHARGE: Performed by: STUDENT IN AN ORGANIZED HEALTH CARE EDUCATION/TRAINING PROGRAM

## 2025-03-05 PROCEDURE — 36415 COLL VENOUS BLD VENIPUNCTURE: CPT

## 2025-03-05 PROCEDURE — 2500000005 HC RX 250 GENERAL PHARMACY W/O HCPCS: Performed by: ANESTHESIOLOGY

## 2025-03-05 PROCEDURE — 22842 INSERT SPINE FIXATION DEVICE: CPT | Performed by: STUDENT IN AN ORGANIZED HEALTH CARE EDUCATION/TRAINING PROGRAM

## 2025-03-05 PROCEDURE — 63053 LAM FACTC/FRMT ARTHRD LUM EA: CPT | Performed by: STUDENT IN AN ORGANIZED HEALTH CARE EDUCATION/TRAINING PROGRAM

## 2025-03-05 PROCEDURE — 22633 ARTHRD CMBN 1NTRSPC LUMBAR: CPT | Performed by: STUDENT IN AN ORGANIZED HEALTH CARE EDUCATION/TRAINING PROGRAM

## 2025-03-05 PROCEDURE — 22853 INSJ BIOMECHANICAL DEVICE: CPT | Performed by: STUDENT IN AN ORGANIZED HEALTH CARE EDUCATION/TRAINING PROGRAM

## 2025-03-05 PROCEDURE — 82947 ASSAY GLUCOSE BLOOD QUANT: CPT

## 2025-03-05 PROCEDURE — C1763 CONN TISS, NON-HUMAN: HCPCS | Performed by: STUDENT IN AN ORGANIZED HEALTH CARE EDUCATION/TRAINING PROGRAM

## 2025-03-05 DEVICE — SCREW SET, SOLERA VOYAGER, 5.5/6.0: Type: IMPLANTABLE DEVICE | Site: BACK | Status: FUNCTIONAL

## 2025-03-05 DEVICE — DURAGEN® PLUS DURAL REGENERATION MATRIX, 2 IN X 2 IN (5 CM X 5 CM)
Type: IMPLANTABLE DEVICE | Site: BACK | Status: FUNCTIONAL
Brand: DURAGEN® PLUS

## 2025-03-05 DEVICE — SCREW, SOLERA 5.5/6.0 ATS, 7.5 X 50: Type: IMPLANTABLE DEVICE | Site: BACK | Status: FUNCTIONAL

## 2025-03-05 DEVICE — IMPLANTABLE DEVICE: Type: IMPLANTABLE DEVICE | Site: BACK | Status: FUNCTIONAL

## 2025-03-05 DEVICE — SPACER, CATALYFT PL, 7MM, SHORT: Type: IMPLANTABLE DEVICE | Site: BACK | Status: FUNCTIONAL

## 2025-03-05 DEVICE — SCREW, SOLERA 5.5/6.0 ATS, 6.5 X 50: Type: IMPLANTABLE DEVICE | Site: BACK | Status: FUNCTIONAL

## 2025-03-05 RX ORDER — HYDROMORPHONE HCL/0.9% NACL/PF 15 MG/30ML
PATIENT CONTROLLED ANALGESIA SYRINGE INTRAVENOUS CONTINUOUS
Status: DISCONTINUED | OUTPATIENT
Start: 2025-03-05 | End: 2025-03-06

## 2025-03-05 RX ORDER — OXYCODONE HYDROCHLORIDE 5 MG/1
10 TABLET ORAL ONCE
Status: DISCONTINUED | OUTPATIENT
Start: 2025-03-05 | End: 2025-03-06

## 2025-03-05 RX ORDER — NALOXONE HYDROCHLORIDE 0.4 MG/ML
0.2 INJECTION, SOLUTION INTRAMUSCULAR; INTRAVENOUS; SUBCUTANEOUS AS NEEDED
Status: DISCONTINUED | OUTPATIENT
Start: 2025-03-05 | End: 2025-03-06

## 2025-03-05 RX ORDER — IBUPROFEN 200 MG
400 TABLET ORAL EVERY 6 HOURS PRN
Status: DISCONTINUED | OUTPATIENT
Start: 2025-03-05 | End: 2025-03-05 | Stop reason: HOSPADM

## 2025-03-05 RX ORDER — SCOPOLAMINE 1 MG/3D
PATCH, EXTENDED RELEASE TRANSDERMAL AS NEEDED
Status: DISCONTINUED | OUTPATIENT
Start: 2025-03-05 | End: 2025-03-05

## 2025-03-05 RX ORDER — HYDROMORPHONE HYDROCHLORIDE 0.2 MG/ML
0.2 INJECTION INTRAMUSCULAR; INTRAVENOUS; SUBCUTANEOUS EVERY 5 MIN PRN
Status: DISCONTINUED | OUTPATIENT
Start: 2025-03-05 | End: 2025-03-05 | Stop reason: HOSPADM

## 2025-03-05 RX ORDER — METHOCARBAMOL 100 MG/ML
INJECTION, SOLUTION INTRAMUSCULAR; INTRAVENOUS AS NEEDED
Status: DISCONTINUED | OUTPATIENT
Start: 2025-03-05 | End: 2025-03-05

## 2025-03-05 RX ORDER — PROPOFOL 10 MG/ML
INJECTION, EMULSION INTRAVENOUS AS NEEDED
Status: DISCONTINUED | OUTPATIENT
Start: 2025-03-05 | End: 2025-03-05

## 2025-03-05 RX ORDER — ROCURONIUM BROMIDE 10 MG/ML
INJECTION, SOLUTION INTRAVENOUS AS NEEDED
Status: DISCONTINUED | OUTPATIENT
Start: 2025-03-05 | End: 2025-03-05

## 2025-03-05 RX ORDER — IBUPROFEN 200 MG
400 TABLET ORAL EVERY 6 HOURS PRN
Status: DISCONTINUED | OUTPATIENT
Start: 2025-03-05 | End: 2025-03-05 | Stop reason: SDUPTHER

## 2025-03-05 RX ORDER — CEFAZOLIN 1 G/1
INJECTION, POWDER, FOR SOLUTION INTRAVENOUS AS NEEDED
Status: DISCONTINUED | OUTPATIENT
Start: 2025-03-05 | End: 2025-03-05

## 2025-03-05 RX ORDER — HYDROMORPHONE HYDROCHLORIDE 1 MG/ML
0.2 INJECTION, SOLUTION INTRAMUSCULAR; INTRAVENOUS; SUBCUTANEOUS
Status: DISCONTINUED | OUTPATIENT
Start: 2025-03-05 | End: 2025-03-06

## 2025-03-05 RX ORDER — NORETHINDRONE AND ETHINYL ESTRADIOL 0.5-0.035
KIT ORAL AS NEEDED
Status: DISCONTINUED | OUTPATIENT
Start: 2025-03-05 | End: 2025-03-05

## 2025-03-05 RX ORDER — LIDOCAINE HYDROCHLORIDE AND EPINEPHRINE 5; 5 MG/ML; UG/ML
INJECTION, SOLUTION INFILTRATION; PERINEURAL AS NEEDED
Status: DISCONTINUED | OUTPATIENT
Start: 2025-03-05 | End: 2025-03-05 | Stop reason: HOSPADM

## 2025-03-05 RX ORDER — ALBUMIN HUMAN 50 G/1000ML
SOLUTION INTRAVENOUS AS NEEDED
Status: DISCONTINUED | OUTPATIENT
Start: 2025-03-05 | End: 2025-03-05

## 2025-03-05 RX ORDER — MIDAZOLAM HYDROCHLORIDE 1 MG/ML
INJECTION INTRAMUSCULAR; INTRAVENOUS AS NEEDED
Status: DISCONTINUED | OUTPATIENT
Start: 2025-03-05 | End: 2025-03-05

## 2025-03-05 RX ORDER — APREPITANT 40 MG/1
CAPSULE ORAL AS NEEDED
Status: DISCONTINUED | OUTPATIENT
Start: 2025-03-05 | End: 2025-03-05

## 2025-03-05 RX ORDER — FENTANYL CITRATE 50 UG/ML
INJECTION, SOLUTION INTRAMUSCULAR; INTRAVENOUS AS NEEDED
Status: DISCONTINUED | OUTPATIENT
Start: 2025-03-05 | End: 2025-03-05

## 2025-03-05 RX ORDER — HYDROMORPHONE HYDROCHLORIDE 1 MG/ML
INJECTION, SOLUTION INTRAMUSCULAR; INTRAVENOUS; SUBCUTANEOUS AS NEEDED
Status: DISCONTINUED | OUTPATIENT
Start: 2025-03-05 | End: 2025-03-05

## 2025-03-05 RX ORDER — KETOROLAC TROMETHAMINE 30 MG/ML
INJECTION, SOLUTION INTRAMUSCULAR; INTRAVENOUS AS NEEDED
Status: DISCONTINUED | OUTPATIENT
Start: 2025-03-05 | End: 2025-03-05

## 2025-03-05 RX ORDER — FENTANYL CITRATE 50 UG/ML
25 INJECTION, SOLUTION INTRAMUSCULAR; INTRAVENOUS EVERY 5 MIN PRN
Status: DISCONTINUED | OUTPATIENT
Start: 2025-03-05 | End: 2025-03-05

## 2025-03-05 RX ORDER — PHENYLEPHRINE HCL IN 0.9% NACL 0.4MG/10ML
SYRINGE (ML) INTRAVENOUS AS NEEDED
Status: DISCONTINUED | OUTPATIENT
Start: 2025-03-05 | End: 2025-03-05

## 2025-03-05 RX ORDER — FENTANYL CITRATE 50 UG/ML
25 INJECTION, SOLUTION INTRAMUSCULAR; INTRAVENOUS EVERY 5 MIN PRN
Status: DISCONTINUED | OUTPATIENT
Start: 2025-03-05 | End: 2025-03-05 | Stop reason: HOSPADM

## 2025-03-05 RX ORDER — TRANEXAMIC ACID 100 MG/ML
INJECTION, SOLUTION INTRAVENOUS AS NEEDED
Status: DISCONTINUED | OUTPATIENT
Start: 2025-03-05 | End: 2025-03-05

## 2025-03-05 RX ORDER — SODIUM CHLORIDE, SODIUM LACTATE, POTASSIUM CHLORIDE, CALCIUM CHLORIDE 600; 310; 30; 20 MG/100ML; MG/100ML; MG/100ML; MG/100ML
100 INJECTION, SOLUTION INTRAVENOUS CONTINUOUS
Status: DISCONTINUED | OUTPATIENT
Start: 2025-03-05 | End: 2025-03-05 | Stop reason: HOSPADM

## 2025-03-05 RX ADMIN — HYDROMORPHONE HYDROCHLORIDE 0.5 MG: 1 INJECTION, SOLUTION INTRAMUSCULAR; INTRAVENOUS; SUBCUTANEOUS at 19:25

## 2025-03-05 RX ADMIN — LAMOTRIGINE 100 MG: 100 TABLET ORAL at 08:16

## 2025-03-05 RX ADMIN — GABAPENTIN 300 MG: 300 CAPSULE ORAL at 22:56

## 2025-03-05 RX ADMIN — LAMOTRIGINE 150 MG: 150 TABLET ORAL at 01:42

## 2025-03-05 RX ADMIN — NORETHINDRONE AND ETHINYL ESTRADIOL 10 MG: KIT ORAL at 15:07

## 2025-03-05 RX ADMIN — ALBUMIN HUMAN 250 ML: 0.05 INJECTION, SOLUTION INTRAVENOUS at 14:30

## 2025-03-05 RX ADMIN — GABAPENTIN 300 MG: 300 CAPSULE ORAL at 08:16

## 2025-03-05 RX ADMIN — ROCURONIUM 20 MG: 50 INJECTION, SOLUTION INTRAVENOUS at 16:02

## 2025-03-05 RX ADMIN — PROPOFOL 30 MG: 10 INJECTION, EMULSION INTRAVENOUS at 15:33

## 2025-03-05 RX ADMIN — SCOPOLAMINE 1 PATCH: 1.5 PATCH, EXTENDED RELEASE TRANSDERMAL at 12:01

## 2025-03-05 RX ADMIN — OXYCODONE HYDROCHLORIDE 10 MG: 5 TABLET ORAL at 07:23

## 2025-03-05 RX ADMIN — CEFAZOLIN 2 G: 1 INJECTION, POWDER, FOR SOLUTION INTRAMUSCULAR; INTRAVENOUS at 16:45

## 2025-03-05 RX ADMIN — Medication 80 MCG: at 13:54

## 2025-03-05 RX ADMIN — Medication 6 L/MIN: at 18:06

## 2025-03-05 RX ADMIN — EPHEDRINE SULFATE 25 MG: 50 INJECTION, SOLUTION INTRAVENOUS at 14:27

## 2025-03-05 RX ADMIN — FLUOXETINE HYDROCHLORIDE 40 MG: 20 CAPSULE ORAL at 22:57

## 2025-03-05 RX ADMIN — HYDROMORPHONE HYDROCHLORIDE 0.5 MG: 1 INJECTION, SOLUTION INTRAMUSCULAR; INTRAVENOUS; SUBCUTANEOUS at 19:10

## 2025-03-05 RX ADMIN — HYDROMORPHONE HYDROCHLORIDE 0.5 MG: 1 INJECTION, SOLUTION INTRAMUSCULAR; INTRAVENOUS; SUBCUTANEOUS at 18:58

## 2025-03-05 RX ADMIN — HYDROMORPHONE HYDROCHLORIDE 0.5 MG: 1 INJECTION, SOLUTION INTRAMUSCULAR; INTRAVENOUS; SUBCUTANEOUS at 17:54

## 2025-03-05 RX ADMIN — DEXAMETHASONE SODIUM PHOSPHATE 2 MG: 4 INJECTION, SOLUTION INTRA-ARTICULAR; INTRALESIONAL; INTRAMUSCULAR; INTRAVENOUS; SOFT TISSUE at 01:43

## 2025-03-05 RX ADMIN — ROCURONIUM 10 MG: 50 INJECTION, SOLUTION INTRAVENOUS at 16:37

## 2025-03-05 RX ADMIN — OXYCODONE HYDROCHLORIDE 10 MG: 5 TABLET ORAL at 01:43

## 2025-03-05 RX ADMIN — HYDROMORPHONE HYDROCHLORIDE 0.5 MG: 1 INJECTION, SOLUTION INTRAMUSCULAR; INTRAVENOUS; SUBCUTANEOUS at 15:33

## 2025-03-05 RX ADMIN — ACETAMINOPHEN 975 MG: 325 TABLET ORAL at 22:57

## 2025-03-05 RX ADMIN — ROCURONIUM 20 MG: 50 INJECTION, SOLUTION INTRAVENOUS at 15:28

## 2025-03-05 RX ADMIN — FENTANYL CITRATE 25 MCG: 50 INJECTION, SOLUTION INTRAMUSCULAR; INTRAVENOUS at 18:42

## 2025-03-05 RX ADMIN — ACETAMINOPHEN 975 MG: 325 TABLET ORAL at 07:23

## 2025-03-05 RX ADMIN — TRANEXAMIC ACID 1000 MG: 100 INJECTION INTRAVENOUS at 17:21

## 2025-03-05 RX ADMIN — SENNOSIDES AND DOCUSATE SODIUM 2 TABLET: 50; 8.6 TABLET ORAL at 08:16

## 2025-03-05 RX ADMIN — ROCURONIUM 50 MG: 50 INJECTION, SOLUTION INTRAVENOUS at 12:13

## 2025-03-05 RX ADMIN — ALBUMIN HUMAN 250 ML: 0.05 INJECTION, SOLUTION INTRAVENOUS at 15:45

## 2025-03-05 RX ADMIN — FENTANYL CITRATE 50 MCG: 50 INJECTION, SOLUTION INTRAMUSCULAR; INTRAVENOUS at 14:47

## 2025-03-05 RX ADMIN — SENNOSIDES AND DOCUSATE SODIUM 2 TABLET: 50; 8.6 TABLET ORAL at 22:56

## 2025-03-05 RX ADMIN — PROPOFOL 170 MG: 10 INJECTION, EMULSION INTRAVENOUS at 12:12

## 2025-03-05 RX ADMIN — ROCURONIUM 20 MG: 50 INJECTION, SOLUTION INTRAVENOUS at 13:13

## 2025-03-05 RX ADMIN — Medication: at 20:24

## 2025-03-05 RX ADMIN — FENTANYL CITRATE 25 MCG: 50 INJECTION, SOLUTION INTRAMUSCULAR; INTRAVENOUS at 18:21

## 2025-03-05 RX ADMIN — Medication 120 MCG: at 17:46

## 2025-03-05 RX ADMIN — FENTANYL CITRATE 50 MCG: 50 INJECTION, SOLUTION INTRAMUSCULAR; INTRAVENOUS at 12:12

## 2025-03-05 RX ADMIN — NORETHINDRONE AND ETHINYL ESTRADIOL 10 MG: KIT ORAL at 17:07

## 2025-03-05 RX ADMIN — NORETHINDRONE AND ETHINYL ESTRADIOL 5 MG: KIT ORAL at 16:50

## 2025-03-05 RX ADMIN — Medication 80 MCG: at 14:06

## 2025-03-05 RX ADMIN — APREPITANT 40 MG: 40 CAPSULE ORAL at 12:01

## 2025-03-05 RX ADMIN — Medication 80 MCG: at 13:13

## 2025-03-05 RX ADMIN — CYCLOBENZAPRINE 10 MG: 10 TABLET, FILM COATED ORAL at 08:16

## 2025-03-05 RX ADMIN — Medication 80 MCG: at 14:17

## 2025-03-05 RX ADMIN — HEPARIN SODIUM 5000 UNITS: 5000 INJECTION, SOLUTION INTRAVENOUS; SUBCUTANEOUS at 22:57

## 2025-03-05 RX ADMIN — HYDROMORPHONE HYDROCHLORIDE 0.5 MG: 1 INJECTION, SOLUTION INTRAMUSCULAR; INTRAVENOUS; SUBCUTANEOUS at 19:35

## 2025-03-05 RX ADMIN — Medication 2 L/MIN: at 18:30

## 2025-03-05 RX ADMIN — CYCLOBENZAPRINE 10 MG: 10 TABLET, FILM COATED ORAL at 22:56

## 2025-03-05 RX ADMIN — PANTOPRAZOLE SODIUM 40 MG: 40 TABLET, DELAYED RELEASE ORAL at 07:24

## 2025-03-05 RX ADMIN — ROCURONIUM 20 MG: 50 INJECTION, SOLUTION INTRAVENOUS at 13:44

## 2025-03-05 RX ADMIN — ROCURONIUM 10 MG: 50 INJECTION, SOLUTION INTRAVENOUS at 14:36

## 2025-03-05 RX ADMIN — MIDAZOLAM HYDROCHLORIDE 2 MG: 1 INJECTION, SOLUTION INTRAMUSCULAR; INTRAVENOUS at 12:04

## 2025-03-05 RX ADMIN — FENTANYL CITRATE 25 MCG: 50 INJECTION, SOLUTION INTRAMUSCULAR; INTRAVENOUS at 18:36

## 2025-03-05 RX ADMIN — HYDROMORPHONE HYDROCHLORIDE 0.5 MG: 1 INJECTION, SOLUTION INTRAMUSCULAR; INTRAVENOUS; SUBCUTANEOUS at 19:53

## 2025-03-05 RX ADMIN — TRANEXAMIC ACID 1000 MG: 100 INJECTION INTRAVENOUS at 12:48

## 2025-03-05 RX ADMIN — DEXAMETHASONE SODIUM PHOSPHATE 10 MG: 4 INJECTION INTRA-ARTICULAR; INTRALESIONAL; INTRAMUSCULAR; INTRAVENOUS; SOFT TISSUE at 12:49

## 2025-03-05 RX ADMIN — KETOROLAC TROMETHAMINE 30 MG: 30 INJECTION, SOLUTION INTRAMUSCULAR; INTRAVENOUS at 17:18

## 2025-03-05 RX ADMIN — CEFAZOLIN 2 G: 1 INJECTION, POWDER, FOR SOLUTION INTRAMUSCULAR; INTRAVENOUS at 12:45

## 2025-03-05 RX ADMIN — FENTANYL CITRATE 25 MCG: 50 INJECTION, SOLUTION INTRAMUSCULAR; INTRAVENOUS at 18:30

## 2025-03-05 RX ADMIN — LAMOTRIGINE 150 MG: 150 TABLET ORAL at 22:57

## 2025-03-05 RX ADMIN — METHOCARBAMOL 1000 MG: 100 INJECTION INTRAMUSCULAR; INTRAVENOUS at 17:18

## 2025-03-05 RX ADMIN — Medication 80 MCG: at 14:29

## 2025-03-05 RX ADMIN — SODIUM CHLORIDE, SODIUM LACTATE, POTASSIUM CHLORIDE, AND CALCIUM CHLORIDE: 600; 310; 30; 20 INJECTION, SOLUTION INTRAVENOUS at 12:02

## 2025-03-05 ASSESSMENT — PAIN - FUNCTIONAL ASSESSMENT
PAIN_FUNCTIONAL_ASSESSMENT: 0-10
PAIN_FUNCTIONAL_ASSESSMENT: 0-10
PAIN_FUNCTIONAL_ASSESSMENT: UNABLE TO SELF-REPORT
PAIN_FUNCTIONAL_ASSESSMENT: 0-10
PAIN_FUNCTIONAL_ASSESSMENT: 0-10
PAIN_FUNCTIONAL_ASSESSMENT: UNABLE TO SELF-REPORT
PAIN_FUNCTIONAL_ASSESSMENT: 0-10
PAIN_FUNCTIONAL_ASSESSMENT: 0-10
PAIN_FUNCTIONAL_ASSESSMENT: UNABLE TO SELF-REPORT
PAIN_FUNCTIONAL_ASSESSMENT: 0-10
PAIN_FUNCTIONAL_ASSESSMENT: 0-10
PAIN_FUNCTIONAL_ASSESSMENT: UNABLE TO SELF-REPORT
PAIN_FUNCTIONAL_ASSESSMENT: 0-10

## 2025-03-05 ASSESSMENT — PAIN SCALES - GENERAL
PAINLEVEL_OUTOF10: 9
PAINLEVEL_OUTOF10: 7
PAINLEVEL_OUTOF10: 7
PAINLEVEL_OUTOF10: 8
PAINLEVEL_OUTOF10: 9
PAINLEVEL_OUTOF10: 8
PAINLEVEL_OUTOF10: 6
PAINLEVEL_OUTOF10: 9
PAINLEVEL_OUTOF10: 9
PAINLEVEL_OUTOF10: 7
PAINLEVEL_OUTOF10: 7
PAINLEVEL_OUTOF10: 10 - WORST POSSIBLE PAIN
PAINLEVEL_OUTOF10: 7
PAINLEVEL_OUTOF10: 9
PAINLEVEL_OUTOF10: 9
PAINLEVEL_OUTOF10: 7
PAINLEVEL_OUTOF10: 8
PAINLEVEL_OUTOF10: 6

## 2025-03-05 ASSESSMENT — PAIN DESCRIPTION - LOCATION
LOCATION: LEG
LOCATION: LEG
LOCATION: BACK

## 2025-03-05 ASSESSMENT — PAIN DESCRIPTION - ORIENTATION
ORIENTATION: RIGHT
ORIENTATION: LOWER;MID
ORIENTATION: RIGHT

## 2025-03-05 NOTE — ANESTHESIA PROCEDURE NOTES
Arterial Line:    Date/Time: 3/5/2025 12:18 PM    Staffing  Performed: ALEXA   Authorized by: Hilda Mahan MD    Performed by: ABEL Villalobos    An arterial line was placed. in the OR for the following indication(s): continuous blood pressure monitoring.    A 20 gauge (size), 5 cm (length), Angiocath (type) catheter was placed into the Right radial artery, secured by Tegaderm,   Seldinger technique used.  Events:  patient tolerated procedure well with no complications.

## 2025-03-05 NOTE — OP NOTE
L3-4, L4-5 Minimally Invasive Robotic Transforaminal Lumbar Interbody Fusion with L3-5 pedicle screw instrumentation Operative Note     Date: 3/5/2025  OR Location: [facility] OR    Name: Cristal Pollard, : 1960, Age: 64 y.o., MRN: [id number], Sex: female    Diagnosis  Pre-op Diagnosis      * Back pain with radiculopathy [M54.10] Post-op Diagnosis     * Back pain with radiculopathy [M54.10]     Procedures  L3-4, L4-5 Robotic MIS TLIF with Screws  [address] - CO ARTHRODESIS POSTERIOR INTERBODY 1 [facility] LUMBAR      Surgeons      * Arnulfo Reynolds - Primary    Resident/Fellow/Other Assistant:  Surgeons and Role:     * Zoila Pro MD - Resident - Assisting  Erin Gutiérrez MD - Resident - Assisting  Terese Rodgers MD - Resident - Assisting    Staff:   Circulator: Ramona Chowub Person: Yordan  Circulator: Jeremy Chowub Person: Renetta Rodriguez Circulator: Tory Rodriguez Scrub: Yun  Relief Scrub: Shayan  Relief Circulator: Yordan    Anesthesia Staff: Anesthesiologist: Hilda Mahan MD  C-AA: ABEL Villalobos; ABEL Tan  ALEXA: Riley Wheat    Procedure Summary  Anesthesia: General  ASA: III  Estimated Blood Loss: 300mL  Intra-op Medications:   Administrations occurring from 1120 to 1700 on 25:   Medication Name Total Dose   lidocaine-epinephrine (Xylocaine W/EPI) 0.5 %-1:200,000 injection 18 mL   polymyxin B 500,000 Units in sodium chloride 0.9 % 1,000 mL irrigation 1,000 mL   acetaminophen (Tylenol) tablet 975 mg Cannot be calculated   cyclobenzaprine (Flexeril) tablet 10 mg Cannot be calculated   gabapentin (Neurontin) capsule 300 mg Cannot be calculated   heparin (porcine) injection 5,000 Units Cannot be calculated   insulin lispro injection 0-10 Units Cannot be calculated   albumin human bottle 5% 500 mL   aprepitant (Emend) 40 mg capsule 40 mg   ceFAZolin (Ancef) vial 1 g 4 g   dexAMETHasone (Decadron) 4 mg/mL 10 mg   ePHEDrine injection 25 mg   ePHEDrine injection 15 mg   fentaNYL  (Sublimaze) injection 50 mcg/mL 100 mcg   HYDROmorphone (Dilaudid) injection 1 mg/mL 0.5 mg   LR bolus Cannot be calculated   LR bolus Cannot be calculated   midazolam PF (Versed) injection 1 mg/mL 2 mg   phenylephrine 40 mcg/mL syringe 10 mL 400 mcg   propofol (Diprivan) injection 10 mg/mL 200 mg   rocuronium (ZeMuron) 50 mg/5 mL injection 150 mg   scopolamine patch 1 patch   tranexamic acid injection 100 mg/mL 1,000 mg              Anesthesia Record               Intraprocedure I/O Totals          Intake    LR bolus 1600.00 mL    Tranexamic Acid 0.00 mL    The total shown is the total volume documented since Anesthesia Start was filed.    Total Intake 1600 mL       Output    Urine 1050 mL    Est. Blood Loss 200 mL    Total Output 1250 mL       Net    Net Volume 350 mL          Specimen: No specimens collected              Drains and/or Catheters:   Urethral Catheter Non-latex;Straight-tip 16 Fr. (Active)       Tourniquet Times:         Implants:  Implants       Type Name Action Serial No.      Graft HELEN, BG SET, ACCELERATE, DBF, 12CC - [id number] - [id number] Implanted [id number]     Implant 6.5x45 screw Implanted      Spinal Hardware SCREW, SOLERA 5.5/6.0 ATS, 6.5 X 50 - [id number] Implanted      Spinal Hardware SCREW, SOLERA 5.5/6.0 ATS, 7.5 X 50 - [id number] Implanted      Spinal Hardware SCREW SET, SOLERA VOYAGER, 5.5/6.0 - [id number] Implanted      Spinal Hardware SPACER, CATALYFT PL, 7MM, SHORT - [id number] Implanted      Graft GRAFT MATRIX, DURAL, DURAGEN PLUS 2X2 (1/PK) - [id number] - [id number] Implanted [id number]     Spinal Hardware SPACER, CATALYFT PL, 7MM, SHORT - [id number] Implanted               Findings: severe spondylosis with improvement in alignment after instrumentation    Indications: Cristal Pollard is an 64 y.o. female who is having surgery for Back pain with radiculopathy [M54.10].     The patient was seen in the preoperative area. The risks, benefits, complications, treatment options,  non-operative alternatives, expected recovery and outcomes were discussed with the patient. The possibilities of reaction to medication, pulmonary aspiration, injury to surrounding structures, bleeding, recurrent infection, the need for additional procedures, failure to diagnose a condition, and creating a complication requiring transfusion or operation were discussed with the patient. The patient concurred with the proposed plan, giving informed consent.  The site of surgery was properly noted/marked if necessary per policy. The patient has been actively warmed in preoperative area. Preoperative antibiotics have been ordered and given within 1 hours of incision. Venous thrombosis prophylaxis have been ordered including bilateral sequential compression devices    Procedure Details:   The patient was brought to the operating room theater. A verbal huddle was performed confirming the patient by name, date of birth, medical record number, site of surgery. After all team members were in agreement, they underwent anesthesia induction without complication. Two large bore IVs were placed as well as endotracheal tube. Perioperative antibiotic administration was confirmed. The patient was flipped prone onto a Cristian table with posts and their back was prepped and draped in a sterile fashion. A sham pin was inserted into the posterior superior iliac crest with a stab incision. The navigation array was attached and the O arm was brought into the field. An intraoperative c arm xray was performed and merged to the patient in 3 dimensional space.    Stereotaxis was then used to ginger out our entry site for our pedicle screws bilaterally through paramedian incisions. The skin was then infiltrated with lidocaine with epinephrine and next began procedure by using a 10 blade to incise the skin on the first paramedian incision.  We incised the skin along the incision and using Bovie electrocautery and blunt dissection exposed the  fascia and bisected this in a vertical fashion.  We then used a navigated drill to ginger out entry site for our pedicle screws and then inserted pedicle screws directly into the pedicles at L3-5 without complication.    We then used navigation to ginger place our first dilator over the trailing edge of the lamina at the L3-4 joint on the right. We dilated the muscle to 26mm with tube and placed a tubular retractor fixated to the bed over our joint of interest. We then performed a decompression across our level of interest with a high speed drill. We resected the bony elements and ligamentum flavum overlying the L3-4 disc space. We dissected free the traversing nerve root and protected the exiting one. We then swept the thecal sac medially. The operative room microscope was then brought into the field and using microsurgical technique we performed a total discectomy at L3-4. We used oriana, rongeurs, and pituitary instruments to perform a discectomy. After completion of the discectomy and end plate preparation for arthrodesis, we packed the disc space with autologous bone graft mixed with michael fibers allograft. Next, a medtronic expandable cage was brought into the disc space under fluoroscopy and expanded to complete torque.    We then used navigation to ginger place our first dilator over the trailing edge of the lamina at the L4-5 joint on the left. We dilated the muscle to 26mm with tube and placed a tubular retractor fixated to the bed over our joint of interest. We then performed a decompression across our level of interest with a high speed drill. We resected the bony elements and ligamentum flavum overlying the L4-5 disc space. We dissected free the traversing nerve root and protected the exiting one. We then swept the thecal sac medially. During the removal of the ligamentum flavum, there was a tear noted in the thecal sac with cerebrospinal fluid egress. This was inherent to the difficulty of the anatomy and  critical spinal stenosis at this segment. This was repaired with duragen and duraseal exact. The operative room microscope was then brought into the field and using microsurgical technique we performed a total discectomy at L3-4. We used oriana, rongeurs, and pituitary instruments to perform a discectomy. After completion of the discectomy and end plate preparation for arthrodesis, we packed the disc space with autologous bone graft mixed with michael fibers allograft. Next, a medtronic expandable cage was brought into the disc space under fluoroscopy and expanded to complete torque.    Lordotic titanium rods were sized and placed into the screw shanks and caps were tightened. We placed distraction between the right L3 and L4 screws and final tightened the hardware. We then acquired final x-rays of our hardware confirming placement at our level of interest at L3-5 and accurate placement within the bony elements and disc space. We copiously irrigated the incisions and hemostasis was achieved with bipolar and floseal.  The sham pin was removed. The muscle and fascia were approximated at all incisions with 0 Vicryl sutures and then 2-0 Vicryl sutures were placed into the dermal layers and skin glue was placed on the skin. The patient was returned to anesthesias care, they were extubated and returned to PACU in stable condition.       Complications:  None; patient tolerated the procedure well.    Disposition: PACU - hemodynamically stable.  Condition: stable       Additional Details: none    Attending Attestation: I was present and scrubbed for the key portions of the procedure.    Arnulfo Reynolds  Phone Number: [phone]

## 2025-03-05 NOTE — CARE PLAN
The patient's goals for the shift include      The clinical goals for the shift include Pt will remain HDS on my shift      Problem: Pain - Adult  Goal: Verbalizes/displays adequate comfort level or baseline comfort level  Outcome: Progressing     Problem: Discharge Planning  Goal: Discharge to home or other facility with appropriate resources  Outcome: Progressing     Problem: Fall/Injury  Goal: Not fall by end of shift  Outcome: Progressing

## 2025-03-05 NOTE — CARE PLAN
The patient's goals for the shift include  Pt will prepare for surgery    The clinical goals for the shift include Pt will remain HDS on my shift    Over the shift, the patient did not make progress toward the following goals. Barriers to progression include none. Recommendations to address these barriers include none.

## 2025-03-05 NOTE — PROGRESS NOTES
"Cristal Pollard is a 64 y.o. female on day 3 of admission presenting with Back pain of thoracolumbar region.    Subjective   NAEON, no change to radiculopathy, ready for surgery       Objective     Physical Exam  Aox3  BUE 5  LLE HF/KE?DF/PF 5  RLE HF/KE4+ ow 5  RLE L4 and L5 radic  Last Recorded Vitals  Blood pressure 168/77, pulse 51, temperature 36 °C (96.8 °F), temperature source Temporal, resp. rate 16, height 1.651 m (5' 5\"), weight 80.1 kg (176 lb 9.6 oz), SpO2 97%.  Intake/Output last 3 Shifts:  I/O last 3 completed shifts:  In: 240 (3 mL/kg) [P.O.:240]  Out: 2200 (27.5 mL/kg) [Urine:2200 (0.8 mL/kg/hr)]  Weight: 80.1 kg         Assessment/Plan   Assessment & Plan  Back pain of thoracolumbar region    PONV (postoperative nausea and vomiting)    Back pain with radiculopathy    64 F h/o HTN, HLD, seziures (on lamictal), BUE radiculopathy, 2/28/24 s/p C4-7 ACDF (by Dr. Reynolds), p/w 5d acute on chronic back pain RLE (likely L4 radiculopathy), CT L-spine w/ multi-level spondylosis worse at L3-4, L4-5, L5-S1, no fx, MRI LS w L3-4, L4-5 CCS, L5-S1 L foraminal stenosis, Flex ex no pathologic motion, 3/3 CT thin cut L spine done    Floor  OR Today L3-4, L4-5, L5-S1 TLIF  Will continue dex 2q8  SCDs, SQH           Yordan Petersen MD      "

## 2025-03-05 NOTE — ANESTHESIA PROCEDURE NOTES
Peripheral IV  Date/Time: 3/5/2025 12:20 PM  Inserted by: Riley Wheat    Placement  Needle size: 18 G  Laterality: right  Location: wrist  Local anesthetic: none  Site prep: chlorhexidine

## 2025-03-05 NOTE — ANESTHESIA PROCEDURE NOTES
Peripheral IV  Date/Time: 3/5/2025 12:25 PM  Inserted by: Riley Wheat    Placement  Needle size: 18 G  Location: hand  Local anesthetic: none  Site prep: chlorhexidine

## 2025-03-05 NOTE — PROGRESS NOTES
Occupational Therapy    Communication Note    Patient Name: Cristal Pollard  MRN: [id number]  Today's Date: 3/5/2025   Room: [address] [place], [place] [address]    Discipline: Occupational Therapy      Missed Visit Reason: Other (Comment) (Per notes pt will be going to OR today.)      03/05/25 at 9:35 AM   Jacqueline Currie OT   Rehab Office: [phone]

## 2025-03-05 NOTE — ANESTHESIA PROCEDURE NOTES
Airway  Date/Time: 3/5/2025 12:14 PM  Urgency: elective    Airway not difficult    Staffing  Performed: ALEXA   Authorized by: Hilda Mahan MD    Performed by: ABEL Villalobos  Patient location during procedure: OR    Indications and Patient Condition  Indications for airway management: anesthesia  Spontaneous ventilation: present  Sedation level: deep  Preoxygenated: yes  Patient position: sniffing  Mask difficulty assessment: 1 - vent by mask    Final Airway Details  Final airway type: endotracheal airway      Successful airway: ETT  Cuffed: yes   Successful intubation technique: video laryngoscopy (Glidescope)  Facilitating devices/methods: intubating stylet and cricoid pressure  Endotracheal tube insertion site: oral  Blade size: #3  ETT size (mm): 7.0  Cormack-Lehane Classification: grade I - full view of glottis  Placement verified by: chest auscultation, capnometry and palpation of cuff   Measured from: lips  ETT to lips (cm): 22  Number of attempts at approach: 1

## 2025-03-05 NOTE — ANESTHESIA POSTPROCEDURE EVALUATION
Patient: Cristal Pollard    Procedure Summary       Date: 03/05/25 Room / Location: [facility] OR 10 / [facility] OR    Anesthesia Start: 1202 Anesthesia Stop: 1813    Procedure: FUSION, SPINE, LUMBAR, TLIF, ROBOT-ASSISTED L3-5 (Back) Diagnosis:       Back pain with radiculopathy      (Back pain with radiculopathy [M54.10])    Surgeons: Arnulfo Reynolds MD Responsible Provider: Hilda Mahan MD    Anesthesia Type: general ASA Status: 3            Anesthesia Type: general    Vitals Value Taken Time   /58 03/05/25 1807   Temp 36.4 03/05/25 1813   Pulse 77 03/05/25 1807   Resp 16 03/05/25 1807   SpO2 100 % 03/05/25 1807   Vitals shown include unfiled device data.    Anesthesia Post Evaluation    Patient location during evaluation: PACU  Patient participation: complete - patient participated  Level of consciousness: awake and awake and alert  Pain management: adequate  Airway patency: patent  Cardiovascular status: acceptable, hemodynamically stable and blood pressure returned to baseline  Respiratory status: spontaneous ventilation, room air and acceptable  Hydration status: acceptable  Postoperative Nausea and Vomiting: none        There were no known notable events for this encounter.

## 2025-03-06 ENCOUNTER — HOME HEALTH ADMISSION (OUTPATIENT)
Dept: HOME HEALTH SERVICES | Facility: HOME HEALTH | Age: 65
End: 2025-03-06
Payer: COMMERCIAL

## 2025-03-06 LAB
ALBUMIN SERPL BCP-MCNC: 3.5 G/DL (ref 3.4–5)
ANION GAP SERPL CALC-SCNC: 14 MMOL/L (ref 10–20)
BUN SERPL-MCNC: 14 MG/DL (ref 6–23)
CALCIUM SERPL-MCNC: 9.3 MG/DL (ref 8.6–10.6)
CHLORIDE SERPL-SCNC: 107 MMOL/L (ref 98–107)
CO2 SERPL-SCNC: 23 MMOL/L (ref 21–32)
CREAT SERPL-MCNC: 0.66 MG/DL (ref 0.5–1.05)
EGFRCR SERPLBLD CKD-EPI 2021: >90 ML/MIN/1.73M*2
ERYTHROCYTE [DISTWIDTH] IN BLOOD BY AUTOMATED COUNT: 12.7 % (ref 11.5–14.5)
GLUCOSE BLD MANUAL STRIP-MCNC: 145 MG/DL (ref 74–99)
GLUCOSE BLD MANUAL STRIP-MCNC: 210 MG/DL (ref 74–99)
GLUCOSE BLD MANUAL STRIP-MCNC: 88 MG/DL (ref 74–99)
GLUCOSE SERPL-MCNC: 144 MG/DL (ref 74–99)
HCT VFR BLD AUTO: 34.1 % (ref 36–46)
HGB BLD-MCNC: 11.3 G/DL (ref 12–16)
MCH RBC QN AUTO: 31.6 PG (ref 26–34)
MCHC RBC AUTO-ENTMCNC: 33.1 G/DL (ref 32–36)
MCV RBC AUTO: 95 FL (ref 80–100)
NRBC BLD-RTO: 0 /100 WBCS (ref 0–0)
PHOSPHATE SERPL-MCNC: 3.3 MG/DL (ref 2.5–4.9)
PLATELET # BLD AUTO: 207 X10*3/UL (ref 150–450)
POTASSIUM SERPL-SCNC: 4.2 MMOL/L (ref 3.5–5.3)
RBC # BLD AUTO: 3.58 X10*6/UL (ref 4–5.2)
SODIUM SERPL-SCNC: 140 MMOL/L (ref 136–145)
WBC # BLD AUTO: 12.5 X10*3/UL (ref 4.4–11.3)

## 2025-03-06 PROCEDURE — 97165 OT EVAL LOW COMPLEX 30 MIN: CPT | Mod: GO

## 2025-03-06 PROCEDURE — 1100000001 HC PRIVATE ROOM DAILY

## 2025-03-06 PROCEDURE — G0378 HOSPITAL OBSERVATION PER HR: HCPCS

## 2025-03-06 PROCEDURE — 2500000004 HC RX 250 GENERAL PHARMACY W/ HCPCS (ALT 636 FOR OP/ED)

## 2025-03-06 PROCEDURE — 51701 INSERT BLADDER CATHETER: CPT

## 2025-03-06 PROCEDURE — 2500000004 HC RX 250 GENERAL PHARMACY W/ HCPCS (ALT 636 FOR OP/ED): Performed by: PHYSICIAN ASSISTANT

## 2025-03-06 PROCEDURE — 97530 THERAPEUTIC ACTIVITIES: CPT | Mod: GP

## 2025-03-06 PROCEDURE — 2500000004 HC RX 250 GENERAL PHARMACY W/ HCPCS (ALT 636 FOR OP/ED): Mod: TB

## 2025-03-06 PROCEDURE — 2500000001 HC RX 250 WO HCPCS SELF ADMINISTERED DRUGS (ALT 637 FOR MEDICARE OP)

## 2025-03-06 PROCEDURE — 2500000002 HC RX 250 W HCPCS SELF ADMINISTERED DRUGS (ALT 637 FOR MEDICARE OP, ALT 636 FOR OP/ED)

## 2025-03-06 PROCEDURE — 97535 SELF CARE MNGMENT TRAINING: CPT | Mod: GO

## 2025-03-06 PROCEDURE — 80069 RENAL FUNCTION PANEL: CPT

## 2025-03-06 PROCEDURE — 82947 ASSAY GLUCOSE BLOOD QUANT: CPT

## 2025-03-06 PROCEDURE — 99233 SBSQ HOSP IP/OBS HIGH 50: CPT | Performed by: PHYSICIAN ASSISTANT

## 2025-03-06 PROCEDURE — 97164 PT RE-EVAL EST PLAN CARE: CPT | Mod: GP

## 2025-03-06 PROCEDURE — 36415 COLL VENOUS BLD VENIPUNCTURE: CPT

## 2025-03-06 PROCEDURE — 85027 COMPLETE CBC AUTOMATED: CPT

## 2025-03-06 PROCEDURE — 2500000001 HC RX 250 WO HCPCS SELF ADMINISTERED DRUGS (ALT 637 FOR MEDICARE OP): Performed by: PHYSICIAN ASSISTANT

## 2025-03-06 RX ORDER — CYCLOBENZAPRINE HCL 10 MG
10 TABLET ORAL 3 TIMES DAILY
Qty: 21 TABLET | Refills: 0 | Status: CANCELLED | OUTPATIENT
Start: 2025-03-06 | End: 2025-03-13

## 2025-03-06 RX ORDER — OXYCODONE HYDROCHLORIDE 5 MG/1
2.5 TABLET ORAL EVERY 4 HOURS PRN
Status: DISCONTINUED | OUTPATIENT
Start: 2025-03-06 | End: 2025-03-10 | Stop reason: HOSPADM

## 2025-03-06 RX ORDER — HYDROXYZINE HYDROCHLORIDE 25 MG/1
25 TABLET, FILM COATED ORAL EVERY 8 HOURS PRN
Status: DISCONTINUED | OUTPATIENT
Start: 2025-03-06 | End: 2025-03-10 | Stop reason: HOSPADM

## 2025-03-06 RX ORDER — OXYCODONE HYDROCHLORIDE 5 MG/1
10 TABLET ORAL EVERY 4 HOURS PRN
Status: DISCONTINUED | OUTPATIENT
Start: 2025-03-06 | End: 2025-03-06

## 2025-03-06 RX ORDER — OXYCODONE HYDROCHLORIDE 5 MG/1
5 TABLET ORAL EVERY 4 HOURS PRN
Status: DISCONTINUED | OUTPATIENT
Start: 2025-03-06 | End: 2025-03-10 | Stop reason: HOSPADM

## 2025-03-06 RX ADMIN — HEPARIN SODIUM 5000 UNITS: 5000 INJECTION, SOLUTION INTRAVENOUS; SUBCUTANEOUS at 13:53

## 2025-03-06 RX ADMIN — GABAPENTIN 300 MG: 300 CAPSULE ORAL at 20:37

## 2025-03-06 RX ADMIN — HYDROMORPHONE HYDROCHLORIDE 0.3 MG: 1 INJECTION, SOLUTION INTRAMUSCULAR; INTRAVENOUS; SUBCUTANEOUS at 18:50

## 2025-03-06 RX ADMIN — DEXAMETHASONE SODIUM PHOSPHATE 2 MG: 4 INJECTION, SOLUTION INTRA-ARTICULAR; INTRALESIONAL; INTRAMUSCULAR; INTRAVENOUS; SOFT TISSUE at 01:37

## 2025-03-06 RX ADMIN — FLUOXETINE HYDROCHLORIDE 40 MG: 20 CAPSULE ORAL at 20:36

## 2025-03-06 RX ADMIN — ACETAMINOPHEN 975 MG: 325 TABLET ORAL at 23:13

## 2025-03-06 RX ADMIN — SENNOSIDES AND DOCUSATE SODIUM 2 TABLET: 50; 8.6 TABLET ORAL at 08:14

## 2025-03-06 RX ADMIN — OXYCODONE HYDROCHLORIDE 10 MG: 5 TABLET ORAL at 12:29

## 2025-03-06 RX ADMIN — CYCLOBENZAPRINE 10 MG: 10 TABLET, FILM COATED ORAL at 10:39

## 2025-03-06 RX ADMIN — ACETAMINOPHEN 975 MG: 325 TABLET ORAL at 06:30

## 2025-03-06 RX ADMIN — ACETAMINOPHEN 975 MG: 325 TABLET ORAL at 15:08

## 2025-03-06 RX ADMIN — CYCLOBENZAPRINE 10 MG: 10 TABLET, FILM COATED ORAL at 17:01

## 2025-03-06 RX ADMIN — HYDROMORPHONE HYDROCHLORIDE 0.2 MG: 1 INJECTION, SOLUTION INTRAMUSCULAR; INTRAVENOUS; SUBCUTANEOUS at 13:53

## 2025-03-06 RX ADMIN — PANTOPRAZOLE SODIUM 40 MG: 40 TABLET, DELAYED RELEASE ORAL at 06:30

## 2025-03-06 RX ADMIN — CYCLOBENZAPRINE 10 MG: 10 TABLET, FILM COATED ORAL at 23:13

## 2025-03-06 RX ADMIN — Medication 1 TABLET: at 08:15

## 2025-03-06 RX ADMIN — SODIUM CHLORIDE 1000 ML: 9 INJECTION, SOLUTION INTRAVENOUS at 08:15

## 2025-03-06 RX ADMIN — LAMOTRIGINE 150 MG: 150 TABLET ORAL at 20:37

## 2025-03-06 RX ADMIN — OXYCODONE HYDROCHLORIDE 10 MG: 5 TABLET ORAL at 21:19

## 2025-03-06 RX ADMIN — LAMOTRIGINE 100 MG: 100 TABLET ORAL at 08:15

## 2025-03-06 RX ADMIN — HEPARIN SODIUM 5000 UNITS: 5000 INJECTION, SOLUTION INTRAVENOUS; SUBCUTANEOUS at 06:30

## 2025-03-06 RX ADMIN — OXYCODONE HYDROCHLORIDE 10 MG: 5 TABLET ORAL at 17:01

## 2025-03-06 RX ADMIN — GABAPENTIN 300 MG: 300 CAPSULE ORAL at 08:14

## 2025-03-06 RX ADMIN — INSULIN LISPRO 4 UNITS: 100 INJECTION, SOLUTION INTRAVENOUS; SUBCUTANEOUS at 13:53

## 2025-03-06 RX ADMIN — GABAPENTIN 300 MG: 300 CAPSULE ORAL at 15:08

## 2025-03-06 RX ADMIN — HYDROXYZINE HYDROCHLORIDE 25 MG: 25 TABLET, FILM COATED ORAL at 17:01

## 2025-03-06 RX ADMIN — DEXAMETHASONE SODIUM PHOSPHATE 2 MG: 4 INJECTION, SOLUTION INTRA-ARTICULAR; INTRALESIONAL; INTRAMUSCULAR; INTRAVENOUS; SOFT TISSUE at 08:33

## 2025-03-06 RX ADMIN — DEXAMETHASONE SODIUM PHOSPHATE 2 MG: 4 INJECTION, SOLUTION INTRA-ARTICULAR; INTRALESIONAL; INTRAMUSCULAR; INTRAVENOUS; SOFT TISSUE at 18:45

## 2025-03-06 RX ADMIN — HEPARIN SODIUM 5000 UNITS: 5000 INJECTION, SOLUTION INTRAVENOUS; SUBCUTANEOUS at 20:36

## 2025-03-06 RX ADMIN — OXYCODONE HYDROCHLORIDE 10 MG: 5 TABLET ORAL at 08:25

## 2025-03-06 RX ADMIN — HYDROMORPHONE HYDROCHLORIDE 0.2 MG: 1 INJECTION, SOLUTION INTRAMUSCULAR; INTRAVENOUS; SUBCUTANEOUS at 15:55

## 2025-03-06 ASSESSMENT — PAIN SCALES - GENERAL
PAINLEVEL_OUTOF10: 10 - WORST POSSIBLE PAIN
PAINLEVEL_OUTOF10: 7
PAINLEVEL_OUTOF10: 6
PAINLEVEL_OUTOF10: 8
PAINLEVEL_OUTOF10: 8
PAINLEVEL_OUTOF10: 10 - WORST POSSIBLE PAIN
PAINLEVEL_OUTOF10: 9
PAINLEVEL_OUTOF10: 8
PAINLEVEL_OUTOF10: 6
PAINLEVEL_OUTOF10: 10 - WORST POSSIBLE PAIN
PAINLEVEL_OUTOF10: 8

## 2025-03-06 ASSESSMENT — COGNITIVE AND FUNCTIONAL STATUS - GENERAL
WALKING IN HOSPITAL ROOM: A LITTLE
TOILETING: A LITTLE
CLIMB 3 TO 5 STEPS WITH RAILING: A LITTLE
STANDING UP FROM CHAIR USING ARMS: A LITTLE
HELP NEEDED FOR BATHING: A LOT
TURNING FROM BACK TO SIDE WHILE IN FLAT BAD: A LITTLE
PERSONAL GROOMING: A LITTLE
MOVING FROM LYING ON BACK TO SITTING ON SIDE OF FLAT BED WITH BEDRAILS: A LOT
MOBILITY SCORE: 17
DAILY ACTIVITIY SCORE: 17
DRESSING REGULAR LOWER BODY CLOTHING: A LOT
DRESSING REGULAR UPPER BODY CLOTHING: A LITTLE
MOVING TO AND FROM BED TO CHAIR: A LITTLE

## 2025-03-06 ASSESSMENT — ACTIVITIES OF DAILY LIVING (ADL)
HOME_MANAGEMENT_TIME_ENTRY: 11
BATHING_ASSISTANCE: MINIMAL
ADL_ASSISTANCE: INDEPENDENT
ADL_ASSISTANCE: INDEPENDENT

## 2025-03-06 ASSESSMENT — PAIN DESCRIPTION - LOCATION
LOCATION: BACK

## 2025-03-06 ASSESSMENT — PAIN - FUNCTIONAL ASSESSMENT
PAIN_FUNCTIONAL_ASSESSMENT: 0-10

## 2025-03-06 ASSESSMENT — PAIN DESCRIPTION - ORIENTATION
ORIENTATION: LOWER;MID
ORIENTATION: RIGHT;LOWER

## 2025-03-06 NOTE — NURSING NOTE
Pt transferred from OR via bed to LT6. Pt alert and oriented x4 with PCA pump infusing. Pt educated on PCA pump and usage. Pt able to ambulate Ax2 w/ walker 5ft from bed. Meal given which pt tolerated well. Bed alarm on. Safety precautions in place.

## 2025-03-06 NOTE — PROGRESS NOTES
"  Department of Neurosurgery  Daily Progress Note    Patient Name: Cristal Pollard  MRN: [id number]  Date:  03/06/25     Subjective  Patient resting in bed. Notes moderate-severe pain in surgical region but is happy to report she was able to stand at bedside last night and bear weight in RLE which she preoperatively could not. Has noted resolution of RLE radiculopathy since OR. Quinn and PCA in place overnight. Denies any fever, chills, night sweats, CP, SOB, headache, dizziness, vision changes, palpitations, nausea, vomiting, or abdominal pain/discomfort.    Objective    Vital Signs  BP 97/59 (BP Location: Left arm, Patient Position: Lying)   Pulse 81   Temp 36.7 °C (98.1 °F) (Temporal)   Resp 17   Ht 1.651 m (5' 5\")   Wt 80.1 kg (176 lb 9.6 oz)   LMP  (LMP Unknown)   SpO2 94%   BMI 29.39 kg/m²      Physical Exam  Constitutional: A&Ox3, calm and cooperative, NAD.  Eyes: PERRL, EOMI.   ENMT: Moist mucous membranes, no apparent injuries or lesions.  Cardiovascular: Normal rate and regular rhythm. 2+ equal pulses of the distal extremities.  Respiratory/Thorax: CTAB, regular respirations on RA. Good symmetric chest expansion.   Gastrointestinal: Abdomen soft, non tender.   Genitourinary: Quinn in place with pale yellow urine.   Neurological:   A&Ox3  Face symmetric, Facial SILT   Tongue midline and symmetric  RUE D5 / B5 / T5 / HG 5/ IO 5  LUE D5 / B5 / T5 / HG 5/ IO 5  RLE HF5 / KE 5/ DF 5/ PF 5  LLE HF5 / KE 5/ DF 5/ PF 5  No clonus, neg Maynard  SILT  Psychological: Appropriate mood and behavior.   Skin: Warm and dry. Incisions C/D/I.     Diagnostics   Results for orders placed or performed during the hospital encounter of 03/02/25 (from the past 24 hours)   CBC   Result Value Ref Range    WBC 9.4 4.4 - 11.3 x10*3/uL    nRBC 0.0 0.0 - 0.0 /100 WBCs    RBC 4.72 4.00 - 5.20 x10*6/uL    Hemoglobin 14.8 12.0 - 16.0 g/dL    Hematocrit 44.5 36.0 - 46.0 %    MCV 94 80 - 100 fL    MCH 31.4 26.0 - 34.0 pg    MCHC 33.3 32.0 " - 36.0 g/dL    RDW 12.2 11.5 - 14.5 %    Platelets 233 150 - 450 x10*3/uL   Renal Function Panel   Result Value Ref Range    Glucose 171 (H) 74 - 99 mg/dL    Sodium 139 136 - 145 mmol/L    Potassium 4.0 3.5 - 5.3 mmol/L    Chloride 104 98 - 107 mmol/L    Bicarbonate 25 21 - 32 mmol/L    Anion Gap 14 10 - 20 mmol/L    Urea Nitrogen 19 6 - 23 mg/dL    Creatinine 0.62 0.50 - 1.05 mg/dL    eGFR >90 >60 mL/min/1.73m*2    Calcium 9.9 8.6 - 10.6 mg/dL    Phosphorus 2.6 2.5 - 4.9 mg/dL    Albumin 4.0 3.4 - 5.0 g/dL   POCT GLUCOSE   Result Value Ref Range    POCT Glucose 161 (H) 74 - 99 mg/dL   POCT GLUCOSE   Result Value Ref Range    POCT Glucose 236 (H) 74 - 99 mg/dL   POCT GLUCOSE   Result Value Ref Range    POCT Glucose 210 (H) 74 - 99 mg/dL     *Note: Due to a large number of results and/or encounters for the requested time period, some results have not been displayed. A complete set of results can be found in Results Review.     Current Medications  Scheduled medications  acetaminophen, 975 mg, oral, q8h  bisacodyl, 10 mg, rectal, Daily  calcium carbonate-vitamin D3, 1 tablet, oral, Daily  cyclobenzaprine, 10 mg, oral, TID  dexAMETHasone, 2 mg, intravenous, q8h  FLUoxetine, 40 mg, oral, Nightly  gabapentin, 300 mg, oral, TID  heparin (porcine), 5,000 Units, subcutaneous, q8h  insulin lispro, 0-10 Units, subcutaneous, TID AC  lamoTRIgine, 100 mg, oral, q AM  lamoTRIgine, 150 mg, oral, Nightly  [Held by provider] lisinopril 10 mg, hydroCHLOROthiazide 12.5 mg for Zestoretic/Prinizide, , oral, Daily  [Held by provider] metFORMIN XR, 500 mg, oral, Nightly  oxyCODONE, 10 mg, oral, Once  pantoprazole, 40 mg, oral, Daily before breakfast  polyethylene glycol, 17 g, oral, BID  sennosides-docusate sodium, 2 tablet, oral, BID    Continuous medications     PRN medications  PRN medications: bisacodyl, dextrose, dextrose, glucagon, glucagon, HYDROmorphone, HYDROmorphone, naloxone, naloxone, nitroglycerin, oxyCODONE, oxyCODONE,  oxyCODONE, oxygen     Assessment/Plan  Cristal Pollard is a 64 y.o. 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 Reynolds) on 2/28/2024. Patient presented to the [facility] ED on 2/27 with 5 days acute on chronic back pain and RLE (likely L4) radiculopathy. CT L-spine demonstrated  multi-level spondylosis worse at L3-4, L4-5, L5-S1, no fracture. MRI LS demonstrated L3-4, L4-5 CCS, L5-S1 L foraminal stenosis. Flex ex xrays with no pathologic motion. She was admitted to Neurosurgery for further evaluation and management.     3/3 CT thin cut L spine complete for surgical planning.   3/5 s/p MIS L3-4, L4-5 TLIF complicated by durotomy with secondary repair.     # S/p MIS L3-4, L4-5 TLIF   - Floor status   - Continue dexamethasone [id number] in post operative period   - Monitor for S/S infection or wound breakdown, okay to leave incision open to air   - Encourage frequent pressure off loading from surgical site   - DC PCA POD#1, transition to PO pain medication   - Quinn removal POD#1, follow for trial of void   - Encourage ambulation, pending PT/OT assessment post operatively   - Monitor VS/pulse ox [id number]   - Monitor AM CBC/RFP    # Acute postoperative pain  - Well controlled per patient, continue current regimen       ·  Dilaudid PCA x24 hours postoperatively, removed POD#1        ·  Continue home gabapentin 300mg TID        ·  Scheduled Tylenol 975mg PO [id number]        ·  Scheduled flexeril 10mg TID        ·  Oxycodone 5/10mg PO [id number] PRN mod/severe pain   - Scheduled bowel regimen while using narcotics  - Zofran PRN nausea due to narcotics   - Pain assessments [id number]     # Hx HTN   - Mild hypotension AM POD#1 s/p 1 liter bolus (net negative 970cc)   - Home lisinopril-hydrochlorothiazide held in post operative period, will monitor VS for restart   - Monitor VS [id number]     # Hx DMII   - Home regimen held during admission (metfomrin 500mg daily)   - Continue  SSI #1   - POCT glucose TID with meals and nightly   - Hypoglycemia protocol in place  - Carb controled diet     # Hx seizure  - Continue home Lamictal 100mg daily and 150mg nightly      # Hx anxiety/depression   - Continue home prozac 40mg daily      Prophylaxis:   - DVT: SQ heparin [id number], SCDs, encourage ambulation   - Encourage IS x10 every hour while awake     FEN/GI:  - Monitor/replete electrolytes PRN   - Continue carb consistent diet   - GI protection with Protonix 40mg daily   - Bowel regimen: Scheduled Miralax, pericolace, bisacodyl suppository daily        Disposition: Pending medical stability, pain control and PT/OT assessment. Post operative appointment with neurosurgery scheduled on 3/24/2025.     Patient evaluated with chief neurosurgery resident, Dr. Lowe, and plan discussed with Dr. Rdoolfo Zambrano, PA-C   Neurologic Surgery   [place]  Team Pager #[phone]     Total face to face time spent with patient/family of > 60 minutes, with >50% of the time spent discussing plan of care/management, counseling/educating on disease processes, explaining results of diagnostic testing.

## 2025-03-06 NOTE — PROGRESS NOTES
Transitional Care Coordination Progress Note:  Plan per Medical/Surgical team: POD#1 s/p MIS L3-4, L4-5 TLIF , pending PT/OT assessment, weaned off PCA this morning and pending her voiding.   Discharge Disposition: pending PT/OT  Potential Barriers: none  ADOD: 3/7    Addendum: This nurse met with pt and notified her of PT/OT recommendation for low intensity therapy, pt requested to discharge home with [facility], Neurosurgery team notified.     Faye Smart, RN, BSN  Transitional Care Coordinator  Office: [phone]  Secure chat via [employer]iku

## 2025-03-06 NOTE — PROGRESS NOTES
Physical Therapy    Physical Therapy Re-Evaluation & Treatment    Patient Name: Cristal Pollard  MRN: [id number]  Department: [place] [address]  Room: [facility]  Today's Date: 3/6/2025   Time Calculation  Start Time: 0920  Stop Time: 0949  Time Calculation (min): 29 min    Assessment/Plan   PT Assessment  PT Assessment Results: Decreased strength, Decreased endurance, Impaired balance, Decreased mobility, Pain  Rehab Prognosis: Good  Barriers to Discharge Home: No anticipated barriers  Evaluation/Treatment Tolerance: Patient tolerated treatment well  Medical Staff Made Aware: Yes  Strengths: Rehab experience, Physical health  End of Session Communication: Bedside nurse  Assessment Comment: 64 year old female admitted with 5d acute on chronic back pain RLE (likely L4 radiculopathy), CT L-spine w/ multi-level spondylosis worse at L3-4, L4-5, L5-S1, no fx, MRI LS w L3-4, L4-5 CCS, L5-S1 L foraminal stenosis, Flex ex no pathologic motion. The patient is now s/p L3-4 TLIF with Dr. Reynolds on 3/5.  Pt would benefit from continued PT while in hospital to improve functional mobility and return to PLOF. Recommend low intensity physical therapy program at discharge.  End of Session Patient Position: Alarm on, Up in chair (call bell in reach)   IP OR SWING BED PT PLAN  Inpatient or Swing Bed: Inpatient  PT Plan  Treatment/Interventions: Bed mobility, Transfer training, Gait training, Stair training, Strengthening, Therapeutic exercise, Therapeutic activity, Positioning  PT Plan: Ongoing PT  PT Frequency: Daily  PT Discharge Recommendations: Low intensity level of continued care  Equipment Recommended upon Discharge: Wheeled walker  PT Recommended Transfer Status: Assist x1  PT - OK to Discharge: Yes      Subjective     General Visit Information:  General  Reason for Referral: 64 year old female admitted with 5d acute on chronic back pain RLE (likely L4 radiculopathy), CT L-spine w/ multi-level spondylosis worse at L3-4, L4-5, L5-S1, no fx,  MRI LS w L3-4, L4-5 CCS, L5-S1 L foraminal stenosis.  Now S/P L304 TLIF with Dr. Reynolds on 3/5.  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), BUE radiculopathy, 2/28/24 s/p C4-7 ACDF  Prior to Session Communication: Bedside nurse  Patient Position Received: Bed, 3 rail up, Alarm on  Preferred Learning Style: auditory, verbal, written  General Comment: Agreeable to participate with PT  Home Living:  Home Living  Type of Home: House  Lives With: Significant other  Home Layout: One level  Home Access: Stairs to enter with rails  Entrance Stairs-Rails: Both  Entrance Stairs-Number of Steps: 5  Bathroom Shower/Tub: Tub/shower unit  Home Living Comments: Lives with significant other, bed/bath 1st floor with 5 stairs to enter with a rail. Independent ADLs, transfers & mobility without device. Owns reachers, standard height toilet, tub/shower with grab bar, seat & hand held shower. Shared IADLs, drives; significant other able to assist as needed.  Prior Level of Function:  Prior Function Per Pt/Caregiver Report  Level of [place]: Independent with ADLs and functional transfers, Independent with homemaking with ambulation  ADL Assistance: Independent  Homemaking Assistance: Independent  Ambulatory Assistance: Independent  Vocational: Retired  Prior Function Comments: pt independent prior to feb 2025, has had issues with pain and ambulation since then; denies any falls over the last 6 months  Precautions:  Precautions  Medical Precautions: Fall precautions  Post-Surgical Precautions: Spinal precautions  Precautions Comment: falls             Objective   Pain:  Pain Assessment  Pain Assessment: 0-10  0-10 (Numeric) Pain Score: 8  Pain Type: Surgical pain  Pain Location: Back  Pain Orientation: Lower  Pain Interventions: Repositioned  Response to Interventions: Decrease in pain  Cognition:  Cognition  Overall Cognitive Status: Within Functional  Limits  Orientation Level: Oriented X4    General Assessments:  General Observation  General Observation: With cues, the patient was able to progress gait to CGA       Activity Tolerance  Endurance: Decreased tolerance for upright activites    Sensation  Light Touch: No apparent deficits    Strength  Strength Comments: B LE grossly 4/5  Strength  Strength Comments: B LE grossly 4/5           Coordination  Movements are Fluid and Coordinated: Yes         Static Sitting Balance  Static Sitting-Level of Assistance: Close supervision  Dynamic Sitting Balance  Dynamic Sitting-Level of Assistance: Close supervision  Dynamic Sitting-Comments: cues for safety    Static Standing Balance  Static Standing-Level of Assistance: Minimum assistance  Dynamic Standing Balance  Dynamic Standing-Level of Assistance: Minimum assistance  Dynamic Standing-Comments: verbal cues for safety  Functional Assessments:       Bed Mobility  Bed Mobility: Yes  Bed Mobility 1  Bed Mobility 1: Log roll, Supine to sitting  Level of Assistance 1: Moderate assistance  Bed Mobility Comments 1: cues for hand placement    Transfers  Transfer: Yes  Transfer 1  Technique 1: Sit to stand, Stand to sit  Transfer Device 1: Walker  Transfer Level of Assistance 1: Minimum assistance  Trials/Comments 1: no evidence of knee buckling this date    Ambulation/Gait Training  Ambulation/Gait Training Performed: Yes  Ambulation/Gait Training 1  Surface 1: Level tile  Device 1: Rolling walker  Assistance 1: Contact guard, Minimal verbal cues, Minimal tactile cues  Quality of Gait 1: Antalgic (decreased step length, janice decreased)  Comments/Distance (ft) 1: 8ft    Treatments:  Therapeutic Activity  Therapeutic Activity Performed: Yes  Therapeutic Activity 1: Performed static standing with weight shifting. Performed static standing with weight shifting on bent knee.  EOB sitting for improved tolerance to upright positioning.  Discussed strategies to reduce pain and  guarding.         Bed Mobility  Bed Mobility: Yes  Bed Mobility 1  Bed Mobility 1: Log roll, Supine to sitting  Level of Assistance 1: Moderate assistance  Bed Mobility Comments 1: cues for hand placement    Ambulation/Gait Training  Ambulation/Gait Training Performed: Yes  Ambulation/Gait Training 1  Surface 1: Level tile  Device 1: Rolling walker  Assistance 1: Contact guard, Minimal verbal cues, Minimal tactile cues  Quality of Gait 1: Antalgic (decreased step length, janice decreased)  Comments/Distance (ft) 1: 8ft  Transfers  Transfer: Yes  Transfer 1  Technique 1: Sit to stand, Stand to sit  Transfer Device 1: Walker  Transfer Level of Assistance 1: Minimum assistance  Trials/Comments 1: no evidence of knee buckling this date       Outcome Measures:  [facility] Basic Mobility  Turning from your back to your side while in a flat bed without using bedrails: A lot  Moving from lying on your back to sitting on the side of a flat bed without using bedrails: A little  Moving to and from bed to chair (including a wheelchair): A little  Standing up from a chair using your arms (e.g. wheelchair or bedside chair): A little  To walk in hospital room: A little  Climbing 3-5 steps with railing: A little  Basic Mobility - Total Score: 17    Encounter Problems       Encounter Problems (Active)       Mobility       STG - Patient will ascend and descend >5 steps with LRAD and supervision.  (Progressing)       Start:  03/03/25    Expected End:  03/20/25            Pt will perform bed mobility with supervision.  (Progressing)       Start:  03/03/25    Expected End:  03/20/25         Goal Note       Using log roll technique.                 Pt will ambulate >250ft with LRAD and supervision Assist with no LOB and VSS.   (Progressing)       Start:  03/03/25    Expected End:  03/20/25            Pt will demonstrate WFL BLE strength to complete therapeutic exercise and participate in functional mobility.   (Progressing)       Start:   03/03/25    Expected End:  03/20/25               PT Transfers       Pt will perform all functional transfers with LRAD and supervision assist.   (Progressing)       Start:  03/03/25    Expected End:  03/20/25               Pain - Adult              Education Documentation  Precautions, taught by Nicolas Calixto, PT at 3/6/2025 10:12 AM.  Learner: Patient  Readiness: Acceptance  Method: Explanation  Response: Verbalizes Understanding  Comment: Bed mobility including log roll, strategies to reduce pain and guarding, spine precautions    Mobility Training, taught by Nicolas Calixto, PT at 3/6/2025 10:12 AM.  Learner: Patient  Readiness: Acceptance  Method: Explanation  Response: Verbalizes Understanding  Comment: Bed mobility including log roll, strategies to reduce pain and guarding, spine precautions    Education Comments  No comments found.

## 2025-03-06 NOTE — PROGRESS NOTES
Occupational Therapy    Evaluation and Treatment    Patient Name: Cristal Pollard  MRN: [id number]  Today's Date: 3/6/2025  Room: [address] [place], [place] [address]  Time Calculation  Start Time: 1009  Stop Time: 1035  Time Calculation (min): 26 min    Assessment  IP OT Assessment  Prognosis: Good  Barriers to Discharge Home: No anticipated barriers  Evaluation/Treatment Tolerance: Patient tolerated treatment well  End of Session Communication: Bedside nurse  End of Session Patient Position: Alarm on, Up in chair  Plan:  Inpatient Plan  Treatment Interventions: ADL retraining, Functional transfer training, Equipment evaluation/education, Compensatory technique education  OT Frequency: 3 times per week  OT Discharge Recommendations: Low intensity level of continued care  Equipment Recommended upon Discharge:  (reacher, sock aid)  OT Recommended Transfer Status: Assist of 1  OT - OK to Discharge: Yes  OT Assessment  OT Assessment Results: Decreased ADL status, Decreased functional mobility, Decreased trunk control for functional activities  Prognosis: Good  Evaluation/Treatment Tolerance: Patient tolerated treatment well    Subjective   Current Problem:  1. Back pain of thoracolumbar region        2. Back pain with radiculopathy  Case Request Operating Room: FUSION, SPINE, LUMBAR, TLIF, ROBOT-ASSISTED L3-5    Case Request Operating Room: FUSION, SPINE, LUMBAR, TLIF, ROBOT-ASSISTED L3-5        General:  Reason for Referral: S/p MIS L3-4, L4-5 TLIF  Past Medical History Relevant to Rehab: HTN, HLD, seziures (on lamictal), BUE radiculopathy, 2/28/24 s/p C4-7 ACDF  Prior to Session Communication: Bedside nurse  Patient Position Received: Up in chair, Alarm on  General Comment: Pt in chiar on arrival, willing and motivated to participate in OT   Precautions:  Medical Precautions: Fall precautions  Post-Surgical Precautions: Spinal precautions  Precautions Comment: falls  Vital Signs:    Pain:  Pain Assessment  Pain Assessment: 0-10  0-10 (Numeric)  Pain Score:  (unrated)  Pain Type: Surgical pain  Pain Location: Back  Pain Orientation: Lower  Pain Interventions: Repositioned  Lines/Tubes/Drains:  Urethral Catheter Non-latex;Straight-tip 16 Fr. (Active)   Number of days: 0         Objective   Cognition:  Overall Cognitive Status: Within Functional Limits  Orientation Level: Oriented X4           Home Living:  Type of Home: House  Lives With: Significant other  Home Layout: One level  Home Access: Stairs to enter with rails  Entrance Stairs-Rails: Both  Entrance Stairs-Number of Steps: 5  Bathroom Shower/Tub: Tub/shower unit   Prior Function:  Level of [place]: Independent with ADLs and functional transfers, Independent with homemaking with ambulation  ADL Assistance: Independent  Homemaking Assistance: Independent  Ambulatory Assistance: Independent  Vocational: Retired  Prior Function Comments: pt reports she was independent with ADLs, shares household tasks with S.O, + driving, retired sub teacher       ADL:  Eating Assistance: Stand by  Eating Deficit: Setup  Grooming Assistance: Stand by  Grooming Deficit: Setup  Bathing Assistance: Minimal  UE Dressing Assistance: Minimal  LE Dressing Assistance: Moderate  Toileting Assistance with Device: Minimal  Activity Tolerance:  Endurance: Decreased tolerance for upright activites  Balance:  Dynamic Standing Balance  Dynamic Standing-Level of Assistance: Contact guard  Dynamic Standing-Balance:  (hiking underwear over hips in standing)  Dynamic Standing-Comments: using WW  Static Standing Balance  Static Standing-Level of Assistance: Contact guard  Static Standing-Comment/Number of Minutes: using WW  Bed Mobility/Transfers: Bed Mobility/Transfers:     and Transfers  Transfer: Yes  Transfer 1  Technique 1: Sit to stand, Stand to sit  Transfer Device 1: Walker  Transfer Level of Assistance 1: Minimum assistance  Trials/Comments 1: increased time required, pt reporting increased R LE weakness in standing, no  bucking noted      Sensation:  Light Touch: No apparent deficits  Strength:  Strength Comments: B UE grossly WFL       Coordination:  Movements are Fluid and Coordinated: Yes   Hand Function:  Hand Function  Gross Grasp: Functional  Coordination: Functional  Extremities:   RUE   RUE : Within Functional Limits, LUE   LUE: Within Functional Limits, RLE   RLE : Within Functional Limits, and LLE   LLE : Within Functional Limits      Outcome Measures: [facility] Daily Activity  Putting on and taking off regular lower body clothing: A lot  Bathing (including washing, rinsing, drying): A lot  Putting on and taking off regular upper body clothing: A little  Toileting, which includes using toilet, bedpan or urinal: A little  Taking care of personal grooming such as brushing teeth: A little  Eating Meals: None  Daily Activity - Total Score: 17         ,     OT Adult Other Outcome Measures  4AT: negative    Education Documentation  Handouts, taught by Jacqueline Currie OT at 3/6/2025 11:23 AM.  Learner: Patient  Readiness: Acceptance  Method: Explanation  Response: Verbalizes Understanding    Body Mechanics, taught by Jacqueline Currie OT at 3/6/2025 11:23 AM.  Learner: Patient  Readiness: Acceptance  Method: Explanation  Response: Verbalizes Understanding    ADL Training, taught by Jacqueline Currie OT at 3/6/2025 11:23 AM.  Learner: Patient  Readiness: Acceptance  Method: Explanation  Response: Verbalizes Understanding    Education Comments  No comments found.        Goals:   Encounter Problems       Encounter Problems (Active)       ADLs       Patient will perform UB and LB bathing  with supervision level of assistance and shower chair. (Progressing)       Start:  03/06/25    Expected End:  03/20/25            Patient with complete lower body dressing with stand by assist level of assistance donning and doffing all LE clothes  with PRN adaptive equipment while edge of bed  (Progressing)       Start:  03/06/25    Expected End:   03/20/25            Patient will complete toileting including hygiene clothing management/hygiene with supervision level of assistance and grab bars. (Progressing)       Start:  03/06/25    Expected End:  03/20/25               COGNITION/SAFETY       Patient will recall and adhere to spine precautions during all functional mobility/ADL tasks in order to demonstrate improved understanding and promote healing post op (Progressing)       Start:  03/06/25    Expected End:  03/20/25               MOBILITY       Patient will perform Functional mobility min Household distances/Community Distances with stand by assist level of assistance and least restrictive device in order to improve safety and functional mobility. (Progressing)       Start:  03/06/25    Expected End:  03/20/25               TRANSFERS       Patient will perform bed mobility stand by assist level of assistance and bed rails in order to improve safety and independence with mobility (Progressing)       Start:  03/06/25    Expected End:  03/20/25                   Treatment Completed on Evaluation    Therapy/Activity:     Therapeutic Activity  Therapeutic Activity Performed: Yes  Therapeutic Activity 1: Pt educated on lumbar spine precautions and LB dressing. Pt unable to complete LB dressing by doing figure 4 technique, required use of LH AE. Pt used reacher to florencia underwear with Min A and verbal cues. Pt educated on sock aid, OT demonstrated donning sock, pt will practice on next opportunity.            03/06/25 at 11:25 AM   Jacqueline Currie OT   Rehab Office: [phone]

## 2025-03-06 NOTE — CARE PLAN
The patient's goals for the shift include  Pt will be pain controlled throughout the shift.    The clinical goals for the shift include Pt will pass trial of void throughout shift.    Over the shift, the patient did not make progress toward the following goals. Barriers to progression include none. Recommendations to address these barriers include none.

## 2025-03-07 ENCOUNTER — APPOINTMENT (OUTPATIENT)
Dept: RADIOLOGY | Facility: HOSPITAL | Age: 65
DRG: 448 | End: 2025-03-07
Payer: COMMERCIAL

## 2025-03-07 LAB
ALBUMIN SERPL BCP-MCNC: 3.6 G/DL (ref 3.4–5)
ANION GAP SERPL CALC-SCNC: 11 MMOL/L (ref 10–20)
BUN SERPL-MCNC: 13 MG/DL (ref 6–23)
CALCIUM SERPL-MCNC: 9.2 MG/DL (ref 8.6–10.6)
CHLORIDE SERPL-SCNC: 106 MMOL/L (ref 98–107)
CO2 SERPL-SCNC: 25 MMOL/L (ref 21–32)
CREAT SERPL-MCNC: 0.7 MG/DL (ref 0.5–1.05)
EGFRCR SERPLBLD CKD-EPI 2021: >90 ML/MIN/1.73M*2
ERYTHROCYTE [DISTWIDTH] IN BLOOD BY AUTOMATED COUNT: 13.2 % (ref 11.5–14.5)
GLUCOSE BLD MANUAL STRIP-MCNC: 192 MG/DL (ref 74–99)
GLUCOSE BLD MANUAL STRIP-MCNC: 195 MG/DL (ref 74–99)
GLUCOSE BLD MANUAL STRIP-MCNC: 197 MG/DL (ref 74–99)
GLUCOSE BLD MANUAL STRIP-MCNC: 227 MG/DL (ref 74–99)
GLUCOSE SERPL-MCNC: 197 MG/DL (ref 74–99)
HCT VFR BLD AUTO: 32.1 % (ref 36–46)
HGB BLD-MCNC: 10.4 G/DL (ref 12–16)
MCH RBC QN AUTO: 30.2 PG (ref 26–34)
MCHC RBC AUTO-ENTMCNC: 32.4 G/DL (ref 32–36)
MCV RBC AUTO: 93 FL (ref 80–100)
NRBC BLD-RTO: 0 /100 WBCS (ref 0–0)
PHOSPHATE SERPL-MCNC: 2.3 MG/DL (ref 2.5–4.9)
PLATELET # BLD AUTO: 209 X10*3/UL (ref 150–450)
POTASSIUM SERPL-SCNC: 3.9 MMOL/L (ref 3.5–5.3)
RBC # BLD AUTO: 3.44 X10*6/UL (ref 4–5.2)
SODIUM SERPL-SCNC: 138 MMOL/L (ref 136–145)
WBC # BLD AUTO: 10.4 X10*3/UL (ref 4.4–11.3)

## 2025-03-07 PROCEDURE — 0ST20ZZ RESECTION OF LUMBAR VERTEBRAL DISC, OPEN APPROACH: ICD-10-PCS | Performed by: STUDENT IN AN ORGANIZED HEALTH CARE EDUCATION/TRAINING PROGRAM

## 2025-03-07 PROCEDURE — 2500000004 HC RX 250 GENERAL PHARMACY W/ HCPCS (ALT 636 FOR OP/ED)

## 2025-03-07 PROCEDURE — 2780000003 HC OR 278 NO HCPCS

## 2025-03-07 PROCEDURE — 2500000001 HC RX 250 WO HCPCS SELF ADMINISTERED DRUGS (ALT 637 FOR MEDICARE OP)

## 2025-03-07 PROCEDURE — 97530 THERAPEUTIC ACTIVITIES: CPT | Mod: GO

## 2025-03-07 PROCEDURE — 0SG10AJ FUSION OF 2 OR MORE LUMBAR VERTEBRAL JOINTS WITH INTERBODY FUSION DEVICE, POSTERIOR APPROACH, ANTERIOR COLUMN, OPEN APPROACH: ICD-10-PCS | Performed by: STUDENT IN AN ORGANIZED HEALTH CARE EDUCATION/TRAINING PROGRAM

## 2025-03-07 PROCEDURE — 2500000001 HC RX 250 WO HCPCS SELF ADMINISTERED DRUGS (ALT 637 FOR MEDICARE OP): Performed by: STUDENT IN AN ORGANIZED HEALTH CARE EDUCATION/TRAINING PROGRAM

## 2025-03-07 PROCEDURE — 2500000004 HC RX 250 GENERAL PHARMACY W/ HCPCS (ALT 636 FOR OP/ED): Mod: TB | Performed by: PHYSICIAN ASSISTANT

## 2025-03-07 PROCEDURE — 00NY0ZZ RELEASE LUMBAR SPINAL CORD, OPEN APPROACH: ICD-10-PCS | Performed by: STUDENT IN AN ORGANIZED HEALTH CARE EDUCATION/TRAINING PROGRAM

## 2025-03-07 PROCEDURE — 2500000002 HC RX 250 W HCPCS SELF ADMINISTERED DRUGS (ALT 637 FOR MEDICARE OP, ALT 636 FOR OP/ED): Performed by: NEUROLOGICAL SURGERY

## 2025-03-07 PROCEDURE — 97116 GAIT TRAINING THERAPY: CPT | Mod: GP,CQ

## 2025-03-07 PROCEDURE — 1100000001 HC PRIVATE ROOM DAILY

## 2025-03-07 PROCEDURE — 01NB0ZZ RELEASE LUMBAR NERVE, OPEN APPROACH: ICD-10-PCS | Performed by: STUDENT IN AN ORGANIZED HEALTH CARE EDUCATION/TRAINING PROGRAM

## 2025-03-07 PROCEDURE — 85027 COMPLETE CBC AUTOMATED: CPT

## 2025-03-07 PROCEDURE — 36415 COLL VENOUS BLD VENIPUNCTURE: CPT

## 2025-03-07 PROCEDURE — 36410 VNPNXR 3YR/> PHY/QHP DX/THER: CPT

## 2025-03-07 PROCEDURE — 2500000002 HC RX 250 W HCPCS SELF ADMINISTERED DRUGS (ALT 637 FOR MEDICARE OP, ALT 636 FOR OP/ED)

## 2025-03-07 PROCEDURE — 80069 RENAL FUNCTION PANEL: CPT

## 2025-03-07 PROCEDURE — C1751 CATH, INF, PER/CENT/MIDLINE: HCPCS

## 2025-03-07 PROCEDURE — 82947 ASSAY GLUCOSE BLOOD QUANT: CPT

## 2025-03-07 PROCEDURE — 97535 SELF CARE MNGMENT TRAINING: CPT | Mod: GO

## 2025-03-07 PROCEDURE — 00UT0KZ SUPPLEMENT SPINAL MENINGES WITH NONAUTOLOGOUS TISSUE SUBSTITUTE, OPEN APPROACH: ICD-10-PCS | Performed by: STUDENT IN AN ORGANIZED HEALTH CARE EDUCATION/TRAINING PROGRAM

## 2025-03-07 PROCEDURE — 05H733Z INSERTION OF INFUSION DEVICE INTO RIGHT AXILLARY VEIN, PERCUTANEOUS APPROACH: ICD-10-PCS | Performed by: STUDENT IN AN ORGANIZED HEALTH CARE EDUCATION/TRAINING PROGRAM

## 2025-03-07 PROCEDURE — G0378 HOSPITAL OBSERVATION PER HR: HCPCS

## 2025-03-07 PROCEDURE — 2500000002 HC RX 250 W HCPCS SELF ADMINISTERED DRUGS (ALT 637 FOR MEDICARE OP, ALT 636 FOR OP/ED): Performed by: PHYSICIAN ASSISTANT

## 2025-03-07 PROCEDURE — 97530 THERAPEUTIC ACTIVITIES: CPT | Mod: GP,CQ

## 2025-03-07 RX ORDER — GABAPENTIN 300 MG/1
600 CAPSULE ORAL 3 TIMES DAILY
Status: DISCONTINUED | OUTPATIENT
Start: 2025-03-07 | End: 2025-03-10 | Stop reason: HOSPADM

## 2025-03-07 RX ORDER — BETHANECHOL CHLORIDE 10 MG/1
10 TABLET ORAL 3 TIMES DAILY
Status: DISCONTINUED | OUTPATIENT
Start: 2025-03-07 | End: 2025-03-10 | Stop reason: HOSPADM

## 2025-03-07 RX ORDER — DEXAMETHASONE 2 MG/1
2 TABLET ORAL EVERY 8 HOURS SCHEDULED
Status: DISCONTINUED | OUTPATIENT
Start: 2025-03-07 | End: 2025-03-08

## 2025-03-07 RX ORDER — LIDOCAINE HYDROCHLORIDE 10 MG/ML
5 INJECTION, SOLUTION INFILTRATION; PERINEURAL ONCE
Status: DISCONTINUED | OUTPATIENT
Start: 2025-03-07 | End: 2025-03-10 | Stop reason: HOSPADM

## 2025-03-07 RX ORDER — DIAZEPAM 5 MG/1
5 TABLET ORAL EVERY 6 HOURS PRN
Status: DISCONTINUED | OUTPATIENT
Start: 2025-03-07 | End: 2025-03-10 | Stop reason: HOSPADM

## 2025-03-07 RX ORDER — TAMSULOSIN HYDROCHLORIDE 0.4 MG/1
0.4 CAPSULE ORAL DAILY
Status: DISCONTINUED | OUTPATIENT
Start: 2025-03-07 | End: 2025-03-10 | Stop reason: HOSPADM

## 2025-03-07 RX ORDER — INSULIN LISPRO 100 [IU]/ML
0-20 INJECTION, SOLUTION INTRAVENOUS; SUBCUTANEOUS
Status: DISCONTINUED | OUTPATIENT
Start: 2025-03-07 | End: 2025-03-10 | Stop reason: HOSPADM

## 2025-03-07 RX ADMIN — DIAZEPAM 5 MG: 5 TABLET ORAL at 11:49

## 2025-03-07 RX ADMIN — ACETAMINOPHEN 975 MG: 325 TABLET ORAL at 06:35

## 2025-03-07 RX ADMIN — LAMOTRIGINE 150 MG: 150 TABLET ORAL at 21:44

## 2025-03-07 RX ADMIN — DEXAMETHASONE 2 MG: 2 TABLET ORAL at 23:09

## 2025-03-07 RX ADMIN — DIAZEPAM 5 MG: 5 TABLET ORAL at 18:34

## 2025-03-07 RX ADMIN — GABAPENTIN 600 MG: 300 CAPSULE ORAL at 09:39

## 2025-03-07 RX ADMIN — HEPARIN SODIUM 5000 UNITS: 5000 INJECTION, SOLUTION INTRAVENOUS; SUBCUTANEOUS at 06:35

## 2025-03-07 RX ADMIN — SENNOSIDES AND DOCUSATE SODIUM 2 TABLET: 50; 8.6 TABLET ORAL at 09:40

## 2025-03-07 RX ADMIN — OXYCODONE HYDROCHLORIDE 10 MG: 5 TABLET ORAL at 02:59

## 2025-03-07 RX ADMIN — ACETAMINOPHEN 975 MG: 325 TABLET ORAL at 23:09

## 2025-03-07 RX ADMIN — HEPARIN SODIUM 5000 UNITS: 5000 INJECTION, SOLUTION INTRAVENOUS; SUBCUTANEOUS at 21:44

## 2025-03-07 RX ADMIN — SENNOSIDES AND DOCUSATE SODIUM 2 TABLET: 50; 8.6 TABLET ORAL at 21:44

## 2025-03-07 RX ADMIN — INSULIN LISPRO 2 UNITS: 100 INJECTION, SOLUTION INTRAVENOUS; SUBCUTANEOUS at 12:23

## 2025-03-07 RX ADMIN — Medication 1 TABLET: at 09:40

## 2025-03-07 RX ADMIN — GABAPENTIN 600 MG: 300 CAPSULE ORAL at 14:12

## 2025-03-07 RX ADMIN — TAMSULOSIN HYDROCHLORIDE 0.4 MG: 0.4 CAPSULE ORAL at 21:44

## 2025-03-07 RX ADMIN — DEXAMETHASONE 2 MG: 2 TABLET ORAL at 09:50

## 2025-03-07 RX ADMIN — ACETAMINOPHEN 975 MG: 325 TABLET ORAL at 14:12

## 2025-03-07 RX ADMIN — BETHANECHOL CHLORIDE 10 MG: 10 TABLET ORAL at 21:45

## 2025-03-07 RX ADMIN — PANTOPRAZOLE SODIUM 40 MG: 40 TABLET, DELAYED RELEASE ORAL at 06:35

## 2025-03-07 RX ADMIN — POLYETHYLENE GLYCOL 3350 17 G: 17 POWDER, FOR SOLUTION ORAL at 09:40

## 2025-03-07 RX ADMIN — GABAPENTIN 600 MG: 300 CAPSULE ORAL at 21:44

## 2025-03-07 RX ADMIN — OXYCODONE HYDROCHLORIDE 10 MG: 5 TABLET ORAL at 09:39

## 2025-03-07 RX ADMIN — INSULIN LISPRO 4 UNITS: 100 INJECTION, SOLUTION INTRAVENOUS; SUBCUTANEOUS at 16:34

## 2025-03-07 RX ADMIN — INSULIN LISPRO 2 UNITS: 100 INJECTION, SOLUTION INTRAVENOUS; SUBCUTANEOUS at 09:40

## 2025-03-07 RX ADMIN — BISACODYL 10 MG: 10 SUPPOSITORY RECTAL at 09:40

## 2025-03-07 RX ADMIN — POLYETHYLENE GLYCOL 3350 17 G: 17 POWDER, FOR SOLUTION ORAL at 21:44

## 2025-03-07 RX ADMIN — DEXAMETHASONE SODIUM PHOSPHATE 2 MG: 4 INJECTION, SOLUTION INTRA-ARTICULAR; INTRALESIONAL; INTRAMUSCULAR; INTRAVENOUS; SOFT TISSUE at 02:47

## 2025-03-07 RX ADMIN — HYDROMORPHONE HYDROCHLORIDE 0.3 MG: 1 INJECTION, SOLUTION INTRAMUSCULAR; INTRAVENOUS; SUBCUTANEOUS at 17:05

## 2025-03-07 RX ADMIN — HYDROMORPHONE HYDROCHLORIDE 0.3 MG: 1 INJECTION, SOLUTION INTRAMUSCULAR; INTRAVENOUS; SUBCUTANEOUS at 06:35

## 2025-03-07 RX ADMIN — HEPARIN SODIUM 5000 UNITS: 5000 INJECTION, SOLUTION INTRAVENOUS; SUBCUTANEOUS at 14:12

## 2025-03-07 RX ADMIN — OXYCODONE HYDROCHLORIDE 10 MG: 5 TABLET ORAL at 18:28

## 2025-03-07 RX ADMIN — LAMOTRIGINE 100 MG: 100 TABLET ORAL at 09:39

## 2025-03-07 RX ADMIN — FLUOXETINE HYDROCHLORIDE 40 MG: 20 CAPSULE ORAL at 21:44

## 2025-03-07 RX ADMIN — DEXAMETHASONE 2 MG: 2 TABLET ORAL at 14:12

## 2025-03-07 RX ADMIN — OXYCODONE HYDROCHLORIDE 10 MG: 5 TABLET ORAL at 14:12

## 2025-03-07 ASSESSMENT — PAIN SCALES - GENERAL
PAINLEVEL_OUTOF10: 7
PAINLEVEL_OUTOF10: 8
PAINLEVEL_OUTOF10: 0 - NO PAIN
PAINLEVEL_OUTOF10: 9
PAINLEVEL_OUTOF10: 8
PAINLEVEL_OUTOF10: 7
PAINLEVEL_OUTOF10: 9
PAINLEVEL_OUTOF10: 8

## 2025-03-07 ASSESSMENT — COGNITIVE AND FUNCTIONAL STATUS - GENERAL
CLIMB 3 TO 5 STEPS WITH RAILING: A LOT
TURNING FROM BACK TO SIDE WHILE IN FLAT BAD: A LITTLE
WALKING IN HOSPITAL ROOM: A LITTLE
MOVING TO AND FROM BED TO CHAIR: A LITTLE
MOVING FROM LYING ON BACK TO SITTING ON SIDE OF FLAT BED WITH BEDRAILS: A LOT
DRESSING REGULAR UPPER BODY CLOTHING: A LITTLE
PERSONAL GROOMING: A LITTLE
TOILETING: A LITTLE
DAILY ACTIVITIY SCORE: 17
STANDING UP FROM CHAIR USING ARMS: A LITTLE
HELP NEEDED FOR BATHING: A LOT
MOBILITY SCORE: 16
DRESSING REGULAR LOWER BODY CLOTHING: A LOT

## 2025-03-07 ASSESSMENT — PAIN - FUNCTIONAL ASSESSMENT
PAIN_FUNCTIONAL_ASSESSMENT: 0-10

## 2025-03-07 ASSESSMENT — PAIN DESCRIPTION - DESCRIPTORS
DESCRIPTORS: SHARP
DESCRIPTORS: SHARP
DESCRIPTORS: SHARP;SHOOTING
DESCRIPTORS: SHARP
DESCRIPTORS: SHARP
DESCRIPTORS: SHARP;SHOOTING

## 2025-03-07 ASSESSMENT — ACTIVITIES OF DAILY LIVING (ADL): HOME_MANAGEMENT_TIME_ENTRY: 10

## 2025-03-07 NOTE — POST-PROCEDURE NOTE
Midline Placement            Placed:3/7/19485/7/[id number].Earliest Known Present:3/7/33567/7/2025.Hand Hygiene Completed:YesYes.Catheter Time Out Checklist Completed:YesYes.Size (Fr):33.Lumen Type:Single lumenSingle lumen.Catheter to Vein Ratio Less Than 45%:YesYes.Total Length (cm):1010.External Length (cm):00.Orientation:RightRight.Location:Brachial veinBrachial vein.Site Prep:Chlorhexidine ; Usual sterile procedure followedChlorhexidine ; Usual sterile procedure followed.Local Anesthetic:InjectableInjectable.Indication:Parenteral medicationsParenteral medications.Insertion Team Members In The Room:Nurse. The comment is Trian Pappas LPN.Initial Extremity Circumference (cm):2929.Placed by:Brianna Hendricks RN-Lisa Hendricks RN-FRANCI.Insertion attempts:11.Patient Tolerance:Tolerated wellTolerated well.Comfort Measures:Subcutaneous anestheticSubcutaneous anesthetic.Procedure Location:BedsideBedside. The comment is timeout with Izaiah HARVEY.Estimated Blood Loss (mL):00.Vessel Fully Compressible Proximally and Distally to Insertion Site:YesYes.Brisk Blood Return Obtained and Line Draws Easily:YesYes.Catheter Tip Location:Peripheral/midlinePeripheral/midline. The comment is right axilla.Line Confirmation:Blood return; Non-pulsatile blood flowBlood return; Non-pulsatile blood flow.Lot #:[id number].:BDBD.Expiration Date:12/31/202512/31/2025.Securement Method:Stat lock; Transparent dressing; Skin barrierStat lock; Transparent dressing; Skin barrier.Post Procedure Checklist:Handoff with RN; Bed at lowest level and wheels locked; Obtain all new IV tubing prior to useHandoff with RN; Bed at lowest level and wheels locked; Obtain all new IV tubing prior to use.

## 2025-03-07 NOTE — PROGRESS NOTES
Physical Therapy    Physical Therapy Treatment    Patient Name: Cristal Pollard  MRN: [id number]  Department: [place] [address]  Room: [facility]  Today's Date: 3/7/2025  Time Calculation  Start Time: 1020  Stop Time: 1047  Time Calculation (min): 27 min       Assessment/Plan   PT Assessment  PT Assessment Results: Decreased strength, Decreased endurance, Impaired balance, Decreased mobility, Pain  Rehab Prognosis: Good  Barriers to Discharge Home: No anticipated barriers  Evaluation/Treatment Tolerance: Patient limited by pain, Patient limited by fatigue  Medical Staff Made Aware: Yes  Strengths: Rehab experience  Barriers to Participation: Comorbidities  End of Session Communication: Bedside nurse  Assessment Comment: Pt would benefit from continued PT while in hospital to improve functional mobility and return to PLOF. Recommend low intensity physical therapy program at discharge.  End of Session Patient Position: Alarm on, Up in chair     PT Plan  Treatment/Interventions: Bed mobility, Transfer training, Gait training, Stair training, Strengthening, Therapeutic exercise, Therapeutic activity, Positioning  PT Plan: Ongoing PT  PT Frequency: Daily  PT Discharge Recommendations: Low intensity level of continued care  Equipment Recommended upon Discharge: Wheeled walker  PT Recommended Transfer Status: Assist x1  PT - OK to Discharge: Yes      General Visit Information:   PT  Visit  PT Received On: 03/07/25  Response to Previous Treatment: Patient reporting fatigue but able to participate.  General  Reason for Referral: S/p MIS L3-4, L4-5 TLIF  Past Medical History Relevant to Rehab: HTN, HLD, seziures (on lamictal), BUE radiculopathy, 2/28/24 s/p C4-7 ACDF  Co-Treatment: OT  Co-Treatment Reason: maximize pt safety with ambulation and to maxamize pt functional mobility  Prior to Session Communication: Bedside nurse  Patient Position Received: Bed, 3 rail up, Alarm on  Preferred Learning Style: auditory, verbal, written  General  Comment: pt pleasant and cooperative, willing to work with PT    Subjective   Precautions:  Precautions  Medical Precautions: Fall precautions  Post-Surgical Precautions: Spinal precautions  Precautions Comment: falls        Objective   Pain:  Pain Assessment  Pain Assessment: 0-10  0-10 (Numeric) Pain Score: 8  Pain Type: Acute pain, Surgical pain  Pain Location: Back  Pain Orientation: Mid, Lower  Pain Descriptors: Sharp, Shooting  Pain Frequency: Constant/continuous  Pain Onset: Ongoing  Clinical Progression: Not changed  Patient's Stated Pain Goal: No pain  Pain Interventions: Medication (See MAR), Repositioned, Ambulation/increased activity  Response to Interventions: No change in pain  Cognition:  Cognition  Overall Cognitive Status: Within Functional Limits  Orientation Level: Oriented X4  Coordination:  Movements are Fluid and Coordinated: Yes  Postural Control:  Postural Control  Postural Control: Within Functional Limits  Static Sitting Balance  Static Sitting-Balance Support: Bilateral upper extremity supported  Static Sitting-Level of Assistance: Close supervision  Dynamic Sitting Balance  Dynamic Sitting-Balance Support: Bilateral upper extremity supported  Dynamic Sitting-Level of Assistance: Close supervision  Static Standing Balance  Static Standing-Balance Support: Bilateral upper extremity supported (FWW)  Static Standing-Level of Assistance: Minimum assistance  Dynamic Standing Balance  Dynamic Standing-Balance Support: Bilateral upper extremity supported (FWW)  Dynamic Standing-Level of Assistance: Minimum assistance    Activity Tolerance:  Activity Tolerance  Endurance: Tolerates 10 - 20 min exercise with multiple rests  Treatments:       Therapeutic Activity  Therapeutic Activity Performed: Yes  Therapeutic Activity 1: continuing education on log roll technique, spinal precautions, transfer training.    Bed Mobility  Bed Mobility: Yes  Bed Mobility 1  Bed Mobility 1: Log roll, Supine to  sitting  Level of Assistance 1: Minimum assistance, Moderate verbal cues  Bed Mobility Comments 1: Min A for trunk control    Ambulation/Gait Training  Ambulation/Gait Training Performed: Yes  Ambulation/Gait Training 1  Surface 1: Level tile  Device 1: Rolling walker  Assistance 1: Contact guard, Minimal verbal cues, Minimal tactile cues  Quality of Gait 1: Decreased step length, Inconsistent stride length, Antalgic  Comments/Distance (ft) 1: 6', 40' (~5 minute seated rest  period between bouts)  Transfers  Transfer: Yes  Transfer 1  Technique 1: Sit to stand, Stand to sit  Transfer Device 1: Walker  Transfer Level of Assistance 1: Minimum assistance, Minimal verbal cues    Stairs  Stairs: No    Outcome Measures:  [facility] Basic Mobility  Turning from your back to your side while in a flat bed without using bedrails: A lot  Moving from lying on your back to sitting on the side of a flat bed without using bedrails: A little  Moving to and from bed to chair (including a wheelchair): A little  Standing up from a chair using your arms (e.g. wheelchair or bedside chair): A little  To walk in hospital room: A little  Climbing 3-5 steps with railing: A lot  Basic Mobility - Total Score: 16    Education Documentation  Body Mechanics, taught by Salinas Pritchett PTA at 3/7/2025 12:07 PM.  Learner: Patient  Readiness: Acceptance  Method: Explanation  Response: Verbalizes Understanding  Comment: gait training, POC    Precautions, taught by Salinas Pritchett PTA at 3/7/2025 12:07 PM.  Learner: Patient  Readiness: Acceptance  Method: Explanation  Response: Verbalizes Understanding  Comment: gait training, POC    Mobility Training, taught by Salinas Pritchett PTA at 3/7/2025 12:07 PM.  Learner: Patient  Readiness: Acceptance  Method: Explanation  Response: Verbalizes Understanding  Comment: gait training, POC    Education Comments  No comments found.      OP EDUCATION:       Encounter Problems       Encounter Problems (Active)        Mobility       STG - Patient will ascend and descend >5 steps with LRAD and supervision.  (Progressing)       Start:  03/03/25    Expected End:  03/20/25            Pt will perform bed mobility with supervision.  (Progressing)       Start:  03/03/25    Expected End:  03/20/25         Goal Note       Using log roll technique.                 Pt will ambulate >250ft with LRAD and supervision Assist with no LOB and VSS.   (Progressing)       Start:  03/03/25    Expected End:  03/20/25            Pt will demonstrate WFL BLE strength to complete therapeutic exercise and participate in functional mobility.   (Progressing)       Start:  03/03/25    Expected End:  03/20/25               PT Transfers       Pt will perform all functional transfers with LRAD and supervision assist.   (Progressing)       Start:  03/03/25    Expected End:  03/20/25               Pain - Adult

## 2025-03-07 NOTE — PROGRESS NOTES
Occupational Therapy      Occupational Therapy Treatment    Name: Cristal Pollard  MRN: [id number]  : 1960  Date: 25  Room: [address] [place], [place] [address]      Time Calculation  Start Time: 1019  Stop Time: 1047  Time Calculation (min): 28 min    Assessment:  Prognosis: Good  Barriers to Discharge Home: No anticipated barriers  Evaluation/Treatment Tolerance: Patient tolerated treatment well  End of Session Communication: Bedside nurse  End of Session Patient Position: Alarm on, Up in chair  Plan:  Treatment Interventions: ADL retraining, Functional transfer training, Equipment evaluation/education, Compensatory technique education  OT Frequency: 3 times per week  OT Discharge Recommendations: Low intensity level of continued care  Equipment Recommended upon Discharge:  (reacher, sock aid)  OT Recommended Transfer Status: Assist of 1  OT - OK to Discharge: Yes    Subjective   General:  OT Last Visit  OT Received On: 25  Reason for Referral: S/p MIS L3-4, L4-5 TLIF  Past Medical History Relevant to Rehab: HTN, HLD, seziures (on lamictal), BUE radiculopathy, 24 s/p C4-7 ACDF  Co-Treatment: PT  Co-Treatment Reason: tp maximize pts safety and rehab potential  Prior to Session Communication: Bedside nurse  Patient Position Received: Bed, 3 rail up, Alarm on  General Comment: Pt in bed on arrival, willing to participate in therapy   Precautions:  Medical Precautions: Fall precautions  Post-Surgical Precautions: Spinal precautions  Precautions Comment: falls  Vitals:   Date/Time Vitals Session Patient Position Pulse Resp SpO2 BP MAP (mmHg)    25 1200 --  --  76  18  92 %  108/66  --           Lines/Tubes/Drains:  Midline 25 Single lumen Right Brachial vein (Active)   Number of days: 0       External Urinary Catheter Female (Active)   Number of days: 13       Cognition:  Overall Cognitive Status: Within Functional Limits  Orientation Level: Oriented X4    Pain Assessment:  Pain Assessment  Pain Assessment:  0-10  0-10 (Numeric) Pain Score: 8  Pain Type: Surgical pain  Pain Location: Back  Pain Orientation: Mid, Lower  Pain Descriptors: Sharp, Shooting  Pain Frequency: Constant/continuous  Pain Onset: Ongoing  Patient's Stated Pain Goal: No pain  Pain Interventions: Medication (See MAR), Repositioned     Objective   Activities of Daily Living:               UE Dressing  UE Dressing Level of Assistance: Minimum assistance  UE Dressing Where Assessed: Edge of bed  UE Dressing Comments: donned gown as robe, required assistance locating sleeves          Functional Standing Tolerance:  Functional Mobility  Functional Mobility Performed: Yes  Functional Mobility 1  Surface 1: Level tile  Device 1: Rolling walker  Assistance 1: Contact guard, Minimal verbal cues  Comments 1: in room and hallway, required one sitting rest break d.t pain and c/o wekness in LEs, no buckling noted  Bed Mobility/Transfers:   Bed Mobility  Bed Mobility: Yes  Bed Mobility 1  Bed Mobility 1: Log roll, Supine to sitting  Level of Assistance 1: Minimum assistance  Bed Mobility Comments 1: provided verbal and tactile cues on log roll technique, assistance provided with bringing trunk upright  Transfers  Transfer: Yes  Transfer 1  Technique 1: Sit to stand, Stand to sit  Transfer Device 1: Walker  Transfer Level of Assistance 1: Minimum assistance  Trials/Comments 1: x2 trials       Outcome Measures:  [facility] Daily Activity  Putting on and taking off regular lower body clothing: A lot  Bathing (including washing, rinsing, drying): A lot  Putting on and taking off regular upper body clothing: A little  Toileting, which includes using toilet, bedpan or urinal: A little  Taking care of personal grooming such as brushing teeth: A little  Eating Meals: None  Daily Activity - Total Score: 17     Education Documentation  Handouts, taught by Jacqueline Currie OT at 3/7/2025  1:17 PM.  Learner: Patient  Readiness: Acceptance  Method: Explanation  Response:  Verbalizes Understanding    Body Mechanics, taught by Jacqueline Currie OT at 3/7/2025  1:17 PM.  Learner: Patient  Readiness: Acceptance  Method: Explanation  Response: Verbalizes Understanding    ADL Training, taught by Jacqueline Currie OT at 3/7/2025  1:17 PM.  Learner: Patient  Readiness: Acceptance  Method: Explanation  Response: Verbalizes Understanding    Education Comments  No comments found.        Goals:  Encounter Problems       Encounter Problems (Active)       ADLs       Patient will perform UB and LB bathing  with supervision level of assistance and shower chair. (Progressing)       Start:  03/06/25    Expected End:  03/20/25            Patient with complete lower body dressing with stand by assist level of assistance donning and doffing all LE clothes  with PRN adaptive equipment while edge of bed  (Progressing)       Start:  03/06/25    Expected End:  03/20/25            Patient will complete toileting including hygiene clothing management/hygiene with supervision level of assistance and grab bars. (Progressing)       Start:  03/06/25    Expected End:  03/20/25               COGNITION/SAFETY       Patient will recall and adhere to spine precautions during all functional mobility/ADL tasks in order to demonstrate improved understanding and promote healing post op (Progressing)       Start:  03/06/25    Expected End:  03/20/25               MOBILITY       Patient will perform Functional mobility min Household distances/Community Distances with stand by assist level of assistance and least restrictive device in order to improve safety and functional mobility. (Progressing)       Start:  03/06/25    Expected End:  03/20/25               TRANSFERS       Patient will perform bed mobility stand by assist level of assistance and bed rails in order to improve safety and independence with mobility (Progressing)       Start:  03/06/25    Expected End:  03/20/25 03/07/25 at 1:18 PM   Jacqueline  Kush, OT   [phone]

## 2025-03-07 NOTE — PROGRESS NOTES
"Cristal Pollard is a 64 y.o. female on day 5 of admission presenting with Back pain of thoracolumbar region.    Subjective   NAEON radic improved       Objective     Physical Exam  Aox3  BUE 5  LLE HF/KE/DF/PF 5  RLE HF/KE4+ ow 5  Incisions cdi    Last Recorded Vitals  Blood pressure 115/72, pulse 79, temperature 36.4 °C (97.5 °F), temperature source Temporal, resp. rate 18, height 1.651 m (5' 5\"), weight 80.1 kg (176 lb 9.6 oz), SpO2 96%.  Intake/Output last 3 Shifts:  I/O last 3 completed shifts:  In: 2890 (36.1 mL/kg) [I.V.:290 (3.6 mL/kg); IV Piggyback:2600]  Out: 3743 (46.7 mL/kg) [Urine:3543 (1.2 mL/kg/hr); Blood:200]  Weight: 80.1 kg         Assessment/Plan   Assessment & Plan  Back pain of thoracolumbar region    PONV (postoperative nausea and vomiting)    Back pain with radiculopathy    64 F h/o HTN, HLD, seziures (on lamictal), BUE radiculopathy, 2/28/24 s/p C4-7 ACDF (by Dr. Reynolds), p/w 5d acute on chronic back pain RLE (likely L4 radiculopathy), CT L-spine w/ multi-level spondylosis worse at L3-4, L4-5, L5-S1, no fx, MRI LS w L3-4, L4-5 CCS, L5-S1 L foraminal stenosis, Flex ex no pathologic motion, 3/3 CT thin cut L spine done    3/5 s/p MIS L3-4, L4-5 TLIF c/b durotomy s/p secondary repair    Floor  Floor  Pain control  Will continue dex 2q8  PTOT - HC  SCDs, SQH           Tolu Navarro MD      "

## 2025-03-07 NOTE — CARE PLAN
The patient's goals for the shift include  pain control    The clinical goals for the shift include pt will tolerate turning in bed with improved pain control        Problem: Pain  Goal: Turns in bed with improved pain control throughout the shift  Outcome: Not Progressing  Goal: Walks with improved pain control throughout the shift  Outcome: Not Progressing       Problem: Pain - Adult  Goal: Verbalizes/displays adequate comfort level or baseline comfort level  Outcome: Progressing     Problem: Discharge Planning  Goal: Discharge to home or other facility with appropriate resources  Outcome: Progressing     Problem: Fall/Injury  Goal: Not fall by end of shift  Outcome: Progressing

## 2025-03-07 NOTE — NURSING NOTE
Hung consulted for PIV placement by bedside RN. HUNG team placed IV only a few hours ago. Hung RN to bedside to assess. Scanned bilateral upper extremities, no sustainable additional same vessels noted for IV cannulation at this time. Temporary 24 ga diffusics placed in the right FA. Patient's veins rupture easily and this patient's veins are measuring small. Bedside RN made aware and rounding provider, alternative access recommended based on assessment, the fact that her IV's are not lasting a full day and her intolerance to placement. Provider noted he will attempt to ween patient off of IV pain medication, to oral control and take the plan of care from there. Patient is not agreeable to alternative access at this point. She voiced understanding as to why she is a candidate and information regarding mid line/pic line and still would like more time to think about it.

## 2025-03-07 NOTE — CARE PLAN
Problem: Pain - Adult  Goal: Verbalizes/displays adequate comfort level or baseline comfort level  Outcome: Progressing     Problem: Pain  Goal: Turns in bed with improved pain control throughout the shift  Outcome: Progressing  Goal: Walks with improved pain control throughout the shift  Outcome: Progressing  Goal: Performs ADL's with improved pain control throughout shift  Outcome: Progressing  Goal: Participates in PT with improved pain control throughout the shift  Outcome: Progressing     Problem: Discharge Planning  Goal: Discharge to home or other facility with appropriate resources  Outcome: Progressing     Problem: Fall/Injury  Goal: Not fall by end of shift  Outcome: Progressing  Goal: Be free from injury by end of the shift  Outcome: Progressing  Goal: Verbalize understanding of personal risk factors for fall in the hospital  Outcome: Progressing  Goal: Verbalize understanding of risk factor reduction measures to prevent injury from fall in the home  Outcome: Progressing  Goal: Use assistive devices by end of the shift  Outcome: Progressing  Goal: Pace activities to prevent fatigue by end of the shift  Outcome: Progressing   The patient's goals for the shift include      The clinical goals for the shift include Pt's pain will be controlled throughout shift.

## 2025-03-08 PROBLEM — G89.18 ACUTE POSTOPERATIVE PAIN: Status: ACTIVE | Noted: 2025-03-08

## 2025-03-08 LAB
ALBUMIN SERPL BCP-MCNC: 3.7 G/DL (ref 3.4–5)
ANION GAP SERPL CALC-SCNC: 12 MMOL/L (ref 10–20)
BUN SERPL-MCNC: 15 MG/DL (ref 6–23)
CALCIUM SERPL-MCNC: 9.8 MG/DL (ref 8.6–10.6)
CHLORIDE SERPL-SCNC: 104 MMOL/L (ref 98–107)
CO2 SERPL-SCNC: 28 MMOL/L (ref 21–32)
CREAT SERPL-MCNC: 0.71 MG/DL (ref 0.5–1.05)
EGFRCR SERPLBLD CKD-EPI 2021: >90 ML/MIN/1.73M*2
ERYTHROCYTE [DISTWIDTH] IN BLOOD BY AUTOMATED COUNT: 12.9 % (ref 11.5–14.5)
GLUCOSE BLD MANUAL STRIP-MCNC: 167 MG/DL (ref 74–99)
GLUCOSE BLD MANUAL STRIP-MCNC: 182 MG/DL (ref 74–99)
GLUCOSE BLD MANUAL STRIP-MCNC: 195 MG/DL (ref 74–99)
GLUCOSE BLD MANUAL STRIP-MCNC: 217 MG/DL (ref 74–99)
GLUCOSE SERPL-MCNC: 176 MG/DL (ref 74–99)
HCT VFR BLD AUTO: 33.4 % (ref 36–46)
HGB BLD-MCNC: 10.8 G/DL (ref 12–16)
MCH RBC QN AUTO: 31.2 PG (ref 26–34)
MCHC RBC AUTO-ENTMCNC: 32.3 G/DL (ref 32–36)
MCV RBC AUTO: 97 FL (ref 80–100)
NRBC BLD-RTO: 0 /100 WBCS (ref 0–0)
PHOSPHATE SERPL-MCNC: 2.7 MG/DL (ref 2.5–4.9)
PLATELET # BLD AUTO: 235 X10*3/UL (ref 150–450)
POTASSIUM SERPL-SCNC: 4.2 MMOL/L (ref 3.5–5.3)
RBC # BLD AUTO: 3.46 X10*6/UL (ref 4–5.2)
SODIUM SERPL-SCNC: 140 MMOL/L (ref 136–145)
WBC # BLD AUTO: 11.5 X10*3/UL (ref 4.4–11.3)

## 2025-03-08 PROCEDURE — 85027 COMPLETE CBC AUTOMATED: CPT

## 2025-03-08 PROCEDURE — 2500000004 HC RX 250 GENERAL PHARMACY W/ HCPCS (ALT 636 FOR OP/ED)

## 2025-03-08 PROCEDURE — 2500000004 HC RX 250 GENERAL PHARMACY W/ HCPCS (ALT 636 FOR OP/ED): Mod: JW,TB | Performed by: PHYSICIAN ASSISTANT

## 2025-03-08 PROCEDURE — 2500000001 HC RX 250 WO HCPCS SELF ADMINISTERED DRUGS (ALT 637 FOR MEDICARE OP)

## 2025-03-08 PROCEDURE — 99254 IP/OBS CNSLTJ NEW/EST MOD 60: CPT | Performed by: ANESTHESIOLOGY

## 2025-03-08 PROCEDURE — G0378 HOSPITAL OBSERVATION PER HR: HCPCS

## 2025-03-08 PROCEDURE — 2500000002 HC RX 250 W HCPCS SELF ADMINISTERED DRUGS (ALT 637 FOR MEDICARE OP, ALT 636 FOR OP/ED)

## 2025-03-08 PROCEDURE — 97116 GAIT TRAINING THERAPY: CPT | Mod: GP,CQ

## 2025-03-08 PROCEDURE — 2500000002 HC RX 250 W HCPCS SELF ADMINISTERED DRUGS (ALT 637 FOR MEDICARE OP, ALT 636 FOR OP/ED): Performed by: NEUROLOGICAL SURGERY

## 2025-03-08 PROCEDURE — 2500000002 HC RX 250 W HCPCS SELF ADMINISTERED DRUGS (ALT 637 FOR MEDICARE OP, ALT 636 FOR OP/ED): Performed by: PHYSICIAN ASSISTANT

## 2025-03-08 PROCEDURE — 97530 THERAPEUTIC ACTIVITIES: CPT | Mod: GP,CQ

## 2025-03-08 PROCEDURE — 1100000001 HC PRIVATE ROOM DAILY

## 2025-03-08 PROCEDURE — 80069 RENAL FUNCTION PANEL: CPT

## 2025-03-08 PROCEDURE — 2500000001 HC RX 250 WO HCPCS SELF ADMINISTERED DRUGS (ALT 637 FOR MEDICARE OP): Performed by: STUDENT IN AN ORGANIZED HEALTH CARE EDUCATION/TRAINING PROGRAM

## 2025-03-08 PROCEDURE — 82947 ASSAY GLUCOSE BLOOD QUANT: CPT

## 2025-03-08 RX ORDER — AMOXICILLIN 250 MG
2 CAPSULE ORAL 2 TIMES DAILY
Qty: 28 TABLET | Refills: 0 | Status: SHIPPED | OUTPATIENT
Start: 2025-03-08 | End: 2025-03-31 | Stop reason: HOSPADM

## 2025-03-08 RX ORDER — METHOCARBAMOL 500 MG/1
500 TABLET, FILM COATED ORAL EVERY 8 HOURS SCHEDULED
Status: DISCONTINUED | OUTPATIENT
Start: 2025-03-08 | End: 2025-03-10 | Stop reason: HOSPADM

## 2025-03-08 RX ORDER — MAGNESIUM SULFATE 1 G/100ML
1 INJECTION INTRAVENOUS ONCE
Status: COMPLETED | OUTPATIENT
Start: 2025-03-08 | End: 2025-03-08

## 2025-03-08 RX ORDER — ACETAMINOPHEN 325 MG/1
975 TABLET ORAL EVERY 8 HOURS
Qty: 63 TABLET | Refills: 0 | Status: ON HOLD | OUTPATIENT
Start: 2025-03-08 | End: 2025-03-26

## 2025-03-08 RX ORDER — POLYETHYLENE GLYCOL 3350 17 G/17G
17 POWDER, FOR SOLUTION ORAL 2 TIMES DAILY
Qty: 14 PACKET | Refills: 0 | Status: SHIPPED | OUTPATIENT
Start: 2025-03-08 | End: 2025-03-31 | Stop reason: HOSPADM

## 2025-03-08 RX ORDER — OXYCODONE HYDROCHLORIDE 5 MG/1
5 TABLET ORAL EVERY 6 HOURS PRN
Qty: 28 TABLET | Refills: 0 | Status: SHIPPED | OUTPATIENT
Start: 2025-03-08 | End: 2025-03-31 | Stop reason: HOSPADM

## 2025-03-08 RX ORDER — GABAPENTIN 300 MG/1
600 CAPSULE ORAL 3 TIMES DAILY
Qty: 180 CAPSULE | Refills: 0 | Status: SHIPPED | OUTPATIENT
Start: 2025-03-08 | End: 2025-03-31 | Stop reason: HOSPADM

## 2025-03-08 RX ADMIN — PANTOPRAZOLE SODIUM 40 MG: 40 TABLET, DELAYED RELEASE ORAL at 06:26

## 2025-03-08 RX ADMIN — BISACODYL 10 MG: 10 SUPPOSITORY RECTAL at 08:47

## 2025-03-08 RX ADMIN — HEPARIN SODIUM 5000 UNITS: 5000 INJECTION, SOLUTION INTRAVENOUS; SUBCUTANEOUS at 13:42

## 2025-03-08 RX ADMIN — METHOCARBAMOL 500 MG: 500 TABLET ORAL at 13:42

## 2025-03-08 RX ADMIN — OXYCODONE HYDROCHLORIDE 10 MG: 5 TABLET ORAL at 00:12

## 2025-03-08 RX ADMIN — HEPARIN SODIUM 5000 UNITS: 5000 INJECTION, SOLUTION INTRAVENOUS; SUBCUTANEOUS at 20:12

## 2025-03-08 RX ADMIN — INSULIN LISPRO 8 UNITS: 100 INJECTION, SOLUTION INTRAVENOUS; SUBCUTANEOUS at 16:46

## 2025-03-08 RX ADMIN — POLYETHYLENE GLYCOL 3350 17 G: 17 POWDER, FOR SOLUTION ORAL at 20:11

## 2025-03-08 RX ADMIN — ACETAMINOPHEN 975 MG: 325 TABLET ORAL at 16:40

## 2025-03-08 RX ADMIN — OXYCODONE HYDROCHLORIDE 10 MG: 5 TABLET ORAL at 11:55

## 2025-03-08 RX ADMIN — POLYETHYLENE GLYCOL 3350 17 G: 17 POWDER, FOR SOLUTION ORAL at 08:49

## 2025-03-08 RX ADMIN — BETHANECHOL CHLORIDE 10 MG: 10 TABLET ORAL at 08:46

## 2025-03-08 RX ADMIN — LAMOTRIGINE 100 MG: 100 TABLET ORAL at 08:46

## 2025-03-08 RX ADMIN — GABAPENTIN 600 MG: 300 CAPSULE ORAL at 08:46

## 2025-03-08 RX ADMIN — INSULIN LISPRO 4 UNITS: 100 INJECTION, SOLUTION INTRAVENOUS; SUBCUTANEOUS at 11:52

## 2025-03-08 RX ADMIN — GABAPENTIN 600 MG: 300 CAPSULE ORAL at 16:39

## 2025-03-08 RX ADMIN — OXYCODONE HYDROCHLORIDE 10 MG: 5 TABLET ORAL at 06:23

## 2025-03-08 RX ADMIN — INSULIN LISPRO 4 UNITS: 100 INJECTION, SOLUTION INTRAVENOUS; SUBCUTANEOUS at 08:48

## 2025-03-08 RX ADMIN — GABAPENTIN 600 MG: 300 CAPSULE ORAL at 20:11

## 2025-03-08 RX ADMIN — DIAZEPAM 5 MG: 5 TABLET ORAL at 20:32

## 2025-03-08 RX ADMIN — DIAZEPAM 5 MG: 5 TABLET ORAL at 08:46

## 2025-03-08 RX ADMIN — SENNOSIDES AND DOCUSATE SODIUM 2 TABLET: 50; 8.6 TABLET ORAL at 20:11

## 2025-03-08 RX ADMIN — MAGNESIUM SULFATE HEPTAHYDRATE 1 G: 1 INJECTION, SOLUTION INTRAVENOUS at 13:42

## 2025-03-08 RX ADMIN — SENNOSIDES AND DOCUSATE SODIUM 2 TABLET: 50; 8.6 TABLET ORAL at 08:46

## 2025-03-08 RX ADMIN — LAMOTRIGINE 150 MG: 150 TABLET ORAL at 20:11

## 2025-03-08 RX ADMIN — BETHANECHOL CHLORIDE 10 MG: 10 TABLET ORAL at 16:40

## 2025-03-08 RX ADMIN — ACETAMINOPHEN 975 MG: 325 TABLET ORAL at 08:52

## 2025-03-08 RX ADMIN — HYDROMORPHONE HYDROCHLORIDE 0.3 MG: 1 INJECTION, SOLUTION INTRAMUSCULAR; INTRAVENOUS; SUBCUTANEOUS at 09:37

## 2025-03-08 RX ADMIN — METHOCARBAMOL 500 MG: 500 TABLET ORAL at 22:08

## 2025-03-08 RX ADMIN — HEPARIN SODIUM 5000 UNITS: 5000 INJECTION, SOLUTION INTRAVENOUS; SUBCUTANEOUS at 06:24

## 2025-03-08 RX ADMIN — OXYCODONE HYDROCHLORIDE 10 MG: 5 TABLET ORAL at 16:41

## 2025-03-08 RX ADMIN — BETHANECHOL CHLORIDE 10 MG: 10 TABLET ORAL at 20:11

## 2025-03-08 RX ADMIN — Medication 1 TABLET: at 08:46

## 2025-03-08 RX ADMIN — TAMSULOSIN HYDROCHLORIDE 0.4 MG: 0.4 CAPSULE ORAL at 20:11

## 2025-03-08 RX ADMIN — FLUOXETINE HYDROCHLORIDE 40 MG: 20 CAPSULE ORAL at 20:12

## 2025-03-08 ASSESSMENT — PAIN DESCRIPTION - DESCRIPTORS
DESCRIPTORS: SHARP

## 2025-03-08 ASSESSMENT — COGNITIVE AND FUNCTIONAL STATUS - GENERAL
HELP NEEDED FOR BATHING: A LITTLE
STANDING UP FROM CHAIR USING ARMS: A LITTLE
TURNING FROM BACK TO SIDE WHILE IN FLAT BAD: A LITTLE
DRESSING REGULAR UPPER BODY CLOTHING: A LITTLE
EATING MEALS: A LITTLE
DRESSING REGULAR LOWER BODY CLOTHING: A LITTLE
MOVING FROM LYING ON BACK TO SITTING ON SIDE OF FLAT BED WITH BEDRAILS: A LITTLE
STANDING UP FROM CHAIR USING ARMS: A LITTLE
MOVING TO AND FROM BED TO CHAIR: A LITTLE
DAILY ACTIVITIY SCORE: 18
WALKING IN HOSPITAL ROOM: A LITTLE
PERSONAL GROOMING: A LITTLE
TOILETING: A LITTLE
CLIMB 3 TO 5 STEPS WITH RAILING: A LOT
MOBILITY SCORE: 17
CLIMB 3 TO 5 STEPS WITH RAILING: A LITTLE
MOBILITY SCORE: 20
WALKING IN HOSPITAL ROOM: A LITTLE
MOVING TO AND FROM BED TO CHAIR: A LITTLE

## 2025-03-08 ASSESSMENT — PAIN - FUNCTIONAL ASSESSMENT
PAIN_FUNCTIONAL_ASSESSMENT: 0-10

## 2025-03-08 ASSESSMENT — PAIN SCALES - GENERAL
PAINLEVEL_OUTOF10: 9
PAINLEVEL_OUTOF10: 7
PAINLEVEL_OUTOF10: 8
PAINLEVEL_OUTOF10: 6
PAINLEVEL_OUTOF10: 8
PAINLEVEL_OUTOF10: 7
PAINLEVEL_OUTOF10: 8
PAINLEVEL_OUTOF10: 8
PAINLEVEL_OUTOF10: 7
PAINLEVEL_OUTOF10: 8

## 2025-03-08 ASSESSMENT — PAIN SCALES - WONG BAKER
WONGBAKER_NUMERICALRESPONSE: HURTS WHOLE LOT
WONGBAKER_NUMERICALRESPONSE: HURTS EVEN MORE

## 2025-03-08 ASSESSMENT — PAIN DESCRIPTION - ORIENTATION: ORIENTATION: LOWER;MID

## 2025-03-08 ASSESSMENT — PAIN DESCRIPTION - LOCATION
LOCATION: BACK
LOCATION: BACK

## 2025-03-08 NOTE — PROGRESS NOTES
Physical Therapy    Physical Therapy Treatment    Patient Name: Cristal Pollard  MRN: [id number]  Department: [place] [address]  Room: [address] [place] [address]  Today's Date: 3/8/2025  Time Calculation  Start Time: 1156  Stop Time: 1225  Time Calculation (min): 29 min         Assessment/Plan   PT Assessment  PT Assessment Results: Decreased strength, Decreased endurance, Impaired balance, Decreased mobility, Pain  End of Session Communication: Bedside nurse  Assessment Comment: Pt progressed ambulation to 65' using FWW with CGA initially and progressing to SBA. Pt able to recal spinal precautions and complete log roll with moderate cuing.  End of Session Patient Position: Up in chair, Alarm on     PT Plan  Treatment/Interventions: Bed mobility, Transfer training, Gait training, Stair training, Strengthening, Therapeutic exercise, Therapeutic activity, Positioning  PT Plan: Ongoing PT  PT Frequency: Daily  PT Discharge Recommendations: Low intensity level of continued care  Equipment Recommended upon Discharge: Wheeled walker  PT Recommended Transfer Status: Assist x1  PT - OK to Discharge: Yes      General Visit Information:   PT  Visit  PT Received On: 03/08/25  General  Prior to Session Communication: Bedside nurse  Patient Position Received: Bed, 3 rail up, Alarm off, not on at start of session  General Comment: Pt supine in bed upon arrival. Pt pleasant and agreeable to therapy.    Subjective   Precautions:  Precautions  Medical Precautions: Fall precautions  Post-Surgical Precautions: Spinal precautions     Date/Time Vitals Session Patient Position Pulse Resp SpO2 BP MAP (mmHg)    03/08/25 1612 --  --  81  16  93 %  106/67  80                 Objective   Pain:     Cognition:  Cognition  Overall Cognitive Status: Within Functional Limits  Coordination:       Activity Tolerance:  Activity Tolerance  Endurance: Tolerates 10 - 20 min exercise with multiple rests  Treatments:  Therapeutic Activity  Therapeutic Activity Performed:  Yes  Therapeutic Activity 1: Pt completed bed mobility, functional transfers, and gait training.    Bed Mobility  Bed Mobility: Yes  Bed Mobility 1  Bed Mobility 1: Supine to sitting  Level of Assistance 1: Minimum assistance  Bed Mobility Comments 1: cues for log roll    Ambulation/Gait Training  Ambulation/Gait Training Performed: Yes  Ambulation/Gait Training 1  Surface 1: Level tile  Device 1: Rolling walker  Assistance 1: Close supervision, Contact guard  Quality of Gait 1: Decreased step length, Diminished heel strike (decreased speed)  Comments/Distance (ft) 1: 65' initially CGA and slightly unsteady but progressed to SBA  Transfers  Transfer: Yes  Transfer 1  Transfer From 1: Sit to, Stand to  Transfer to 1: Stand, Sit  Technique 1: Sit to stand, Stand to sit  Transfer Device 1: Walker  Transfer Level of Assistance 1: Contact guard  Trials/Comments 1: cues for hand placement    Stairs  Stairs: No (Pt declined)    Outcome Measures:  [facility] Basic Mobility  Turning from your back to your side while in a flat bed without using bedrails: A little  Moving from lying on your back to sitting on the side of a flat bed without using bedrails: A little  Moving to and from bed to chair (including a wheelchair): A little  Standing up from a chair using your arms (e.g. wheelchair or bedside chair): A little  To walk in hospital room: A little  Climbing 3-5 steps with railing: A lot  Basic Mobility - Total Score: 17    Education Documentation  Mobility Training, taught by Syl Ham PTA at 3/8/2025  4:18 PM.  Learner: Patient  Readiness: Acceptance  Method: Explanation  Response: Verbalizes Understanding  Comment: Pt educated in log roll technique and the importance of log roll to maintain precautions.    Education Comments  No comments found.        OP EDUCATION:       Encounter Problems       Encounter Problems (Active)       Mobility       STG - Patient will ascend and descend >5 steps with LRAD and supervision.   (Progressing)       Start:  03/03/25    Expected End:  03/20/25            Pt will perform bed mobility with supervision.  (Progressing)       Start:  03/03/25    Expected End:  03/20/25            Pt will ambulate >250ft with LRAD and supervision Assist with no LOB and VSS.   (Progressing)       Start:  03/03/25    Expected End:  03/20/25            Pt will demonstrate WFL BLE strength to complete therapeutic exercise and participate in functional mobility.   (Progressing)       Start:  03/03/25    Expected End:  03/20/25               PT Transfers       Pt will perform all functional transfers with LRAD and supervision assist.   (Progressing)       Start:  03/03/25    Expected End:  03/20/25               Pain - Adult

## 2025-03-08 NOTE — CARE PLAN
The patient's goals for the shift include      The clinical goals for the shift include Pt's pain will be controlled throughout shift.    Problem: Pain - Adult  Goal: Verbalizes/displays adequate comfort level or baseline comfort level  Outcome: Progressing     Problem: Discharge Planning  Goal: Discharge to home or other facility with appropriate resources  Outcome: Progressing     Problem: Pain  Goal: Turns in bed with improved pain control throughout the shift  Outcome: Progressing  Goal: Walks with improved pain control throughout the shift  Outcome: Progressing  Goal: Performs ADL's with improved pain control throughout shift  Outcome: Progressing  Goal: Participates in PT with improved pain control throughout the shift  Outcome: Progressing     Problem: Fall/Injury  Goal: Not fall by end of shift  Outcome: Progressing  Goal: Be free from injury by end of the shift  Outcome: Progressing  Goal: Verbalize understanding of personal risk factors for fall in the hospital  Outcome: Progressing  Goal: Verbalize understanding of risk factor reduction measures to prevent injury from fall in the home  Outcome: Progressing  Goal: Use assistive devices by end of the shift  Outcome: Progressing  Goal: Pace activities to prevent fatigue by end of the shift  Outcome: Progressing

## 2025-03-08 NOTE — PROGRESS NOTES
Transitional Care Coordination Progress Note:  Per MD Kumar, pt a possible discharge home tomorrow. [facility] notified of possible d.c tomorrow.   FWW delivered to bedside.     Faye Smart, RN, BSN  Transitional Care Coordinator  Office: [phone]  Secure chat via [employer]

## 2025-03-08 NOTE — PROGRESS NOTES
"Cristal Pollard is a 64 y.o. female on day 6 of admission presenting with Back pain of thoracolumbar region.    Subjective   NAEON, radiculopathy still improved, significant incision pain endorsed       Objective     Physical Exam  Aox3  BUE 5  LLE HF/KE/DF/PF 5  RLE HF/KE4+ ow 5  Incisions cdi    Last Recorded Vitals  Blood pressure 111/65, pulse 69, temperature 36.8 °C (98.2 °F), temperature source Temporal, resp. rate 16, height 1.651 m (5' 5\"), weight 80.1 kg (176 lb 9.6 oz), SpO2 96%.  Intake/Output last 3 Shifts:  I/O last 3 completed shifts:  In: 1000 (12.5 mL/kg) [IV Piggyback:1000]  Out: 1693 (21.1 mL/kg) [Urine:1693 (0.6 mL/kg/hr)]  Weight: 80.1 kg         Assessment/Plan   Assessment & Plan  Back pain of thoracolumbar region    PONV (postoperative nausea and vomiting)    Back pain with radiculopathy    64 F h/o HTN, HLD, seziures (on lamictal), BUE radiculopathy, 2/28/24 s/p C4-7 ACDF (by Dr. Reynolds), p/w 5d acute on chronic back pain RLE (likely L4 radiculopathy), CT L-spine w/ multi-level spondylosis worse at L3-4, L4-5, L5-S1, no fx, MRI LS w L3-4, L4-5 CCS, L5-S1 L foraminal stenosis, Flex ex no pathologic motion, 3/3 CT thin cut L spine done    3/5 s/p MIS L3-4, L4-5 TLIF c/b durotomy s/p secondary repair    Plan  Floor  Pain control  Will continue dex 2q8  PTOT - HC  SCDs, SQH           Yordan Petersen MD      "

## 2025-03-08 NOTE — CONSULTS
Cristal Pollard is a 64 y.o. year old female patient who presents for Procedure(s):  FUSION, SPINE, LUMBAR, TLIF, ROBOT-ASSISTED L3-5 with Volodymyr Pickard MD PhD on 3/5/2025.  Acute Pain consulted for assistance with pain control.     Current Pain:   Palliative: relieved with IV analgesics and regional local anesthetics  Provocative: movement  Quality: burning and aching  Radiation: none  Severity: severe 8/10  Timing: constant    24 hour opioid consumption:  50 mg oxycodone  0.9 mg hydromorphone    Past Medical History:   Diagnosis Date    Abnormal ECG 10/26/2023    Sinus rhythm LVH by voltage Inferior infarct, old Anterior Q waves, possibly due to LVH    Angina pectoris     Anxiety     Cervical disc disease     Cervical radiculopathy     Neuro: Arnulfo PEREZ 1/15/24    Depression     Diabetes mellitus (Multi)     A1C: 2/6/24 -7.6%    GERD (gastroesophageal reflux disease)     History of Holter monitoring 10/2023    24 -48 hour monitor on 10/31/23, No abnormal findings    Hypertension     Incisional hernia with obstruction, without gangrene 05/24/2017    Incarcerated incisional hernia    Joint pain     Mastodynia 05/14/2020    Breast pain in female    Palpitations     Stress test scheduled for 2/9/24    PONV (postoperative nausea and vomiting)     Seizure disorder (Multi)     Last seizure 2/2023    TIA (transient ischemic attack)     Several years ago, no residual symptoms    Vertigo     Vision loss         Past Surgical History:   Procedure Laterality Date    CARPAL TUNNEL RELEASE Right     CATARACT EXTRACTION      CHOLECYSTECTOMY  11/14/2014    Cholecystectomy    COLONOSCOPY  05/17/2018    Complete Colonoscopy    CT CHEST WO IV CONTRAST  04/21/2023    No acute intrathoracic abnormalities. No thoracic aortic aneurysm or subintimal hematoma.    ECHOCARDIOGRAM 2 D M MODE PANEL  02/17/2023    Left ventricular systolic function is normal with a 60-65% estimated ejection fraction.    HERNIA REPAIR  05/24/2017     Hernia Repair    MANDIBLE SURGERY Bilateral     OTHER SURGICAL HISTORY  11/14/2014    Surgery Intracardiac Mass Removal Left Atrial Myxoma    OTHER SURGICAL HISTORY      Xiphoidectomy    STERNOTOMY  2011        Family History   Problem Relation Name Age of Onset    Cancer Mother      Hypertension Mother      Heart disease Father      Cancer Father      Hypertension Father      Glaucoma Father      Macular degeneration Father      Heart attack Father      Cancer Sister      Diabetes Sister      Diabetes Maternal Grandfather          Social History     Socioeconomic History    Marital status: Single     Spouse name: Not on file    Number of children: Not on file    Years of education: Not on file    Highest education level: Not on file   Occupational History    Not on file   Tobacco Use    Smoking status: Never    Smokeless tobacco: Never   Vaping Use    Vaping status: Never Used   Substance and Sexual Activity    Alcohol use: Yes     Comment: RARELY    Drug use: Never    Sexual activity: Defer   Other Topics Concern    Not on file   Social History Narrative    Not on file     Social Drivers of Health     Financial Resource Strain: Low Risk  (3/3/2025)    Overall Financial Resource Strain (CARDIA)     Difficulty of Paying Living Expenses: Not hard at all   Food Insecurity: No Food Insecurity (3/2/2025)    Hunger Vital Sign     Worried About Running Out of Food in the Last Year: Never true     Ran Out of Food in the Last Year: Never true   Transportation Needs: No Transportation Needs (3/3/2025)    PRAPARE - Transportation     Lack of Transportation (Medical): No     Lack of Transportation (Non-Medical): No   Physical Activity: Not on file   Stress: Not on file   Social Connections: Feeling Socially Integrated (4/30/2024)    OASIS : Social Isolation     Frequency of experiencing loneliness or isolation: Never   Intimate Partner Violence: Not At Risk (3/2/2025)    Humiliation, Afraid, Rape, and Kick questionnaire      Fear of Current or Ex-Partner: No     Emotionally Abused: No     Physically Abused: No     Sexually Abused: No   Housing Stability: Low Risk  (3/3/2025)    Housing Stability Vital Sign     Unable to Pay for Housing in the Last Year: No     Number of Times Moved in the Last Year: 0     Homeless in the Last Year: No        Allergies   Allergen Reactions    Tramadol Seizure     seizure while hospitalized. Tolerates Percocet per hx    Amoxicillin Rash    Ampicillin Hives     shakey    Lidocaine Hives, Rash and Nausea/vomiting     patch    Meperidine Hcl Unknown         Review of Systems  Gen: No fatigue, anorexia, insomnia, fever.   Eyes: No vision loss, double vision, drainage, eye pain.   ENT: No pharyngitis, dry mouth, no hearing changes or ear discharge  Cardiac: No chest pain, palpitations, syncope, near syncope.   Pulmonary: No shortness of breath, cough, hemoptysis.   Heme/lymph: No swollen glands, fever, bleeding.   GI: No abdominal pain, change in bowel habits, melena, hematemesis, hematochezia, nausea, vomiting, diarrhea.   : No discharge, dysuria, frequency, urgency, hematuria.  Endo: No polyuria or weight loss.   Musculoskeletal: Reports pain in lower back near incisions  Skin: No rashes or lesions  Neuro: Normal speech, no numbness or weakness. No gait difficulties  Review of systems is otherwise negative unless stated above or in history of present illness.    Physical Exam:  Constitutional:  no distress, alert and cooperative  Eyes: clear sclera  Head/Neck: No apparent injury, trachea midline  Respiratory/Thorax: Patent airways, thorax symmetric, breathing comfortably  Cardiovascular: no pitting edema  Gastrointestinal: Nondistended  Musculoskeletal: ROM intact  Extremities: no clubbing  Neurological: alert, miranda x4  Psychological: Appropriate affect    Results for orders placed or performed during the hospital encounter of 03/02/25 (from the past 24 hours)   POCT GLUCOSE   Result Value Ref Range     POCT Glucose 195 (H) 74 - 99 mg/dL   POCT GLUCOSE   Result Value Ref Range    POCT Glucose 192 (H) 74 - 99 mg/dL   POCT GLUCOSE   Result Value Ref Range    POCT Glucose 227 (H) 74 - 99 mg/dL   CBC   Result Value Ref Range    WBC 11.5 (H) 4.4 - 11.3 x10*3/uL    nRBC 0.0 0.0 - 0.0 /100 WBCs    RBC 3.46 (L) 4.00 - 5.20 x10*6/uL    Hemoglobin 10.8 (L) 12.0 - 16.0 g/dL    Hematocrit 33.4 (L) 36.0 - 46.0 %    MCV 97 80 - 100 fL    MCH 31.2 26.0 - 34.0 pg    MCHC 32.3 32.0 - 36.0 g/dL    RDW 12.9 11.5 - 14.5 %    Platelets 235 150 - 450 x10*3/uL   Renal Function Panel   Result Value Ref Range    Glucose 176 (H) 74 - 99 mg/dL    Sodium 140 136 - 145 mmol/L    Potassium 4.2 3.5 - 5.3 mmol/L    Chloride 104 98 - 107 mmol/L    Bicarbonate 28 21 - 32 mmol/L    Anion Gap 12 10 - 20 mmol/L    Urea Nitrogen 15 6 - 23 mg/dL    Creatinine 0.71 0.50 - 1.05 mg/dL    eGFR >90 >60 mL/min/1.73m*2    Calcium 9.8 8.6 - 10.6 mg/dL    Phosphorus 2.7 2.5 - 4.9 mg/dL    Albumin 3.7 3.4 - 5.0 g/dL   POCT GLUCOSE   Result Value Ref Range    POCT Glucose 167 (H) 74 - 99 mg/dL   POCT GLUCOSE   Result Value Ref Range    POCT Glucose 182 (H) 74 - 99 mg/dL     *Note: Due to a large number of results and/or encounters for the requested time period, some results have not been displayed. A complete set of results can be found in Results Review.        Cristal Pollard is a 64 y.o. year old female patient who presents for Procedure(s):  FUSION, SPINE, LUMBAR, TLIF, ROBOT-ASSISTED L3-5 with Volodymyr Pickard MD PhD on 3/5/2025. Acute Pain consulted for assistance with pain control.     Patient is now POD3 from her surgery. She was able to work with PT and get OOB to chair and to the bathroom multiple times yesterday. She reports her pain has improved today compared to yesterday.     Current pain regimen:   -tylenol 975 q8h scheduled  -gabapentin 600mg TID scheduled  -valium 5mg PO q6h prn for muscle spasms  -oxycodone 2.5, 5, and 10 mg q4h prn for mild,  moderate, and severe pain   -hydromorphone 0.3 mg IV q3h for breakthrough    Plan:    Can consider adding the following medications for pain relief:  - If no medical contraindications, can add PO robaxin   - 1g IV Mg given over 2 hours (one time)  - If she continues to have severe pain can consider PO ketamine 30mg TID    Given patient reported her pain is improving and she is able to get OOB and work with PT, it is expected her pain should continue to improve. We encouraged patient to continue to work with PT and that she should feel better over the next few days.    Acute pain service will sign off at this time.    Acute Pain Resident  pg [phone] ph [phone]

## 2025-03-09 VITALS
RESPIRATION RATE: 16 BRPM | DIASTOLIC BLOOD PRESSURE: 68 MMHG | SYSTOLIC BLOOD PRESSURE: 110 MMHG | HEIGHT: 65 IN | TEMPERATURE: 97.3 F | HEART RATE: 99 BPM | BODY MASS INDEX: 29.42 KG/M2 | OXYGEN SATURATION: 95 % | WEIGHT: 176.6 LBS

## 2025-03-09 LAB
ALBUMIN SERPL BCP-MCNC: 3.5 G/DL (ref 3.4–5)
ANION GAP SERPL CALC-SCNC: 11 MMOL/L (ref 10–20)
BUN SERPL-MCNC: 16 MG/DL (ref 6–23)
CALCIUM SERPL-MCNC: 9.3 MG/DL (ref 8.6–10.6)
CHLORIDE SERPL-SCNC: 106 MMOL/L (ref 98–107)
CO2 SERPL-SCNC: 27 MMOL/L (ref 21–32)
CREAT SERPL-MCNC: 0.63 MG/DL (ref 0.5–1.05)
EGFRCR SERPLBLD CKD-EPI 2021: >90 ML/MIN/1.73M*2
ERYTHROCYTE [DISTWIDTH] IN BLOOD BY AUTOMATED COUNT: 13.1 % (ref 11.5–14.5)
GLUCOSE BLD MANUAL STRIP-MCNC: 147 MG/DL (ref 74–99)
GLUCOSE BLD MANUAL STRIP-MCNC: 156 MG/DL (ref 74–99)
GLUCOSE BLD MANUAL STRIP-MCNC: 187 MG/DL (ref 74–99)
GLUCOSE BLD MANUAL STRIP-MCNC: 192 MG/DL (ref 74–99)
GLUCOSE SERPL-MCNC: 197 MG/DL (ref 74–99)
HCT VFR BLD AUTO: 33.1 % (ref 36–46)
HGB BLD-MCNC: 10.7 G/DL (ref 12–16)
MCH RBC QN AUTO: 31.3 PG (ref 26–34)
MCHC RBC AUTO-ENTMCNC: 32.3 G/DL (ref 32–36)
MCV RBC AUTO: 97 FL (ref 80–100)
NRBC BLD-RTO: 0 /100 WBCS (ref 0–0)
PHOSPHATE SERPL-MCNC: 2.9 MG/DL (ref 2.5–4.9)
PLATELET # BLD AUTO: 253 X10*3/UL (ref 150–450)
POTASSIUM SERPL-SCNC: 3.5 MMOL/L (ref 3.5–5.3)
RBC # BLD AUTO: 3.42 X10*6/UL (ref 4–5.2)
SODIUM SERPL-SCNC: 140 MMOL/L (ref 136–145)
WBC # BLD AUTO: 10.9 X10*3/UL (ref 4.4–11.3)

## 2025-03-09 PROCEDURE — 2500000001 HC RX 250 WO HCPCS SELF ADMINISTERED DRUGS (ALT 637 FOR MEDICARE OP)

## 2025-03-09 PROCEDURE — G0378 HOSPITAL OBSERVATION PER HR: HCPCS

## 2025-03-09 PROCEDURE — 2500000001 HC RX 250 WO HCPCS SELF ADMINISTERED DRUGS (ALT 637 FOR MEDICARE OP): Performed by: STUDENT IN AN ORGANIZED HEALTH CARE EDUCATION/TRAINING PROGRAM

## 2025-03-09 PROCEDURE — 82947 ASSAY GLUCOSE BLOOD QUANT: CPT

## 2025-03-09 PROCEDURE — 2500000004 HC RX 250 GENERAL PHARMACY W/ HCPCS (ALT 636 FOR OP/ED): Mod: JW,TB | Performed by: PHYSICIAN ASSISTANT

## 2025-03-09 PROCEDURE — 85027 COMPLETE CBC AUTOMATED: CPT

## 2025-03-09 PROCEDURE — RXMED WILLOW AMBULATORY MEDICATION CHARGE

## 2025-03-09 PROCEDURE — 97530 THERAPEUTIC ACTIVITIES: CPT | Mod: GP,CQ

## 2025-03-09 PROCEDURE — 1100000001 HC PRIVATE ROOM DAILY

## 2025-03-09 PROCEDURE — 2500000004 HC RX 250 GENERAL PHARMACY W/ HCPCS (ALT 636 FOR OP/ED)

## 2025-03-09 PROCEDURE — 2500000002 HC RX 250 W HCPCS SELF ADMINISTERED DRUGS (ALT 637 FOR MEDICARE OP, ALT 636 FOR OP/ED)

## 2025-03-09 PROCEDURE — 80069 RENAL FUNCTION PANEL: CPT

## 2025-03-09 PROCEDURE — 2500000002 HC RX 250 W HCPCS SELF ADMINISTERED DRUGS (ALT 637 FOR MEDICARE OP, ALT 636 FOR OP/ED): Performed by: PHYSICIAN ASSISTANT

## 2025-03-09 PROCEDURE — 97116 GAIT TRAINING THERAPY: CPT | Mod: GP,CQ

## 2025-03-09 RX ORDER — SYRING-NEEDL,DISP,INSUL,0.3 ML 29 G X1/2"
296 SYRINGE, EMPTY DISPOSABLE MISCELLANEOUS ONCE
Status: DISCONTINUED | OUTPATIENT
Start: 2025-03-09 | End: 2025-03-10 | Stop reason: HOSPADM

## 2025-03-09 RX ORDER — CALCIUM CARBONATE 200(500)MG
500 TABLET,CHEWABLE ORAL 4 TIMES DAILY PRN
Status: DISCONTINUED | OUTPATIENT
Start: 2025-03-09 | End: 2025-03-10 | Stop reason: HOSPADM

## 2025-03-09 RX ADMIN — METHOCARBAMOL 500 MG: 500 TABLET ORAL at 05:52

## 2025-03-09 RX ADMIN — TAMSULOSIN HYDROCHLORIDE 0.4 MG: 0.4 CAPSULE ORAL at 20:53

## 2025-03-09 RX ADMIN — FLUOXETINE HYDROCHLORIDE 40 MG: 20 CAPSULE ORAL at 20:53

## 2025-03-09 RX ADMIN — OXYCODONE HYDROCHLORIDE 10 MG: 5 TABLET ORAL at 04:11

## 2025-03-09 RX ADMIN — INSULIN LISPRO 4 UNITS: 100 INJECTION, SOLUTION INTRAVENOUS; SUBCUTANEOUS at 17:33

## 2025-03-09 RX ADMIN — HEPARIN SODIUM 5000 UNITS: 5000 INJECTION, SOLUTION INTRAVENOUS; SUBCUTANEOUS at 20:53

## 2025-03-09 RX ADMIN — OXYCODONE HYDROCHLORIDE 10 MG: 5 TABLET ORAL at 13:13

## 2025-03-09 RX ADMIN — CALCIUM CARBONATE (ANTACID) CHEW TAB 500 MG 500 MG: 500 CHEW TAB at 21:55

## 2025-03-09 RX ADMIN — BETHANECHOL CHLORIDE 10 MG: 10 TABLET ORAL at 20:53

## 2025-03-09 RX ADMIN — SENNOSIDES AND DOCUSATE SODIUM 2 TABLET: 50; 8.6 TABLET ORAL at 20:52

## 2025-03-09 RX ADMIN — GABAPENTIN 600 MG: 300 CAPSULE ORAL at 14:48

## 2025-03-09 RX ADMIN — GABAPENTIN 600 MG: 300 CAPSULE ORAL at 20:52

## 2025-03-09 RX ADMIN — METHOCARBAMOL 500 MG: 500 TABLET ORAL at 21:02

## 2025-03-09 RX ADMIN — LAMOTRIGINE 100 MG: 100 TABLET ORAL at 09:01

## 2025-03-09 RX ADMIN — METHOCARBAMOL 500 MG: 500 TABLET ORAL at 14:47

## 2025-03-09 RX ADMIN — POLYETHYLENE GLYCOL 3350 17 G: 17 POWDER, FOR SOLUTION ORAL at 08:59

## 2025-03-09 RX ADMIN — HYDROMORPHONE HYDROCHLORIDE 0.3 MG: 1 INJECTION, SOLUTION INTRAMUSCULAR; INTRAVENOUS; SUBCUTANEOUS at 03:04

## 2025-03-09 RX ADMIN — LAMOTRIGINE 150 MG: 150 TABLET ORAL at 20:53

## 2025-03-09 RX ADMIN — HYDROMORPHONE HYDROCHLORIDE 0.3 MG: 1 INJECTION, SOLUTION INTRAMUSCULAR; INTRAVENOUS; SUBCUTANEOUS at 16:25

## 2025-03-09 RX ADMIN — ACETAMINOPHEN 975 MG: 325 TABLET ORAL at 08:58

## 2025-03-09 RX ADMIN — HEPARIN SODIUM 5000 UNITS: 5000 INJECTION, SOLUTION INTRAVENOUS; SUBCUTANEOUS at 05:52

## 2025-03-09 RX ADMIN — Medication 1 TABLET: at 08:59

## 2025-03-09 RX ADMIN — BETHANECHOL CHLORIDE 10 MG: 10 TABLET ORAL at 09:00

## 2025-03-09 RX ADMIN — BISACODYL 10 MG: 10 SUPPOSITORY RECTAL at 16:26

## 2025-03-09 RX ADMIN — SENNOSIDES AND DOCUSATE SODIUM 2 TABLET: 50; 8.6 TABLET ORAL at 08:59

## 2025-03-09 RX ADMIN — OXYCODONE HYDROCHLORIDE 10 MG: 5 TABLET ORAL at 20:53

## 2025-03-09 RX ADMIN — POLYETHYLENE GLYCOL 3350 17 G: 17 POWDER, FOR SOLUTION ORAL at 20:53

## 2025-03-09 RX ADMIN — BETHANECHOL CHLORIDE 10 MG: 10 TABLET ORAL at 14:48

## 2025-03-09 RX ADMIN — PANTOPRAZOLE SODIUM 40 MG: 40 TABLET, DELAYED RELEASE ORAL at 06:33

## 2025-03-09 RX ADMIN — HYDROMORPHONE HYDROCHLORIDE 0.3 MG: 1 INJECTION, SOLUTION INTRAMUSCULAR; INTRAVENOUS; SUBCUTANEOUS at 06:34

## 2025-03-09 RX ADMIN — GABAPENTIN 600 MG: 300 CAPSULE ORAL at 08:59

## 2025-03-09 RX ADMIN — ACETAMINOPHEN 975 MG: 325 TABLET ORAL at 16:25

## 2025-03-09 RX ADMIN — INSULIN LISPRO 4 UNITS: 100 INJECTION, SOLUTION INTRAVENOUS; SUBCUTANEOUS at 09:10

## 2025-03-09 RX ADMIN — HEPARIN SODIUM 5000 UNITS: 5000 INJECTION, SOLUTION INTRAVENOUS; SUBCUTANEOUS at 14:50

## 2025-03-09 ASSESSMENT — PAIN SCALES - GENERAL
PAINLEVEL_OUTOF10: 7
PAINLEVEL_OUTOF10: 9
PAINLEVEL_OUTOF10: 7
PAINLEVEL_OUTOF10: 8
PAINLEVEL_OUTOF10: 8
PAINLEVEL_OUTOF10: 5 - MODERATE PAIN
PAINLEVEL_OUTOF10: 7
PAINLEVEL_OUTOF10: 8
PAINLEVEL_OUTOF10: 7
PAINLEVEL_OUTOF10: 7

## 2025-03-09 ASSESSMENT — COGNITIVE AND FUNCTIONAL STATUS - GENERAL
WALKING IN HOSPITAL ROOM: A LITTLE
DRESSING REGULAR LOWER BODY CLOTHING: A LITTLE
MOVING FROM LYING ON BACK TO SITTING ON SIDE OF FLAT BED WITH BEDRAILS: A LITTLE
STANDING UP FROM CHAIR USING ARMS: A LITTLE
STANDING UP FROM CHAIR USING ARMS: A LITTLE
MOBILITY SCORE: 18
MOVING TO AND FROM BED TO CHAIR: A LITTLE
MOBILITY SCORE: 17
WALKING IN HOSPITAL ROOM: A LITTLE
DAILY ACTIVITIY SCORE: 19
MOVING TO AND FROM BED TO CHAIR: A LITTLE
CLIMB 3 TO 5 STEPS WITH RAILING: A LOT
PERSONAL GROOMING: A LITTLE
TOILETING: A LITTLE
TURNING FROM BACK TO SIDE WHILE IN FLAT BAD: A LITTLE
MOVING FROM LYING ON BACK TO SITTING ON SIDE OF FLAT BED WITH BEDRAILS: A LITTLE
CLIMB 3 TO 5 STEPS WITH RAILING: A LITTLE
TURNING FROM BACK TO SIDE WHILE IN FLAT BAD: A LITTLE
DRESSING REGULAR UPPER BODY CLOTHING: A LITTLE
HELP NEEDED FOR BATHING: A LITTLE

## 2025-03-09 ASSESSMENT — PAIN - FUNCTIONAL ASSESSMENT
PAIN_FUNCTIONAL_ASSESSMENT: 0-10

## 2025-03-09 NOTE — PROGRESS NOTES
Physical Therapy    Physical Therapy Treatment    Patient Name: Cristal Pollard  MRN: [id number]  Department: [place] [address]  Room: [facility]6070  Today's Date: 3/9/2025  Time Calculation  Start Time: 1227  Stop Time: 1254  Time Calculation (min): 27 min         Assessment/Plan   PT Assessment  PT Assessment Results: Decreased strength, Decreased endurance, Impaired balance, Decreased mobility  Rehab Prognosis: Good  Barriers to Discharge Home: No anticipated barriers  Evaluation/Treatment Tolerance: Patient tolerated treatment well  Medical Staff Made Aware: Yes  End of Session Communication: Bedside nurse  Assessment Comment: Pt progressing well with therapy, able to negotiate stairs with CGA this date. Remains appropriate for low intensity therapy  End of Session Patient Position: Up in chair, Alarm on     PT Plan  Treatment/Interventions: Bed mobility, Transfer training, Gait training, Stair training, Strengthening, Therapeutic exercise, Therapeutic activity, Positioning  PT Plan: Ongoing PT  PT Frequency: Daily  PT Discharge Recommendations: Low intensity level of continued care  Equipment Recommended upon Discharge: Wheeled walker  PT Recommended Transfer Status: Assist x1  PT - OK to Discharge: Yes      General Visit Information:   PT  Visit  PT Received On: 03/09/25  Response to Previous Treatment: Patient with no complaints from previous session.  General  Prior to Session Communication: Bedside nurse  Patient Position Received: Up in chair, Alarm on  General Comment: Pt sitting up in chair, pleasant and motivated  Per handoff with RN, pt is appropriate for therapy, vitals are stable and pain is controlled. Other concerns prior to tx are: none    Subjective   Precautions:  Precautions  Medical Precautions: Fall precautions  Post-Surgical Precautions: Spinal precautions    Objective   Pain:  Pain Assessment  Pain Assessment: 0-10  0-10 (Numeric) Pain Score: 5 - Moderate pain  Pain Location: Back  Pain Interventions:  Ambulation/increased activity  Cognition:  Cognition  Overall Cognitive Status: Within Functional Limits  Orientation Level: Oriented X4    Treatments:     Bed Mobility  Bed Mobility: Yes  Bed Mobility 1  Bed Mobility 1: Sitting to supine, Supine to sitting  Level of Assistance 1: Close supervision  Bed Mobility Comments 1: min cues for logroll technique    Ambulation/Gait Training  Ambulation/Gait Training Performed: Yes  Ambulation/Gait Training 1  Surface 1: Level tile  Device 1: Rolling walker  Assistance 1: Close supervision  Quality of Gait 1: Soft knee(s)  Comments/Distance (ft) 1: 200'x1  Transfers  Transfer: Yes  Transfer 1  Transfer From 1: Sit to, Stand to  Transfer to 1: Sit  Technique 1: Sit to stand, Stand to sit  Transfer Device 1: Walker  Transfer Level of Assistance 1: Close supervision  Transfers 2  Transfer From 2: Chair with arms to, Bed to  Transfer to 2: Chair with arms  Technique 2: Stand pivot  Transfer Device 2: Walker  Transfer Level of Assistance 2: Close supervision    Stairs  Stairs: Yes  Stairs  Rails 1: Right  Device 1: Railing  Assistance 1: Contact guard  Comment/Number of Steps 1: 3 steps x2 with B UE on single rail, good demo of non-recip sequence    Outcome Measures:     [facility] Basic Mobility  Turning from your back to your side while in a flat bed without using bedrails: A little  Moving from lying on your back to sitting on the side of a flat bed without using bedrails: A little  Moving to and from bed to chair (including a wheelchair): A little  Standing up from a chair using your arms (e.g. wheelchair or bedside chair): A little  To walk in hospital room: A little  Climbing 3-5 steps with railing: A little  Basic Mobility - Total Score: 18    Education Documentation  Body Mechanics, taught by Kellie Guerrero PTA at 3/9/2025  1:16 PM.  Learner: Patient  Readiness: Acceptance  Method: Explanation, Demonstration  Response: Verbalizes Understanding, Demonstrated  Understanding  Comment: precautions, safe mobility, logroll technique    Precautions, taught by Kellie Guerrero PTA at 3/9/2025  1:16 PM.  Learner: Patient  Readiness: Acceptance  Method: Explanation, Demonstration  Response: Verbalizes Understanding, Demonstrated Understanding  Comment: precautions, safe mobility, logroll technique    Mobility Training, taught by Kellie Guerrero PTA at 3/9/2025  1:16 PM.  Learner: Patient  Readiness: Acceptance  Method: Explanation, Demonstration  Response: Verbalizes Understanding, Demonstrated Understanding  Comment: precautions, safe mobility, logroll technique    Education Comments  No comments found.        OP EDUCATION:       Encounter Problems       Encounter Problems (Active)       Mobility       STG - Patient will ascend and descend >5 steps with LRAD and supervision.  (Progressing)       Start:  03/03/25    Expected End:  03/20/25            Pt will perform bed mobility with supervision.  (Progressing)       Start:  03/03/25    Expected End:  03/20/25            Pt will ambulate >250ft with LRAD and supervision Assist with no LOB and VSS.   (Progressing)       Start:  03/03/25    Expected End:  03/20/25            Pt will demonstrate WFL BLE strength to complete therapeutic exercise and participate in functional mobility.   (Progressing)       Start:  03/03/25    Expected End:  03/20/25               PT Transfers       Pt will perform all functional transfers with LRAD and supervision assist.   (Progressing)       Start:  03/03/25    Expected End:  03/20/25               Pain - Adult            PRABHA Fong

## 2025-03-09 NOTE — CARE PLAN
The patient's goals for the shift include      The clinical goals for the shift include The patient's pain will be under control by end of shift    Over the shift, the patient did not make progress toward the following goals. Barriers to progression include . Recommendations to address these barriers include   Problem: Pain - Adult  Goal: Verbalizes/displays adequate comfort level or baseline comfort level  Outcome: Progressing     Problem: Discharge Planning  Goal: Discharge to home or other facility with appropriate resources  Outcome: Progressing     Problem: Pain  Goal: Turns in bed with improved pain control throughout the shift  Outcome: Progressing  Goal: Walks with improved pain control throughout the shift  Outcome: Progressing  Goal: Performs ADL's with improved pain control throughout shift  Outcome: Progressing  Goal: Participates in PT with improved pain control throughout the shift  Outcome: Progressing     Problem: Fall/Injury  Goal: Not fall by end of shift  Outcome: Progressing  Goal: Be free from injury by end of the shift  Outcome: Progressing  Goal: Verbalize understanding of personal risk factors for fall in the hospital  Outcome: Progressing  Goal: Verbalize understanding of risk factor reduction measures to prevent injury from fall in the home  Outcome: Progressing  Goal: Use assistive devices by end of the shift  Outcome: Progressing  Goal: Pace activities to prevent fatigue by end of the shift  Outcome: Progressing   .

## 2025-03-09 NOTE — PROGRESS NOTES
"Cristal Pollard is a 64 y.o. female on day 7 of admission presenting with Back pain of thoracolumbar region.    Subjective   NAEON, radiculopathy still improved, incisional pain much improved with robaxin added, wants BM       Objective     Physical Exam  Aox3  BUE 5  LLE HF/KE/DF/PF 5  RLE HF/KE4+ ow 5  Incisions cdi    Last Recorded Vitals  Blood pressure 117/73, pulse 71, temperature 36.1 °C (97 °F), temperature source Temporal, resp. rate 18, height 1.651 m (5' 5\"), weight 80.1 kg (176 lb 9.6 oz), SpO2 93%.  Intake/Output last 3 Shifts:  I/O last 3 completed shifts:  In: 700 (8.7 mL/kg) [P.O.:600; I.V.:100 (1.2 mL/kg)]  Out: 2725 (34 mL/kg) [Urine:2725 (0.9 mL/kg/hr)]  Weight: 80.1 kg         Assessment/Plan   Assessment & Plan  Back pain of thoracolumbar region    PONV (postoperative nausea and vomiting)    Back pain with radiculopathy    Acute postoperative pain    64 F h/o HTN, HLD, seziures (on lamictal), BUE radiculopathy, 2/28/24 s/p C4-7 ACDF (by Dr. Reynolds), p/w 5d acute on chronic back pain RLE (likely L4 radiculopathy), CT L-spine w/ multi-level spondylosis worse at L3-4, L4-5, L5-S1, no fx, MRI LS w L3-4, L4-5 CCS, L5-S1 L foraminal stenosis, Flex ex no pathologic motion, 3/3 CT thin cut L spine done    3/5 s/p MIS L3-4, L4-5 TLIF c/b durotomy s/p secondary repair  3/7 s/p midline    Plan  Floor  Pain control  Needs BM, added mag citrate  PTOT - HC  SCDs, SQH           Yordan Petersen MD      "

## 2025-03-09 NOTE — CARE PLAN
Problem: Pain - Adult  Goal: Verbalizes/displays adequate comfort level or baseline comfort level  Outcome: Progressing     Problem: Fall/Injury  Goal: Not fall by end of shift  Outcome: Progressing  Goal: Be free from injury by end of the shift  Outcome: Progressing   The patient's goals for the shift include      The clinical goals for the shift include The patient's pain will be under control by end of shift    Over the shift, the patient did not make progress toward the following goals. Barriers to progression include NA. Recommendations to address these barriers include NA.

## 2025-03-10 ENCOUNTER — PHARMACY VISIT (OUTPATIENT)
Dept: PHARMACY | Facility: CLINIC | Age: 65
End: 2025-03-10
Payer: COMMERCIAL

## 2025-03-10 VITALS
OXYGEN SATURATION: 96 % | TEMPERATURE: 97 F | WEIGHT: 176.6 LBS | HEART RATE: 97 BPM | BODY MASS INDEX: 29.42 KG/M2 | SYSTOLIC BLOOD PRESSURE: 129 MMHG | HEIGHT: 65 IN | RESPIRATION RATE: 17 BRPM | DIASTOLIC BLOOD PRESSURE: 78 MMHG

## 2025-03-10 LAB
ALBUMIN SERPL BCP-MCNC: 3.5 G/DL (ref 3.4–5)
ANION GAP SERPL CALC-SCNC: 13 MMOL/L (ref 10–20)
BUN SERPL-MCNC: 14 MG/DL (ref 6–23)
CALCIUM SERPL-MCNC: 9.6 MG/DL (ref 8.6–10.6)
CHLORIDE SERPL-SCNC: 106 MMOL/L (ref 98–107)
CO2 SERPL-SCNC: 24 MMOL/L (ref 21–32)
CREAT SERPL-MCNC: 0.69 MG/DL (ref 0.5–1.05)
EGFRCR SERPLBLD CKD-EPI 2021: >90 ML/MIN/1.73M*2
ERYTHROCYTE [DISTWIDTH] IN BLOOD BY AUTOMATED COUNT: 13.5 % (ref 11.5–14.5)
GLUCOSE BLD MANUAL STRIP-MCNC: 140 MG/DL (ref 74–99)
GLUCOSE BLD MANUAL STRIP-MCNC: 174 MG/DL (ref 74–99)
GLUCOSE BLD MANUAL STRIP-MCNC: 212 MG/DL (ref 74–99)
GLUCOSE SERPL-MCNC: 181 MG/DL (ref 74–99)
HCT VFR BLD AUTO: 33.4 % (ref 36–46)
HGB BLD-MCNC: 10.7 G/DL (ref 12–16)
MCH RBC QN AUTO: 30.5 PG (ref 26–34)
MCHC RBC AUTO-ENTMCNC: 32 G/DL (ref 32–36)
MCV RBC AUTO: 95 FL (ref 80–100)
NRBC BLD-RTO: 0 /100 WBCS (ref 0–0)
PHOSPHATE SERPL-MCNC: 3.6 MG/DL (ref 2.5–4.9)
PLATELET # BLD AUTO: 296 X10*3/UL (ref 150–450)
POTASSIUM SERPL-SCNC: 4.1 MMOL/L (ref 3.5–5.3)
RBC # BLD AUTO: 3.51 X10*6/UL (ref 4–5.2)
SODIUM SERPL-SCNC: 139 MMOL/L (ref 136–145)
WBC # BLD AUTO: 10.1 X10*3/UL (ref 4.4–11.3)

## 2025-03-10 PROCEDURE — 82565 ASSAY OF CREATININE: CPT

## 2025-03-10 PROCEDURE — RXMED WILLOW AMBULATORY MEDICATION CHARGE

## 2025-03-10 PROCEDURE — 99239 HOSP IP/OBS DSCHRG MGMT >30: CPT | Performed by: PHYSICIAN ASSISTANT

## 2025-03-10 PROCEDURE — 2500000002 HC RX 250 W HCPCS SELF ADMINISTERED DRUGS (ALT 637 FOR MEDICARE OP, ALT 636 FOR OP/ED): Performed by: PHYSICIAN ASSISTANT

## 2025-03-10 PROCEDURE — 2500000001 HC RX 250 WO HCPCS SELF ADMINISTERED DRUGS (ALT 637 FOR MEDICARE OP)

## 2025-03-10 PROCEDURE — 82947 ASSAY GLUCOSE BLOOD QUANT: CPT

## 2025-03-10 PROCEDURE — 2500000004 HC RX 250 GENERAL PHARMACY W/ HCPCS (ALT 636 FOR OP/ED)

## 2025-03-10 PROCEDURE — 85027 COMPLETE CBC AUTOMATED: CPT

## 2025-03-10 PROCEDURE — G0378 HOSPITAL OBSERVATION PER HR: HCPCS

## 2025-03-10 PROCEDURE — 2500000001 HC RX 250 WO HCPCS SELF ADMINISTERED DRUGS (ALT 637 FOR MEDICARE OP): Performed by: STUDENT IN AN ORGANIZED HEALTH CARE EDUCATION/TRAINING PROGRAM

## 2025-03-10 RX ORDER — METHOCARBAMOL 500 MG/1
500 TABLET, FILM COATED ORAL EVERY 8 HOURS SCHEDULED
Qty: 21 TABLET | Refills: 0 | Status: SHIPPED | OUTPATIENT
Start: 2025-03-10 | End: 2025-03-31 | Stop reason: HOSPADM

## 2025-03-10 RX ORDER — DIAZEPAM 5 MG/1
5 TABLET ORAL EVERY 6 HOURS PRN
Qty: 28 TABLET | Refills: 0 | Status: SHIPPED | OUTPATIENT
Start: 2025-03-10 | End: 2025-03-31 | Stop reason: HOSPADM

## 2025-03-10 RX ADMIN — METHOCARBAMOL 500 MG: 500 TABLET ORAL at 05:59

## 2025-03-10 RX ADMIN — BETHANECHOL CHLORIDE 10 MG: 10 TABLET ORAL at 14:46

## 2025-03-10 RX ADMIN — GABAPENTIN 600 MG: 300 CAPSULE ORAL at 14:47

## 2025-03-10 RX ADMIN — INSULIN LISPRO 4 UNITS: 100 INJECTION, SOLUTION INTRAVENOUS; SUBCUTANEOUS at 09:03

## 2025-03-10 RX ADMIN — HEPARIN SODIUM 5000 UNITS: 5000 INJECTION, SOLUTION INTRAVENOUS; SUBCUTANEOUS at 12:31

## 2025-03-10 RX ADMIN — BISACODYL 10 MG: 10 SUPPOSITORY RECTAL at 09:10

## 2025-03-10 RX ADMIN — OXYCODONE HYDROCHLORIDE 10 MG: 5 TABLET ORAL at 00:53

## 2025-03-10 RX ADMIN — OXYCODONE HYDROCHLORIDE 10 MG: 5 TABLET ORAL at 16:48

## 2025-03-10 RX ADMIN — ACETAMINOPHEN 975 MG: 325 TABLET ORAL at 09:08

## 2025-03-10 RX ADMIN — GABAPENTIN 600 MG: 300 CAPSULE ORAL at 09:08

## 2025-03-10 RX ADMIN — BETHANECHOL CHLORIDE 10 MG: 10 TABLET ORAL at 09:08

## 2025-03-10 RX ADMIN — POLYETHYLENE GLYCOL 3350 17 G: 17 POWDER, FOR SOLUTION ORAL at 09:09

## 2025-03-10 RX ADMIN — METHOCARBAMOL 500 MG: 500 TABLET ORAL at 13:01

## 2025-03-10 RX ADMIN — ACETAMINOPHEN 975 MG: 325 TABLET ORAL at 15:46

## 2025-03-10 RX ADMIN — Medication 1 TABLET: at 09:08

## 2025-03-10 RX ADMIN — ACETAMINOPHEN 975 MG: 325 TABLET ORAL at 00:22

## 2025-03-10 RX ADMIN — LAMOTRIGINE 100 MG: 100 TABLET ORAL at 09:07

## 2025-03-10 RX ADMIN — HEPARIN SODIUM 5000 UNITS: 5000 INJECTION, SOLUTION INTRAVENOUS; SUBCUTANEOUS at 05:59

## 2025-03-10 RX ADMIN — PANTOPRAZOLE SODIUM 40 MG: 40 TABLET, DELAYED RELEASE ORAL at 06:00

## 2025-03-10 RX ADMIN — OXYCODONE HYDROCHLORIDE 10 MG: 5 TABLET ORAL at 06:04

## 2025-03-10 RX ADMIN — SENNOSIDES AND DOCUSATE SODIUM 2 TABLET: 50; 8.6 TABLET ORAL at 09:08

## 2025-03-10 RX ADMIN — OXYCODONE HYDROCHLORIDE 10 MG: 5 TABLET ORAL at 12:31

## 2025-03-10 ASSESSMENT — COGNITIVE AND FUNCTIONAL STATUS - GENERAL
DAILY ACTIVITIY SCORE: 19
DRESSING REGULAR UPPER BODY CLOTHING: A LITTLE
DRESSING REGULAR LOWER BODY CLOTHING: A LITTLE
TOILETING: A LITTLE
MOVING TO AND FROM BED TO CHAIR: A LITTLE
CLIMB 3 TO 5 STEPS WITH RAILING: A LOT
TURNING FROM BACK TO SIDE WHILE IN FLAT BAD: A LITTLE
HELP NEEDED FOR BATHING: A LITTLE
PERSONAL GROOMING: A LITTLE
STANDING UP FROM CHAIR USING ARMS: A LITTLE
MOBILITY SCORE: 17
WALKING IN HOSPITAL ROOM: A LITTLE
MOVING FROM LYING ON BACK TO SITTING ON SIDE OF FLAT BED WITH BEDRAILS: A LITTLE

## 2025-03-10 ASSESSMENT — PAIN SCALES - WONG BAKER: WONGBAKER_NUMERICALRESPONSE: HURTS LITTLE BIT

## 2025-03-10 ASSESSMENT — PAIN SCALES - GENERAL
PAINLEVEL_OUTOF10: 8
PAINLEVEL_OUTOF10: 8
PAINLEVEL_OUTOF10: 7
PAINLEVEL_OUTOF10: 6
PAINLEVEL_OUTOF10: 7
PAINLEVEL_OUTOF10: 6

## 2025-03-10 ASSESSMENT — PAIN - FUNCTIONAL ASSESSMENT
PAIN_FUNCTIONAL_ASSESSMENT: 0-10
PAIN_FUNCTIONAL_ASSESSMENT: 0-10

## 2025-03-10 NOTE — CARE PLAN
The patient's goals for the shift include  pain control     The clinical goals for the shift include patient pain will be under control    Over the shift, the patient did not make progress toward the following goals. Barriers to progression include surgical incision. Recommendations to address these barriers include non pharmacological and pharmacological pain control.

## 2025-03-10 NOTE — CARE PLAN
The patient's goals for the shift include  safety    The clinical goals for the shift include patient pain will be under control    Over the shift, the patient did not make progress toward the following goals. Barriers to progression include surgical incision. Recommendations to address these barriers include pharmacological and non pharmacological interventions.

## 2025-03-10 NOTE — DISCHARGE SUMMARY
Discharge Diagnosis  Back pain of thoracolumbar region    Test Results Pending At Discharge  Pending Labs       No current pending labs.          Hospital Course  Cristal Pollard is a 64 y.o. 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 Reynolds) on 2/28/2024. Patient presented to the [facility] ED on 2/27 with 5 days acute on chronic back pain and RLE (likely L4) radiculopathy. CT L-spine demonstrated  multi-level spondylosis worse at L3-4, L4-5, L5-S1, no fracture. MRI LS demonstrated L3-4, L4-5 CCS, L5-S1 L foraminal stenosis. Flex ex xrays with no pathologic motion. She was admitted to Neurosurgery for further evaluation and management.     3/3 CT thin cut L spine complete for surgical planning.   3/5 s/p MIS L3-4, L4-5 TLIF complicated by durotomy with secondary repair.   3/6 Quinn and PCA discontinued.   3/7 s/p midline placement due to poor access.   3/10 Midline removed prior to discharge.     PT/OT evaluated patient and recommended low level of continued therapy for which referral placed for home health care.     On the day of discharge, the patient was seen and evaluated by the neurosurgery team and deemed suitable for discharge. The patient was given detailed discharge instructions and were scheduled to follow up as an outpatient.    Pertinent Physical Exam At Time of Discharge  Constitutional: A&Ox3, calm and cooperative, NAD.  Eyes: PERRL, EOMI.   ENMT: Moist mucous membranes, no apparent injuries or lesions.  Cardiovascular: Normal rate and regular rhythm. 2+ equal pulses of the distal extremities.  Respiratory/Thorax: CTAB, regular respirations on RA. Good symmetric chest expansion.   Gastrointestinal: Abdomen soft, non tender.   Neurological:   A&Ox3  Face symmetric, Facial SILT   Tongue midline and symmetric  BUE 5  LLE HF/KE/DF/PF 5  RLE HF/KE4+ ow 5  Psychological: Appropriate mood and behavior.   Skin: Warm and dry. Incision C/D/I.      Home Medications     Medication List      START taking these medications     diazePAM 5 mg tablet; Commonly known as: Valium; Take 1 tablet (5 mg) by   mouth every 6 hours as needed for muscle spasms for up to 7 days.   gabapentin 300 mg capsule; Commonly known as: Neurontin; Take 2 capsules   (600 mg) by mouth 3 times a day. Continue until discussed further at   follow up neurosurgery appointment   methocarbamol 500 mg tablet; Commonly known as: Robaxin; Take 1 tablet   (500 mg) by mouth every 8 hours for 7 days.   oxyCODONE 5 mg immediate release tablet; Commonly known as: Roxicodone;   Take 1 tablet (5 mg) by mouth every 6 hours if needed for severe pain (7 -   10) for up to 7 days.   polyethylene glycol 17 gram packet; Commonly known as: Glycolax,   Miralax; Take 17 g by mouth 2 times a day for 7 days.   sennosides-docusate sodium 8.6-50 mg tablet; Commonly known as:   Marilyn-Colace; Take 2 tablets by mouth 2 times a day for 7 days.     CHANGE how you take these medications     acetaminophen 325 mg tablet; Commonly known as: Tylenol; Take 3 tablets   (975 mg) by mouth every 8 hours for 7 days.; What changed: how much to   take, when to take this, reasons to take this, additional instructions   FLUoxetine 40 mg capsule; Commonly known as: PROzac; TAKE 1 CAPSULE (40   MG) BY MOUTH ONCE DAILY. TO START AFTER COMPLETING 10MG AND 20MG DOSES;   What changed: when to take this   metFORMIN  mg 24 hr tablet; Commonly known as: Glucophage-XR; TAKE   1 TABLET BY MOUTH EVERY DAY WITH EVENING MEAL; What changed: when to take   this   omeprazole 40 mg DR capsule; Commonly known as: PriLOSEC; TAKE 1 CAPSULE   (40 MG) BY MOUTH ONCE DAILY. DO NOT CRUSH OR CHEW.; What changed: when to   take this, reasons to take this     CONTINUE taking these medications     calcium carbonate-vitamin D3 600 mg-10 mcg (400 unit) chewable tablet   lamoTRIgine 100 mg tablet; Commonly known as: LaMICtal; TAKE 1 TABLET BY   MOUTH IN THE  MORNING AND 1.5 TABLETS BY MOUTH AT BEDTIME   lisinopriL-hydrochlorothiazide 10-12.5 mg tablet; TAKE 1 TABLET BY MOUTH   EVERY DAY   multivitamin tablet   OneTouch Delica Plus Lancet 30 gauge misc; Generic drug: lancets; USE TO   TEST ONCE DAILY   OneTouch Verio test strips strip; Generic drug: blood sugar diagnostic;   USE TO TEST ONCE DAILY     STOP taking these medications     hydrOXYzine HCL 25 mg tablet; Commonly known as: Atarax   oxyCODONE-acetaminophen 7.5-325 mg tablet; Commonly known as: Percocet     Outpatient Follow-Up  Future Appointments   Date Time Provider Department Center   3/24/2025  2:00 PM Samantha A Meeson, APRN-CNP [id number] [place]   5/9/2025  9:00 AM Darci Allison MD [id number] [facility]   5/19/2025  1:00 PM Arnulfo Reynolds MD [id number] [place]       Zaria Zambrano PA-C    Total face to face time spent with patient/family of >30 minutes, with >50% of the time spent discussing plan of care/management, counseling/educating on disease processes, explaining results of diagnostic testing.

## 2025-03-10 NOTE — PROGRESS NOTES
Transitional Care Coordination Progress Note:  Per PA pt medically ready for discharge today. [facility] notified.  Awaiting confirmed SOC.    Pt to call family to provide transport home.     Addendum 1120: [facility] confirmed SOC for 24-48 hours.     Faye Smart RN, BSN  Transitional Care Coordinator  Office: [phone]  Secure chat via [employer]

## 2025-03-10 NOTE — PROGRESS NOTES
"Cristal Pollard is a 64 y.o. female on day 8 of admission presenting with Back pain of thoracolumbar region.    Subjective   No acute events, had small BM yesterday, pain stable at 7/10    Objective     Physical Exam  Aox3  BUE 5  LLE HF/KE/DF/PF 5  RLE HF/KE4+ ow 5  Incisions cdi    Last Recorded Vitals  Blood pressure 113/67, pulse 79, temperature 36 °C (96.8 °F), temperature source Temporal, resp. rate 18, height 1.651 m (5' 5\"), weight 80.1 kg (176 lb 9.6 oz), SpO2 94%.  Intake/Output last 3 Shifts:  I/O last 3 completed shifts:  In: 700 (8.7 mL/kg) [P.O.:600; I.V.:100 (1.2 mL/kg)]  Out: 1735 (21.7 mL/kg) [Urine:1735 (0.6 mL/kg/hr)]  Weight: 80.1 kg         Assessment/Plan   Assessment & Plan  Back pain of thoracolumbar region    PONV (postoperative nausea and vomiting)    Back pain with radiculopathy    Acute postoperative pain    64 F h/o HTN, HLD, seziures (on lamictal), BUE radiculopathy, 2/28/24 s/p C4-7 ACDF (by Dr. Reynolds), p/w 5d acute on chronic back pain RLE (likely L4 radiculopathy), CT L-spine w/ multi-level spondylosis worse at L3-4, L4-5, L5-S1, no fx, MRI LS w L3-4, L4-5 CCS, L5-S1 L foraminal stenosis, Flex ex no pathologic motion, 3/3 CT thin cut L spine done    3/5 s/p MIS L3-4, L4-5 TLIF c/b durotomy s/p secondary repair  3/7 s/p midline    Plan  Floor  Pain control  PTOT - HC  SCDs, SQH       Zoila Pro MD      "

## 2025-03-10 NOTE — NURSING NOTE
Patient wanted PA to speak with her before discharge., which she did.  Patient bladder scan was 500 ml which was wrong once straight cath performed only 200 ml in bladder.  The  PA discussed this with patient and stated this is normal amount for bladder.  Patient given discharge paper work, education performed, midline pulled and patient discharged.

## 2025-03-11 ENCOUNTER — CLINICAL SUPPORT (OUTPATIENT)
Dept: PRIMARY CARE | Facility: CLINIC | Age: 65
End: 2025-03-11
Payer: COMMERCIAL

## 2025-03-11 ENCOUNTER — APPOINTMENT (OUTPATIENT)
Dept: RADIOLOGY | Facility: HOSPITAL | Age: 65
DRG: 872 | End: 2025-03-11
Payer: COMMERCIAL

## 2025-03-11 ENCOUNTER — HOSPITAL ENCOUNTER (INPATIENT)
Facility: HOSPITAL | Age: 65
LOS: 4 days | Discharge: HOME HEALTH | DRG: 872 | End: 2025-03-15
Attending: EMERGENCY MEDICINE | Admitting: STUDENT IN AN ORGANIZED HEALTH CARE EDUCATION/TRAINING PROGRAM
Payer: COMMERCIAL

## 2025-03-11 ENCOUNTER — DOCUMENTATION (OUTPATIENT)
Dept: HOME HEALTH SERVICES | Facility: HOME HEALTH | Age: 65
End: 2025-03-11
Payer: COMMERCIAL

## 2025-03-11 ENCOUNTER — PATIENT OUTREACH (OUTPATIENT)
Dept: PRIMARY CARE | Facility: CLINIC | Age: 65
End: 2025-03-11

## 2025-03-11 DIAGNOSIS — Z98.1 HISTORY OF LUMBAR FUSION: ICD-10-CM

## 2025-03-11 DIAGNOSIS — R33.9 URINARY RETENTION: ICD-10-CM

## 2025-03-11 DIAGNOSIS — N30.90 CYSTITIS: ICD-10-CM

## 2025-03-11 DIAGNOSIS — A41.9 SEPSIS WITHOUT SEPTIC SHOCK (MULTI): Primary | ICD-10-CM

## 2025-03-11 DIAGNOSIS — R39.9 UTI SYMPTOMS: ICD-10-CM

## 2025-03-11 LAB
ALBUMIN SERPL BCP-MCNC: 4.6 G/DL (ref 3.4–5)
ALP SERPL-CCNC: 53 U/L (ref 33–136)
ALT SERPL W P-5'-P-CCNC: 29 U/L (ref 7–45)
ANION GAP SERPL CALC-SCNC: 15 MMOL/L (ref 10–20)
APPEARANCE UR: ABNORMAL
APPEARANCE UR: ABNORMAL
AST SERPL W P-5'-P-CCNC: 19 U/L (ref 9–39)
BACTERIA #/AREA URNS AUTO: ABNORMAL /HPF
BASOPHILS # BLD AUTO: 0.09 X10*3/UL (ref 0–0.1)
BASOPHILS NFR BLD AUTO: 0.6 %
BILIRUB SERPL-MCNC: 0.5 MG/DL (ref 0–1.2)
BILIRUB UR QL STRIP: NEGATIVE
BILIRUB UR STRIP.AUTO-MCNC: NEGATIVE MG/DL
BUN SERPL-MCNC: 12 MG/DL (ref 6–23)
CALCIUM SERPL-MCNC: 10.4 MG/DL (ref 8.6–10.3)
CHLORIDE SERPL-SCNC: 100 MMOL/L (ref 98–107)
CO2 SERPL-SCNC: 25 MMOL/L (ref 21–32)
COLOR UR: ABNORMAL
COLOR UR: ABNORMAL
CREAT SERPL-MCNC: 0.74 MG/DL (ref 0.5–1.05)
EGFRCR SERPLBLD CKD-EPI 2021: 90 ML/MIN/1.73M*2
EOSINOPHIL # BLD AUTO: 0.29 X10*3/UL (ref 0–0.7)
EOSINOPHIL NFR BLD AUTO: 1.8 %
ERYTHROCYTE [DISTWIDTH] IN BLOOD BY AUTOMATED COUNT: 13.3 % (ref 11.5–14.5)
GLUCOSE SERPL-MCNC: 192 MG/DL (ref 74–99)
GLUCOSE UR STRIP-MCNC: ABNORMAL MG/DL
GLUCOSE UR STRIP.AUTO-MCNC: NORMAL MG/DL
HCT VFR BLD AUTO: 39.8 % (ref 36–46)
HGB BLD-MCNC: 12.8 G/DL (ref 12–16)
HGB UR QL STRIP: ABNORMAL
IMM GRANULOCYTES # BLD AUTO: 0.43 X10*3/UL (ref 0–0.7)
IMM GRANULOCYTES NFR BLD AUTO: 2.7 % (ref 0–0.9)
KETONES UR STRIP-MCNC: NEGATIVE MG/DL
KETONES UR STRIP.AUTO-MCNC: NEGATIVE MG/DL
LACTATE SERPL-SCNC: 2.1 MMOL/L (ref 0.4–2)
LEUKOCYTE ESTERASE UR QL STRIP.AUTO: ABNORMAL
LEUKOCYTE ESTERASE UR QL STRIP: ABNORMAL
LYMPHOCYTES # BLD AUTO: 3.38 X10*3/UL (ref 1.2–4.8)
LYMPHOCYTES NFR BLD AUTO: 20.9 %
MCH RBC QN AUTO: 31.2 PG (ref 26–34)
MCHC RBC AUTO-ENTMCNC: 32.2 G/DL (ref 32–36)
MCV RBC AUTO: 97 FL (ref 80–100)
MONOCYTES # BLD AUTO: 0.92 X10*3/UL (ref 0.1–1)
MONOCYTES NFR BLD AUTO: 5.7 %
NEUTROPHILS # BLD AUTO: 11.09 X10*3/UL (ref 1.2–7.7)
NEUTROPHILS NFR BLD AUTO: 68.3 %
NITRITE UR QL STRIP.AUTO: NEGATIVE
NITRITE UR QL STRIP: NEGATIVE
NRBC BLD-RTO: 0 /100 WBCS (ref 0–0)
PH UR STRIP.AUTO: 5.5 [PH]
PH UR STRIP: 5.5 [PH]
PLATELET # BLD AUTO: 368 X10*3/UL (ref 150–450)
POTASSIUM SERPL-SCNC: 3.6 MMOL/L (ref 3.5–5.3)
PROT SERPL-MCNC: 8 G/DL (ref 6.4–8.2)
PROT UR STRIP-MCNC: NEGATIVE MG/DL
PROT UR STRIP.AUTO-MCNC: ABNORMAL MG/DL
RBC # BLD AUTO: 4.1 X10*6/UL (ref 4–5.2)
RBC # UR STRIP.AUTO: ABNORMAL MG/DL
RBC #/AREA URNS AUTO: >20 /HPF
SODIUM SERPL-SCNC: 136 MMOL/L (ref 136–145)
SP GR UR STRIP.AUTO: 1.01
SP GR UR STRIP.AUTO: >=1.03
UROBILINOGEN UR STRIP-ACNC: 0.2 E.U./DL
UROBILINOGEN UR STRIP.AUTO-MCNC: NORMAL MG/DL
WBC # BLD AUTO: 16.2 X10*3/UL (ref 4.4–11.3)
WBC #/AREA URNS AUTO: >50 /HPF
WBC CLUMPS #/AREA URNS AUTO: ABNORMAL /HPF
YEAST BUDDING #/AREA UR COMP ASSIST: PRESENT /HPF

## 2025-03-11 PROCEDURE — 74176 CT ABD & PELVIS W/O CONTRAST: CPT

## 2025-03-11 PROCEDURE — 87040 BLOOD CULTURE FOR BACTERIA: CPT | Mod: PARLAB | Performed by: PHYSICIAN ASSISTANT

## 2025-03-11 PROCEDURE — 99223 1ST HOSP IP/OBS HIGH 75: CPT

## 2025-03-11 PROCEDURE — 51702 INSERT TEMP BLADDER CATH: CPT

## 2025-03-11 PROCEDURE — 2500000004 HC RX 250 GENERAL PHARMACY W/ HCPCS (ALT 636 FOR OP/ED): Performed by: PHYSICIAN ASSISTANT

## 2025-03-11 PROCEDURE — 84075 ASSAY ALKALINE PHOSPHATASE: CPT | Performed by: NURSE PRACTITIONER

## 2025-03-11 PROCEDURE — 83605 ASSAY OF LACTIC ACID: CPT | Performed by: PHYSICIAN ASSISTANT

## 2025-03-11 PROCEDURE — 36415 COLL VENOUS BLD VENIPUNCTURE: CPT | Performed by: NURSE PRACTITIONER

## 2025-03-11 PROCEDURE — 36415 COLL VENOUS BLD VENIPUNCTURE: CPT | Performed by: PHYSICIAN ASSISTANT

## 2025-03-11 PROCEDURE — 99291 CRITICAL CARE FIRST HOUR: CPT | Performed by: PHYSICIAN ASSISTANT

## 2025-03-11 PROCEDURE — 1210000001 HC SEMI-PRIVATE ROOM DAILY

## 2025-03-11 PROCEDURE — 87075 CULTR BACTERIA EXCEPT BLOOD: CPT | Mod: PARLAB | Performed by: PHYSICIAN ASSISTANT

## 2025-03-11 PROCEDURE — 87086 URINE CULTURE/COLONY COUNT: CPT | Mod: PARLAB | Performed by: NURSE PRACTITIONER

## 2025-03-11 PROCEDURE — 81001 URINALYSIS AUTO W/SCOPE: CPT | Performed by: NURSE PRACTITIONER

## 2025-03-11 PROCEDURE — 51798 US URINE CAPACITY MEASURE: CPT

## 2025-03-11 PROCEDURE — 74176 CT ABD & PELVIS W/O CONTRAST: CPT | Performed by: RADIOLOGY

## 2025-03-11 PROCEDURE — 85025 COMPLETE CBC W/AUTO DIFF WBC: CPT | Performed by: NURSE PRACTITIONER

## 2025-03-11 RX ORDER — LAMOTRIGINE 100 MG/1
150 TABLET ORAL NIGHTLY
Status: DISCONTINUED | OUTPATIENT
Start: 2025-03-11 | End: 2025-03-15 | Stop reason: HOSPADM

## 2025-03-11 RX ORDER — ACETAMINOPHEN 325 MG/1
975 TABLET ORAL ONCE
Status: COMPLETED | OUTPATIENT
Start: 2025-03-11 | End: 2025-03-12

## 2025-03-11 RX ORDER — MEROPENEM 1 G/1
1 INJECTION, POWDER, FOR SOLUTION INTRAVENOUS ONCE
Status: DISCONTINUED | OUTPATIENT
Start: 2025-03-11 | End: 2025-03-11

## 2025-03-11 RX ORDER — LAMOTRIGINE 100 MG/1
100 TABLET ORAL DAILY
Status: DISCONTINUED | OUTPATIENT
Start: 2025-03-12 | End: 2025-03-15 | Stop reason: HOSPADM

## 2025-03-11 RX ORDER — HYDROMORPHONE HYDROCHLORIDE 0.2 MG/ML
0.2 INJECTION INTRAMUSCULAR; INTRAVENOUS; SUBCUTANEOUS ONCE
Status: COMPLETED | OUTPATIENT
Start: 2025-03-11 | End: 2025-03-12

## 2025-03-11 RX ADMIN — SODIUM CHLORIDE 1000 ML: 9 INJECTION, SOLUTION INTRAVENOUS at 23:25

## 2025-03-11 ASSESSMENT — PAIN DESCRIPTION - ORIENTATION: ORIENTATION: LOWER

## 2025-03-11 ASSESSMENT — PAIN SCALES - GENERAL: PAINLEVEL_OUTOF10: 8

## 2025-03-11 ASSESSMENT — PAIN DESCRIPTION - DESCRIPTORS: DESCRIPTORS: SORE

## 2025-03-11 ASSESSMENT — PAIN DESCRIPTION - LOCATION: LOCATION: BACK

## 2025-03-11 ASSESSMENT — PAIN DESCRIPTION - PAIN TYPE: TYPE: ACUTE PAIN

## 2025-03-11 ASSESSMENT — PAIN - FUNCTIONAL ASSESSMENT: PAIN_FUNCTIONAL_ASSESSMENT: 0-10

## 2025-03-11 NOTE — HH CARE COORDINATION
Home Care received a Referral for Physical Therapy and Occupational Therapy. We have processed the referral for a Start of Care on 3/15-3/16/25.     If you have any questions or concerns, please feel free to contact us at [phone]. Follow the prompts, enter your five digit zip code, and you will be directed to your care team on [facility] 3.

## 2025-03-11 NOTE — PROGRESS NOTES
"Discharge Facility: [facility]   Discharge Diagnosis:presented to the [facility] ED on 2/27 with 5 days acute on chronic back pain and RLE (likely L4) radiculopathy. CT L-spine demonstrated multi-level spondylosis worse at L3-4, L4-5, L5-S1, no fracture. MRI LS demonstrated L3-4, L4-5 CCS, L5-S1 L foraminal stenosis. Flex ex xrays with no pathologic motion. She was admitted to Neurosurgery for further evaluation and management.      3/3 CT thin cut L spine complete for surgical planning.   3/5 s/p MIS L3-4, L4-5 TLIF complicated by durotomy with secondary repair.   3/6 Quinn and PCA discontinued.   Admission Date:3.2.25  Discharge Date: 3.10.25    PCP Appointment Date:Messaged office as follow up appt was not available within 14 days    Specialist Appointment Date:   Hospital Encounter and Summary Linked: Yes  Admission (Discharged)   See discharge assessment below for further details   Wrap Up  Wrap Up Additional Comments: Spoke with patient. States incision intact. Taking pain med.Patient reporting unable to void unless \"lying down\" states this was a known post op symptom at the hospital. They had performed a bladder scan and ended up straight cathing at the hospital.  I did advise patient to call Neuro office, Dr Reynolds to notify. I also sent a message to pcp. Called patient again several hrs later and states urinating in large amts while lying down and feels she is emptying bladder. She has collected a urine sample for spouse to take to the lab per Dr arora orders. (3/11/2025  2:23 PM)    Engagement  Call Start Time: 1426 (3/11/2025  2:23 PM)    Medications  Medications reviewed with patient/caregiver?: Yes (3/11/2025  2:23 PM)  Is the patient having any side effects they believe may be caused by any medication additions or changes?: No (3/11/2025  2:23 PM)  Does the patient have all medications ordered at discharge?: Yes (3/11/2025  2:23 PM)  Care Management Interventions: No intervention needed (3/11/2025  2:23 " PM)  Prescription Comments: START taking these medications   o diazePAM 5 mg tablet; Commonly known as: Valium; Take 1 tablet (5 mg) by   mouth every 6 hours as needed for muscle spasms for up to 7 days.  o gabapentin 300 mg capsule; Commonly known as: Neurontin; Take 2 capsules   (600 mg) by mouth 3 times a day. Continue until discussed further at   follow up neurosurgery appointment  o methocarbamol 500 mg tablet; Commonly known as: Robaxin; Take 1 tablet   (500 mg) by mouth every 8 hours for 7 days.  o oxyCODONE 5 mg immediate release tablet; Commonly known as: Roxicodone;   Take 1 tablet (5 mg) by mouth every 6 hours if needed for severe pain (7 -   10) for up to 7 days.  o polyethylene glycol 17 gram packet; Commonly known as: Glycolax,   Miralax; Take 17 g by mouth 2 times a day for 7 days.  o sennosides-docusate sodium 8.6-50 mg tablet; Commonly known as:   Marilyn-Colace; Take 2 tablets by mouth 2 times a day for 7 days.  CHANGE how you take these medications   o acetaminophen 325 mg tablet; Commonly known as: Tylenol; Take 3 tablets   (975 mg) by mouth every 8 hours for 7 days.; What changed: how much to   take, when to take this, reasons to take this, additional instructions  o FLUoxetine 40 mg capsule; Commonly known as: PROzac; TAKE 1 CAPSULE (40   MG) BY MOUTH ONCE DAILY. TO START AFTER COMPLETING 10MG AND 20MG DOSES;   What changed: when to take this  o metFORMIN  mg 24 hr tablet; Commonly known as: Glucophage-XR; TAKE   1 TABLET BY MOUTH EVERY DAY WITH EVENING MEAL; What changed: when to take   this (3/11/2025  2:23 PM)  Is the patient taking all medications as directed (includes completed medication regime)?: Yes (3/11/2025  2:23 PM)  Care Management Interventions: Provided patient education (3/11/2025  2:23 PM)  Medication Comments: Verbalizes understanding of medication changes (3/11/2025  2:23 PM)    Appointments  Does the patient have a primary care provider?: Yes (3/11/2025  2:23 PM)  Care  Management Interventions: Educated patient on importance of making appointment; Advised patient to make appointment (Messaged office as follow up appt was not available within 14 days) (3/11/2025  2:23 PM)  Has the patient kept scheduled appointments due by today?: Yes (3/11/2025  2:23 PM)  Care Management Interventions: Advised patient to keep appointment (3/11/2025  2:23 PM)    Self Management  What is the home health agency?: [place] (3/11/2025  2:23 PM)  Has home health visited the patient within 72 hours of discharge?: Yes (3/11/2025  2:23 PM)  What Durable Medical Equipment (DME) was ordered?: n/a (3/11/2025  2:23 PM)    Patient Teaching  Does the patient have access to their discharge instructions?: Yes (3/11/2025  2:23 PM)  Care Management Interventions: Reviewed instructions with patient (3/11/2025  2:23 PM)  What is the patient's perception of their health status since discharge?: Improving (3/11/2025  2:23 PM)  Is the patient/caregiver able to teach back the hierarchy of who to call/visit for symptoms/problems? PCP, Specialist, Home Health nurse, Urgent Care, ED, 911: Yes (3/11/2025  2:23 PM)  Patient/Caregiver Education Comments: See wrap up (3/11/2025  2:23 PM)

## 2025-03-11 NOTE — ED TRIAGE NOTES
TRIAGE NOTE   I saw the patient as the Clinician in Triage and performed a brief history and physical exam, established acuity, and ordered appropriate tests to develop basic plan of care. Patient will be seen by an [facility]EN, resident and/or physician who will independently evaluate the patient. Please see subsequent provider notes for further details and disposition.     Brief HPI: In brief, Cristal Pollard is a 64 y.o. female with a past medical history of HTN, HLD, seziures (on lamictal), BUE radiculopathy s/p C4-7 ACDF (by Dr. Reynolds) on 2/28/2024 and  s/p MIS L3-4, L4-5 TLIF complicated by durotomy with secondary repair on 3/5 presenting to ED today from home with  for evaluation of urinary symptoms.  Discharged home last night from [facility], patient she had some urinary incontinence last night, today she is having urinary urgency.  She is unable to void when she sits on the toilet but then ends up wetting herself when she gets up.  Complaining of bladder pressure.  Mild right lower extremity weakness which is unchanged from prior to surgery.  Minimal ambulation with a walker.  Normal postop pain.  Denies fever/chills, cough/cold symptoms, chest pain, shortness of breath, nausea/vomiting, abdominal pain, dysuria/hematuria, change in bowel habits or any other complaints.  No smoking, EtOH or IV drugs.  PCP is Dr. Diehl.     Focused Physical exam:   General: 64-year-old female sitting in wheelchair, awake and alert, oriented x 3.  Well-nourished and hydrated.  Appears uncomfortable but nontoxic.  Skin: Pink, warm and dry.  Postop incisions on the back intact with Dermabond, no erythema or drainage.  Cardiac: Tachycardic at 113.  Pulmonary: Clear bilaterally.  Abdomen: Round and soft with bowel sounds, nontender.  No CVA tenderness.      Plan/MDM:    64 y.o. female with a past medical history of HTN, HLD, seziures (on lamictal), BUE radiculopathy s/p C4-7 ACDF (by Dr. Reynolds) on 2/28/2024 and  s/p MIS L3-4, L4-5 TLIF  complicated by durotomy with secondary repair on 3/5 he is evaluated at the bedside for intermittent urinary continence with urinary urgency and bladder pressure after being discharged home yesterday afternoon.  On arrival to the ED, awake and alert, blood pressure 117/71, tachycardic at 113.  Patient is not hypoxic or febrile.  Lungs clear, abdomen soft and nontender.  IV established, will check basic labs with UA and preparation for further evaluation in the main ED.  I spoke with Dr. Reynolds via epic chat, he does not feel strongly about imaging at this time.  Patient is agreeable to this plan.    Please see subsequent provider note for further details and disposition

## 2025-03-11 NOTE — PROGRESS NOTES
Patients spouse dropped off urine for uti symptoms   Will run urine and see what results show  Pt did go to ER last night so we did not send out for culture

## 2025-03-11 NOTE — ED TRIAGE NOTES
PT. C/O INCREASED URINARY FREQUENCY STARTING YESTERDAY. PT.  HAD BACK SURGERY WEDNESDAY WITH RODS AND SCREWS AND SPACERS PLACED, WAS JUST RELEASED YESTERDAY. PT.  HAD CATHETER THE DAY OF THE SURGERY AND WAS HAVING SOME DIFFICULTY URINATING WHILE IN THE HOSPITAL. PT.  STARTED WITH URINARY INCONTINENCE LAST NIGHT ALSO, NO HX OF THAT. PT. C/O LOWER ABD. PAIN.

## 2025-03-12 ENCOUNTER — HOME CARE VISIT (OUTPATIENT)
Dept: HOME HEALTH SERVICES | Facility: HOME HEALTH | Age: 65
End: 2025-03-12

## 2025-03-12 PROBLEM — N30.90 CYSTITIS: Status: ACTIVE | Noted: 2025-03-12

## 2025-03-12 PROBLEM — Z98.1 HISTORY OF LUMBAR FUSION: Status: ACTIVE | Noted: 2025-03-12

## 2025-03-12 LAB
ANION GAP SERPL CALC-SCNC: 11 MMOL/L (ref 10–20)
BUN SERPL-MCNC: 11 MG/DL (ref 6–23)
CALCIUM SERPL-MCNC: 9.2 MG/DL (ref 8.6–10.3)
CHLORIDE SERPL-SCNC: 105 MMOL/L (ref 98–107)
CO2 SERPL-SCNC: 25 MMOL/L (ref 21–32)
CREAT SERPL-MCNC: 0.62 MG/DL (ref 0.5–1.05)
EGFRCR SERPLBLD CKD-EPI 2021: >90 ML/MIN/1.73M*2
ERYTHROCYTE [DISTWIDTH] IN BLOOD BY AUTOMATED COUNT: 13.7 % (ref 11.5–14.5)
GLUCOSE BLD MANUAL STRIP-MCNC: 180 MG/DL (ref 74–99)
GLUCOSE BLD MANUAL STRIP-MCNC: 181 MG/DL (ref 74–99)
GLUCOSE BLD MANUAL STRIP-MCNC: 185 MG/DL (ref 74–99)
GLUCOSE BLD MANUAL STRIP-MCNC: 188 MG/DL (ref 74–99)
GLUCOSE SERPL-MCNC: 202 MG/DL (ref 74–99)
HCT VFR BLD AUTO: 33.2 % (ref 36–46)
HGB BLD-MCNC: 10.7 G/DL (ref 12–16)
HOLD SPECIMEN: NORMAL
LACTATE SERPL-SCNC: 1.6 MMOL/L (ref 0.4–2)
MCH RBC QN AUTO: 31.3 PG (ref 26–34)
MCHC RBC AUTO-ENTMCNC: 32.2 G/DL (ref 32–36)
MCV RBC AUTO: 97 FL (ref 80–100)
NRBC BLD-RTO: 0 /100 WBCS (ref 0–0)
PLATELET # BLD AUTO: 277 X10*3/UL (ref 150–450)
POTASSIUM SERPL-SCNC: 3.6 MMOL/L (ref 3.5–5.3)
RBC # BLD AUTO: 3.42 X10*6/UL (ref 4–5.2)
SODIUM SERPL-SCNC: 137 MMOL/L (ref 136–145)
WBC # BLD AUTO: 13.1 X10*3/UL (ref 4.4–11.3)

## 2025-03-12 PROCEDURE — 2500000004 HC RX 250 GENERAL PHARMACY W/ HCPCS (ALT 636 FOR OP/ED): Mod: JZ | Performed by: EMERGENCY MEDICINE

## 2025-03-12 PROCEDURE — 2500000004 HC RX 250 GENERAL PHARMACY W/ HCPCS (ALT 636 FOR OP/ED)

## 2025-03-12 PROCEDURE — 80048 BASIC METABOLIC PNL TOTAL CA: CPT

## 2025-03-12 PROCEDURE — 1100000001 HC PRIVATE ROOM DAILY

## 2025-03-12 PROCEDURE — 36415 COLL VENOUS BLD VENIPUNCTURE: CPT

## 2025-03-12 PROCEDURE — 2500000004 HC RX 250 GENERAL PHARMACY W/ HCPCS (ALT 636 FOR OP/ED): Performed by: PHYSICIAN ASSISTANT

## 2025-03-12 PROCEDURE — 2500000002 HC RX 250 W HCPCS SELF ADMINISTERED DRUGS (ALT 637 FOR MEDICARE OP, ALT 636 FOR OP/ED)

## 2025-03-12 PROCEDURE — 51702 INSERT TEMP BLADDER CATH: CPT

## 2025-03-12 PROCEDURE — 2500000001 HC RX 250 WO HCPCS SELF ADMINISTERED DRUGS (ALT 637 FOR MEDICARE OP)

## 2025-03-12 PROCEDURE — 2500000001 HC RX 250 WO HCPCS SELF ADMINISTERED DRUGS (ALT 637 FOR MEDICARE OP): Performed by: STUDENT IN AN ORGANIZED HEALTH CARE EDUCATION/TRAINING PROGRAM

## 2025-03-12 PROCEDURE — 99223 1ST HOSP IP/OBS HIGH 75: CPT | Performed by: STUDENT IN AN ORGANIZED HEALTH CARE EDUCATION/TRAINING PROGRAM

## 2025-03-12 PROCEDURE — 2500000001 HC RX 250 WO HCPCS SELF ADMINISTERED DRUGS (ALT 637 FOR MEDICARE OP): Performed by: PHYSICIAN ASSISTANT

## 2025-03-12 PROCEDURE — 85027 COMPLETE CBC AUTOMATED: CPT

## 2025-03-12 PROCEDURE — 82947 ASSAY GLUCOSE BLOOD QUANT: CPT

## 2025-03-12 RX ORDER — ONDANSETRON 4 MG/1
4 TABLET, FILM COATED ORAL EVERY 8 HOURS PRN
Status: DISCONTINUED | OUTPATIENT
Start: 2025-03-12 | End: 2025-03-15 | Stop reason: HOSPADM

## 2025-03-12 RX ORDER — OXYCODONE HYDROCHLORIDE 5 MG/1
10 TABLET ORAL EVERY 6 HOURS PRN
Status: DISCONTINUED | OUTPATIENT
Start: 2025-03-12 | End: 2025-03-15 | Stop reason: HOSPADM

## 2025-03-12 RX ORDER — ONDANSETRON HYDROCHLORIDE 2 MG/ML
4 INJECTION, SOLUTION INTRAVENOUS EVERY 8 HOURS PRN
Status: DISCONTINUED | OUTPATIENT
Start: 2025-03-12 | End: 2025-03-15 | Stop reason: HOSPADM

## 2025-03-12 RX ORDER — DEXTROSE 50 % IN WATER (D50W) INTRAVENOUS SYRINGE
12.5
Status: DISCONTINUED | OUTPATIENT
Start: 2025-03-12 | End: 2025-03-15 | Stop reason: HOSPADM

## 2025-03-12 RX ORDER — DEXTROMETHORPHAN POLISTIREX 30 MG/5 ML
1 SUSPENSION, EXTENDED RELEASE 12 HR ORAL ONCE AS NEEDED
Status: DISCONTINUED | OUTPATIENT
Start: 2025-03-12 | End: 2025-03-15 | Stop reason: HOSPADM

## 2025-03-12 RX ORDER — INSULIN LISPRO 100 [IU]/ML
0-10 INJECTION, SOLUTION INTRAVENOUS; SUBCUTANEOUS
Status: DISCONTINUED | OUTPATIENT
Start: 2025-03-12 | End: 2025-03-15 | Stop reason: HOSPADM

## 2025-03-12 RX ORDER — OXYCODONE HYDROCHLORIDE 5 MG/1
5 TABLET ORAL EVERY 6 HOURS PRN
Status: DISCONTINUED | OUTPATIENT
Start: 2025-03-12 | End: 2025-03-12

## 2025-03-12 RX ORDER — LISINOPRIL AND HYDROCHLOROTHIAZIDE 10; 12.5 MG/1; MG/1
1 TABLET ORAL DAILY
Status: DISCONTINUED | OUTPATIENT
Start: 2025-03-12 | End: 2025-03-15 | Stop reason: HOSPADM

## 2025-03-12 RX ORDER — DEXTROSE 50 % IN WATER (D50W) INTRAVENOUS SYRINGE
25
Status: DISCONTINUED | OUTPATIENT
Start: 2025-03-12 | End: 2025-03-15 | Stop reason: HOSPADM

## 2025-03-12 RX ORDER — SODIUM CHLORIDE, SODIUM LACTATE, POTASSIUM CHLORIDE, CALCIUM CHLORIDE 600; 310; 30; 20 MG/100ML; MG/100ML; MG/100ML; MG/100ML
100 INJECTION, SOLUTION INTRAVENOUS CONTINUOUS
Status: ACTIVE | OUTPATIENT
Start: 2025-03-12 | End: 2025-03-13

## 2025-03-12 RX ORDER — ACETAMINOPHEN 160 MG/5ML
650 SOLUTION ORAL EVERY 4 HOURS PRN
Status: DISCONTINUED | OUTPATIENT
Start: 2025-03-12 | End: 2025-03-15 | Stop reason: HOSPADM

## 2025-03-12 RX ORDER — PANTOPRAZOLE SODIUM 40 MG/1
40 TABLET, DELAYED RELEASE ORAL
Status: DISCONTINUED | OUTPATIENT
Start: 2025-03-13 | End: 2025-03-15 | Stop reason: HOSPADM

## 2025-03-12 RX ORDER — GABAPENTIN 300 MG/1
600 CAPSULE ORAL 3 TIMES DAILY
Status: DISCONTINUED | OUTPATIENT
Start: 2025-03-12 | End: 2025-03-15 | Stop reason: HOSPADM

## 2025-03-12 RX ORDER — AMOXICILLIN 250 MG
2 CAPSULE ORAL 2 TIMES DAILY
Status: DISCONTINUED | OUTPATIENT
Start: 2025-03-12 | End: 2025-03-15 | Stop reason: HOSPADM

## 2025-03-12 RX ORDER — ACETAMINOPHEN 650 MG/1
650 SUPPOSITORY RECTAL EVERY 4 HOURS PRN
Status: DISCONTINUED | OUTPATIENT
Start: 2025-03-12 | End: 2025-03-15 | Stop reason: HOSPADM

## 2025-03-12 RX ORDER — ACETAMINOPHEN 325 MG/1
650 TABLET ORAL EVERY 4 HOURS PRN
Status: DISCONTINUED | OUTPATIENT
Start: 2025-03-12 | End: 2025-03-15 | Stop reason: HOSPADM

## 2025-03-12 RX ORDER — FLUOXETINE HYDROCHLORIDE 20 MG/1
40 CAPSULE ORAL NIGHTLY
Status: DISCONTINUED | OUTPATIENT
Start: 2025-03-12 | End: 2025-03-15 | Stop reason: HOSPADM

## 2025-03-12 RX ADMIN — HYDROMORPHONE HYDROCHLORIDE 0.2 MG: 0.2 INJECTION, SOLUTION INTRAMUSCULAR; INTRAVENOUS; SUBCUTANEOUS at 00:22

## 2025-03-12 RX ADMIN — ACETAMINOPHEN 975 MG: 325 TABLET, FILM COATED ORAL at 00:17

## 2025-03-12 RX ADMIN — OXYCODONE HYDROCHLORIDE 5 MG: 5 TABLET ORAL at 06:25

## 2025-03-12 RX ADMIN — OXYCODONE HYDROCHLORIDE 10 MG: 5 TABLET ORAL at 12:00

## 2025-03-12 RX ADMIN — SODIUM CHLORIDE, POTASSIUM CHLORIDE, SODIUM LACTATE AND CALCIUM CHLORIDE 100 ML/HR: 600; 310; 30; 20 INJECTION, SOLUTION INTRAVENOUS at 17:46

## 2025-03-12 RX ADMIN — INSULIN LISPRO 2 UNITS: 100 INJECTION, SOLUTION INTRAVENOUS; SUBCUTANEOUS at 08:28

## 2025-03-12 RX ADMIN — INSULIN LISPRO 2 UNITS: 100 INJECTION, SOLUTION INTRAVENOUS; SUBCUTANEOUS at 17:46

## 2025-03-12 RX ADMIN — GENTAMICIN SULFATE 390 MG: 40 INJECTION, SOLUTION INTRAMUSCULAR; INTRAVENOUS at 00:14

## 2025-03-12 RX ADMIN — GABAPENTIN 600 MG: 300 CAPSULE ORAL at 21:00

## 2025-03-12 RX ADMIN — OXYCODONE HYDROCHLORIDE 10 MG: 5 TABLET ORAL at 19:42

## 2025-03-12 RX ADMIN — GABAPENTIN 600 MG: 300 CAPSULE ORAL at 17:46

## 2025-03-12 RX ADMIN — GENTAMICIN SULFATE 390 MG: 40 INJECTION, SOLUTION INTRAMUSCULAR; INTRAVENOUS at 23:59

## 2025-03-12 RX ADMIN — SENNOSIDES AND DOCUSATE SODIUM 2 TABLET: 50; 8.6 TABLET ORAL at 19:42

## 2025-03-12 RX ADMIN — SODIUM CHLORIDE, POTASSIUM CHLORIDE, SODIUM LACTATE AND CALCIUM CHLORIDE 100 ML/HR: 600; 310; 30; 20 INJECTION, SOLUTION INTRAVENOUS at 02:17

## 2025-03-12 RX ADMIN — FLUOXETINE HYDROCHLORIDE 40 MG: 20 CAPSULE ORAL at 21:00

## 2025-03-12 RX ADMIN — LAMOTRIGINE 150 MG: 100 TABLET ORAL at 02:34

## 2025-03-12 RX ADMIN — INSULIN LISPRO 2 UNITS: 100 INJECTION, SOLUTION INTRAVENOUS; SUBCUTANEOUS at 12:00

## 2025-03-12 RX ADMIN — SENNOSIDES AND DOCUSATE SODIUM 2 TABLET: 50; 8.6 TABLET ORAL at 08:28

## 2025-03-12 RX ADMIN — LAMOTRIGINE 150 MG: 100 TABLET ORAL at 20:55

## 2025-03-12 RX ADMIN — LAMOTRIGINE 100 MG: 100 TABLET ORAL at 08:28

## 2025-03-12 RX ADMIN — GABAPENTIN 600 MG: 300 CAPSULE ORAL at 11:59

## 2025-03-12 SDOH — SOCIAL STABILITY: SOCIAL INSECURITY: HAVE YOU HAD ANY THOUGHTS OF HARMING ANYONE ELSE?: NO

## 2025-03-12 SDOH — SOCIAL STABILITY: SOCIAL INSECURITY: WITHIN THE LAST YEAR, HAVE YOU BEEN AFRAID OF YOUR PARTNER OR EX-PARTNER?: NO

## 2025-03-12 SDOH — ECONOMIC STABILITY: INCOME INSECURITY: IN THE PAST 12 MONTHS HAS THE ELECTRIC, GAS, OIL, OR WATER COMPANY THREATENED TO SHUT OFF SERVICES IN YOUR HOME?: NO

## 2025-03-12 SDOH — SOCIAL STABILITY: SOCIAL INSECURITY: HAS ANYONE EVER THREATENED TO HURT YOUR FAMILY OR YOUR PETS?: NO

## 2025-03-12 SDOH — ECONOMIC STABILITY: HOUSING INSECURITY: IN THE PAST 12 MONTHS, HOW MANY TIMES HAVE YOU MOVED WHERE YOU WERE LIVING?: 0

## 2025-03-12 SDOH — HEALTH STABILITY: MENTAL HEALTH: HOW OFTEN DO YOU HAVE A DRINK CONTAINING ALCOHOL?: NEVER

## 2025-03-12 SDOH — SOCIAL STABILITY: SOCIAL NETWORK
DO YOU BELONG TO ANY CLUBS OR ORGANIZATIONS SUCH AS CHURCH GROUPS, UNIONS, FRATERNAL OR ATHLETIC GROUPS, OR SCHOOL GROUPS?: YES

## 2025-03-12 SDOH — SOCIAL STABILITY: SOCIAL NETWORK: HOW OFTEN DO YOU ATTEND CHURCH OR RELIGIOUS SERVICES?: 1 TO 4 TIMES PER YEAR

## 2025-03-12 SDOH — ECONOMIC STABILITY: FOOD INSECURITY: WITHIN THE PAST 12 MONTHS, THE FOOD YOU BOUGHT JUST DIDN'T LAST AND YOU DIDN'T HAVE MONEY TO GET MORE.: NEVER TRUE

## 2025-03-12 SDOH — HEALTH STABILITY: PHYSICAL HEALTH: ON AVERAGE, HOW MANY MINUTES DO YOU ENGAGE IN EXERCISE AT THIS LEVEL?: 150+ MIN

## 2025-03-12 SDOH — SOCIAL STABILITY: SOCIAL INSECURITY: HAVE YOU HAD THOUGHTS OF HARMING ANYONE ELSE?: NO

## 2025-03-12 SDOH — HEALTH STABILITY: MENTAL HEALTH
DO YOU FEEL STRESS - TENSE, RESTLESS, NERVOUS, OR ANXIOUS, OR UNABLE TO SLEEP AT NIGHT BECAUSE YOUR MIND IS TROUBLED ALL THE TIME - THESE DAYS?: TO SOME EXTENT

## 2025-03-12 SDOH — SOCIAL STABILITY: SOCIAL NETWORK: IN A TYPICAL WEEK, HOW MANY TIMES DO YOU TALK ON THE PHONE WITH FAMILY, FRIENDS, OR NEIGHBORS?: THREE TIMES A WEEK

## 2025-03-12 SDOH — ECONOMIC STABILITY: HOUSING INSECURITY: AT ANY TIME IN THE PAST 12 MONTHS, WERE YOU HOMELESS OR LIVING IN A SHELTER (INCLUDING NOW)?: NO

## 2025-03-12 SDOH — SOCIAL STABILITY: SOCIAL INSECURITY: ARE YOU MARRIED, WIDOWED, DIVORCED, SEPARATED, NEVER MARRIED, OR LIVING WITH A PARTNER?: DIVORCED

## 2025-03-12 SDOH — SOCIAL STABILITY: SOCIAL INSECURITY: DO YOU FEEL ANYONE HAS EXPLOITED OR TAKEN ADVANTAGE OF YOU FINANCIALLY OR OF YOUR PERSONAL PROPERTY?: NO

## 2025-03-12 SDOH — SOCIAL STABILITY: SOCIAL INSECURITY: DO YOU FEEL UNSAFE GOING BACK TO THE PLACE WHERE YOU ARE LIVING?: NO

## 2025-03-12 SDOH — ECONOMIC STABILITY: HOUSING INSECURITY: DO YOU FEEL UNSAFE GOING BACK TO THE PLACE WHERE YOU LIVE?: NO

## 2025-03-12 SDOH — HEALTH STABILITY: MENTAL HEALTH: HOW OFTEN DO YOU HAVE SIX OR MORE DRINKS ON ONE OCCASION?: NEVER

## 2025-03-12 SDOH — SOCIAL STABILITY: SOCIAL INSECURITY: WITHIN THE LAST YEAR, HAVE YOU BEEN HUMILIATED OR EMOTIONALLY ABUSED IN OTHER WAYS BY YOUR PARTNER OR EX-PARTNER?: NO

## 2025-03-12 SDOH — SOCIAL STABILITY: SOCIAL INSECURITY: ARE YOU OR HAVE YOU BEEN THREATENED OR ABUSED PHYSICALLY, EMOTIONALLY, OR SEXUALLY BY ANYONE?: NO

## 2025-03-12 SDOH — SOCIAL STABILITY: SOCIAL NETWORK: HOW OFTEN DO YOU ATTEND MEETINGS OF THE CLUBS OR ORGANIZATIONS YOU BELONG TO?: 1 TO 4 TIMES PER YEAR

## 2025-03-12 SDOH — SOCIAL STABILITY: SOCIAL INSECURITY: ARE THERE ANY APPARENT SIGNS OF INJURIES/BEHAVIORS THAT COULD BE RELATED TO ABUSE/NEGLECT?: NO

## 2025-03-12 SDOH — HEALTH STABILITY: PHYSICAL HEALTH: ON AVERAGE, HOW MANY DAYS PER WEEK DO YOU ENGAGE IN MODERATE TO STRENUOUS EXERCISE (LIKE A BRISK WALK)?: 7 DAYS

## 2025-03-12 SDOH — SOCIAL STABILITY: SOCIAL INSECURITY: DOES ANYONE TRY TO KEEP YOU FROM HAVING/CONTACTING OTHER FRIENDS OR DOING THINGS OUTSIDE YOUR HOME?: NO

## 2025-03-12 SDOH — ECONOMIC STABILITY: FOOD INSECURITY: WITHIN THE PAST 12 MONTHS, YOU WORRIED THAT YOUR FOOD WOULD RUN OUT BEFORE YOU GOT THE MONEY TO BUY MORE.: NEVER TRUE

## 2025-03-12 SDOH — ECONOMIC STABILITY: HOUSING INSECURITY: IN THE LAST 12 MONTHS, WAS THERE A TIME WHEN YOU WERE NOT ABLE TO PAY THE MORTGAGE OR RENT ON TIME?: NO

## 2025-03-12 SDOH — ECONOMIC STABILITY: TRANSPORTATION INSECURITY: IN THE PAST 12 MONTHS, HAS LACK OF TRANSPORTATION KEPT YOU FROM MEDICAL APPOINTMENTS OR FROM GETTING MEDICATIONS?: NO

## 2025-03-12 SDOH — SOCIAL STABILITY: SOCIAL INSECURITY: ABUSE: ADULT

## 2025-03-12 SDOH — HEALTH STABILITY: MENTAL HEALTH: HOW MANY DRINKS CONTAINING ALCOHOL DO YOU HAVE ON A TYPICAL DAY WHEN YOU ARE DRINKING?: PATIENT DOES NOT DRINK

## 2025-03-12 SDOH — HEALTH STABILITY: MENTAL HEALTH: EXPERIENCED ANY OF THE FOLLOWING LIFE EVENTS: OTHER (COMMENT)

## 2025-03-12 SDOH — ECONOMIC STABILITY: FOOD INSECURITY: HOW HARD IS IT FOR YOU TO PAY FOR THE VERY BASICS LIKE FOOD, HOUSING, MEDICAL CARE, AND HEATING?: NOT HARD AT ALL

## 2025-03-12 SDOH — SOCIAL STABILITY: SOCIAL NETWORK: HOW OFTEN DO YOU GET TOGETHER WITH FRIENDS OR RELATIVES?: THREE TIMES A WEEK

## 2025-03-12 SDOH — SOCIAL STABILITY: SOCIAL INSECURITY: POSSIBLE ABUSE REPORTED TO:: OTHER (COMMENT)

## 2025-03-12 ASSESSMENT — ACTIVITIES OF DAILY LIVING (ADL)
GROOMING: INDEPENDENT
LACK_OF_TRANSPORTATION: NO
JUDGMENT_ADEQUATE_SAFELY_COMPLETE_DAILY_ACTIVITIES: YES
DRESSING YOURSELF: INDEPENDENT
HEARING - RIGHT EAR: FUNCTIONAL
PATIENT'S MEMORY ADEQUATE TO SAFELY COMPLETE DAILY ACTIVITIES?: YES
HEARING - LEFT EAR: FUNCTIONAL
TOILETING: INDEPENDENT
ADEQUATE_TO_COMPLETE_ADL: YES
FEEDING YOURSELF: INDEPENDENT
LACK_OF_TRANSPORTATION: NO
WALKS IN HOME: INDEPENDENT
BATHING: INDEPENDENT

## 2025-03-12 ASSESSMENT — COGNITIVE AND FUNCTIONAL STATUS - GENERAL
DAILY ACTIVITIY SCORE: 18
PERSONAL GROOMING: A LITTLE
HELP NEEDED FOR BATHING: A LITTLE
HELP NEEDED FOR BATHING: A LITTLE
WALKING IN HOSPITAL ROOM: A LITTLE
TURNING FROM BACK TO SIDE WHILE IN FLAT BAD: A LITTLE
STANDING UP FROM CHAIR USING ARMS: A LITTLE
DRESSING REGULAR UPPER BODY CLOTHING: A LITTLE
MOVING TO AND FROM BED TO CHAIR: A LITTLE
CLIMB 3 TO 5 STEPS WITH RAILING: A LOT
TURNING FROM BACK TO SIDE WHILE IN FLAT BAD: A LITTLE
TURNING FROM BACK TO SIDE WHILE IN FLAT BAD: A LITTLE
TOILETING: A LITTLE
MOVING TO AND FROM BED TO CHAIR: A LITTLE
MOVING FROM LYING ON BACK TO SITTING ON SIDE OF FLAT BED WITH BEDRAILS: A LITTLE
MOBILITY SCORE: 17
EATING MEALS: A LITTLE
TOILETING: A LITTLE
DRESSING REGULAR LOWER BODY CLOTHING: A LITTLE
CLIMB 3 TO 5 STEPS WITH RAILING: A LOT
DAILY ACTIVITIY SCORE: 18
DRESSING REGULAR UPPER BODY CLOTHING: A LITTLE
MOVING TO AND FROM BED TO CHAIR: A LITTLE
DAILY ACTIVITIY SCORE: 18
PERSONAL GROOMING: A LITTLE
CLIMB 3 TO 5 STEPS WITH RAILING: A LOT
DRESSING REGULAR LOWER BODY CLOTHING: A LITTLE
MOBILITY SCORE: 17
PATIENT BASELINE BEDBOUND: NO
DRESSING REGULAR UPPER BODY CLOTHING: A LITTLE
MOVING FROM LYING ON BACK TO SITTING ON SIDE OF FLAT BED WITH BEDRAILS: A LITTLE
WALKING IN HOSPITAL ROOM: A LITTLE
MOVING FROM LYING ON BACK TO SITTING ON SIDE OF FLAT BED WITH BEDRAILS: A LITTLE
EATING MEALS: A LITTLE
STANDING UP FROM CHAIR USING ARMS: A LITTLE
TOILETING: A LITTLE
EATING MEALS: A LITTLE
DRESSING REGULAR LOWER BODY CLOTHING: A LITTLE
TURNING FROM BACK TO SIDE WHILE IN FLAT BAD: A LITTLE
STANDING UP FROM CHAIR USING ARMS: A LITTLE
HELP NEEDED FOR BATHING: A LITTLE
DAILY ACTIVITIY SCORE: 18
PERSONAL GROOMING: A LITTLE
HELP NEEDED FOR BATHING: A LITTLE
MOVING FROM LYING ON BACK TO SITTING ON SIDE OF FLAT BED WITH BEDRAILS: A LITTLE
MOVING TO AND FROM BED TO CHAIR: A LITTLE
WALKING IN HOSPITAL ROOM: A LITTLE
PERSONAL GROOMING: A LITTLE
EATING MEALS: A LITTLE
DRESSING REGULAR LOWER BODY CLOTHING: A LITTLE
MOBILITY SCORE: 17
MOBILITY SCORE: 17
CLIMB 3 TO 5 STEPS WITH RAILING: A LOT
DRESSING REGULAR UPPER BODY CLOTHING: A LITTLE
STANDING UP FROM CHAIR USING ARMS: A LITTLE
WALKING IN HOSPITAL ROOM: A LITTLE
TOILETING: A LITTLE

## 2025-03-12 ASSESSMENT — PAIN SCALES - WONG BAKER
WONGBAKER_NUMERICALRESPONSE: HURTS LITTLE MORE
WONGBAKER_NUMERICALRESPONSE: HURTS WHOLE LOT
WONGBAKER_NUMERICALRESPONSE: HURTS WHOLE LOT
WONGBAKER_NUMERICALRESPONSE: HURTS LITTLE MORE

## 2025-03-12 ASSESSMENT — PAIN DESCRIPTION - ORIENTATION: ORIENTATION: LOWER;RIGHT;LEFT

## 2025-03-12 ASSESSMENT — PAIN SCALES - GENERAL
PAINLEVEL_OUTOF10: 4
PAINLEVEL_OUTOF10: 7
PAINLEVEL_OUTOF10: 5 - MODERATE PAIN
PAINLEVEL_OUTOF10: 8
PAINLEVEL_OUTOF10: 0 - NO PAIN

## 2025-03-12 ASSESSMENT — LIFESTYLE VARIABLES
SUBSTANCE_ABUSE_PAST_12_MONTHS: NO
AUDIT-C TOTAL SCORE: 0
SKIP TO QUESTIONS 9-10: 1
HOW MANY STANDARD DRINKS CONTAINING ALCOHOL DO YOU HAVE ON A TYPICAL DAY: PATIENT DOES NOT DRINK
PRESCIPTION_ABUSE_PAST_12_MONTHS: NO
SKIP TO QUESTIONS 9-10: 1
HOW OFTEN DO YOU HAVE A DRINK CONTAINING ALCOHOL: NEVER
HOW OFTEN DO YOU HAVE 6 OR MORE DRINKS ON ONE OCCASION: NEVER
AUDIT-C TOTAL SCORE: 0
AUDIT-C TOTAL SCORE: 0

## 2025-03-12 ASSESSMENT — PAIN - FUNCTIONAL ASSESSMENT
PAIN_FUNCTIONAL_ASSESSMENT: 0-10

## 2025-03-12 ASSESSMENT — PAIN DESCRIPTION - LOCATION
LOCATION: BACK
LOCATION: BACK

## 2025-03-12 NOTE — PROGRESS NOTES
03/12/25 1248   Discharge Planning   Living Arrangements Spouse/significant other   Support Systems Spouse/significant other   Assistance Needed walker   Type of Residence Private residence   Number of Stairs to Enter Residence 4   Number of Stairs Within Residence 13   Do you have animals or pets at home? Yes   Home or Post Acute Services In home services   Type of Home Care Services Home OT;Home PT   Expected Discharge Disposition Home Health   Patient Choice   Patient / Family choosing to utilize agency / facility established prior to hospitalization Yes   Intensity of Service   Intensity of Service 0-30 min     3/12/2025  Met with pt and spouse in room, introduced self and explained role. Per pt, ok to speak with spouse present.  Verified insurance, address, phone.   PCP_ Dr Jessica Diehl  Pharmacy- [facility] on [address]       Able to obtain and afford medications.   Pt is from home, lives with spouse. Uses a walker  (had back surgery last week). Performs own ADL's. Has shower seat. Spouse doing the driving currently.   Pt went home with [facility]- they had not come out yet. Would like to have them upon discharge.   Will ask for therapy.  Ct Team will continue to follow.   Nicky Gibson RN [facility]

## 2025-03-12 NOTE — ED PROVIDER NOTES
HPI   Chief Complaint   Patient presents with    Urinary Frequency     PT. C/O INCREASED URINARY FREQUENCY STARTING YESTERDAY. PT. STATES HAD BACK SURGERY WEDNESDAY WITH RODS AND SCREWS AND SPACERS PLACED, WAS JUST RELEASED YESTERDAY. PT. STATES HAD CATHETER THE DAY OF THE SURGERY AND WAS HAVING SOME DIFFICULTY URINATING WHILE IN THE HOSPITAL. PT. STATES STARTED WITH URINARY INCONTINENCE LAST NIGHT ALSO, NO HX OF THAT. PT. C/O LOWER ABD. PAIN.        64 y.o. 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 Reynolds) on 2/28/2024. Patient presented to the [facility] ED on 2/27 with 5 days acute on chronic back pain and RLE (likely L4) radiculopathy. CT L-spine demonstrated  multi-level spondylosis worse at L3-4, L4-5, L5-S1, no fracture. MRI LS demonstrated L3-4, L4-5 CCS, L5-S1 L foraminal stenosis. Flex ex xrays with no pathologic motion. She was admitted to Neurosurgery for further evaluation and management with robot-assisted L3-5 lumbar fusion by Dr. Arnulfo Reynolds on 3/5/2025.  Patient presents today complaining of urinary incontinence along with retention and dysuria.  Patient denies fevers or chills.  She states that she is unaware if she had a Quinn catheter during her procedure however she was catheterized multiple times              Patient History   Past Medical History:   Diagnosis Date    Abnormal ECG 10/26/2023    Sinus rhythm LVH by voltage Inferior infarct, old Anterior Q waves, possibly due to LVH    Angina pectoris     Anxiety     Cervical disc disease     Cervical radiculopathy     Neuro: Arnulfo Reynolsd LOV 1/15/24    Depression     Diabetes mellitus (Multi)     A1C: 2/6/24 -7.6%    GERD (gastroesophageal reflux disease)     History of Holter monitoring 10/2023    24 -48 hour monitor on 10/31/23, No abnormal findings    Hypertension     Incisional hernia with obstruction, without gangrene 05/24/2017    Incarcerated incisional hernia    Joint  pain     Mastodynia 05/14/2020    Breast pain in female    Palpitations     Stress test scheduled for 2/9/24    PONV (postoperative nausea and vomiting)     Seizure disorder (Multi)     Last seizure 2/2023    TIA (transient ischemic attack)     Several years ago, no residual symptoms    Vertigo     Vision loss      Past Surgical History:   Procedure Laterality Date    CARPAL TUNNEL RELEASE Right     CATARACT EXTRACTION      CHOLECYSTECTOMY  11/14/2014    Cholecystectomy    COLONOSCOPY  05/17/2018    Complete Colonoscopy    CT CHEST WO IV CONTRAST  04/21/2023    No acute intrathoracic abnormalities. No thoracic aortic aneurysm or subintimal hematoma.    ECHOCARDIOGRAM 2 D M MODE PANEL  02/17/2023    Left ventricular systolic function is normal with a 60-65% estimated ejection fraction.    HERNIA REPAIR  05/24/2017    Hernia Repair    MANDIBLE SURGERY Bilateral     OTHER SURGICAL HISTORY  11/14/2014    Surgery Intracardiac Mass Removal Left Atrial Myxoma    OTHER SURGICAL HISTORY      Xiphoidectomy    STERNOTOMY  2011     Family History   Problem Relation Name Age of Onset    Cancer Mother      Hypertension Mother      Heart disease Father      Cancer Father      Hypertension Father      Glaucoma Father      Macular degeneration Father      Heart attack Father      Cancer Sister      Diabetes Sister      Diabetes Maternal Grandfather       Social History     Tobacco Use    Smoking status: Never    Smokeless tobacco: Never   Vaping Use    Vaping status: Never Used   Substance Use Topics    Alcohol use: Yes     Comment: RARELY    Drug use: Never       Physical Exam   ED Triage Vitals [03/11/25 1708]   Temperature Heart Rate Respirations BP   37.7 °C (99.9 °F) (!) 113 20 117/71      Pulse Ox Temp Source Heart Rate Source Patient Position   97 % Temporal Monitor Sitting      BP Location FiO2 (%)     Right arm --       Physical Exam  Vitals and nursing note reviewed.   Constitutional:       General: She is not in acute  distress.     Appearance: Normal appearance. She is normal weight. She is not ill-appearing, toxic-appearing or diaphoretic.   HENT:      Head: Normocephalic.      Nose: Nose normal.      Mouth/Throat:      Mouth: Mucous membranes are moist.   Eyes:      Extraocular Movements: Extraocular movements intact.      Conjunctiva/sclera: Conjunctivae normal.   Cardiovascular:      Rate and Rhythm: Regular rhythm. Tachycardia present.      Pulses: Normal pulses.   Pulmonary:      Effort: Pulmonary effort is normal. No respiratory distress.      Breath sounds: Normal breath sounds.   Abdominal:      General: Abdomen is flat. There is no distension.      Palpations: Abdomen is soft.      Tenderness: There is no abdominal tenderness. There is no guarding or rebound.   Musculoskeletal:         General: Normal range of motion.      Cervical back: Normal range of motion and neck supple.   Skin:     General: Skin is warm and dry.      Capillary Refill: Capillary refill takes less than 2 seconds.   Neurological:      General: No focal deficit present.      Mental Status: She is alert and oriented to person, place, and time.   Psychiatric:         Mood and Affect: Mood normal.         Behavior: Behavior normal.         Thought Content: Thought content normal.         Judgment: Judgment normal.           ED Course & MDM   ED Course as of 03/11/25 2301   e Mar 11, 2025   2114 WBC(!): 16.2 [DS]   2114 HEMOGLOBIN: 12.8 [DS]   2114 Creatinine: 0.74 [DS]   2300 Lactate(!): 2.1 [DS]      ED Course User Index  [DS] Octavio Edgar PA-C                 No data recorded     Stephanie Coma Scale Score: 15 (03/11/25 2121 : Elma Christian RN)                           Medical Decision Making    Medical Decision Making & ED Course  Medical Decision Making:  Patient was seen and evaluated for UTI-like symptoms.  Patient recently had a lumbar fusion done by Dr. Reynolds on 3/5/2025.  Patient was discharged home yesterday.  The patient states bouts of  incontinence along with dysuria.  She states no obvious hematuria.  She denies any saddle anesthesia.  On exam the patient was found to be well-appearing.  Initial vital signs revealed a mild tachycardia with a borderline fever.  Patient was found to have some mild weakness to her right lower extremity in comparison to the left which is reported to be remote.  Blood work was obtained revealing a leukocytosis of 16.2.  Lactate level elevated at 2.1.  Bladder scan was performed revealing over 300 cc of urine present.  Quinn catheter was placed in a sterile fashion.  Urinalysis was concerning for urinary tract infection.  Given these findings sepsis was recognized and a sepsis alert was called.  I reviewed the patient's most recent urine cultures which revealed E. coli with near pan susceptibility.  Patient was provided with Tylenol along with aggressive IV hydration.  2 sets of blood cultures were obtained and the patient was subsequently started on antibiotics.  Case was also discussed with Dr. Arnulfo Reynolds neurosurgery on-call.  He requested that his service be placed on consultation.  No emergent imaging studies were recommended.  At this point I feel that this is unlikely representing a complication from the patient's surgery. Repeat lactate level will be obtained.  At this point the patient remains hemodynamically stable and is appropriate for the regular floor.  Case was discussed with Dr. Mckinley who accepted the patient for admission     --  Scoring Tools Utilized: SIRS criteria positive    Differential diagnoses considered include but are not limited to: Cauda equina, epidural abscess, epidural hematoma, UTI, pyelonephritis, acute urinary retention     Social Determinants of Health which Significantly Impact Care: None identified The following actions were taken to address these social determinants: None    EKG Independent Interpretation: EKG not obtained    Independent Result Review and Interpretation: Relevant  laboratory and radiographic results were reviewed and independently interpreted by myself.  As necessary, they are commented on in the ED Course.    Chronic conditions affecting the patient's care: None    The patient was discussed with the following consultants/services: Dr. Arnulfo Renyolds with neurosurgery    Care Considerations: Considered ordering emergent MRI of the lumbar spine, but chose not to because patient endorses no saddle anesthesia or new weakness to her lower extremities    ED Course:  ED Course as of 03/11/25 2301  ------------------------------------------------------------  Time: 03/11 2114  Value: WBC(!): 16.2  Comment: (Reviewed)  By: Octavio Edgar PA-C  ------------------------------------------------------------  Time: 03/11 2114  Value: HEMOGLOBIN: 12.8  Comment: (Reviewed)  By: Octavio Edgar PA-C  ------------------------------------------------------------  Time: 03/11 2114  Value: Creatinine: 0.74  Comment: (Reviewed)  By: Octavio Edgar PA-C  ------------------------------------------------------------  Time: 03/11 2300  Value: Lactate(!): 2.1  Comment: (Reviewed)  By: Octavio Edgar PA-C     Disposition  As a result of their workup, the patient will require admission to the hospital.  The patient was informed of her diagnosis.  The patient was given the opportunity to ask questions and I answered them. The patient agreed to be admitted to the hospital.      This was a shared visit with an ED attending.  The patient was seen and discussed with the ED attending    Octavio Edgar PA-C  Emergency Medicine            Procedure  Critical Care    Performed by: Octavio Edgar PA-C  Authorized by: Octavio Edgar PA-C    Critical care provider statement:     Critical care time (minutes):  60    Critical care start time:  3/11/2025 10:00 PM    Critical care end time:  3/11/2025 11:00 PM    Critical care time was exclusive of:  Separately billable procedures and treating other patients    Critical  care was necessary to treat or prevent imminent or life-threatening deterioration of the following conditions:  Sepsis    Critical care was time spent personally by me on the following activities:  Review of old charts, discussions with consultants, blood draw for specimens, ordering and review of laboratory studies, ordering and performing treatments and interventions, pulse oximetry, re-evaluation of patient's condition and examination of patient    Care discussed with: admitting provider         Octavio Edgar PA-C  03/11/25 0152

## 2025-03-12 NOTE — H&P
History Of Present Illness  Cristal Pollard is a 64 y.o. 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 Reynolds) on 2/28/2024. Patient presented to the [facility] ED on 2/27 with 5 days acute on chronic back pain and RLE (likely L4) radiculopathy. CT L-spine demonstrated  multi-level spondylosis worse at L3-S1, no fracture. MRI LS demonstrated L3-L5 CCS, L5-S1 L foraminal stenosis. She was admitted to Neurosurgery for further evaluation and management with robot-assisted L3-5 lumbar fusion by Dr. Arnulfo Reynolds on 3/5/2025.  Patient had urine retention and was cathed multiple times after surgery.  Patient presents today complaining of urinary incontinence along with retention and dysuria.  Patient denies fevers or chills.  States she was also constipated but last BM yesterday.  Currently endorses lumbar pain at surgical site which is managed by 5 Mg of oxycodone as needed.  She also states she gets Dilaudid for breakthrough pain but it is not listed in outpatient meds, I assume she was taking this during hospital stay.    ED course: Patient tachycardic on presentation which resolved, vitals otherwise stable.  Labs remarkable for leukocytosis of 16.2, elevated lactate of 2.1, UA suspicious for possible UTI.  Lactate to be trended by ED.  Blood cultures collected, neurosurgery consulted.  Sepsis protocol activated and Quinn inserted, patient treated with fluid bolus and covered with gentamicin for suspected UTI.     Past Medical History  Past Medical History:   Diagnosis Date    Abnormal ECG 10/26/2023    Sinus rhythm LVH by voltage Inferior infarct, old Anterior Q waves, possibly due to LVH    Angina pectoris     Anxiety     Cervical disc disease     Cervical radiculopathy     Neuro: Arnulfo Reynolds LOV 1/15/24    Depression     Diabetes mellitus (Multi)     A1C: 2/6/24 -7.6%    GERD (gastroesophageal reflux disease)     History of Holter monitoring 10/2023    24  -48 hour monitor on 10/31/23, No abnormal findings    Hypertension     Incisional hernia with obstruction, without gangrene 05/24/2017    Incarcerated incisional hernia    Joint pain     Mastodynia 05/14/2020    Breast pain in female    Palpitations     Stress test scheduled for 2/9/24    PONV (postoperative nausea and vomiting)     Seizure disorder (Multi)     Last seizure 2/2023    TIA (transient ischemic attack)     Several years ago, no residual symptoms    Vertigo     Vision loss        Surgical History  Past Surgical History:   Procedure Laterality Date    CARPAL TUNNEL RELEASE Right     CATARACT EXTRACTION      CHOLECYSTECTOMY  11/14/2014    Cholecystectomy    COLONOSCOPY  05/17/2018    Complete Colonoscopy    CT CHEST WO IV CONTRAST  04/21/2023    No acute intrathoracic abnormalities. No thoracic aortic aneurysm or subintimal hematoma.    ECHOCARDIOGRAM 2 D M MODE PANEL  02/17/2023    Left ventricular systolic function is normal with a 60-65% estimated ejection fraction.    HERNIA REPAIR  05/24/2017    Hernia Repair    MANDIBLE SURGERY Bilateral     OTHER SURGICAL HISTORY  11/14/2014    Surgery Intracardiac Mass Removal Left Atrial Myxoma    OTHER SURGICAL HISTORY      Xiphoidectomy    STERNOTOMY  2011        Social History  She reports that she has never smoked. She has never used smokeless tobacco. She reports current alcohol use. She reports that she does not use drugs.    Family History  Family History   Problem Relation Name Age of Onset    Cancer Mother      Hypertension Mother      Heart disease Father      Cancer Father      Hypertension Father      Glaucoma Father      Macular degeneration Father      Heart attack Father      Cancer Sister      Diabetes Sister      Diabetes Maternal Grandfather          Allergies  Tramadol, Amoxicillin, Ampicillin, Lidocaine, and Meperidine hcl    Review of Systems  10 point ROS negative except as specified in HPI    Physical Exam  HENT:      Head: Atraumatic.       "Nose: Nose normal.   Eyes:      Conjunctiva/sclera: Conjunctivae normal.   Cardiovascular:      Rate and Rhythm: Normal rate and regular rhythm.      Pulses: Normal pulses.   Pulmonary:      Effort: Pulmonary effort is normal.      Breath sounds: Normal breath sounds.   Abdominal:      Tenderness: There is no abdominal tenderness.   Musculoskeletal:         General: Tenderness (Lumbar surgical site) present.   Skin:     General: Skin is warm and dry.   Neurological:      Mental Status: She is alert. Mental status is at baseline.   Psychiatric:         Behavior: Behavior normal.          Last Recorded Vitals  Blood pressure 138/56, pulse 98, temperature 37.7 °C (99.9 °F), temperature source Temporal, resp. rate 20, height 1.651 m (5' 5\"), weight 78.5 kg (173 lb), SpO2 95%.    Relevant Results    Results for orders placed or performed during the hospital encounter of 03/11/25 (from the past 24 hours)   CBC and Auto Differential   Result Value Ref Range    WBC 16.2 (H) 4.4 - 11.3 x10*3/uL    nRBC 0.0 0.0 - 0.0 /100 WBCs    RBC 4.10 4.00 - 5.20 x10*6/uL    Hemoglobin 12.8 12.0 - 16.0 g/dL    Hematocrit 39.8 36.0 - 46.0 %    MCV 97 80 - 100 fL    MCH 31.2 26.0 - 34.0 pg    MCHC 32.2 32.0 - 36.0 g/dL    RDW 13.3 11.5 - 14.5 %    Platelets 368 150 - 450 x10*3/uL    Neutrophils % 68.3 40.0 - 80.0 %    Immature Granulocytes %, Automated 2.7 (H) 0.0 - 0.9 %    Lymphocytes % 20.9 13.0 - 44.0 %    Monocytes % 5.7 2.0 - 10.0 %    Eosinophils % 1.8 0.0 - 6.0 %    Basophils % 0.6 0.0 - 2.0 %    Neutrophils Absolute 11.09 (H) 1.20 - 7.70 x10*3/uL    Immature Granulocytes Absolute, Automated 0.43 0.00 - 0.70 x10*3/uL    Lymphocytes Absolute 3.38 1.20 - 4.80 x10*3/uL    Monocytes Absolute 0.92 0.10 - 1.00 x10*3/uL    Eosinophils Absolute 0.29 0.00 - 0.70 x10*3/uL    Basophils Absolute 0.09 0.00 - 0.10 x10*3/uL   Comprehensive metabolic panel   Result Value Ref Range    Glucose 192 (H) 74 - 99 mg/dL    Sodium 136 136 - 145 mmol/L    " Potassium 3.6 3.5 - 5.3 mmol/L    Chloride 100 98 - 107 mmol/L    Bicarbonate 25 21 - 32 mmol/L    Anion Gap 15 10 - 20 mmol/L    Urea Nitrogen 12 6 - 23 mg/dL    Creatinine 0.74 0.50 - 1.05 mg/dL    eGFR 90 >60 mL/min/1.73m*2    Calcium 10.4 (H) 8.6 - 10.3 mg/dL    Albumin 4.6 3.4 - 5.0 g/dL    Alkaline Phosphatase 53 33 - 136 U/L    Total Protein 8.0 6.4 - 8.2 g/dL    AST 19 9 - 39 U/L    Bilirubin, Total 0.5 0.0 - 1.2 mg/dL    ALT 29 7 - 45 U/L   Urinalysis with Reflex Culture and Microscopic   Result Value Ref Range    Color, Urine Light-Orange (N) Light-Yellow, Yellow, Dark-Yellow    Appearance, Urine Ex.Turbid (N) Clear    Specific Gravity, Urine 1.014 1.005 - 1.035    pH, Urine 5.5 5.0, 5.5, 6.0, 6.5, 7.0, 7.5, 8.0    Protein, Urine 30 (1+) (A) NEGATIVE, 10 (TRACE), 20 (TRACE) mg/dL    Glucose, Urine Normal Normal mg/dL    Blood, Urine 0.5 (2+) (A) NEGATIVE mg/dL    Ketones, Urine NEGATIVE NEGATIVE mg/dL    Bilirubin, Urine NEGATIVE NEGATIVE mg/dL    Urobilinogen, Urine Normal Normal mg/dL    Nitrite, Urine NEGATIVE NEGATIVE    Leukocyte Esterase, Urine 500 Stacy/uL (A) NEGATIVE   Microscopic Only, Urine   Result Value Ref Range    WBC, Urine >50 (A) 1-5, NONE /HPF    WBC Clumps, Urine MANY Reference range not established. /HPF    RBC, Urine >20 (A) NONE, 1-2, 3-5 /HPF    Bacteria, Urine 2+ (A) NONE SEEN /HPF    Budding Yeast, Urine PRESENT (A) NONE /HPF   Lactate   Result Value Ref Range    Lactate 2.1 (H) 0.4 - 2.0 mmol/L     *Note: Due to a large number of results and/or encounters for the requested time period, some results have not been displayed. A complete set of results can be found in Results Review.     No results found.       Assessment/Plan   Assessment & Plan  Sepsis without septic shock (Multi)    History of lumbar fusion    Cystitis    64-year-old female discharged from hospital yesterday after lumbar fusion presented to ED for evaluation of urinary incontinence, retention and dysuria.  Patient  found to be septic with concern for UTI.  Patient treated with fluids and covered with gentamicin and admitted to inpatient under Dr. Mckinley for further evaluation and care.    #Sepsis without septic shock  #Cystitis with hematuria  #History of lumbar fusion  #Constipation  -Continue fluids and gentamicin started in ED, monitor for hattie/nephrotoxicity   - Neurosurgery consulted in ED  - Follow urine culture, a.m. labs, lactate  - Maintain Quinn catheter  - Cardiac/carb consistent diet  - Hold blood thinners, NSAIDs due to hematuria  - Continue home oxycodone as needed for lumbar pain  - Continue senna, encourage adequate oral hydration, enema as needed    Chronic conditions  #Seizures  #Hypertension  #HLD  #T2DM  #GERD  #Radiculopathy  - Continue home meds when reconciled         I spent 45 minutes in the professional and overall care of this patient.      JENNIFER GonzalezC

## 2025-03-12 NOTE — CARE PLAN
The patient's goals for the shift include rest    The clinical goals for the shift include safety

## 2025-03-12 NOTE — CONSULTS
Reason For Consult  Recent lumbar spinal surgery    History Of Present Illness  Cristal Pollard is a 64 y.o. female presenting with UTI.  Patient recently underwent robotic assisted L3-5 TLIF on 03/05/2025 with Dr. Arnulfo Reynolds at [facility] for treatment of lumbar radiculopathy.  The patient was doing well and was discharged on 03/10/25 however on 03/11/2025 she stated that she had increased in her urinary frequency however was unable to urinate while sitting on the commode.  She returned back to bed in a somewhat upright seated position and had an episode of urinary incontinence.  She presented to the ED here at [facility] and has been diagnosed with UTI based on blood work and UA.  Final culture pending.     Past Medical History  She has a past medical history of Abnormal ECG (10/26/2023), Angina pectoris, Anxiety, Cervical disc disease, Cervical radiculopathy, Depression, Diabetes mellitus (Multi), GERD (gastroesophageal reflux disease), History of Holter monitoring (10/2023), Hypertension, Incisional hernia with obstruction, without gangrene (05/24/2017), Joint pain, Mastodynia (05/14/2020), Palpitations, PONV (postoperative nausea and vomiting), Seizure disorder (Multi), TIA (transient ischemic attack), Vertigo, and Vision loss.    Surgical History  She has a past surgical history that includes Other surgical history (11/14/2014); Cholecystectomy (11/14/2014); Colonoscopy (05/17/2018); Hernia repair (05/24/2017); Sternotomy (2011); echocardiogram 2 d m mode panel (02/17/2023); CT chest wo IV contrast (04/21/2023); Cataract extraction; Carpal tunnel release (Right); Other surgical history; and Mandible surgery (Bilateral).     Social History  She reports that she has never smoked. She has never used smokeless tobacco. She reports current alcohol use. She reports that she does not use drugs.    Family History  Family History   Problem Relation Name Age of Onset    Cancer Mother      Hypertension Mother      Heart disease  "Father      Cancer Father      Hypertension Father      Glaucoma Father      Macular degeneration Father      Heart attack Father      Cancer Sister      Diabetes Sister      Diabetes Maternal Grandfather          Allergies  Tramadol, Amoxicillin, Ampicillin, Lidocaine, and Meperidine hcl    Review of Systems  Patient denies headache, vision changes, shortness of breath, chest pain, abdominal pain, numbness or tingling in arms or legs, bowel or bladder changes.     Physical Exam  General: Well developed, awake/alert/oriented x3, no distress, alert and cooperative  Skin: Warm and dry, no lesions, no rashes  Incision: clean dry and intact with Exofin still intact, no s/s of infection noted.  ENMT: Mucous membranes moist, no apparent injury, no lesions seen  Head/Neck: Neck Supple, no apparent injury  Respiratory/Thorax: Normal breath sounds with good chest expansion, thorax symmetric  Cardiovascular: No pitting edema, no JVD    Yolyn Coma Scale  Best Eye Response: 4: Opens eyes spontaneously   Best Verbal Response: 5: Oriented, converses normally   Best Motor Response: 6: Obeys commands   Stephanie Coma Scale Score: 15    Motor Strength: 5/5 Throughout bilateral upper extremities and LLE.  4/5 in RLE    Muscle Bulk: Normal and symmetric in all extremities     Paraspinal muscle spasm/tenderness present bilaterally in the lumbar region and expected postoperatively    Midline tenderness present in the lumbar region expected postoperatively    Sensation to light touch intact throughout with the exception of right lower extremity slightly decreased.       Last Recorded Vitals  Blood pressure 112/64, pulse 75, temperature 36.3 °C (97.3 °F), resp. rate 16, height 1.651 m (5' 5\"), weight 78.5 kg (173 lb), SpO2 93%.    Relevant Results  No new imaging this visit     Assessment/Plan   Cristal Pollard is a 64 y.o. female presenting with UTI.  Patient recently underwent robotic assisted L3-5 TLIF on 03/05/2025 with Dr. Arnulfo Reynolds " at [facility] for treatment of lumbar radiculopathy.  The patient was doing well and was discharged on 03/10/25 however on 03/11/2025 she stated that she had increased in her urinary frequency however was unable to urinate while sitting on the commode.  She returned back to bed in a somewhat upright seated position and had an episode of urinary incontinence.  She presented to the ED here at [facility] and has been diagnosed with UTI based on blood work and UA.  Final culture pending.    On physical exam the patient is resting in bed.  She is alert and oriented x 3.  She has no focal deficits on neurologic exam.  She has 5/5 strength in bilateral upper extremities and left lower extremity.  Her right lower extremity is 4/5 which is improving since surgery.  She has some slight decrease sensation in the right lower extremity which again is improving since surgery.  Her incisions are clean dry and intact with Exofin still intact, no s/s of infection noted.    No acute neurosurgical interventions warranted.  Patient okay for PT/OT.  Defer treatment and discharge planning to primary team.  Neurosurgery to remain on board during hospitalization.    I spent 40 minutes in the professional and overall care of this patient.      Samantha A Meeson, APRN-CNP

## 2025-03-12 NOTE — CARE PLAN
The patient's goals for the shift include  rest    The clinical goals for the shift include  comfort      Problem: Pain - Adult  Goal: Verbalizes/displays adequate comfort level or baseline comfort level  Outcome: Progressing     Problem: Safety - Adult  Goal: Free from fall injury  Outcome: Progressing     Problem: Discharge Planning  Goal: Discharge to home or other facility with appropriate resources  Outcome: Progressing     Problem: Chronic Conditions and Co-morbidities  Goal: Patient's chronic conditions and co-morbidity symptoms are monitored and maintained or improved  Outcome: Progressing     Problem: Nutrition  Goal: Nutrient intake appropriate for maintaining nutritional needs  Outcome: Progressing

## 2025-03-12 NOTE — H&P
History Of Present Illness  Cristal Pollard is a 64 y.o. 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 Reynolds) on 2/28/2024. Patient presented to the [facility] ED on 2/27 with 5 days acute on chronic back pain and RLE (likely L4) radiculopathy. CT L-spine demonstrated  multi-level spondylosis worse at L3-S1, no fracture. MRI LS demonstrated L3-L5 CCS, L5-S1 L foraminal stenosis. She was admitted to Neurosurgery for further evaluation and management with robot-assisted L3-5 lumbar fusion by Dr. Arnulfo Reynolds on 3/5/2025.  Patient had urine retention and was cathed multiple times after surgery.  Patient presents today complaining of urinary incontinence along with retention and dysuria.  Patient denies fevers or chills.  States she was also constipated but last BM yesterday.  Currently endorses lumbar pain at surgical site which is managed by 5 Mg of oxycodone as needed.  She also states she gets Dilaudid for breakthrough pain but it is not listed in outpatient meds, I assume she was taking this during hospital stay.    ED course: Patient tachycardic on presentation which resolved, vitals otherwise stable.  Labs remarkable for leukocytosis of 16.2, elevated lactate of 2.1, UA suspicious for possible UTI.  Lactate to be trended by ED.  Blood cultures collected, neurosurgery consulted.  Sepsis protocol activated and Quinn inserted, patient treated with fluid bolus and covered with gentamicin for suspected UTI.     Past Medical History  Past Medical History:   Diagnosis Date    Abnormal ECG 10/26/2023    Sinus rhythm LVH by voltage Inferior infarct, old Anterior Q waves, possibly due to LVH    Angina pectoris     Anxiety     Cervical disc disease     Cervical radiculopathy     Neuro: Arnulfo Reynolds LOV 1/15/24    Depression     Diabetes mellitus (Multi)     A1C: 2/6/24 -7.6%    GERD (gastroesophageal reflux disease)     History of Holter monitoring 10/2023    24  -48 hour monitor on 10/31/23, No abnormal findings    Hypertension     Incisional hernia with obstruction, without gangrene 05/24/2017    Incarcerated incisional hernia    Joint pain     Mastodynia 05/14/2020    Breast pain in female    Palpitations     Stress test scheduled for 2/9/24    PONV (postoperative nausea and vomiting)     Seizure disorder (Multi)     Last seizure 2/2023    TIA (transient ischemic attack)     Several years ago, no residual symptoms    Vertigo     Vision loss        Surgical History  Past Surgical History:   Procedure Laterality Date    CARPAL TUNNEL RELEASE Right     CATARACT EXTRACTION      CHOLECYSTECTOMY  11/14/2014    Cholecystectomy    COLONOSCOPY  05/17/2018    Complete Colonoscopy    CT CHEST WO IV CONTRAST  04/21/2023    No acute intrathoracic abnormalities. No thoracic aortic aneurysm or subintimal hematoma.    ECHOCARDIOGRAM 2 D M MODE PANEL  02/17/2023    Left ventricular systolic function is normal with a 60-65% estimated ejection fraction.    HERNIA REPAIR  05/24/2017    Hernia Repair    MANDIBLE SURGERY Bilateral     OTHER SURGICAL HISTORY  11/14/2014    Surgery Intracardiac Mass Removal Left Atrial Myxoma    OTHER SURGICAL HISTORY      Xiphoidectomy    STERNOTOMY  2011        Social History  She reports that she has never smoked. She has never used smokeless tobacco. She reports current alcohol use. She reports that she does not use drugs.    Family History  Family History   Problem Relation Name Age of Onset    Cancer Mother      Hypertension Mother      Heart disease Father      Cancer Father      Hypertension Father      Glaucoma Father      Macular degeneration Father      Heart attack Father      Cancer Sister      Diabetes Sister      Diabetes Maternal Grandfather          Allergies  Tramadol, Amoxicillin, Ampicillin, Lidocaine, and Meperidine hcl    Review of Systems  10 point ROS negative except as specified in HPI    Physical Exam  HENT:      Head: Atraumatic.       "Nose: Nose normal.   Eyes:      Conjunctiva/sclera: Conjunctivae normal.   Cardiovascular:      Rate and Rhythm: Normal rate and regular rhythm.      Pulses: Normal pulses.   Pulmonary:      Effort: Pulmonary effort is normal.      Breath sounds: Normal breath sounds.   Abdominal:      Tenderness: There is no abdominal tenderness.   Musculoskeletal:         General: Tenderness (Lumbar surgical site) present.   Skin:     General: Skin is warm and dry.   Neurological:      Mental Status: She is alert. Mental status is at baseline.   Psychiatric:         Behavior: Behavior normal.          Last Recorded Vitals  Blood pressure 112/64, pulse 75, temperature 36.3 °C (97.3 °F), resp. rate 16, height 1.651 m (5' 5\"), weight 78.5 kg (173 lb), SpO2 93%.    Relevant Results    Results for orders placed or performed during the hospital encounter of 03/11/25 (from the past 24 hours)   CBC and Auto Differential   Result Value Ref Range    WBC 16.2 (H) 4.4 - 11.3 x10*3/uL    nRBC 0.0 0.0 - 0.0 /100 WBCs    RBC 4.10 4.00 - 5.20 x10*6/uL    Hemoglobin 12.8 12.0 - 16.0 g/dL    Hematocrit 39.8 36.0 - 46.0 %    MCV 97 80 - 100 fL    MCH 31.2 26.0 - 34.0 pg    MCHC 32.2 32.0 - 36.0 g/dL    RDW 13.3 11.5 - 14.5 %    Platelets 368 150 - 450 x10*3/uL    Neutrophils % 68.3 40.0 - 80.0 %    Immature Granulocytes %, Automated 2.7 (H) 0.0 - 0.9 %    Lymphocytes % 20.9 13.0 - 44.0 %    Monocytes % 5.7 2.0 - 10.0 %    Eosinophils % 1.8 0.0 - 6.0 %    Basophils % 0.6 0.0 - 2.0 %    Neutrophils Absolute 11.09 (H) 1.20 - 7.70 x10*3/uL    Immature Granulocytes Absolute, Automated 0.43 0.00 - 0.70 x10*3/uL    Lymphocytes Absolute 3.38 1.20 - 4.80 x10*3/uL    Monocytes Absolute 0.92 0.10 - 1.00 x10*3/uL    Eosinophils Absolute 0.29 0.00 - 0.70 x10*3/uL    Basophils Absolute 0.09 0.00 - 0.10 x10*3/uL   Comprehensive metabolic panel   Result Value Ref Range    Glucose 192 (H) 74 - 99 mg/dL    Sodium 136 136 - 145 mmol/L    Potassium 3.6 3.5 - 5.3 mmol/L "    Chloride 100 98 - 107 mmol/L    Bicarbonate 25 21 - 32 mmol/L    Anion Gap 15 10 - 20 mmol/L    Urea Nitrogen 12 6 - 23 mg/dL    Creatinine 0.74 0.50 - 1.05 mg/dL    eGFR 90 >60 mL/min/1.73m*2    Calcium 10.4 (H) 8.6 - 10.3 mg/dL    Albumin 4.6 3.4 - 5.0 g/dL    Alkaline Phosphatase 53 33 - 136 U/L    Total Protein 8.0 6.4 - 8.2 g/dL    AST 19 9 - 39 U/L    Bilirubin, Total 0.5 0.0 - 1.2 mg/dL    ALT 29 7 - 45 U/L   Urinalysis with Reflex Culture and Microscopic   Result Value Ref Range    Color, Urine Light-Orange (N) Light-Yellow, Yellow, Dark-Yellow    Appearance, Urine Ex.Turbid (N) Clear    Specific Gravity, Urine 1.014 1.005 - 1.035    pH, Urine 5.5 5.0, 5.5, 6.0, 6.5, 7.0, 7.5, 8.0    Protein, Urine 30 (1+) (A) NEGATIVE, 10 (TRACE), 20 (TRACE) mg/dL    Glucose, Urine Normal Normal mg/dL    Blood, Urine 0.5 (2+) (A) NEGATIVE mg/dL    Ketones, Urine NEGATIVE NEGATIVE mg/dL    Bilirubin, Urine NEGATIVE NEGATIVE mg/dL    Urobilinogen, Urine Normal Normal mg/dL    Nitrite, Urine NEGATIVE NEGATIVE    Leukocyte Esterase, Urine 500 Stacy/uL (A) NEGATIVE   Extra Urine Gray Tube   Result Value Ref Range    Extra Tube Hold for add-ons.    Microscopic Only, Urine   Result Value Ref Range    WBC, Urine >50 (A) 1-5, NONE /HPF    WBC Clumps, Urine MANY Reference range not established. /HPF    RBC, Urine >20 (A) NONE, 1-2, 3-5 /HPF    Bacteria, Urine 2+ (A) NONE SEEN /HPF    Budding Yeast, Urine PRESENT (A) NONE /HPF   Lactate   Result Value Ref Range    Lactate 2.1 (H) 0.4 - 2.0 mmol/L   Lactate   Result Value Ref Range    Lactate 1.6 0.4 - 2.0 mmol/L   Blood Culture    Specimen: Peripheral Venipuncture; Blood culture   Result Value Ref Range    Blood Culture Loaded on Instrument - Culture in progress    POCT GLUCOSE   Result Value Ref Range    POCT Glucose 188 (H) 74 - 99 mg/dL   POCT GLUCOSE   Result Value Ref Range    POCT Glucose 185 (H) 74 - 99 mg/dL   Basic metabolic panel   Result Value Ref Range    Glucose 202 (H)  74 - 99 mg/dL    Sodium 137 136 - 145 mmol/L    Potassium 3.6 3.5 - 5.3 mmol/L    Chloride 105 98 - 107 mmol/L    Bicarbonate 25 21 - 32 mmol/L    Anion Gap 11 10 - 20 mmol/L    Urea Nitrogen 11 6 - 23 mg/dL    Creatinine 0.62 0.50 - 1.05 mg/dL    eGFR >90 >60 mL/min/1.73m*2    Calcium 9.2 8.6 - 10.3 mg/dL   CBC   Result Value Ref Range    WBC 13.1 (H) 4.4 - 11.3 x10*3/uL    nRBC 0.0 0.0 - 0.0 /100 WBCs    RBC 3.42 (L) 4.00 - 5.20 x10*6/uL    Hemoglobin 10.7 (L) 12.0 - 16.0 g/dL    Hematocrit 33.2 (L) 36.0 - 46.0 %    MCV 97 80 - 100 fL    MCH 31.3 26.0 - 34.0 pg    MCHC 32.2 32.0 - 36.0 g/dL    RDW 13.7 11.5 - 14.5 %    Platelets 277 150 - 450 x10*3/uL     *Note: Due to a large number of results and/or encounters for the requested time period, some results have not been displayed. A complete set of results can be found in Results Review.     CT abdomen pelvis wo IV contrast    Result Date: 3/12/2025  Interpreted By:  Elmira Orozco, STUDY: CT ABDOMEN PELVIS WO IV CONTRAST;  3/11/2025 11:58 pm   INDICATION: Signs/Symptoms:uti with back pain.   COMPARISON: CT abdomen and pelvis 03/13/2024   ACCESSION NUMBER(S): [id number]   ORDERING CLINICIAN: SARBJIT FONSECA   TECHNIQUE: CT of the abdomen and pelvis was performed with no contrast administration. Coronal and sagittal reformats were obtained.   FINDINGS: LOWER CHEST: No focal consolidation or pleural effusion.   ABDOMEN:   LIVER: Unremarkable unenhanced appearance.   BILE DUCTS: No obvious dilation.   GALLBLADDER: The gallbladder is surgically absent.   PANCREAS: No peripancreatic inflammatory changes.   SPLEEN: Unremarkable unenhanced appearance.   ADRENAL GLANDS: Unremarkable.   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 No hydronephrosis or hydroureter.  No obstructing ureteral calculi.   BOWEL: No bowel obstruction. There is a large amount of stool at the colon. The appendix is normal.   VESSELS: Atherosclerotic  calcifications within the abdominal aorta and its branches. No abdominal aortic aneurysm.   PELVIS: The urinary bladder is  decompressed by a Quinn catheter. There is a small amount of gas at the urinary bladder, may be secondary to recent instrumentation. The uterus is present.   PERITONEUM/RETROPERITONEUM/LYMPH NODES: No free fluid or free air.  No retroperitoneal hemorrhage.  No pathologically enlarged lymph nodes are identified.   ABDOMINAL WALL: Postsurgical changes are noted at the anterior abdominal wall.   BONE AND SOFT TISSUE: Multilevel degenerative changes at the spine. Postsurgical changes at the lumbar spine with posterior orthopedic hardware at L3, L4 and L5. Intervertebral disc spacers at L3-L4 and L4-L5.  Scattered gas foci with small amount of fluid at the paraspinal soft tissues overlying the lower lumbar spine, probably secondary to recent surgery.   IMPRESSION 1.  No hydronephrosis or obstructing ureteral calculi. No acute findings on unenhanced CT. 2. Constipation with large amount of stool at the colon.       MACRO: None.   Signed by: Elmira Orozco 3/12/2025 12:58 AM Dictation workstation:   [id number]        Assessment/Plan   Assessment & Plan  Sepsis without septic shock (Multi)    History of lumbar fusion    Cystitis    64-year-old female discharged from hospital yesterday after lumbar fusion presented to ED for evaluation of urinary incontinence, retention and dysuria.  Patient found to be septic with concern for UTI.  Patient treated with fluids and covered with gentamicin and admitted to inpatient under Dr. Mckinley for further evaluation and care.    #Sepsis without septic shock  #Cystitis with hematuria  #History of lumbar fusion  #Constipation  -Continue fluids and gentamicin started in ED, monitor for hattie/nephrotoxicity   - Neurosurgery consulted in ED  - Follow urine culture, a.m. labs, lactate  - Maintain Quinn catheter  - Cardiac/carb consistent diet  - Hold blood thinners, NSAIDs  due to hematuria  - Continue home oxycodone as needed for lumbar pain  - Continue senna, encourage adequate oral hydration, enema as needed    Chronic conditions  #Seizures  #Hypertension  #HLD  #T2DM  #GERD  #Radiculopathy  - Continue home meds when reconciled       3/12: doing well, increase pain meds, neurosurgyer consult given recent back surgery    I spent 45 minutes in the professional and overall care of this patient.      Khari Mckinley, DO

## 2025-03-13 LAB
GLUCOSE BLD MANUAL STRIP-MCNC: 163 MG/DL (ref 74–99)
GLUCOSE BLD MANUAL STRIP-MCNC: 188 MG/DL (ref 74–99)
GLUCOSE BLD MANUAL STRIP-MCNC: 212 MG/DL (ref 74–99)
GLUCOSE BLD MANUAL STRIP-MCNC: 217 MG/DL (ref 74–99)

## 2025-03-13 PROCEDURE — 97165 OT EVAL LOW COMPLEX 30 MIN: CPT | Mod: GO

## 2025-03-13 PROCEDURE — 2500000002 HC RX 250 W HCPCS SELF ADMINISTERED DRUGS (ALT 637 FOR MEDICARE OP, ALT 636 FOR OP/ED)

## 2025-03-13 PROCEDURE — 2500000001 HC RX 250 WO HCPCS SELF ADMINISTERED DRUGS (ALT 637 FOR MEDICARE OP)

## 2025-03-13 PROCEDURE — 97162 PT EVAL MOD COMPLEX 30 MIN: CPT | Mod: GP

## 2025-03-13 PROCEDURE — 2500000001 HC RX 250 WO HCPCS SELF ADMINISTERED DRUGS (ALT 637 FOR MEDICARE OP): Performed by: STUDENT IN AN ORGANIZED HEALTH CARE EDUCATION/TRAINING PROGRAM

## 2025-03-13 PROCEDURE — 82947 ASSAY GLUCOSE BLOOD QUANT: CPT

## 2025-03-13 PROCEDURE — 1100000001 HC PRIVATE ROOM DAILY

## 2025-03-13 PROCEDURE — 80170 ASSAY OF GENTAMICIN: CPT | Mod: PARLAB

## 2025-03-13 PROCEDURE — 99232 SBSQ HOSP IP/OBS MODERATE 35: CPT | Performed by: STUDENT IN AN ORGANIZED HEALTH CARE EDUCATION/TRAINING PROGRAM

## 2025-03-13 RX ORDER — CYCLOBENZAPRINE HCL 10 MG
5 TABLET ORAL 3 TIMES DAILY PRN
Status: DISCONTINUED | OUTPATIENT
Start: 2025-03-13 | End: 2025-03-15 | Stop reason: HOSPADM

## 2025-03-13 RX ADMIN — GABAPENTIN 600 MG: 300 CAPSULE ORAL at 20:49

## 2025-03-13 RX ADMIN — GABAPENTIN 600 MG: 300 CAPSULE ORAL at 08:02

## 2025-03-13 RX ADMIN — OXYCODONE HYDROCHLORIDE 10 MG: 5 TABLET ORAL at 22:20

## 2025-03-13 RX ADMIN — OXYCODONE HYDROCHLORIDE 10 MG: 5 TABLET ORAL at 15:45

## 2025-03-13 RX ADMIN — CYCLOBENZAPRINE 5 MG: 10 TABLET, FILM COATED ORAL at 20:55

## 2025-03-13 RX ADMIN — INSULIN LISPRO 2 UNITS: 100 INJECTION, SOLUTION INTRAVENOUS; SUBCUTANEOUS at 08:03

## 2025-03-13 RX ADMIN — LAMOTRIGINE 150 MG: 100 TABLET ORAL at 20:49

## 2025-03-13 RX ADMIN — FLUOXETINE HYDROCHLORIDE 40 MG: 20 CAPSULE ORAL at 20:49

## 2025-03-13 RX ADMIN — PANTOPRAZOLE SODIUM 40 MG: 40 TABLET, DELAYED RELEASE ORAL at 06:19

## 2025-03-13 RX ADMIN — SENNOSIDES AND DOCUSATE SODIUM 2 TABLET: 50; 8.6 TABLET ORAL at 08:02

## 2025-03-13 RX ADMIN — LAMOTRIGINE 100 MG: 100 TABLET ORAL at 08:02

## 2025-03-13 RX ADMIN — SENNOSIDES AND DOCUSATE SODIUM 2 TABLET: 50; 8.6 TABLET ORAL at 20:49

## 2025-03-13 RX ADMIN — OXYCODONE HYDROCHLORIDE 10 MG: 5 TABLET ORAL at 08:02

## 2025-03-13 RX ADMIN — GABAPENTIN 600 MG: 300 CAPSULE ORAL at 15:45

## 2025-03-13 RX ADMIN — INSULIN LISPRO 4 UNITS: 100 INJECTION, SOLUTION INTRAVENOUS; SUBCUTANEOUS at 11:41

## 2025-03-13 RX ADMIN — INSULIN LISPRO 2 UNITS: 100 INJECTION, SOLUTION INTRAVENOUS; SUBCUTANEOUS at 18:03

## 2025-03-13 RX ADMIN — CYCLOBENZAPRINE 5 MG: 10 TABLET, FILM COATED ORAL at 11:41

## 2025-03-13 ASSESSMENT — COGNITIVE AND FUNCTIONAL STATUS - GENERAL
HELP NEEDED FOR BATHING: A LOT
STANDING UP FROM CHAIR USING ARMS: A LITTLE
MOVING TO AND FROM BED TO CHAIR: A LITTLE
MOVING TO AND FROM BED TO CHAIR: A LITTLE
MOVING FROM LYING ON BACK TO SITTING ON SIDE OF FLAT BED WITH BEDRAILS: A LITTLE
WALKING IN HOSPITAL ROOM: A LITTLE
EATING MEALS: A LITTLE
MOBILITY SCORE: 17
DRESSING REGULAR LOWER BODY CLOTHING: A LOT
WALKING IN HOSPITAL ROOM: A LITTLE
CLIMB 3 TO 5 STEPS WITH RAILING: A LOT
MOVING FROM LYING ON BACK TO SITTING ON SIDE OF FLAT BED WITH BEDRAILS: A LITTLE
CLIMB 3 TO 5 STEPS WITH RAILING: A LOT
TOILETING: A LITTLE
TURNING FROM BACK TO SIDE WHILE IN FLAT BAD: A LITTLE
MOBILITY SCORE: 17
DRESSING REGULAR LOWER BODY CLOTHING: A LITTLE
DAILY ACTIVITIY SCORE: 18
DAILY ACTIVITIY SCORE: 18
DRESSING REGULAR UPPER BODY CLOTHING: A LITTLE
PERSONAL GROOMING: A LITTLE
STANDING UP FROM CHAIR USING ARMS: A LITTLE
HELP NEEDED FOR BATHING: A LITTLE
TURNING FROM BACK TO SIDE WHILE IN FLAT BAD: A LITTLE
TOILETING: A LOT

## 2025-03-13 ASSESSMENT — PAIN SCALES - GENERAL
PAINLEVEL_OUTOF10: 0 - NO PAIN
PAINLEVEL_OUTOF10: 4
PAINLEVEL_OUTOF10: 7
PAINLEVEL_OUTOF10: 6
PAINLEVEL_OUTOF10: 4
PAINLEVEL_OUTOF10: 4
PAINLEVEL_OUTOF10: 6
PAINLEVEL_OUTOF10: 8
PAINLEVEL_OUTOF10: 0 - NO PAIN

## 2025-03-13 ASSESSMENT — PAIN - FUNCTIONAL ASSESSMENT
PAIN_FUNCTIONAL_ASSESSMENT: 0-10

## 2025-03-13 ASSESSMENT — PAIN SCALES - WONG BAKER
WONGBAKER_NUMERICALRESPONSE: HURTS WHOLE LOT
WONGBAKER_NUMERICALRESPONSE: HURTS LITTLE MORE

## 2025-03-13 ASSESSMENT — PAIN DESCRIPTION - ORIENTATION: ORIENTATION: RIGHT

## 2025-03-13 ASSESSMENT — PAIN DESCRIPTION - LOCATION: LOCATION: HIP

## 2025-03-13 NOTE — PROGRESS NOTES
"Cristal Pollard is a 64 y.o. female on day 2 of admission presenting with Sepsis without septic shock (Multi).    Subjective   Pain doing well       Objective     Physical Exam  Constitutional:       Appearance: Normal appearance.   HENT:      Head: Normocephalic and atraumatic.      Right Ear: Tympanic membrane and ear canal normal.      Left Ear: Tympanic membrane and ear canal normal.      Mouth/Throat:      Mouth: Mucous membranes are moist.      Pharynx: Oropharynx is clear.   Eyes:      Extraocular Movements: Extraocular movements intact.      Conjunctiva/sclera: Conjunctivae normal.      Pupils: Pupils are equal, round, and reactive to light.   Cardiovascular:      Rate and Rhythm: Normal rate and regular rhythm.      Pulses: Normal pulses.      Heart sounds: Normal heart sounds.   Pulmonary:      Effort: Pulmonary effort is normal.      Breath sounds: Normal breath sounds.   Abdominal:      General: Abdomen is flat. Bowel sounds are normal.      Palpations: Abdomen is soft.   Musculoskeletal:         General: Normal range of motion.      Cervical back: Normal range of motion and neck supple.   Skin:     General: Skin is warm and dry.      Capillary Refill: Capillary refill takes 2 to 3 seconds.   Neurological:      General: No focal deficit present.      Mental Status: She is alert and oriented to person, place, and time. Mental status is at baseline.   Psychiatric:         Mood and Affect: Mood normal.         Behavior: Behavior normal.         Thought Content: Thought content normal.         Judgment: Judgment normal.         Last Recorded Vitals  Blood pressure 111/58, pulse 77, temperature 36.6 °C (97.9 °F), temperature source Temporal, resp. rate 18, height 1.651 m (5' 5\"), weight 78.5 kg (173 lb), SpO2 94%.  Intake/Output last 3 Shifts:  I/O last 3 completed shifts:  In: 1990.2 (25.4 mL/kg) [I.V.:771.7 (9.8 mL/kg); IV Piggyback:1218.5]  Out: 2250 (28.7 mL/kg) [Urine:2250 (0.8 mL/kg/hr)]  Weight: 78.5 kg "     Relevant Results                 This patient has a urinary catheter   Reason for the urinary catheter remaining today? Urine catheter unnecessary, will be removed today               Assessment/Plan   Assessment & Plan  Sepsis without septic shock (Multi)    History of lumbar fusion    Cystitis    64-year-old female discharged from hospital yesterday after lumbar fusion presented to ED for evaluation of urinary incontinence, retention and dysuria.  Patient found to be septic with concern for UTI.  Patient treated with fluids and covered with gentamicin and admitted to inpatient under Dr. Mckinley for further evaluation and care.     #Sepsis without septic shock  #Cystitis with hematuria  #History of lumbar fusion  #Constipation  -Continue fluids and gentamicin started in ED, monitor for hattie/nephrotoxicity   - Neurosurgery consulted in ED  - Follow urine culture, a.m. labs, lactate  - Maintain Quinn catheter  - Cardiac/carb consistent diet  - Hold blood thinners, NSAIDs due to hematuria  - Continue home oxycodone as needed for lumbar pain  - Continue senna, encourage adequate oral hydration, enema as needed     Chronic conditions  #Seizures  #Hypertension  #HLD  #T2DM  #GERD  #Radiculopathy  - Continue home meds when reconciled     3/12: doing well, increase pain meds, neurosurgyer consult given recent back surgery    Tiral void tomorrow, contineu abx no issues       I spent 35 minutes in the professional and overall care of this patient.      Khari Mckinley, DO

## 2025-03-13 NOTE — CARE PLAN
The patient's goals for the shift include rest and pain control  Problem: Pain - Adult  Goal: Verbalizes/displays adequate comfort level or baseline comfort level  Outcome: Progressing     Problem: Safety - Adult  Goal: Free from fall injury  Outcome: Progressing     Problem: Discharge Planning  Goal: Discharge to home or other facility with appropriate resources  Outcome: Progressing     Problem: Chronic Conditions and Co-morbidities  Goal: Patient's chronic conditions and co-morbidity symptoms are monitored and maintained or improved  Outcome: Progressing     Problem: Nutrition  Goal: Nutrient intake appropriate for maintaining nutritional needs  Outcome: Progressing       The clinical goals for the shift include safety

## 2025-03-13 NOTE — PROGRESS NOTES
Occupational Therapy    Evaluation    Patient Name: Cristal Pollard  MRN: [id number]  Today's Date: 3/13/2025  Time Calculation  Start Time: 1036  Stop Time: 1100  Time Calculation (min): 24 min  720/720-A    Assessment  IP OT Assessment  OT Assessment: HPI:  [facility] ED on 2/27 with 5 days acute on chronic back pain and RLE (likely L4) radiculopathy. CT L-spine demonstrated  multi-level spondylosis worse at L3-S1, no fracture. MRI LS demonstrated L3-L5 CCS, L5-S1 L foraminal stenosis. She was admitted to Neurosurgery for further evaluation and management with robot-assisted L3-5 lumbar fusion by Dr. Arnulfo Reynolds on 3/5/2025.  Patient had urine retention and was cathed multiple times after surgery.  Patient presents [facility] hospitalization 3/11 complaining of urinary incontinence along with retention and dysuria.  Patient would benefit from further OT to address ADL's and functional transfers/mobility while recovering from surgery.  Prognosis: Good  Evaluation/Treatment Tolerance: Patient limited by pain  End of Session Communication: Bedside nurse  End of Session Patient Position: Up in chair, Alarm off, not on at start of session; call-light within reach    Plan:  Treatment Interventions: ADL retraining, Functional transfer training, Endurance training, Compensatory technique education, Patient/family training, Equipment evaluation/education (proper body mechanics training)  OT Frequency: 3 times per week  OT Discharge Recommendations: Low intensity level of continued care  OT - OK to Discharge: Yes to next level of care when medically cleared by physician/medical team    Subjective   Current Problem:  1. Sepsis without septic shock (Multi)        2. Cystitis        3. History of lumbar fusion          General:  General  Reason for Referral: recent surgery:  3/5/2025 lumbar fusion  Referred By: Khari Mckinley DO  Past Medical History Relevant to Rehab: HTN, HLD, seziures (on lamictal) and BUE radiculopathy s/p C4-7 ACDF (by   Smith) on 2/28/2024  Co-Treatment: PT Co-eval to maximize safety during mobility tasks  Prior to Session Communication: Bedside nurse who confirmed that patient is medically stable to participate in this OT session  Patient Position Received: Bed, 2 rail up, Alarm off, not on at start of session  General Comment: Patient seen in room ; cooperative, motivated    Precautions:  Medical Precautions: Fall precautions  Post-Surgical Precautions: Spinal precautions    Pain:  Pain Assessment  Pain Assessment: 0-10  0-10 (Numeric) Pain Score: 6  Pain Location: Leg  Pain Orientation: Right  Pain Interventions: Repositioned    Objective   Cognition:  Overall Cognitive Status: Within Functional Limits  Orientation Level: Oriented X4     Home Living:  Type of Home: House  Lives With: Significant other  Home Adaptive Equipment: Walker rolling or standard  Home Layout: One level  Home Access: Stairs to enter with rails (5)  Bathroom Shower/Tub: Tub/shower unit  Bathroom Equipment:  (bath seat)     Prior Function:  Level of [place]: Independent with ADLs and functional transfers, Independent with homemaking with ambulation (prior to hospitalizations)  Prior Function Comments: drives; retired sub-teacher    ADL:  ADL Comments: To further address lower body self-care in OT sessions using adaptive equipment/assistive techniques and proper body mechanics as needed to facilitate indep and ease in performance.    Bed Mobility/Transfers:   Bed Mobility 1  Bed Mobility 1: Side lying right to sit  Level of Assistance 1:  (SBA)  Transfers  Transfer: Yes  Transfer 1  Transfer From 1: Sit to, Stand to  Transfer to 1: Bed, Chair with arms  Transfer Device 1: Walker  Transfer Level of Assistance 1: Contact guard, Minimal verbal cues    Ambulation/Gait Training:  Functional Mobility  Functional Mobility Performed: Yes  Functional Mobility 1  Device 1: Rolling walker  Assistance 1: Contact guard, Minimal verbal cues  Comments 1: ambulated  from bed to chair    Sitting Balance:  Static Sitting Balance  Static Sitting-Level of Assistance: Independent    Standing Balance:  Static Standing Balance  Static Standing-Level of Assistance: Contact guard (good (-))     Sensation:  Light Touch: No apparent deficits    Extremities: RUE   RUE : Within Functional Limits (grossly observed during functional tasks) and LUE   LUE: Within Functional Limits (grossly observed during functional tasks)    Outcome Measures: [facility] Daily Activity  Putting on and taking off regular lower body clothing: A lot  Bathing (including washing, rinsing, drying): A lot  Putting on and taking off regular upper body clothing: None  Toileting, which includes using toilet, bedpan or urinal: A lot  Taking care of personal grooming such as brushing teeth: None  Eating Meals: None  Daily Activity - Total Score: 18     Goals:   Encounter Problems       Encounter Problems (Active)       OT Goals       Patient will complete lower body bathing/dressing; toileting with modified independence using assistive techniques/adaptive equipment as needed  (Progressing)       Start:  03/13/25    Expected End:  03/20/25            Patient will perform bed mobility and functional transfers safely and independently: bed, chair, commode using DME as needed  (Progressing)       Start:  03/13/25    Expected End:  03/20/25            Patient will tolerate standing for 5 mins. and show overall good standing balance during ADL's and functional transfers/mobility  (Progressing)       Start:  03/13/25    Expected End:  03/20/25            apply proper body mechanics to ADL's and functional transfers (Progressing)       Start:  03/13/25    Expected End:  03/20/25

## 2025-03-13 NOTE — CARE PLAN
The patient's goals for the shift include rest    The clinical goals for the shift include rest and comfort

## 2025-03-13 NOTE — PROGRESS NOTES
Physical Therapy    Physical Therapy Evaluation    Patient Name: Cristal Pollard  MRN: [id number]  Today's Date: 3/13/2025   Time Calculation  Start Time: 1035  Stop Time: 1100  Time Calculation (min): 25 min  720/720-A    Assessment/Plan   PT Assessment  PT Assessment Results: Decreased strength, Decreased endurance, Decreased mobility, Impaired balance  Rehab Prognosis: Good  Barriers to Discharge Home: No anticipated barriers  Evaluation/Treatment Tolerance: Patient limited by fatigue, Patient limited by pain  End of Session Communication: Bedside nurse  Assessment Comment: Pt admitted without septic shock due to UTI and recent lumbar fusion L3-5 on 3/5/25. Pt demos decreased posture, endurance and strength. Pt would benefit from continued PT while admitted to improve functional mobility and continue low intensity therapy once d/c home.  End of Session Patient Position: Up in chair, Alarm off, not on at start of session  IP OR SWING BED PT PLAN  Inpatient or Swing Bed: Inpatient  PT Plan  Treatment/Interventions: Bed mobility, Transfer training, Gait training, Stair training, Balance training, Strengthening, Endurance training, Therapeutic exercise, Therapeutic activity  PT Plan: Ongoing PT  PT Frequency: 3 times per week  PT Discharge Recommendations: Low intensity level of continued care  PT - OK to Discharge: Yes    Subjective     Current Problem:  1. Sepsis without septic shock (Multi)        2. Cystitis        3. History of lumbar fusion          Patient Active Problem List   Diagnosis    Anxiety disorder    Depression, recurrent (CMS-HCC)    Depressive personality disorder    Neck strain    Post traumatic stress disorder (PTSD)    Whiplash injury to neck    Stress reaction, chronic    Benign paroxysmal positional vertigo due to bilateral vestibular disorder    Chronic neck pain    Cervical radiculopathy due to trauma    Bulging of cervical intervertebral disc    Cervical paraspinous muscle spasm    Bilateral dry  eyes    Soft tissue mass    Grief reaction    Hypercholesteremia    Hypertension    Insomnia    Vestibular dysfunction of both ears    Visual changes    Vitamin D deficiency    Chest pain    Headache    Central stenosis of spinal canal    DJD (degenerative joint disease) of cervical spine    Facet arthropathy, cervical    HNP (herniated nucleus pulposus), cervical    Lumbar facet arthropathy    Right lumbosacral radiculopathy    Spinal stenosis of lumbar region with neurogenic claudication    Xerosis cutis    Weakness of both upper extremities    Type 2 diabetes mellitus    Status post surgery    Shoulder tendinitis, right    Rotator cuff syndrome of right shoulder    Short-term memory loss    Seizure disorder (Multi)    Rosacea, unspecified    PVD (posterior vitreous detachment), left eye    Psychological factor affecting physical condition    Poikiloderma of [place]    Panic attacks    Palpitations    Pain disorder associated with psychological and physical factors    Other melanin hyperpigmentation    Occipital neuralgia    Neoplasm of unspecified behavior of bone, soft tissue, and skin    MGD (meibomian gland disease)    Melanocytic nevi of unspecified eyelid, including canthus    Melanocytic nevi of unspecified ear and external auricular canal    Melanocytic nevi of unspecified upper limb, including shoulder    Melanocytic nevi of unspecified part of face    Melanocytic nevi of unspecified lower limb, including hip    Melanocytic nevi of trunk    Melanocytic nevi of scalp and neck    Medial epicondylitis of right elbow    Late effects of CVA (cerebrovascular accident)    Internal impingement of right shoulder    History of colon polyps    Hemangioma of skin and subcutaneous tissue    Benign lipomatous neoplasm of skin and subcutaneous tissue of right arm    Generalized tonic clonic epilepsy (Multi)    Fatigue    Elevated blood sugar    Dyspepsia    DDD (degenerative disc disease), lumbosacral    Carpal tunnel  syndrome on right    Atrial myxoma (HHS-HCC)    Other skin changes due to chronic exposure to nonionizing radiation    Other seborrheic keratosis    Erythema intertrigo    Actinic keratosis    Class 1 obesity due to excess calories without serious comorbidity with body mass index (BMI) of 30.0 to 30.9 in adult    Cervical radiculopathy    Chronic right hip pain    Difficulty in walking    Diabetes mellitus (Multi)    Right hip pain    Back pain with radiculopathy    Back pain of thoracolumbar region    PONV (postoperative nausea and vomiting)    Acute postoperative pain    Sepsis without septic shock (Multi)    History of lumbar fusion    Cystitis       General Visit Information:  General  Reason for Referral: PT eval and treat; recent surgery  Referred By: Khari Mckinley DO  Past Medical History Relevant to Rehab: HTN, HLD, seziures (on lamictal) and BUE radiculopathy s/p C4-7 ACDF (by Dr. Reynolds) on 2/28/2024  Co-Treatment: OT  Prior to Session Communication: Bedside nurse  Patient Position Received: Bed, 2 rail up, Alarm off, not on at start of session  General Comment: Pt pleasant and agreeable to therapy.    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, + driving, retired sub teacher    Precautions:  Precautions  Medical Precautions: Fall precautions  Post-Surgical Precautions: Spinal precautions     Objective     Pain:  Pain Assessment  Pain Assessment: 0-10  0-10 (Numeric) Pain Score:  6  Pain Location: Leg  Pain Orientation: Right (and back)  Pain Interventions: Repositioned    Cognition:  Cognition  Overall Cognitive Status: Within Functional Limits  Orientation Level: Oriented X4    General Assessments:  General Observation  General Observation: sx incision clean and intact   Activity Tolerance  Endurance: Decreased tolerance for upright activites  Sensation  Light Touch: No apparent deficits  Strength  Strength Comments: Demos WFL BLE; notes intermittent weakness in RLE due to radiculopathy     Functional Assessments:     Bed Mobility  Bed Mobility:  (completed supine to sit with SBA and cues for logroll technique. Denies dizziness in sitting.)  Transfers  Transfer:  (completed from EOB to FWW with cues for hand placement and CGA for safety.)  Ambulation/Gait Training  Ambulation/Gait Training Performed:  (completed with FWW and CGA for safety. Demos slight L lateral lean with pelvic tilt to L and short step length. No acute LOB. Mild buckling in RLE intermittently that patient corrects.)        Extremity/Trunk Assessments:     RLE   RLE : Within Functional Limits  LLE   LLE : Within Functional Limits    Outcome Measures:     [facility] Basic Mobility  Turning from your back to your side while in a flat bed without using bedrails: A little  Moving from lying on your back to sitting on the side of a flat bed without using bedrails: A little  Moving to and from bed to chair (including a wheelchair): A little  Standing up from a chair using your arms (e.g. wheelchair or bedside chair): A little  To walk in hospital room: A little  Climbing 3-5 steps with railing: A lot  Basic Mobility - Total Score: 17    Goals:  Encounter Problems       Encounter Problems (Active)       PT Problem       PT Goal 1 STG - Pt will transition supine <> sitting with MOD I  (Progressing)       Start:  03/13/25    Expected End:  03/27/25            PT Goal 2 STG - Pt will transfer STS with supervision  (Progressing)        Start:  03/13/25    Expected End:  03/27/25            PT Goal 3 STG - Pt will amb 100' using FWW with supervision  (Progressing)       Start:  03/13/25    Expected End:  03/27/25            PT Goal 4 STG - Pt will perform a B LE ther ex program of 2-3 sets of 10  (Progressing)       Start:  03/13/25    Expected End:  03/27/25               Pain - Adult            Education Documentation  Body Mechanics, taught by Nina Johns PT at 3/13/2025  4:29 PM.  Learner: Patient  Readiness: Acceptance  Method: Explanation  Response: Verbalizes Understanding, Needs Reinforcement    Precautions, taught by Nina Johns PT at 3/13/2025  4:29 PM.  Learner: Patient  Readiness: Acceptance  Method: Explanation  Response: Verbalizes Understanding, Needs Reinforcement    Mobility Training, taught by Nina Johns PT at 3/13/2025  4:29 PM.  Learner: Patient  Readiness: Acceptance  Method: Explanation  Response: Verbalizes Understanding, Needs Reinforcement    Education Comments  No comments found.

## 2025-03-13 NOTE — PROGRESS NOTES
"Cristal Pollard is a 64 y.o. female on day 2 of admission presenting with Sepsis without septic shock (Multi).    Subjective   Patient sitting upright in bed getting ready to have breakfast.  Was up with PT yesterday ambulating in the halls.  Still experiencing some right lower extremity radiculopathy.  Taking every 6 hours oxycodone for pain.  Also has as needed acetaminophen and scheduled 600 mg 3 times daily gabapentin.       Objective     Physical Exam  General: Well developed, awake/alert/oriented x3, no distress, alert and cooperative  Skin: Warm and dry, no lesions, no rashes  Incision: clean dry and intact with Exofin still intact, no s/s of infection noted.  ENMT: Mucous membranes moist, no apparent injury, no lesions seen  Head/Neck: Neck Supple, no apparent injury  Respiratory/Thorax: Normal breath sounds with good chest expansion, thorax symmetric  Cardiovascular: No pitting edema, no JVD     Sandoval Coma Scale  Best Eye Response: 4: Opens eyes spontaneously   Best Verbal Response: 5: Oriented, converses normally   Best Motor Response: 6: Obeys commands   Sandoval Coma Scale Score: 15     Motor Strength: 5/5 Throughout bilateral upper extremities and LLE.  4/5 in RLE     Muscle Bulk: Normal and symmetric in all extremities     Paraspinal muscle spasm/tenderness present bilaterally in the lumbar region and expected postoperatively     Midline tenderness present in the lumbar region expected postoperatively     Sensation to light touch intact throughout with the exception of right lower extremity slightly decreased.      Last Recorded Vitals  Blood pressure 111/58, pulse 77, temperature 36.6 °C (97.9 °F), resp. rate 16, height 1.651 m (5' 5\"), weight 78.5 kg (173 lb), SpO2 94%.  Intake/Output last 3 Shifts:  I/O last 3 completed shifts:  In: 1990.2 (25.4 mL/kg) [I.V.:771.7 (9.8 mL/kg); IV Piggyback:1218.5]  Out: 2250 (28.7 mL/kg) [Urine:2250 (0.8 mL/kg/hr)]  Weight: 78.5 kg     Relevant Results                "       Assessment/Plan   Assessment & Plan  Sepsis without septic shock (Multi)    History of lumbar fusion    Cystitis    Patient sitting upright in bed getting ready to have breakfast.  Was up with PT yesterday ambulating in the halls.  Still experiencing some right lower extremity radiculopathy.  Taking every 6 hours oxycodone for pain.  Also has as needed acetaminophen and scheduled 600 mg 3 times daily gabapentin.    No significant changes noted during physical exam.  Incisions still healing well with no signs and symptoms of infection.    Patient would benefit from restarting her home postoperative dose of methocarbamol 500 mg every 8 hours as needed to help assist her with pain and muscle spasms postoperatively.       I spent 35 minutes in the professional and overall care of this patient.      Samantha A Meeson, DREW-CNP

## 2025-03-14 ENCOUNTER — HOME HEALTH ADMISSION (OUTPATIENT)
Dept: HOME HEALTH SERVICES | Facility: HOME HEALTH | Age: 65
End: 2025-03-14
Payer: COMMERCIAL

## 2025-03-14 ENCOUNTER — DOCUMENTATION (OUTPATIENT)
Dept: HOME HEALTH SERVICES | Facility: HOME HEALTH | Age: 65
End: 2025-03-14
Payer: COMMERCIAL

## 2025-03-14 ENCOUNTER — APPOINTMENT (OUTPATIENT)
Dept: ORTHOPEDIC SURGERY | Facility: HOSPITAL | Age: 65
End: 2025-03-14
Payer: COMMERCIAL

## 2025-03-14 LAB
BACTERIA UR CULT: ABNORMAL
GENTAMICIN TROUGH SERPL-MCNC: 0.3 UG/ML (ref 0–2)
GLUCOSE BLD MANUAL STRIP-MCNC: 154 MG/DL (ref 74–99)
GLUCOSE BLD MANUAL STRIP-MCNC: 166 MG/DL (ref 74–99)
GLUCOSE BLD MANUAL STRIP-MCNC: 243 MG/DL (ref 74–99)
GLUCOSE BLD MANUAL STRIP-MCNC: 245 MG/DL (ref 74–99)

## 2025-03-14 PROCEDURE — 82947 ASSAY GLUCOSE BLOOD QUANT: CPT

## 2025-03-14 PROCEDURE — 2500000001 HC RX 250 WO HCPCS SELF ADMINISTERED DRUGS (ALT 637 FOR MEDICARE OP)

## 2025-03-14 PROCEDURE — 1100000001 HC PRIVATE ROOM DAILY

## 2025-03-14 PROCEDURE — 99238 HOSP IP/OBS DSCHRG MGMT 30/<: CPT | Performed by: STUDENT IN AN ORGANIZED HEALTH CARE EDUCATION/TRAINING PROGRAM

## 2025-03-14 PROCEDURE — 2500000004 HC RX 250 GENERAL PHARMACY W/ HCPCS (ALT 636 FOR OP/ED)

## 2025-03-14 PROCEDURE — 2500000002 HC RX 250 W HCPCS SELF ADMINISTERED DRUGS (ALT 637 FOR MEDICARE OP, ALT 636 FOR OP/ED)

## 2025-03-14 PROCEDURE — 2500000001 HC RX 250 WO HCPCS SELF ADMINISTERED DRUGS (ALT 637 FOR MEDICARE OP): Performed by: STUDENT IN AN ORGANIZED HEALTH CARE EDUCATION/TRAINING PROGRAM

## 2025-03-14 RX ORDER — CIPROFLOXACIN 500 MG/1
500 TABLET ORAL 2 TIMES DAILY
Qty: 6 TABLET | Refills: 0 | Status: ON HOLD | OUTPATIENT
Start: 2025-03-14 | End: 2025-03-17

## 2025-03-14 RX ORDER — OXYCODONE HYDROCHLORIDE 10 MG/1
10 TABLET ORAL EVERY 6 HOURS PRN
Qty: 15 TABLET | Refills: 0 | Status: ON HOLD | OUTPATIENT
Start: 2025-03-14

## 2025-03-14 RX ORDER — CYCLOBENZAPRINE HCL 5 MG
5 TABLET ORAL 3 TIMES DAILY PRN
Qty: 30 TABLET | Refills: 0 | Status: ON HOLD | OUTPATIENT
Start: 2025-03-14 | End: 2025-03-26

## 2025-03-14 RX ADMIN — LAMOTRIGINE 150 MG: 100 TABLET ORAL at 21:46

## 2025-03-14 RX ADMIN — INSULIN LISPRO 4 UNITS: 100 INJECTION, SOLUTION INTRAVENOUS; SUBCUTANEOUS at 12:55

## 2025-03-14 RX ADMIN — GENTAMICIN SULFATE 390 MG: 40 INJECTION, SOLUTION INTRAMUSCULAR; INTRAVENOUS at 00:25

## 2025-03-14 RX ADMIN — PANTOPRAZOLE SODIUM 40 MG: 40 TABLET, DELAYED RELEASE ORAL at 06:12

## 2025-03-14 RX ADMIN — CYCLOBENZAPRINE 5 MG: 10 TABLET, FILM COATED ORAL at 06:12

## 2025-03-14 RX ADMIN — INSULIN LISPRO 2 UNITS: 100 INJECTION, SOLUTION INTRAVENOUS; SUBCUTANEOUS at 17:01

## 2025-03-14 RX ADMIN — GABAPENTIN 600 MG: 300 CAPSULE ORAL at 08:40

## 2025-03-14 RX ADMIN — OXYCODONE HYDROCHLORIDE 10 MG: 5 TABLET ORAL at 06:12

## 2025-03-14 RX ADMIN — CYCLOBENZAPRINE 5 MG: 10 TABLET, FILM COATED ORAL at 21:50

## 2025-03-14 RX ADMIN — GENTAMICIN SULFATE 390 MG: 40 INJECTION, SOLUTION INTRAMUSCULAR; INTRAVENOUS at 23:02

## 2025-03-14 RX ADMIN — LAMOTRIGINE 100 MG: 100 TABLET ORAL at 08:40

## 2025-03-14 RX ADMIN — GABAPENTIN 600 MG: 300 CAPSULE ORAL at 13:04

## 2025-03-14 RX ADMIN — SENNOSIDES AND DOCUSATE SODIUM 2 TABLET: 50; 8.6 TABLET ORAL at 08:40

## 2025-03-14 RX ADMIN — OXYCODONE HYDROCHLORIDE 10 MG: 5 TABLET ORAL at 21:49

## 2025-03-14 RX ADMIN — OXYCODONE HYDROCHLORIDE 10 MG: 5 TABLET ORAL at 13:04

## 2025-03-14 RX ADMIN — ACETAMINOPHEN 650 MG: 325 TABLET, FILM COATED ORAL at 21:46

## 2025-03-14 RX ADMIN — GABAPENTIN 600 MG: 300 CAPSULE ORAL at 21:45

## 2025-03-14 RX ADMIN — SENNOSIDES AND DOCUSATE SODIUM 2 TABLET: 50; 8.6 TABLET ORAL at 22:55

## 2025-03-14 RX ADMIN — INSULIN LISPRO 2 UNITS: 100 INJECTION, SOLUTION INTRAVENOUS; SUBCUTANEOUS at 08:40

## 2025-03-14 RX ADMIN — FLUOXETINE HYDROCHLORIDE 40 MG: 20 CAPSULE ORAL at 21:45

## 2025-03-14 ASSESSMENT — PAIN DESCRIPTION - LOCATION
LOCATION: BACK
LOCATION: BACK
LOCATION: HIP

## 2025-03-14 ASSESSMENT — COGNITIVE AND FUNCTIONAL STATUS - GENERAL
TURNING FROM BACK TO SIDE WHILE IN FLAT BAD: A LITTLE
DRESSING REGULAR UPPER BODY CLOTHING: A LITTLE
CLIMB 3 TO 5 STEPS WITH RAILING: A LOT
TOILETING: A LITTLE
PERSONAL GROOMING: A LITTLE
EATING MEALS: A LITTLE
WALKING IN HOSPITAL ROOM: A LOT
MOBILITY SCORE: 16
DRESSING REGULAR LOWER BODY CLOTHING: A LOT
MOVING FROM LYING ON BACK TO SITTING ON SIDE OF FLAT BED WITH BEDRAILS: A LITTLE
DAILY ACTIVITIY SCORE: 17
MOVING TO AND FROM BED TO CHAIR: A LITTLE
STANDING UP FROM CHAIR USING ARMS: A LITTLE
HELP NEEDED FOR BATHING: A LITTLE

## 2025-03-14 ASSESSMENT — PAIN SCALES - WONG BAKER: WONGBAKER_NUMERICALRESPONSE: HURTS WHOLE LOT

## 2025-03-14 ASSESSMENT — PAIN DESCRIPTION - DESCRIPTORS: DESCRIPTORS: THROBBING;ACHING;SHARP

## 2025-03-14 ASSESSMENT — PAIN SCALES - GENERAL
PAINLEVEL_OUTOF10: 0 - NO PAIN
PAINLEVEL_OUTOF10: 4
PAINLEVEL_OUTOF10: 7
PAINLEVEL_OUTOF10: 7
PAINLEVEL_OUTOF10: 8
PAINLEVEL_OUTOF10: 7

## 2025-03-14 ASSESSMENT — PAIN - FUNCTIONAL ASSESSMENT
PAIN_FUNCTIONAL_ASSESSMENT: 0-10
PAIN_FUNCTIONAL_ASSESSMENT: 0-10

## 2025-03-14 ASSESSMENT — PAIN DESCRIPTION - ORIENTATION
ORIENTATION: LOWER
ORIENTATION: LOWER

## 2025-03-14 NOTE — PROGRESS NOTES
Aminoglycoside Pharmacist Monitoring - FOLLOW UP    Cristal Pollard is a 64 y.o. female who was started on gentamicin for treatment of UTI.     Current dose is: 390 mg given every 24 hours utilizing extended-interval dosing    Relevant clinical data and objective history reviewed:  Lab Results   Component Value Date    GENTTROUGH 0.3 03/13/2025     Lab Results   Component Value Date    CREATININE 0.62 03/12/2025    BUN 11 03/12/2025     03/12/2025    K 3.6 03/12/2025     03/12/2025    CO2 25 03/12/2025     Estimated Creatinine Clearance: 93.7 mL/min (by C-G formula based on SCr of 0.62 mg/dL).    I/O last 3 completed shifts:  In: 109.8 (1.4 mL/kg) [IV Piggyback:109.8]  Out: 2900 (37 mL/kg) [Urine:2900 (1 mL/kg/hr)]  Weight: 78.5 kg     Lab Results   Component Value Date    WBC 13.1 (H) 03/12/2025    HGB 10.7 (L) 03/12/2025    HCT 33.2 (L) 03/12/2025    MCV 97 03/12/2025     03/12/2025     Temp Readings from Last 3 Encounters:   03/14/25 36.8 °C (98.2 °F)   03/10/25 36.1 °C (97 °F) (Temporal)   03/01/25 36 °C (96.8 °F) (Temporal)        Susceptibility data for the encounter in last 14 days.  Collected Specimen Info Organism   03/11/25 Urine from Clean Catch/Voided Enteric bacilli       Assessment/Plan  Day #3 of gentamicin therapy.  Trough level is within goal range of <1 mcg/mL. Recommend continuing current regimen at this time.   No additional levels are needed unless therapy extends beyond a typical course of 7 days or change in renal function/clinical status occurs.   Pharmacy will continue to monitor for signs/symptoms of toxicity, as well as culture results and clinical progress daily.     Please contact Pharmacy with any questions.  Adalgisa Maynard, PharmD, BCCCP

## 2025-03-14 NOTE — CARE PLAN
The patient's goals for the shift include Patient's chronic conditions and co-morbidity symptoms are monitored and maintained or improved     The clinical goals for the shift include Be free from injury by end of the shift       Problem: Discharge Planning  Goal: Discharge to home or other facility with appropriate resources  Outcome: Met       Problem: Diabetes  Goal: Maintain glucose levels >70mg/dl to <250mg/dl throughout shift  Outcome: Progressing

## 2025-03-14 NOTE — CARE PLAN
The patient's goals for the shift include rest    The clinical goals for the shift include rest and comfort      Problem: Pain - Adult  Goal: Verbalizes/displays adequate comfort level or baseline comfort level  Outcome: Progressing     Problem: Safety - Adult  Goal: Free from fall injury  Outcome: Progressing     Problem: Discharge Planning  Goal: Discharge to home or other facility with appropriate resources  Outcome: Progressing     Problem: Chronic Conditions and Co-morbidities  Goal: Patient's chronic conditions and co-morbidity symptoms are monitored and maintained or improved  Outcome: Progressing     Problem: Nutrition  Goal: Nutrient intake appropriate for maintaining nutritional needs  Outcome: Progressing     Problem: Fall/Injury  Goal: Not fall by end of shift  Outcome: Progressing  Goal: Be free from injury by end of the shift  Outcome: Progressing  Goal: Verbalize understanding of personal risk factors for fall in the hospital  Outcome: Progressing  Goal: Verbalize understanding of risk factor reduction measures to prevent injury from fall in the home  Outcome: Progressing  Goal: Use assistive devices by end of the shift  Outcome: Progressing  Goal: Pace activities to prevent fatigue by end of the shift  Outcome: Progressing     Problem: Diabetes  Goal: Achieve decreasing blood glucose levels by end of shift  Outcome: Progressing  Goal: Increase stability of blood glucose readings by end of shift  Outcome: Progressing  Goal: Decrease in ketones present in urine by end of shift  Outcome: Progressing  Goal: Maintain electrolyte levels within acceptable range throughout shift  Outcome: Progressing  Goal: Maintain glucose levels >70mg/dl to <250mg/dl throughout shift  Outcome: Progressing  Goal: No changes in neurological exam by end of shift  Outcome: Progressing  Goal: Learn about and adhere to nutrition recommendations by end of shift  Outcome: Progressing  Goal: Vital signs within normal range for age  by end of shift  Outcome: Progressing  Goal: Increase self care and/or family involovement by end of shift  Outcome: Progressing  Goal: Receive DSME education by end of shift  Outcome: Progressing     Problem: Pain  Goal: Takes deep breaths with improved pain control throughout the shift  Outcome: Progressing  Goal: Turns in bed with improved pain control throughout the shift  Outcome: Progressing  Goal: Walks with improved pain control throughout the shift  Outcome: Progressing  Goal: Performs ADL's with improved pain control throughout shift  Outcome: Progressing  Goal: Participates in PT with improved pain control throughout the shift  Outcome: Progressing  Goal: Free from opioid side effects throughout the shift  Outcome: Progressing  Goal: Free from acute confusion related to pain meds throughout the shift  Outcome: Progressing     Problem: Skin  Goal: Decreased wound size/increased tissue granulation at next dressing change  Outcome: Progressing  Goal: Participates in plan/prevention/treatment measures  Outcome: Progressing  Goal: Prevent/manage excess moisture  Outcome: Progressing  Goal: Prevent/minimize sheer/friction injuries  Outcome: Progressing  Goal: Promote/optimize nutrition  Outcome: Progressing  Goal: Promote skin healing  Outcome: Progressing

## 2025-03-14 NOTE — PROGRESS NOTES
"Cristal Pollard is a 64 y.o. female on day 3 of admission presenting with Sepsis without septic shock (Multi).    Subjective   Patient did well over night.  Eager to get arnold cath out and to go home.  States she walked around the unit yesterday with PT and significant other and feels better with cyclobenzaprine on board.       Objective     Physical Exam  General: Well developed, awake/alert/oriented x3, no distress, alert and cooperative  Skin: Warm and dry, no lesions, no rashes  Incision: clean dry and intact with Exofin still intact, no s/s of infection noted.  ENMT: Mucous membranes moist, no apparent injury, no lesions seen  Head/Neck: Neck Supple, no apparent injury  Respiratory/Thorax: Normal breath sounds with good chest expansion, thorax symmetric  Cardiovascular: No pitting edema, no JVD     Duncan Coma Scale  Best Eye Response: 4: Opens eyes spontaneously   Best Verbal Response: 5: Oriented, converses normally   Best Motor Response: 6: Obeys commands   Duncan Coma Scale Score: 15     Motor Strength: 5/5 Throughout bilateral upper extremities and LLE.  4/5 in RLE     Muscle Bulk: Normal and symmetric in all extremities     Paraspinal muscle spasm/tenderness present bilaterally in the lumbar region and expected postoperatively     Midline tenderness present in the lumbar region expected postoperatively     Sensation to light touch intact throughout with the exception of right lower extremity slightly decreased but improving per patient.      Last Recorded Vitals  Blood pressure 112/58, pulse 75, temperature 36.8 °C (98.2 °F), resp. rate 16, height 1.651 m (5' 5\"), weight 78.5 kg (173 lb), SpO2 93%.  Intake/Output last 3 Shifts:  I/O last 3 completed shifts:  In: 109.8 (1.4 mL/kg) [IV Piggyback:109.8]  Out: 2900 (37 mL/kg) [Urine:2900 (1 mL/kg/hr)]  Weight: 78.5 kg     Relevant Results                   Assessment/Plan   Assessment & Plan  Sepsis without septic shock (Multi)    History of lumbar " fusion    Cystitis    Patient doing well postoperatively.  Up and ambulating well with PT and significant other.  Pain improved with addition of cyclobenzaprine- would continue upon discharge.  On physical exam she has improved sensation in the RLE but still slightly decreased compared to the left.  Okay to continue PT while in the hospital and if needed as outpatient.  Okay to discharge from neurosurgery standpoint when deemed ready by primary team. Patient has already scheduled post operative appointment on 03/24/25 with me in the outpatient setting for follow up.         I spent 30 minutes in the professional and overall care of this patient.      Samantha A Meeson, APRN-CNP

## 2025-03-14 NOTE — DISCHARGE SUMMARY
Discharge Diagnosis  Sepsis without septic shock (Multi)    Issues Requiring Follow-Up  uti    Test Results Pending At Discharge  Pending Labs       Order Current Status    Blood Culture Preliminary result    Blood Culture Preliminary result    Urine Culture Preliminary result            Hospital Course   64-year-old female discharged from hospital yesterday after lumbar fusion presented to ED for evaluation of urinary incontinence, retention and dysuria.  Patient found to be septic with concern for UTI.  Patient treated with fluids and covered with gentamicin and admitted to inpatient under Dr. Mckinley for further evaluation and care.     #Sepsis without septic shock  #Cystitis with hematuria  #History of lumbar fusion  #Constipation  -Continue fluids and gentamicin started in ED, monitor for hattie/nephrotoxicity   - Neurosurgery consulted in ED  - Follow urine culture, a.m. labs, lactate  - Maintain Quinn catheter  - Cardiac/carb consistent diet  - Hold blood thinners, NSAIDs due to hematuria  - Continue home oxycodone as needed for lumbar pain  - Continue senna, encourage adequate oral hydration, enema as needed     Chronic conditions  #Seizures  #Hypertension  #HLD  #T2DM  #GERD  #Radiculopathy  - Continue home meds when reconciled     3/12: doing well, increase pain meds, neurosurgyer consult given recent back surgery     Tiral void tomorrow, contineu abx no issues     3/14: doing well, cipro, pian meds, pt/ot home care    Pertinent Physical Exam At Time of Discharge  Physical Exam  Constitutional:       Appearance: Normal appearance.   HENT:      Head: Normocephalic and atraumatic.      Right Ear: Tympanic membrane and ear canal normal.      Left Ear: Tympanic membrane and ear canal normal.      Mouth/Throat:      Mouth: Mucous membranes are moist.      Pharynx: Oropharynx is clear.   Eyes:      Extraocular Movements: Extraocular movements intact.      Conjunctiva/sclera: Conjunctivae normal.      Pupils: Pupils are  equal, round, and reactive to light.   Cardiovascular:      Rate and Rhythm: Normal rate and regular rhythm.      Pulses: Normal pulses.      Heart sounds: Normal heart sounds.   Pulmonary:      Effort: Pulmonary effort is normal.      Breath sounds: Normal breath sounds.   Abdominal:      General: Abdomen is flat. Bowel sounds are normal.      Palpations: Abdomen is soft.   Musculoskeletal:         General: Normal range of motion.      Cervical back: Normal range of motion and neck supple.   Skin:     General: Skin is warm and dry.      Capillary Refill: Capillary refill takes 2 to 3 seconds.   Neurological:      General: No focal deficit present.      Mental Status: She is alert and oriented to person, place, and time. Mental status is at baseline.   Psychiatric:         Mood and Affect: Mood normal.         Behavior: Behavior normal.         Thought Content: Thought content normal.         Judgment: Judgment normal.         Home Medications     Medication List      START taking these medications     ciprofloxacin 500 mg tablet; Commonly known as: Cipro; Take 1 tablet   (500 mg) by mouth 2 times a day for 3 days.   cyclobenzaprine 5 mg tablet; Commonly known as: Flexeril; Take 1 tablet   (5 mg) by mouth 3 times a day as needed for muscle spasms.     CHANGE how you take these medications     FLUoxetine 40 mg capsule; Commonly known as: PROzac; TAKE 1 CAPSULE (40   MG) BY MOUTH ONCE DAILY. TO START AFTER COMPLETING 10MG AND 20MG DOSES;   What changed: when to take this   omeprazole 40 mg DR capsule; Commonly known as: PriLOSEC; TAKE 1 CAPSULE   (40 MG) BY MOUTH ONCE DAILY. DO NOT CRUSH OR CHEW.; What changed: when to   take this, reasons to take this   * oxyCODONE 5 mg immediate release tablet; Commonly known as:   Roxicodone; Take 1 tablet (5 mg) by mouth every 6 hours if needed for   severe pain (7 - 10) for up to 7 days.; What changed: Another medication   with the same name was added. Make sure you understand  how and when to   take each.   * oxyCODONE 10 mg immediate release tablet; Commonly known as:   Roxicodone; Take 1 tablet (10 mg) by mouth every 6 hours if needed for   severe pain (7 - 10).; What changed: You were already taking a medication   with the same name, and this prescription was added. Make sure you   understand how and when to take each.  * This list has 2 medication(s) that are the same as other medications   prescribed for you. Read the directions carefully, and ask your doctor or   other care provider to review them with you.     CONTINUE taking these medications     acetaminophen 325 mg tablet; Commonly known as: Tylenol; Take 3 tablets   (975 mg) by mouth every 8 hours for 7 days.   calcium carbonate-vitamin D3 600 mg-10 mcg (400 unit) chewable tablet   diazePAM 5 mg tablet; Commonly known as: Valium; Take 1 tablet (5 mg) by   mouth every 6 hours if needed for muscle spasms for up to 7 days.   gabapentin 300 mg capsule; Commonly known as: Neurontin; Take 2 capsules   (600 mg) by mouth 3 times a day. Continue until discussed further at   follow up neurosurgery appointment   lamoTRIgine 100 mg tablet; Commonly known as: LaMICtal; TAKE 1 TABLET BY   MOUTH IN THE MORNING AND 1.5 TABLETS BY MOUTH AT BEDTIME   lisinopriL-hydrochlorothiazide 10-12.5 mg tablet; TAKE 1 TABLET BY MOUTH   EVERY DAY   metFORMIN  mg 24 hr tablet; Commonly known as: Glucophage-XR; TAKE   1 TABLET BY MOUTH EVERY DAY WITH EVENING MEAL   methocarbamol 500 mg tablet; Commonly known as: Robaxin; Take 1 tablet   (500 mg) by mouth every 8 hours for 7 days.   multivitamin tablet   OneTouch Delica Plus Lancet 30 gauge misc; Generic drug: lancets; USE TO   TEST ONCE DAILY   OneTouch Verio test strips strip; Generic drug: blood sugar diagnostic;   USE TO TEST ONCE DAILY   polyethylene glycol 17 gram packet; Commonly known as: Glycolax,   Miralax; Take 17 g by mouth 2 times a day for 7 days.   Senexon-S 8.6-50 mg tablet; Generic drug:  sennosides-docusate sodium;   Take 2 tablets by mouth 2 times a day for 7 days.       Outpatient Follow-Up  Future Appointments   Date Time Provider Department Center   3/24/2025  2:00 PM Samantha A Meeson, APRN-CNP [id number] [place]   5/9/2025  9:00 AM Darci Allison MD [id number] [facility]   5/19/2025  1:00 PM Arnulfo Reynolds MD [id number] [place]       Khari Mckinley DO

## 2025-03-15 VITALS
RESPIRATION RATE: 18 BRPM | WEIGHT: 173 LBS | SYSTOLIC BLOOD PRESSURE: 133 MMHG | DIASTOLIC BLOOD PRESSURE: 67 MMHG | HEIGHT: 65 IN | BODY MASS INDEX: 28.82 KG/M2 | HEART RATE: 91 BPM | TEMPERATURE: 99.9 F | OXYGEN SATURATION: 92 %

## 2025-03-15 PROBLEM — R33.9 URINARY RETENTION: Status: ACTIVE | Noted: 2025-03-15

## 2025-03-15 LAB
GLUCOSE BLD MANUAL STRIP-MCNC: 181 MG/DL (ref 74–99)
GLUCOSE BLD MANUAL STRIP-MCNC: 238 MG/DL (ref 74–99)

## 2025-03-15 PROCEDURE — 2500000002 HC RX 250 W HCPCS SELF ADMINISTERED DRUGS (ALT 637 FOR MEDICARE OP, ALT 636 FOR OP/ED)

## 2025-03-15 PROCEDURE — 82947 ASSAY GLUCOSE BLOOD QUANT: CPT

## 2025-03-15 PROCEDURE — 2500000001 HC RX 250 WO HCPCS SELF ADMINISTERED DRUGS (ALT 637 FOR MEDICARE OP)

## 2025-03-15 PROCEDURE — 2500000001 HC RX 250 WO HCPCS SELF ADMINISTERED DRUGS (ALT 637 FOR MEDICARE OP): Performed by: STUDENT IN AN ORGANIZED HEALTH CARE EDUCATION/TRAINING PROGRAM

## 2025-03-15 PROCEDURE — 99232 SBSQ HOSP IP/OBS MODERATE 35: CPT | Performed by: STUDENT IN AN ORGANIZED HEALTH CARE EDUCATION/TRAINING PROGRAM

## 2025-03-15 RX ORDER — TAMSULOSIN HYDROCHLORIDE 0.4 MG/1
0.4 CAPSULE ORAL NIGHTLY
Status: DISCONTINUED | OUTPATIENT
Start: 2025-03-15 | End: 2025-03-15 | Stop reason: HOSPADM

## 2025-03-15 RX ORDER — TAMSULOSIN HYDROCHLORIDE 0.4 MG/1
0.4 CAPSULE ORAL NIGHTLY
Qty: 30 CAPSULE | Refills: 0 | Status: ON HOLD | OUTPATIENT
Start: 2025-03-15 | End: 2025-04-14

## 2025-03-15 RX ADMIN — SENNOSIDES AND DOCUSATE SODIUM 2 TABLET: 50; 8.6 TABLET ORAL at 08:15

## 2025-03-15 RX ADMIN — GABAPENTIN 600 MG: 300 CAPSULE ORAL at 08:15

## 2025-03-15 RX ADMIN — OXYCODONE HYDROCHLORIDE 10 MG: 5 TABLET ORAL at 08:19

## 2025-03-15 RX ADMIN — PANTOPRAZOLE SODIUM 40 MG: 40 TABLET, DELAYED RELEASE ORAL at 05:42

## 2025-03-15 RX ADMIN — INSULIN LISPRO 2 UNITS: 100 INJECTION, SOLUTION INTRAVENOUS; SUBCUTANEOUS at 08:10

## 2025-03-15 RX ADMIN — INSULIN LISPRO 4 UNITS: 100 INJECTION, SOLUTION INTRAVENOUS; SUBCUTANEOUS at 12:37

## 2025-03-15 RX ADMIN — LAMOTRIGINE 100 MG: 100 TABLET ORAL at 08:15

## 2025-03-15 ASSESSMENT — COGNITIVE AND FUNCTIONAL STATUS - GENERAL
WALKING IN HOSPITAL ROOM: A LITTLE
TOILETING: A LITTLE
DRESSING REGULAR LOWER BODY CLOTHING: A LITTLE
CLIMB 3 TO 5 STEPS WITH RAILING: A LOT
DAILY ACTIVITIY SCORE: 20
HELP NEEDED FOR BATHING: A LITTLE
DRESSING REGULAR UPPER BODY CLOTHING: A LITTLE
MOBILITY SCORE: 18
MOVING TO AND FROM BED TO CHAIR: A LITTLE
STANDING UP FROM CHAIR USING ARMS: A LITTLE
TURNING FROM BACK TO SIDE WHILE IN FLAT BAD: A LITTLE

## 2025-03-15 ASSESSMENT — PAIN SCALES - GENERAL
PAINLEVEL_OUTOF10: 0 - NO PAIN
PAINLEVEL_OUTOF10: 7
PAINLEVEL_OUTOF10: 5 - MODERATE PAIN

## 2025-03-15 ASSESSMENT — ENCOUNTER SYMPTOMS
DIFFICULTY URINATING: 1
HEMATURIA: 0

## 2025-03-15 ASSESSMENT — PAIN - FUNCTIONAL ASSESSMENT
PAIN_FUNCTIONAL_ASSESSMENT: 0-10

## 2025-03-15 ASSESSMENT — PAIN DESCRIPTION - ORIENTATION: ORIENTATION: RIGHT

## 2025-03-15 ASSESSMENT — PAIN DESCRIPTION - LOCATION: LOCATION: BACK

## 2025-03-15 NOTE — PROGRESS NOTES
"Cristal Pollard is a 64 y.o. female on day 4 of admission presenting with Sepsis without septic shock (Multi).    Subjective   Pain doing well       Objective     Physical Exam  Constitutional:       Appearance: Normal appearance.   HENT:      Head: Normocephalic and atraumatic.      Right Ear: Tympanic membrane and ear canal normal.      Left Ear: Tympanic membrane and ear canal normal.      Mouth/Throat:      Mouth: Mucous membranes are moist.      Pharynx: Oropharynx is clear.   Eyes:      Extraocular Movements: Extraocular movements intact.      Conjunctiva/sclera: Conjunctivae normal.      Pupils: Pupils are equal, round, and reactive to light.   Cardiovascular:      Rate and Rhythm: Normal rate and regular rhythm.      Pulses: Normal pulses.      Heart sounds: Normal heart sounds.   Pulmonary:      Effort: Pulmonary effort is normal.      Breath sounds: Normal breath sounds.   Abdominal:      General: Abdomen is flat. Bowel sounds are normal.      Palpations: Abdomen is soft.   Musculoskeletal:         General: Normal range of motion.      Cervical back: Normal range of motion and neck supple.   Skin:     General: Skin is warm and dry.      Capillary Refill: Capillary refill takes 2 to 3 seconds.   Neurological:      General: No focal deficit present.      Mental Status: She is alert and oriented to person, place, and time. Mental status is at baseline.   Psychiatric:         Mood and Affect: Mood normal.         Behavior: Behavior normal.         Thought Content: Thought content normal.         Judgment: Judgment normal.         Last Recorded Vitals  Blood pressure 123/61, pulse 78, temperature 36.7 °C (98.1 °F), temperature source Temporal, resp. rate 18, height 1.651 m (5' 5\"), weight 78.5 kg (173 lb), SpO2 93%.  Intake/Output last 3 Shifts:  I/O last 3 completed shifts:  In: 219.5 (2.8 mL/kg) [IV Piggyback:219.5]  Out: 3250 (41.4 mL/kg) [Urine:3250 (1.2 mL/kg/hr)]  Weight: 78.5 kg     Relevant Results          "        This patient has a urinary catheter   Reason for the urinary catheter remaining today? Urine catheter unnecessary, will be removed today               Assessment/Plan   Assessment & Plan  Sepsis without septic shock (Multi)    History of lumbar fusion    Cystitis    Urinary retention    64-year-old female discharged from hospital yesterday after lumbar fusion presented to ED for evaluation of urinary incontinence, retention and dysuria.  Patient found to be septic with concern for UTI.  Patient treated with fluids and covered with gentamicin and admitted to inpatient under Dr. Mckinley for further evaluation and care.     #Sepsis without septic shock  #Cystitis with hematuria  #History of lumbar fusion  #Constipation  -Continue fluids and gentamicin started in ED, monitor for hattie/nephrotoxicity   - Neurosurgery consulted in ED  - Follow urine culture, a.m. labs, lactate  - Maintain Arnold catheter  - Cardiac/carb consistent diet  - Hold blood thinners, NSAIDs due to hematuria  - Continue home oxycodone as needed for lumbar pain  - Continue senna, encourage adequate oral hydration, enema as needed     Chronic conditions  #Seizures  #Hypertension  #HLD  #T2DM  #GERD  #Radiculopathy  - Continue home meds when reconciled    3/15   Cleared the urology team this am   - dc home with arnold and antibiotics as ordered nursing to provide arnold care prior dc as well   [facility] resumed       Plan of care disucced with attending Dr. Mckinley     I personally reviewed the key and critical portions of the history and physical exam or was physically present for key and critical portions performed by the  NP I reviewed the rNP documentation and discussed the patient with the NP I agree with the NP medical decision making as documented in the note.           Subha Valladares, APRN-CNP  I spent 35 minutes in the professional and overall care of this patient.

## 2025-03-15 NOTE — CARE PLAN
The patient's goals for the shift include rest    The clinical goals for the shift include Patients pain level will remain under control throughout shift.

## 2025-03-15 NOTE — CARE PLAN
The patient's goals for the shift include rest    The clinical goals for the shift include pain mgmt      Problem: Pain - Adult  Goal: Verbalizes/displays adequate comfort level or baseline comfort level  Outcome: Progressing     Problem: Safety - Adult  Goal: Free from fall injury  Outcome: Progressing     Problem: Discharge Planning  Goal: Discharge to home or other facility with appropriate resources  Outcome: Progressing     Problem: Chronic Conditions and Co-morbidities  Goal: Patient's chronic conditions and co-morbidity symptoms are monitored and maintained or improved  Outcome: Progressing     Problem: Nutrition  Goal: Nutrient intake appropriate for maintaining nutritional needs  Outcome: Progressing     Problem: Fall/Injury  Goal: Not fall by end of shift  Outcome: Progressing  Goal: Be free from injury by end of the shift  Outcome: Progressing  Goal: Verbalize understanding of personal risk factors for fall in the hospital  Outcome: Progressing  Goal: Verbalize understanding of risk factor reduction measures to prevent injury from fall in the home  Outcome: Progressing  Goal: Use assistive devices by end of the shift  Outcome: Progressing  Goal: Pace activities to prevent fatigue by end of the shift  Outcome: Progressing     Problem: Diabetes  Goal: Achieve decreasing blood glucose levels by end of shift  Outcome: Progressing  Goal: Increase stability of blood glucose readings by end of shift  Outcome: Progressing  Goal: Decrease in ketones present in urine by end of shift  Outcome: Progressing  Goal: Maintain electrolyte levels within acceptable range throughout shift  Outcome: Progressing  Goal: Maintain glucose levels >70mg/dl to <250mg/dl throughout shift  Outcome: Progressing  Goal: No changes in neurological exam by end of shift  Outcome: Progressing  Goal: Learn about and adhere to nutrition recommendations by end of shift  Outcome: Progressing  Goal: Vital signs within normal range for age by end  of shift  Outcome: Progressing  Goal: Increase self care and/or family involovement by end of shift  Outcome: Progressing  Goal: Receive DSME education by end of shift  Outcome: Progressing     Problem: Pain  Goal: Takes deep breaths with improved pain control throughout the shift  Outcome: Progressing  Goal: Turns in bed with improved pain control throughout the shift  Outcome: Progressing  Goal: Walks with improved pain control throughout the shift  Outcome: Progressing  Goal: Performs ADL's with improved pain control throughout shift  Outcome: Progressing  Goal: Participates in PT with improved pain control throughout the shift  Outcome: Progressing  Goal: Free from opioid side effects throughout the shift  Outcome: Progressing  Goal: Free from acute confusion related to pain meds throughout the shift  Outcome: Progressing     Problem: Skin  Goal: Decreased wound size/increased tissue granulation at next dressing change  Outcome: Progressing  Goal: Participates in plan/prevention/treatment measures  Outcome: Progressing  Goal: Prevent/manage excess moisture  Outcome: Progressing  Goal: Prevent/minimize sheer/friction injuries  Outcome: Progressing  Goal: Promote/optimize nutrition  Outcome: Progressing  Goal: Promote skin healing  Outcome: Progressing

## 2025-03-15 NOTE — CONSULTS
Reason For Consult  Urinary retention    History Of Present Illness  Cristal Pollard is a 64 y.o. female presenting with urinary incontinence, urinary retention  Recent lumbar fusion, post op was straight cathed 3 times  Was discharged then returned shortly after due to incontinence and retention  No prior history of urinary retention  Admits to R leg numbness pre op, now had perineal numbness  Had a void trial yesterday that she failed  Urine cx positive for E coli, going home on cipro per Medicine     Past Medical History  She has a past medical history of Abnormal ECG (10/26/2023), Angina pectoris, Anxiety, Cervical disc disease, Cervical radiculopathy, Depression, Diabetes mellitus (Multi), GERD (gastroesophageal reflux disease), History of Holter monitoring (10/2023), Hypertension, Incisional hernia with obstruction, without gangrene (05/24/2017), Joint pain, Mastodynia (05/14/2020), Palpitations, PONV (postoperative nausea and vomiting), Seizure disorder (Multi), TIA (transient ischemic attack), Vertigo, and Vision loss.    Surgical History  She has a past surgical history that includes Other surgical history (11/14/2014); Cholecystectomy (11/14/2014); Colonoscopy (05/17/2018); Hernia repair (05/24/2017); Sternotomy (2011); echocardiogram 2 d m mode panel (02/17/2023); CT chest wo IV contrast (04/21/2023); Cataract extraction; Carpal tunnel release (Right); Other surgical history; and Mandible surgery (Bilateral).     Social History  She reports that she has never smoked. She has never used smokeless tobacco. She reports current alcohol use. She reports that she does not use drugs.    Family History  Family History   Problem Relation Name Age of Onset    Cancer Mother      Hypertension Mother      Heart disease Father      Cancer Father      Hypertension Father      Glaucoma Father      Macular degeneration Father      Heart attack Father      Cancer Sister      Diabetes Sister      Diabetes Maternal Grandfather           Allergies  Tramadol, Amoxicillin, Ampicillin, Lidocaine, and Meperidine hcl    Review of Systems   Genitourinary:  Positive for difficulty urinating. Negative for hematuria.         Physical Exam  No distress  Quinn to CD with clear yellow urine      Current Facility-Administered Medications:     acetaminophen (Tylenol) tablet 650 mg, 650 mg, oral, q4h PRN, 650 mg at 03/14/25 2146 **OR** acetaminophen (Tylenol) oral liquid 650 mg, 650 mg, oral, q4h PRN **OR** acetaminophen (Tylenol) suppository 650 mg, 650 mg, rectal, q4h PRN, Yosvany Yoder PA-C    cyclobenzaprine (Flexeril) tablet 5 mg, 5 mg, oral, TID PRN, Khari Mckinley, DO, 5 mg at 03/14/25 2150    dextrose 50 % injection 12.5 g, 12.5 g, intravenous, q15 min PRN, Yosvany Yoder PA-C    dextrose 50 % injection 25 g, 25 g, intravenous, q15 min PRN, Yosvany Yoder PA-C    FLUoxetine (PROzac) capsule 40 mg, 40 mg, oral, Nightly, Jovita Kam PA-C, 40 mg at 03/14/25 2145    gabapentin (Neurontin) capsule 600 mg, 600 mg, oral, TID, Jovita Kam PA-C, 600 mg at 03/14/25 2145    gentamicin (Garamycin) 390 mg in dextrose 5% 100 mL IV, 5 mg/kg, intravenous, q24h, Yosvany Yoder PA-C, Stopped at 03/15/25 0010    glucagon (Glucagen) injection 1 mg, 1 mg, intramuscular, q15 min PRN, Yosvany Yoder PA-C    glucagon (Glucagen) injection 1 mg, 1 mg, intramuscular, q15 min PRN, Yosvany Yoder PA-C    insulin lispro injection 0-10 Units, 0-10 Units, subcutaneous, TID AC, Yosvany Yoder PA-C, 2 Units at 03/14/25 1701    lamoTRIgine (LaMICtal) tablet 100 mg, 100 mg, oral, Daily, Yosvany Yoder PA-C, 100 mg at 03/14/25 0840    lamoTRIgine (LaMICtal) tablet 150 mg, 150 mg, oral, Nightly, Yosvany Yoder PA-C, 150 mg at 03/14/25 214    [Held by provider] lisinopriL-hydrochlorothiazide 10-12.5 mg per tablet 1 tablet, 1 tablet, oral, Daily, Jovita Kam PA-C    mineral oil enema 1 enema, 1 enema, rectal, Once PRN, Yosvany Yoder PA-C    ondansetron (Zofran)  "tablet 4 mg, 4 mg, oral, q8h PRN **OR** ondansetron (Zofran) injection 4 mg, 4 mg, intravenous, q8h PRN, Yosvany Yoder PA-C    oxyCODONE (Roxicodone) immediate release tablet 10 mg, 10 mg, oral, q6h PRN, Khari Elías DO, 10 mg at 03/14/25 2149    pantoprazole (ProtoNix) EC tablet 40 mg, 40 mg, oral, Daily before breakfast, Jovita Kam PA-C, 40 mg at 03/15/25 0542    sennosides-docusate sodium (Marilyn-Colace) 8.6-50 mg per tablet 2 tablet, 2 tablet, oral, BID, Yosvany Yoder PA-C, 2 tablet at 03/14/25 2255     Last Recorded Vitals  Blood pressure 123/61, pulse 78, temperature 36.7 °C (98.1 °F), temperature source Temporal, resp. rate 18, height 1.651 m (5' 5\"), weight 78.5 kg (173 lb), SpO2 93%.    Relevant Results  Results for orders placed or performed during the hospital encounter of 03/11/25 (from the past 96 hours)   CBC and Auto Differential   Result Value Ref Range    WBC 16.2 (H) 4.4 - 11.3 x10*3/uL    nRBC 0.0 0.0 - 0.0 /100 WBCs    RBC 4.10 4.00 - 5.20 x10*6/uL    Hemoglobin 12.8 12.0 - 16.0 g/dL    Hematocrit 39.8 36.0 - 46.0 %    MCV 97 80 - 100 fL    MCH 31.2 26.0 - 34.0 pg    MCHC 32.2 32.0 - 36.0 g/dL    RDW 13.3 11.5 - 14.5 %    Platelets 368 150 - 450 x10*3/uL    Neutrophils % 68.3 40.0 - 80.0 %    Immature Granulocytes %, Automated 2.7 (H) 0.0 - 0.9 %    Lymphocytes % 20.9 13.0 - 44.0 %    Monocytes % 5.7 2.0 - 10.0 %    Eosinophils % 1.8 0.0 - 6.0 %    Basophils % 0.6 0.0 - 2.0 %    Neutrophils Absolute 11.09 (H) 1.20 - 7.70 x10*3/uL    Immature Granulocytes Absolute, Automated 0.43 0.00 - 0.70 x10*3/uL    Lymphocytes Absolute 3.38 1.20 - 4.80 x10*3/uL    Monocytes Absolute 0.92 0.10 - 1.00 x10*3/uL    Eosinophils Absolute 0.29 0.00 - 0.70 x10*3/uL    Basophils Absolute 0.09 0.00 - 0.10 x10*3/uL   Comprehensive metabolic panel   Result Value Ref Range    Glucose 192 (H) 74 - 99 mg/dL    Sodium 136 136 - 145 mmol/L    Potassium 3.6 3.5 - 5.3 mmol/L    Chloride 100 98 - 107 mmol/L    Bicarbonate " 25 21 - 32 mmol/L    Anion Gap 15 10 - 20 mmol/L    Urea Nitrogen 12 6 - 23 mg/dL    Creatinine 0.74 0.50 - 1.05 mg/dL    eGFR 90 >60 mL/min/1.73m*2    Calcium 10.4 (H) 8.6 - 10.3 mg/dL    Albumin 4.6 3.4 - 5.0 g/dL    Alkaline Phosphatase 53 33 - 136 U/L    Total Protein 8.0 6.4 - 8.2 g/dL    AST 19 9 - 39 U/L    Bilirubin, Total 0.5 0.0 - 1.2 mg/dL    ALT 29 7 - 45 U/L   Urinalysis with Reflex Culture and Microscopic   Result Value Ref Range    Color, Urine Light-Orange (N) Light-Yellow, Yellow, Dark-Yellow    Appearance, Urine Ex.Turbid (N) Clear    Specific Gravity, Urine 1.014 1.005 - 1.035    pH, Urine 5.5 5.0, 5.5, 6.0, 6.5, 7.0, 7.5, 8.0    Protein, Urine 30 (1+) (A) NEGATIVE, 10 (TRACE), 20 (TRACE) mg/dL    Glucose, Urine Normal Normal mg/dL    Blood, Urine 0.5 (2+) (A) NEGATIVE mg/dL    Ketones, Urine NEGATIVE NEGATIVE mg/dL    Bilirubin, Urine NEGATIVE NEGATIVE mg/dL    Urobilinogen, Urine Normal Normal mg/dL    Nitrite, Urine NEGATIVE NEGATIVE    Leukocyte Esterase, Urine 500 Stacy/uL (A) NEGATIVE   Extra Urine Gray Tube   Result Value Ref Range    Extra Tube Hold for add-ons.    Microscopic Only, Urine   Result Value Ref Range    WBC, Urine >50 (A) 1-5, NONE /HPF    WBC Clumps, Urine MANY Reference range not established. /HPF    RBC, Urine >20 (A) NONE, 1-2, 3-5 /HPF    Bacteria, Urine 2+ (A) NONE SEEN /HPF    Budding Yeast, Urine PRESENT (A) NONE /HPF   Urine Culture    Specimen: Clean Catch/Voided; Urine   Result Value Ref Range    Urine Culture >=100,000 CFU/mL Escherichia coli (A)        Susceptibility    Escherichia coli - MICROSCAN     Ampicillin  Susceptible ug/ml     Cefazolin  Susceptible ug/ml     Cefazolin (uncomplicated UTIs only)  Susceptible ug/ml     Piperacillin/Tazobactam  Susceptible ug/ml     Nitrofurantoin  Susceptible ug/ml     Trimethoprim/Sulfamethoxazole  Susceptible ug/ml     Ciprofloxacin  Susceptible ug/ml     Gentamicin  Susceptible ug/ml   Lactate   Result Value Ref Range     Lactate 2.1 (H) 0.4 - 2.0 mmol/L   Lactate   Result Value Ref Range    Lactate 1.6 0.4 - 2.0 mmol/L   Blood Culture    Specimen: Peripheral Venipuncture; Blood culture   Result Value Ref Range    Blood Culture No growth at 2 days    Blood Culture    Specimen: Peripheral Venipuncture; Blood culture   Result Value Ref Range    Blood Culture No growth at 2 days    POCT GLUCOSE   Result Value Ref Range    POCT Glucose 188 (H) 74 - 99 mg/dL   POCT GLUCOSE   Result Value Ref Range    POCT Glucose 185 (H) 74 - 99 mg/dL   Basic metabolic panel   Result Value Ref Range    Glucose 202 (H) 74 - 99 mg/dL    Sodium 137 136 - 145 mmol/L    Potassium 3.6 3.5 - 5.3 mmol/L    Chloride 105 98 - 107 mmol/L    Bicarbonate 25 21 - 32 mmol/L    Anion Gap 11 10 - 20 mmol/L    Urea Nitrogen 11 6 - 23 mg/dL    Creatinine 0.62 0.50 - 1.05 mg/dL    eGFR >90 >60 mL/min/1.73m*2    Calcium 9.2 8.6 - 10.3 mg/dL   CBC   Result Value Ref Range    WBC 13.1 (H) 4.4 - 11.3 x10*3/uL    nRBC 0.0 0.0 - 0.0 /100 WBCs    RBC 3.42 (L) 4.00 - 5.20 x10*6/uL    Hemoglobin 10.7 (L) 12.0 - 16.0 g/dL    Hematocrit 33.2 (L) 36.0 - 46.0 %    MCV 97 80 - 100 fL    MCH 31.3 26.0 - 34.0 pg    MCHC 32.2 32.0 - 36.0 g/dL    RDW 13.7 11.5 - 14.5 %    Platelets 277 150 - 450 x10*3/uL   POCT GLUCOSE   Result Value Ref Range    POCT Glucose 180 (H) 74 - 99 mg/dL   POCT GLUCOSE   Result Value Ref Range    POCT Glucose 181 (H) 74 - 99 mg/dL   POCT GLUCOSE   Result Value Ref Range    POCT Glucose 163 (H) 74 - 99 mg/dL   POCT GLUCOSE   Result Value Ref Range    POCT Glucose 212 (H) 74 - 99 mg/dL   POCT GLUCOSE   Result Value Ref Range    POCT Glucose 188 (H) 74 - 99 mg/dL   POCT GLUCOSE   Result Value Ref Range    POCT Glucose 217 (H) 74 - 99 mg/dL   Gentamicin Trough   Result Value Ref Range    Gentamicin, Trough 0.3 0.0 - 2.0 ug/mL   POCT GLUCOSE   Result Value Ref Range    POCT Glucose 166 (H) 74 - 99 mg/dL   POCT GLUCOSE   Result Value Ref Range    POCT Glucose 245 (H) 74 -  99 mg/dL   POCT GLUCOSE   Result Value Ref Range    POCT Glucose 154 (H) 74 - 99 mg/dL   POCT GLUCOSE   Result Value Ref Range    POCT Glucose 243 (H) 74 - 99 mg/dL   POCT GLUCOSE   Result Value Ref Range    POCT Glucose 181 (H) 74 - 99 mg/dL     *Note: Due to a large number of results and/or encounters for the requested time period, some results have not been displayed. A complete set of results can be found in Results Review.      CT abdomen pelvis wo IV contrast    Result Date: 3/12/2025  Interpreted By:  Elmira Orozco, STUDY: CT ABDOMEN PELVIS WO IV CONTRAST;  3/11/2025 11:58 pm   INDICATION: Signs/Symptoms:uti with back pain.   COMPARISON: CT abdomen and pelvis 03/13/2024   ACCESSION NUMBER(S): [id number]   ORDERING CLINICIAN: SARBJIT FONSECA   TECHNIQUE: CT of the abdomen and pelvis was performed with no contrast administration. Coronal and sagittal reformats were obtained.   FINDINGS: LOWER CHEST: No focal consolidation or pleural effusion.   ABDOMEN:   LIVER: Unremarkable unenhanced appearance.   BILE DUCTS: No obvious dilation.   GALLBLADDER: The gallbladder is surgically absent.   PANCREAS: No peripancreatic inflammatory changes.   SPLEEN: Unremarkable unenhanced appearance.   ADRENAL GLANDS: Unremarkable.   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 No hydronephrosis or hydroureter.  No obstructing ureteral calculi.   BOWEL: No bowel obstruction. There is a large amount of stool at the colon. The appendix is normal.   VESSELS: Atherosclerotic calcifications within the abdominal aorta and its branches. No abdominal aortic aneurysm.   PELVIS: The urinary bladder is  decompressed by a Quinn catheter. There is a small amount of gas at the urinary bladder, may be secondary to recent instrumentation. The uterus is present.   PERITONEUM/RETROPERITONEUM/LYMPH NODES: No free fluid or free air.  No retroperitoneal hemorrhage.  No pathologically enlarged lymph nodes are  identified.   ABDOMINAL WALL: Postsurgical changes are noted at the anterior abdominal wall.   BONE AND SOFT TISSUE: Multilevel degenerative changes at the spine. Postsurgical changes at the lumbar spine with posterior orthopedic hardware at L3, L4 and L5. Intervertebral disc spacers at L3-L4 and L4-L5.  Scattered gas foci with small amount of fluid at the paraspinal soft tissues overlying the lower lumbar spine, probably secondary to recent surgery.   IMPRESSION 1.  No hydronephrosis or obstructing ureteral calculi. No acute findings on unenhanced CT. 2. Constipation with large amount of stool at the colon.       MACRO: None.   Signed by: Elmira Orozco 3/12/2025 12:58 AM Dictation workstation:   [id number]    Bedside Midline Imaging    Result Date: 3/7/2025  These images are not reportable by radiology and will not be interpreted by  Radiologists.    FL fluoro images no charge    Result Date: 3/5/2025  These images are not reportable by radiology and will not be interpreted by  Radiologists.    CT lumbar spine wo IV contrast    Result Date: 3/3/2025  Interpreted By:  Chris June, STUDY: CT LUMBAR SPINE WO IV CONTRAST;  3/3/2025 3:08 pm   INDICATION: Signs/Symptoms:thin cut CT for OR planning.     COMPARISON: Plain film and MRI lumbar spine yesterday.   ACCESSION NUMBER(S): [id number]   ORDERING CLINICIAN: LIANA CARREON   TECHNIQUE: Axial CT images of the lumbar spine are obtained. Axial, coronal and sagittal reconstructions are provided for review.   FINDINGS: 5 lumbar type vertebrae.   Mild-to-moderate rotatory levoscoliosis. Grade 1 left lateral translation L4 on L5. Mild degree spondylolisthesis at multiple levels.   No compression deformity or destructive osseous process. Moderately severe asymmetric loss of disc height L5-S1 with associated vacuum disc phenomenon. Severe loss of disc height L4-5 with associated vacuum disc phenomenon. Moderate asymmetric loss of disc height L3-4 with  associated vacuum disc phenomenon. Mild-to-moderate asymmetric loss of disc height L1-2 with associated vacuum disc phenomenon.   Moderate to advanced facet arthrosis seen throughout the lumbar spine.   For detailed analysis of central canal and foraminal stenosis at each level see report of recent MRI of the lumbar spine.   Degenerative changes of the left-greater-than-right SI joints.       1. Mild-to-moderate rotatory levoscoliosis. 2. Moderate to advanced multilevel degenerative lumbar spondylosis.   MACRO: None   Signed by: Chris June 3/3/2025 3:31 PM Dictation workstation:   [id number]    MR lumbar spine wo IV contrast    Result Date: 3/2/2025  Interpreted By:  Chris June, STUDY: MRI of the lumbar spine without IV contrast;  3/2/2025 10:00 am   INDICATION: Signs/Symptoms:low back pain, worsening leg/hip pain.     COMPARISON: CT lumbar spine 02/27/2025.   ACCESSION NUMBER(S): [id number]   ORDERING CLINICIAN: CHRISTI BAXTER   TECHNIQUE: Sagittal STIR, T1- and T2-weighted as well as axial T1- and T2- weighted MRI images of the lumbar spine were acquired using a spondylolysis protocol.  No contrast was administered.   FINDINGS: Mild-to-moderate rotatory levoscoliosis. Mild degree spondylolisthesis at multiple levels.   No compression deformity or destructive osseous process. Severe loss of disc height L4-5. Moderately severe asymmetric loss of disc height L5-S1. Moderate asymmetric loss of disc height L3-4. Disc desiccated throughout the lumbar spine. Predominant Modic type 2 endplate degenerative changes at multiple levels. Mild Modic type 1 endplate degenerative changes L3-4 to L5-S1.   Normal conus termination.   LEVELS: L5-S1: Disc bulge lateralized more left than right. Moderately advanced left and moderate right facet arthrosis. No significant central canal stenosis. Moderate narrowing of the left lateral recess. Moderate right and moderately severe left foraminal stenosis. L4-L5: Grade 1  anterolisthesis. Disc bulge lateralized to both sides. Advanced facet arthrosis. Ligamentous thickening. Severe central canal and bilateral lateral recess stenosis. Moderately severe bilateral foraminal stenosis. L3-L4: Trace anterolisthesis. Disc bulge lateralized is more right than left. Advanced facet arthrosis. Ligamentous thickening. Moderately severe trefoil central canal stenosis. Severe right and moderately severe left lateral recess stenosis. Severe right and mild-to-moderate left foraminal stenosis. L2-L3: Disc bulge lateralized slightly more right than left. Moderately advanced right and moderate left facet arthrosis. Ligamentous thickening. Mild-to-moderate central canal stenosis. Moderate narrowing of the bilateral lateral recesses. Moderately severe right and mild-to-moderate left foraminal stenosis. L1-L2: Disc bulge lateralized to both sides. Moderate bilateral foraminal stenosis. Mild ligamentous thickening. Mild-to-moderate trefoil central canal stenosis. Moderately severe left lateral recess stenosis. Severe right and moderately severe left foraminal stenosis.       1. Mild-to-moderate rotatory levoscoliosis. 2. Moderate to advanced multilevel degenerative lumbar spondylosis as described in detail above.       MACRO: None   Signed by: Chris June 3/2/2025 6:26 PM Dictation workstation:   [id number]    XR lumbar spine 4+ views w flexion extension    Result Date: 3/2/2025  Interpreted By:  Mumtaz Johnson, STUDY: XR LUMBAR SPINE 4+ VIEWS WITH FLEXION EXTENSION; ;  3/2/2025 2:22 pm   INDICATION: Signs/Symptoms:eval alignment, please perform upright if able, if not then sitting.     COMPARISON: CT 02/27/2025.   ACCESSION NUMBER(S): [id number]   ORDERING CLINICIAN: CHRISTI BAXTER   FINDINGS: AP and lateral views of the lumbosacral spine   Mild serpentine scoliotic curve of the thoracolumbar spine. Grade 1 anterolisthesis at L3-4 and L4-5 without significant change in flexion or extension.  Moderate to severe multilevel degenerative changes of the spine with large anterior osteophytes and facet arthropathy and disc space narrowing. Unchanged anterior vertebral body height loss at T11, less than 25% and unchanged from radiographs from 2018. No new vertebral body height loss.       1. Moderate to severe degenerative changes of the spine with grade 1 anterolisthesis at L3-4 and L4-5, unchanged in flexion and extension. This is better seen on prior CT.       MACRO: None   Signed by: Mumtaz Johnson 3/2/2025 2:28 PM Dictation workstation:   [id number]    XR chest 1 view    Result Date: 3/2/2025  Interpreted By:  Gabby Goldman,  and Talon Khan STUDY: XR CHEST 1 VIEW;  3/2/2025 2:01 am   INDICATION: Signs/Symptoms:preop.     COMPARISON: Chest x-ray 02/20/2024   ACCESSION NUMBER(S): [id number]   ORDERING CLINICIAN: CHRISTI BAXTER   FINDINGS: AP radiograph of the chest was provided.   Median sternotomy wires in place. Partially visualized cervical spinal fusion hardware.   CARDIOMEDIASTINAL SILHOUETTE: Cardiomediastinal silhouette is normal in size and configuration.   LUNGS: No focal consolidation, sizeable pleural effusion or pneumothorax.   ABDOMEN: No remarkable upper abdominal findings.   BONES: No acute osseous changes.       1.  No evidence of acute cardiopulmonary process.   I personally reviewed the images/study and I agree with the findings as stated by resident physician Dr. Bill Bullard . This study was interpreted at [facility], [place], [place].   MACRO: None   Signed by: Gabby Adair 3/2/2025 10:42 AM Dictation workstation:   [id number]    CT lumbar spine wo IV contrast    Result Date: 2/27/2025  Interpreted By:  Ponce Denney, STUDY: CT LUMBAR SPINE WO IV CONTRAST; 2/27/2025 3:11 pm   INDICATION: Signs/Symptoms:Back pain;   COMPARISON: None   ACCESSION NUMBER(S): [id number]   ORDERING CLINICIAN: ANDREA LOU    TECHNIQUE: Contiguous axial images of the lumbar spine were performed. Coronal and sagittal reformatted images were also obtained. All CT examinations are performed with 1 or more of the following dose reduction techniques: Automated exposure control, adjustment of mA and/or kv according to patient's size, or use of iterative reconstruction techniques.   FINDINGS: No evidence for acute fracture. Levoscoliosis is present. Advanced facet degenerative changes. There is a normal lumbar lordosis however there is minimal degenerative anterolisthesis of L3 on L4 and L4 on L5.   The vertebral bodies are normal in height and configuration.   At L5-S1, there is moderate-severe disc space narrowing, vacuum disc phenomena and diffuse disc bulging. No evidence for significant bony spinal canal stenosis. However there is severe left neural foramina stenosis and moderate right neural foramina stenosis.   At L4-5, there is severe disc space narrowing, vacuum disc phenomena, diffuse disc bulging, suggestion of severe canal stenosis due to disc bulging as well as severe facet and ligamentum flavum hypertrophy. There is moderate-severe bilateral neural foramina stenosis at this level.   At L3-4, there is moderate disc space narrowing asymmetrically worsened towards the right with vacuum disc phenomena and diffuse disc bulging. There is moderate-severe canal stenosis. Moderate-severe right neural foramina stenosis and mild-moderate left neural foramina stenosis.   At L2-3, there is facet and ligamentum flavum hypertrophy disc space narrowing asymmetrically greater toward the right, diffuse disc bulging. Moderate to moderate-severe canal stenosis. Moderate-severe right neural foramina stenosis. No significant left neural foramina stenosis.   At L1-2 there is vacuum disc phenomena and asymmetric disc space narrowing towards the right, diffuse disc bulging, mild to mild-moderate lateral recess stenosis and at least mild canal stenosis.  Moderate-severe right neural foramina stenosis. Mild-moderate left neural foramina stenosis.   At T12-L1, there is diffuse disc bulging. No evidence for high-grade spinal canal stenosis. Mild-moderate left neural foramina stenosis.   The SI joints show degenerative gas but otherwise intact.       No acute fracture or traumatic malalignment.   Advanced chronic degenerative changes as described in the body of the report with multilevel high-grade spinal canal stenosis and high-grade neural foramina stenosis.     Signed by: Ponce Denney 2/27/2025 4:03 PM Dictation workstation:   [id number]    CT pelvis wo IV contrast    Result Date: 2/27/2025  Interpreted By:  Yosvany Christopher, STUDY: CT PELVIS WO IV CONTRAST; CT HIP RIGHT WO IV CONTRAST; ;  2/27/2025 3:11 pm   INDICATION: Signs/Symptoms:Right hip pain.     COMPARISON: Previous CT of the abdomen and pelvis March 13, 2024   Plain film radiographs of the right hip performed on February 11, 2025     ACCESSION NUMBER(S): [id number]; [id number]   ORDERING CLINICIAN: ANDREA LOU   TECHNIQUE: Multiple thin-section axial images of the right hip reconstructed in the sagittal and coronal plane.   Additional dedicated thin-section axial images of the pelvis also performed.     FINDINGS: Moderate osteoarthritis of the right hip.   Similar appearance left hip.   Small bilateral trochanteric spurs.   No evidence of right hip fracture.   No bony destructive lesions.   Advanced lower lumbar degenerative change.   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   No muscular attenuation changes.   No evidence of a soft tissue mass.   No focal fluid collections.   No sizeable effusion.       Surgical clips from previous hernia repair along the anterior abdominal wall.         Degenerative changes bilateral hips, sacroiliac joints, and lower lumbar spine.   No acute findings right hip.     MACRO: None   Signed by: Yosvany Christopher 2/27/2025 3:24 PM  Dictation workstation:   [id number]    CT hip right wo IV contrast    Result Date: 2/27/2025  Interpreted By:  Yosvany Christopher, STUDY: CT PELVIS WO IV CONTRAST; CT HIP RIGHT WO IV CONTRAST; ;  2/27/2025 3:11 pm   INDICATION: Signs/Symptoms:Right hip pain.     COMPARISON: Previous CT of the abdomen and pelvis March 13, 2024   Plain film radiographs of the right hip performed on February 11, 2025     ACCESSION NUMBER(S): [id number]; [id number]   ORDERING CLINICIAN: ANDREA LOU   TECHNIQUE: Multiple thin-section axial images of the right hip reconstructed in the sagittal and coronal plane.   Additional dedicated thin-section axial images of the pelvis also performed.     FINDINGS: Moderate osteoarthritis of the right hip.   Similar appearance left hip.   Small bilateral trochanteric spurs.   No evidence of right hip fracture.   No bony destructive lesions.   Advanced lower lumbar degenerative change.   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   No muscular attenuation changes.   No evidence of a soft tissue mass.   No focal fluid collections.   No sizeable effusion.       Surgical clips from previous hernia repair along the anterior abdominal wall.         Degenerative changes bilateral hips, sacroiliac joints, and lower lumbar spine.   No acute findings right hip.     MACRO: None   Signed by: Yosvany Christopher 2/27/2025 3:24 PM Dictation workstation:   [id number]        Assessment/Plan   Post op urinary retention  -Maintain arnold  -Start flomax  -TOV in 1-2 weeks at our office, all info given    Thank you for allowing us to participate in this patient's care, patient is OK ofr discharge from a  standpoint.    Chon Valles PA-C

## 2025-03-16 LAB
BACTERIA BLD CULT: NORMAL
BACTERIA BLD CULT: NORMAL

## 2025-03-17 ENCOUNTER — HOME CARE VISIT (OUTPATIENT)
Dept: HOME HEALTH SERVICES | Facility: HOME HEALTH | Age: 65
End: 2025-03-17
Payer: COMMERCIAL

## 2025-03-17 ENCOUNTER — PATIENT OUTREACH (OUTPATIENT)
Dept: PRIMARY CARE | Facility: CLINIC | Age: 65
End: 2025-03-17
Payer: COMMERCIAL

## 2025-03-17 PROCEDURE — G0151 HHCP-SERV OF PT,EA 15 MIN: HCPCS | Mod: HHH

## 2025-03-17 PROCEDURE — 169592 NO-PAY CLAIM PROCEDURE

## 2025-03-17 PROCEDURE — G0152 HHCP-SERV OF OT,EA 15 MIN: HCPCS | Mod: HHH

## 2025-03-17 ASSESSMENT — ENCOUNTER SYMPTOMS
PAIN: 1
HIGHEST PAIN SEVERITY IN PAST 24 HOURS: 8/10
LOWEST PAIN SEVERITY IN PAST 24 HOURS: 2/10
PAIN LOCATION: RIGHT LEG
HIGHEST PAIN SEVERITY IN PAST 24 HOURS: 7/10
PERSON REPORTING PAIN: PATIENT
PAIN LOCATION: BACK
PERSON REPORTING PAIN: PATIENT
PAIN SEVERITY GOAL: 0/10
LOWEST PAIN SEVERITY IN PAST 24 HOURS: 2/10
PAIN LOCATION - PAIN SEVERITY: 4/10
SUBJECTIVE PAIN PROGRESSION: GRADUALLY IMPROVING
PAIN: 1

## 2025-03-17 ASSESSMENT — ACTIVITIES OF DAILY LIVING (ADL)
OASIS_M1830: 05
BATHING_CURRENT_FUNCTION: MODERATE ASSIST
TOILETING: 1
LAUNDRY ASSESSED: 1
PHYSICAL TRANSFERS ASSESSED: 1
FEEDING: INDEPENDENT
GROOMING_CURRENT_FUNCTION: INDEPENDENT
BATHING ASSESSED: 1
FEEDING ASSESSED: 1
DRESSING_LB_CURRENT_FUNCTION: MODERATE ASSIST
TOILETING: INDEPENDENT
PREPARING MEALS: NEEDS ASSISTANCE
LAUNDRY: DEPENDENT
GROOMING ASSESSED: 1
CURRENT_FUNCTION: MODERATE ASSIST
DRESSING_UB_CURRENT_FUNCTION: INDEPENDENT
ENTERING_EXITING_HOME: MINIMUM ASSIST

## 2025-03-17 NOTE — HOME HEALTH
Patient seen alone for OT evaluation visit. Patient was recently hospitalized with sepsis without septic shock from 3.11-3.15. She had urinary incontinence, retention and dysuria. She had recent lumbar fusion robot assisted surgery on 3.5.25, L3-5. She has a Quinn catheter, has to make a follow up phone call to set up an appointment with a urologist.    Lives with significant other in a ranch home with 5 steps to enter the front door, 2 steps to enter the side door and 3 steps to the main level. Stays on 1st floor with bedroom and full bathroom with a tub shower.     Medical history of seizures, HTN, HLD, DM, GERD, radiculopathy, neck surgery about a year and a half ago.     Patient has a wheeled walker, shower chair, handheld shower, wall mounted grab bars.     Prior to hospitalization, was independent with ADLs and IADLs, drove. She is retired, on disability. She didn't use an ambulatory device.     UE AROM and strength WNL. Patient in agreement with OT POC. Plan to see 2w1, 1w1 to address LE dressing, showering/tub transfer and light meal prep.    Plan for next visit-instruct on tub transfer/showering safety, ADL training-dressing, IADL training-light meal prep, review home safety.

## 2025-03-17 NOTE — PROGRESS NOTES
Discharge Facility: [facility]  Discharge Diagnosis:64-year-old female discharged from hospital yesterday after lumbar fusion presented to ED for evaluation of urinary incontinence, retention and dysuria. Patient found to be septic with concern for UTI. Patient treated with fluids and covered with gentamicin and admitted to inpatient under Dr. Mckinley for further evaluation and care.  Admission Date:3.12.25  Discharge Date: 3.15.25    PCP Appointment Date:3.27.25  Specialist Appointment Date:   Hospital Encounter and Summary Linked: Yes  ED to Hosp-Admission (Discharged) with Khari Mckinley DO; Elyse H Klerman, MD (03/11/2025)   See discharge assessment below for further details   Wrap Up  Wrap Up Additional Comments: readmitted to the hospital after recent discharge. Admitted for Sepsis. Hds experienced urinary incontinence, retention and dysuria. Now has a arnold in place. Patient states arnold will remain to allow for healing after recent surgery. Is afebrile and taking atb as ordered. Will be following with urology for arnold removal. (3/17/2025  3:16 PM)    Engagement  Call Start Time: 1516 (3/17/2025  3:16 PM)    Medications  Medications reviewed with patient/caregiver?: Yes (3/17/2025  3:16 PM)  Is the patient having any side effects they believe may be caused by any medication additions or changes?: No (3/17/2025  3:16 PM)  Does the patient have all medications ordered at discharge?: Yes (3/17/2025  3:16 PM)  Care Management Interventions: No intervention needed (3/17/2025  3:16 PM)  Prescription Comments: START taking these medications   o ciprofloxacin 500 mg tablet; Commonly known as: Cipro; Take 1 tablet   (500 mg) by mouth 2 times a day for 3 days.  o cyclobenzaprine 5 mg tablet; Commonly known as: Flexeril; Take 1 tablet   (5 mg) by mouth 3 times a day as needed for muscle spasms.  CHANGE how you take these medications   o FLUoxetine 40 mg capsule; Commonly known as: PROzac; TAKE 1 CAPSULE (40   MG) BY MOUTH ONCE  DAILY. TO START AFTER COMPLETING 10MG AND 20MG DOSES;   What changed: when to take this  o omeprazole 40 mg DR capsule; Commonly known as: PriLOSEC; TAKE 1 CAPSULE   (40 MG) BY MOUTH ONCE DAILY. DO NOT CRUSH OR CHEW.; What changed: when to   take this, reasons to take this  o * oxyCODONE 5 mg immediate release tablet; Commonly known as:   Roxicodone; Take 1 tablet (5 mg) by mouth every 6 hours if needed for   severe pain (7 - 10) for up to 7 days.; What changed: Another medication   with the same name was added. Make sure you understand how and when to   take each.  o * oxyCODONE 10 mg immediate release tablet; Commonly known as:   Roxicodone; Take 1 tablet (10 mg) by mouth every 6 hours if needed for   severe pain (7 - 10).; What changed: You were already taking a medication   with the same name, and this prescription was added. Make sure you   understand how and when to take each. (3/17/2025  3:16 PM)  Is the patient taking all medications as directed (includes completed medication regime)?: Yes (3/17/2025  3:16 PM)  Care Management Interventions: Provided patient education (3/17/2025  3:16 PM)  Medication Comments: Verbalizes understanding of medication changes (3/17/2025  3:16 PM)    Appointments  Does the patient have a primary care provider?: Yes (3/17/2025  3:16 PM)  Care Management Interventions: Verified appointment date/time/provider (3/17/2025  3:16 PM)  Has the patient kept scheduled appointments due by today?: Yes (3/17/2025  3:16 PM)  Care Management Interventions: Advised patient to keep appointment (3/17/2025  3:16 PM)    Self Management  What is the home health agency?: [facility] (3/17/2025  3:16 PM)  What Durable Medical Equipment (DME) was ordered?: n/a (3/17/2025  3:16 PM)    Patient Teaching  Does the patient have access to their discharge instructions?: Yes (3/17/2025  3:16 PM)  Care Management Interventions: Reviewed instructions with patient (3/17/2025  3:16 PM)  What is the patient's perception  of their health status since discharge?: Improving (3/17/2025  3:16 PM)  Is the patient/caregiver able to teach back the hierarchy of who to call/visit for symptoms/problems? PCP, Specialist, Home Health nurse, Urgent Care, ED, 911: Yes (3/17/2025  3:16 PM)  Patient/Caregiver Education Comments: See wrap up (3/17/2025  3:16 PM)

## 2025-03-18 DIAGNOSIS — E11.9 TYPE 2 DIABETES MELLITUS WITHOUT COMPLICATION, WITHOUT LONG-TERM CURRENT USE OF INSULIN (MULTI): Primary | ICD-10-CM

## 2025-03-20 ENCOUNTER — HOME CARE VISIT (OUTPATIENT)
Dept: HOME HEALTH SERVICES | Facility: HOME HEALTH | Age: 65
End: 2025-03-20
Payer: COMMERCIAL

## 2025-03-20 DIAGNOSIS — R33.9 URINARY RETENTION: Primary | ICD-10-CM

## 2025-03-20 NOTE — PROGRESS NOTES
Subjective   Reason for Visit: Cristal Pollard is an 64 y.o. female here for a Medicare Wellness visit.               HPI    Patient Care Team:  Jessica Diehl DO as PCP - General (Internal Medicine)  Jessica Diehl DO as PCP - [facility] PCP  Jessica Diehl DO as PCP - [facility] Medicaid PCP  Yoni Fall MD as Consulting Physician (Cardiology)  DREW Torres-CNP as Nurse Practitioner (Neurosurgery)  Arnulfo Reynolds MD as Surgeon (Neurosurgery)  Guerita Perez RN as Care Manager (Case Management)     Review of Systems    Objective   Vitals:  LMP  (LMP Unknown)       Physical Exam    Assessment & Plan

## 2025-03-21 ENCOUNTER — HOME CARE VISIT (OUTPATIENT)
Dept: HOME HEALTH SERVICES | Facility: HOME HEALTH | Age: 65
End: 2025-03-21
Payer: COMMERCIAL

## 2025-03-21 VITALS
TEMPERATURE: 98.1 F | SYSTOLIC BLOOD PRESSURE: 122 MMHG | HEART RATE: 82 BPM | DIASTOLIC BLOOD PRESSURE: 80 MMHG | RESPIRATION RATE: 18 BRPM | OXYGEN SATURATION: 97 %

## 2025-03-21 PROCEDURE — G0299 HHS/HOSPICE OF RN EA 15 MIN: HCPCS | Mod: HHH

## 2025-03-21 PROCEDURE — G0151 HHCP-SERV OF PT,EA 15 MIN: HCPCS | Mod: HHH

## 2025-03-21 ASSESSMENT — ENCOUNTER SYMPTOMS
PAIN LOCATION - PAIN SEVERITY: 5/10
LOWEST PAIN SEVERITY IN PAST 24 HOURS: 5/10
PAIN LOCATION: BACK
PAIN: 1
MUSCLE WEAKNESS: 1
PAIN: 1
HIGHEST PAIN SEVERITY IN PAST 24 HOURS: 5/10
PERSON REPORTING PAIN: PATIENT
PERSON REPORTING PAIN: PATIENT

## 2025-03-21 NOTE — HOME HEALTH
pt was seen in ed first time due to shooting pains and numbness in the rt leg. was found to have 2 slipped discs. after dc, pt arrived home and had urinary issues. was not able to pee, then would pee a lot. felt like bladder was convulsing. now has catheter inserted. 16fr. upon arrival pt states she feels like catheter is falling out. assessed catheter, balloon seems to be intact. pt has felt this way for 3 days and catheter is still patent and draining. has f/u w/ urology wednesday. will order supplies if catheter stays in place. pt agreeable to this plan. agrees to go to ed if catheter falls out. did change stat lock for pt. is knowledgable on how to empty the bag. rinses incisions in the shower as directed. they are clean and intact, photo in chart. ambulates w/ walker, is very weak. no recent falls. appetite is normal. denies sob. lungs are clear. no issues noted w/ bms. medications reviewed, no recent changes. current pain is a 5, does have oxy. also uses tylenol. vitals wnl. no other concerns at this time.

## 2025-03-23 ENCOUNTER — HOSPITAL ENCOUNTER (OUTPATIENT)
Facility: HOSPITAL | Age: 65
Setting detail: OBSERVATION
DRG: 552 | End: 2025-03-23
Attending: STUDENT IN AN ORGANIZED HEALTH CARE EDUCATION/TRAINING PROGRAM | Admitting: INTERNAL MEDICINE
Payer: COMMERCIAL

## 2025-03-23 ENCOUNTER — APPOINTMENT (OUTPATIENT)
Dept: RADIOLOGY | Facility: HOSPITAL | Age: 65
DRG: 552 | End: 2025-03-23
Payer: COMMERCIAL

## 2025-03-23 VITALS
RESPIRATION RATE: 16 BRPM | DIASTOLIC BLOOD PRESSURE: 74 MMHG | WEIGHT: 190 LBS | BODY MASS INDEX: 28.14 KG/M2 | HEIGHT: 69 IN | TEMPERATURE: 98.4 F | SYSTOLIC BLOOD PRESSURE: 151 MMHG | OXYGEN SATURATION: 93 % | HEART RATE: 68 BPM

## 2025-03-23 DIAGNOSIS — M54.9 BACK PAIN, UNSPECIFIED BACK LOCATION, UNSPECIFIED BACK PAIN LATERALITY, UNSPECIFIED CHRONICITY: Primary | ICD-10-CM

## 2025-03-23 DIAGNOSIS — G89.18 ACUTE POSTOPERATIVE PAIN: ICD-10-CM

## 2025-03-23 DIAGNOSIS — Z98.890 S/P SPINAL SURGERY: ICD-10-CM

## 2025-03-23 DIAGNOSIS — N30.90 CYSTITIS: ICD-10-CM

## 2025-03-23 DIAGNOSIS — M54.10 BACK PAIN WITH RADICULOPATHY: ICD-10-CM
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
## 2025-03-23 DIAGNOSIS — Z98.1 HISTORY OF LUMBAR FUSION: ICD-10-CM

## 2025-03-23 LAB
AMORPH CRY #/AREA UR COMP ASSIST: NORMAL /HPF
ANION GAP SERPL CALC-SCNC: 20 MMOL/L (ref 10–20)
APPEARANCE UR: ABNORMAL
BASOPHILS # BLD AUTO: 0.07 X10*3/UL (ref 0–0.1)
BASOPHILS NFR BLD AUTO: 0.7 %
BILIRUB UR STRIP.AUTO-MCNC: NEGATIVE MG/DL
BUN SERPL-MCNC: 10 MG/DL (ref 6–23)
CALCIUM SERPL-MCNC: 10.1 MG/DL (ref 8.6–10.3)
CHLORIDE SERPL-SCNC: 100 MMOL/L (ref 98–107)
CO2 SERPL-SCNC: 19 MMOL/L (ref 21–32)
COLOR UR: ABNORMAL
CREAT SERPL-MCNC: 0.68 MG/DL (ref 0.5–1.05)
EGFRCR SERPLBLD CKD-EPI 2021: >90 ML/MIN/1.73M*2
EOSINOPHIL # BLD AUTO: 0.23 X10*3/UL (ref 0–0.7)
EOSINOPHIL NFR BLD AUTO: 2.3 %
ERYTHROCYTE [DISTWIDTH] IN BLOOD BY AUTOMATED COUNT: 13.2 % (ref 11.5–14.5)
GLUCOSE BLD MANUAL STRIP-MCNC: 179 MG/DL (ref 74–99)
GLUCOSE BLD MANUAL STRIP-MCNC: 183 MG/DL (ref 74–99)
GLUCOSE BLD MANUAL STRIP-MCNC: 183 MG/DL (ref 74–99)
GLUCOSE BLD MANUAL STRIP-MCNC: 198 MG/DL (ref 74–99)
GLUCOSE SERPL-MCNC: 223 MG/DL (ref 74–99)
GLUCOSE UR STRIP.AUTO-MCNC: ABNORMAL MG/DL
HCT VFR BLD AUTO: 38.8 % (ref 36–46)
HGB BLD-MCNC: 12.4 G/DL (ref 12–16)
IMM GRANULOCYTES # BLD AUTO: 0.14 X10*3/UL (ref 0–0.7)
IMM GRANULOCYTES NFR BLD AUTO: 1.4 % (ref 0–0.9)
KETONES UR STRIP.AUTO-MCNC: ABNORMAL MG/DL
LEUKOCYTE ESTERASE UR QL STRIP.AUTO: ABNORMAL
LYMPHOCYTES # BLD AUTO: 2.99 X10*3/UL (ref 1.2–4.8)
LYMPHOCYTES NFR BLD AUTO: 30.1 %
MCH RBC QN AUTO: 30 PG (ref 26–34)
MCHC RBC AUTO-ENTMCNC: 32 G/DL (ref 32–36)
MCV RBC AUTO: 94 FL (ref 80–100)
MONOCYTES # BLD AUTO: 0.53 X10*3/UL (ref 0.1–1)
MONOCYTES NFR BLD AUTO: 5.3 %
MUCOUS THREADS #/AREA URNS AUTO: NORMAL /LPF
NEUTROPHILS # BLD AUTO: 5.98 X10*3/UL (ref 1.2–7.7)
NEUTROPHILS NFR BLD AUTO: 60.2 %
NITRITE UR QL STRIP.AUTO: NEGATIVE
NRBC BLD-RTO: 0 /100 WBCS (ref 0–0)
PH UR STRIP.AUTO: 7.5 [PH]
PLATELET # BLD AUTO: 336 X10*3/UL (ref 150–450)
POTASSIUM SERPL-SCNC: 3.6 MMOL/L (ref 3.5–5.3)
PROT UR STRIP.AUTO-MCNC: ABNORMAL MG/DL
RBC # BLD AUTO: 4.13 X10*6/UL (ref 4–5.2)
RBC # UR STRIP.AUTO: NEGATIVE MG/DL
RBC #/AREA URNS AUTO: NORMAL /HPF
SODIUM SERPL-SCNC: 135 MMOL/L (ref 136–145)
SP GR UR STRIP.AUTO: 1.02
UROBILINOGEN UR STRIP.AUTO-MCNC: NORMAL MG/DL
WBC # BLD AUTO: 9.9 X10*3/UL (ref 4.4–11.3)
WBC #/AREA URNS AUTO: NORMAL /HPF

## 2025-03-23 PROCEDURE — 2500000001 HC RX 250 WO HCPCS SELF ADMINISTERED DRUGS (ALT 637 FOR MEDICARE OP): Performed by: INTERNAL MEDICINE

## 2025-03-23 PROCEDURE — 72131 CT LUMBAR SPINE W/O DYE: CPT | Performed by: RADIOLOGY

## 2025-03-23 PROCEDURE — 87086 URINE CULTURE/COLONY COUNT: CPT | Mod: PARLAB | Performed by: STUDENT IN AN ORGANIZED HEALTH CARE EDUCATION/TRAINING PROGRAM

## 2025-03-23 PROCEDURE — 99222 1ST HOSP IP/OBS MODERATE 55: CPT | Performed by: INTERNAL MEDICINE

## 2025-03-23 PROCEDURE — 85025 COMPLETE CBC W/AUTO DIFF WBC: CPT | Performed by: STUDENT IN AN ORGANIZED HEALTH CARE EDUCATION/TRAINING PROGRAM

## 2025-03-23 PROCEDURE — G0378 HOSPITAL OBSERVATION PER HR: HCPCS

## 2025-03-23 PROCEDURE — 72131 CT LUMBAR SPINE W/O DYE: CPT

## 2025-03-23 PROCEDURE — 80048 BASIC METABOLIC PNL TOTAL CA: CPT | Performed by: STUDENT IN AN ORGANIZED HEALTH CARE EDUCATION/TRAINING PROGRAM

## 2025-03-23 PROCEDURE — 96372 THER/PROPH/DIAG INJ SC/IM: CPT | Performed by: INTERNAL MEDICINE

## 2025-03-23 PROCEDURE — 96376 TX/PRO/DX INJ SAME DRUG ADON: CPT

## 2025-03-23 PROCEDURE — 82947 ASSAY GLUCOSE BLOOD QUANT: CPT

## 2025-03-23 PROCEDURE — 2500000002 HC RX 250 W HCPCS SELF ADMINISTERED DRUGS (ALT 637 FOR MEDICARE OP, ALT 636 FOR OP/ED): Performed by: INTERNAL MEDICINE

## 2025-03-23 PROCEDURE — 2500000004 HC RX 250 GENERAL PHARMACY W/ HCPCS (ALT 636 FOR OP/ED): Performed by: INTERNAL MEDICINE

## 2025-03-23 PROCEDURE — 81001 URINALYSIS AUTO W/SCOPE: CPT | Performed by: STUDENT IN AN ORGANIZED HEALTH CARE EDUCATION/TRAINING PROGRAM

## 2025-03-23 PROCEDURE — 96374 THER/PROPH/DIAG INJ IV PUSH: CPT | Mod: 59

## 2025-03-23 PROCEDURE — 96375 TX/PRO/DX INJ NEW DRUG ADDON: CPT

## 2025-03-23 PROCEDURE — 72128 CT CHEST SPINE W/O DYE: CPT | Performed by: RADIOLOGY

## 2025-03-23 PROCEDURE — 36415 COLL VENOUS BLD VENIPUNCTURE: CPT | Performed by: STUDENT IN AN ORGANIZED HEALTH CARE EDUCATION/TRAINING PROGRAM

## 2025-03-23 PROCEDURE — 72128 CT CHEST SPINE W/O DYE: CPT

## 2025-03-23 PROCEDURE — 2500000004 HC RX 250 GENERAL PHARMACY W/ HCPCS (ALT 636 FOR OP/ED): Performed by: STUDENT IN AN ORGANIZED HEALTH CARE EDUCATION/TRAINING PROGRAM

## 2025-03-23 PROCEDURE — 99285 EMERGENCY DEPT VISIT HI MDM: CPT | Mod: 25 | Performed by: STUDENT IN AN ORGANIZED HEALTH CARE EDUCATION/TRAINING PROGRAM

## 2025-03-23 RX ORDER — POLYETHYLENE GLYCOL 3350 17 G/17G
17 POWDER, FOR SOLUTION ORAL DAILY
Status: DISCONTINUED | OUTPATIENT
Start: 2025-03-23 | End: 2025-03-31 | Stop reason: HOSPADM

## 2025-03-23 RX ORDER — DEXTROSE 50 % IN WATER (D50W) INTRAVENOUS SYRINGE
25
Status: DISCONTINUED | OUTPATIENT
Start: 2025-03-23 | End: 2025-03-31 | Stop reason: HOSPADM

## 2025-03-23 RX ORDER — OXYCODONE HYDROCHLORIDE 5 MG/1
5 TABLET ORAL EVERY 6 HOURS PRN
Status: DISCONTINUED | OUTPATIENT
Start: 2025-03-23 | End: 2025-03-24

## 2025-03-23 RX ORDER — DIAZEPAM 5 MG/1
5 TABLET ORAL ONCE
Status: COMPLETED | OUTPATIENT
Start: 2025-03-23 | End: 2025-03-23

## 2025-03-23 RX ORDER — ACETAMINOPHEN 160 MG/5ML
650 SOLUTION ORAL EVERY 4 HOURS PRN
Status: DISCONTINUED | OUTPATIENT
Start: 2025-03-23 | End: 2025-03-23

## 2025-03-23 RX ORDER — ONDANSETRON HYDROCHLORIDE 2 MG/ML
4 INJECTION, SOLUTION INTRAVENOUS EVERY 8 HOURS PRN
Status: DISCONTINUED | OUTPATIENT
Start: 2025-03-23 | End: 2025-03-31 | Stop reason: HOSPADM

## 2025-03-23 RX ORDER — ACETAMINOPHEN 650 MG/1
650 SUPPOSITORY RECTAL EVERY 4 HOURS PRN
Status: DISCONTINUED | OUTPATIENT
Start: 2025-03-23 | End: 2025-03-23

## 2025-03-23 RX ORDER — ONDANSETRON HYDROCHLORIDE 2 MG/ML
4 INJECTION, SOLUTION INTRAVENOUS ONCE
Status: COMPLETED | OUTPATIENT
Start: 2025-03-23 | End: 2025-03-23

## 2025-03-23 RX ORDER — GABAPENTIN 300 MG/1
600 CAPSULE ORAL 3 TIMES DAILY
Status: DISCONTINUED | OUTPATIENT
Start: 2025-03-23 | End: 2025-03-26

## 2025-03-23 RX ORDER — HYDROMORPHONE HYDROCHLORIDE 0.2 MG/ML
0.2 INJECTION INTRAMUSCULAR; INTRAVENOUS; SUBCUTANEOUS
Status: DISCONTINUED | OUTPATIENT
Start: 2025-03-23 | End: 2025-03-24

## 2025-03-23 RX ORDER — PANTOPRAZOLE SODIUM 40 MG/1
40 TABLET, DELAYED RELEASE ORAL
Status: DISCONTINUED | OUTPATIENT
Start: 2025-03-23 | End: 2025-03-31 | Stop reason: HOSPADM

## 2025-03-23 RX ORDER — OXYCODONE HYDROCHLORIDE 5 MG/1
10 TABLET ORAL EVERY 6 HOURS PRN
Status: DISCONTINUED | OUTPATIENT
Start: 2025-03-23 | End: 2025-03-24

## 2025-03-23 RX ORDER — AMOXICILLIN 250 MG
2 CAPSULE ORAL 2 TIMES DAILY
Status: DISCONTINUED | OUTPATIENT
Start: 2025-03-23 | End: 2025-03-31 | Stop reason: HOSPADM

## 2025-03-23 RX ORDER — FLUOXETINE HYDROCHLORIDE 20 MG/1
40 CAPSULE ORAL NIGHTLY
Status: DISCONTINUED | OUTPATIENT
Start: 2025-03-23 | End: 2025-03-31 | Stop reason: HOSPADM

## 2025-03-23 RX ORDER — HYDROXYZINE PAMOATE 25 MG/1
25 CAPSULE ORAL EVERY 8 HOURS PRN
Status: DISCONTINUED | OUTPATIENT
Start: 2025-03-23 | End: 2025-03-31 | Stop reason: HOSPADM

## 2025-03-23 RX ORDER — ACETAMINOPHEN 325 MG/1
650 TABLET ORAL EVERY 4 HOURS PRN
Status: DISCONTINUED | OUTPATIENT
Start: 2025-03-23 | End: 2025-03-31 | Stop reason: HOSPADM

## 2025-03-23 RX ORDER — DEXTROSE 50 % IN WATER (D50W) INTRAVENOUS SYRINGE
12.5
Status: DISCONTINUED | OUTPATIENT
Start: 2025-03-23 | End: 2025-03-31 | Stop reason: HOSPADM

## 2025-03-23 RX ORDER — HYDROMORPHONE HYDROCHLORIDE 0.2 MG/ML
0.2 INJECTION INTRAMUSCULAR; INTRAVENOUS; SUBCUTANEOUS ONCE
Status: COMPLETED | OUTPATIENT
Start: 2025-03-23 | End: 2025-03-23

## 2025-03-23 RX ORDER — INSULIN LISPRO 100 [IU]/ML
0-5 INJECTION, SOLUTION INTRAVENOUS; SUBCUTANEOUS
Status: DISCONTINUED | OUTPATIENT
Start: 2025-03-23 | End: 2025-03-31 | Stop reason: HOSPADM

## 2025-03-23 RX ORDER — ENOXAPARIN SODIUM 100 MG/ML
40 INJECTION SUBCUTANEOUS EVERY 24 HOURS
Status: DISCONTINUED | OUTPATIENT
Start: 2025-03-23 | End: 2025-03-31 | Stop reason: HOSPADM

## 2025-03-23 RX ORDER — TALC
3 POWDER (GRAM) TOPICAL NIGHTLY PRN
Status: DISCONTINUED | OUTPATIENT
Start: 2025-03-23 | End: 2025-03-31 | Stop reason: HOSPADM

## 2025-03-23 RX ORDER — SODIUM CHLORIDE 9 MG/ML
100 INJECTION, SOLUTION INTRAVENOUS CONTINUOUS
Status: DISCONTINUED | OUTPATIENT
Start: 2025-03-23 | End: 2025-03-24

## 2025-03-23 RX ORDER — TAMSULOSIN HYDROCHLORIDE 0.4 MG/1
0.4 CAPSULE ORAL NIGHTLY
Status: DISCONTINUED | OUTPATIENT
Start: 2025-03-23 | End: 2025-03-31 | Stop reason: HOSPADM

## 2025-03-23 RX ORDER — LAMOTRIGINE 100 MG/1
100 TABLET ORAL DAILY
Status: DISCONTINUED | OUTPATIENT
Start: 2025-03-23 | End: 2025-03-31 | Stop reason: HOSPADM

## 2025-03-23 RX ORDER — HYDRALAZINE HYDROCHLORIDE 20 MG/ML
5 INJECTION INTRAMUSCULAR; INTRAVENOUS EVERY 8 HOURS PRN
Status: DISCONTINUED | OUTPATIENT
Start: 2025-03-23 | End: 2025-03-31 | Stop reason: HOSPADM

## 2025-03-23 RX ORDER — CYCLOBENZAPRINE HCL 10 MG
5 TABLET ORAL 3 TIMES DAILY PRN
Status: DISCONTINUED | OUTPATIENT
Start: 2025-03-23 | End: 2025-03-31 | Stop reason: HOSPADM

## 2025-03-23 RX ORDER — ACETAMINOPHEN 325 MG/1
975 TABLET ORAL EVERY 8 HOURS
Status: DISCONTINUED | OUTPATIENT
Start: 2025-03-23 | End: 2025-03-31 | Stop reason: HOSPADM

## 2025-03-23 RX ADMIN — ACETAMINOPHEN 975 MG: 325 TABLET, FILM COATED ORAL at 08:04

## 2025-03-23 RX ADMIN — ONDANSETRON 4 MG: 2 INJECTION INTRAMUSCULAR; INTRAVENOUS at 20:48

## 2025-03-23 RX ADMIN — SODIUM CHLORIDE 100 ML/HR: 9 INJECTION, SOLUTION INTRAVENOUS at 08:04

## 2025-03-23 RX ADMIN — HYDROMORPHONE HYDROCHLORIDE 0.2 MG: 0.2 INJECTION, SOLUTION INTRAMUSCULAR; INTRAVENOUS; SUBCUTANEOUS at 11:20

## 2025-03-23 RX ADMIN — CYCLOBENZAPRINE 5 MG: 10 TABLET, FILM COATED ORAL at 10:14

## 2025-03-23 RX ADMIN — HYDROMORPHONE HYDROCHLORIDE 0.2 MG: 0.2 INJECTION, SOLUTION INTRAMUSCULAR; INTRAVENOUS; SUBCUTANEOUS at 22:53

## 2025-03-23 RX ADMIN — TAMSULOSIN HYDROCHLORIDE 0.4 MG: 0.4 CAPSULE ORAL at 20:48

## 2025-03-23 RX ADMIN — HYDROCHLOROTHIAZIDE: 12.5 TABLET ORAL at 09:38

## 2025-03-23 RX ADMIN — OXYCODONE HYDROCHLORIDE 10 MG: 5 TABLET ORAL at 20:48

## 2025-03-23 RX ADMIN — PANTOPRAZOLE SODIUM 40 MG: 40 TABLET, DELAYED RELEASE ORAL at 08:04

## 2025-03-23 RX ADMIN — GABAPENTIN 600 MG: 300 CAPSULE ORAL at 20:48

## 2025-03-23 RX ADMIN — INSULIN LISPRO 1 UNITS: 100 INJECTION, SOLUTION INTRAVENOUS; SUBCUTANEOUS at 16:54

## 2025-03-23 RX ADMIN — FLUOXETINE HYDROCHLORIDE 40 MG: 20 CAPSULE ORAL at 20:48

## 2025-03-23 RX ADMIN — GABAPENTIN 600 MG: 300 CAPSULE ORAL at 09:38

## 2025-03-23 RX ADMIN — ACETAMINOPHEN 975 MG: 325 TABLET, FILM COATED ORAL at 15:21

## 2025-03-23 RX ADMIN — HYDROMORPHONE HYDROCHLORIDE 0.2 MG: 0.2 INJECTION, SOLUTION INTRAMUSCULAR; INTRAVENOUS; SUBCUTANEOUS at 03:25

## 2025-03-23 RX ADMIN — DIAZEPAM 5 MG: 5 TABLET ORAL at 10:14

## 2025-03-23 RX ADMIN — ENOXAPARIN SODIUM 40 MG: 40 INJECTION SUBCUTANEOUS at 08:04

## 2025-03-23 RX ADMIN — LAMOTRIGINE 100 MG: 100 TABLET ORAL at 09:38

## 2025-03-23 RX ADMIN — HYDRALAZINE HYDROCHLORIDE 5 MG: 20 INJECTION INTRAMUSCULAR; INTRAVENOUS at 16:54

## 2025-03-23 RX ADMIN — SENNOSIDES AND DOCUSATE SODIUM 2 TABLET: 50; 8.6 TABLET ORAL at 20:48

## 2025-03-23 RX ADMIN — INSULIN LISPRO 1 UNITS: 100 INJECTION, SOLUTION INTRAVENOUS; SUBCUTANEOUS at 08:13

## 2025-03-23 RX ADMIN — HYDROXYZINE PAMOATE 25 MG: 25 CAPSULE ORAL at 15:42

## 2025-03-23 RX ADMIN — ONDANSETRON 4 MG: 2 INJECTION INTRAMUSCULAR; INTRAVENOUS at 03:25

## 2025-03-23 RX ADMIN — OXYCODONE HYDROCHLORIDE 10 MG: 5 TABLET ORAL at 08:10

## 2025-03-23 RX ADMIN — GABAPENTIN 600 MG: 300 CAPSULE ORAL at 15:21

## 2025-03-23 RX ADMIN — ONDANSETRON 4 MG: 2 INJECTION INTRAMUSCULAR; INTRAVENOUS at 10:36

## 2025-03-23 RX ADMIN — OXYCODONE HYDROCHLORIDE 10 MG: 5 TABLET ORAL at 14:22

## 2025-03-23 RX ADMIN — HYDROMORPHONE HYDROCHLORIDE 0.4 MG: 1 INJECTION, SOLUTION INTRAMUSCULAR; INTRAVENOUS; SUBCUTANEOUS at 05:13

## 2025-03-23 SDOH — ECONOMIC STABILITY: FOOD INSECURITY: WITHIN THE PAST 12 MONTHS, THE FOOD YOU BOUGHT JUST DIDN'T LAST AND YOU DIDN'T HAVE MONEY TO GET MORE.: NEVER TRUE

## 2025-03-23 SDOH — SOCIAL STABILITY: SOCIAL INSECURITY: WERE YOU ABLE TO COMPLETE ALL THE BEHAVIORAL HEALTH SCREENINGS?: YES

## 2025-03-23 SDOH — SOCIAL STABILITY: SOCIAL INSECURITY: ARE YOU MARRIED, WIDOWED, DIVORCED, SEPARATED, NEVER MARRIED, OR LIVING WITH A PARTNER?: DIVORCED

## 2025-03-23 SDOH — SOCIAL STABILITY: SOCIAL INSECURITY: WITHIN THE LAST YEAR, HAVE YOU BEEN AFRAID OF YOUR PARTNER OR EX-PARTNER?: NO

## 2025-03-23 SDOH — SOCIAL STABILITY: SOCIAL INSECURITY: HAS ANYONE EVER THREATENED TO HURT YOUR FAMILY OR YOUR PETS?: NO

## 2025-03-23 SDOH — SOCIAL STABILITY: SOCIAL NETWORK: HOW OFTEN DO YOU ATTEND MEETINGS OF THE CLUBS OR ORGANIZATIONS YOU BELONG TO?: 1 TO 4 TIMES PER YEAR

## 2025-03-23 SDOH — SOCIAL STABILITY: SOCIAL INSECURITY: DO YOU FEEL UNSAFE GOING BACK TO THE PLACE WHERE YOU ARE LIVING?: NO

## 2025-03-23 SDOH — HEALTH STABILITY: MENTAL HEALTH
DO YOU FEEL STRESS - TENSE, RESTLESS, NERVOUS, OR ANXIOUS, OR UNABLE TO SLEEP AT NIGHT BECAUSE YOUR MIND IS TROUBLED ALL THE TIME - THESE DAYS?: VERY MUCH

## 2025-03-23 SDOH — SOCIAL STABILITY: SOCIAL INSECURITY: DOES ANYONE TRY TO KEEP YOU FROM HAVING/CONTACTING OTHER FRIENDS OR DOING THINGS OUTSIDE YOUR HOME?: NO

## 2025-03-23 SDOH — SOCIAL STABILITY: SOCIAL INSECURITY: WITHIN THE LAST YEAR, HAVE YOU BEEN HUMILIATED OR EMOTIONALLY ABUSED IN OTHER WAYS BY YOUR PARTNER OR EX-PARTNER?: NO

## 2025-03-23 SDOH — ECONOMIC STABILITY: HOUSING INSECURITY: IN THE PAST 12 MONTHS, HOW MANY TIMES HAVE YOU MOVED WHERE YOU WERE LIVING?: 0

## 2025-03-23 SDOH — HEALTH STABILITY: PHYSICAL HEALTH: ON AVERAGE, HOW MANY DAYS PER WEEK DO YOU ENGAGE IN MODERATE TO STRENUOUS EXERCISE (LIKE A BRISK WALK)?: 2 DAYS

## 2025-03-23 SDOH — ECONOMIC STABILITY: INCOME INSECURITY: IN THE PAST 12 MONTHS HAS THE ELECTRIC, GAS, OIL, OR WATER COMPANY THREATENED TO SHUT OFF SERVICES IN YOUR HOME?: NO

## 2025-03-23 SDOH — ECONOMIC STABILITY: HOUSING INSECURITY: IN THE LAST 12 MONTHS, WAS THERE A TIME WHEN YOU WERE NOT ABLE TO PAY THE MORTGAGE OR RENT ON TIME?: NO

## 2025-03-23 SDOH — ECONOMIC STABILITY: HOUSING INSECURITY: AT ANY TIME IN THE PAST 12 MONTHS, WERE YOU HOMELESS OR LIVING IN A SHELTER (INCLUDING NOW)?: NO

## 2025-03-23 SDOH — SOCIAL STABILITY: SOCIAL NETWORK: IN A TYPICAL WEEK, HOW MANY TIMES DO YOU TALK ON THE PHONE WITH FAMILY, FRIENDS, OR NEIGHBORS?: THREE TIMES A WEEK

## 2025-03-23 SDOH — ECONOMIC STABILITY: FOOD INSECURITY: HOW HARD IS IT FOR YOU TO PAY FOR THE VERY BASICS LIKE FOOD, HOUSING, MEDICAL CARE, AND HEATING?: VERY HARD

## 2025-03-23 SDOH — ECONOMIC STABILITY: FOOD INSECURITY: WITHIN THE PAST 12 MONTHS, YOU WORRIED THAT YOUR FOOD WOULD RUN OUT BEFORE YOU GOT THE MONEY TO BUY MORE.: NEVER TRUE

## 2025-03-23 SDOH — SOCIAL STABILITY: SOCIAL NETWORK: HOW OFTEN DO YOU GET TOGETHER WITH FRIENDS OR RELATIVES?: THREE TIMES A WEEK

## 2025-03-23 SDOH — SOCIAL STABILITY: SOCIAL NETWORK: HOW OFTEN DO YOU ATTEND CHURCH OR RELIGIOUS SERVICES?: 1 TO 4 TIMES PER YEAR

## 2025-03-23 SDOH — SOCIAL STABILITY: SOCIAL INSECURITY: ARE THERE ANY APPARENT SIGNS OF INJURIES/BEHAVIORS THAT COULD BE RELATED TO ABUSE/NEGLECT?: NO

## 2025-03-23 SDOH — SOCIAL STABILITY: SOCIAL INSECURITY: HAVE YOU HAD ANY THOUGHTS OF HARMING ANYONE ELSE?: NO

## 2025-03-23 SDOH — ECONOMIC STABILITY: TRANSPORTATION INSECURITY: IN THE PAST 12 MONTHS, HAS LACK OF TRANSPORTATION KEPT YOU FROM MEDICAL APPOINTMENTS OR FROM GETTING MEDICATIONS?: NO

## 2025-03-23 SDOH — HEALTH STABILITY: PHYSICAL HEALTH: ON AVERAGE, HOW MANY MINUTES DO YOU ENGAGE IN EXERCISE AT THIS LEVEL?: 30 MIN

## 2025-03-23 SDOH — SOCIAL STABILITY: SOCIAL INSECURITY: DO YOU FEEL ANYONE HAS EXPLOITED OR TAKEN ADVANTAGE OF YOU FINANCIALLY OR OF YOUR PERSONAL PROPERTY?: NO

## 2025-03-23 SDOH — SOCIAL STABILITY: SOCIAL INSECURITY: HAVE YOU HAD THOUGHTS OF HARMING ANYONE ELSE?: NO

## 2025-03-23 SDOH — SOCIAL STABILITY: SOCIAL INSECURITY: ABUSE: ADULT

## 2025-03-23 SDOH — SOCIAL STABILITY: SOCIAL INSECURITY: ARE YOU OR HAVE YOU BEEN THREATENED OR ABUSED PHYSICALLY, EMOTIONALLY, OR SEXUALLY BY ANYONE?: NO

## 2025-03-23 ASSESSMENT — LIFESTYLE VARIABLES
HAVE PEOPLE ANNOYED YOU BY CRITICIZING YOUR DRINKING: NO
TOTAL SCORE: 0
HOW MANY STANDARD DRINKS CONTAINING ALCOHOL DO YOU HAVE ON A TYPICAL DAY: PATIENT DOES NOT DRINK
SUBSTANCE_ABUSE_PAST_12_MONTHS: NO
HOW OFTEN DO YOU HAVE A DRINK CONTAINING ALCOHOL: NEVER
EVER FELT BAD OR GUILTY ABOUT YOUR DRINKING: NO
HOW OFTEN DO YOU HAVE 6 OR MORE DRINKS ON ONE OCCASION: NEVER
SKIP TO QUESTIONS 9-10: 1
AUDIT-C TOTAL SCORE: 0
PRESCIPTION_ABUSE_PAST_12_MONTHS: NO
EVER HAD A DRINK FIRST THING IN THE MORNING TO STEADY YOUR NERVES TO GET RID OF A HANGOVER: NO
AUDIT-C TOTAL SCORE: 0
HAVE YOU EVER FELT YOU SHOULD CUT DOWN ON YOUR DRINKING: NO

## 2025-03-23 ASSESSMENT — ENCOUNTER SYMPTOMS
CONFUSION: 0
VOMITING: 0
FEVER: 0
COUGH: 0
DYSURIA: 0
SORE THROAT: 0
NAUSEA: 0
HEMATURIA: 0
RHINORRHEA: 0
WOUND: 0
CHILLS: 0
CONSTIPATION: 0
ARTHRALGIAS: 0
DIARRHEA: 0
HALLUCINATIONS: 0
MYALGIAS: 0
SHORTNESS OF BREATH: 0
PALPITATIONS: 0
DIZZINESS: 0

## 2025-03-23 ASSESSMENT — ACTIVITIES OF DAILY LIVING (ADL)
PATIENT'S MEMORY ADEQUATE TO SAFELY COMPLETE DAILY ACTIVITIES?: YES
DRESSING YOURSELF: NEEDS ASSISTANCE
HEARING - RIGHT EAR: FUNCTIONAL
HEARING - LEFT EAR: FUNCTIONAL
JUDGMENT_ADEQUATE_SAFELY_COMPLETE_DAILY_ACTIVITIES: YES
ASSISTIVE_DEVICE: WALKER
BATHING: NEEDS ASSISTANCE
LACK_OF_TRANSPORTATION: NO
TOILETING: NEEDS ASSISTANCE
GROOMING: INDEPENDENT
FEEDING YOURSELF: INDEPENDENT
ADEQUATE_TO_COMPLETE_ADL: YES
WALKS IN HOME: NEEDS ASSISTANCE
LACK_OF_TRANSPORTATION: NO

## 2025-03-23 ASSESSMENT — COGNITIVE AND FUNCTIONAL STATUS - GENERAL
PATIENT BASELINE BEDBOUND: NO
STANDING UP FROM CHAIR USING ARMS: A LITTLE
TOILETING: A LITTLE
WALKING IN HOSPITAL ROOM: A LOT
DRESSING REGULAR LOWER BODY CLOTHING: A LOT
HELP NEEDED FOR BATHING: A LITTLE
CLIMB 3 TO 5 STEPS WITH RAILING: A LOT
DRESSING REGULAR UPPER BODY CLOTHING: A LITTLE
MOVING TO AND FROM BED TO CHAIR: A LITTLE
DAILY ACTIVITIY SCORE: 19
MOBILITY SCORE: 18

## 2025-03-23 ASSESSMENT — PAIN SCALES - GENERAL
PAINLEVEL_OUTOF10: 6
PAINLEVEL_OUTOF10: 8
PAINLEVEL_OUTOF10: 7
PAINLEVEL_OUTOF10: 0 - NO PAIN
PAINLEVEL_OUTOF10: 9

## 2025-03-23 ASSESSMENT — PAIN DESCRIPTION - ORIENTATION: ORIENTATION: LOWER

## 2025-03-23 ASSESSMENT — PAIN DESCRIPTION - LOCATION
LOCATION: BACK
LOCATION: BACK
LOCATION: OTHER (COMMENT)
LOCATION: BACK

## 2025-03-23 ASSESSMENT — COLUMBIA-SUICIDE SEVERITY RATING SCALE - C-SSRS
1. IN THE PAST MONTH, HAVE YOU WISHED YOU WERE DEAD OR WISHED YOU COULD GO TO SLEEP AND NOT WAKE UP?: NO
2. HAVE YOU ACTUALLY HAD ANY THOUGHTS OF KILLING YOURSELF?: NO
6. HAVE YOU EVER DONE ANYTHING, STARTED TO DO ANYTHING, OR PREPARED TO DO ANYTHING TO END YOUR LIFE?: NO

## 2025-03-23 ASSESSMENT — PAIN SCALES - PAIN ASSESSMENT IN ADVANCED DEMENTIA (PAINAD): TOTALSCORE: MEDICATION (SEE MAR)

## 2025-03-23 ASSESSMENT — PAIN - FUNCTIONAL ASSESSMENT: PAIN_FUNCTIONAL_ASSESSMENT: 0-10

## 2025-03-23 NOTE — H&P
History Of Present Illness  Cristal Pollard is a 64 y.o. female with PMH HTN, HLD, seizure d/o recent lumbar fusion 3/5/25, and subsequent recent hospital admission for UTI. Patient home just about a week and slid down at home using her walker and having severe back pains so presented back to ED.  CT imaging showed stable post op instrumentation, and neurosurgery was ok with ambulation.  Pain was not able to be controlled and patient unable to ambulate so admission requested.     Past Medical History  Past Medical History:   Diagnosis Date    Abnormal ECG 10/26/2023    Sinus rhythm LVH by voltage Inferior infarct, old Anterior Q waves, possibly due to LVH    Angina pectoris     Anxiety     Cervical disc disease     Cervical radiculopathy     Neuro: Arnulfo Reynolds LOV 1/15/24    Depression     Diabetes mellitus (Multi)     A1C: 2/6/24 -7.6%    GERD (gastroesophageal reflux disease)     History of Holter monitoring 10/2023    24 -48 hour monitor on 10/31/23, No abnormal findings    Hypertension     Incisional hernia with obstruction, without gangrene 05/24/2017    Incarcerated incisional hernia    Joint pain     Mastodynia 05/14/2020    Breast pain in female    Palpitations     Stress test scheduled for 2/9/24    PONV (postoperative nausea and vomiting)     Seizure disorder (Multi)     Last seizure 2/2023    TIA (transient ischemic attack)     Several years ago, no residual symptoms    Vertigo     Vision loss        Surgical History  Past Surgical History:   Procedure Laterality Date    CARPAL TUNNEL RELEASE Right     CATARACT EXTRACTION      CHOLECYSTECTOMY  11/14/2014    Cholecystectomy    COLONOSCOPY  05/17/2018    Complete Colonoscopy    CT CHEST WO IV CONTRAST  04/21/2023    No acute intrathoracic abnormalities. No thoracic aortic aneurysm or subintimal hematoma.    ECHOCARDIOGRAM 2 D M MODE PANEL  02/17/2023    Left ventricular systolic function is normal with a 60-65% estimated ejection fraction.    HERNIA REPAIR   05/24/2017    Hernia Repair    MANDIBLE SURGERY Bilateral     OTHER SURGICAL HISTORY  11/14/2014    Surgery Intracardiac Mass Removal Left Atrial Myxoma    OTHER SURGICAL HISTORY      Xiphoidectomy    STERNOTOMY  2011        Social History  She reports that she has never smoked. She has never used smokeless tobacco. She reports current alcohol use. She reports that she does not use drugs.    Family History  Family History   Problem Relation Name Age of Onset    Cancer Mother      Hypertension Mother      Heart disease Father      Cancer Father      Hypertension Father      Glaucoma Father      Macular degeneration Father      Heart attack Father      Cancer Sister      Diabetes Sister      Diabetes Maternal Grandfather          Allergies  Tramadol, Amoxicillin, Ampicillin, Lidocaine, and Meperidine hcl    Review of Systems   Constitutional:  Negative for chills and fever.   HENT:  Negative for rhinorrhea and sore throat.    Respiratory:  Negative for cough and shortness of breath.    Cardiovascular:  Negative for chest pain and palpitations.   Gastrointestinal:  Negative for constipation, diarrhea, nausea and vomiting.   Genitourinary:  Negative for dysuria and hematuria.   Musculoskeletal:  Negative for arthralgias and myalgias.   Skin:  Negative for rash and wound.   Neurological:  Negative for dizziness and syncope.   Psychiatric/Behavioral:  Negative for confusion and hallucinations.         Physical Exam  Vitals reviewed.   Constitutional:       Appearance: Normal appearance.   HENT:      Head: Normocephalic and atraumatic.      Mouth/Throat:      Mouth: Mucous membranes are moist.   Eyes:      Conjunctiva/sclera: Conjunctivae normal.   Cardiovascular:      Rate and Rhythm: Normal rate.      Pulses: Normal pulses.   Pulmonary:      Effort: Pulmonary effort is normal.      Breath sounds: Normal breath sounds. No wheezing.   Abdominal:      General: Abdomen is flat. There is no distension.      Palpations:  "Abdomen is soft.      Tenderness: There is no guarding.   Musculoskeletal:         General: No swelling. Normal range of motion.      Comments: Lumbar sutures in place   Skin:     General: Skin is warm and dry.   Neurological:      General: No focal deficit present.      Mental Status: She is alert. Mental status is at baseline.      Comments: Moves b/l LE   Psychiatric:         Mood and Affect: Mood normal.         Behavior: Behavior normal.          Last Recorded Vitals  Blood pressure 132/77, pulse 87, temperature 37.1 °C (98.7 °F), temperature source Oral, resp. rate 16, height 1.753 m (5' 9\"), weight 86.2 kg (190 lb), SpO2 98%.    Relevant Results             Assessment/Plan   Assessment & Plan  Anxiety disorder    Cervical radiculopathy    Chronic neck pain    Depression, recurrent (CMS-HCC)    History of lumbar fusion    Hypertension    Type 2 diabetes mellitus    Back pain, unspecified back location, unspecified back pain laterality, unspecified chronicity    Lumbar fusion with intractable pain and ambulatory dysfunction      Admit for pain control, PT/OT for dispo  Scheduled miralax  Continue home meds  DVT prophy - lovenox  DM diet  SSI while inpt  Consider neurosurgical consult if patient unable to safely dc soon    Nicolas Burdick MD    "

## 2025-03-23 NOTE — PROGRESS NOTES
Physical Therapy                 Therapy Communication Note    Patient Name: Cristal Pollard  MRN: [id number]  Department: [facility] 2  Room: 202/202-A  Today's Date: 3/23/2025     Discipline: Physical Therapy    PT Missed Visit: Yes     Missed Visit Reason: Missed Visit Reason: Patient refused (Pt declined any mobility attempts this date 2nd pain. Per nursing pain meds in place currently. Pt also c/o nausea, recently received Zofran per nursing. Pt still declining despite encouragement/education regarding benefits. Will reattempt as able/appropriate.)    Missed Time: Attempt

## 2025-03-23 NOTE — PROGRESS NOTES
"Hospital Medicine Progress Note    Subjective:  Cristal Pollard is a 64 y.o. female, who is hospital day 1.     Patient     Objective:    /75 (BP Location: Right arm, Patient Position: Sitting)   Pulse 76   Temp 35.7 °C (96.3 °F) (Temporal)   Resp 18   Ht 1.753 m (5' 9\")   Wt 86.2 kg (190 lb)   LMP  (LMP Unknown)   SpO2 98%   BMI 28.06 kg/m²     Physical Exam:  General: A&Ox3, no distress, cooperative  HEENT: NC/AT, clear sclera, MMM  NECK: Supple  Cardiovascular: RRR, no murmurs. S1/S2  Respiratory: CTAB, no RRW or crackles. No distress  Abdomen: Soft, ND, non-tender. No rebound or guarding. BS+  Extremities: No peripheral edema or wounds  Neurological: A&Ox3. No focal deficits  Skin: Warm and dry, no rashes  Psych: appropriate mood and affect    I personally reviewed all imaging, labs and notes/ documentation.     Assessment & Plan:    Intractable back pain after mechanical fall  Ambulatory dysfunction   Recent lumbar fusion 3/5    Urinary retention s/p arnold  HTN, HLD  T2DM  KATELYN, MDD  GERD  Seizure disorder    Multimodal pain control, PT/OT, consult neurosurgery  Continue home meds, holding metformin. ISS, monitor BG  Dc'ed with arnold, consider VT, pt was due for one this upcoming week with urology     DVT Prophylaxis: Lovenox, SCDs  Code Status: Full Code   Disposition: Med/surg      Yakelin Mendez DO  Hospitalist     "

## 2025-03-23 NOTE — CARE PLAN
The patient's goals for the shift include      The clinical goals for the shift include        Problem: Pain - Adult  Goal: Verbalizes/displays adequate comfort level or baseline comfort level  Outcome: Progressing     Problem: Safety - Adult  Goal: Free from fall injury  Outcome: Progressing     Problem: Discharge Planning  Goal: Discharge to home or other facility with appropriate resources  Outcome: Progressing     Problem: Chronic Conditions and Co-morbidities  Goal: Patient's chronic conditions and co-morbidity symptoms are monitored and maintained or improved  Outcome: Progressing     Problem: Nutrition  Goal: Nutrient intake appropriate for maintaining nutritional needs  Outcome: Progressing     Problem: Skin  Goal: Promote skin healing  Outcome: Progressing  Flowsheets (Taken 3/23/2025 1202)  Promote skin healing: Assess skin/pad under line(s)/device(s)     Problem: Pain  Goal: Takes deep breaths with improved pain control throughout the shift  Outcome: Progressing  Goal: Turns in bed with improved pain control throughout the shift  Outcome: Progressing  Goal: Walks with improved pain control throughout the shift  Outcome: Progressing  Goal: Performs ADL's with improved pain control throughout shift  Outcome: Progressing  Goal: Participates in PT with improved pain control throughout the shift  Outcome: Progressing  Goal: Free from opioid side effects throughout the shift  Outcome: Progressing  Goal: Free from acute confusion related to pain meds throughout the shift  Outcome: Progressing

## 2025-03-23 NOTE — ED PROVIDER NOTES
HPI   Chief Complaint   Patient presents with    Back Pain     Patient brought in by Lynette KAMARA with complaints of back pain, states she tripped last week and has had back pain ever since.        Patient comes in with back pain since last night that has worsened her normal.  Patient had fusion spinal surgery on 3/5 and has had back pain since then but last night got worse after she accidentally fell while using her walker.  Baseline right leg weakness and foot numbness.  Patient was taking oxycodone, ciprofloxacin, Flexeril, diazepam at home without improvement so she decided to come in for further evaluation.  No head trauma no loss of consciousness              Patient History   Past Medical History:   Diagnosis Date    Abnormal ECG 10/26/2023    Sinus rhythm LVH by voltage Inferior infarct, old Anterior Q waves, possibly due to LVH    Angina pectoris     Anxiety     Cervical disc disease     Cervical radiculopathy     Neuro: Arnulfo Reynolds LOV 1/15/24    Depression     Diabetes mellitus (Multi)     A1C: 2/6/24 -7.6%    GERD (gastroesophageal reflux disease)     History of Holter monitoring 10/2023    24 -48 hour monitor on 10/31/23, No abnormal findings    Hypertension     Incisional hernia with obstruction, without gangrene 05/24/2017    Incarcerated incisional hernia    Joint pain     Mastodynia 05/14/2020    Breast pain in female    Palpitations     Stress test scheduled for 2/9/24    PONV (postoperative nausea and vomiting)     Seizure disorder (Multi)     Last seizure 2/2023    TIA (transient ischemic attack)     Several years ago, no residual symptoms    Vertigo     Vision loss      Past Surgical History:   Procedure Laterality Date    CARPAL TUNNEL RELEASE Right     CATARACT EXTRACTION      CHOLECYSTECTOMY  11/14/2014    Cholecystectomy    COLONOSCOPY  05/17/2018    Complete Colonoscopy    CT CHEST WO IV CONTRAST  04/21/2023    No acute intrathoracic abnormalities. No thoracic aortic aneurysm or subintimal  hematoma.    ECHOCARDIOGRAM 2 D M MODE PANEL  02/17/2023    Left ventricular systolic function is normal with a 60-65% estimated ejection fraction.    HERNIA REPAIR  05/24/2017    Hernia Repair    MANDIBLE SURGERY Bilateral     OTHER SURGICAL HISTORY  11/14/2014    Surgery Intracardiac Mass Removal Left Atrial Myxoma    OTHER SURGICAL HISTORY      Xiphoidectomy    STERNOTOMY  2011     Family History   Problem Relation Name Age of Onset    Cancer Mother      Hypertension Mother      Heart disease Father      Cancer Father      Hypertension Father      Glaucoma Father      Macular degeneration Father      Heart attack Father      Cancer Sister      Diabetes Sister      Diabetes Maternal Grandfather       Social History     Tobacco Use    Smoking status: Never    Smokeless tobacco: Never   Vaping Use    Vaping status: Never Used   Substance Use Topics    Alcohol use: Yes     Comment: RARELY    Drug use: Never       Physical Exam   ED Triage Vitals [03/23/25 0230]   Temperature Heart Rate Respirations BP   37.1 °C (98.7 °F) 87 16 132/77      Pulse Ox Temp Source Heart Rate Source Patient Position   98 % Oral Monitor Lying      BP Location FiO2 (%)     Right arm --       Physical Exam  Vitals and nursing note reviewed.   Constitutional:       General: She is not in acute distress.     Appearance: Normal appearance. She is not toxic-appearing.   HENT:      Head: Atraumatic.      Nose: Nose normal.      Mouth/Throat:      Mouth: Mucous membranes are moist.   Eyes:      Extraocular Movements: Extraocular movements intact.      Conjunctiva/sclera: Conjunctivae normal.   Cardiovascular:      Rate and Rhythm: Normal rate and regular rhythm.      Pulses: Normal pulses.   Pulmonary:      Effort: Pulmonary effort is normal.      Breath sounds: Normal breath sounds.   Abdominal:      General: Abdomen is flat. Bowel sounds are normal.      Palpations: Abdomen is soft.   Musculoskeletal:         General: No deformity.      Cervical  back: Normal range of motion and neck supple. No rigidity.      Comments: Diffuse thoracic and lumbar tenderness including midline.  Lumbar surgical wounds without erythema, warmness, drainage or infectious symptoms.  No CVA tenderness.  No pelvic instability   Skin:     General: Skin is warm.      Capillary Refill: Capillary refill takes less than 2 seconds.   Neurological:      Mental Status: She is alert and oriented to person, place, and time. Mental status is at baseline.      Comments: Right leg weakness at baseline per patient.  Bilateral feet numbness at baseline per patient   Psychiatric:         Mood and Affect: Mood normal.           ED Course & MDM   ED Course as of 03/23/25 0754   Sun Mar 23, 2025   6805 I spoke with Dr. Mckinley, and discussed the case with him.  Reason for admission was going to be for pain control in the setting of patient having dry or ready multimodal regimen for pain control at home without improvement.  And pain not controlled after initial dose of Dilaudid.  Patient at the moment was not a safe discharge since she was not able to ambulate as normal with her walker due to the pain.  He refused admission after his service and he said that the patient can follow-up outpatient with his neurosurgeon. [AM]   2067 I spoke with Dr. Burdick and discussed the case with him.  We will attempt another round of medications in the ED and reevaluate her after to see if she has improvement enough to be discharged.  If patient is still not a safe discharge due to the uncontrolled back pain we will proceed with admitting to his service.  Further medications are being added.  Patient has been updated on the plan of care and she agreed.  Will reevaluate her later. [AM]   6099 CT of thoracic and lumbar spine negative for acute injuries or complications from prior surgeries.  Patient has no signs of sepsis or urine infection at this moment. [AM]   2315 Dr Burdick accepted the patient under his care. Pt  remains with back pain after multiple treatments,  [AM]      ED Course User Index  [AM] Minnie Leal MD         Diagnoses as of 03/23/25 0754   Back pain, unspecified back location, unspecified back pain laterality, unspecified chronicity   Fall, initial encounter                 No data recorded     [place] Coma Scale Score: 15 (03/23/25 0232 : Thu Davis, RN)                           Medical Decision Making      Procedure  Procedures     Minnie Leal MD  03/23/25 0751

## 2025-03-23 NOTE — PROGRESS NOTES
Occupational Therapy                 Therapy Communication Note    Patient Name: Cristal Pollard  MRN: [id number]  Department: [facility] 2  Room: 202/202A  Today's Date: 3/23/2025     Discipline: Occupational Therapy    OT Missed Visit: Yes     Missed Visit Reason: Missed Visit Reason: Patient refused (Pt agreeable to initial questions regarding PLOF however adamantly refused any OOB tx/re-positioning or engagement in ADLs d/t 8/10 back pain, NSG aware and provided pt with pain meds prior to co-eval.)    Missed Time: Attempt

## 2025-03-24 ENCOUNTER — HOME CARE VISIT (OUTPATIENT)
Dept: HOME HEALTH SERVICES | Facility: HOME HEALTH | Age: 65
End: 2025-03-24
Payer: COMMERCIAL

## 2025-03-24 ENCOUNTER — APPOINTMENT (OUTPATIENT)
Dept: NEUROSURGERY | Facility: CLINIC | Age: 65
End: 2025-03-24
Payer: COMMERCIAL

## 2025-03-24 LAB
BACTERIA UR CULT: NO GROWTH
GLUCOSE BLD MANUAL STRIP-MCNC: 165 MG/DL (ref 74–99)
GLUCOSE BLD MANUAL STRIP-MCNC: 172 MG/DL (ref 74–99)
GLUCOSE BLD MANUAL STRIP-MCNC: 173 MG/DL (ref 74–99)
GLUCOSE BLD MANUAL STRIP-MCNC: 184 MG/DL (ref 74–99)
GLUCOSE BLD MANUAL STRIP-MCNC: 201 MG/DL (ref 74–99)

## 2025-03-24 PROCEDURE — 96372 THER/PROPH/DIAG INJ SC/IM: CPT | Performed by: INTERNAL MEDICINE

## 2025-03-24 PROCEDURE — 2500000002 HC RX 250 W HCPCS SELF ADMINISTERED DRUGS (ALT 637 FOR MEDICARE OP, ALT 636 FOR OP/ED): Performed by: INTERNAL MEDICINE

## 2025-03-24 PROCEDURE — 99222 1ST HOSP IP/OBS MODERATE 55: CPT | Performed by: NURSE PRACTITIONER

## 2025-03-24 PROCEDURE — 97165 OT EVAL LOW COMPLEX 30 MIN: CPT | Mod: GO

## 2025-03-24 PROCEDURE — 97161 PT EVAL LOW COMPLEX 20 MIN: CPT | Mod: GP

## 2025-03-24 PROCEDURE — 2500000001 HC RX 250 WO HCPCS SELF ADMINISTERED DRUGS (ALT 637 FOR MEDICARE OP): Performed by: INTERNAL MEDICINE

## 2025-03-24 PROCEDURE — 1100000001 HC PRIVATE ROOM DAILY

## 2025-03-24 PROCEDURE — 99232 SBSQ HOSP IP/OBS MODERATE 35: CPT | Performed by: INTERNAL MEDICINE

## 2025-03-24 PROCEDURE — 2500000004 HC RX 250 GENERAL PHARMACY W/ HCPCS (ALT 636 FOR OP/ED): Mod: JZ | Performed by: INTERNAL MEDICINE

## 2025-03-24 PROCEDURE — 82947 ASSAY GLUCOSE BLOOD QUANT: CPT

## 2025-03-24 PROCEDURE — 2500000004 HC RX 250 GENERAL PHARMACY W/ HCPCS (ALT 636 FOR OP/ED): Performed by: INTERNAL MEDICINE

## 2025-03-24 RX ORDER — BUPRENORPHINE 2 MG/1
2 TABLET SUBLINGUAL EVERY 24 HOURS
Status: DISCONTINUED | OUTPATIENT
Start: 2025-03-28 | End: 2025-03-24

## 2025-03-24 RX ORDER — BUPRENORPHINE 2 MG/1
2 TABLET SUBLINGUAL EVERY 4 HOURS PRN
Status: DISCONTINUED | OUTPATIENT
Start: 2025-03-24 | End: 2025-03-28

## 2025-03-24 RX ORDER — IBUPROFEN 600 MG/1
600 TABLET ORAL EVERY 6 HOURS PRN
Status: DISCONTINUED | OUTPATIENT
Start: 2025-03-24 | End: 2025-03-31 | Stop reason: HOSPADM

## 2025-03-24 RX ORDER — DICYCLOMINE HYDROCHLORIDE 10 MG/1
10 CAPSULE ORAL EVERY 6 HOURS PRN
Status: DISCONTINUED | OUTPATIENT
Start: 2025-03-24 | End: 2025-03-31 | Stop reason: HOSPADM

## 2025-03-24 RX ORDER — BUPRENORPHINE 2 MG/1
6 TABLET SUBLINGUAL ONCE
Status: COMPLETED | OUTPATIENT
Start: 2025-03-25 | End: 2025-03-25

## 2025-03-24 RX ORDER — BUPRENORPHINE 2 MG/1
4 TABLET SUBLINGUAL EVERY 24 HOURS
Status: DISCONTINUED | OUTPATIENT
Start: 2025-03-25 | End: 2025-03-24

## 2025-03-24 RX ORDER — BUPRENORPHINE 2 MG/1
4 TABLET SUBLINGUAL EVERY 24 HOURS
Status: DISCONTINUED | OUTPATIENT
Start: 2025-03-27 | End: 2025-03-24

## 2025-03-24 RX ORDER — BUPRENORPHINE 2 MG/1
6 TABLET SUBLINGUAL
Status: DISCONTINUED | OUTPATIENT
Start: 2025-03-24 | End: 2025-03-24

## 2025-03-24 RX ORDER — BUPRENORPHINE 2 MG/1
2 TABLET SUBLINGUAL EVERY 24 HOURS
Status: COMPLETED | OUTPATIENT
Start: 2025-03-27 | End: 2025-03-27

## 2025-03-24 RX ORDER — LOPERAMIDE HYDROCHLORIDE 2 MG/1
2 CAPSULE ORAL EVERY 6 HOURS PRN
Status: DISCONTINUED | OUTPATIENT
Start: 2025-03-24 | End: 2025-03-31 | Stop reason: HOSPADM

## 2025-03-24 RX ORDER — BUPRENORPHINE 2 MG/1
4 TABLET SUBLINGUAL EVERY 24 HOURS
Status: COMPLETED | OUTPATIENT
Start: 2025-03-26 | End: 2025-03-26

## 2025-03-24 RX ORDER — KETOROLAC TROMETHAMINE 30 MG/ML
7.5 INJECTION, SOLUTION INTRAMUSCULAR; INTRAVENOUS EVERY 6 HOURS
Status: COMPLETED | OUTPATIENT
Start: 2025-03-24 | End: 2025-03-26

## 2025-03-24 RX ORDER — BUPRENORPHINE 2 MG/1
6 TABLET SUBLINGUAL EVERY 24 HOURS
Status: DISCONTINUED | OUTPATIENT
Start: 2025-03-26 | End: 2025-03-24

## 2025-03-24 RX ORDER — BUPRENORPHINE 2 MG/1
2 TABLET SUBLINGUAL EVERY 24 HOURS
Status: DISCONTINUED | OUTPATIENT
Start: 2025-03-26 | End: 2025-03-24

## 2025-03-24 RX ORDER — BUPRENORPHINE HYDROCHLORIDE 8 MG/1
8 TABLET SUBLINGUAL
Status: COMPLETED | OUTPATIENT
Start: 2025-03-24 | End: 2025-03-24

## 2025-03-24 RX ORDER — CLONIDINE HYDROCHLORIDE 0.1 MG/1
0.1 TABLET ORAL EVERY 4 HOURS PRN
Status: DISCONTINUED | OUTPATIENT
Start: 2025-03-24 | End: 2025-03-31 | Stop reason: HOSPADM

## 2025-03-24 RX ADMIN — BUPRENORPHINE 8 MG: 8 TABLET SUBLINGUAL at 12:33

## 2025-03-24 RX ADMIN — INSULIN LISPRO 1 UNITS: 100 INJECTION, SOLUTION INTRAVENOUS; SUBCUTANEOUS at 06:13

## 2025-03-24 RX ADMIN — KETOROLAC TROMETHAMINE 7.5 MG: 30 INJECTION, SOLUTION INTRAMUSCULAR at 14:13

## 2025-03-24 RX ADMIN — ONDANSETRON 4 MG: 2 INJECTION INTRAMUSCULAR; INTRAVENOUS at 18:42

## 2025-03-24 RX ADMIN — HYDROCHLOROTHIAZIDE: 12.5 TABLET ORAL at 08:39

## 2025-03-24 RX ADMIN — HYDRALAZINE HYDROCHLORIDE 5 MG: 20 INJECTION INTRAMUSCULAR; INTRAVENOUS at 04:42

## 2025-03-24 RX ADMIN — ACETAMINOPHEN 975 MG: 325 TABLET, FILM COATED ORAL at 00:32

## 2025-03-24 RX ADMIN — TAMSULOSIN HYDROCHLORIDE 0.4 MG: 0.4 CAPSULE ORAL at 20:34

## 2025-03-24 RX ADMIN — OXYCODONE HYDROCHLORIDE 10 MG: 5 TABLET ORAL at 09:32

## 2025-03-24 RX ADMIN — ACETAMINOPHEN 975 MG: 325 TABLET, FILM COATED ORAL at 23:08

## 2025-03-24 RX ADMIN — ACETAMINOPHEN 975 MG: 325 TABLET, FILM COATED ORAL at 08:38

## 2025-03-24 RX ADMIN — INSULIN LISPRO 1 UNITS: 100 INJECTION, SOLUTION INTRAVENOUS; SUBCUTANEOUS at 16:38

## 2025-03-24 RX ADMIN — OXYCODONE HYDROCHLORIDE 10 MG: 5 TABLET ORAL at 02:59

## 2025-03-24 RX ADMIN — KETOROLAC TROMETHAMINE 7.5 MG: 30 INJECTION, SOLUTION INTRAMUSCULAR at 20:35

## 2025-03-24 RX ADMIN — HYDROMORPHONE HYDROCHLORIDE 0.2 MG: 0.2 INJECTION, SOLUTION INTRAMUSCULAR; INTRAVENOUS; SUBCUTANEOUS at 07:48

## 2025-03-24 RX ADMIN — HYDROXYZINE PAMOATE 25 MG: 25 CAPSULE ORAL at 04:12

## 2025-03-24 RX ADMIN — SENNOSIDES AND DOCUSATE SODIUM 2 TABLET: 50; 8.6 TABLET ORAL at 20:34

## 2025-03-24 RX ADMIN — SENNOSIDES AND DOCUSATE SODIUM 2 TABLET: 50; 8.6 TABLET ORAL at 08:39

## 2025-03-24 RX ADMIN — FLUOXETINE HYDROCHLORIDE 40 MG: 20 CAPSULE ORAL at 20:34

## 2025-03-24 RX ADMIN — ONDANSETRON 4 MG: 2 INJECTION INTRAMUSCULAR; INTRAVENOUS at 04:47

## 2025-03-24 RX ADMIN — GABAPENTIN 600 MG: 300 CAPSULE ORAL at 20:34

## 2025-03-24 RX ADMIN — SODIUM CHLORIDE 100 ML/HR: 9 INJECTION, SOLUTION INTRAVENOUS at 04:13

## 2025-03-24 RX ADMIN — POLYETHYLENE GLYCOL 3350 17 G: 17 POWDER, FOR SOLUTION ORAL at 08:39

## 2025-03-24 RX ADMIN — GABAPENTIN 600 MG: 300 CAPSULE ORAL at 08:38

## 2025-03-24 RX ADMIN — LAMOTRIGINE 100 MG: 100 TABLET ORAL at 08:38

## 2025-03-24 RX ADMIN — INSULIN LISPRO 2 UNITS: 100 INJECTION, SOLUTION INTRAVENOUS; SUBCUTANEOUS at 11:49

## 2025-03-24 RX ADMIN — PANTOPRAZOLE SODIUM 40 MG: 40 TABLET, DELAYED RELEASE ORAL at 04:47

## 2025-03-24 RX ADMIN — ENOXAPARIN SODIUM 40 MG: 40 INJECTION SUBCUTANEOUS at 08:39

## 2025-03-24 ASSESSMENT — PAIN - FUNCTIONAL ASSESSMENT
PAIN_FUNCTIONAL_ASSESSMENT: 0-10

## 2025-03-24 ASSESSMENT — PAIN SCALES - GENERAL
PAINLEVEL_OUTOF10: 8
PAINLEVEL_OUTOF10: 0 - NO PAIN
PAINLEVEL_OUTOF10: 3
PAINLEVEL_OUTOF10: 7
PAINLEVEL_OUTOF10: 4
PAINLEVEL_OUTOF10: 4
PAINLEVEL_OUTOF10: 7
PAINLEVEL_OUTOF10: 8

## 2025-03-24 ASSESSMENT — COGNITIVE AND FUNCTIONAL STATUS - GENERAL
MOVING TO AND FROM BED TO CHAIR: A LITTLE
TOILETING: A LITTLE
HELP NEEDED FOR BATHING: A LITTLE
MOVING FROM LYING ON BACK TO SITTING ON SIDE OF FLAT BED WITH BEDRAILS: A LITTLE
MOVING TO AND FROM BED TO CHAIR: A LITTLE
TURNING FROM BACK TO SIDE WHILE IN FLAT BAD: A LITTLE
DAILY ACTIVITIY SCORE: 21
TOILETING: A LITTLE
STANDING UP FROM CHAIR USING ARMS: A LITTLE
MOBILITY SCORE: 20
DAILY ACTIVITIY SCORE: 17
PERSONAL GROOMING: A LITTLE
DRESSING REGULAR UPPER BODY CLOTHING: A LITTLE
CLIMB 3 TO 5 STEPS WITH RAILING: A LITTLE
MOBILITY SCORE: 17
WALKING IN HOSPITAL ROOM: A LITTLE
HELP NEEDED FOR BATHING: A LOT
CLIMB 3 TO 5 STEPS WITH RAILING: A LOT
DRESSING REGULAR LOWER BODY CLOTHING: A LOT
WALKING IN HOSPITAL ROOM: A LITTLE
STANDING UP FROM CHAIR USING ARMS: A LITTLE
DRESSING REGULAR LOWER BODY CLOTHING: A LITTLE

## 2025-03-24 ASSESSMENT — PAIN DESCRIPTION - LOCATION
LOCATION: OTHER (COMMENT)
LOCATION: BACK
LOCATION: BACK

## 2025-03-24 ASSESSMENT — PAIN DESCRIPTION - DESCRIPTORS
DESCRIPTORS: ACHING;THROBBING
DESCRIPTORS: ACHING;SORE;THROBBING

## 2025-03-24 ASSESSMENT — LIFESTYLE VARIABLES
TOTAL_SCORE: 2
TOTAL_SCORE: 5
TOTAL_SCORE: 9

## 2025-03-24 ASSESSMENT — PAIN DESCRIPTION - ORIENTATION
ORIENTATION: LOWER
ORIENTATION: LOWER

## 2025-03-24 ASSESSMENT — ACTIVITIES OF DAILY LIVING (ADL): LACK_OF_TRANSPORTATION: NO

## 2025-03-24 ASSESSMENT — PAIN SCALES - WONG BAKER: WONGBAKER_NUMERICALRESPONSE: HURTS WHOLE LOT

## 2025-03-24 NOTE — PROGRESS NOTES
Occupational Therapy    Evaluation    Patient Name: Cristal Pollard  MRN: [id number]  Department: [facility]  Room: 202/202A  Today's Date: 3/24/2025  Time Calculation  Start Time: 1112  Stop Time: 1131  Time Calculation (min): 19 min    Assessment  IP OT Assessment  OT Assessment: Pt demonstrates a decline in adl/functional mobility. Recommend  low intensity OT tx intervention 2x/week. Good prognosis for goal attainment  End of Session Communication: Bedside nurse  End of Session Patient Position: Up in chair, Alarm off, not on at start of session  Plan:  Treatment Interventions: ADL retraining, Functional transfer training  OT - OK to Discharge: Yes (okay to discharge to next level of car pending medical team clearance)    Subjective   Current Problem:  1. Back pain, unspecified back location, unspecified back pain laterality, unspecified chronicity        2. Fall, initial encounter          General:  General  Reason for Referral: OT eval/tx/ impaired functional daily living skills  Referred By: Dr MEGHNA Mendez  Co-Treatment: PT  Co-Treatment Reason: maximize pt safety  Prior to Session Communication: Bedside nurse  Patient Position Received: Up in chair, Alarm off, not on at start of session  General Comment: Pt agreeable to OT tx intervention  Precautions:  Precautions Comment: fall,neck/back arnold,neck/back/arnold     Date/Time Vitals Session Patient Position Pulse Resp SpO2 BP MAP (mmHg)    03/24/25 1200 --  --  76  --  --  146/82  --                Pain:  Pain Assessment  Pain Assessment: 0-10  0-10 (Numeric) Pain Score: 7 (back  R leg)  Pain Type: Acute pain  Pain Interventions: Repositioned    Objective   Cognition:  Overall Cognitive Status: Within Functional Limits  Orientation Level: Oriented X4           Home Living:  Type of Home: House  Lives With: Significant other (Carlitos)  Home Living Comments: 5 entry steps with rail, 1st floor bed/bath with a tub shower combination w/ grab rails, shower chair and HHS. Pt sleeps  in a standard bed without a rail (uses sink for support).Significant othe Zack works full time  6- 3:30 daily   Prior Function:  Level of Harvey:  (PTA  and surgery 3/5/25 pt was indep with adl/i ADL)  Hand Dominance: Right  IADL History:   Significant other performs laundry in basement / remainder are shared activities  ADL: PTA indep with use of reacher only for adl equip     Activity Tolerance:   Fair plus  Bed Mobility/Transfers: Bed Mobility  Bed Mobility:  (not assessed)    Transfers  Transfer:  (supervison sit to stand with support of wheeled walker)      Functional Mobility:  Functional Mobility  Functional Mobility Performed:  (functional mobility bathroom distances CGA with support of wheeled walker)  :     IADL's:    pTA indep/shared with significant othe  (prior to surgery)  Vision: Vision - Basic Assessment  Current Vision: No visual deficits  Sensation:  Light Touch: No apparent deficits  Strength:  Strength Comments: strength below elbow wfl  Perception:   wfl  Coordination:    wfl  Hand Function:  Hand Function  Gross Grasp: Functional  Extremities: RUE   RUE : Within Functional Limits and LUE   LUE: Within Functional Limits    Outcome Measures: [facility] Daily Activity  Putting on and taking off regular lower body clothing: A lot  Bathing (including washing, rinsing, drying): A lot  Putting on and taking off regular upper body clothing: A little  Toileting, which includes using toilet, bedpan or urinal: A little  Taking care of personal grooming such as brushing teeth: A little (sink  level)  Eating Meals: None  Daily Activity - Total Score: 17      Education Documentation  Body Mechanics, taught by Usha Lawrence OT at 3/24/2025  1:18 PM.  Learner: Patient  Readiness: Acceptance  Method: Demonstration  Response: Demonstrated Understanding, Needs Reinforcement    Precautions, taught by Usha Lawrence OT at 3/24/2025  1:18 PM.  Learner: Patient  Readiness: Acceptance  Method:  Demonstration  Response: Demonstrated Understanding, Needs Reinforcement    ADL Training, taught by Usha Lawrence, OT at 3/24/2025  1:18 PM.  Learner: Patient  Readiness: Acceptance  Method: Demonstration  Response: Demonstrated Understanding, Needs Reinforcement    Education Comments  No comments found.      Goals:   Encounter Problems       Encounter Problems (Active)       impaired functional daily living skills       Pt will increase Grooming to s/mod indep   Upper Body Bathing to s/mod indep    Lower Body Bathing  to s/mod indep    Increase Upper Body Dressing  to s/mod indep    LE Dressing to s/mod indep with adaptive equipment as needed (Progressing)       Start:  03/24/25    Expected End:  04/07/25            Pt will increase Functional Transfers Bed Mobility to s/mod indep    Sit to Stand  to s/mod indep    Functional Mobility  with a device to s/mod indep  chair/toliet/room to increase indep/safety in patients discharge environment (Progressing)       Start:  03/24/25    Expected End:  04/07/25

## 2025-03-24 NOTE — PROGRESS NOTES
Physical Therapy    Physical Therapy Evaluation    Patient Name: Cristal Pollard  MRN: [id number]  Today's Date: 3/24/2025   Time Calculation  Start Time: 1111  Stop Time: 1131  Time Calculation (min): 20 min  202/202-A    Assessment/Plan   PT Assessment  PT Assessment Results: Decreased strength, Decreased endurance, Impaired balance, Decreased mobility  Rehab Prognosis: Good  Barriers to Discharge Home: No anticipated barriers  End of Session Communication: Bedside nurse  Assessment Comment: Pt demonstrates impaired mobility throughout the session requring increased level of assistance. Pt not at baseline and will benefit from further acute PT services and therapy s/p discharge to regain function and independence.  End of Session Patient Position: Up in chair, Alarm off, not on at start of session  IP OR SWING BED PT PLAN  Inpatient or Swing Bed: Inpatient  PT Plan  Treatment/Interventions: Bed mobility, Transfer training, Gait training  PT Plan: Ongoing PT  PT Frequency: 4 times per week  PT Discharge Recommendations: Low intensity level of continued care (with initial 24hr support)  PT Recommended Transfer Status: Stand by assist  PT - OK to Discharge: Yes    Subjective     Current Problem:  1. Back pain, unspecified back location, unspecified back pain laterality, unspecified chronicity        2. Fall, initial encounter          Patient Active Problem List   Diagnosis    Anxiety disorder    Depression, recurrent (CMS-HCC)    Depressive personality disorder    Neck strain    Post traumatic stress disorder (PTSD)    Whiplash injury to neck    Stress reaction, chronic    Benign paroxysmal positional vertigo due to bilateral vestibular disorder    Chronic neck pain    Cervical radiculopathy due to trauma    Bulging of cervical intervertebral disc    Cervical paraspinous muscle spasm    Bilateral dry eyes    Soft tissue mass    Grief reaction    Hypercholesteremia    Hypertension    Insomnia    Vestibular dysfunction of  both ears    Visual changes    Vitamin D deficiency    Chest pain    Headache    Central stenosis of spinal canal    DJD (degenerative joint disease) of cervical spine    Facet arthropathy, cervical    HNP (herniated nucleus pulposus), cervical    Lumbar facet arthropathy    Right lumbosacral radiculopathy    Spinal stenosis of lumbar region with neurogenic claudication    Xerosis cutis    Weakness of both upper extremities    Type 2 diabetes mellitus    Status post surgery    Shoulder tendinitis, right    Rotator cuff syndrome of right shoulder    Short-term memory loss    Seizure disorder (Multi)    Rosacea, unspecified    PVD (posterior vitreous detachment), left eye    Psychological factor affecting physical condition    Poikiloderma of [place]    Panic attacks    Palpitations    Pain disorder associated with psychological and physical factors    Other melanin hyperpigmentation    Occipital neuralgia    Neoplasm of unspecified behavior of bone, soft tissue, and skin    MGD (meibomian gland disease)    Melanocytic nevi of unspecified eyelid, including canthus    Melanocytic nevi of unspecified ear and external auricular canal    Melanocytic nevi of unspecified upper limb, including shoulder    Melanocytic nevi of unspecified part of face    Melanocytic nevi of unspecified lower limb, including hip    Melanocytic nevi of trunk    Melanocytic nevi of scalp and neck    Medial epicondylitis of right elbow    Late effects of CVA (cerebrovascular accident)    Internal impingement of right shoulder    History of colon polyps    Hemangioma of skin and subcutaneous tissue    Benign lipomatous neoplasm of skin and subcutaneous tissue of right arm    Generalized tonic clonic epilepsy (Multi)    Fatigue    Elevated blood sugar    Dyspepsia    DDD (degenerative disc disease), lumbosacral    Carpal tunnel syndrome on right    Atrial myxoma (HHS-HCC)    Other skin changes due to chronic exposure to nonionizing radiation     Other seborrheic keratosis    Erythema intertrigo    Actinic keratosis    Class 1 obesity due to excess calories without serious comorbidity with body mass index (BMI) of 30.0 to 30.9 in adult    Cervical radiculopathy    Chronic right hip pain    Difficulty in walking    Diabetes mellitus (Multi)    Right hip pain    Back pain with radiculopathy    Back pain of thoracolumbar region    PONV (postoperative nausea and vomiting)    Acute postoperative pain    Sepsis without septic shock (Multi)    History of lumbar fusion    Cystitis    Urinary retention    Back pain, unspecified back location, unspecified back pain laterality, unspecified chronicity     General Visit Information:  General  Reason for Referral: PT Eval and Treat  Referred By: Nicolas Burdick MD  Past Medical History Relevant to Rehab: 64 y.o. female presenting with fall at home.  Patient underwent L3-5 lumbar spinal fusion and TLIF on 03/05/2025 with Dr. Arnulfo Reynolds at [facility].  She was discharged on 03/10/2025 however readmitted to [facility] on 03/11/2025 - 03/14/2025 for UTI.  She was discharged home with home care however on 03/21/2025 suffered a fall where she states she was walking with her walker and her legs gave out.  She was unable to walk due to irretractable pain therefore called [place] fire department who brought her into the ED on 03/23/2025.  Family/Caregiver Present: No  Co-Treatment: OT  Co-Treatment Reason: maximize safety and functional mobility  Prior to Session Communication: Bedside nurse  Patient Position Received: Up in chair, Alarm off, not on at start of session  General Comment: Pt agreeable to PT.    Home Living:  Home Living  Type of Home: House  Lives With: Significant other  Home Adaptive Equipment: Walker rolling or standard  Home Layout: Able to live on main level with bedroom/bathroom, Laundry in basement  Home Access: Stairs to enter with rails  Entrance Stairs-Number of Steps:  5  Bathroom Shower/Tub: Tub/shower unit  Bathroom Toilet: Standard (next to vanity)  Bathroom Equipment: Grab bars in shower, Shower chair with back, Hand-held shower hose  Home Living Comments: pt sleeps in std. bed    Prior Level of Function:  Prior Function Per Pt/Caregiver Report  Level of [place]: Independent with ADLs and functional transfers, Independent with homemaking with ambulation (Pt amb with RW intermittently since spinal surgeries last  year, sig. other does laundry, (+) drive)    Precautions:  Precautions  Medical Precautions: Fall precautions  Precautions Comment: spinal precautions, arnold    Vital Signs:  Vital Signs  Vital Signs Comment: VSS    Objective     Pain:  Pain Assessment  Pain Assessment:  (7/10 back, premedicated, repositioned for comfort s/p session)    Cognition:  Cognition  Overall Cognitive Status: Within Functional Limits    General Assessments:  Sensation  Light Touch:  ((+) numbness and tingling B feet and hands)  Strength  Strength Comments: B LE ROM WFL, B LE strength WFL  Static Sitting Balance  Static Sitting-Level of Assistance: Distant supervision  Dynamic Sitting Balance  Dynamic Sitting-Level of Assistance: Close supervision  Static Standing Balance  Static Standing-Level of Assistance: Close supervision  Dynamic Standing Balance  Dynamic Standing-Level of Assistance: Contact guard    Functional Assessments:  Bed Mobility  Bed Mobility:  (not assessed 2/2 pt being up in chair pre/post session, however per RN, pt was able to complete bed mobility earlier this morning with SBA to SUP)  Transfers  Transfer: Yes  Transfer 1  Trials/Comments 1: STS from chair: SBA to SUP with cueing for body positioning  Ambulation/Gait Training  Ambulation/Gait Training Performed: Yes  Ambulation/Gait Training 1  Comments/Distance (ft) 1: Pt was able to amb 15' using RW with CGA to SBA with cueing for RW management and proper gait mechanics.    Outcome Measures:  [facility] Basic  Mobility  Turning from your back to your side while in a flat bed without using bedrails: A little  Moving from lying on your back to sitting on the side of a flat bed without using bedrails: A little  Moving to and from bed to chair (including a wheelchair): A little  Standing up from a chair using your arms (e.g. wheelchair or bedside chair): A little  To walk in hospital room: A little  Climbing 3-5 steps with railing: A lot  Basic Mobility - Total Score: 17    Goals:  Encounter Problems       Encounter Problems (Active)       PT Problem       STG - Pt will transition supine <> sitting with SUP  (Progressing)       Start:  03/24/25    Expected End:  04/07/25            STG - Pt will transfer STS with SUP  (Progressing)       Start:  03/24/25    Expected End:  04/07/25            STG - Pt will amb 30' using RW with SUP  (Progressing)       Start:  03/24/25    Expected End:  04/07/25            STG -  Pt will navigate 4 stairs using HR with SBA  (Progressing)       Start:  03/24/25    Expected End:  04/07/25                 Education Documentation  Precautions, taught by Tori Boucher PT at 3/24/2025  1:58 PM.  Learner: Patient  Readiness: Acceptance  Method: Explanation  Response: Verbalizes Understanding  Comment: precautions and safety awareness    Mobility Training, taught by Tori Boucher PT at 3/24/2025  1:58 PM.  Learner: Patient  Readiness: Acceptance  Method: Explanation  Response: Verbalizes Understanding  Comment: precautions and safety awareness    Education Comments  No comments found.

## 2025-03-24 NOTE — CARE PLAN
The patient's goals for the shift include      The clinical goals for the shift include pain management      Problem: Pain - Adult  Goal: Verbalizes/displays adequate comfort level or baseline comfort level  Outcome: Progressing     Problem: Safety - Adult  Goal: Free from fall injury  Outcome: Progressing     Problem: Discharge Planning  Goal: Discharge to home or other facility with appropriate resources  Outcome: Progressing     Problem: Chronic Conditions and Co-morbidities  Goal: Patient's chronic conditions and co-morbidity symptoms are monitored and maintained or improved  Outcome: Progressing

## 2025-03-24 NOTE — CONSULTS
Reason For Consult  Recent lumbar fusion with fall and now having a retractable back pain    History Of Present Illness  Cristal Pollard is a 64 y.o. female presenting with fall at home.  Patient underwent L3-5 lumbar spinal fusion and TLIF on 03/05/2025 with Dr. Arnulfo Reynolds at [facility]  She was discharged on 03/10/2025 however readmitted to [facility] on 03/11/2025 - 03/14/2025 for UTI.  She was discharged home with home care however on 03/21/2025 suffered a fall where she states she was walking with her walker and her legs gave out.  She was unable to walk due to irretractable pain therefore called [place] fire department who brought her into the ED on 03/23/2025.     Past Medical History  She has a past medical history of Abnormal ECG (10/26/2023), Angina pectoris, Anxiety, Cervical disc disease, Cervical radiculopathy, Depression, Diabetes mellitus (Multi), GERD (gastroesophageal reflux disease), History of Holter monitoring (10/2023), Hypertension, Incisional hernia with obstruction, without gangrene (05/24/2017), Joint pain, Mastodynia (05/14/2020), Palpitations, PONV (postoperative nausea and vomiting), Seizure disorder (Multi), TIA (transient ischemic attack), Vertigo, and Vision loss.    Surgical History  She has a past surgical history that includes Other surgical history (11/14/2014); Cholecystectomy (11/14/2014); Colonoscopy (05/17/2018); Hernia repair (05/24/2017); Sternotomy (2011); echocardiogram 2 d m mode panel (02/17/2023); CT chest wo IV contrast (04/21/2023); Cataract extraction; Carpal tunnel release (Right); Other surgical history; and Mandible surgery (Bilateral).     Social History  She reports that she has never smoked. She has never used smokeless tobacco. She reports current alcohol use. She reports that she does not use drugs.    Family History  Family History   Problem Relation Name Age of Onset    Cancer Mother      Hypertension Mother      Heart disease  Father      Cancer Father      Hypertension Father      Glaucoma Father      Macular degeneration Father      Heart attack Father      Cancer Sister      Diabetes Sister      Diabetes Maternal Grandfather          Allergies  Tramadol, Amoxicillin, Ampicillin, Lidocaine, and Meperidine hcl    Review of Systems  Patient denies headache, vision changes, shortness of breath, chest pain, abdominal pain, numbness or tingling in arms or legs, bowel or bladder changes.     Physical Exam  General: Well developed, awake/alert/oriented x3, no distress, alert and cooperative  Skin: Warm and dry, no lesions, no rashes  Incision: clean dry and intact with no s/s of infection  ENMT: Mucous membranes moist, no apparent injury, no lesions seen  Head/Neck: Neck Supple, no apparent injury  Respiratory/Thorax: Normal breath sounds with good chest expansion, thorax symmetric  Cardiovascular: No pitting edema, no JVD    Cranial nerves   II/III: Visual fields are full. Pupils are reactive bilaterally.  III/IV/VI: Extraocular movements are full with no nystagmus.   V: Facial sensation is intact to light touch.  VII: Face is symmetric.  VIII: hearing is intact bilaterally.  IX/X: Palate elevates symmetrically to phonation.  XI: Sternocleidomastoid is 5/5 to strength testing.  XII: Tongue is midline.    Stephanie Coma Scale  Best Eye Response: 4: Opens eyes spontaneously   Best Verbal Response: 5: Oriented, converses normally   Best Motor Response: 6: Obeys commands   Stephanie Coma Scale Score: 15    Motor Strength: 5/5 Throughout bilateral upper extremities and LLE, 4/5 in RLE    Muscle Bulk: Normal and symmetric in all extremities     Paraspinal muscle spasm/tenderness present and expected postoperatively     Midline tenderness present and expected postoperatively    Sensation to light touch intact       Last Recorded Vitals  Blood pressure 164/74, pulse 73, temperature 36.3 °C (97.3 °F), temperature source Temporal, resp. rate 18, height  "1.753 m (5' 9\"), weight 86.2 kg (190 lb), SpO2 95%.    Relevant Results  CT of the thoracic and lumbar spine from 03/23/2025 demonstrate intact hardware with no acute processes to account for symptoms.     Assessment/Plan     Cristal Pollard is a 64 y.o. female presenting with fall at home.  Patient underwent L3-5 lumbar spinal fusion and TLIF on 03/05/2025 with Dr. Arnulfo Reynolds at [facility]  She was discharged on 03/10/2025 however readmitted to [facility] on 03/11/2025 - 03/14/2025 for UTI.  She was discharged home with home care however on 03/21/2025 suffered a fall where she states she was walking with her walker and her legs gave out.  She was unable to walk due to irretractable pain therefore called [place] fire department who brought her into the ED on 03/23/2025.    After speaking with the overnight nursing team patient was very eager to receive pain medication.  Knowing the frequency in which she can receive IV Dilaudid.  She had notable tremors and became visibly relaxed upon receiving narcotic pain medication.  Her entire demeanor changes after receiving pain medication.  Her blood pressure also responds tremendously to IV pain medication showing a 20 point systolic BP drop within 10 minutes of receiving.    After speaking with the patient she states that at home she was having significant nausea as well as anxiety and tremors.  I ran an OARRS report on her and noticed that she has been receiving 180 tabs of either 10/325 mg or 7.5/325 mg of Percocet on a monthly basis and taking it every 4 hours from April 2024 through February 2025.  She was due for refill at the beginning of March however underwent surgery therefore it was never refilled.  After surgery she was sent home with her standard 28 tabs of 5 mg oxycodone which did not last long prior to her readmission for UTI which she was provided with stronger pain medication in-house.  Upon discharge on 03/14/25 she was given 15 " tabs of 10 mg of oxycodone which the patient states she was trying not to take at home which is when she noticed her weakness worsening and the other symptoms occurring.    CT of the thoracic and lumbar spine from 03/23/2025 demonstrate intact hardware with no acute processes to account for symptoms. I had a long discussion with the patient today that I believe her symptoms are related to opioid withdrawal.  By the end of the long discussion patient admitted that she also believes she may be withdrawing from opioids.  We discussed that she safely needs to be weaned off of them.  Recommend inpatient pain consult and possible psych consult.  Also recommend close follow-up with Dr. Morocho in pain medicine who she is already established with.  In the meantime withdrawal protocol should be put into place.    I spent 65 minutes in the professional and overall care of this patient.      Samantha A Meeson, APRN-CNP

## 2025-03-24 NOTE — CARE PLAN
Problem: Pain - Adult  Goal: Verbalizes/displays adequate comfort level or baseline comfort level  Outcome: Progressing     Problem: Safety - Adult  Goal: Free from fall injury  Outcome: Progressing     Problem: Discharge Planning  Goal: Discharge to home or other facility with appropriate resources  Outcome: Progressing     Problem: Chronic Conditions and Co-morbidities  Goal: Patient's chronic conditions and co-morbidity symptoms are monitored and maintained or improved  Outcome: Progressing     Problem: Nutrition  Goal: Nutrient intake appropriate for maintaining nutritional needs  Outcome: Progressing     Problem: Skin  Goal: Promote skin healing  Outcome: Progressing     Problem: Pain  Goal: Takes deep breaths with improved pain control throughout the shift  Outcome: Progressing  Goal: Turns in bed with improved pain control throughout the shift  Outcome: Progressing  Goal: Walks with improved pain control throughout the shift  Outcome: Progressing  Goal: Performs ADL's with improved pain control throughout shift  Outcome: Progressing  Goal: Participates in PT with improved pain control throughout the shift  Outcome: Progressing  Goal: Free from opioid side effects throughout the shift  Outcome: Progressing  Goal: Free from acute confusion related to pain meds throughout the shift  Outcome: Progressing   The patient's goals for the shift include      The clinical goals for the shift include pain management

## 2025-03-24 NOTE — PROGRESS NOTES
"Hospital Medicine Progress Note    Subjective:  Cristal Pollard is a 64 y.o. female, who is hospital day 1.     Patient better today after COWS protocol initiated. States she walked a little with therapy. Denies any additional concerns.     Objective:    /74 (BP Location: Right arm, Patient Position: Lying)   Pulse 73   Temp 36.3 °C (97.3 °F) (Temporal)   Resp 18   Ht 1.753 m (5' 9\")   Wt 86.2 kg (190 lb)   LMP  (LMP Unknown)   SpO2 95%   BMI 28.06 kg/m²     Physical Exam:  General: A&Ox3, no distress, cooperative  HEENT: NC/AT, clear sclera, MMM  NECK: Supple  Cardiovascular: RRR, no murmurs. S1/S2  Respiratory: CTAB, no RRW or crackles. No distress  Abdomen: Soft, ND, non-tender  Extremities: No peripheral edema   Neurological: A&Ox3. No focal deficits  Skin: Warm and dry, no rashes  Psych: appropriate mood and affect    I personally reviewed all imaging, labs and notes/ documentation.     Assessment & Plan:    Intractable back pain after mechanical fall  Opiate withdrawal   Ambulatory dysfunction   Recent lumbar fusion 3/5    Urinary retention s/p arnold  HTN, HLD  T2DM  KATELYN, MDD  GERD  Seizure disorder    COWS protocol, no additional PRN opioids for pain, tylenol & toradol  PT, OT. Neurosurg consulted, apparently pt has been taking large quantities of opioids for about a year and is now in withdrawal. Will need to follow up closely with her OP pain management, no further neurosurg intervention at this time  Continue home meds, holding metformin. ISS, monitor BG  Dc'ed with arnold, consider VT, pt was due for one this upcoming week with urology, VOT likely tomorrow    DVT Prophylaxis: Lovenox, SCDs  Code Status: Full Code   Disposition: Med/surg      Yakelin Mendez,   Hospitalist     "

## 2025-03-24 NOTE — PROGRESS NOTES
03/24/25 0833   Discharge Planning   Living Arrangements Spouse/significant other   Support Systems Spouse/significant other   Assistance Needed walker   Type of Residence Private residence   Number of Stairs to Enter Residence 4   Number of Stairs Within Residence 0   Home or Post Acute Services In home services   Type of Home Care Services Home PT;Home OT   Expected Discharge Disposition Home Health   Does the patient need discharge transport arranged? No   Financial Resource Strain   How hard is it for you to pay for the very basics like food, housing, medical care, and heating? Not hard   Housing Stability   In the last 12 months, was there a time when you were not able to pay the mortgage or rent on time? N   In the past 12 months, how many times have you moved where you were living? 1   At any time in the past 12 months, were you homeless or living in a shelter (including now)? N   Transportation Needs   In the past 12 months, has lack of transportation kept you from medical appointments or from getting medications? no   In the past 12 months, has lack of transportation kept you from meetings, work, or from getting things needed for daily living? No   Patient Choice   Provider Choice list and CMS website (https://medicare.gov/care-compare#search) for post-acute Quality and Resource Measure Data were provided and reviewed with: Patient   Patient / Family choosing to utilize agency / facility established prior to hospitalization Yes   Intensity of Service   Intensity of Service 0-30 min     Called and spoke with patient. Confirmed address and contacts. Patient lives at home with significant other. States she uses walker at home and had a fall. S/P lumbar fusion on 3/5/25. Had a fall and having pain that brought patient in this time. She states she has 4 steps to enter the home and then none inside. PCP is Dr Diehl-[facility] regularly. Denies issues obtaining or affording medications at home. She does states she  just started with [facility] for therapy at home (went home after last admit/surgery). Asked patient what she feels she may need at dc. She is unsure. Await therapy evals. They did not see patient yesterday as she was having too much pain per note.     1409 Called into patient's room to discuss hc as therapy  recs low intensity-no answer.

## 2025-03-25 LAB
GLUCOSE BLD MANUAL STRIP-MCNC: 171 MG/DL (ref 74–99)
GLUCOSE BLD MANUAL STRIP-MCNC: 171 MG/DL (ref 74–99)
GLUCOSE BLD MANUAL STRIP-MCNC: 183 MG/DL (ref 74–99)
GLUCOSE BLD MANUAL STRIP-MCNC: 213 MG/DL (ref 74–99)

## 2025-03-25 PROCEDURE — 2500000004 HC RX 250 GENERAL PHARMACY W/ HCPCS (ALT 636 FOR OP/ED): Performed by: INTERNAL MEDICINE

## 2025-03-25 PROCEDURE — 2500000001 HC RX 250 WO HCPCS SELF ADMINISTERED DRUGS (ALT 637 FOR MEDICARE OP): Performed by: INTERNAL MEDICINE

## 2025-03-25 PROCEDURE — 99232 SBSQ HOSP IP/OBS MODERATE 35: CPT | Performed by: INTERNAL MEDICINE

## 2025-03-25 PROCEDURE — 2500000002 HC RX 250 W HCPCS SELF ADMINISTERED DRUGS (ALT 637 FOR MEDICARE OP, ALT 636 FOR OP/ED): Performed by: INTERNAL MEDICINE

## 2025-03-25 PROCEDURE — 1100000001 HC PRIVATE ROOM DAILY

## 2025-03-25 PROCEDURE — 97535 SELF CARE MNGMENT TRAINING: CPT | Mod: CQ,GP

## 2025-03-25 PROCEDURE — 82947 ASSAY GLUCOSE BLOOD QUANT: CPT

## 2025-03-25 PROCEDURE — 97116 GAIT TRAINING THERAPY: CPT | Mod: CQ,GP

## 2025-03-25 RX ORDER — CALCIUM CARBONATE 200(500)MG
500 TABLET,CHEWABLE ORAL 4 TIMES DAILY PRN
Status: DISCONTINUED | OUTPATIENT
Start: 2025-03-25 | End: 2025-03-31 | Stop reason: HOSPADM

## 2025-03-25 RX ADMIN — INSULIN LISPRO 1 UNITS: 100 INJECTION, SOLUTION INTRAVENOUS; SUBCUTANEOUS at 17:04

## 2025-03-25 RX ADMIN — KETOROLAC TROMETHAMINE 7.5 MG: 30 INJECTION, SOLUTION INTRAMUSCULAR at 14:54

## 2025-03-25 RX ADMIN — INSULIN LISPRO 1 UNITS: 100 INJECTION, SOLUTION INTRAVENOUS; SUBCUTANEOUS at 12:06

## 2025-03-25 RX ADMIN — SENNOSIDES AND DOCUSATE SODIUM 2 TABLET: 50; 8.6 TABLET ORAL at 20:00

## 2025-03-25 RX ADMIN — FLUOXETINE HYDROCHLORIDE 40 MG: 20 CAPSULE ORAL at 20:00

## 2025-03-25 RX ADMIN — ACETAMINOPHEN 650 MG: 325 TABLET, FILM COATED ORAL at 04:02

## 2025-03-25 RX ADMIN — ACETAMINOPHEN 975 MG: 325 TABLET, FILM COATED ORAL at 08:48

## 2025-03-25 RX ADMIN — LAMOTRIGINE 100 MG: 100 TABLET ORAL at 08:48

## 2025-03-25 RX ADMIN — PANTOPRAZOLE SODIUM 40 MG: 40 TABLET, DELAYED RELEASE ORAL at 06:06

## 2025-03-25 RX ADMIN — ENOXAPARIN SODIUM 40 MG: 40 INJECTION SUBCUTANEOUS at 08:48

## 2025-03-25 RX ADMIN — GABAPENTIN 600 MG: 300 CAPSULE ORAL at 17:04

## 2025-03-25 RX ADMIN — CALCIUM CARBONATE (ANTACID) CHEW TAB 500 MG 500 MG: 500 CHEW TAB at 02:29

## 2025-03-25 RX ADMIN — KETOROLAC TROMETHAMINE 7.5 MG: 30 INJECTION, SOLUTION INTRAMUSCULAR at 06:52

## 2025-03-25 RX ADMIN — GABAPENTIN 600 MG: 300 CAPSULE ORAL at 08:48

## 2025-03-25 RX ADMIN — HYDROCHLOROTHIAZIDE: 12.5 TABLET ORAL at 08:48

## 2025-03-25 RX ADMIN — ACETAMINOPHEN 975 MG: 325 TABLET, FILM COATED ORAL at 17:04

## 2025-03-25 RX ADMIN — KETOROLAC TROMETHAMINE 7.5 MG: 30 INJECTION, SOLUTION INTRAMUSCULAR at 01:19

## 2025-03-25 RX ADMIN — POLYETHYLENE GLYCOL 3350 17 G: 17 POWDER, FOR SOLUTION ORAL at 08:49

## 2025-03-25 RX ADMIN — SENNOSIDES AND DOCUSATE SODIUM 2 TABLET: 50; 8.6 TABLET ORAL at 08:48

## 2025-03-25 RX ADMIN — TAMSULOSIN HYDROCHLORIDE 0.4 MG: 0.4 CAPSULE ORAL at 20:01

## 2025-03-25 RX ADMIN — KETOROLAC TROMETHAMINE 7.5 MG: 30 INJECTION, SOLUTION INTRAMUSCULAR at 18:56

## 2025-03-25 RX ADMIN — GABAPENTIN 600 MG: 300 CAPSULE ORAL at 20:00

## 2025-03-25 RX ADMIN — BUPRENORPHINE 6 MG: 2 TABLET SUBLINGUAL at 09:57

## 2025-03-25 RX ADMIN — INSULIN LISPRO 2 UNITS: 100 INJECTION, SOLUTION INTRAVENOUS; SUBCUTANEOUS at 06:44

## 2025-03-25 ASSESSMENT — COGNITIVE AND FUNCTIONAL STATUS - GENERAL
WALKING IN HOSPITAL ROOM: A LITTLE
CLIMB 3 TO 5 STEPS WITH RAILING: A LOT
TURNING FROM BACK TO SIDE WHILE IN FLAT BAD: A LITTLE
STANDING UP FROM CHAIR USING ARMS: A LITTLE
MOVING TO AND FROM BED TO CHAIR: A LITTLE
MOVING FROM LYING ON BACK TO SITTING ON SIDE OF FLAT BED WITH BEDRAILS: A LITTLE
MOBILITY SCORE: 17

## 2025-03-25 ASSESSMENT — LIFESTYLE VARIABLES
TOTAL_SCORE: 3
TOTAL_SCORE: 2
TOTAL_SCORE: 1
TOTAL_SCORE: 1
TOTAL_SCORE: 4

## 2025-03-25 ASSESSMENT — PAIN SCALES - GENERAL
PAINLEVEL_OUTOF10: 2
PAINLEVEL_OUTOF10: 4
PAINLEVEL_OUTOF10: 4
PAINLEVEL_OUTOF10: 3

## 2025-03-25 ASSESSMENT — PAIN DESCRIPTION - DESCRIPTORS
DESCRIPTORS: ACHING
DESCRIPTORS: ACHING;SORE

## 2025-03-25 ASSESSMENT — PAIN - FUNCTIONAL ASSESSMENT
PAIN_FUNCTIONAL_ASSESSMENT: 0-10

## 2025-03-25 NOTE — PROGRESS NOTES
"Hospital Medicine Progress Note    Subjective:  Cristal Pollard is a 64 y.o. female, who is hospital day 2.     Patient endorse loss of taste and dry mouth, these have been present for several weeks now and are not new symptoms and haven't changed. Pain is controlled. No additional complaints.     Objective:    /83 (BP Location: Right arm, Patient Position: Lying)   Pulse 83   Temp 36.3 °C (97.3 °F) (Temporal)   Resp 18   Ht 1.753 m (5' 9\")   Wt 86.2 kg (190 lb)   LMP  (LMP Unknown)   SpO2 96%   BMI 28.06 kg/m²     Physical Exam:  General: A&Ox3, no distress, cooperative  HEENT: NC/AT, clear sclera, MMM  NECK: Supple  Cardiovascular: RRR, no murmurs. S1/S2  Respiratory: CTAB, no RRW or crackles. No distress  Abdomen: Soft, ND, non-tender  Extremities: No peripheral edema   Neurological: A&Ox3. No focal deficits  Skin: Warm and dry, no rashes  Psych: appropriate mood and affect    I personally reviewed all imaging, labs and notes/ documentation.     Assessment & Plan:    Intractable back pain after mechanical fall  Opiate withdrawal   Ambulatory dysfunction   Recent lumbar fusion 3/5    Urinary retention s/p arnold  HTN, HLD  T2DM  KATELYN, MDD  GERD  Seizure disorder    COWS protocol, no additional PRN opioids for pain, tylenol & toradol  PT, OT. Neurosurg consulted, apparently pt has been taking large quantities of opioids for about a year and is now in withdrawal. Will need to follow up closely with her OP pain management, no further neurosurg intervention at this time  Continue home meds, holding metformin. ISS, monitor BG  Dc'ed with [facility], pt was due for one this upcoming week with urology, VOT today    DVT Prophylaxis: Lovenox, SCDs  Code Status: Full Code   Disposition: Med/surg, poss dc tomorrow with [facility]      Yakelin Mendez DO  Hospitalist     "

## 2025-03-25 NOTE — PROGRESS NOTES
Cristal Pollard is a 64 y.o. female on day 2 of admission presenting with Back pain, unspecified back location, unspecified back pain laterality, unspecified chronicity.  Record reviewed.  PT/OT recommending low intensity level therapy at discharge.  This TCC met with patient at bedside, introduced self, explained role.  Discussed therapy recommendations.  Patient agreeable to home care.  [place] of choice explained.  Declined list.  Preference is [facility].  Care Transitions will continue to follow.  PCP:  Dr. Jessica Diehl    12:54 pm addendum  Attending provider updated via secure message.  Care Transitions will continue to follow.

## 2025-03-25 NOTE — PROGRESS NOTES
Physical Therapy    Physical Therapy Treatment    Patient Name: Cristal Pollard  MRN: [id number]  Department: [facility]  Room: 202/202A  Today's Date: 3/25/2025  Time Calculation  Start Time: 1316  Stop Time: 1346  Time Calculation (min): 30 min         Assessment/Plan         PT Plan  Treatment/Interventions: Bed mobility, Transfer training, Gait training  PT Plan: Ongoing PT  PT Frequency: 4 times per week  PT Discharge Recommendations: Low intensity level of continued care (with initial 24hr support)  PT Recommended Transfer Status: Stand by assist  PT - OK to Discharge: Yes      General Visit Information:   PT  Visit  PT Received On: 03/25/25       Subjective                Objective   Pain:3/10 back pain                           Treatments:  Therapeutic Exercise  Therapeutic Exercise Performed:  (ble ap's,laqs and marching x 15 reps)    Bed Mobility  Bed Mobility:  (supine to sit sba)    Ambulation/Gait Training  Ambulation/Gait Training Performed:  (ambulated 15 feet x 2 using fixed wheeled walker cga   decreased stride length guarded)  Transfers  Transfer:  (sit to stand cga)         Outcome Measures:  [facility] Basic Mobility  Turning from your back to your side while in a flat bed without using bedrails: A little  Moving from lying on your back to sitting on the side of a flat bed without using bedrails: A little  Moving to and from bed to chair (including a wheelchair): A little  Standing up from a chair using your arms (e.g. wheelchair or bedside chair): A little  To walk in hospital room: A little  Climbing 3-5 steps with railing: A lot  Basic Mobility - Total Score: 17    Education Documentation  Precautions, taught by Dianne Lance PTA at 3/25/2025  1:55 PM.  Learner: Patient  Readiness: Acceptance  Method: Demonstration  Response: Demonstrated Understanding    Mobility Training, taught by Dianne Lance PTA at 3/25/2025  1:55 PM.  Learner: Patient  Readiness: Acceptance  Method: Demonstration  Response:  Demonstrated Understanding    Body Mechanics, taught by Dianne Lance PTA at 3/25/2025  1:55 PM.  Learner: Patient  Readiness: Acceptance  Method: Demonstration  Response: Demonstrated Understanding    Precautions, taught by Dianne Lance PTA at 3/25/2025  1:55 PM.  Learner: Patient  Readiness: Acceptance  Method: Demonstration  Response: Demonstrated Understanding    ADL Training, taught by Dianne Lance PTA at 3/25/2025  1:55 PM.  Learner: Patient  Readiness: Acceptance  Method: Demonstration  Response: Demonstrated Understanding    Education Comments  No comments found.           Encounter Problems       Encounter Problems (Active)       PT Problem       STG - Pt will transition supine <> sitting with SUP  (Progressing)       Start:  03/24/25    Expected End:  04/07/25            STG - Pt will transfer STS with SUP  (Progressing)       Start:  03/24/25    Expected End:  04/07/25            STG - Pt will amb 30' using RW with SUP  (Progressing)       Start:  03/24/25    Expected End:  04/07/25            STG -  Pt will navigate 4 stairs using HR with SBA  (Progressing)       Start:  03/24/25    Expected End:  04/07/25               Pain - Adult          Safety       LTG - Patient will demonstrate safety requirements appropriate to situation/environment       Start:  03/23/25            LTG - Patient will utilize safety techniques       Start:  03/23/25            STG - Patient uses call light consistently to request assistance with transfers       Start:  03/23/25

## 2025-03-25 NOTE — CARE PLAN
The patient's goals for the shift include      The clinical goals for the shift include HDS/comfort/safety      Problem: Pain - Adult  Goal: Verbalizes/displays adequate comfort level or baseline comfort level  Outcome: Progressing     Problem: Safety - Adult  Goal: Free from fall injury  Outcome: Progressing     Problem: Discharge Planning  Goal: Discharge to home or other facility with appropriate resources  Outcome: Progressing     Problem: Chronic Conditions and Co-morbidities  Goal: Patient's chronic conditions and co-morbidity symptoms are monitored and maintained or improved  Outcome: Progressing     Problem: Nutrition  Goal: Nutrient intake appropriate for maintaining nutritional needs  Outcome: Progressing     Problem: Skin  Goal: Promote skin healing  Outcome: Progressing  Flowsheets (Taken 3/25/2025 1022)  Promote skin healing: Assess skin/pad under line(s)/device(s)     Problem: Pain  Goal: Takes deep breaths with improved pain control throughout the shift  Outcome: Progressing  Goal: Turns in bed with improved pain control throughout the shift  Outcome: Progressing  Goal: Walks with improved pain control throughout the shift  Outcome: Progressing  Goal: Performs ADL's with improved pain control throughout shift  Outcome: Progressing  Goal: Participates in PT with improved pain control throughout the shift  Outcome: Progressing  Goal: Free from opioid side effects throughout the shift  Outcome: Progressing  Goal: Free from acute confusion related to pain meds throughout the shift  Outcome: Progressing

## 2025-03-25 NOTE — PROGRESS NOTES
Cristal Pollard is a 64 y.o. female on day 2 of admission presenting with Back pain, unspecified back location, unspecified back pain laterality, unspecified chronicity.    Subjective   Patient resting in bed comfortably, eating small portion of provided breakfast. States that after about a third of her meal she does start to get nauseous.  We discussed that this is better than yesterday where she was only able to tolerate a few bites.  States her pain is slightly better controlled.  She is eager to have PT evaluate her for home-going versus SNF.       Objective     Physical Exam  General: Well developed, awake/alert/oriented x3, no distress, alert and cooperative  Skin: Warm and dry, no lesions, no rashes  Incision: clean dry and intact with no s/s of infection  ENMT: Mucous membranes moist, no apparent injury, no lesions seen  Head/Neck: Neck Supple, no apparent injury  Respiratory/Thorax: Normal breath sounds with good chest expansion, thorax symmetric  Cardiovascular: No pitting edema, no JVD     Cranial nerves   II/III: Visual fields are full. Pupils are reactive bilaterally.  III/IV/VI: Extraocular movements are full with no nystagmus.   V: Facial sensation is intact to light touch.  VII: Face is symmetric.  VIII: hearing is intact bilaterally.  IX/X: Palate elevates symmetrically to phonation.  XI: Sternocleidomastoid is 5/5 to strength testing.  XII: Tongue is midline.     Stephanie Coma Scale  Best Eye Response: 4: Opens eyes spontaneously   Best Verbal Response: 5: Oriented, converses normally   Best Motor Response: 6: Obeys commands   Stephanie Coma Scale Score: 15     Motor Strength: 5/5 Throughout bilateral upper extremities and LLE, 4/5 in RLE     Muscle Bulk: Normal and symmetric in all extremities     Paraspinal muscle spasm/tenderness present and expected postoperatively      Midline tenderness present and expected postoperatively     Sensation to light touch intact      Last Recorded Vitals  Blood pressure  "148/83, pulse 83, temperature 36.3 °C (97.3 °F), temperature source Temporal, resp. rate 18, height 1.753 m (5' 9\"), weight 86.2 kg (190 lb), SpO2 96%.  Intake/Output last 3 Shifts:  I/O last 3 completed shifts:  In: 1836.7 (21.3 mL/kg) [I.V.:1836.7 (21.3 mL/kg)]  Out: 1650 (19.1 mL/kg) [Urine:1650 (0.5 mL/kg/hr)]  Weight: 86.2 kg     Relevant Results                      Assessment/Plan   Assessment & Plan  Back pain, unspecified back location, unspecified back pain laterality, unspecified chronicity    Anxiety disorder    Depression, recurrent (CMS-HCC)    Chronic neck pain    Hypertension    Type 2 diabetes mellitus    History of lumbar fusion    Cervical radiculopathy    Patient is doing very well.  She is in a much more pleasant mood today.  COWS (clinical opiate withdrawal scale) was started yesterday patient initiated on buprenorphine.  She is continuing acetaminophen scheduled every 8 hours and 600 mg gabapentin 3 times daily.  She is also being given Toradol every 6 hours IV.  Plan is for patient to have close follow-up with Dr. Morocho in the outpatient setting since she is already established with him.  Neurosurgery to remain involved at the discretion of the primary team.       I spent 40 minutes in the professional and overall care of this patient.      Samantha A Meeson, APRN-CNP      "

## 2025-03-25 NOTE — CARE PLAN
The patient's goals for the shift include      The clinical goals for the shift include pain management    Problem: Pain - Adult  Goal: Verbalizes/displays adequate comfort level or baseline comfort level  Outcome: Progressing     Problem: Safety - Adult  Goal: Free from fall injury  Outcome: Progressing     Problem: Discharge Planning  Goal: Discharge to home or other facility with appropriate resources  Outcome: Progressing     Problem: Chronic Conditions and Co-morbidities  Goal: Patient's chronic conditions and co-morbidity symptoms are monitored and maintained or improved  Outcome: Progressing     Problem: Nutrition  Goal: Nutrient intake appropriate for maintaining nutritional needs  Outcome: Progressing     Problem: Skin  Goal: Promote skin healing  Outcome: Progressing     Problem: Pain  Goal: Takes deep breaths with improved pain control throughout the shift  Outcome: Progressing  Goal: Turns in bed with improved pain control throughout the shift  Outcome: Progressing  Goal: Walks with improved pain control throughout the shift  Outcome: Progressing  Goal: Performs ADL's with improved pain control throughout shift  Outcome: Progressing  Goal: Participates in PT with improved pain control throughout the shift  Outcome: Progressing  Goal: Free from opioid side effects throughout the shift  Outcome: Progressing  Goal: Free from acute confusion related to pain meds throughout the shift  Outcome: Progressing

## 2025-03-26 ENCOUNTER — DOCUMENTATION (OUTPATIENT)
Dept: HOME HEALTH SERVICES | Facility: HOME HEALTH | Age: 65
End: 2025-03-26
Payer: COMMERCIAL

## 2025-03-26 ENCOUNTER — APPOINTMENT (OUTPATIENT)
Dept: RADIOLOGY | Facility: HOSPITAL | Age: 65
DRG: 552 | End: 2025-03-26
Payer: COMMERCIAL

## 2025-03-26 LAB
GLUCOSE BLD MANUAL STRIP-MCNC: 179 MG/DL (ref 74–99)
GLUCOSE BLD MANUAL STRIP-MCNC: 185 MG/DL (ref 74–99)
GLUCOSE BLD MANUAL STRIP-MCNC: 204 MG/DL (ref 74–99)
GLUCOSE BLD MANUAL STRIP-MCNC: 207 MG/DL (ref 74–99)
GLUCOSE BLD MANUAL STRIP-MCNC: 233 MG/DL (ref 74–99)

## 2025-03-26 PROCEDURE — 2500000001 HC RX 250 WO HCPCS SELF ADMINISTERED DRUGS (ALT 637 FOR MEDICARE OP): Performed by: INTERNAL MEDICINE

## 2025-03-26 PROCEDURE — 2500000002 HC RX 250 W HCPCS SELF ADMINISTERED DRUGS (ALT 637 FOR MEDICARE OP, ALT 636 FOR OP/ED): Performed by: INTERNAL MEDICINE

## 2025-03-26 PROCEDURE — 99223 1ST HOSP IP/OBS HIGH 75: CPT | Performed by: NURSE PRACTITIONER

## 2025-03-26 PROCEDURE — 2500000004 HC RX 250 GENERAL PHARMACY W/ HCPCS (ALT 636 FOR OP/ED): Performed by: STUDENT IN AN ORGANIZED HEALTH CARE EDUCATION/TRAINING PROGRAM

## 2025-03-26 PROCEDURE — 99233 SBSQ HOSP IP/OBS HIGH 50: CPT | Performed by: STUDENT IN AN ORGANIZED HEALTH CARE EDUCATION/TRAINING PROGRAM

## 2025-03-26 PROCEDURE — 99232 SBSQ HOSP IP/OBS MODERATE 35: CPT | Performed by: NURSE PRACTITIONER

## 2025-03-26 PROCEDURE — 72100 X-RAY EXAM L-S SPINE 2/3 VWS: CPT

## 2025-03-26 PROCEDURE — 2500000004 HC RX 250 GENERAL PHARMACY W/ HCPCS (ALT 636 FOR OP/ED): Performed by: INTERNAL MEDICINE

## 2025-03-26 PROCEDURE — 1100000001 HC PRIVATE ROOM DAILY

## 2025-03-26 PROCEDURE — 82947 ASSAY GLUCOSE BLOOD QUANT: CPT

## 2025-03-26 PROCEDURE — 72100 X-RAY EXAM L-S SPINE 2/3 VWS: CPT | Performed by: RADIOLOGY

## 2025-03-26 PROCEDURE — 72072 X-RAY EXAM THORAC SPINE 3VWS: CPT

## 2025-03-26 PROCEDURE — 2500000001 HC RX 250 WO HCPCS SELF ADMINISTERED DRUGS (ALT 637 FOR MEDICARE OP): Performed by: NURSE PRACTITIONER

## 2025-03-26 PROCEDURE — 72072 X-RAY EXAM THORAC SPINE 3VWS: CPT | Performed by: RADIOLOGY

## 2025-03-26 RX ORDER — ACETAMINOPHEN 325 MG/1
975 TABLET ORAL EVERY 8 HOURS
Start: 2025-03-26

## 2025-03-26 RX ORDER — CYCLOBENZAPRINE HCL 5 MG
10 TABLET ORAL 3 TIMES DAILY PRN
Qty: 90 TABLET | Refills: 0 | Status: SHIPPED | OUTPATIENT
Start: 2025-03-26 | End: 2025-03-26 | Stop reason: HOSPADM

## 2025-03-26 RX ORDER — GABAPENTIN 300 MG/1
300 CAPSULE ORAL 3 TIMES DAILY
Status: DISCONTINUED | OUTPATIENT
Start: 2025-03-26 | End: 2025-03-31 | Stop reason: HOSPADM

## 2025-03-26 RX ADMIN — BUPRENORPHINE 4 MG: 2 TABLET SUBLINGUAL at 09:02

## 2025-03-26 RX ADMIN — SODIUM CHLORIDE 1000 ML: 9 INJECTION, SOLUTION INTRAVENOUS at 16:19

## 2025-03-26 RX ADMIN — SENNOSIDES AND DOCUSATE SODIUM 2 TABLET: 50; 8.6 TABLET ORAL at 09:02

## 2025-03-26 RX ADMIN — TAMSULOSIN HYDROCHLORIDE 0.4 MG: 0.4 CAPSULE ORAL at 22:33

## 2025-03-26 RX ADMIN — KETOROLAC TROMETHAMINE 7.5 MG: 30 INJECTION, SOLUTION INTRAMUSCULAR at 00:18

## 2025-03-26 RX ADMIN — ACETAMINOPHEN 975 MG: 325 TABLET, FILM COATED ORAL at 16:18

## 2025-03-26 RX ADMIN — SENNOSIDES AND DOCUSATE SODIUM 2 TABLET: 50; 8.6 TABLET ORAL at 22:33

## 2025-03-26 RX ADMIN — KETOROLAC TROMETHAMINE 7.5 MG: 30 INJECTION, SOLUTION INTRAMUSCULAR at 06:33

## 2025-03-26 RX ADMIN — PANTOPRAZOLE SODIUM 40 MG: 40 TABLET, DELAYED RELEASE ORAL at 06:32

## 2025-03-26 RX ADMIN — CYCLOBENZAPRINE 5 MG: 10 TABLET, FILM COATED ORAL at 22:39

## 2025-03-26 RX ADMIN — LAMOTRIGINE 100 MG: 100 TABLET ORAL at 09:02

## 2025-03-26 RX ADMIN — ENOXAPARIN SODIUM 40 MG: 40 INJECTION SUBCUTANEOUS at 06:32

## 2025-03-26 RX ADMIN — HYDROCHLOROTHIAZIDE: 12.5 TABLET ORAL at 09:02

## 2025-03-26 RX ADMIN — ACETAMINOPHEN 975 MG: 325 TABLET, FILM COATED ORAL at 22:33

## 2025-03-26 RX ADMIN — IBUPROFEN 600 MG: 600 TABLET, FILM COATED ORAL at 22:39

## 2025-03-26 RX ADMIN — INSULIN LISPRO 1 UNITS: 100 INJECTION, SOLUTION INTRAVENOUS; SUBCUTANEOUS at 11:46

## 2025-03-26 RX ADMIN — INSULIN LISPRO 1 UNITS: 100 INJECTION, SOLUTION INTRAVENOUS; SUBCUTANEOUS at 06:31

## 2025-03-26 RX ADMIN — INSULIN LISPRO 2 UNITS: 100 INJECTION, SOLUTION INTRAVENOUS; SUBCUTANEOUS at 17:13

## 2025-03-26 RX ADMIN — ACETAMINOPHEN 975 MG: 325 TABLET, FILM COATED ORAL at 06:32

## 2025-03-26 RX ADMIN — POLYETHYLENE GLYCOL 3350 17 G: 17 POWDER, FOR SOLUTION ORAL at 09:02

## 2025-03-26 RX ADMIN — GABAPENTIN 600 MG: 300 CAPSULE ORAL at 09:02

## 2025-03-26 RX ADMIN — FLUOXETINE HYDROCHLORIDE 40 MG: 20 CAPSULE ORAL at 22:33

## 2025-03-26 RX ADMIN — GABAPENTIN 300 MG: 300 CAPSULE ORAL at 22:33

## 2025-03-26 ASSESSMENT — PAIN DESCRIPTION - LOCATION: LOCATION: BACK

## 2025-03-26 ASSESSMENT — PAIN - FUNCTIONAL ASSESSMENT
PAIN_FUNCTIONAL_ASSESSMENT: 0-10

## 2025-03-26 ASSESSMENT — PAIN SCALES - GENERAL
PAINLEVEL_OUTOF10: 2
PAINLEVEL_OUTOF10: 7
PAINLEVEL_OUTOF10: 8
PAINLEVEL_OUTOF10: 0 - NO PAIN
PAINLEVEL_OUTOF10: 8

## 2025-03-26 ASSESSMENT — PAIN DESCRIPTION - ORIENTATION: ORIENTATION: LOWER

## 2025-03-26 ASSESSMENT — LIFESTYLE VARIABLES
TOTAL_SCORE: 3
TOTAL_SCORE: 2

## 2025-03-26 ASSESSMENT — PAIN DESCRIPTION - DESCRIPTORS: DESCRIPTORS: ACHING

## 2025-03-26 NOTE — CARE PLAN
The patient's goals for the shift include      The clinical goals for the shift include comfort and safety needs      Problem: Pain - Adult  Goal: Verbalizes/displays adequate comfort level or baseline comfort level  Outcome: Progressing     Problem: Safety - Adult  Goal: Free from fall injury  Outcome: Progressing     Problem: Discharge Planning  Goal: Discharge to home or other facility with appropriate resources  Outcome: Progressing     Problem: Chronic Conditions and Co-morbidities  Goal: Patient's chronic conditions and co-morbidity symptoms are monitored and maintained or improved  Outcome: Progressing     Problem: Nutrition  Goal: Nutrient intake appropriate for maintaining nutritional needs  Outcome: Progressing     Problem: Skin  Goal: Promote skin healing  Outcome: Progressing  Flowsheets (Taken 3/26/2025 0749)  Promote skin healing: Assess skin/pad under line(s)/device(s)     Problem: Pain  Goal: Takes deep breaths with improved pain control throughout the shift  Outcome: Progressing  Goal: Turns in bed with improved pain control throughout the shift  Outcome: Progressing  Goal: Walks with improved pain control throughout the shift  Outcome: Progressing  Goal: Performs ADL's with improved pain control throughout shift  Outcome: Progressing  Goal: Participates in PT with improved pain control throughout the shift  Outcome: Progressing  Goal: Free from opioid side effects throughout the shift  Outcome: Progressing  Goal: Free from acute confusion related to pain meds throughout the shift  Outcome: Progressing

## 2025-03-26 NOTE — CARE PLAN
The patient's goals for the shift include  voiding.    The clinical goals for the shift include sitting in chair for one hour.

## 2025-03-26 NOTE — PROGRESS NOTES
Cristal Pollard is a 64 y.o. female on day 3 of admission presenting with Back pain, unspecified back location, unspecified back pain laterality, unspecified chronicity.  Record reviewed.  This TCC received secure message from attending provider regarding need for home-going Suboxone.  TCC updated provider regarding discharge plan for home with [facility].  Referral placed to  for assistance with Suboxone need at discharge.  CO to include nursing and SW.  Provider updated.  Care Transitions will continue to follow.    12:30 pm addendum  This TCC and provider met with patient at bedside for discharge planning.  Patient has difficulty staying alert during conversation.  TCC shared team's concern for going home with home care and ability to manage her self care and medications, since significant other works all day, and patient will discharge on new buprenorphine regimen.  TCC provided information from [facility], which was left at Nurse's station.  Patient unsure she wants to go to SNF facility.  TCC discussed phoning patient's significant other and/or daughter, and Adv Directives.  Patient stated that she doesn't have anything and would like to set something up.  She stated she is not .  TCC explained daughter is NOK.  Patient gave permission to phone daughter, if needed.  Patient stated that daughter would want me to go to a facility.  TCC explained freedom of choice and offered opportunity for patient to review a SNF list.  Patient agreeable.  Per notes, patient has a H/O KATELYN/MDD and may benefit from psych eval to assist with discharge planning.  TCC made referral to [facility].  [facility] planned to follow up this afternoon.  Provider, Care Transitions manager,  updated.  Care Transitions will continue to follow.    2:00 pm addendum  OT consulted for MOCA.  Psych consult ordered.  Await consult notes for any updates regarding discharge plan and buprenorphine regimen.  Care Transitions will continue to follow.

## 2025-03-26 NOTE — PROGRESS NOTES
Cristal Pollard is a 64 y.o. female on day 3 of admission presenting with Back pain, unspecified back location, unspecified back pain laterality, unspecified chronicity.    Subjective   Pt says she still has back pain. There is some confusion apparent as pt did not know she is on suboxone despite being told many times. I had extensive conversation with pt's daughter who said pt has extensive addiction hx to opioids where she would go to [facility] docs/hospitals to get pain meds for past 30 years.        Objective     Physical Exam  Vitals and nursing note reviewed.   Constitutional:       General: She is not in acute distress.     Appearance: Normal appearance. She is not ill-appearing or toxic-appearing.   HENT:      Head: Normocephalic and atraumatic.      Nose: Nose normal.      Mouth/Throat:      Mouth: Mucous membranes are moist.   Eyes:      Extraocular Movements: Extraocular movements intact.      Conjunctiva/sclera: Conjunctivae normal.      Pupils: Pupils are equal, round, and reactive to light.   Cardiovascular:      Rate and Rhythm: Normal rate and regular rhythm.      Heart sounds: No murmur heard.     No gallop.   Pulmonary:      Effort: Pulmonary effort is normal. No respiratory distress.      Breath sounds: Normal breath sounds. No wheezing, rhonchi or rales.   Abdominal:      General: Abdomen is flat. Bowel sounds are normal. There is no distension.      Palpations: Abdomen is soft. There is no mass.      Tenderness: There is no abdominal tenderness.   Musculoskeletal:         General: No swelling.      Cervical back: Normal range of motion and neck supple.      Comments: Limited ROM due to pain, very weak    Skin:     General: Skin is warm and dry.   Neurological:      Mental Status: She is alert and oriented to person, place, and time.      Motor: Weakness present.      Comments: Some confusion apparent, as pt is very forgetful   Psychiatric:         Mood and Affect: Mood normal.         Behavior: Behavior  "normal.         Last Recorded Vitals:  /62 (BP Location: Right arm, Patient Position: Lying)   Pulse 106   Temp 36.3 °C (97.3 °F) (Temporal)   Resp 18   Ht 1.753 m (5' 9\")   Wt 86.2 kg (190 lb)   LMP  (LMP Unknown)   SpO2 92%   BMI 28.06 kg/m²      Scheduled medications:  acetaminophen, 975 mg, oral, q8h  [START ON 3/27/2025] buprenorphine, 2 mg, sublingual, q24h  enoxaparin, 40 mg, subcutaneous, q24h  FLUoxetine, 40 mg, oral, Nightly  gabapentin, 600 mg, oral, TID  insulin lispro, 0-5 Units, subcutaneous, TID AC  lamoTRIgine, 100 mg, oral, Daily  lisinopril 10 mg, hydroCHLOROthiazide 12.5 mg for Zestoretic/Prinizide, , oral, Daily  pantoprazole, 40 mg, oral, Daily before breakfast  polyethylene glycol, 17 g, oral, Daily  sennosides-docusate sodium, 2 tablet, oral, BID  tamsulosin, 0.4 mg, oral, Nightly      Continuous medications:     PRN medications:  PRN medications: acetaminophen **OR** [DISCONTINUED] acetaminophen **OR** [DISCONTINUED] acetaminophen, benzocaine-menthol, buprenorphine, calcium carbonate, cloNIDine, cyclobenzaprine, dextrose, dextrose, dicyclomine, glucagon, glucagon, hydrALAZINE, hydrOXYzine pamoate, ibuprofen, loperamide, melatonin, ondansetron     Relevant Results:  Results for orders placed or performed during the hospital encounter of 03/23/25 (from the past 24 hours)   POCT GLUCOSE   Result Value Ref Range    POCT Glucose 183 (H) 74 - 99 mg/dL   POCT GLUCOSE   Result Value Ref Range    POCT Glucose 171 (H) 74 - 99 mg/dL   POCT GLUCOSE   Result Value Ref Range    POCT Glucose 179 (H) 74 - 99 mg/dL   POCT GLUCOSE   Result Value Ref Range    POCT Glucose 185 (H) 74 - 99 mg/dL     *Note: Due to a large number of results and/or encounters for the requested time period, some results have not been displayed. A complete set of results can be found in Results Review.       No results found.                   Assessment/Plan   Assessment & Plan  Anxiety disorder    Cervical " radiculopathy    Chronic neck pain    Depression, recurrent (CMS-HCC)    History of lumbar fusion    Hypertension    Type 2 diabetes mellitus    Back pain, unspecified back location, unspecified back pain laterality, unspecified chronicity    Likely pt has cognitive impairment from long term opioid abuse  Per daughter she has had 30 years of opioid abuse where she would go to [facility] doctors/hospitals to get pain meds  - Pt does not have capacity to make her own decisions as she does not understand the severity of her addiction diagnosis and the need to go into a treatment center whether inpatient or outpt  - OT for MOCA eval  - psych consult   - limit naroctic use    Intractable back pain after mechanical fall  Acute opiate withdrawal  Hx recent lumbar fusion  - COWS protocol, on suboxone  - will avoid opioids  - flexeril PRN  - gabapentin  - bowel regimen  - tylenol PRN    Tachycardia   Likely from withdrawal   - IVF    HTN  - lisinopril  - HCTZ    Intermittent urinary retention  - straight cath PRN   - flomax    GERD  - protonix    Depression/anxiety  - psych consult  - prozac  - lacmictal    DVT ppx: lovenox  Dispo: monitor clinically          Jovita Paul MD  Hospitalist

## 2025-03-26 NOTE — SIGNIFICANT EVENT
"Capacity Assessment Tool    \"Capacity\" is the \"ability\" to make a decision.  The decision in question must be specific (one decision), relevant to a patient's current condition (appropriate), and timely (neither prospective nor retrospective).    Capacity varies based on knowledge base (explanation/understanding of clinical information), cognitive processing, acute psychiatric illness, and other clinical conditions.    In order to be deemed \"capacitated\" to make a single decision at one point in time, a patient must demonstrate all 4 of the following elements:    *Ability to consistently communicate a choice (consistent over time with adequate information)  *Ability to understand the relevant information (accurate knowledge of condition)  *Ability to appreciate the situation and its consequences (risks/benefits, pros/cons)  *Ability to reason about treatment options (without undue influence of a person or condition, eg. suicidality or acute psychosis)      Current Decision    Clinical issue:   Understanding severe addiction diagnosis and needing a safe dispo plan     Did the appropriate team address relevant information with the patient:  Yes    Date: 3/26/25    If \"NO\" is selected for appropriate team, then please discuss with the appropriate team.  The appropriate team should be encouraged to address relevant information with the patient AND reevaluate capacity when appropriate.    Capacity Evaluation    Patient demonstrates ability to consistently communicate choice:  No     Patient demonstrates ability to understand the relevant information:  No she does not understand her addiction diagnosis and why suboxone/treatment clinic is important    Patient demonstrates ability to appreciate the situation and its consequences:  No she is not understanding why family should be involved in her care    Patient demonstrates ability to reason about treatment options:  No she does not understand why SNF would be needed as she " "is quite weak at the moment    If ANY of the above items are answered \"NO,\" the patient LACKS CAPACITY for that specific decision at hand, at that specific time.  Further capacity evaluations can be done as needed.         "

## 2025-03-26 NOTE — PROGRESS NOTES
Social work consulted for assistance with suboxone clinic at discharge. Spoke with patient and she was not aware she was on suboxone or that she would need a suboxone clinic at discharge. Physician and TCC updated and they met with patient at bedside, see TCC  note. Psych has been consulted.  [facility] left information for patient with nurse regarding outpatient suboxone clinic.   Asked by physician to update patient's daughter,  Savannah. Placed call to patient to ask for permission to speak with her daughter. Patient asked why we are talking to her daughter and stated she does not give permission for thsi worker to call her daughter. Medical team updated.   Awaiting psych eval.     1437  Placed another call to patient's room and Psych NP was at beside. He will update this worker after his eval.     1542  Psych does not feel patient has capacity to make decisions at this time.   Placed call to patient's daughter, Muna, since it has been determined patient does not have capacity. Talked at length. She advised patient is not at baseline and is usually more alert and oriented Muna is hesitant to make decisions but agreed to at this time. She is aware if patient's mental status clears and it is determined she can make decisions, she will not have to make them for her.   She advised patient has been abusing prescription drugs for a very long time. She is estranged from all of her family, besides Muna. She lives with a sig other but they are not . He works and is not always home with patient. He is willing to assist patient when he is home but patient does not always allow him to.   Muna agrees best plan at this time is a skilled facility with substance abuse treatment program on site. She agreed to referral to [facility]. She is aware someone will udpate her tomorrow and  was thankful for the call.   Asked [facility] to send referral to [facility].

## 2025-03-26 NOTE — PROGRESS NOTES
"Cristal Pollard is a 64 y.o. female on day 3 of admission presenting with Back pain, unspecified back location, unspecified back pain laterality, unspecified chronicity.    Subjective   Patient doing well overnight, sitting up in chair today.  Tolerating PO intake better and pain improved.         Objective     Physical Exam    Last Recorded Vitals  Blood pressure 106/62, pulse 106, temperature 36.3 °C (97.3 °F), temperature source Temporal, resp. rate 18, height 1.753 m (5' 9\"), weight 86.2 kg (190 lb), SpO2 92%.  Intake/Output last 3 Shifts:  I/O last 3 completed shifts:  In: 240 (2.8 mL/kg) [P.O.:240]  Out: 855 (9.9 mL/kg) [Urine:855 (0.3 mL/kg/hr)]  Weight: 86.2 kg     Relevant Results                      Assessment/Plan   Assessment & Plan  Back pain, unspecified back location, unspecified back pain laterality, unspecified chronicity    Anxiety disorder    Depression, recurrent (CMS-HCC)    Chronic neck pain    Hypertension    Type 2 diabetes mellitus    History of lumbar fusion    Cervical radiculopathy    Patient continues to do well overnight and was sitting up in chair today for physical exam.  Incisions are continuing to heal well with no signs and symptoms of infection.  Strength continues to remain the same with 5/5 strength in bilateral upper extremities and left lower extremity and 4/5 strength in right lower extremity.  Sounds like patient to go home with home health care and PT/OT.  Just awaiting assistance with Suboxone as an outpatient with close outpatient follow-up.  I would like to see her in approximately 2 to 3 weeks as an outpatient since she is not scheduled to see the surgeon until 05/19/2025 for her regular scheduled 3-month postop visit.  Patient okay for discharge from neurosurgery standpoint when deemed ready by primary team.  Neurosurgery to sign off.       I spent 30 minutes in the professional and overall care of this patient.      Samantha A Meeson, APRN-CNP      "
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
## 2025-03-26 NOTE — HH CARE COORDINATION
Home Care received a Referral to Resume Care for Nursing, Physical Therapy, Occupational Therapy, and Medical Social Work. We have processed the referral for a Resumption of Care on 03/27/2025.     If you have any questions or concerns, please feel free to contact us at [phone]. Follow the prompts, enter your five digit zip code, and you will be directed to your care team on [facility].

## 2025-03-27 ENCOUNTER — APPOINTMENT (OUTPATIENT)
Dept: PRIMARY CARE | Facility: CLINIC | Age: 65
End: 2025-03-27
Payer: COMMERCIAL

## 2025-03-27 LAB
ALBUMIN SERPL BCP-MCNC: 3.7 G/DL (ref 3.4–5)
ALP SERPL-CCNC: 93 U/L (ref 33–136)
ALT SERPL W P-5'-P-CCNC: 14 U/L (ref 7–45)
AMMONIA PLAS-SCNC: 27 UMOL/L (ref 16–53)
ANION GAP BLDA CALCULATED.4IONS-SCNC: 9 MMO/L (ref 10–25)
ANION GAP SERPL CALC-SCNC: 13 MMOL/L (ref 10–20)
ARTERIAL PATENCY WRIST A: POSITIVE
AST SERPL W P-5'-P-CCNC: 22 U/L (ref 9–39)
BASE EXCESS BLDA CALC-SCNC: 0.8 MMOL/L (ref -2–3)
BASOPHILS # BLD AUTO: 0.06 X10*3/UL (ref 0–0.1)
BASOPHILS NFR BLD AUTO: 0.3 %
BILIRUB SERPL-MCNC: 0.5 MG/DL (ref 0–1.2)
BODY TEMPERATURE: 37 DEGREES CELSIUS
BUN SERPL-MCNC: 11 MG/DL (ref 6–23)
CA-I BLDA-SCNC: 1.25 MMOL/L (ref 1.1–1.33)
CALCIUM SERPL-MCNC: 9.4 MG/DL (ref 8.6–10.3)
CHLORIDE BLDA-SCNC: 99 MMOL/L (ref 98–107)
CHLORIDE SERPL-SCNC: 98 MMOL/L (ref 98–107)
CO2 SERPL-SCNC: 24 MMOL/L (ref 21–32)
CREAT SERPL-MCNC: 0.54 MG/DL (ref 0.5–1.05)
EGFRCR SERPLBLD CKD-EPI 2021: >90 ML/MIN/1.73M*2
EOSINOPHIL # BLD AUTO: 0.58 X10*3/UL (ref 0–0.7)
EOSINOPHIL NFR BLD AUTO: 3.2 %
ERYTHROCYTE [DISTWIDTH] IN BLOOD BY AUTOMATED COUNT: 13.7 % (ref 11.5–14.5)
GLUCOSE BLD MANUAL STRIP-MCNC: 179 MG/DL (ref 74–99)
GLUCOSE BLD MANUAL STRIP-MCNC: 203 MG/DL (ref 74–99)
GLUCOSE BLD MANUAL STRIP-MCNC: 220 MG/DL (ref 74–99)
GLUCOSE BLD MANUAL STRIP-MCNC: 237 MG/DL (ref 74–99)
GLUCOSE BLDA-MCNC: 227 MG/DL (ref 74–99)
GLUCOSE SERPL-MCNC: 215 MG/DL (ref 74–99)
HCO3 BLDA-SCNC: 25.3 MMOL/L (ref 22–26)
HCT VFR BLD AUTO: 36.3 % (ref 36–46)
HCT VFR BLD EST: 37 % (ref 36–46)
HGB BLD-MCNC: 11.9 G/DL (ref 12–16)
HGB BLDA-MCNC: 12.4 G/DL (ref 12–16)
IMM GRANULOCYTES # BLD AUTO: 0.23 X10*3/UL (ref 0–0.7)
IMM GRANULOCYTES NFR BLD AUTO: 1.3 % (ref 0–0.9)
INHALED O2 CONCENTRATION: 21 %
LACTATE BLDA-SCNC: 1.7 MMOL/L (ref 0.4–2)
LYMPHOCYTES # BLD AUTO: 1.59 X10*3/UL (ref 1.2–4.8)
LYMPHOCYTES NFR BLD AUTO: 8.9 %
MAGNESIUM SERPL-MCNC: 1.84 MG/DL (ref 1.6–2.4)
MCH RBC QN AUTO: 30.6 PG (ref 26–34)
MCHC RBC AUTO-ENTMCNC: 32.8 G/DL (ref 32–36)
MCV RBC AUTO: 93 FL (ref 80–100)
MONOCYTES # BLD AUTO: 1.15 X10*3/UL (ref 0.1–1)
MONOCYTES NFR BLD AUTO: 6.4 %
NEUTROPHILS # BLD AUTO: 14.28 X10*3/UL (ref 1.2–7.7)
NEUTROPHILS NFR BLD AUTO: 79.9 %
NRBC BLD-RTO: 0 /100 WBCS (ref 0–0)
OXYHGB MFR BLDA: 90.8 % (ref 94–98)
PCO2 BLDA: 39 MM HG (ref 38–42)
PH BLDA: 7.42 PH (ref 7.38–7.42)
PLATELET # BLD AUTO: 288 X10*3/UL (ref 150–450)
PO2 BLDA: 62 MM HG (ref 85–95)
POTASSIUM BLDA-SCNC: 3.6 MMOL/L (ref 3.5–5.3)
POTASSIUM SERPL-SCNC: 3.2 MMOL/L (ref 3.5–5.3)
PROT SERPL-MCNC: 6.8 G/DL (ref 6.4–8.2)
RBC # BLD AUTO: 3.89 X10*6/UL (ref 4–5.2)
SAO2 % BLDA: 94 % (ref 94–100)
SODIUM BLDA-SCNC: 130 MMOL/L (ref 136–145)
SODIUM SERPL-SCNC: 132 MMOL/L (ref 136–145)
SPECIMEN DRAWN FROM PATIENT: ABNORMAL
WBC # BLD AUTO: 17.9 X10*3/UL (ref 4.4–11.3)

## 2025-03-27 PROCEDURE — 97530 THERAPEUTIC ACTIVITIES: CPT | Mod: GO

## 2025-03-27 PROCEDURE — 82947 ASSAY GLUCOSE BLOOD QUANT: CPT

## 2025-03-27 PROCEDURE — 1100000001 HC PRIVATE ROOM DAILY

## 2025-03-27 PROCEDURE — 36415 COLL VENOUS BLD VENIPUNCTURE: CPT | Performed by: STUDENT IN AN ORGANIZED HEALTH CARE EDUCATION/TRAINING PROGRAM

## 2025-03-27 PROCEDURE — 2500000002 HC RX 250 W HCPCS SELF ADMINISTERED DRUGS (ALT 637 FOR MEDICARE OP, ALT 636 FOR OP/ED): Performed by: STUDENT IN AN ORGANIZED HEALTH CARE EDUCATION/TRAINING PROGRAM

## 2025-03-27 PROCEDURE — 84132 ASSAY OF SERUM POTASSIUM: CPT | Performed by: STUDENT IN AN ORGANIZED HEALTH CARE EDUCATION/TRAINING PROGRAM

## 2025-03-27 PROCEDURE — 2500000002 HC RX 250 W HCPCS SELF ADMINISTERED DRUGS (ALT 637 FOR MEDICARE OP, ALT 636 FOR OP/ED): Performed by: INTERNAL MEDICINE

## 2025-03-27 PROCEDURE — 99232 SBSQ HOSP IP/OBS MODERATE 35: CPT | Performed by: STUDENT IN AN ORGANIZED HEALTH CARE EDUCATION/TRAINING PROGRAM

## 2025-03-27 PROCEDURE — 36600 WITHDRAWAL OF ARTERIAL BLOOD: CPT

## 2025-03-27 PROCEDURE — 2500000001 HC RX 250 WO HCPCS SELF ADMINISTERED DRUGS (ALT 637 FOR MEDICARE OP): Performed by: INTERNAL MEDICINE

## 2025-03-27 PROCEDURE — 84075 ASSAY ALKALINE PHOSPHATASE: CPT | Performed by: STUDENT IN AN ORGANIZED HEALTH CARE EDUCATION/TRAINING PROGRAM

## 2025-03-27 PROCEDURE — 83735 ASSAY OF MAGNESIUM: CPT | Performed by: STUDENT IN AN ORGANIZED HEALTH CARE EDUCATION/TRAINING PROGRAM

## 2025-03-27 PROCEDURE — 82140 ASSAY OF AMMONIA: CPT | Performed by: STUDENT IN AN ORGANIZED HEALTH CARE EDUCATION/TRAINING PROGRAM

## 2025-03-27 PROCEDURE — 85025 COMPLETE CBC W/AUTO DIFF WBC: CPT | Performed by: STUDENT IN AN ORGANIZED HEALTH CARE EDUCATION/TRAINING PROGRAM

## 2025-03-27 PROCEDURE — 2500000004 HC RX 250 GENERAL PHARMACY W/ HCPCS (ALT 636 FOR OP/ED): Performed by: INTERNAL MEDICINE

## 2025-03-27 PROCEDURE — 2500000001 HC RX 250 WO HCPCS SELF ADMINISTERED DRUGS (ALT 637 FOR MEDICARE OP): Performed by: NURSE PRACTITIONER

## 2025-03-27 RX ORDER — METFORMIN HYDROCHLORIDE 500 MG/1
500 TABLET ORAL NIGHTLY
Status: DISCONTINUED | OUTPATIENT
Start: 2025-03-27 | End: 2025-03-31 | Stop reason: HOSPADM

## 2025-03-27 RX ORDER — GABAPENTIN 300 MG/1
300 CAPSULE ORAL 3 TIMES DAILY
Start: 2025-03-27

## 2025-03-27 RX ORDER — POTASSIUM CHLORIDE 20 MEQ/1
40 TABLET, EXTENDED RELEASE ORAL ONCE
Status: COMPLETED | OUTPATIENT
Start: 2025-03-27 | End: 2025-03-27

## 2025-03-27 RX ADMIN — ACETAMINOPHEN 975 MG: 325 TABLET, FILM COATED ORAL at 22:29

## 2025-03-27 RX ADMIN — SENNOSIDES AND DOCUSATE SODIUM 2 TABLET: 50; 8.6 TABLET ORAL at 20:29

## 2025-03-27 RX ADMIN — ACETAMINOPHEN 975 MG: 325 TABLET, FILM COATED ORAL at 14:31

## 2025-03-27 RX ADMIN — LAMOTRIGINE 100 MG: 100 TABLET ORAL at 08:25

## 2025-03-27 RX ADMIN — POLYETHYLENE GLYCOL 3350 17 G: 17 POWDER, FOR SOLUTION ORAL at 08:23

## 2025-03-27 RX ADMIN — FLUOXETINE HYDROCHLORIDE 40 MG: 20 CAPSULE ORAL at 20:29

## 2025-03-27 RX ADMIN — GABAPENTIN 300 MG: 300 CAPSULE ORAL at 08:25

## 2025-03-27 RX ADMIN — GABAPENTIN 300 MG: 300 CAPSULE ORAL at 14:31

## 2025-03-27 RX ADMIN — INSULIN LISPRO 2 UNITS: 100 INJECTION, SOLUTION INTRAVENOUS; SUBCUTANEOUS at 12:18

## 2025-03-27 RX ADMIN — METFORMIN HYDROCHLORIDE 500 MG: 500 TABLET ORAL at 20:29

## 2025-03-27 RX ADMIN — POTASSIUM CHLORIDE 40 MEQ: 1500 TABLET, EXTENDED RELEASE ORAL at 08:23

## 2025-03-27 RX ADMIN — HYDROCHLOROTHIAZIDE: 12.5 TABLET ORAL at 08:23

## 2025-03-27 RX ADMIN — SENNOSIDES AND DOCUSATE SODIUM 2 TABLET: 50; 8.6 TABLET ORAL at 08:23

## 2025-03-27 RX ADMIN — GABAPENTIN 300 MG: 300 CAPSULE ORAL at 20:29

## 2025-03-27 RX ADMIN — INSULIN LISPRO 1 UNITS: 100 INJECTION, SOLUTION INTRAVENOUS; SUBCUTANEOUS at 17:03

## 2025-03-27 RX ADMIN — BUPRENORPHINE 2 MG: 2 TABLET SUBLINGUAL at 01:13

## 2025-03-27 RX ADMIN — ENOXAPARIN SODIUM 40 MG: 40 INJECTION SUBCUTANEOUS at 08:22

## 2025-03-27 RX ADMIN — BUPRENORPHINE 2 MG: 2 TABLET SUBLINGUAL at 08:37

## 2025-03-27 RX ADMIN — IBUPROFEN 600 MG: 600 TABLET, FILM COATED ORAL at 20:29

## 2025-03-27 RX ADMIN — TAMSULOSIN HYDROCHLORIDE 0.4 MG: 0.4 CAPSULE ORAL at 20:29

## 2025-03-27 RX ADMIN — PANTOPRAZOLE SODIUM 40 MG: 40 TABLET, DELAYED RELEASE ORAL at 06:38

## 2025-03-27 RX ADMIN — INSULIN LISPRO 2 UNITS: 100 INJECTION, SOLUTION INTRAVENOUS; SUBCUTANEOUS at 08:27

## 2025-03-27 RX ADMIN — ACETAMINOPHEN 975 MG: 325 TABLET, FILM COATED ORAL at 06:39

## 2025-03-27 ASSESSMENT — LIFESTYLE VARIABLES
TOTAL_SCORE: 1
TOTAL_SCORE: 1
TOTAL_SCORE: 8
TOTAL_SCORE: 6
TOTAL_SCORE: 2
TOTAL_SCORE: 2

## 2025-03-27 ASSESSMENT — PAIN DESCRIPTION - DESCRIPTORS: DESCRIPTORS: ACHING

## 2025-03-27 ASSESSMENT — PAIN SCALES - WONG BAKER: WONGBAKER_NUMERICALRESPONSE: HURTS LITTLE BIT

## 2025-03-27 ASSESSMENT — PAIN SCALES - GENERAL
PAINLEVEL_OUTOF10: 0 - NO PAIN
PAINLEVEL_OUTOF10: 4
PAINLEVEL_OUTOF10: 8
PAINLEVEL_OUTOF10: 0 - NO PAIN

## 2025-03-27 ASSESSMENT — PAIN - FUNCTIONAL ASSESSMENT: PAIN_FUNCTIONAL_ASSESSMENT: 0-10

## 2025-03-27 NOTE — PROGRESS NOTES
Cristal Pollard is a 64 y.o. female on day 4 of admission presenting with Back pain, unspecified back location, unspecified back pain laterality, unspecified chronicity.    Subjective   Pt is still confused about suboxone despite I have explained suboxone and its usage for opioid addiction multiple times. She did fall overnight.        Objective     Physical Exam  Vitals and nursing note reviewed.   Constitutional:       General: She is not in acute distress.     Appearance: Normal appearance. She is not ill-appearing or toxic-appearing.   HENT:      Head: Normocephalic and atraumatic.      Nose: Nose normal.      Mouth/Throat:      Mouth: Mucous membranes are moist.   Eyes:      Extraocular Movements: Extraocular movements intact.      Conjunctiva/sclera: Conjunctivae normal.      Pupils: Pupils are equal, round, and reactive to light.   Cardiovascular:      Rate and Rhythm: Normal rate and regular rhythm.      Heart sounds: No murmur heard.     No gallop.   Pulmonary:      Effort: Pulmonary effort is normal. No respiratory distress.      Breath sounds: Normal breath sounds. No wheezing, rhonchi or rales.   Abdominal:      General: Abdomen is flat. Bowel sounds are normal. There is no distension.      Palpations: Abdomen is soft. There is no mass.      Tenderness: There is no abdominal tenderness.   Musculoskeletal:         General: No swelling.      Cervical back: Normal range of motion and neck supple.      Comments: Limited ROM due to pain, very weak    Skin:     General: Skin is warm and dry.   Neurological:      Mental Status: She is alert and oriented to person, place, and time.      Motor: Weakness present.      Comments: confusion apparent, as pt is very forgetful   Psychiatric:         Mood and Affect: Mood normal.         Behavior: Behavior normal.         Last Recorded Vitals:  /73 (BP Location: Left arm, Patient Position: Sitting)   Pulse 85   Temp 35.9 °C (96.6 °F) (Temporal)   Resp 18   Ht 1.753  "m (5' 9\")   Wt 86.2 kg (190 lb)   LMP  (LMP Unknown)   SpO2 92%   BMI 28.06 kg/m²      Scheduled medications:  acetaminophen, 975 mg, oral, q8h  enoxaparin, 40 mg, subcutaneous, q24h  FLUoxetine, 40 mg, oral, Nightly  gabapentin, 300 mg, oral, TID  insulin lispro, 0-5 Units, subcutaneous, TID AC  lamoTRIgine, 100 mg, oral, Daily  lisinopril 10 mg, hydroCHLOROthiazide 12.5 mg for Zestoretic/Prinizide, , oral, Daily  pantoprazole, 40 mg, oral, Daily before breakfast  polyethylene glycol, 17 g, oral, Daily  sennosides-docusate sodium, 2 tablet, oral, BID  tamsulosin, 0.4 mg, oral, Nightly      Continuous medications:     PRN medications:  PRN medications: acetaminophen **OR** [DISCONTINUED] acetaminophen **OR** [DISCONTINUED] acetaminophen, benzocaine-menthol, buprenorphine, calcium carbonate, cloNIDine, cyclobenzaprine, dextrose, dextrose, dicyclomine, glucagon, glucagon, hydrALAZINE, hydrOXYzine pamoate, ibuprofen, loperamide, melatonin, ondansetron     Relevant Results:  Results for orders placed or performed during the hospital encounter of 03/23/25 (from the past 24 hours)   POCT GLUCOSE   Result Value Ref Range    POCT Glucose 185 (H) 74 - 99 mg/dL   POCT GLUCOSE   Result Value Ref Range    POCT Glucose 204 (H) 74 - 99 mg/dL   POCT GLUCOSE   Result Value Ref Range    POCT Glucose 207 (H) 74 - 99 mg/dL   POCT GLUCOSE   Result Value Ref Range    POCT Glucose 233 (H) 74 - 99 mg/dL   Comprehensive Metabolic Panel   Result Value Ref Range    Glucose 215 (H) 74 - 99 mg/dL    Sodium 132 (L) 136 - 145 mmol/L    Potassium 3.2 (L) 3.5 - 5.3 mmol/L    Chloride 98 98 - 107 mmol/L    Bicarbonate 24 21 - 32 mmol/L    Anion Gap 13 10 - 20 mmol/L    Urea Nitrogen 11 6 - 23 mg/dL    Creatinine 0.54 0.50 - 1.05 mg/dL    eGFR >90 >60 mL/min/1.73m*2    Calcium 9.4 8.6 - 10.3 mg/dL    Albumin 3.7 3.4 - 5.0 g/dL    Alkaline Phosphatase 93 33 - 136 U/L    Total Protein 6.8 6.4 - 8.2 g/dL    AST 22 9 - 39 U/L    Bilirubin, Total 0.5 " 0.0 - 1.2 mg/dL    ALT 14 7 - 45 U/L   CBC and Auto Differential   Result Value Ref Range    WBC 17.9 (H) 4.4 - 11.3 x10*3/uL    nRBC 0.0 0.0 - 0.0 /100 WBCs    RBC 3.89 (L) 4.00 - 5.20 x10*6/uL    Hemoglobin 11.9 (L) 12.0 - 16.0 g/dL    Hematocrit 36.3 36.0 - 46.0 %    MCV 93 80 - 100 fL    MCH 30.6 26.0 - 34.0 pg    MCHC 32.8 32.0 - 36.0 g/dL    RDW 13.7 11.5 - 14.5 %    Platelets 288 150 - 450 x10*3/uL    Neutrophils % 79.9 40.0 - 80.0 %    Immature Granulocytes %, Automated 1.3 (H) 0.0 - 0.9 %    Lymphocytes % 8.9 13.0 - 44.0 %    Monocytes % 6.4 2.0 - 10.0 %    Eosinophils % 3.2 0.0 - 6.0 %    Basophils % 0.3 0.0 - 2.0 %    Neutrophils Absolute 14.28 (H) 1.20 - 7.70 x10*3/uL    Immature Granulocytes Absolute, Automated 0.23 0.00 - 0.70 x10*3/uL    Lymphocytes Absolute 1.59 1.20 - 4.80 x10*3/uL    Monocytes Absolute 1.15 (H) 0.10 - 1.00 x10*3/uL    Eosinophils Absolute 0.58 0.00 - 0.70 x10*3/uL    Basophils Absolute 0.06 0.00 - 0.10 x10*3/uL   Magnesium   Result Value Ref Range    Magnesium 1.84 1.60 - 2.40 mg/dL   POCT GLUCOSE   Result Value Ref Range    POCT Glucose 220 (H) 74 - 99 mg/dL     *Note: Due to a large number of results and/or encounters for the requested time period, some results have not been displayed. A complete set of results can be found in Results Review.       XR thoracic spine 3 views    Result Date: 3/26/2025  Interpreted By:  Clinton Ortiz, STUDY: XR THORACIC SPINE 3 VIEWS; ;  3/26/2025 10:30 pm   INDICATION: Signs/Symptoms:fall.     COMPARISON: None.   ACCESSION NUMBER(S): [id number]   ORDERING CLINICIAN: JAY CAICEDO   FINDINGS: Thoracic spine, four views   There is moderate multilevel disc space narrowing with osteophytosis and sclerosis throughout the thoracic spine. No fracture or spondylolisthesis seen. Partially visualized hardware in the lower cervical spine.       Moderate multilevel spondylosis throughout the thoracic spine   MACRO: None   Signed by: Clinton Ortiz 3/26/2025  10:34 PM Dictation workstation:   [id number]    XR lumbar spine 2-3 views    Result Date: 3/26/2025  Interpreted By:  Clinton Ortiz, STUDY: XR LUMBAR SPINE 2-3 VIEWS; ;  3/26/2025 10:30 pm   INDICATION: Signs/Symptoms:fall.     COMPARISON: 03/02/2025   ACCESSION NUMBER(S): [id number]   ORDERING CLINICIAN: JAY CAICEDO   FINDINGS: Lumbar spine, three views   Interval laminectomy and posterior fusion of L3-L5 with intact hardware. Moderate multilevel spondylotic changes. No acute fracture or dislocation seen         Interval L3-L5 posterior fusion with intact hardware. Moderate multilevel spondylosis   MACRO: None   Signed by: Clinton Ortiz 3/26/2025 10:34 PM Dictation workstation:   [id number]                    Assessment/Plan   Assessment & Plan  Anxiety disorder    Cervical radiculopathy    Chronic neck pain    Depression, recurrent (CMS-HCC)    History of lumbar fusion    Hypertension    Type 2 diabetes mellitus    Back pain, unspecified back location, unspecified back pain laterality, unspecified chronicity    Likely pt has cognitive impairment from long term opioid abuse  Per daughter she has had 30 years of opioid abuse where she would go to [facility] doctors/hospitals to get pain meds  - Pt does not have capacity to make her own decisions as she does not understand the severity of her addiction diagnosis and the need to go into a treatment center whether inpatient or outpt  - OT for MOCA eval  - psych recs appreciated   - limit naroctic use    Intractable back pain after mechanical fall  Acute opiate withdrawal  Hx recent lumbar fusion  - COWS protocol, on suboxone  - will avoid opioids  - flexeril PRN  - decreased gabapentin  - bowel regimen  - tylenol PRN    hypoK  - replace    Leukocytosis  - monitor    Normocytic anemia  - monitor    Tachycardia   Likely from withdrawal   resolved    HTN  - lisinopril  - HCTZ    Intermittent urinary retention  - straight cath PRN   - flomax    DM  - A1c 6.9  - resume  metformin  - monitor on daily cmp     GERD  - protonix    Depression/anxiety  - psych recs appreciated  - prozac  - lacmictal    DVT ppx: lovenox  Dispo: monitor clinically          Jovita Paul MD  Hospitalist

## 2025-03-27 NOTE — CARE PLAN
The patient's goals for the shift include      The clinical goals for the shift include   Problem: Pain - Adult  Goal: Verbalizes/displays adequate comfort level or baseline comfort level  Outcome: Progressing     Problem: Safety - Adult  Goal: Free from fall injury  Outcome: Progressing     Problem: Discharge Planning  Goal: Discharge to home or other facility with appropriate resources  Outcome: Progressing     Problem: Chronic Conditions and Co-morbidities  Goal: Patient's chronic conditions and co-morbidity symptoms are monitored and maintained or improved  Outcome: Progressing     Problem: Nutrition  Goal: Nutrient intake appropriate for maintaining nutritional needs  Outcome: Progressing     Problem: Skin  Goal: Promote skin healing  Outcome: Progressing     Problem: Pain  Goal: Takes deep breaths with improved pain control throughout the shift  Outcome: Progressing  Goal: Turns in bed with improved pain control throughout the shift  Outcome: Progressing  Goal: Walks with improved pain control throughout the shift  Outcome: Progressing  Goal: Performs ADL's with improved pain control throughout shift  Outcome: Progressing  Goal: Participates in PT with improved pain control throughout the shift  Outcome: Progressing  Goal: Free from opioid side effects throughout the shift  Outcome: Progressing  Goal: Free from acute confusion related to pain meds throughout the shift  Outcome: Progressing

## 2025-03-27 NOTE — CARE PLAN
The patient's goals for the shift include  pain control    The clinical goals for the shift include comfort and safety

## 2025-03-27 NOTE — PROGRESS NOTES
Cristal Pollard is a 64 y.o. female on day 4 of admission presenting with Back pain, unspecified back location, unspecified back pain laterality, unspecified chronicity.  Record reviewed.  Psych consult determined patient does not have capacity to make decisions.  KELSI phoned and updated patient's daughter, Muna, who agreed to placement at SNF with on-site substance abuse treatment program.  Referral sent to [facility].  [facility]Port reviewed for updates.  [facility] is able to accept pending clinical and financial review.  Significant event noted last evening, patient found on floor by nurse passing by her room.  Did not hit head, no loc.  Plan to obtain T and L spine X-rays.  Care Transitions will continue to follow.    9:45 am addendum  Referred to Complex Care Team.  Care Transitions will continue to follow.    10:55 am addendum  [facility] representative her for on-site visit.  Care Transitions will continue to follow.    2:55 pm addendum  CarePort reviewed for updates.  Patient signed [facility] agreement to admit to [facility].  Attending provider updated.  Patient is medically ready for discharge, ok to start pre-cert.  Psych CNP-APRN updated.  Awaiting discharge orders, [place] certification and AVS for HENS to start pre-cert.  Care Transitions will continue to follow.

## 2025-03-27 NOTE — SIGNIFICANT EVENT
Code [place]: 2110    Unwitnessed fall, patient observed to be on the floor on her bottom but nurse passing by her room. Per chart review, patient is slightly confused at baseline possibly secondary to long-term opiate abuse for back pain.  Patient states she went to grab her phone and gently lowered herself to the ground. Reports no new areas of pain. Has ambulatory dysfunction at baseline, uses a walker at home. Did not hit her head or lose consciousness. Is not on systemic anticoagulation. Vital signs are stable. Blood glucose WNL. Patient raise to bed with assistance. Fall precautions reinforced. Will obtain T and L spine xrays. Discussed with hospitalist.

## 2025-03-27 NOTE — PROGRESS NOTES
Physical Therapy                 Therapy Communication Note    Patient Name: Cristal Pollard  MRN: [id number]  Department: [facility]  Room: 202/202-A  Today's Date: 3/27/2025     Discipline: Physical Therapy    PT Missed Visit:  (too sleepy)     Missed Visit Reason:      Missed Time: Attempt    Comment:

## 2025-03-27 NOTE — PROGRESS NOTES
Occupational Therapy    Occupational Therapy Treatment  MoCA cognitive assessment    Name: Cristal Pollard  MRN: [id number]  Department: [facility]  Room: [address] [place] [address]  Date: 03/27/25  Time Calculation  Start Time: 1152  Stop Time: 1219  Time Calculation (min): 27 min    Assessment:  OT Assessment: Pt. demonstrating cognitive impairment.  Barriers to Discharge Home: Cognition needs  Medical Staff Made Aware: Yes  End of Session Communication: PCT/NA/CTA  End of Session Patient Position: Bed, 2 rail up, Alarm on (call light in reach. all needs met)  Plan:  Treatment Interventions: ADL retraining, Functional transfer training, Cognitive reorientation  OT Frequency: 2 times per week  OT - OK to Discharge: Yes (once cleared by medical team)    Subjective   Previous Visit Info:  OT Last Visit  OT Received On: 03/27/25  General:  General  Reason for Referral: OT order for MoCA.  Referred By: Jovita Paul MD  Prior to Session Communication: Bedside nurse  Patient Position Received: Bed, 2 rail up, Alarm on  General Comment: Pt. agreeable to therapy, but very drowsy requiring cueing to keep eyes open and participate in MoCA.    Objective   Cognition:  Overall Cognitive Status: Impaired. MoCA completed. See outcome measures for score. Original placed in chart. Pt. very drowsy requiring constant cueing to keep eyes open and attend to task. Therefore, results of test may not be a true representation of pt.'s cognition at this time.  Processing Speed: Delayed    Outcome Measures:  MoCA  Visuospatial/Executive: 2  Naming: 3  Memory (Score '0' as this is an Unscored Section): 0  Attention: Read List of Digits: 0  Attention: Read List of Letters: 0  Attention: Serial Sevens: 0  Language: Repeat: 0  Language: Fluency: 0  Abstraction: 0  Delayed Recall: 0  Orientation: 4  Add 1 Point if </=12 yr Education: 0  MOCA Total Score: 9    Education Documentation  No documentation found.  Education Comments  No comments found.      Goals:  Encounter  Problems       Encounter Problems (Active)       impaired functional daily living skills       Pt will increase Grooming to s/mod indep Upper Body Bathing to s/mod indep  and Lower Body Bathing  to s/mod indep  increase Upper Body Dressing  to s/mod indep  and LE Dressing to s/mod indep with adaptive equipment as needed (Progressing)       Start:  03/24/25    Expected End:  04/07/25            Pt will increase Functional Transfers Bed Mobility to s/mod indep  Sit to Stand  to s/mod indep  Functional Mobility  with a device to s/mod indep  chair/toliet/room to increase indep/safety in patients discharge environment (Progressing)       Start:  03/24/25    Expected End:  04/07/25

## 2025-03-28 LAB
ALBUMIN SERPL BCP-MCNC: 3.9 G/DL (ref 3.4–5)
ALP SERPL-CCNC: 97 U/L (ref 33–136)
ALT SERPL W P-5'-P-CCNC: 15 U/L (ref 7–45)
ANION GAP SERPL CALC-SCNC: 13 MMOL/L (ref 10–20)
AST SERPL W P-5'-P-CCNC: 16 U/L (ref 9–39)
BASOPHILS # BLD AUTO: 0.09 X10*3/UL (ref 0–0.1)
BASOPHILS NFR BLD AUTO: 0.7 %
BILIRUB SERPL-MCNC: 0.4 MG/DL (ref 0–1.2)
BUN SERPL-MCNC: 8 MG/DL (ref 6–23)
CALCIUM SERPL-MCNC: 10.1 MG/DL (ref 8.6–10.3)
CHLORIDE SERPL-SCNC: 97 MMOL/L (ref 98–107)
CO2 SERPL-SCNC: 27 MMOL/L (ref 21–32)
CREAT SERPL-MCNC: 0.54 MG/DL (ref 0.5–1.05)
EGFRCR SERPLBLD CKD-EPI 2021: >90 ML/MIN/1.73M*2
EOSINOPHIL # BLD AUTO: 0.57 X10*3/UL (ref 0–0.7)
EOSINOPHIL NFR BLD AUTO: 4.3 %
ERYTHROCYTE [DISTWIDTH] IN BLOOD BY AUTOMATED COUNT: 13.6 % (ref 11.5–14.5)
GLUCOSE BLD MANUAL STRIP-MCNC: 204 MG/DL (ref 74–99)
GLUCOSE BLD MANUAL STRIP-MCNC: 208 MG/DL (ref 74–99)
GLUCOSE BLD MANUAL STRIP-MCNC: 228 MG/DL (ref 74–99)
GLUCOSE BLD MANUAL STRIP-MCNC: 233 MG/DL (ref 74–99)
GLUCOSE SERPL-MCNC: 204 MG/DL (ref 74–99)
HCT VFR BLD AUTO: 40.2 % (ref 36–46)
HGB BLD-MCNC: 12.3 G/DL (ref 12–16)
IMM GRANULOCYTES # BLD AUTO: 0.2 X10*3/UL (ref 0–0.7)
IMM GRANULOCYTES NFR BLD AUTO: 1.5 % (ref 0–0.9)
LYMPHOCYTES # BLD AUTO: 1.96 X10*3/UL (ref 1.2–4.8)
LYMPHOCYTES NFR BLD AUTO: 14.8 %
MCH RBC QN AUTO: 29.5 PG (ref 26–34)
MCHC RBC AUTO-ENTMCNC: 30.6 G/DL (ref 32–36)
MCV RBC AUTO: 96 FL (ref 80–100)
MONOCYTES # BLD AUTO: 0.76 X10*3/UL (ref 0.1–1)
MONOCYTES NFR BLD AUTO: 5.7 %
NEUTROPHILS # BLD AUTO: 9.64 X10*3/UL (ref 1.2–7.7)
NEUTROPHILS NFR BLD AUTO: 73 %
NRBC BLD-RTO: 0 /100 WBCS (ref 0–0)
PLATELET # BLD AUTO: 318 X10*3/UL (ref 150–450)
POTASSIUM SERPL-SCNC: 4 MMOL/L (ref 3.5–5.3)
PROT SERPL-MCNC: 7.3 G/DL (ref 6.4–8.2)
RBC # BLD AUTO: 4.17 X10*6/UL (ref 4–5.2)
SODIUM SERPL-SCNC: 133 MMOL/L (ref 136–145)
WBC # BLD AUTO: 13.2 X10*3/UL (ref 4.4–11.3)

## 2025-03-28 PROCEDURE — 2500000004 HC RX 250 GENERAL PHARMACY W/ HCPCS (ALT 636 FOR OP/ED): Performed by: INTERNAL MEDICINE

## 2025-03-28 PROCEDURE — 2500000002 HC RX 250 W HCPCS SELF ADMINISTERED DRUGS (ALT 637 FOR MEDICARE OP, ALT 636 FOR OP/ED): Performed by: NURSE PRACTITIONER

## 2025-03-28 PROCEDURE — 2500000002 HC RX 250 W HCPCS SELF ADMINISTERED DRUGS (ALT 637 FOR MEDICARE OP, ALT 636 FOR OP/ED): Performed by: INTERNAL MEDICINE

## 2025-03-28 PROCEDURE — 36415 COLL VENOUS BLD VENIPUNCTURE: CPT | Performed by: STUDENT IN AN ORGANIZED HEALTH CARE EDUCATION/TRAINING PROGRAM

## 2025-03-28 PROCEDURE — 82947 ASSAY GLUCOSE BLOOD QUANT: CPT

## 2025-03-28 PROCEDURE — 1100000001 HC PRIVATE ROOM DAILY

## 2025-03-28 PROCEDURE — 99233 SBSQ HOSP IP/OBS HIGH 50: CPT | Performed by: INTERNAL MEDICINE

## 2025-03-28 PROCEDURE — 2500000001 HC RX 250 WO HCPCS SELF ADMINISTERED DRUGS (ALT 637 FOR MEDICARE OP): Performed by: INTERNAL MEDICINE

## 2025-03-28 PROCEDURE — 2500000002 HC RX 250 W HCPCS SELF ADMINISTERED DRUGS (ALT 637 FOR MEDICARE OP, ALT 636 FOR OP/ED): Performed by: STUDENT IN AN ORGANIZED HEALTH CARE EDUCATION/TRAINING PROGRAM

## 2025-03-28 PROCEDURE — 85025 COMPLETE CBC W/AUTO DIFF WBC: CPT | Performed by: STUDENT IN AN ORGANIZED HEALTH CARE EDUCATION/TRAINING PROGRAM

## 2025-03-28 PROCEDURE — 80053 COMPREHEN METABOLIC PANEL: CPT | Performed by: STUDENT IN AN ORGANIZED HEALTH CARE EDUCATION/TRAINING PROGRAM

## 2025-03-28 PROCEDURE — 2500000001 HC RX 250 WO HCPCS SELF ADMINISTERED DRUGS (ALT 637 FOR MEDICARE OP): Performed by: NURSE PRACTITIONER

## 2025-03-28 PROCEDURE — 97116 GAIT TRAINING THERAPY: CPT | Mod: GP,CQ

## 2025-03-28 PROCEDURE — 97535 SELF CARE MNGMENT TRAINING: CPT | Mod: CO,GO

## 2025-03-28 PROCEDURE — 99222 1ST HOSP IP/OBS MODERATE 55: CPT | Performed by: NURSE PRACTITIONER

## 2025-03-28 RX ORDER — BUPRENORPHINE 2 MG/1
2 TABLET SUBLINGUAL DAILY
Status: DISCONTINUED | OUTPATIENT
Start: 2025-03-28 | End: 2025-03-31 | Stop reason: HOSPADM

## 2025-03-28 RX ADMIN — ONDANSETRON 4 MG: 2 INJECTION INTRAMUSCULAR; INTRAVENOUS at 12:46

## 2025-03-28 RX ADMIN — SENNOSIDES AND DOCUSATE SODIUM 2 TABLET: 50; 8.6 TABLET ORAL at 08:26

## 2025-03-28 RX ADMIN — GABAPENTIN 300 MG: 300 CAPSULE ORAL at 15:25

## 2025-03-28 RX ADMIN — ACETAMINOPHEN 975 MG: 325 TABLET, FILM COATED ORAL at 15:25

## 2025-03-28 RX ADMIN — METFORMIN HYDROCHLORIDE 500 MG: 500 TABLET ORAL at 20:24

## 2025-03-28 RX ADMIN — BUPRENORPHINE 2 MG: 2 TABLET SUBLINGUAL at 09:28

## 2025-03-28 RX ADMIN — ENOXAPARIN SODIUM 40 MG: 40 INJECTION SUBCUTANEOUS at 08:25

## 2025-03-28 RX ADMIN — TAMSULOSIN HYDROCHLORIDE 0.4 MG: 0.4 CAPSULE ORAL at 20:23

## 2025-03-28 RX ADMIN — INSULIN LISPRO 2 UNITS: 100 INJECTION, SOLUTION INTRAVENOUS; SUBCUTANEOUS at 11:48

## 2025-03-28 RX ADMIN — IBUPROFEN 600 MG: 600 TABLET, FILM COATED ORAL at 20:24

## 2025-03-28 RX ADMIN — ACETAMINOPHEN 975 MG: 325 TABLET, FILM COATED ORAL at 08:26

## 2025-03-28 RX ADMIN — BUPRENORPHINE 2 MG: 2 TABLET SUBLINGUAL at 00:14

## 2025-03-28 RX ADMIN — PANTOPRAZOLE SODIUM 40 MG: 40 TABLET, DELAYED RELEASE ORAL at 06:53

## 2025-03-28 RX ADMIN — SENNOSIDES AND DOCUSATE SODIUM 2 TABLET: 50; 8.6 TABLET ORAL at 20:23

## 2025-03-28 RX ADMIN — HYDROCHLOROTHIAZIDE: 12.5 TABLET ORAL at 08:26

## 2025-03-28 RX ADMIN — INSULIN LISPRO 2 UNITS: 100 INJECTION, SOLUTION INTRAVENOUS; SUBCUTANEOUS at 16:54

## 2025-03-28 RX ADMIN — POLYETHYLENE GLYCOL 3350 17 G: 17 POWDER, FOR SOLUTION ORAL at 08:26

## 2025-03-28 RX ADMIN — LAMOTRIGINE 100 MG: 100 TABLET ORAL at 08:26

## 2025-03-28 RX ADMIN — GABAPENTIN 300 MG: 300 CAPSULE ORAL at 20:24

## 2025-03-28 RX ADMIN — GABAPENTIN 300 MG: 300 CAPSULE ORAL at 08:26

## 2025-03-28 RX ADMIN — FLUOXETINE HYDROCHLORIDE 40 MG: 20 CAPSULE ORAL at 20:23

## 2025-03-28 RX ADMIN — ACETAMINOPHEN 975 MG: 325 TABLET, FILM COATED ORAL at 23:52

## 2025-03-28 RX ADMIN — INSULIN LISPRO 2 UNITS: 100 INJECTION, SOLUTION INTRAVENOUS; SUBCUTANEOUS at 06:54

## 2025-03-28 ASSESSMENT — COGNITIVE AND FUNCTIONAL STATUS - GENERAL
MOVING TO AND FROM BED TO CHAIR: A LITTLE
MOBILITY SCORE: 17
PERSONAL GROOMING: A LITTLE
MOVING FROM LYING ON BACK TO SITTING ON SIDE OF FLAT BED WITH BEDRAILS: A LITTLE
CLIMB 3 TO 5 STEPS WITH RAILING: A LOT
DAILY ACTIVITIY SCORE: 18
HELP NEEDED FOR BATHING: A LITTLE
DRESSING REGULAR LOWER BODY CLOTHING: A LITTLE
TOILETING: A LOT
TURNING FROM BACK TO SIDE WHILE IN FLAT BAD: A LITTLE
WALKING IN HOSPITAL ROOM: A LITTLE
STANDING UP FROM CHAIR USING ARMS: A LITTLE
DRESSING REGULAR UPPER BODY CLOTHING: A LITTLE

## 2025-03-28 ASSESSMENT — PAIN - FUNCTIONAL ASSESSMENT
PAIN_FUNCTIONAL_ASSESSMENT: 0-10

## 2025-03-28 ASSESSMENT — LIFESTYLE VARIABLES
TOTAL_SCORE: 2
TOTAL_SCORE: 0
TOTAL_SCORE: 1
TOTAL_SCORE: 2
TOTAL_SCORE: 1
TOTAL_SCORE: 8
TOTAL_SCORE: 0

## 2025-03-28 ASSESSMENT — PAIN SCALES - GENERAL
PAINLEVEL_OUTOF10: 2
PAINLEVEL_OUTOF10: 5 - MODERATE PAIN
PAINLEVEL_OUTOF10: 8
PAINLEVEL_OUTOF10: 2
PAINLEVEL_OUTOF10: 7
PAINLEVEL_OUTOF10: 0 - NO PAIN
PAINLEVEL_OUTOF10: 1
PAINLEVEL_OUTOF10: 7

## 2025-03-28 ASSESSMENT — PAIN DESCRIPTION - DESCRIPTORS
DESCRIPTORS: ACHING

## 2025-03-28 ASSESSMENT — PAIN SCALES - WONG BAKER: WONGBAKER_NUMERICALRESPONSE: NO HURT

## 2025-03-28 ASSESSMENT — ACTIVITIES OF DAILY LIVING (ADL): HOME_MANAGEMENT_TIME_ENTRY: 15

## 2025-03-28 NOTE — PROGRESS NOTES
03/28/25 0955   Discharge Planning   Home or Post Acute Services Post acute facilities (Rehab/SNF/etc)   Type of Post Acute Facility Services Rehab;Skilled nursing;Other (Comment)  (with C[facility] inpatient tx)   Expected Discharge Disposition SNF   Does the patient need discharge transport arranged? Yes   RoundTrip coordination needed? Yes   Has discharge transport been arranged? No     Record reviewed.  Plan to discharge to [facility] with inpatient treatment for [facility].  Psych following.  HENS completed.  Awaiting therapy updates to start pre-cert.  Care Transitions will continue to follow.    12:12 pm addendum  Awaiting therapy updates.  This TCC received message from KELSI, daughter called and wanted to speak with someone.  This TCC phoned patient's daughter, Muna.  Unable to reach.  Generic VM left with TCC contact info to return call.  Care Transitions will continue to follow.    12:25 pm addendum  TCC received return phone call from daughter requesting update regarding discharge to facility.  Updates provided.  Care Transitions will continue to follow.    2:45 pm addendum  PT messaged for updates to start pre-cert.  Care Transitions manager aware.  Care Transitions will continue to follow.    3:26 pm addendum  PT notes updated.  Team requested to start pre-cert.  Care Transitions will continue to follow.

## 2025-03-28 NOTE — CARE PLAN
The patient's goals for the shift include      The clinical goals for the shift include pt will be safe and comfortable throughout the shift      Problem: Pain - Adult  Goal: Verbalizes/displays adequate comfort level or baseline comfort level  Outcome: Progressing     Problem: Safety - Adult  Goal: Free from fall injury  Outcome: Progressing     Problem: Discharge Planning  Goal: Discharge to home or other facility with appropriate resources  Outcome: Progressing     Problem: Chronic Conditions and Co-morbidities  Goal: Patient's chronic conditions and co-morbidity symptoms are monitored and maintained or improved  Outcome: Progressing     Problem: Nutrition  Goal: Nutrient intake appropriate for maintaining nutritional needs  Outcome: Progressing     Problem: Skin  Goal: Promote skin healing  Outcome: Progressing     Problem: Pain  Goal: Takes deep breaths with improved pain control throughout the shift  Outcome: Progressing  Goal: Turns in bed with improved pain control throughout the shift  Outcome: Progressing  Goal: Walks with improved pain control throughout the shift  Outcome: Progressing  Goal: Performs ADL's with improved pain control throughout shift  Outcome: Progressing  Goal: Participates in PT with improved pain control throughout the shift  Outcome: Progressing  Goal: Free from opioid side effects throughout the shift  Outcome: Progressing  Goal: Free from acute confusion related to pain meds throughout the shift  Outcome: Progressing

## 2025-03-28 NOTE — PROGRESS NOTES
Physical Therapy    Physical Therapy Treatment    Patient Name: Cristal Pollard  MRN: [id number]  Department: [facility]  Room: 202/202-A  Today's Date: 3/28/2025  Time Calculation  Start Time: 1314  Time Calculation (min): 29 min         Assessment/Plan   PT Assessment  End of Session Communication: Bedside nurse  End of Session Patient Position: Bed, 4 rail up, Alarm off, caregiver present (Supine with HOB elevated, call bell in reach, RN present in room.)     PT Plan  Treatment/Interventions: Bed mobility, Transfer training, Gait training  PT Plan: Ongoing PT  PT Frequency: 4 times per week  PT Discharge Recommendations: Low intensity level of continued care (with initial 24hr support)  PT Recommended Transfer Status: Stand by assist  PT - OK to Discharge: Yes      General Visit Information:   PT  Visit  PT Received On: 03/28/25  General  Co-Treatment: PT, OT  Co-Treatment Reason: maximize pt safety  Prior to Session Communication: Bedside nurse  Patient Position Received: Bed, 4 rail up, Alarm off, not on at start of session  General Comment:  (Pt pleasant and agreeable to participate in  therapy session. Pt reports slight nausea upon arrival which subsided during activity.)    Subjective   Precautions:  Precautions  Medical Precautions: Fall precautions  Post-Surgical Precautions: Spinal precautions  Precautions Comment: fall,neck/back arnold            Objective   Pain:  Pain Assessment  Pain Assessment: 0-10  0-10 (Numeric) Pain Score: 7  Pain Type: Chronic pain  Pain Location: Back  Pain Orientation: Lower  Pain Descriptors: Aching  Pain Frequency: Constant/continuous  Pain Interventions: Medication (See MAR)                Treatments:  Bed Mobility  Bed Mobility: Yes (Pt performed supine>sit with HOB elevated and SBA, sit>supine with MinAx1 to assist with BLE.)    Ambulation/Gait Training  Ambulation/Gait Training Performed: Yes (Pt able to amb 12ft x2 with FWW and MinAx1 plus close chair follow. Pt demos slow janice  with slight R knee buckle/no LOB. Seated rest break b/t trials d/t fatigue.)  Transfers  Transfer: Yes (Pt performed sit<>stand x2 trials with FWW and Min/CGAx1; v/c for proper hand placement safety.)    Outcome Measures:  [facility] Basic Mobility  Turning from your back to your side while in a flat bed without using bedrails: A little  Moving from lying on your back to sitting on the side of a flat bed without using bedrails: A little  Moving to and from bed to chair (including a wheelchair): A little  Standing up from a chair using your arms (e.g. wheelchair or bedside chair): A little  To walk in hospital room: A little  Climbing 3-5 steps with railing: A lot  Basic Mobility - Total Score: 17    Education Documentation  Precautions, taught by Yair Barnhart PTA at 3/28/2025  3:21 PM.  Learner: Patient  Readiness: Acceptance  Method: Explanation  Response: Verbalizes Understanding    Mobility Training, taught by Yair Barnhart PTA at 3/28/2025  3:21 PM.  Learner: Patient  Readiness: Acceptance  Method: Explanation  Response: Verbalizes Understanding    Education Comments  No comments found.        OP EDUCATION:       Encounter Problems       Encounter Problems (Active)       PT Problem       STG - Pt will transition supine <> sitting with SUP  (Progressing)       Start:  03/24/25    Expected End:  04/07/25            STG - Pt will transfer STS with SUP  (Progressing)       Start:  03/24/25    Expected End:  04/07/25            STG - Pt will amb 30' using RW with SUP  (Progressing)       Start:  03/24/25    Expected End:  04/07/25            STG -  Pt will navigate 4 stairs using HR with SBA  (Progressing)       Start:  03/24/25    Expected End:  04/07/25               Pain - Adult          Safety       LTG - Patient will demonstrate safety requirements appropriate to situation/environment (Progressing)       Start:  03/23/25            LTG - Patient will utilize safety techniques (Progressing)       Start:  03/23/25             STG - Patient uses call light consistently to request assistance with transfers (Progressing)       Start:  03/23/25

## 2025-03-28 NOTE — PROGRESS NOTES
Occupational Therapy    OT Treatment    Patient Name: Cristal Pollard  MRN: [id number]  Department: [facility]  Room: 202/202  Today's Date: 3/28/2025  Time Calculation  Start Time: 1313  Stop Time: 1343  Time Calculation (min): 30 min        Assessment:  End of Session Communication: Bedside nurse  End of Session Patient Position: Bed, 2 rail up, Alarm on     Plan:  Treatment Interventions: ADL retraining, Functional transfer training, Cognitive reorientation  OT Frequency: 3 times per week  OT - OK to Discharge: Yes (once cleared by medical team)  Treatment Interventions: ADL retraining, Functional transfer training, Cognitive reorientation    Subjective   Previous Visit Info:  OT Last Visit  OT Received On: 03/28/25  General:  General  Co-Treatment: PT  Co-Treatment Reason: maximize pt safety  Prior to Session Communication: Bedside nurse  Patient Position Received: Bed, 4 rail up, Alarm on  General Comment: pleasant and cooperative, required min vc back to task d/t talkative  Precautions:  Medical Precautions: Fall precautions, Oxygen therapy device and L/min (2L)  Precautions Comment: purewick            Pain:  Pain Assessment  Pain Assessment:  (c/o pain in back, nursing aware)    Objective         Activities of Daily Living: Grooming  Grooming Level of Assistance: Minimum assistance  Grooming Where Assessed: Edge of bed  Grooming Comments: pt utilized shower cap and brushed one's hair    LE Dressing  LE Dressing: Yes  Adult Briefs Level of Assistance: Maximum assistance  LE Dressing Where Assessed: Edge of bed  LE Dressing Comments: Min A to doff/don socks with crossed leg technique  Functional Standing Tolerance:  Time: 2:00 standing at FWW  Bed Mobility/Transfers: Bed Mobility  Bed Mobility: Yes  Bed Mobility 1  Bed Mobility 1: Supine to sitting  Level of Assistance 1: Close supervision  Bed Mobility 2  Bed Mobility  2: Sitting to supine  Level of Assistance 2: Minimum assistance    Transfers  Transfer:  Yes  Transfer 1  Technique 1: Sit to stand, Stand to sit  Transfer Device 1: Walker  Transfer Level of Assistance 1:  (CGA-Min A x2)      Functional Mobility:  Functional Mobility  Functional Mobility Performed: Yes  Functional Mobility 1  Device 1: Rolling walker  Assistance 1: Minimum assistance (with close chair follow d/t unsteady on feet)  Comments 1: pt ambulated to door and back to bed with one sitting rest break to complete      Outcome Measures:[facility] Daily Activity  Putting on and taking off regular lower body clothing: A little  Bathing (including washing, rinsing, drying): A little  Putting on and taking off regular upper body clothing: A little  Toileting, which includes using toilet, bedpan or urinal: A lot  Taking care of personal grooming such as brushing teeth: A little  Eating Meals: None  Daily Activity - Total Score: 18        Education Documentation  Body Mechanics, taught by ALEM Ragsdale at 3/28/2025  3:21 PM.  Learner: Patient  Readiness: Acceptance  Method: Explanation  Response: Verbalizes Understanding, Needs Reinforcement    Precautions, taught by ALEM Ragsdale at 3/28/2025  3:21 PM.  Learner: Patient  Readiness: Acceptance  Method: Explanation  Response: Verbalizes Understanding, Needs Reinforcement    ADL Training, taught by ALEM Ragsdale at 3/28/2025  3:21 PM.  Learner: Patient  Readiness: Acceptance  Method: Explanation  Response: Verbalizes Understanding, Needs Reinforcement    Education Comments  No comments found.             Goals:  Encounter Problems       Encounter Problems (Active)       impaired functional daily living skills       Pt will increase Grooming to s/mod indep Upper Body Bathing to s/mod indep  and Lower Body Bathing  to s/mod indep  increase Upper Body Dressing  to s/mod indep  and LE Dressing to s/mod indep with adaptive equipment as needed (Progressing)       Start:  03/24/25    Expected End:  04/07/25            Pt will increase  Functional Transfers Bed Mobility to s/mod indep  Sit to Stand  to s/mod indep  Functional Mobility  with a device to s/mod indep  chair/toliet/room to increase indep/safety in patients discharge environment (Progressing)       Start:  03/24/25    Expected End:  04/07/25

## 2025-03-28 NOTE — PROGRESS NOTES
Cristal Pollard is a 64 y.o. female on day 5 of admission presenting with Back pain, unspecified back location, unspecified back pain laterality, unspecified chronicity.      Subjective   Still complains of pains.       Objective     Last Recorded Vitals  /86 (Patient Position: Lying)   Pulse 85   Temp 36.3 °C (97.3 °F)   Resp 18   Wt 86.2 kg (190 lb)   SpO2 96%   Intake/Output last 3 Shifts:    Intake/Output Summary (Last 24 hours) at 3/28/2025 1046  Last data filed at 3/28/2025 0533  Gross per 24 hour   Intake 474 ml   Output 900 ml   Net -426 ml       Admission Weight  Weight: 86.2 kg (190 lb) (03/23/25 0230)    Daily Weight  03/23/25 : 86.2 kg (190 lb)    Image Results  XR thoracic spine 3 views  Narrative: Interpreted By:  Clinton Ortiz,   STUDY:  XR THORACIC SPINE 3 VIEWS; ;  3/26/2025 10:30 pm      INDICATION:  Signs/Symptoms:fall.          COMPARISON:  None.      ACCESSION NUMBER(S):  [id number]      ORDERING CLINICIAN:  JAY CAICEDO      FINDINGS:  Thoracic spine, four views      There is moderate multilevel disc space narrowing with osteophytosis  and sclerosis throughout the thoracic spine. No fracture or  spondylolisthesis seen. Partially visualized hardware in the lower  cervical spine.      Impression: Moderate multilevel spondylosis throughout the thoracic spine      MACRO:  None      Signed by: Clinton Ortiz 3/26/2025 10:34 PM  Dictation workstation:   [id number]  XR lumbar spine 2-3 views  Narrative: Interpreted By:  Clinton Ortiz,   STUDY:  XR LUMBAR SPINE 2-3 VIEWS; ;  3/26/2025 10:30 pm      INDICATION:  Signs/Symptoms:fall.          COMPARISON:  03/02/2025      ACCESSION NUMBER(S):  [id number]      ORDERING CLINICIAN:  JAY CAICEDO      FINDINGS:  Lumbar spine, three views      Interval laminectomy and posterior fusion of L3-L5 with intact  hardware. Moderate multilevel spondylotic changes. No acute fracture  or dislocation seen      Impression:     Interval L3-L5 posterior  fusion with intact hardware. Moderate  multilevel spondylosis      MACRO:  None      Signed by: Clinton Ortiz 3/26/2025 10:34 PM  Dictation workstation:   [id number]      Physical Exam  Vitals reviewed.   Constitutional:       Appearance: Normal appearance.      Comments: No apparent distress   HENT:      Head: Normocephalic and atraumatic.      Mouth/Throat:      Mouth: Mucous membranes are moist.   Eyes:      Conjunctiva/sclera: Conjunctivae normal.   Cardiovascular:      Rate and Rhythm: Normal rate.      Pulses: Normal pulses.   Pulmonary:      Effort: Pulmonary effort is normal.      Breath sounds: Normal breath sounds. No wheezing.   Abdominal:      General: Abdomen is flat. There is no distension.      Palpations: Abdomen is soft.      Tenderness: There is no guarding.   Musculoskeletal:         General: No swelling. Normal range of motion.      Comments: Moving b/l LE, rolls around in bed.     Skin:     General: Skin is warm and dry.   Neurological:      General: No focal deficit present.      Mental Status: She is alert. Mental status is at baseline.   Psychiatric:         Mood and Affect: Mood normal.         Behavior: Behavior normal.         Relevant Results               Assessment/Plan      Assessment & Plan  Anxiety disorder    Cervical radiculopathy    Chronic neck pain    Depression, recurrent (CMS-HCC)    History of lumbar fusion    Hypertension    Type 2 diabetes mellitus    Back pain, unspecified back location, unspecified back pain laterality, unspecified chronicity      Lumbar fusion with intractable pain and ambulatory dysfunction   Cognitive impairment secondary to long term opiate abuse  Opiate withdrawal  Leukocytosis  Normocytic anemia  HTN  DM  GERD  Depression/ anxiety      Neurosurgery evaluated patient on admission and cleared for discharge.  Patient deemed to lack capacity and placement pending.  Continue COWs protocol for withdrawal, suboxone at dc per psych.  Leukocytosis  improved - reactive, monitor off abx  Home meds  Reg diet  Lovenox for DVT ezioy  DC to SNF, appreciate TCC/SW assistance      Nicolas Burdick MD

## 2025-03-28 NOTE — PROGRESS NOTES
Occupational Therapy      Name: Cristal Pollard  MRN: [id number]  Department: [facility]  Room: [address] [place] [address]  Date: 03/28/25     Plan:  Treatment Interventions: ADL retraining, Functional transfer training, Cognitive reorientation  OT Frequency: 3 times per week  OT - OK to Discharge: Yes (once cleared by medical team)    Subjective   General:  General  General Comment: Case conference with ALEM/Racehl.  Updated frequency from 2 to 3x/week as patient would benefit.  Plan:  Continue with OT goals/treatment plan.    Goals:  Encounter Problems       Encounter Problems (Active)       impaired functional daily living skills       Pt will increase Grooming to s/mod indep Upper Body Bathing to s/mod indep  and Lower Body Bathing  to s/mod indep  increase Upper Body Dressing  to s/mod indep  and LE Dressing to s/mod indep with adaptive equipment as needed (Progressing)       Start:  03/24/25    Expected End:  04/07/25            Pt will increase Functional Transfers Bed Mobility to s/mod indep  Sit to Stand  to s/mod indep  Functional Mobility  with a device to s/mod indep  chair/toliet/room to increase indep/safety in patients discharge environment (Progressing)       Start:  03/24/25    Expected End:  04/07/25

## 2025-03-28 NOTE — CARE PLAN
The patient's goals for the shift include      The clinical goals for the shift include   Problem: Pain - Adult  Goal: Verbalizes/displays adequate comfort level or baseline comfort level  Outcome: Progressing     Problem: Safety - Adult  Goal: Free from fall injury  Outcome: Progressing     Problem: Discharge Planning  Goal: Discharge to home or other facility with appropriate resources  Outcome: Progressing     Problem: Chronic Conditions and Co-morbidities  Goal: Patient's chronic conditions and co-morbidity symptoms are monitored and maintained or improved  Outcome: Progressing     Problem: Nutrition  Goal: Nutrient intake appropriate for maintaining nutritional needs  Outcome: Progressing     Problem: Skin  Goal: Promote skin healing  Outcome: Progressing  Flowsheets (Taken 3/27/2025 2315 by Adalgisa Salazar RN)  Promote skin healing: Turn/reposition every 2 hours/use positioning/transfer devices     Problem: Pain  Goal: Takes deep breaths with improved pain control throughout the shift  Outcome: Progressing  Goal: Turns in bed with improved pain control throughout the shift  Outcome: Progressing  Goal: Walks with improved pain control throughout the shift  Outcome: Progressing  Goal: Performs ADL's with improved pain control throughout shift  Outcome: Progressing  Goal: Participates in PT with improved pain control throughout the shift  Outcome: Progressing  Goal: Free from opioid side effects throughout the shift  Outcome: Progressing  Goal: Free from acute confusion related to pain meds throughout the shift  Outcome: Progressing

## 2025-03-28 NOTE — CARE PLAN
The patient's goals for the shift include      The clinical goals for the shift include pt will be free from falls throughout the shift      Problem: Pain - Adult  Goal: Verbalizes/displays adequate comfort level or baseline comfort level  Outcome: Progressing     Problem: Safety - Adult  Goal: Free from fall injury  Outcome: Progressing     Problem: Discharge Planning  Goal: Discharge to home or other facility with appropriate resources  Outcome: Progressing     Problem: Chronic Conditions and Co-morbidities  Goal: Patient's chronic conditions and co-morbidity symptoms are monitored and maintained or improved  Outcome: Progressing     Problem: Pain  Goal: Takes deep breaths with improved pain control throughout the shift  Outcome: Progressing     Problem: Pain  Goal: Turns in bed with improved pain control throughout the shift  Outcome: Progressing     Problem: Pain  Goal: Walks with improved pain control throughout the shift  Outcome: Progressing

## 2025-03-28 NOTE — PROGRESS NOTES
"Cristal Pollard is a 64 y.o. female on day 5 of admission presenting with Back pain, unspecified back location, unspecified back pain laterality, unspecified chronicity.    Subjective   Pt seen in her room this morning  Pt is confused at times and very repetitive  Asking about pain medication, pt has been educated several times throughout the week by different providers that pt will transition to Suboxone instead of taking Percocet yet continues to ask about opiates  Pt is also very confused about \"the next step\".   Pt is asking about going home vs SNF, then asks about \"the mental side vs the physical\"  Timeline is poor  Pt remains restless. \"Looking for paperwork that was left here\"  Pt states that she is having a lot of pain currently  Will keep subutex 2 mg daily due to being drowsy frequently      Physical Exam  Psychiatric:         Attention and Perception: Perception normal. She is inattentive.         Mood and Affect: Mood is anxious.         Speech: Speech normal.         Behavior: Behavior normal.         Thought Content: Thought content normal.         Cognition and Memory: Cognition is impaired. Memory is impaired.         Judgment: Judgment is impulsive.         Last Recorded Vitals  Blood pressure 152/86, pulse 85, temperature 36.3 °C (97.3 °F), resp. rate 18, height 1.753 m (5' 9\"), weight 86.2 kg (190 lb), SpO2 96%.  Intake/Output last 3 Shifts:  I/O last 3 completed shifts:  In: 711 (8.2 mL/kg) [P.O.:711]  Out: 1400 (16.2 mL/kg) [Urine:1400 (0.5 mL/kg/hr)]  Weight: 86.2 kg     Relevant Results  Vitals:    03/27/25 2322 03/28/25 0000 03/28/25 0404 03/28/25 0825   BP:   155/89 152/86   BP Location:   Right arm    Patient Position:   Lying Lying   Pulse: 80 82 87 85   Resp:   18    Temp:   35.9 °C (96.6 °F) 36.3 °C (97.3 °F)   TempSrc:   Temporal    SpO2: 97%  94% 96%   Weight:       Height:           Recent Results (from the past 72 hours)   POCT GLUCOSE    Collection Time: 03/25/25 11:00 AM   Result Value " Ref Range    POCT Glucose 171 (H) 74 - 99 mg/dL   POCT GLUCOSE    Collection Time: 03/25/25  4:50 PM   Result Value Ref Range    POCT Glucose 183 (H) 74 - 99 mg/dL   POCT GLUCOSE    Collection Time: 03/25/25  8:22 PM   Result Value Ref Range    POCT Glucose 171 (H) 74 - 99 mg/dL   POCT GLUCOSE    Collection Time: 03/26/25  6:23 AM   Result Value Ref Range    POCT Glucose 179 (H) 74 - 99 mg/dL   POCT GLUCOSE    Collection Time: 03/26/25 11:01 AM   Result Value Ref Range    POCT Glucose 185 (H) 74 - 99 mg/dL   POCT GLUCOSE    Collection Time: 03/26/25  4:21 PM   Result Value Ref Range    POCT Glucose 204 (H) 74 - 99 mg/dL   POCT GLUCOSE    Collection Time: 03/26/25  7:58 PM   Result Value Ref Range    POCT Glucose 207 (H) 74 - 99 mg/dL   POCT GLUCOSE    Collection Time: 03/26/25  9:14 PM   Result Value Ref Range    POCT Glucose 233 (H) 74 - 99 mg/dL   Comprehensive Metabolic Panel    Collection Time: 03/27/25  5:00 AM   Result Value Ref Range    Glucose 215 (H) 74 - 99 mg/dL    Sodium 132 (L) 136 - 145 mmol/L    Potassium 3.2 (L) 3.5 - 5.3 mmol/L    Chloride 98 98 - 107 mmol/L    Bicarbonate 24 21 - 32 mmol/L    Anion Gap 13 10 - 20 mmol/L    Urea Nitrogen 11 6 - 23 mg/dL    Creatinine 0.54 0.50 - 1.05 mg/dL    eGFR >90 >60 mL/min/1.73m*2    Calcium 9.4 8.6 - 10.3 mg/dL    Albumin 3.7 3.4 - 5.0 g/dL    Alkaline Phosphatase 93 33 - 136 U/L    Total Protein 6.8 6.4 - 8.2 g/dL    AST 22 9 - 39 U/L    Bilirubin, Total 0.5 0.0 - 1.2 mg/dL    ALT 14 7 - 45 U/L   CBC and Auto Differential    Collection Time: 03/27/25  5:00 AM   Result Value Ref Range    WBC 17.9 (H) 4.4 - 11.3 x10*3/uL    nRBC 0.0 0.0 - 0.0 /100 WBCs    RBC 3.89 (L) 4.00 - 5.20 x10*6/uL    Hemoglobin 11.9 (L) 12.0 - 16.0 g/dL    Hematocrit 36.3 36.0 - 46.0 %    MCV 93 80 - 100 fL    MCH 30.6 26.0 - 34.0 pg    MCHC 32.8 32.0 - 36.0 g/dL    RDW 13.7 11.5 - 14.5 %    Platelets 288 150 - 450 x10*3/uL    Neutrophils % 79.9 40.0 - 80.0 %    Immature Granulocytes  %, Automated 1.3 (H) 0.0 - 0.9 %    Lymphocytes % 8.9 13.0 - 44.0 %    Monocytes % 6.4 2.0 - 10.0 %    Eosinophils % 3.2 0.0 - 6.0 %    Basophils % 0.3 0.0 - 2.0 %    Neutrophils Absolute 14.28 (H) 1.20 - 7.70 x10*3/uL    Immature Granulocytes Absolute, Automated 0.23 0.00 - 0.70 x10*3/uL    Lymphocytes Absolute 1.59 1.20 - 4.80 x10*3/uL    Monocytes Absolute 1.15 (H) 0.10 - 1.00 x10*3/uL    Eosinophils Absolute 0.58 0.00 - 0.70 x10*3/uL    Basophils Absolute 0.06 0.00 - 0.10 x10*3/uL   Magnesium    Collection Time: 03/27/25  5:00 AM   Result Value Ref Range    Magnesium 1.84 1.60 - 2.40 mg/dL   POCT GLUCOSE    Collection Time: 03/27/25  6:55 AM   Result Value Ref Range    POCT Glucose 220 (H) 74 - 99 mg/dL   Ammonia    Collection Time: 03/27/25  9:42 AM   Result Value Ref Range    Ammonia 27 16 - 53 umol/L   POCT GLUCOSE    Collection Time: 03/27/25 11:02 AM   Result Value Ref Range    POCT Glucose 203 (H) 74 - 99 mg/dL   Blood Gas Arterial Full Panel    Collection Time: 03/27/25 11:56 AM   Result Value Ref Range    POCT pH, Arterial 7.42 7.38 - 7.42 pH    POCT pCO2, Arterial 39 38 - 42 mm Hg    POCT pO2, Arterial 62 (L) 85 - 95 mm Hg    POCT SO2, Arterial 94 94 - 100 %    POCT Oxy Hemoglobin, Arterial 90.8 (L) 94.0 - 98.0 %    POCT Hematocrit Calculated, Arterial 37.0 36.0 - 46.0 %    POCT Sodium, Arterial 130 (L) 136 - 145 mmol/L    POCT Potassium, Arterial 3.6 3.5 - 5.3 mmol/L    POCT Chloride, Arterial 99 98 - 107 mmol/L    POCT Ionized Calcium, Arterial 1.25 1.10 - 1.33 mmol/L    POCT Glucose, Arterial 227 (H) 74 - 99 mg/dL    POCT Lactate, Arterial 1.7 0.4 - 2.0 mmol/L    POCT Base Excess, Arterial 0.8 -2.0 - 3.0 mmol/L    POCT HCO3 Calculated, Arterial 25.3 22.0 - 26.0 mmol/L    POCT Hemoglobin, Arterial 12.4 12.0 - 16.0 g/dL    POCT Anion Gap, Arterial 9 (L) 10 - 25 mmo/L    Patient Temperature 37.0 degrees Celsius    FiO2 21 %    Site of Arterial Puncture Radial Right     Marshall's Test Positive    POCT  GLUCOSE    Collection Time: 03/27/25  4:30 PM   Result Value Ref Range    POCT Glucose 179 (H) 74 - 99 mg/dL   POCT GLUCOSE    Collection Time: 03/27/25  7:48 PM   Result Value Ref Range    POCT Glucose 237 (H) 74 - 99 mg/dL   Comprehensive Metabolic Panel    Collection Time: 03/28/25  5:22 AM   Result Value Ref Range    Glucose 204 (H) 74 - 99 mg/dL    Sodium 133 (L) 136 - 145 mmol/L    Potassium 4.0 3.5 - 5.3 mmol/L    Chloride 97 (L) 98 - 107 mmol/L    Bicarbonate 27 21 - 32 mmol/L    Anion Gap 13 10 - 20 mmol/L    Urea Nitrogen 8 6 - 23 mg/dL    Creatinine 0.54 0.50 - 1.05 mg/dL    eGFR >90 >60 mL/min/1.73m*2    Calcium 10.1 8.6 - 10.3 mg/dL    Albumin 3.9 3.4 - 5.0 g/dL    Alkaline Phosphatase 97 33 - 136 U/L    Total Protein 7.3 6.4 - 8.2 g/dL    AST 16 9 - 39 U/L    Bilirubin, Total 0.4 0.0 - 1.2 mg/dL    ALT 15 7 - 45 U/L   CBC and Auto Differential    Collection Time: 03/28/25  5:22 AM   Result Value Ref Range    WBC 13.2 (H) 4.4 - 11.3 x10*3/uL    nRBC 0.0 0.0 - 0.0 /100 WBCs    RBC 4.17 4.00 - 5.20 x10*6/uL    Hemoglobin 12.3 12.0 - 16.0 g/dL    Hematocrit 40.2 36.0 - 46.0 %    MCV 96 80 - 100 fL    MCH 29.5 26.0 - 34.0 pg    MCHC 30.6 (L) 32.0 - 36.0 g/dL    RDW 13.6 11.5 - 14.5 %    Platelets 318 150 - 450 x10*3/uL    Neutrophils % 73.0 40.0 - 80.0 %    Immature Granulocytes %, Automated 1.5 (H) 0.0 - 0.9 %    Lymphocytes % 14.8 13.0 - 44.0 %    Monocytes % 5.7 2.0 - 10.0 %    Eosinophils % 4.3 0.0 - 6.0 %    Basophils % 0.7 0.0 - 2.0 %    Neutrophils Absolute 9.64 (H) 1.20 - 7.70 x10*3/uL    Immature Granulocytes Absolute, Automated 0.20 0.00 - 0.70 x10*3/uL    Lymphocytes Absolute 1.96 1.20 - 4.80 x10*3/uL    Monocytes Absolute 0.76 0.10 - 1.00 x10*3/uL    Eosinophils Absolute 0.57 0.00 - 0.70 x10*3/uL    Basophils Absolute 0.09 0.00 - 0.10 x10*3/uL   POCT GLUCOSE    Collection Time: 03/28/25  6:30 AM   Result Value Ref Range    POCT Glucose 204 (H) 74 - 99 mg/dL       XR thoracic spine 3  views    Result Date: 3/26/2025  Interpreted By:  Clinton Ortiz, STUDY: XR THORACIC SPINE 3 VIEWS; ;  3/26/2025 10:30 pm   INDICATION: Signs/Symptoms:fall.     COMPARISON: None.   ACCESSION NUMBER(S): [id number]   ORDERING CLINICIAN: JAY CAICEDO   FINDINGS: Thoracic spine, four views   There is moderate multilevel disc space narrowing with osteophytosis and sclerosis throughout the thoracic spine. No fracture or spondylolisthesis seen. Partially visualized hardware in the lower cervical spine.       Moderate multilevel spondylosis throughout the thoracic spine   MACRO: None   Signed by: Clinton Ortiz 3/26/2025 10:34 PM Dictation workstation:   [id number]    XR lumbar spine 2-3 views    Result Date: 3/26/2025  Interpreted By:  Clinton Ortiz, STUDY: XR LUMBAR SPINE 2-3 VIEWS; ;  3/26/2025 10:30 pm   INDICATION: Signs/Symptoms:fall.     COMPARISON: 03/02/2025   ACCESSION NUMBER(S): [id number]   ORDERING CLINICIAN: JAY CAICEDO   FINDINGS: Lumbar spine, three views   Interval laminectomy and posterior fusion of L3-L5 with intact hardware. Moderate multilevel spondylotic changes. No acute fracture or dislocation seen         Interval L3-L5 posterior fusion with intact hardware. Moderate multilevel spondylosis   MACRO: None   Signed by: Clinton Otriz 3/26/2025 10:34 PM Dictation workstation:   [id number]       Assessment/Plan   Assessment & Plan  Back pain, unspecified back location, unspecified back pain laterality, unspecified chronicity    Anxiety disorder    Depression, recurrent (CMS-HCC)    Chronic neck pain    Hypertension    Type 2 diabetes mellitus    History of lumbar fusion    Cervical radiculopathy      DX:   Opiate abuse   Depression  KATELYN  R/O cognitive disorder    PLAN:   Plan is for SNF  Continue Suboxone 2 mg daily  Pt should follow up at Suboxone clinic vs pain management    I spent 45 minutes in the professional and overall care of this patient.      Rian Forrest, APRN-CNP

## 2025-03-29 LAB
ALBUMIN SERPL BCP-MCNC: 3.8 G/DL (ref 3.4–5)
ALP SERPL-CCNC: 103 U/L (ref 33–136)
ALT SERPL W P-5'-P-CCNC: 16 U/L (ref 7–45)
ANION GAP SERPL CALC-SCNC: 15 MMOL/L (ref 10–20)
AST SERPL W P-5'-P-CCNC: 16 U/L (ref 9–39)
BASOPHILS # BLD AUTO: 0.09 X10*3/UL (ref 0–0.1)
BASOPHILS NFR BLD AUTO: 0.9 %
BILIRUB SERPL-MCNC: 0.5 MG/DL (ref 0–1.2)
BUN SERPL-MCNC: 10 MG/DL (ref 6–23)
CALCIUM SERPL-MCNC: 10.3 MG/DL (ref 8.6–10.3)
CHLORIDE SERPL-SCNC: 95 MMOL/L (ref 98–107)
CO2 SERPL-SCNC: 25 MMOL/L (ref 21–32)
CREAT SERPL-MCNC: 0.56 MG/DL (ref 0.5–1.05)
EGFRCR SERPLBLD CKD-EPI 2021: >90 ML/MIN/1.73M*2
EOSINOPHIL # BLD AUTO: 0.44 X10*3/UL (ref 0–0.7)
EOSINOPHIL NFR BLD AUTO: 4.2 %
ERYTHROCYTE [DISTWIDTH] IN BLOOD BY AUTOMATED COUNT: 13.2 % (ref 11.5–14.5)
GLUCOSE BLD MANUAL STRIP-MCNC: 171 MG/DL (ref 74–99)
GLUCOSE BLD MANUAL STRIP-MCNC: 180 MG/DL (ref 74–99)
GLUCOSE BLD MANUAL STRIP-MCNC: 197 MG/DL (ref 74–99)
GLUCOSE BLD MANUAL STRIP-MCNC: 206 MG/DL (ref 74–99)
GLUCOSE SERPL-MCNC: 195 MG/DL (ref 74–99)
HCT VFR BLD AUTO: 37.5 % (ref 36–46)
HGB BLD-MCNC: 12.4 G/DL (ref 12–16)
IMM GRANULOCYTES # BLD AUTO: 0.26 X10*3/UL (ref 0–0.7)
IMM GRANULOCYTES NFR BLD AUTO: 2.5 % (ref 0–0.9)
LYMPHOCYTES # BLD AUTO: 1.97 X10*3/UL (ref 1.2–4.8)
LYMPHOCYTES NFR BLD AUTO: 18.9 %
MCH RBC QN AUTO: 30.2 PG (ref 26–34)
MCHC RBC AUTO-ENTMCNC: 33.1 G/DL (ref 32–36)
MCV RBC AUTO: 92 FL (ref 80–100)
MONOCYTES # BLD AUTO: 0.82 X10*3/UL (ref 0.1–1)
MONOCYTES NFR BLD AUTO: 7.9 %
NEUTROPHILS # BLD AUTO: 6.86 X10*3/UL (ref 1.2–7.7)
NEUTROPHILS NFR BLD AUTO: 65.6 %
NRBC BLD-RTO: 0 /100 WBCS (ref 0–0)
PLATELET # BLD AUTO: 351 X10*3/UL (ref 150–450)
POTASSIUM SERPL-SCNC: 3.7 MMOL/L (ref 3.5–5.3)
PROT SERPL-MCNC: 7.2 G/DL (ref 6.4–8.2)
RBC # BLD AUTO: 4.1 X10*6/UL (ref 4–5.2)
SODIUM SERPL-SCNC: 131 MMOL/L (ref 136–145)
WBC # BLD AUTO: 10.4 X10*3/UL (ref 4.4–11.3)

## 2025-03-29 PROCEDURE — 2500000005 HC RX 250 GENERAL PHARMACY W/O HCPCS: Performed by: INTERNAL MEDICINE

## 2025-03-29 PROCEDURE — 2500000004 HC RX 250 GENERAL PHARMACY W/ HCPCS (ALT 636 FOR OP/ED): Performed by: INTERNAL MEDICINE

## 2025-03-29 PROCEDURE — 82947 ASSAY GLUCOSE BLOOD QUANT: CPT

## 2025-03-29 PROCEDURE — 2500000001 HC RX 250 WO HCPCS SELF ADMINISTERED DRUGS (ALT 637 FOR MEDICARE OP): Performed by: INTERNAL MEDICINE

## 2025-03-29 PROCEDURE — 2500000002 HC RX 250 W HCPCS SELF ADMINISTERED DRUGS (ALT 637 FOR MEDICARE OP, ALT 636 FOR OP/ED): Performed by: NURSE PRACTITIONER

## 2025-03-29 PROCEDURE — 2500000002 HC RX 250 W HCPCS SELF ADMINISTERED DRUGS (ALT 637 FOR MEDICARE OP, ALT 636 FOR OP/ED): Performed by: STUDENT IN AN ORGANIZED HEALTH CARE EDUCATION/TRAINING PROGRAM

## 2025-03-29 PROCEDURE — 2500000002 HC RX 250 W HCPCS SELF ADMINISTERED DRUGS (ALT 637 FOR MEDICARE OP, ALT 636 FOR OP/ED): Performed by: INTERNAL MEDICINE

## 2025-03-29 PROCEDURE — 1100000001 HC PRIVATE ROOM DAILY

## 2025-03-29 PROCEDURE — 99232 SBSQ HOSP IP/OBS MODERATE 35: CPT | Performed by: STUDENT IN AN ORGANIZED HEALTH CARE EDUCATION/TRAINING PROGRAM

## 2025-03-29 PROCEDURE — 36415 COLL VENOUS BLD VENIPUNCTURE: CPT | Performed by: STUDENT IN AN ORGANIZED HEALTH CARE EDUCATION/TRAINING PROGRAM

## 2025-03-29 PROCEDURE — 85025 COMPLETE CBC W/AUTO DIFF WBC: CPT | Performed by: STUDENT IN AN ORGANIZED HEALTH CARE EDUCATION/TRAINING PROGRAM

## 2025-03-29 PROCEDURE — 2500000001 HC RX 250 WO HCPCS SELF ADMINISTERED DRUGS (ALT 637 FOR MEDICARE OP): Performed by: STUDENT IN AN ORGANIZED HEALTH CARE EDUCATION/TRAINING PROGRAM

## 2025-03-29 PROCEDURE — 80053 COMPREHEN METABOLIC PANEL: CPT | Performed by: STUDENT IN AN ORGANIZED HEALTH CARE EDUCATION/TRAINING PROGRAM

## 2025-03-29 PROCEDURE — 2500000001 HC RX 250 WO HCPCS SELF ADMINISTERED DRUGS (ALT 637 FOR MEDICARE OP): Performed by: NURSE PRACTITIONER

## 2025-03-29 RX ORDER — PROCHLORPERAZINE EDISYLATE 5 MG/ML
10 INJECTION INTRAMUSCULAR; INTRAVENOUS EVERY 6 HOURS PRN
Status: DISCONTINUED | OUTPATIENT
Start: 2025-03-29 | End: 2025-03-31 | Stop reason: HOSPADM

## 2025-03-29 RX ORDER — PROCHLORPERAZINE MALEATE 10 MG
10 TABLET ORAL EVERY 6 HOURS PRN
Status: DISCONTINUED | OUTPATIENT
Start: 2025-03-29 | End: 2025-03-31 | Stop reason: HOSPADM

## 2025-03-29 RX ADMIN — GABAPENTIN 300 MG: 300 CAPSULE ORAL at 08:10

## 2025-03-29 RX ADMIN — ACETAMINOPHEN 975 MG: 325 TABLET, FILM COATED ORAL at 23:45

## 2025-03-29 RX ADMIN — FLUOXETINE HYDROCHLORIDE 40 MG: 20 CAPSULE ORAL at 20:08

## 2025-03-29 RX ADMIN — INSULIN LISPRO 1 UNITS: 100 INJECTION, SOLUTION INTRAVENOUS; SUBCUTANEOUS at 11:49

## 2025-03-29 RX ADMIN — INSULIN LISPRO 2 UNITS: 100 INJECTION, SOLUTION INTRAVENOUS; SUBCUTANEOUS at 08:09

## 2025-03-29 RX ADMIN — METFORMIN HYDROCHLORIDE 500 MG: 500 TABLET ORAL at 20:09

## 2025-03-29 RX ADMIN — INSULIN LISPRO 1 UNITS: 100 INJECTION, SOLUTION INTRAVENOUS; SUBCUTANEOUS at 17:19

## 2025-03-29 RX ADMIN — SENNOSIDES AND DOCUSATE SODIUM 2 TABLET: 50; 8.6 TABLET ORAL at 20:08

## 2025-03-29 RX ADMIN — CYCLOBENZAPRINE 5 MG: 10 TABLET, FILM COATED ORAL at 03:14

## 2025-03-29 RX ADMIN — GABAPENTIN 300 MG: 300 CAPSULE ORAL at 16:14

## 2025-03-29 RX ADMIN — LAMOTRIGINE 100 MG: 100 TABLET ORAL at 08:11

## 2025-03-29 RX ADMIN — BUPRENORPHINE 2 MG: 2 TABLET SUBLINGUAL at 08:11

## 2025-03-29 RX ADMIN — SENNOSIDES AND DOCUSATE SODIUM 2 TABLET: 50; 8.6 TABLET ORAL at 08:11

## 2025-03-29 RX ADMIN — PANTOPRAZOLE SODIUM 40 MG: 40 TABLET, DELAYED RELEASE ORAL at 05:36

## 2025-03-29 RX ADMIN — ONDANSETRON 4 MG: 2 INJECTION INTRAMUSCULAR; INTRAVENOUS at 20:16

## 2025-03-29 RX ADMIN — ENOXAPARIN SODIUM 40 MG: 40 INJECTION SUBCUTANEOUS at 08:11

## 2025-03-29 RX ADMIN — CYCLOBENZAPRINE 5 MG: 10 TABLET, FILM COATED ORAL at 20:09

## 2025-03-29 RX ADMIN — HYDROCHLOROTHIAZIDE: 12.5 TABLET ORAL at 08:10

## 2025-03-29 RX ADMIN — IBUPROFEN 600 MG: 600 TABLET, FILM COATED ORAL at 03:13

## 2025-03-29 RX ADMIN — Medication 3 MG: at 22:09

## 2025-03-29 RX ADMIN — ACETAMINOPHEN 975 MG: 325 TABLET, FILM COATED ORAL at 16:14

## 2025-03-29 RX ADMIN — ACETAMINOPHEN 975 MG: 325 TABLET, FILM COATED ORAL at 08:10

## 2025-03-29 RX ADMIN — GABAPENTIN 300 MG: 300 CAPSULE ORAL at 20:09

## 2025-03-29 RX ADMIN — TAMSULOSIN HYDROCHLORIDE 0.4 MG: 0.4 CAPSULE ORAL at 20:09

## 2025-03-29 ASSESSMENT — PAIN SCALES - GENERAL
PAINLEVEL_OUTOF10: 4
PAINLEVEL_OUTOF10: 5 - MODERATE PAIN
PAINLEVEL_OUTOF10: 9

## 2025-03-29 ASSESSMENT — COGNITIVE AND FUNCTIONAL STATUS - GENERAL
STANDING UP FROM CHAIR USING ARMS: A LITTLE
CLIMB 3 TO 5 STEPS WITH RAILING: A LOT
DRESSING REGULAR LOWER BODY CLOTHING: A LITTLE
DRESSING REGULAR UPPER BODY CLOTHING: A LITTLE
DAILY ACTIVITIY SCORE: 20
MOVING TO AND FROM BED TO CHAIR: A LITTLE
WALKING IN HOSPITAL ROOM: A LOT
TOILETING: A LITTLE
HELP NEEDED FOR BATHING: A LITTLE
MOBILITY SCORE: 18

## 2025-03-29 ASSESSMENT — LIFESTYLE VARIABLES: TOTAL_SCORE: 0

## 2025-03-29 NOTE — PROGRESS NOTES
HOSPITAL MEDICINE - PROGRESS NOTE    Cristal Pollard is a 64 y.o. female on day 6 of admission presenting with Back pain, unspecified back location, unspecified back pain laterality, unspecified chronicity.    Assessment & Plan  Anxiety disorder    Cervical radiculopathy    Chronic neck pain    Depression, recurrent (CMS-HCC)    History of lumbar fusion    Hypertension    Type 2 diabetes mellitus    Back pain, unspecified back location, unspecified back pain laterality, unspecified chronicity      - Remains on suboxone, psychiatry managing inpatient; recommend pt follow at suboxone clinic and/or pain management after discharge.  - Dispo pending placement and associated paperwork clearances, anticipate dc Monday as precert is pending.  - Completed COWS protocol.  - Home meds continued (fluoxetine, gabapentin, metformin, lisinopril/hydrochlorothiazide, pantoprazole, flomax).  - No med changes planned at this time.  - Bowel regimen ordered (senna-colace/miralax)    DVT PPX: Lovenox  Nutrition: Regular    Subjective   Pt seen at bedside, boyfriend also present. Pt mostly stating frustration with still being in the hospital and not being informed of medication changes, but otherwise doing relatively okay and denies other concerns.       Objective     acetaminophen, 975 mg, oral, q8h  buprenorphine, 2 mg, sublingual, Daily  enoxaparin, 40 mg, subcutaneous, q24h  FLUoxetine, 40 mg, oral, Nightly  gabapentin, 300 mg, oral, TID  insulin lispro, 0-5 Units, subcutaneous, TID AC  lamoTRIgine, 100 mg, oral, Daily  lisinopril 10 mg, hydroCHLOROthiazide 12.5 mg for Zestoretic/Prinizide, , oral, Daily  metFORMIN, 500 mg, oral, Nightly  pantoprazole, 40 mg, oral, Daily before breakfast  polyethylene glycol, 17 g, oral, Daily  sennosides-docusate sodium, 2 tablet, oral, BID  tamsulosin, 0.4 mg, oral, Nightly       PRN medications: acetaminophen **OR** [DISCONTINUED] acetaminophen **OR** [DISCONTINUED] acetaminophen, benzocaine-menthol,  calcium carbonate, cloNIDine, cyclobenzaprine, dextrose, dextrose, dicyclomine, glucagon, glucagon, hydrALAZINE, hydrOXYzine pamoate, ibuprofen, loperamide, melatonin, ondansetron         Last Recorded Vitals  /73 (BP Location: Right arm, Patient Position: Lying)   Pulse 86   Temp 37.3 °C (99.1 °F) (Temporal)   Resp 18   Wt 86.2 kg (190 lb)   SpO2 94%   Intake/Output last 3 Shifts:    Intake/Output Summary (Last 24 hours) at 3/29/2025 1452  Last data filed at 3/29/2025 0701  Gross per 24 hour   Intake 450 ml   Output 750 ml   Net -300 ml       Admission Weight  Weight: 86.2 kg (190 lb) (03/23/25 0230)    Daily Weight  03/23/25 : 86.2 kg (190 lb)    Image Results  XR thoracic spine 3 views  Narrative: Interpreted By:  Clinton Ortiz,   STUDY:  XR THORACIC SPINE 3 VIEWS; ;  3/26/2025 10:30 pm      INDICATION:  Signs/Symptoms:fall.          COMPARISON:  None.      ACCESSION NUMBER(S):  [id number]      ORDERING CLINICIAN:  JAY CAICEDO      FINDINGS:  Thoracic spine, four views      There is moderate multilevel disc space narrowing with osteophytosis  and sclerosis throughout the thoracic spine. No fracture or  spondylolisthesis seen. Partially visualized hardware in the lower  cervical spine.      Impression: Moderate multilevel spondylosis throughout the thoracic spine      MACRO:  None      Signed by: Clinton Ortiz 3/26/2025 10:34 PM  Dictation workstation:   [id number]  XR lumbar spine 2-3 views  Narrative: Interpreted By:  Clinton Ortiz,   STUDY:  XR LUMBAR SPINE 2-3 VIEWS; ;  3/26/2025 10:30 pm      INDICATION:  Signs/Symptoms:fall.          COMPARISON:  03/02/2025      ACCESSION NUMBER(S):  [id number]      ORDERING CLINICIAN:  JAY CAICEDO      FINDINGS:  Lumbar spine, three views      Interval laminectomy and posterior fusion of L3-L5 with intact  hardware. Moderate multilevel spondylotic changes. No acute fracture  or dislocation seen      Impression:     Interval L3-L5 posterior fusion with  intact hardware. Moderate  multilevel spondylosis      MACRO:  None      Signed by: Clinton Ortiz 3/26/2025 10:34 PM  Dictation workstation:   [id number]      Physical Exam  Vitals reviewed.   Constitutional:       General: She is awake. She is not in acute distress.     Appearance: She is ill-appearing. She is not toxic-appearing.   HENT:      Head: Normocephalic and atraumatic.      Right Ear: External ear normal.      Left Ear: External ear normal.      Nose: Nose normal.      Mouth/Throat:      Mouth: Mucous membranes are moist.   Eyes:      Extraocular Movements: Extraocular movements intact.   Pulmonary:      Effort: Pulmonary effort is normal. No respiratory distress.   Abdominal:      General: There is no distension.   Musculoskeletal:         General: No deformity or signs of injury. Normal range of motion.      Cervical back: Normal range of motion.   Skin:     Coloration: Skin is not jaundiced.   Neurological:      General: No focal deficit present.      Mental Status: She is alert and oriented to person, place, and time. Mental status is at baseline.   Psychiatric:         Mood and Affect: Mood normal.         Speech: Speech normal.         Behavior: Behavior normal. Behavior is cooperative.         Relevant Results    acetaminophen, 975 mg, oral, q8h  buprenorphine, 2 mg, sublingual, Daily  enoxaparin, 40 mg, subcutaneous, q24h  FLUoxetine, 40 mg, oral, Nightly  gabapentin, 300 mg, oral, TID  insulin lispro, 0-5 Units, subcutaneous, TID AC  lamoTRIgine, 100 mg, oral, Daily  lisinopril 10 mg, hydroCHLOROthiazide 12.5 mg for Zestoretic/Prinizide, , oral, Daily  metFORMIN, 500 mg, oral, Nightly  pantoprazole, 40 mg, oral, Daily before breakfast  polyethylene glycol, 17 g, oral, Daily  sennosides-docusate sodium, 2 tablet, oral, BID  tamsulosin, 0.4 mg, oral, Nightly      PRN medications: acetaminophen **OR** [DISCONTINUED] acetaminophen **OR** [DISCONTINUED] acetaminophen, benzocaine-menthol, calcium  carbonate, cloNIDine, cyclobenzaprine, dextrose, dextrose, dicyclomine, glucagon, glucagon, hydrALAZINE, hydrOXYzine pamoate, ibuprofen, loperamide, melatonin, ondansetron         XR thoracic spine 3 views    Result Date: 3/26/2025  Interpreted By:  Clinton Ortiz, STUDY: XR THORACIC SPINE 3 VIEWS; ;  3/26/2025 10:30 pm   INDICATION: Signs/Symptoms:fall.     COMPARISON: None.   ACCESSION NUMBER(S): [id number]   ORDERING CLINICIAN: JAY CAICEDO   FINDINGS: Thoracic spine, four views   There is moderate multilevel disc space narrowing with osteophytosis and sclerosis throughout the thoracic spine. No fracture or spondylolisthesis seen. Partially visualized hardware in the lower cervical spine.       Moderate multilevel spondylosis throughout the thoracic spine   MACRO: None   Signed by: Clinton Ortiz 3/26/2025 10:34 PM Dictation workstation:   [id number]    XR lumbar spine 2-3 views    Result Date: 3/26/2025  Interpreted By:  Clinton Ortiz, STUDY: XR LUMBAR SPINE 2-3 VIEWS; ;  3/26/2025 10:30 pm   INDICATION: Signs/Symptoms:fall.     COMPARISON: 03/02/2025   ACCESSION NUMBER(S): [id number]   ORDERING CLINICIAN: JAY CAICEDO   FINDINGS: Lumbar spine, three views   Interval laminectomy and posterior fusion of L3-L5 with intact hardware. Moderate multilevel spondylotic changes. No acute fracture or dislocation seen         Interval L3-L5 posterior fusion with intact hardware. Moderate multilevel spondylosis   MACRO: None   Signed by: Clinton Ortiz 3/26/2025 10:34 PM Dictation workstation:   [id number]    CT thoracic spine wo IV contrast    Result Date: 3/23/2025  Interpreted By:  Patti Rush, STUDY: CT THORACIC SPINE WO IV CONTRAST; CT LUMBAR SPINE WO IV CONTRAST; 3/23/2025 3:47 am   INDICATION: Signs/Symptoms:trauma.   COMPARISON: Lumbar spine CT 03/03/2025   ACCESSION NUMBER(S): [id number]; [id number]   ORDERING CLINICIAN: SHERRIE CHO   TECHNIQUE: Axial noncontrast images of the thoracic and  lumbar spine with coronal and sagittal reconstructed images.   FINDINGS: THORACIC SPINE: ALIGNMENT: Mild dextroconvex curvature. No traumatic malalignment. VERTEBRAE: No acute fracture. Mild superior endplate compression deformity of T3 with less than 25% vertebral body height loss appears chronic. SPINAL CANAL: Diffuse degenerative disc changes with prominent anterior osteophytes. No critical spinal canal stenosis. PREVERTEBRAL SOFT TISSUES: No prevertebral soft tissue swelling.   LUMBAR SPINE: ALIGNMENT: Levoconvex curvature centered at L3. Grade 1 anterolisthesis of L3 on L4 and L4-L5, likely on a degenerative basis. VERTEBRAE: Right L4 hemilaminectomy and left L5 hemilaminectomy. Posterior fusion hardware and interbody spacers span L3 through L5. No lucency around the hardware to suggest loosening. SPINAL CANAL: Moderate disc space loss, vacuum disc phenomenon and disc bulge at L1-2. Moderate to severe disc space loss, disc bulge and vacuum disc at L5-S1. T11-12: Left paracentral disc bulge resulting in mild spinal canal and left neural foraminal stenosis. T12-L1: Left paracentral disc bulge resulting in mild effacement of the ventral thecal sac. No significant neural foraminal stenosis. L1-2: Posterior disc osteophyte complex, ligamentum flavum hypertrophy and facet arthropathy result in mild-to-moderate spinal canal stenosis and bilateral neural foraminal stenosis, right greater than left. L2-3: Right intraforaminal disc component and facet arthropathy resulting in neural foraminal stenosis. No significant spinal canal stenosis. L3-4: Limited due to streak artifact. L4-5: Limited due to streak artifact. L5-S1: No significant spinal canal stenosis. Moderate left neural foraminal stenosis. PREVERTEBRAL SOFT TISSUES: No prevertebral soft tissue swelling. Mild nonspecific subcutaneous edema of the lower back. No organized drainable fluid collection is seen.   OTHER FINDINGS: Coronary artery calcifications noted.  Quinn catheter in the urinary bladder.       No acute fracture or traumatic subluxation of the thoracic and lumbar spine.   Postsurgical and degenerative changes as above. No evidence of hardware complication.   MACRO: None   Signed by: Patti Rush 3/23/2025 4:04 AM Dictation workstation:   [id number]    CT lumbar spine wo IV contrast    Result Date: 3/23/2025  Interpreted By:  Patti Rush, STUDY: CT THORACIC SPINE WO IV CONTRAST; CT LUMBAR SPINE WO IV CONTRAST; 3/23/2025 3:47 am   INDICATION: Signs/Symptoms:trauma.   COMPARISON: Lumbar spine CT 03/03/2025   ACCESSION NUMBER(S): [id number]; [id number]   ORDERING CLINICIAN: SHERRIE CHO   TECHNIQUE: Axial noncontrast images of the thoracic and lumbar spine with coronal and sagittal reconstructed images.   FINDINGS: THORACIC SPINE: ALIGNMENT: Mild dextroconvex curvature. No traumatic malalignment. VERTEBRAE: No acute fracture. Mild superior endplate compression deformity of T3 with less than 25% vertebral body height loss appears chronic. SPINAL CANAL: Diffuse degenerative disc changes with prominent anterior osteophytes. No critical spinal canal stenosis. PREVERTEBRAL SOFT TISSUES: No prevertebral soft tissue swelling.   LUMBAR SPINE: ALIGNMENT: Levoconvex curvature centered at L3. Grade 1 anterolisthesis of L3 on L4 and L4-L5, likely on a degenerative basis. VERTEBRAE: Right L4 hemilaminectomy and left L5 hemilaminectomy. Posterior fusion hardware and interbody spacers span L3 through L5. No lucency around the hardware to suggest loosening. SPINAL CANAL: Moderate disc space loss, vacuum disc phenomenon and disc bulge at L1-2. Moderate to severe disc space loss, disc bulge and vacuum disc at L5-S1. T11-12: Left paracentral disc bulge resulting in mild spinal canal and left neural foraminal stenosis. T12-L1: Left paracentral disc bulge resulting in mild effacement of the ventral thecal sac. No significant neural foraminal stenosis. L1-2: Posterior disc  osteophyte complex, ligamentum flavum hypertrophy and facet arthropathy result in mild-to-moderate spinal canal stenosis and bilateral neural foraminal stenosis, right greater than left. L2-3: Right intraforaminal disc component and facet arthropathy resulting in neural foraminal stenosis. No significant spinal canal stenosis. L3-4: Limited due to streak artifact. L4-5: Limited due to streak artifact. L5-S1: No significant spinal canal stenosis. Moderate left neural foraminal stenosis. PREVERTEBRAL SOFT TISSUES: No prevertebral soft tissue swelling. Mild nonspecific subcutaneous edema of the lower back. No organized drainable fluid collection is seen.   OTHER FINDINGS: Coronary artery calcifications noted. Quinn catheter in the urinary bladder.       No acute fracture or traumatic subluxation of the thoracic and lumbar spine.   Postsurgical and degenerative changes as above. No evidence of hardware complication.   MACRO: None   Signed by: Patti Rush 3/23/2025 4:04 AM Dictation workstation:   [id number]    CT abdomen pelvis wo IV contrast    Result Date: 3/12/2025  Interpreted By:  Elmira Orozco, STUDY: CT ABDOMEN PELVIS WO IV CONTRAST;  3/11/2025 11:58 pm   INDICATION: Signs/Symptoms:uti with back pain.   COMPARISON: CT abdomen and pelvis 03/13/2024   ACCESSION NUMBER(S): [id number]   ORDERING CLINICIAN: SARBJIT FONSECA   TECHNIQUE: CT of the abdomen and pelvis was performed with no contrast administration. Coronal and sagittal reformats were obtained.   FINDINGS: LOWER CHEST: No focal consolidation or pleural effusion.   ABDOMEN:   LIVER: Unremarkable unenhanced appearance.   BILE DUCTS: No obvious dilation.   GALLBLADDER: The gallbladder is surgically absent.   PANCREAS: No peripancreatic inflammatory changes.   SPLEEN: Unremarkable unenhanced appearance.   ADRENAL GLANDS: Unremarkable.   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 No hydronephrosis or hydroureter.  No  obstructing ureteral calculi.   BOWEL: No bowel obstruction. There is a large amount of stool at the colon. The appendix is normal.   VESSELS: Atherosclerotic calcifications within the abdominal aorta and its branches. No abdominal aortic aneurysm.   PELVIS: The urinary bladder is  decompressed by a Quinn catheter. There is a small amount of gas at the urinary bladder, may be secondary to recent instrumentation. The uterus is present.   PERITONEUM/RETROPERITONEUM/LYMPH NODES: No free fluid or free air.  No retroperitoneal hemorrhage.  No pathologically enlarged lymph nodes are identified.   ABDOMINAL WALL: Postsurgical changes are noted at the anterior abdominal wall.   BONE AND SOFT TISSUE: Multilevel degenerative changes at the spine. Postsurgical changes at the lumbar spine with posterior orthopedic hardware at L3, L4 and L5. Intervertebral disc spacers at L3-L4 and L4-L5.  Scattered gas foci with small amount of fluid at the paraspinal soft tissues overlying the lower lumbar spine, probably secondary to recent surgery.   IMPRESSION 1.  No hydronephrosis or obstructing ureteral calculi. No acute findings on unenhanced CT. 2. Constipation with large amount of stool at the colon.       MACRO: None.   Signed by: Elmira Orozco 3/12/2025 12:58 AM Dictation workstation:   [id number]    Bedside Midline Imaging    Result Date: 3/7/2025  These images are not reportable by radiology and will not be interpreted by  Radiologists.    FL fluoro images no charge    Result Date: 3/5/2025  These images are not reportable by radiology and will not be interpreted by  Radiologists.    CT lumbar spine wo IV contrast    Result Date: 3/3/2025  Interpreted By:  Chris June, STUDY: CT LUMBAR SPINE WO IV CONTRAST;  3/3/2025 3:08 pm   INDICATION: Signs/Symptoms:thin cut CT for OR planning.     COMPARISON: Plain film and MRI lumbar spine yesterday.   ACCESSION NUMBER(S): [id number]   ORDERING CLINICIAN: LIANA CARREON    TECHNIQUE: Axial CT images of the lumbar spine are obtained. Axial, coronal and sagittal reconstructions are provided for review.   FINDINGS: 5 lumbar type vertebrae.   Mild-to-moderate rotatory levoscoliosis. Grade 1 left lateral translation L4 on L5. Mild degree spondylolisthesis at multiple levels.   No compression deformity or destructive osseous process. Moderately severe asymmetric loss of disc height L5-S1 with associated vacuum disc phenomenon. Severe loss of disc height L4-5 with associated vacuum disc phenomenon. Moderate asymmetric loss of disc height L3-4 with associated vacuum disc phenomenon. Mild-to-moderate asymmetric loss of disc height L1-2 with associated vacuum disc phenomenon.   Moderate to advanced facet arthrosis seen throughout the lumbar spine.   For detailed analysis of central canal and foraminal stenosis at each level see report of recent MRI of the lumbar spine.   Degenerative changes of the left-greater-than-right SI joints.       1. Mild-to-moderate rotatory levoscoliosis. 2. Moderate to advanced multilevel degenerative lumbar spondylosis.   MACRO: None   Signed by: Chris June 3/3/2025 3:31 PM Dictation workstation:   [id number]    MR lumbar spine wo IV contrast    Result Date: 3/2/2025  Interpreted By:  Chris June, STUDY: MRI of the lumbar spine without IV contrast;  3/2/2025 10:00 am   INDICATION: Signs/Symptoms:low back pain, worsening leg/hip pain.     COMPARISON: CT lumbar spine 02/27/2025.   ACCESSION NUMBER(S): [id number]   ORDERING CLINICIAN: CHRISTI BAXTER   TECHNIQUE: Sagittal STIR, T1- and T2-weighted as well as axial T1- and T2- weighted MRI images of the lumbar spine were acquired using a spondylolysis protocol.  No contrast was administered.   FINDINGS: Mild-to-moderate rotatory levoscoliosis. Mild degree spondylolisthesis at multiple levels.   No compression deformity or destructive osseous process. Severe loss of disc height L4-5. Moderately severe  asymmetric loss of disc height L5-S1. Moderate asymmetric loss of disc height L3-4. Disc desiccated throughout the lumbar spine. Predominant Modic type 2 endplate degenerative changes at multiple levels. Mild Modic type 1 endplate degenerative changes L3-4 to L5-S1.   Normal conus termination.   LEVELS: L5-S1: Disc bulge lateralized more left than right. Moderately advanced left and moderate right facet arthrosis. No significant central canal stenosis. Moderate narrowing of the left lateral recess. Moderate right and moderately severe left foraminal stenosis. L4-L5: Grade 1 anterolisthesis. Disc bulge lateralized to both sides. Advanced facet arthrosis. Ligamentous thickening. Severe central canal and bilateral lateral recess stenosis. Moderately severe bilateral foraminal stenosis. L3-L4: Trace anterolisthesis. Disc bulge lateralized is more right than left. Advanced facet arthrosis. Ligamentous thickening. Moderately severe trefoil central canal stenosis. Severe right and moderately severe left lateral recess stenosis. Severe right and mild-to-moderate left foraminal stenosis. L2-L3: Disc bulge lateralized slightly more right than left. Moderately advanced right and moderate left facet arthrosis. Ligamentous thickening. Mild-to-moderate central canal stenosis. Moderate narrowing of the bilateral lateral recesses. Moderately severe right and mild-to-moderate left foraminal stenosis. L1-L2: Disc bulge lateralized to both sides. Moderate bilateral foraminal stenosis. Mild ligamentous thickening. Mild-to-moderate trefoil central canal stenosis. Moderately severe left lateral recess stenosis. Severe right and moderately severe left foraminal stenosis.       1. Mild-to-moderate rotatory levoscoliosis. 2. Moderate to advanced multilevel degenerative lumbar spondylosis as described in detail above.       MACRO: None   Signed by: Chris June 3/2/2025 6:26 PM Dictation workstation:   [id number]    XR lumbar spine 4+  views w flexion extension    Result Date: 3/2/2025  Interpreted By:  Mumtaz Johnson, STUDY: XR LUMBAR SPINE 4+ VIEWS WITH FLEXION EXTENSION; ;  3/2/2025 2:22 pm   INDICATION: Signs/Symptoms:eval alignment, please perform upright if able, if not then sitting.     COMPARISON: CT 02/27/2025.   ACCESSION NUMBER(S): [id number]   ORDERING CLINICIAN: CHRISTI BAXTER   FINDINGS: AP and lateral views of the lumbosacral spine   Mild serpentine scoliotic curve of the thoracolumbar spine. Grade 1 anterolisthesis at L3-4 and L4-5 without significant change in flexion or extension. Moderate to severe multilevel degenerative changes of the spine with large anterior osteophytes and facet arthropathy and disc space narrowing. Unchanged anterior vertebral body height loss at T11, less than 25% and unchanged from radiographs from 2018. No new vertebral body height loss.       1. Moderate to severe degenerative changes of the spine with grade 1 anterolisthesis at L3-4 and L4-5, unchanged in flexion and extension. This is better seen on prior CT.       MACRO: None   Signed by: Mumtaz Johnson 3/2/2025 2:28 PM Dictation workstation:   [id number]    XR chest 1 view    Result Date: 3/2/2025  Interpreted By:  Gabby Goldman,  and Talon Khan STUDY: XR CHEST 1 VIEW;  3/2/2025 2:01 am   INDICATION: Signs/Symptoms:preop.     COMPARISON: Chest x-ray 02/20/2024   ACCESSION NUMBER(S): [id number]   ORDERING CLINICIAN: CHRISTI BAXTER   FINDINGS: AP radiograph of the chest was provided.   Median sternotomy wires in place. Partially visualized cervical spinal fusion hardware.   CARDIOMEDIASTINAL SILHOUETTE: Cardiomediastinal silhouette is normal in size and configuration.   LUNGS: No focal consolidation, sizeable pleural effusion or pneumothorax.   ABDOMEN: No remarkable upper abdominal findings.   BONES: No acute osseous changes.       1.  No evidence of acute cardiopulmonary process.   I personally reviewed the  images/study and I agree with the findings as stated by resident physician Dr. Bill Bullard . This study was interpreted at [facility], [place], [place].   MACRO: None   Signed by: Bandarlobodarius Leedebra Adair 3/2/2025 10:42 AM Dictation workstation:   [id number]    CT lumbar spine wo IV contrast    Result Date: 2/27/2025  Interpreted By:  Ponce Denney, STUDY: CT LUMBAR SPINE WO IV CONTRAST; 2/27/2025 3:11 pm   INDICATION: Signs/Symptoms:Back pain;   COMPARISON: None   ACCESSION NUMBER(S): [id number]   ORDERING CLINICIAN: ANDREA LOU   TECHNIQUE: Contiguous axial images of the lumbar spine were performed. Coronal and sagittal reformatted images were also obtained. All CT examinations are performed with 1 or more of the following dose reduction techniques: Automated exposure control, adjustment of mA and/or kv according to patient's size, or use of iterative reconstruction techniques.   FINDINGS: No evidence for acute fracture. Levoscoliosis is present. Advanced facet degenerative changes. There is a normal lumbar lordosis however there is minimal degenerative anterolisthesis of L3 on L4 and L4 on L5.   The vertebral bodies are normal in height and configuration.   At L5-S1, there is moderate-severe disc space narrowing, vacuum disc phenomena and diffuse disc bulging. No evidence for significant bony spinal canal stenosis. However there is severe left neural foramina stenosis and moderate right neural foramina stenosis.   At L4-5, there is severe disc space narrowing, vacuum disc phenomena, diffuse disc bulging, suggestion of severe canal stenosis due to disc bulging as well as severe facet and ligamentum flavum hypertrophy. There is moderate-severe bilateral neural foramina stenosis at this level.   At L3-4, there is moderate disc space narrowing asymmetrically worsened towards the right with vacuum disc phenomena and diffuse disc bulging. There is moderate-severe canal  stenosis. Moderate-severe right neural foramina stenosis and mild-moderate left neural foramina stenosis.   At L2-3, there is facet and ligamentum flavum hypertrophy disc space narrowing asymmetrically greater toward the right, diffuse disc bulging. Moderate to moderate-severe canal stenosis. Moderate-severe right neural foramina stenosis. No significant left neural foramina stenosis.   At L1-2 there is vacuum disc phenomena and asymmetric disc space narrowing towards the right, diffuse disc bulging, mild to mild-moderate lateral recess stenosis and at least mild canal stenosis. Moderate-severe right neural foramina stenosis. Mild-moderate left neural foramina stenosis.   At T12-L1, there is diffuse disc bulging. No evidence for high-grade spinal canal stenosis. Mild-moderate left neural foramina stenosis.   The SI joints show degenerative gas but otherwise intact.       No acute fracture or traumatic malalignment.   Advanced chronic degenerative changes as described in the body of the report with multilevel high-grade spinal canal stenosis and high-grade neural foramina stenosis.     Signed by: Ponce Denney 2/27/2025 4:03 PM Dictation workstation:   [id number]    CT pelvis wo IV contrast    Result Date: 2/27/2025  Interpreted By:  Yosvany Christopher, STUDY: CT PELVIS WO IV CONTRAST; CT HIP RIGHT WO IV CONTRAST; ;  2/27/2025 3:11 pm   INDICATION: Signs/Symptoms:Right hip pain.     COMPARISON: Previous CT of the abdomen and pelvis March 13, 2024   Plain film radiographs of the right hip performed on February 11, 2025     ACCESSION NUMBER(S): [id number]; [id number]   ORDERING CLINICIAN: ANDREA LOU   TECHNIQUE: Multiple thin-section axial images of the right hip reconstructed in the sagittal and coronal plane.   Additional dedicated thin-section axial images of the pelvis also performed.     FINDINGS: Moderate osteoarthritis of the right hip.   Similar appearance left hip.   Small bilateral trochanteric spurs.    No evidence of right hip fracture.   No bony destructive lesions.   Advanced lower lumbar degenerative change.   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   No muscular attenuation changes.   No evidence of a soft tissue mass.   No focal fluid collections.   No sizeable effusion.       Surgical clips from previous hernia repair along the anterior abdominal wall.         Degenerative changes bilateral hips, sacroiliac joints, and lower lumbar spine.   No acute findings right hip.     MACRO: None   Signed by: Yosvany Christopher 2/27/2025 3:24 PM Dictation workstation:   [id number]    CT hip right wo IV contrast    Result Date: 2/27/2025  Interpreted By:  Yosvany Christopher, STUDY: CT PELVIS WO IV CONTRAST; CT HIP RIGHT WO IV CONTRAST; ;  2/27/2025 3:11 pm   INDICATION: Signs/Symptoms:Right hip pain.     COMPARISON: Previous CT of the abdomen and pelvis March 13, 2024   Plain film radiographs of the right hip performed on February 11, 2025     ACCESSION NUMBER(S): [id number]; [id number]   ORDERING CLINICIAN: ANDREA LOU   TECHNIQUE: Multiple thin-section axial images of the right hip reconstructed in the sagittal and coronal plane.   Additional dedicated thin-section axial images of the pelvis also performed.     FINDINGS: Moderate osteoarthritis of the right hip.   Similar appearance left hip.   Small bilateral trochanteric spurs.   No evidence of right hip fracture.   No bony destructive lesions.   Advanced lower lumbar degenerative change.   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   No muscular attenuation changes.   No evidence of a soft tissue mass.   No focal fluid collections.   No sizeable effusion.       Surgical clips from previous hernia repair along the anterior abdominal wall.         Degenerative changes bilateral hips, sacroiliac joints, and lower lumbar spine.   No acute findings right hip.     MACRO: None   Signed by: Yosvany Christopher  "2/27/2025 3:24 PM Dictation workstation:   [id number]       Results from last 7 days   Lab Units 03/29/25  0607 03/28/25  0522 03/27/25  0500   WBC AUTO x10*3/uL 10.4 13.2* 17.9*   RBC AUTO x10*6/uL 4.10 4.17 3.89*   HEMOGLOBIN g/dL 12.4 12.3 11.9*   HEMATOCRIT % 37.5 40.2 36.3   MCV fL 92 96 93   PLATELETS AUTO x10*3/uL 351 318 288           No lab exists for component: \"B12\"      Results from last 7 days   Lab Units 03/29/25  0607 03/28/25  0522 03/27/25  0500   NEUTROS ABS x10*3/uL 6.86 9.64* 14.28*   IG PCT AUTO % 2.5* 1.5* 1.3*   BASOS ABS AUTO x10*3/uL 0.09 0.09 0.06   EOS ABS AUTO x10*3/uL 0.44 0.57 0.58   LYMPHS ABS AUTO x10*3/uL 1.97 1.96 1.59         Results from last 7 days   Lab Units 03/29/25  0607 03/28/25  0522 03/27/25  0500   SODIUM mmol/L 131* 133* 132*   POTASSIUM mmol/L 3.7 4.0 3.2*   CHLORIDE mmol/L 95* 97* 98   CO2 mmol/L 25 27 24   BUN mg/dL 10 8 11   CREATININE mg/dL 0.56 0.54 0.54   CALCIUM mg/dL 10.3 10.1 9.4   MAGNESIUM mg/dL  --   --  1.84   BILIRUBIN TOTAL mg/dL 0.5 0.4 0.5   ALT U/L 16 15 14   AST U/L 16 16 22     Results from last 7 days   Lab Units 03/23/25  0308   COLOR U  Light-Yellow   APPEARANCE U  Turbid*   PH U  7.5   SPEC GRAV UR  1.018   PROTEIN U mg/dL 20 (TRACE)   BLOOD UR mg/dL NEGATIVE   NITRITE U  NEGATIVE   WBC UR /HPF 1-5                                Khari Arredondo, DO          "

## 2025-03-29 NOTE — CARE PLAN
The patient's goals for the shift include      The clinical goals for the shift include HDS/safety/comfort      Problem: Pain - Adult  Goal: Verbalizes/displays adequate comfort level or baseline comfort level  Outcome: Progressing     Problem: Safety - Adult  Goal: Free from fall injury  Outcome: Progressing     Problem: Discharge Planning  Goal: Discharge to home or other facility with appropriate resources  Outcome: Progressing     Problem: Chronic Conditions and Co-morbidities  Goal: Patient's chronic conditions and co-morbidity symptoms are monitored and maintained or improved  Outcome: Progressing     Problem: Nutrition  Goal: Nutrient intake appropriate for maintaining nutritional needs  Outcome: Progressing     Problem: Skin  Goal: Promote skin healing  Outcome: Progressing     Problem: Pain  Goal: Takes deep breaths with improved pain control throughout the shift  Outcome: Progressing  Goal: Turns in bed with improved pain control throughout the shift  Outcome: Progressing  Goal: Walks with improved pain control throughout the shift  Outcome: Progressing  Goal: Performs ADL's with improved pain control throughout shift  Outcome: Progressing  Goal: Participates in PT with improved pain control throughout the shift  Outcome: Progressing  Goal: Free from opioid side effects throughout the shift  Outcome: Progressing  Goal: Free from acute confusion related to pain meds throughout the shift  Outcome: Progressing

## 2025-03-30 VITALS
BODY MASS INDEX: 28.14 KG/M2 | DIASTOLIC BLOOD PRESSURE: 85 MMHG | HEART RATE: 82 BPM | RESPIRATION RATE: 17 BRPM | OXYGEN SATURATION: 94 % | SYSTOLIC BLOOD PRESSURE: 183 MMHG | WEIGHT: 190 LBS | TEMPERATURE: 98.4 F | HEIGHT: 69 IN

## 2025-03-30 LAB
ALBUMIN SERPL BCP-MCNC: 4 G/DL (ref 3.4–5)
ALP SERPL-CCNC: 101 U/L (ref 33–136)
ALT SERPL W P-5'-P-CCNC: 15 U/L (ref 7–45)
ANION GAP SERPL CALC-SCNC: 15 MMOL/L (ref 10–20)
ANION GAP SERPL CALC-SCNC: 15 MMOL/L (ref 10–20)
AST SERPL W P-5'-P-CCNC: 14 U/L (ref 9–39)
BASOPHILS # BLD AUTO: 0.15 X10*3/UL (ref 0–0.1)
BASOPHILS NFR BLD AUTO: 1.3 %
BILIRUB SERPL-MCNC: 0.5 MG/DL (ref 0–1.2)
BUN SERPL-MCNC: 11 MG/DL (ref 6–23)
BUN SERPL-MCNC: 11 MG/DL (ref 6–23)
CALCIUM SERPL-MCNC: 10.2 MG/DL (ref 8.6–10.3)
CALCIUM SERPL-MCNC: 9.9 MG/DL (ref 8.6–10.3)
CHLORIDE SERPL-SCNC: 93 MMOL/L (ref 98–107)
CHLORIDE SERPL-SCNC: 94 MMOL/L (ref 98–107)
CO2 SERPL-SCNC: 23 MMOL/L (ref 21–32)
CO2 SERPL-SCNC: 23 MMOL/L (ref 21–32)
CREAT SERPL-MCNC: 0.6 MG/DL (ref 0.5–1.05)
CREAT SERPL-MCNC: 0.61 MG/DL (ref 0.5–1.05)
EGFRCR SERPLBLD CKD-EPI 2021: >90 ML/MIN/1.73M*2
EGFRCR SERPLBLD CKD-EPI 2021: >90 ML/MIN/1.73M*2
EOSINOPHIL # BLD AUTO: 0.34 X10*3/UL (ref 0–0.7)
EOSINOPHIL NFR BLD AUTO: 2.9 %
ERYTHROCYTE [DISTWIDTH] IN BLOOD BY AUTOMATED COUNT: 13.2 % (ref 11.5–14.5)
GLUCOSE BLD MANUAL STRIP-MCNC: 165 MG/DL (ref 74–99)
GLUCOSE BLD MANUAL STRIP-MCNC: 172 MG/DL (ref 74–99)
GLUCOSE BLD MANUAL STRIP-MCNC: 196 MG/DL (ref 74–99)
GLUCOSE BLD MANUAL STRIP-MCNC: 203 MG/DL (ref 74–99)
GLUCOSE SERPL-MCNC: 183 MG/DL (ref 74–99)
GLUCOSE SERPL-MCNC: 222 MG/DL (ref 74–99)
HCT VFR BLD AUTO: 42.5 % (ref 36–46)
HGB BLD-MCNC: 13.5 G/DL (ref 12–16)
IMM GRANULOCYTES # BLD AUTO: 0.53 X10*3/UL (ref 0–0.7)
IMM GRANULOCYTES NFR BLD AUTO: 4.6 % (ref 0–0.9)
LYMPHOCYTES # BLD AUTO: 2.49 X10*3/UL (ref 1.2–4.8)
LYMPHOCYTES NFR BLD AUTO: 21.5 %
MCH RBC QN AUTO: 29.5 PG (ref 26–34)
MCHC RBC AUTO-ENTMCNC: 31.8 G/DL (ref 32–36)
MCV RBC AUTO: 93 FL (ref 80–100)
MONOCYTES # BLD AUTO: 0.9 X10*3/UL (ref 0.1–1)
MONOCYTES NFR BLD AUTO: 7.8 %
NEUTROPHILS # BLD AUTO: 7.18 X10*3/UL (ref 1.2–7.7)
NEUTROPHILS NFR BLD AUTO: 61.9 %
NRBC BLD-RTO: 0 /100 WBCS (ref 0–0)
PLATELET # BLD AUTO: 393 X10*3/UL (ref 150–450)
POTASSIUM SERPL-SCNC: 3.2 MMOL/L (ref 3.5–5.3)
POTASSIUM SERPL-SCNC: 3.3 MMOL/L (ref 3.5–5.3)
PROT SERPL-MCNC: 7.6 G/DL (ref 6.4–8.2)
RBC # BLD AUTO: 4.58 X10*6/UL (ref 4–5.2)
SODIUM SERPL-SCNC: 128 MMOL/L (ref 136–145)
SODIUM SERPL-SCNC: 129 MMOL/L (ref 136–145)
WBC # BLD AUTO: 11.6 X10*3/UL (ref 4.4–11.3)

## 2025-03-30 PROCEDURE — 2500000004 HC RX 250 GENERAL PHARMACY W/ HCPCS (ALT 636 FOR OP/ED): Performed by: INTERNAL MEDICINE

## 2025-03-30 PROCEDURE — 84075 ASSAY ALKALINE PHOSPHATASE: CPT | Performed by: STUDENT IN AN ORGANIZED HEALTH CARE EDUCATION/TRAINING PROGRAM

## 2025-03-30 PROCEDURE — 2500000002 HC RX 250 W HCPCS SELF ADMINISTERED DRUGS (ALT 637 FOR MEDICARE OP, ALT 636 FOR OP/ED): Performed by: INTERNAL MEDICINE

## 2025-03-30 PROCEDURE — 2500000005 HC RX 250 GENERAL PHARMACY W/O HCPCS: Performed by: INTERNAL MEDICINE

## 2025-03-30 PROCEDURE — 2500000001 HC RX 250 WO HCPCS SELF ADMINISTERED DRUGS (ALT 637 FOR MEDICARE OP): Performed by: INTERNAL MEDICINE

## 2025-03-30 PROCEDURE — 1100000001 HC PRIVATE ROOM DAILY

## 2025-03-30 PROCEDURE — 82947 ASSAY GLUCOSE BLOOD QUANT: CPT

## 2025-03-30 PROCEDURE — 2500000002 HC RX 250 W HCPCS SELF ADMINISTERED DRUGS (ALT 637 FOR MEDICARE OP, ALT 636 FOR OP/ED): Performed by: NURSE PRACTITIONER

## 2025-03-30 PROCEDURE — 36415 COLL VENOUS BLD VENIPUNCTURE: CPT | Performed by: STUDENT IN AN ORGANIZED HEALTH CARE EDUCATION/TRAINING PROGRAM

## 2025-03-30 PROCEDURE — 2500000002 HC RX 250 W HCPCS SELF ADMINISTERED DRUGS (ALT 637 FOR MEDICARE OP, ALT 636 FOR OP/ED): Performed by: STUDENT IN AN ORGANIZED HEALTH CARE EDUCATION/TRAINING PROGRAM

## 2025-03-30 PROCEDURE — 80048 BASIC METABOLIC PNL TOTAL CA: CPT | Mod: CCI | Performed by: STUDENT IN AN ORGANIZED HEALTH CARE EDUCATION/TRAINING PROGRAM

## 2025-03-30 PROCEDURE — 2500000001 HC RX 250 WO HCPCS SELF ADMINISTERED DRUGS (ALT 637 FOR MEDICARE OP): Performed by: STUDENT IN AN ORGANIZED HEALTH CARE EDUCATION/TRAINING PROGRAM

## 2025-03-30 PROCEDURE — 2500000001 HC RX 250 WO HCPCS SELF ADMINISTERED DRUGS (ALT 637 FOR MEDICARE OP): Performed by: NURSE PRACTITIONER

## 2025-03-30 PROCEDURE — 2500000004 HC RX 250 GENERAL PHARMACY W/ HCPCS (ALT 636 FOR OP/ED): Performed by: STUDENT IN AN ORGANIZED HEALTH CARE EDUCATION/TRAINING PROGRAM

## 2025-03-30 PROCEDURE — 85025 COMPLETE CBC W/AUTO DIFF WBC: CPT | Performed by: STUDENT IN AN ORGANIZED HEALTH CARE EDUCATION/TRAINING PROGRAM

## 2025-03-30 PROCEDURE — 99233 SBSQ HOSP IP/OBS HIGH 50: CPT | Performed by: STUDENT IN AN ORGANIZED HEALTH CARE EDUCATION/TRAINING PROGRAM

## 2025-03-30 RX ORDER — POTASSIUM CHLORIDE 20 MEQ/1
40 TABLET, EXTENDED RELEASE ORAL ONCE
Status: COMPLETED | OUTPATIENT
Start: 2025-03-30 | End: 2025-03-30

## 2025-03-30 RX ADMIN — HYDROXYZINE PAMOATE 25 MG: 25 CAPSULE ORAL at 20:59

## 2025-03-30 RX ADMIN — GABAPENTIN 300 MG: 300 CAPSULE ORAL at 08:20

## 2025-03-30 RX ADMIN — INSULIN LISPRO 2 UNITS: 100 INJECTION, SOLUTION INTRAVENOUS; SUBCUTANEOUS at 08:15

## 2025-03-30 RX ADMIN — HYDRALAZINE HYDROCHLORIDE 5 MG: 20 INJECTION INTRAMUSCULAR; INTRAVENOUS at 03:13

## 2025-03-30 RX ADMIN — ACETAMINOPHEN 975 MG: 325 TABLET, FILM COATED ORAL at 06:20

## 2025-03-30 RX ADMIN — PROCHLORPERAZINE MALEATE 10 MG: 10 TABLET ORAL at 00:25

## 2025-03-30 RX ADMIN — TAMSULOSIN HYDROCHLORIDE 0.4 MG: 0.4 CAPSULE ORAL at 20:57

## 2025-03-30 RX ADMIN — BUPRENORPHINE 2 MG: 2 TABLET SUBLINGUAL at 08:19

## 2025-03-30 RX ADMIN — GABAPENTIN 300 MG: 300 CAPSULE ORAL at 20:57

## 2025-03-30 RX ADMIN — POTASSIUM CHLORIDE 40 MEQ: 1500 TABLET, EXTENDED RELEASE ORAL at 22:01

## 2025-03-30 RX ADMIN — GABAPENTIN 300 MG: 300 CAPSULE ORAL at 15:00

## 2025-03-30 RX ADMIN — LAMOTRIGINE 100 MG: 100 TABLET ORAL at 08:19

## 2025-03-30 RX ADMIN — HYDRALAZINE HYDROCHLORIDE 5 MG: 20 INJECTION INTRAMUSCULAR; INTRAVENOUS at 22:31

## 2025-03-30 RX ADMIN — POTASSIUM CHLORIDE 40 MEQ: 1500 TABLET, EXTENDED RELEASE ORAL at 11:58

## 2025-03-30 RX ADMIN — ONDANSETRON 4 MG: 2 INJECTION INTRAMUSCULAR; INTRAVENOUS at 17:39

## 2025-03-30 RX ADMIN — IBUPROFEN 600 MG: 600 TABLET, FILM COATED ORAL at 17:38

## 2025-03-30 RX ADMIN — ACETAMINOPHEN 975 MG: 325 TABLET, FILM COATED ORAL at 15:00

## 2025-03-30 RX ADMIN — FLUOXETINE HYDROCHLORIDE 40 MG: 20 CAPSULE ORAL at 20:57

## 2025-03-30 RX ADMIN — PANTOPRAZOLE SODIUM 40 MG: 40 TABLET, DELAYED RELEASE ORAL at 06:20

## 2025-03-30 RX ADMIN — INSULIN LISPRO 1 UNITS: 100 INJECTION, SOLUTION INTRAVENOUS; SUBCUTANEOUS at 11:36

## 2025-03-30 RX ADMIN — HYDROCHLOROTHIAZIDE: 12.5 TABLET ORAL at 08:19

## 2025-03-30 RX ADMIN — HYDROXYZINE PAMOATE 25 MG: 25 CAPSULE ORAL at 01:42

## 2025-03-30 RX ADMIN — ENOXAPARIN SODIUM 40 MG: 40 INJECTION SUBCUTANEOUS at 08:20

## 2025-03-30 RX ADMIN — INSULIN LISPRO 1 UNITS: 100 INJECTION, SOLUTION INTRAVENOUS; SUBCUTANEOUS at 16:57

## 2025-03-30 RX ADMIN — SENNOSIDES AND DOCUSATE SODIUM 2 TABLET: 50; 8.6 TABLET ORAL at 20:57

## 2025-03-30 RX ADMIN — METFORMIN HYDROCHLORIDE 500 MG: 500 TABLET ORAL at 20:57

## 2025-03-30 RX ADMIN — CYCLOBENZAPRINE 5 MG: 10 TABLET, FILM COATED ORAL at 17:55

## 2025-03-30 RX ADMIN — Medication 3 MG: at 20:58

## 2025-03-30 RX ADMIN — SODIUM CHLORIDE 1000 ML: 9 INJECTION, SOLUTION INTRAVENOUS at 11:39

## 2025-03-30 RX ADMIN — IBUPROFEN 600 MG: 600 TABLET, FILM COATED ORAL at 00:25

## 2025-03-30 RX ADMIN — POLYETHYLENE GLYCOL 3350 17 G: 17 POWDER, FOR SOLUTION ORAL at 08:19

## 2025-03-30 RX ADMIN — SENNOSIDES AND DOCUSATE SODIUM 2 TABLET: 50; 8.6 TABLET ORAL at 08:19

## 2025-03-30 ASSESSMENT — PAIN SCALES - GENERAL
PAINLEVEL_OUTOF10: 8
PAINLEVEL_OUTOF10: 0 - NO PAIN
PAINLEVEL_OUTOF10: 0 - NO PAIN
PAINLEVEL_OUTOF10: 8

## 2025-03-30 ASSESSMENT — PAIN - FUNCTIONAL ASSESSMENT: PAIN_FUNCTIONAL_ASSESSMENT: 0-10

## 2025-03-30 ASSESSMENT — PAIN DESCRIPTION - DESCRIPTORS: DESCRIPTORS: ACHING

## 2025-03-30 NOTE — CARE PLAN
The patient's goals for the shift include      The clinical goals for the shift include patient safety and comfort

## 2025-03-30 NOTE — DOCUMENTATION CLARIFICATION NOTE
"    PATIENT:               DAWOOD MARRUFO  ACCT #:                  [id number]  MRN:                       [id number]  :                       1960  ADMIT DATE:       3/2/2025 12:10 AM  DISCH DATE:        3/10/2025 5:16 PM  RESPONDING PROVIDER #:        [id number]          PROVIDER RESPONSE TEXT:    noted tear was expected part of surgery    CDI QUERY TEXT:    Clarification    Instruction:  Based on your assessment of the patient and the clinical information, please provide the requested documentation by clicking on the appropriate radio button and enter any additional information if prompted.    Question: Please further clarify if there was a tear noted in the thecal sac with cerebrospinal fluid egress was    When answering this query, please exercise your independent professional judgment. The fact that a question is being asked, does not imply that any particular answer is desired or expected.    The patient's clinical indicators include:  Clinical Information:  (3/05) Op Note states: \" We resected the bony elements and ligamentum flavum overlying the L4-5 disc space. We dissected free the traversing nerve root and protected the exiting one. We then swept the thecal sac medially. During the removal of the ligamentum flavum, there was a tear noted in the thecal sac with cerebrospinal fluid egress. This was inherent to the difficulty of the anatomy and critical spinal stenosis at this segment. This was repaired with duragen and duraseal exact. The operative room microscope was then brought into the field and using microsurgical technique we performed a total discectomy at L3-4. \"  Options provided:  -- noted tear was expected part of surgery  -- noted tear was an unexpected complication while performing surgery  -- Other - I will add my own diagnosis  -- Refer to Clinical Documentation Reviewer    Query created by: Aguila Dorado on 3/26/2025 4:16 PM      Electronically signed by:  VERA SOUSA MD 3/30/2025 11:55 " AM

## 2025-03-30 NOTE — CARE PLAN
Problem: Pain - Adult  Goal: Verbalizes/displays adequate comfort level or baseline comfort level  3/29/2025 2231 by Kelly Altamirano RN  Outcome: Progressing  3/29/2025 2231 by Kelly Altamirano RN  Outcome: Progressing     Problem: Safety - Adult  Goal: Free from fall injury  3/29/2025 2231 by Kelly Altamirano RN  Outcome: Progressing  3/29/2025 2231 by Kelly Altamirano RN  Outcome: Progressing     Problem: Discharge Planning  Goal: Discharge to home or other facility with appropriate resources  3/29/2025 2231 by Kelly Altamirano RN  Outcome: Progressing  3/29/2025 2231 by Kelly Altamirano RN  Outcome: Progressing     Problem: Chronic Conditions and Co-morbidities  Goal: Patient's chronic conditions and co-morbidity symptoms are monitored and maintained or improved  3/29/2025 2231 by Kelly Altamirano RN  Outcome: Progressing  3/29/2025 2231 by Kelly Altamirano RN  Outcome: Progressing     Problem: Nutrition  Goal: Nutrient intake appropriate for maintaining nutritional needs  3/29/2025 2231 by Kelly Altamirano RN  Outcome: Progressing  3/29/2025 2231 by Kelly Altamirano RN  Outcome: Progressing     Problem: Skin  Goal: Promote skin healing  3/29/2025 2231 by Kelly Altamirano RN  Outcome: Progressing  3/29/2025 2231 by Kelly Altamirano RN  Outcome: Progressing     Problem: Pain  Goal: Takes deep breaths with improved pain control throughout the shift  3/29/2025 2231 by Kelly Altamirano RN  Outcome: Progressing  3/29/2025 2231 by Kelly Altamirano RN  Outcome: Progressing  Goal: Turns in bed with improved pain control throughout the shift  3/29/2025 2231 by Kelly Altamirano RN  Outcome: Progressing  3/29/2025 2231 by Kelly Altamirano RN  Outcome: Progressing  Goal: Walks with improved pain control throughout the shift  3/29/2025 2231 by Kelly Altamirano RN  Outcome: Progressing  3/29/2025 2231 by Kelly Altamirano RN  Outcome: Progressing  Goal: Performs ADL's with improved pain control throughout shift  3/29/2025 2231 by Kelly Altamirano RN  Outcome:  Progressing  3/29/2025 2231 by Kelly Altamirano RN  Outcome: Progressing  Goal: Participates in PT with improved pain control throughout the shift  3/29/2025 2231 by Kelly Altamirano RN  Outcome: Progressing  3/29/2025 2231 by Kelly Altamirano RN  Outcome: Progressing  Goal: Free from opioid side effects throughout the shift  3/29/2025 2231 by Kelly Altamirano RN  Outcome: Progressing  3/29/2025 2231 by Kelly Altamirano RN  Outcome: Progressing  Goal: Free from acute confusion related to pain meds throughout the shift  3/29/2025 2231 by Kelly Altamirano RN  Outcome: Progressing  3/29/2025 2231 by Kelly Altamirano RN  Outcome: Progressing

## 2025-03-30 NOTE — PROGRESS NOTES
HOSPITAL MEDICINE - PROGRESS NOTE    Cristal Pollard is a 64 y.o. female on day 7 of admission presenting with Back pain, unspecified back location, unspecified back pain laterality, unspecified chronicity.    Assessment & Plan  Anxiety disorder    Cervical radiculopathy    Chronic neck pain    Depression, recurrent (CMS-HCC)    History of lumbar fusion    Hypertension    Type 2 diabetes mellitus    Back pain, unspecified back location, unspecified back pain laterality, unspecified chronicity      - Remains on suboxone, psychiatry managing inpatient; recommend pt follow at suboxone clinic and/or pain management after discharge.  - Pt did express concern late on 3/30 that her pain was not adequately controlled. Reviewed her meds, has multiple adjuncts including fluoxetine, gabapentin, tylenol.  - Additional opioids not in line with psychiatry plan of care, and are unlikely to provide much benefit without markedly increased doses due to suboxone mechanism.  - Completed COWS protocol 3/29 after 48h.  - Dispo pending placement and associated paperwork clearances, anticipate dc Monday as precert is pending.  - Home meds continued (fluoxetine, gabapentin, metformin, lisinopril/hydrochlorothiazide, pantoprazole, flomax).  - No med changes planned at this time.  - Increasingly hyponatremic/hypochloremic with low K.   - Replete 40 mEq PO, administer 250ml/hr NS IV.   - Repeat BMP @ 1800  - May be due to inadequate PO, pt with ongoing nausea. Compazine was added as second line antiemetic.  - Monitor UOP.  - Bowel regimen ordered (senna-colace/miralax)    DVT PPX: Lovenox  Nutrition: Regular    Subjective   Pt resting comfortably in bed. Per RN, pt was awake most of the night and anxious before falling asleep after her suboxone dose this morning.       Objective     acetaminophen, 975 mg, oral, q8h  buprenorphine, 2 mg, sublingual, Daily  enoxaparin, 40 mg, subcutaneous, q24h  FLUoxetine, 40 mg, oral, Nightly  gabapentin, 300 mg,  oral, TID  insulin lispro, 0-5 Units, subcutaneous, TID AC  lamoTRIgine, 100 mg, oral, Daily  lisinopril 10 mg, hydroCHLOROthiazide 12.5 mg for Zestoretic/Prinizide, , oral, Daily  metFORMIN, 500 mg, oral, Nightly  pantoprazole, 40 mg, oral, Daily before breakfast  polyethylene glycol, 17 g, oral, Daily  sennosides-docusate sodium, 2 tablet, oral, BID  tamsulosin, 0.4 mg, oral, Nightly       PRN medications: acetaminophen **OR** [DISCONTINUED] acetaminophen **OR** [DISCONTINUED] acetaminophen, benzocaine-menthol, calcium carbonate, cloNIDine, cyclobenzaprine, dextrose, dextrose, dicyclomine, glucagon, glucagon, hydrALAZINE, hydrOXYzine pamoate, ibuprofen, loperamide, melatonin, ondansetron, prochlorperazine **OR** prochlorperazine         Last Recorded Vitals  /71 (BP Location: Left arm, Patient Position: Lying)   Pulse 95   Temp 36.8 °C (98.2 °F) (Temporal)   Resp 18   Wt 86.2 kg (190 lb)   SpO2 96%   Intake/Output last 3 Shifts:  No intake or output data in the 24 hours ending 03/30/25 1051      Admission Weight  Weight: 86.2 kg (190 lb) (03/23/25 0230)    Daily Weight  03/23/25 : 86.2 kg (190 lb)    Image Results  XR thoracic spine 3 views  Narrative: Interpreted By:  Clinton Ortiz,   STUDY:  XR THORACIC SPINE 3 VIEWS; ;  3/26/2025 10:30 pm      INDICATION:  Signs/Symptoms:fall.          COMPARISON:  None.      ACCESSION NUMBER(S):  [id number]      ORDERING CLINICIAN:  JAY CAICEDO      FINDINGS:  Thoracic spine, four views      There is moderate multilevel disc space narrowing with osteophytosis  and sclerosis throughout the thoracic spine. No fracture or  spondylolisthesis seen. Partially visualized hardware in the lower  cervical spine.      Impression: Moderate multilevel spondylosis throughout the thoracic spine      MACRO:  None      Signed by: Clinton Ortiz 3/26/2025 10:34 PM  Dictation workstation:   [id number]  XR lumbar spine 2-3 views  Narrative: Interpreted By:  Clinton Ortiz,    STUDY:  XR LUMBAR SPINE 2-3 VIEWS; ;  3/26/2025 10:30 pm      INDICATION:  Signs/Symptoms:fall.          COMPARISON:  03/02/2025      ACCESSION NUMBER(S):  [id number]      ORDERING CLINICIAN:  JAY CAICEDO      FINDINGS:  Lumbar spine, three views      Interval laminectomy and posterior fusion of L3-L5 with intact  hardware. Moderate multilevel spondylotic changes. No acute fracture  or dislocation seen      Impression:     Interval L3-L5 posterior fusion with intact hardware. Moderate  multilevel spondylosis      MACRO:  None      Signed by: Clinton Ortiz 3/26/2025 10:34 PM  Dictation workstation:   [id number]      Physical Exam  Vitals reviewed.   Constitutional:       General: She is sleeping. She is not in acute distress.     Appearance: She is ill-appearing. She is not toxic-appearing.   HENT:      Head: Normocephalic and atraumatic.      Right Ear: External ear normal.      Left Ear: External ear normal.      Nose: Nose normal.   Pulmonary:      Effort: Pulmonary effort is normal. No respiratory distress.   Musculoskeletal:         General: No deformity or signs of injury.      Cervical back: Normal range of motion.   Skin:     Coloration: Skin is not jaundiced.   Neurological:      General: No focal deficit present.   Psychiatric:         Speech: Speech normal.         Relevant Results    acetaminophen, 975 mg, oral, q8h  buprenorphine, 2 mg, sublingual, Daily  enoxaparin, 40 mg, subcutaneous, q24h  FLUoxetine, 40 mg, oral, Nightly  gabapentin, 300 mg, oral, TID  insulin lispro, 0-5 Units, subcutaneous, TID AC  lamoTRIgine, 100 mg, oral, Daily  lisinopril 10 mg, hydroCHLOROthiazide 12.5 mg for Zestoretic/Prinizide, , oral, Daily  metFORMIN, 500 mg, oral, Nightly  pantoprazole, 40 mg, oral, Daily before breakfast  polyethylene glycol, 17 g, oral, Daily  sennosides-docusate sodium, 2 tablet, oral, BID  tamsulosin, 0.4 mg, oral, Nightly      PRN medications: acetaminophen **OR** [DISCONTINUED]  acetaminophen **OR** [DISCONTINUED] acetaminophen, benzocaine-menthol, calcium carbonate, cloNIDine, cyclobenzaprine, dextrose, dextrose, dicyclomine, glucagon, glucagon, hydrALAZINE, hydrOXYzine pamoate, ibuprofen, loperamide, melatonin, ondansetron, prochlorperazine **OR** prochlorperazine         XR thoracic spine 3 views    Result Date: 3/26/2025  Interpreted By:  Clinton Ortiz, STUDY: XR THORACIC SPINE 3 VIEWS; ;  3/26/2025 10:30 pm   INDICATION: Signs/Symptoms:fall.     COMPARISON: None.   ACCESSION NUMBER(S): [id number]   ORDERING CLINICIAN: JAY CAICEDO   FINDINGS: Thoracic spine, four views   There is moderate multilevel disc space narrowing with osteophytosis and sclerosis throughout the thoracic spine. No fracture or spondylolisthesis seen. Partially visualized hardware in the lower cervical spine.       Moderate multilevel spondylosis throughout the thoracic spine   MACRO: None   Signed by: Clinton Ortiz 3/26/2025 10:34 PM Dictation workstation:   [id number]    XR lumbar spine 2-3 views    Result Date: 3/26/2025  Interpreted By:  Clinton Ortiz, STUDY: XR LUMBAR SPINE 2-3 VIEWS; ;  3/26/2025 10:30 pm   INDICATION: Signs/Symptoms:fall.     COMPARISON: 03/02/2025   ACCESSION NUMBER(S): [id number]   ORDERING CLINICIAN: JAY CAICEDO   FINDINGS: Lumbar spine, three views   Interval laminectomy and posterior fusion of L3-L5 with intact hardware. Moderate multilevel spondylotic changes. No acute fracture or dislocation seen         Interval L3-L5 posterior fusion with intact hardware. Moderate multilevel spondylosis   MACRO: None   Signed by: Clinton Ortiz 3/26/2025 10:34 PM Dictation workstation:   [id number]    CT thoracic spine wo IV contrast    Result Date: 3/23/2025  Interpreted By:  Patti Rush, STUDY: CT THORACIC SPINE WO IV CONTRAST; CT LUMBAR SPINE WO IV CONTRAST; 3/23/2025 3:47 am   INDICATION: Signs/Symptoms:trauma.   COMPARISON: Lumbar spine CT 03/03/2025   ACCESSION NUMBER(S):  [id number]; [id number]   ORDERING CLINICIAN: SHERRIE CHO   TECHNIQUE: Axial noncontrast images of the thoracic and lumbar spine with coronal and sagittal reconstructed images.   FINDINGS: THORACIC SPINE: ALIGNMENT: Mild dextroconvex curvature. No traumatic malalignment. VERTEBRAE: No acute fracture. Mild superior endplate compression deformity of T3 with less than 25% vertebral body height loss appears chronic. SPINAL CANAL: Diffuse degenerative disc changes with prominent anterior osteophytes. No critical spinal canal stenosis. PREVERTEBRAL SOFT TISSUES: No prevertebral soft tissue swelling.   LUMBAR SPINE: ALIGNMENT: Levoconvex curvature centered at L3. Grade 1 anterolisthesis of L3 on L4 and L4-L5, likely on a degenerative basis. VERTEBRAE: Right L4 hemilaminectomy and left L5 hemilaminectomy. Posterior fusion hardware and interbody spacers span L3 through L5. No lucency around the hardware to suggest loosening. SPINAL CANAL: Moderate disc space loss, vacuum disc phenomenon and disc bulge at L1-2. Moderate to severe disc space loss, disc bulge and vacuum disc at L5-S1. T11-12: Left paracentral disc bulge resulting in mild spinal canal and left neural foraminal stenosis. T12-L1: Left paracentral disc bulge resulting in mild effacement of the ventral thecal sac. No significant neural foraminal stenosis. L1-2: Posterior disc osteophyte complex, ligamentum flavum hypertrophy and facet arthropathy result in mild-to-moderate spinal canal stenosis and bilateral neural foraminal stenosis, right greater than left. L2-3: Right intraforaminal disc component and facet arthropathy resulting in neural foraminal stenosis. No significant spinal canal stenosis. L3-4: Limited due to streak artifact. L4-5: Limited due to streak artifact. L5-S1: No significant spinal canal stenosis. Moderate left neural foraminal stenosis. PREVERTEBRAL SOFT TISSUES: No prevertebral soft tissue swelling. Mild nonspecific subcutaneous edema  of the lower back. No organized drainable fluid collection is seen.   OTHER FINDINGS: Coronary artery calcifications noted. Quinn catheter in the urinary bladder.       No acute fracture or traumatic subluxation of the thoracic and lumbar spine.   Postsurgical and degenerative changes as above. No evidence of hardware complication.   MACRO: None   Signed by: Patti Rush 3/23/2025 4:04 AM Dictation workstation:   [id number]    CT lumbar spine wo IV contrast    Result Date: 3/23/2025  Interpreted By:  Patti Rush, STUDY: CT THORACIC SPINE WO IV CONTRAST; CT LUMBAR SPINE WO IV CONTRAST; 3/23/2025 3:47 am   INDICATION: Signs/Symptoms:trauma.   COMPARISON: Lumbar spine CT 03/03/2025   ACCESSION NUMBER(S): [id number]; [id number]   ORDERING CLINICIAN: SHERRIE CHO   TECHNIQUE: Axial noncontrast images of the thoracic and lumbar spine with coronal and sagittal reconstructed images.   FINDINGS: THORACIC SPINE: ALIGNMENT: Mild dextroconvex curvature. No traumatic malalignment. VERTEBRAE: No acute fracture. Mild superior endplate compression deformity of T3 with less than 25% vertebral body height loss appears chronic. SPINAL CANAL: Diffuse degenerative disc changes with prominent anterior osteophytes. No critical spinal canal stenosis. PREVERTEBRAL SOFT TISSUES: No prevertebral soft tissue swelling.   LUMBAR SPINE: ALIGNMENT: Levoconvex curvature centered at L3. Grade 1 anterolisthesis of L3 on L4 and L4-L5, likely on a degenerative basis. VERTEBRAE: Right L4 hemilaminectomy and left L5 hemilaminectomy. Posterior fusion hardware and interbody spacers span L3 through L5. No lucency around the hardware to suggest loosening. SPINAL CANAL: Moderate disc space loss, vacuum disc phenomenon and disc bulge at L1-2. Moderate to severe disc space loss, disc bulge and vacuum disc at L5-S1. T11-12: Left paracentral disc bulge resulting in mild spinal canal and left neural foraminal stenosis. T12-L1: Left paracentral disc  bulge resulting in mild effacement of the ventral thecal sac. No significant neural foraminal stenosis. L1-2: Posterior disc osteophyte complex, ligamentum flavum hypertrophy and facet arthropathy result in mild-to-moderate spinal canal stenosis and bilateral neural foraminal stenosis, right greater than left. L2-3: Right intraforaminal disc component and facet arthropathy resulting in neural foraminal stenosis. No significant spinal canal stenosis. L3-4: Limited due to streak artifact. L4-5: Limited due to streak artifact. L5-S1: No significant spinal canal stenosis. Moderate left neural foraminal stenosis. PREVERTEBRAL SOFT TISSUES: No prevertebral soft tissue swelling. Mild nonspecific subcutaneous edema of the lower back. No organized drainable fluid collection is seen.   OTHER FINDINGS: Coronary artery calcifications noted. Quinn catheter in the urinary bladder.       No acute fracture or traumatic subluxation of the thoracic and lumbar spine.   Postsurgical and degenerative changes as above. No evidence of hardware complication.   MACRO: None   Signed by: Patti Rush 3/23/2025 4:04 AM Dictation workstation:   [id number]    CT abdomen pelvis wo IV contrast    Result Date: 3/12/2025  Interpreted By:  Elmira Orozco, STUDY: CT ABDOMEN PELVIS WO IV CONTRAST;  3/11/2025 11:58 pm   INDICATION: Signs/Symptoms:uti with back pain.   COMPARISON: CT abdomen and pelvis 03/13/2024   ACCESSION NUMBER(S): [id number]   ORDERING CLINICIAN: SARBJIT FONSECA   TECHNIQUE: CT of the abdomen and pelvis was performed with no contrast administration. Coronal and sagittal reformats were obtained.   FINDINGS: LOWER CHEST: No focal consolidation or pleural effusion.   ABDOMEN:   LIVER: Unremarkable unenhanced appearance.   BILE DUCTS: No obvious dilation.   GALLBLADDER: The gallbladder is surgically absent.   PANCREAS: No peripancreatic inflammatory changes.   SPLEEN: Unremarkable unenhanced appearance.   ADRENAL GLANDS:  Unremarkable.   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 No hydronephrosis or hydroureter.  No obstructing ureteral calculi.   BOWEL: No bowel obstruction. There is a large amount of stool at the colon. The appendix is normal.   VESSELS: Atherosclerotic calcifications within the abdominal aorta and its branches. No abdominal aortic aneurysm.   PELVIS: The urinary bladder is  decompressed by a Quinn catheter. There is a small amount of gas at the urinary bladder, may be secondary to recent instrumentation. The uterus is present.   PERITONEUM/RETROPERITONEUM/LYMPH NODES: No free fluid or free air.  No retroperitoneal hemorrhage.  No pathologically enlarged lymph nodes are identified.   ABDOMINAL WALL: Postsurgical changes are noted at the anterior abdominal wall.   BONE AND SOFT TISSUE: Multilevel degenerative changes at the spine. Postsurgical changes at the lumbar spine with posterior orthopedic hardware at L3, L4 and L5. Intervertebral disc spacers at L3-L4 and L4-L5.  Scattered gas foci with small amount of fluid at the paraspinal soft tissues overlying the lower lumbar spine, probably secondary to recent surgery.   IMPRESSION 1.  No hydronephrosis or obstructing ureteral calculi. No acute findings on unenhanced CT. 2. Constipation with large amount of stool at the colon.       MACRO: None.   Signed by: Elmira Orozco 3/12/2025 12:58 AM Dictation workstation:   [id number]    Bedside Midline Imaging    Result Date: 3/7/2025  These images are not reportable by radiology and will not be interpreted by  Radiologists.    FL fluoro images no charge    Result Date: 3/5/2025  These images are not reportable by radiology and will not be interpreted by  Radiologists.    CT lumbar spine wo IV contrast    Result Date: 3/3/2025  Interpreted By:  Chris June, STUDY: CT LUMBAR SPINE WO IV CONTRAST;  3/3/2025 3:08 pm   INDICATION: Signs/Symptoms:thin cut CT for OR planning.      COMPARISON: Plain film and MRI lumbar spine yesterday.   ACCESSION NUMBER(S): [id number]   ORDERING CLINICIAN: LIANA CARREON   TECHNIQUE: Axial CT images of the lumbar spine are obtained. Axial, coronal and sagittal reconstructions are provided for review.   FINDINGS: 5 lumbar type vertebrae.   Mild-to-moderate rotatory levoscoliosis. Grade 1 left lateral translation L4 on L5. Mild degree spondylolisthesis at multiple levels.   No compression deformity or destructive osseous process. Moderately severe asymmetric loss of disc height L5-S1 with associated vacuum disc phenomenon. Severe loss of disc height L4-5 with associated vacuum disc phenomenon. Moderate asymmetric loss of disc height L3-4 with associated vacuum disc phenomenon. Mild-to-moderate asymmetric loss of disc height L1-2 with associated vacuum disc phenomenon.   Moderate to advanced facet arthrosis seen throughout the lumbar spine.   For detailed analysis of central canal and foraminal stenosis at each level see report of recent MRI of the lumbar spine.   Degenerative changes of the left-greater-than-right SI joints.       1. Mild-to-moderate rotatory levoscoliosis. 2. Moderate to advanced multilevel degenerative lumbar spondylosis.   MACRO: None   Signed by: Chris June 3/3/2025 3:31 PM Dictation workstation:   [id number]    MR lumbar spine wo IV contrast    Result Date: 3/2/2025  Interpreted By:  Chris June, STUDY: MRI of the lumbar spine without IV contrast;  3/2/2025 10:00 am   INDICATION: Signs/Symptoms:low back pain, worsening leg/hip pain.     COMPARISON: CT lumbar spine 02/27/2025.   ACCESSION NUMBER(S): [id number]   ORDERING CLINICIAN: CHRISTI BAXTER   TECHNIQUE: Sagittal STIR, T1- and T2-weighted as well as axial T1- and T2- weighted MRI images of the lumbar spine were acquired using a spondylolysis protocol.  No contrast was administered.   FINDINGS: Mild-to-moderate rotatory levoscoliosis. Mild degree spondylolisthesis at  multiple levels.   No compression deformity or destructive osseous process. Severe loss of disc height L4-5. Moderately severe asymmetric loss of disc height L5-S1. Moderate asymmetric loss of disc height L3-4. Disc desiccated throughout the lumbar spine. Predominant Modic type 2 endplate degenerative changes at multiple levels. Mild Modic type 1 endplate degenerative changes L3-4 to L5-S1.   Normal conus termination.   LEVELS: L5-S1: Disc bulge lateralized more left than right. Moderately advanced left and moderate right facet arthrosis. No significant central canal stenosis. Moderate narrowing of the left lateral recess. Moderate right and moderately severe left foraminal stenosis. L4-L5: Grade 1 anterolisthesis. Disc bulge lateralized to both sides. Advanced facet arthrosis. Ligamentous thickening. Severe central canal and bilateral lateral recess stenosis. Moderately severe bilateral foraminal stenosis. L3-L4: Trace anterolisthesis. Disc bulge lateralized is more right than left. Advanced facet arthrosis. Ligamentous thickening. Moderately severe trefoil central canal stenosis. Severe right and moderately severe left lateral recess stenosis. Severe right and mild-to-moderate left foraminal stenosis. L2-L3: Disc bulge lateralized slightly more right than left. Moderately advanced right and moderate left facet arthrosis. Ligamentous thickening. Mild-to-moderate central canal stenosis. Moderate narrowing of the bilateral lateral recesses. Moderately severe right and mild-to-moderate left foraminal stenosis. L1-L2: Disc bulge lateralized to both sides. Moderate bilateral foraminal stenosis. Mild ligamentous thickening. Mild-to-moderate trefoil central canal stenosis. Moderately severe left lateral recess stenosis. Severe right and moderately severe left foraminal stenosis.       1. Mild-to-moderate rotatory levoscoliosis. 2. Moderate to advanced multilevel degenerative lumbar spondylosis as described in detail  above.       MACRO: None   Signed by: Chris June 3/2/2025 6:26 PM Dictation workstation:   [id number]    XR lumbar spine 4+ views w flexion extension    Result Date: 3/2/2025  Interpreted By:  Mumtaz Johnson, STUDY: XR LUMBAR SPINE 4+ VIEWS WITH FLEXION EXTENSION; ;  3/2/2025 2:22 pm   INDICATION: Signs/Symptoms:eval alignment, please perform upright if able, if not then sitting.     COMPARISON: CT 02/27/2025.   ACCESSION NUMBER(S): [id number]   ORDERING CLINICIAN: CHRISTI BAXTER   FINDINGS: AP and lateral views of the lumbosacral spine   Mild serpentine scoliotic curve of the thoracolumbar spine. Grade 1 anterolisthesis at L3-4 and L4-5 without significant change in flexion or extension. Moderate to severe multilevel degenerative changes of the spine with large anterior osteophytes and facet arthropathy and disc space narrowing. Unchanged anterior vertebral body height loss at T11, less than 25% and unchanged from radiographs from 2018. No new vertebral body height loss.       1. Moderate to severe degenerative changes of the spine with grade 1 anterolisthesis at L3-4 and L4-5, unchanged in flexion and extension. This is better seen on prior CT.       MACRO: None   Signed by: Mumtaz Johnson 3/2/2025 2:28 PM Dictation workstation:   [id number]    XR chest 1 view    Result Date: 3/2/2025  Interpreted By:  Gabby Goldman,  and Talon Khan STUDY: XR CHEST 1 VIEW;  3/2/2025 2:01 am   INDICATION: Signs/Symptoms:preop.     COMPARISON: Chest x-ray 02/20/2024   ACCESSION NUMBER(S): [id number]   ORDERING CLINICIAN: CHRISTI BAXTER   FINDINGS: AP radiograph of the chest was provided.   Median sternotomy wires in place. Partially visualized cervical spinal fusion hardware.   CARDIOMEDIASTINAL SILHOUETTE: Cardiomediastinal silhouette is normal in size and configuration.   LUNGS: No focal consolidation, sizeable pleural effusion or pneumothorax.   ABDOMEN: No remarkable upper abdominal findings.    BONES: No acute osseous changes.       1.  No evidence of acute cardiopulmonary process.   I personally reviewed the images/study and I agree with the findings as stated by resident physician Dr. Bill Bullard . This study was interpreted at [place], [place].   MACRO: None   Signed by: Gabby Adair 3/2/2025 10:42 AM Dictation workstation:   [id number]    CT lumbar spine wo IV contrast    Result Date: 2/27/2025  Interpreted By:  Ponce Denney, STUDY: CT LUMBAR SPINE WO IV CONTRAST; 2/27/2025 3:11 pm   INDICATION: Signs/Symptoms:Back pain;   COMPARISON: None   ACCESSION NUMBER(S): [id number]   ORDERING CLINICIAN: ANDREA LOU   TECHNIQUE: Contiguous axial images of the lumbar spine were performed. Coronal and sagittal reformatted images were also obtained. All CT examinations are performed with 1 or more of the following dose reduction techniques: Automated exposure control, adjustment of mA and/or kv according to patient's size, or use of iterative reconstruction techniques.   FINDINGS: No evidence for acute fracture. Levoscoliosis is present. Advanced facet degenerative changes. There is a normal lumbar lordosis however there is minimal degenerative anterolisthesis of L3 on L4 and L4 on L5.   The vertebral bodies are normal in height and configuration.   At L5-S1, there is moderate-severe disc space narrowing, vacuum disc phenomena and diffuse disc bulging. No evidence for significant bony spinal canal stenosis. However there is severe left neural foramina stenosis and moderate right neural foramina stenosis.   At L4-5, there is severe disc space narrowing, vacuum disc phenomena, diffuse disc bulging, suggestion of severe canal stenosis due to disc bulging as well as severe facet and ligamentum flavum hypertrophy. There is moderate-severe bilateral neural foramina stenosis at this level.   At L3-4, there is moderate disc space narrowing  asymmetrically worsened towards the right with vacuum disc phenomena and diffuse disc bulging. There is moderate-severe canal stenosis. Moderate-severe right neural foramina stenosis and mild-moderate left neural foramina stenosis.   At L2-3, there is facet and ligamentum flavum hypertrophy disc space narrowing asymmetrically greater toward the right, diffuse disc bulging. Moderate to moderate-severe canal stenosis. Moderate-severe right neural foramina stenosis. No significant left neural foramina stenosis.   At L1-2 there is vacuum disc phenomena and asymmetric disc space narrowing towards the right, diffuse disc bulging, mild to mild-moderate lateral recess stenosis and at least mild canal stenosis. Moderate-severe right neural foramina stenosis. Mild-moderate left neural foramina stenosis.   At T12-L1, there is diffuse disc bulging. No evidence for high-grade spinal canal stenosis. Mild-moderate left neural foramina stenosis.   The SI joints show degenerative gas but otherwise intact.       No acute fracture or traumatic malalignment.   Advanced chronic degenerative changes as described in the body of the report with multilevel high-grade spinal canal stenosis and high-grade neural foramina stenosis.     Signed by: Ponce Denney 2/27/2025 4:03 PM Dictation workstation:   [id number]    CT pelvis wo IV contrast    Result Date: 2/27/2025  Interpreted By:  Yosvany Christopher, STUDY: CT PELVIS WO IV CONTRAST; CT HIP RIGHT WO IV CONTRAST; ;  2/27/2025 3:11 pm   INDICATION: Signs/Symptoms:Right hip pain.     COMPARISON: Previous CT of the abdomen and pelvis March 13, 2024   Plain film radiographs of the right hip performed on February 11, 2025     ACCESSION NUMBER(S): [id number]; [id number]   ORDERING CLINICIAN: ANDREA LOU   TECHNIQUE: Multiple thin-section axial images of the right hip reconstructed in the sagittal and coronal plane.   Additional dedicated thin-section axial images of the pelvis also performed.      FINDINGS: Moderate osteoarthritis of the right hip.   Similar appearance left hip.   Small bilateral trochanteric spurs.   No evidence of right hip fracture.   No bony destructive lesions.   Advanced lower lumbar degenerative change.   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   No muscular attenuation changes.   No evidence of a soft tissue mass.   No focal fluid collections.   No sizeable effusion.       Surgical clips from previous hernia repair along the anterior abdominal wall.         Degenerative changes bilateral hips, sacroiliac joints, and lower lumbar spine.   No acute findings right hip.     MACRO: None   Signed by: Yosvany Christopher 2/27/2025 3:24 PM Dictation workstation:   [id number]    CT hip right wo IV contrast    Result Date: 2/27/2025  Interpreted By:  Yosvany Christopher, STUDY: CT PELVIS WO IV CONTRAST; CT HIP RIGHT WO IV CONTRAST; ;  2/27/2025 3:11 pm   INDICATION: Signs/Symptoms:Right hip pain.     COMPARISON: Previous CT of the abdomen and pelvis March 13, 2024   Plain film radiographs of the right hip performed on February 11, 2025     ACCESSION NUMBER(S): [id number]; [id number]   ORDERING CLINICIAN: ANDREA LOU   TECHNIQUE: Multiple thin-section axial images of the right hip reconstructed in the sagittal and coronal plane.   Additional dedicated thin-section axial images of the pelvis also performed.     FINDINGS: Moderate osteoarthritis of the right hip.   Similar appearance left hip.   Small bilateral trochanteric spurs.   No evidence of right hip fracture.   No bony destructive lesions.   Advanced lower lumbar degenerative change.   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   No muscular attenuation changes.   No evidence of a soft tissue mass.   No focal fluid collections.   No sizeable effusion.       Surgical clips from previous hernia repair along the anterior abdominal wall.         Degenerative changes bilateral  "hips, sacroiliac joints, and lower lumbar spine.   No acute findings right hip.     MACRO: None   Signed by: Yosvany Christopher 2/27/2025 3:24 PM Dictation workstation:   [id number]       Results from last 7 days   Lab Units 03/30/25  0638 03/29/25  0607 03/28/25  0522   WBC AUTO x10*3/uL 11.6* 10.4 13.2*   RBC AUTO x10*6/uL 4.58 4.10 4.17   HEMOGLOBIN g/dL 13.5 12.4 12.3   HEMATOCRIT % 42.5 37.5 40.2   MCV fL 93 92 96   PLATELETS AUTO x10*3/uL 393 351 318           No lab exists for component: \"B12\"      Results from last 7 days   Lab Units 03/30/25  0638 03/29/25  0607 03/28/25  0522   NEUTROS ABS x10*3/uL 7.18 6.86 9.64*   IG PCT AUTO % 4.6* 2.5* 1.5*   BASOS ABS AUTO x10*3/uL 0.15* 0.09 0.09   EOS ABS AUTO x10*3/uL 0.34 0.44 0.57   LYMPHS ABS AUTO x10*3/uL 2.49 1.97 1.96         Results from last 7 days   Lab Units 03/30/25  0638 03/29/25  0607 03/28/25  0522 03/27/25  0500   SODIUM mmol/L 128* 131* 133* 132*   POTASSIUM mmol/L 3.3* 3.7 4.0 3.2*   CHLORIDE mmol/L 93* 95* 97* 98   CO2 mmol/L 23 25 27 24   BUN mg/dL 11 10 8 11   CREATININE mg/dL 0.61 0.56 0.54 0.54   CALCIUM mg/dL 10.2 10.3 10.1 9.4   MAGNESIUM mg/dL  --   --   --  1.84   BILIRUBIN TOTAL mg/dL 0.5 0.5 0.4 0.5   ALT U/L 15 16 15 14   AST U/L 14 16 16 22                                      Khari Arredondo, DO          "

## 2025-03-31 VITALS
RESPIRATION RATE: 18 BRPM | WEIGHT: 190 LBS | SYSTOLIC BLOOD PRESSURE: 162 MMHG | BODY MASS INDEX: 28.14 KG/M2 | HEIGHT: 69 IN | HEART RATE: 96 BPM | TEMPERATURE: 98.4 F | DIASTOLIC BLOOD PRESSURE: 88 MMHG | OXYGEN SATURATION: 94 %

## 2025-03-31 LAB
ALBUMIN SERPL BCP-MCNC: 4 G/DL (ref 3.4–5)
ALP SERPL-CCNC: 92 U/L (ref 33–136)
ALT SERPL W P-5'-P-CCNC: 14 U/L (ref 7–45)
ANION GAP SERPL CALC-SCNC: 16 MMOL/L (ref 10–20)
AST SERPL W P-5'-P-CCNC: 14 U/L (ref 9–39)
BASOPHILS # BLD MANUAL: 0 X10*3/UL (ref 0–0.1)
BASOPHILS NFR BLD MANUAL: 0 %
BILIRUB SERPL-MCNC: 0.5 MG/DL (ref 0–1.2)
BUN SERPL-MCNC: 8 MG/DL (ref 6–23)
CALCIUM SERPL-MCNC: 10.1 MG/DL (ref 8.6–10.3)
CHLORIDE SERPL-SCNC: 94 MMOL/L (ref 98–107)
CO2 SERPL-SCNC: 21 MMOL/L (ref 21–32)
CREAT SERPL-MCNC: 0.54 MG/DL (ref 0.5–1.05)
EGFRCR SERPLBLD CKD-EPI 2021: >90 ML/MIN/1.73M*2
EOSINOPHIL # BLD MANUAL: 0.15 X10*3/UL (ref 0–0.7)
EOSINOPHIL NFR BLD MANUAL: 1 %
ERYTHROCYTE [DISTWIDTH] IN BLOOD BY AUTOMATED COUNT: 13.6 % (ref 11.5–14.5)
GLUCOSE BLD MANUAL STRIP-MCNC: 176 MG/DL (ref 74–99)
GLUCOSE BLD MANUAL STRIP-MCNC: 181 MG/DL (ref 74–99)
GLUCOSE SERPL-MCNC: 177 MG/DL (ref 74–99)
HCT VFR BLD AUTO: 38.5 % (ref 36–46)
HGB BLD-MCNC: 12.9 G/DL (ref 12–16)
IMM GRANULOCYTES # BLD AUTO: 0.95 X10*3/UL (ref 0–0.7)
IMM GRANULOCYTES NFR BLD AUTO: 6.5 % (ref 0–0.9)
LYMPHOCYTES # BLD MANUAL: 2.04 X10*3/UL (ref 1.2–4.8)
LYMPHOCYTES NFR BLD MANUAL: 14 %
MCH RBC QN AUTO: 30.1 PG (ref 26–34)
MCHC RBC AUTO-ENTMCNC: 33.5 G/DL (ref 32–36)
MCV RBC AUTO: 90 FL (ref 80–100)
MONOCYTES # BLD MANUAL: 0.88 X10*3/UL (ref 0.1–1)
MONOCYTES NFR BLD MANUAL: 6 %
NEUTS SEG # BLD MANUAL: 11.53 X10*3/UL (ref 1.2–7)
NEUTS SEG NFR BLD MANUAL: 79 %
NRBC BLD-RTO: 0 /100 WBCS (ref 0–0)
PLATELET # BLD AUTO: 423 X10*3/UL (ref 150–450)
POTASSIUM SERPL-SCNC: 4 MMOL/L (ref 3.5–5.3)
PROT SERPL-MCNC: 7.4 G/DL (ref 6.4–8.2)
RBC # BLD AUTO: 4.29 X10*6/UL (ref 4–5.2)
RBC MORPH BLD: ABNORMAL
SODIUM SERPL-SCNC: 127 MMOL/L (ref 136–145)
TOTAL CELLS COUNTED BLD: 100
WBC # BLD AUTO: 14.6 X10*3/UL (ref 4.4–11.3)

## 2025-03-31 PROCEDURE — 2500000001 HC RX 250 WO HCPCS SELF ADMINISTERED DRUGS (ALT 637 FOR MEDICARE OP): Performed by: NURSE PRACTITIONER

## 2025-03-31 PROCEDURE — 82947 ASSAY GLUCOSE BLOOD QUANT: CPT

## 2025-03-31 PROCEDURE — 85027 COMPLETE CBC AUTOMATED: CPT | Performed by: STUDENT IN AN ORGANIZED HEALTH CARE EDUCATION/TRAINING PROGRAM

## 2025-03-31 PROCEDURE — 2500000004 HC RX 250 GENERAL PHARMACY W/ HCPCS (ALT 636 FOR OP/ED): Performed by: INTERNAL MEDICINE

## 2025-03-31 PROCEDURE — 99239 HOSP IP/OBS DSCHRG MGMT >30: CPT | Performed by: INTERNAL MEDICINE

## 2025-03-31 PROCEDURE — 2500000001 HC RX 250 WO HCPCS SELF ADMINISTERED DRUGS (ALT 637 FOR MEDICARE OP): Performed by: INTERNAL MEDICINE

## 2025-03-31 PROCEDURE — 2500000002 HC RX 250 W HCPCS SELF ADMINISTERED DRUGS (ALT 637 FOR MEDICARE OP, ALT 636 FOR OP/ED): Performed by: INTERNAL MEDICINE

## 2025-03-31 PROCEDURE — 36415 COLL VENOUS BLD VENIPUNCTURE: CPT | Performed by: STUDENT IN AN ORGANIZED HEALTH CARE EDUCATION/TRAINING PROGRAM

## 2025-03-31 PROCEDURE — 2500000002 HC RX 250 W HCPCS SELF ADMINISTERED DRUGS (ALT 637 FOR MEDICARE OP, ALT 636 FOR OP/ED): Performed by: NURSE PRACTITIONER

## 2025-03-31 PROCEDURE — 85007 BL SMEAR W/DIFF WBC COUNT: CPT | Performed by: STUDENT IN AN ORGANIZED HEALTH CARE EDUCATION/TRAINING PROGRAM

## 2025-03-31 PROCEDURE — 80053 COMPREHEN METABOLIC PANEL: CPT | Performed by: STUDENT IN AN ORGANIZED HEALTH CARE EDUCATION/TRAINING PROGRAM

## 2025-03-31 RX ORDER — NITROFURANTOIN 25; 75 MG/1; MG/1
100 CAPSULE ORAL 2 TIMES DAILY
Start: 2025-03-31

## 2025-03-31 RX ORDER — BISACODYL 10 MG/1
10 SUPPOSITORY RECTAL DAILY PRN
Start: 2025-03-31

## 2025-03-31 RX ORDER — BISACODYL 10 MG/1
10 SUPPOSITORY RECTAL DAILY PRN
Status: DISCONTINUED | OUTPATIENT
Start: 2025-03-31 | End: 2025-03-31 | Stop reason: HOSPADM

## 2025-03-31 RX ADMIN — ACETAMINOPHEN 975 MG: 325 TABLET, FILM COATED ORAL at 05:57

## 2025-03-31 RX ADMIN — LAMOTRIGINE 100 MG: 100 TABLET ORAL at 08:56

## 2025-03-31 RX ADMIN — GABAPENTIN 300 MG: 300 CAPSULE ORAL at 08:56

## 2025-03-31 RX ADMIN — INSULIN LISPRO 1 UNITS: 100 INJECTION, SOLUTION INTRAVENOUS; SUBCUTANEOUS at 07:32

## 2025-03-31 RX ADMIN — BUPRENORPHINE 2 MG: 2 TABLET SUBLINGUAL at 08:56

## 2025-03-31 RX ADMIN — CYCLOBENZAPRINE 5 MG: 10 TABLET, FILM COATED ORAL at 02:06

## 2025-03-31 RX ADMIN — PANTOPRAZOLE SODIUM 40 MG: 40 TABLET, DELAYED RELEASE ORAL at 05:57

## 2025-03-31 RX ADMIN — ENOXAPARIN SODIUM 40 MG: 40 INJECTION SUBCUTANEOUS at 05:57

## 2025-03-31 RX ADMIN — IBUPROFEN 600 MG: 600 TABLET, FILM COATED ORAL at 02:06

## 2025-03-31 RX ADMIN — HYDROXYZINE PAMOATE 25 MG: 25 CAPSULE ORAL at 05:57

## 2025-03-31 RX ADMIN — SENNOSIDES AND DOCUSATE SODIUM 2 TABLET: 50; 8.6 TABLET ORAL at 08:56

## 2025-03-31 RX ADMIN — HYDROCHLOROTHIAZIDE: 12.5 TABLET ORAL at 08:56

## 2025-03-31 RX ADMIN — ONDANSETRON 4 MG: 2 INJECTION INTRAMUSCULAR; INTRAVENOUS at 03:41

## 2025-03-31 RX ADMIN — POLYETHYLENE GLYCOL 3350 17 G: 17 POWDER, FOR SOLUTION ORAL at 08:56

## 2025-03-31 RX ADMIN — SODIUM CHLORIDE 250 ML: 0.9 INJECTION, SOLUTION INTRAVENOUS at 08:56

## 2025-03-31 RX ADMIN — INSULIN LISPRO 1 UNITS: 100 INJECTION, SOLUTION INTRAVENOUS; SUBCUTANEOUS at 11:37

## 2025-03-31 ASSESSMENT — PAIN SCALES - GENERAL
PAINLEVEL_OUTOF10: 0 - NO PAIN
PAINLEVEL_OUTOF10: 7
PAINLEVEL_OUTOF10: 0 - NO PAIN

## 2025-03-31 ASSESSMENT — PAIN DESCRIPTION - ORIENTATION: ORIENTATION: LOWER

## 2025-03-31 ASSESSMENT — PAIN - FUNCTIONAL ASSESSMENT: PAIN_FUNCTIONAL_ASSESSMENT: 0-10

## 2025-03-31 ASSESSMENT — PAIN DESCRIPTION - LOCATION: LOCATION: BACK

## 2025-03-31 ASSESSMENT — PAIN DESCRIPTION - DESCRIPTORS: DESCRIPTORS: ACHING

## 2025-03-31 NOTE — PROGRESS NOTES
Cristal Pollard is a 64 y.o. female on day 8 of admission presenting with Back pain, unspecified back location, unspecified back pain laterality, unspecified chronicity.  Record reviewed.  Patient is planned to discharge to [facility] with inpatient COREY tx.  Auth still pending.  This TCC received call from Amy Padron CM, requesting updates.  Updates provided.  Amy to escalate pending auth to her team.  Direct pre-cert team updated.  Care Transitions will continue to follow.    10:55 am addendum  Auth approved.  Care team updated.  Dr. Burdick to call daughter.  This TCC met with patient at bedside and provided update.  Confirmed transport need for stretcher, no O2, with nursing.  [place] and [facility] are completed.  DSC tasked with setting up stretcher transport to [facility].  Care Transitions will continue to follow.    11:27 am addendum  The [employer]S Vehicle is scheduled to arrive at 2:00pm EDT. [facility] is handling this ride and you can contact them at [phone]. Nursing Report number is [phone].  This TCC attempted to phone daughter to provide update.  Unable to connect call.  Care Team updated.  Care Transitions will continue to follow.

## 2025-03-31 NOTE — DISCHARGE SUMMARY
Discharge Diagnosis  Back pain, unspecified back location, unspecified back pain laterality, unspecified chronicity    Issues Requiring Follow-Up      Discharge Meds     Medication List      START taking these medications     bisacodyl 10 mg suppository; Commonly known as: Dulcolax; Insert 1   suppository (10 mg) into the rectum once daily as needed for constipation.   nitrofurantoin (macrocrystal-monohydrate) 100 mg capsule; Commonly known   as: Macrobid; Take 1 capsule (100 mg) by mouth 2 times a day.     CHANGE how you take these medications     acetaminophen 325 mg tablet; Commonly known as: Tylenol; Take 3 tablets   (975 mg) by mouth every 8 hours.; What changed: Another medication with   the same name was removed. Continue taking this medication, and follow the   directions you see here.   FLUoxetine 40 mg capsule; Commonly known as: PROzac; TAKE 1 CAPSULE (40   MG) BY MOUTH ONCE DAILY. TO START AFTER COMPLETING 10MG AND 20MG DOSES;   What changed: when to take this   gabapentin 300 mg capsule; Commonly known as: Neurontin; Take 1 capsule   (300 mg) by mouth 3 times a day.; What changed: how much to take,   additional instructions   omeprazole 20 mg DR capsule; Commonly known as: PriLOSEC; What changed:   Another medication with the same name was removed. Continue taking this   medication, and follow the directions you see here.   sennosides-docusate sodium 8.6-50 mg tablet; Commonly known as:   Marilyn-Colace; What changed: Another medication with the same name was   removed. Continue taking this medication, and follow the directions you   see here.     CONTINUE taking these medications     calcium carbonate-vitamin D3 600 mg-10 mcg (400 unit) chewable tablet   lamoTRIgine 100 mg tablet; Commonly known as: LaMICtal; TAKE 1 TABLET BY   MOUTH IN THE MORNING AND 1.5 TABLETS BY MOUTH AT BEDTIME   lisinopriL-hydrochlorothiazide 10-12.5 mg tablet; TAKE 1 TABLET BY MOUTH   EVERY DAY   metFORMIN  mg 24 hr tablet;  Commonly known as: Glucophage-XR; TAKE   1 TABLET BY MOUTH EVERY DAY WITH EVENING MEAL   multivitamin tablet   OneTouch Delica Plus Lancet 30 gauge misc; Generic drug: lancets; USE TO   TEST ONCE DAILY   OneTouch Verio test strips; Generic drug: blood sugar diagnostic; USE TO   TEST ONCE DAILY   tamsulosin 0.4 mg 24 hr capsule; Commonly known as: Flomax; Take 1   capsule (0.4 mg) by mouth once daily at bedtime.     STOP taking these medications     ciprofloxacin 500 mg tablet; Commonly known as: Cipro   cyclobenzaprine 5 mg tablet; Commonly known as: Flexeril   diazePAM 5 mg tablet; Commonly known as: Valium   methocarbamol 500 mg tablet; Commonly known as: Robaxin   oxyCODONE 10 mg immediate release tablet; Commonly known as: Roxicodone   oxyCODONE 5 mg immediate release tablet; Commonly known as: Roxicodone   polyethylene glycol 17 gram packet; Commonly known as: Glycolax, Miralax       Test Results Pending At Discharge  Pending Labs       Order Current Status    Extra Urine Lisa Tube Collected (03/23/25 0308)    Urinalysis with Reflex Culture and Microscopic In process            Hospital Course   Cristal Pollard is a 64 y.o. female with PMH HTN, HLD, seizure d/o recent lumbar fusion 3/5/25, and subsequent recent hospital admission for UTI. Patient home just about a week and slid down at home using her walker and having severe back pains so presented back to ED, and admitted for pain control.  Patient seen by psychiatry and deemed to not have capacity to make her own decisions, and daughter, Muna was her decision maker.  Started on suboxone which will be continued at discharge. Psychiatry assisted with medication adjustments, and patient also added bowel regimen and to complete oral antibiotic course for possible UTI.    Pertinent Physical Exam At Time of Discharge  Physical Exam  Vitals reviewed.   Constitutional:       Appearance: Normal appearance.   HENT:      Head: Normocephalic and atraumatic.       Mouth/Throat:      Mouth: Mucous membranes are moist.   Eyes:      Conjunctiva/sclera: Conjunctivae normal.   Cardiovascular:      Rate and Rhythm: Normal rate.      Pulses: Normal pulses.   Pulmonary:      Effort: Pulmonary effort is normal.      Breath sounds: Normal breath sounds. No wheezing.   Abdominal:      General: Abdomen is flat. There is no distension.      Palpations: Abdomen is soft.      Tenderness: There is no guarding.   Musculoskeletal:         General: No swelling. Normal range of motion.   Skin:     General: Skin is warm and dry.   Neurological:      General: No focal deficit present.      Mental Status: She is alert. Mental status is at baseline.   Psychiatric:         Mood and Affect: Mood normal.         Behavior: Behavior normal.         Outpatient Follow-Up  Future Appointments   Date Time Provider Department Center   5/9/2025  9:00 AM Darci Allison MD [id number] [facility]   5/19/2025  1:00 PM Arnulfo Reynolds MD [id number] [place]         Nicolas Burdick MD

## 2025-03-31 NOTE — CARE PLAN
The patient's goals for the shift include  pain control    The clinical goals for the shift include patient safety and comfort

## 2025-04-11 DIAGNOSIS — Z98.1 S/P LUMBAR FUSION: ICD-10-CM

## 2025-04-11 DIAGNOSIS — R29.898 WEAKNESS OF BOTH LOWER EXTREMITIES: Primary | ICD-10-CM
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
## 2025-04-11 NOTE — PROGRESS NOTES
Patient was discharged from hospital to rehab and has continued to have LE weakness and falls. She also states that her feet are numb and having difficulty using the bathroom.  Ordered Lumbar MRI to better evaluate for soft tissues, nerve involvement and possible surgical/hardware complications.  Order placed and patient to have scheduled by rehab.    Samantha Meeson, MSN, NP-C  Adult-Gerontology Associate Nurse Practitioner  Department of Neurosurgery- [facility]  Main phone [phone]  Fax [phone]

## 2025-04-21 ENCOUNTER — HOSPITAL ENCOUNTER (OUTPATIENT)
Dept: RADIOLOGY | Facility: HOSPITAL | Age: 65
Discharge: HOME | End: 2025-04-21
Payer: COMMERCIAL

## 2025-04-21 DIAGNOSIS — Z98.1 S/P LUMBAR FUSION: ICD-10-CM
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
## 2025-04-21 DIAGNOSIS — M54.16 LUMBAR RADICULOPATHY: Primary | ICD-10-CM

## 2025-04-21 DIAGNOSIS — R29.898 WEAKNESS OF BOTH LOWER EXTREMITIES: ICD-10-CM

## 2025-04-29 DIAGNOSIS — I10 ESSENTIAL (PRIMARY) HYPERTENSION: ICD-10-CM

## 2025-04-29 RX ORDER — LISINOPRIL AND HYDROCHLOROTHIAZIDE 10; 12.5 MG/1; MG/1
1 TABLET ORAL DAILY
Qty: 90 TABLET | Refills: 2 | Status: SHIPPED | OUTPATIENT
Start: 2025-04-29

## 2025-05-09 ENCOUNTER — APPOINTMENT (OUTPATIENT)
Dept: NEUROLOGY | Facility: HOSPITAL | Age: 65
End: 2025-05-09
Payer: COMMERCIAL

## 2025-05-19 ENCOUNTER — OFFICE VISIT (OUTPATIENT)
Facility: CLINIC | Age: 65
End: 2025-05-19
Payer: COMMERCIAL

## 2025-05-19 VITALS
RESPIRATION RATE: 14 BRPM | HEIGHT: 69 IN | WEIGHT: 191 LBS | SYSTOLIC BLOOD PRESSURE: 126 MMHG | BODY MASS INDEX: 28.29 KG/M2 | TEMPERATURE: 97.4 F | DIASTOLIC BLOOD PRESSURE: 74 MMHG | HEART RATE: 85 BPM

## 2025-05-19 DIAGNOSIS — M54.16 LUMBAR RADICULOPATHY: Primary | ICD-10-CM

## 2025-05-19 PROCEDURE — 1159F MED LIST DOCD IN RCRD: CPT | Performed by: STUDENT IN AN ORGANIZED HEALTH CARE EDUCATION/TRAINING PROGRAM

## 2025-05-19 PROCEDURE — 99211 OFF/OP EST MAY X REQ PHY/QHP: CPT | Performed by: STUDENT IN AN ORGANIZED HEALTH CARE EDUCATION/TRAINING PROGRAM

## 2025-05-19 PROCEDURE — 1125F AMNT PAIN NOTED PAIN PRSNT: CPT | Performed by: STUDENT IN AN ORGANIZED HEALTH CARE EDUCATION/TRAINING PROGRAM

## 2025-05-19 PROCEDURE — 3044F HG A1C LEVEL LT 7.0%: CPT | Performed by: STUDENT IN AN ORGANIZED HEALTH CARE EDUCATION/TRAINING PROGRAM

## 2025-05-19 PROCEDURE — 1036F TOBACCO NON-USER: CPT | Performed by: STUDENT IN AN ORGANIZED HEALTH CARE EDUCATION/TRAINING PROGRAM

## 2025-05-19 PROCEDURE — 3008F BODY MASS INDEX DOCD: CPT | Performed by: STUDENT IN AN ORGANIZED HEALTH CARE EDUCATION/TRAINING PROGRAM

## 2025-05-19 PROCEDURE — 3074F SYST BP LT 130 MM HG: CPT | Performed by: STUDENT IN AN ORGANIZED HEALTH CARE EDUCATION/TRAINING PROGRAM

## 2025-05-19 PROCEDURE — 3078F DIAST BP <80 MM HG: CPT | Performed by: STUDENT IN AN ORGANIZED HEALTH CARE EDUCATION/TRAINING PROGRAM

## 2025-05-19 ASSESSMENT — PATIENT HEALTH QUESTIONNAIRE - PHQ9
2. FEELING DOWN, DEPRESSED OR HOPELESS: NOT AT ALL
1. LITTLE INTEREST OR PLEASURE IN DOING THINGS: NOT AT ALL
SUM OF ALL RESPONSES TO PHQ9 QUESTIONS 1 AND 2: 0

## 2025-05-19 ASSESSMENT — PAIN SCALES - GENERAL: PAINLEVEL_OUTOF10: 4

## 2025-05-19 NOTE — PROGRESS NOTES
[facility] [place]  Department of Neurological Surgery  Post Operative Patient Visit      History of Present Illness:  Cristal Pollard is a 64 y.o. year old female who presents to the spine clinic in a post operative visit. Since surgery they are 2-3 months s/p lumbar fusion on 3/5/25. She was subsequently admitted to the hospital for UTI. Patient home just about a week and slid down at home using her walker and having severe back pains so presented back to ED, and admitted for pain control. Patient seen by psychiatry and deemed to not have capacity to make her own decisions, and daughter, Muna was her decision maker.    Since then, she has urinary incontinence and was treated for sepsis. She then went to a facility and has been there working on physical therapy. Her leg pain has been improving but her back pain is persistent. She is walking with a walker. She is on 2 mg of suboxone and we will work to get her with her pain management doctor. I will put in a referral for urodynamic testing with urogynecology to get her evaluated for different medications to help with her urinary incontinence that persists. I will see her back in 6-12 weeks after getting repeat MRI and x-rays.       The above clinical summary has been dictated with voice recognition software. It has not been proofread for grammatical errors, typographical mistakes, or other semantic inconsistencies.    Thank you for visiting our office today. It was our pleasure to take part in your healthcare.     Do not hesitate to call with any questions regarding your plan of care after leaving at [phone] M-F 8am-4pm.     To clinicians, thank you very much for this kind referral. It is a privilege to partner with you in the care of your patients. My office would be delighted to assist you with any further consultations or with questions regarding the plan of care outlined. Do not hesitate to call the office or contact me directly.        Sincerely,      Arnulfo Reynolds MD, [facility] , [facility]  Qasim Bardales Chair in Spinal Neurosurgery  Complex Spine Surgery Fellowship Director   of Neurological Surgery  [facility] [employer]  Phone: [phone]  Fax: [phone]        Scribe Attestation  By signing my name below, I, Tavianighat Wynn , Ivy   attest that this documentation has been prepared under the direction and in the presence of Arnulfo Reynolds MD.

## 2025-05-21 ENCOUNTER — OFFICE VISIT (OUTPATIENT)
Dept: PAIN MEDICINE | Facility: CLINIC | Age: 65
End: 2025-05-21
Payer: COMMERCIAL

## 2025-05-21 VITALS
TEMPERATURE: 97 F | OXYGEN SATURATION: 96 % | DIASTOLIC BLOOD PRESSURE: 54 MMHG | WEIGHT: 178 LBS | HEIGHT: 65 IN | SYSTOLIC BLOOD PRESSURE: 108 MMHG | HEART RATE: 81 BPM | RESPIRATION RATE: 16 BRPM | BODY MASS INDEX: 29.66 KG/M2

## 2025-05-21 DIAGNOSIS — M96.1 CERVICAL POST-LAMINECTOMY SYNDROME: ICD-10-CM

## 2025-05-21 DIAGNOSIS — M96.1 POSTLAMINECTOMY SYNDROME OF LUMBAR REGION: Primary | ICD-10-CM

## 2025-05-21 PROCEDURE — 3078F DIAST BP <80 MM HG: CPT | Performed by: ANESTHESIOLOGY

## 2025-05-21 PROCEDURE — 99214 OFFICE O/P EST MOD 30 MIN: CPT | Performed by: ANESTHESIOLOGY

## 2025-05-21 PROCEDURE — 1125F AMNT PAIN NOTED PAIN PRSNT: CPT | Performed by: ANESTHESIOLOGY

## 2025-05-21 PROCEDURE — 3008F BODY MASS INDEX DOCD: CPT | Performed by: ANESTHESIOLOGY

## 2025-05-21 PROCEDURE — 99204 OFFICE O/P NEW MOD 45 MIN: CPT | Performed by: ANESTHESIOLOGY

## 2025-05-21 PROCEDURE — 3044F HG A1C LEVEL LT 7.0%: CPT | Performed by: ANESTHESIOLOGY

## 2025-05-21 PROCEDURE — 1159F MED LIST DOCD IN RCRD: CPT | Performed by: ANESTHESIOLOGY

## 2025-05-21 PROCEDURE — 3074F SYST BP LT 130 MM HG: CPT | Performed by: ANESTHESIOLOGY

## 2025-05-21 PROCEDURE — 1036F TOBACCO NON-USER: CPT | Performed by: ANESTHESIOLOGY

## 2025-05-21 RX ORDER — TAMSULOSIN HYDROCHLORIDE 0.4 MG/1
0.4 CAPSULE ORAL NIGHTLY
COMMUNITY

## 2025-05-21 RX ORDER — GABAPENTIN 300 MG/1
600 CAPSULE ORAL NIGHTLY
Qty: 180 CAPSULE | Refills: 2 | Status: SHIPPED | OUTPATIENT
Start: 2025-05-21 | End: 2025-06-20

## 2025-05-21 RX ORDER — BUPRENORPHINE AND NALOXONE 2; .5 MG/1; MG/1
1 FILM, SOLUBLE BUCCAL; SUBLINGUAL DAILY
Qty: 60 FILM | Refills: 3 | Status: SHIPPED | OUTPATIENT
Start: 2025-05-21 | End: 2025-06-20

## 2025-05-21 RX ORDER — BACLOFEN 10 MG/1
10 TABLET ORAL 3 TIMES DAILY
Qty: 90 TABLET | Refills: 3 | Status: SHIPPED | OUTPATIENT
Start: 2025-05-21 | End: 2025-06-20

## 2025-05-21 SDOH — ECONOMIC STABILITY: FOOD INSECURITY: WITHIN THE PAST 12 MONTHS, YOU WORRIED THAT YOUR FOOD WOULD RUN OUT BEFORE YOU GOT MONEY TO BUY MORE.: NEVER TRUE

## 2025-05-21 SDOH — ECONOMIC STABILITY: FOOD INSECURITY: WITHIN THE PAST 12 MONTHS, THE FOOD YOU BOUGHT JUST DIDN'T LAST AND YOU DIDN'T HAVE MONEY TO GET MORE.: NEVER TRUE

## 2025-05-21 ASSESSMENT — LIFESTYLE VARIABLES
HAS A RELATIVE, FRIEND, DOCTOR, OR ANOTHER HEALTH PROFESSIONAL EXPRESSED CONCERN ABOUT YOUR DRINKING OR SUGGESTED YOU CUT DOWN: NO
HOW OFTEN DO YOU HAVE SIX OR MORE DRINKS ON ONE OCCASION: NEVER
AUDIT-C TOTAL SCORE: 0
TOTAL SCORE: 1
HAVE YOU OR SOMEONE ELSE BEEN INJURED AS A RESULT OF YOUR DRINKING: NO
HOW OFTEN DURING THE LAST YEAR HAVE YOU BEEN UNABLE TO REMEMBER WHAT HAPPENED THE NIGHT BEFORE BECAUSE YOU HAD BEEN DRINKING: NEVER
SKIP TO QUESTIONS 9-10: 1
HOW OFTEN DURING THE LAST YEAR HAVE YOU FAILED TO DO WHAT WAS NORMALLY EXPECTED FROM YOU BECAUSE OF DRINKING: NEVER
HOW OFTEN DO YOU HAVE A DRINK CONTAINING ALCOHOL: NEVER
HOW MANY STANDARD DRINKS CONTAINING ALCOHOL DO YOU HAVE ON A TYPICAL DAY: PATIENT DOES NOT DRINK
AUDIT TOTAL SCORE: 0
HOW OFTEN DURING THE LAST YEAR HAVE YOU HAD A FEELING OF GUILT OR REMORSE AFTER DRINKING: NEVER

## 2025-05-21 ASSESSMENT — ENCOUNTER SYMPTOMS
BACK PAIN: 1
EYES NEGATIVE: 1
ENDOCRINE NEGATIVE: 1
OCCASIONAL FEELINGS OF UNSTEADINESS: 1
NECK PAIN: 1
MYALGIAS: 1
RESPIRATORY NEGATIVE: 1
HEMATOLOGIC/LYMPHATIC NEGATIVE: 1
WEAKNESS: 1
GASTROINTESTINAL NEGATIVE: 1
CARDIOVASCULAR NEGATIVE: 1
PSYCHIATRIC NEGATIVE: 1
NUMBNESS: 1
DEPRESSION: 0
LOSS OF SENSATION IN FEET: 1

## 2025-05-21 ASSESSMENT — PAIN SCALES - GENERAL
PAINLEVEL_OUTOF10: 4
PAINLEVEL_OUTOF10: 4

## 2025-05-21 ASSESSMENT — COLUMBIA-SUICIDE SEVERITY RATING SCALE - C-SSRS
1. IN THE PAST MONTH, HAVE YOU WISHED YOU WERE DEAD OR WISHED YOU COULD GO TO SLEEP AND NOT WAKE UP?: NO
6. HAVE YOU EVER DONE ANYTHING, STARTED TO DO ANYTHING, OR PREPARED TO DO ANYTHING TO END YOUR LIFE?: NO
2. HAVE YOU ACTUALLY HAD ANY THOUGHTS OF KILLING YOURSELF?: NO

## 2025-05-21 ASSESSMENT — PAIN DESCRIPTION - DESCRIPTORS: DESCRIPTORS: ACHING

## 2025-05-21 ASSESSMENT — PAIN - FUNCTIONAL ASSESSMENT: PAIN_FUNCTIONAL_ASSESSMENT: 0-10

## 2025-05-21 NOTE — PROGRESS NOTES
History of Present Complaint:  The patient was referred to us by Referring Provider: Arnulfo Reynolds. this is 64 y.o.  female with a past history of anxiety/depression, PTSD, obesity BMI 28, diabetes, hypertension, CAD s/p CABG CABG, GERD, cervical ACDF with residual occipital neuralgia and headaches presenting with significant lower back pain residual from surgery and sometime anterior right thigh pain.  The patient also experienced significant incontinence after her surgery in February of this year.  The patient stated that in February 2023 she had symptoms of severe spinal stenosis of the cervical spine and then having anterior and posterior decompression and those symptoms recovered and improved gradually very well.  The patient has been on Percocet 7.5 mg x 5 daily for many years and in February of this year suddenly she started to have shooting pain down to her back and then to her leg and eventually the pain was severe she ended up going to the emergency room and Dr. Reynolds was consulted and recommended immediately fusion and decompression on hard and she ended up with L3-5 decompression and fusion with a spacer in between.  The patient postoperatively required large amount of opioid and it looks like the doctors at [facility] recommended against the opioid therapy and they put her on Suboxone and recommended for her to stay off the Suboxone and put her on a drug rehab program which she followed and she has now very good idea about opioid therapy and the danger of it and the side effect.  She is here to manage her pain.  Currently her pain is only in the middle back from the incisional pain with radiation to her leg intermittent.       Surgical history:  2/28/2024 anterior C4-5, C5-6 and C6-7 discectomy and fusion with plating  3/5/2025 L3-4 and L4-5 minimally invasive robotic transforaminal lumbar interbody fusion with L3-5 pedicle screws instrumentation    Procedures:   None    Portions of record reviewed for  pertinent issues: active problem list, medication list, allergies, family history, social history, notes from last encounter, encounters, lab results, imaging and other available records.    I have personally reviewed the OARRS report for this patient. This report is scanned into the electronic medical record. I have considered the risks of abuse, dependence, addiction and diversion. It showed: Was on Percocet 7.5 mg x 6 daily now only on Suboxone 2 mg daily  OPIOID RISK ASSESSMENT SCORE 6/26 opioid use disorder  Opioid agreement: 5/21/2025  Activities of daily living: Limited at this time just discharged from SNF  Adverse effects: None  Analgesia: W/O 8/10, W 4-5/10 buprenorphine 2 mg  Toxicology screen: **  Aberrant behavior: None  My patient has no underlying substance abuse or alcohol abuse and there's no mental health conditions contributing to the patient's pain.  Patient is being treated with opioid therapy for pain and is responding appropriately.  There are NO signs of opioid intoxication, abuse, addiction or withdrawal.  Pupils are equal, reactive to light bilateral, appropriate speech and cognition. Patient denies any opioid induced constipation. The OARRS registry followed periodically, urine toxicology completed and appropriate.     Patient is advised and warned  in specific detail about potential benefits of opioids along with risks and side effects including, but not limited to, dependency, addiction, tolerance, hyperalgesia, anxiety, depression, insomnia, endocrine changes, immunologic disturbances, respiratory depression and death.     Caution advised regarding the use of medications prescribed at this office and specific mention made regarding not to drive or operate heavy machinery if feeling side effects from this the medications. Patient  expressed an understanding in regards to these particular concerns.     Discussed non-opioid options including (But no limited), physical wellness,  antiinflammatory diet, icing and heating, meditation, relaxation, massage and acupuncture.    We will continue to monitor and adjust medications as needed.          Diagnostic studies:  3/26/2025 lumbar x-ray showed L3-5 posterior fusion with interbody spacers with significant disease above and below the fusion:        5/10/2024 cervical x-ray showed stable C4-C7 anterior ACDF        8/2/2025 MRI lumbar spine showed multiple level bone marrow edema and endplate changes L4-L5 and S1 in addition to severe desiccation of the L4-5 and L5-S1 disc with grade 1 spondylolisthesis at L4-5 with multiple disc herniation and facet joint hypertrophy:  Bone marrow edema and endplate changes L4, L5 and S1  Right L1-2 disc herniation affecting the right nerve root  Moderate to severe right L2-3 foraminal stenosis worse than the left  Severe canal stenosis at L3-4 and moderate to severe foraminal stenosis  Severe canal stenosis at the L4-5 affecting the bilateral neural foramina  Left more than right foraminal stenosis at the L5-S1    T12-L1:    L-2:    L3-4:    L4-5:    L5-S1:    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  Finished college with bachelors in art .  Denies smoking drinking or use of illicit drugs      Review of Systems   HENT: Negative.     Eyes: Negative.    Respiratory: Negative.     Cardiovascular: Negative.    Gastrointestinal: Negative.    Endocrine: Negative.    Genitourinary: Negative.    Musculoskeletal:  Positive for back pain, gait problem, myalgias and neck pain.   Skin: Negative.    Neurological:  Positive for weakness and numbness.   Hematological: Negative.    Psychiatric/Behavioral: Negative.            Physical Exam  Vitals and nursing note reviewed.   Constitutional:       Appearance: Normal appearance.   HENT:      Head: Normocephalic and atraumatic.       Nose: Nose normal.   Eyes:      Extraocular Movements: Extraocular movements intact.      Conjunctiva/sclera: Conjunctivae normal.      Pupils: Pupils are equal, round, and reactive to light.   Cardiovascular:      Rate and Rhythm: Normal rate and regular rhythm.      Pulses: Normal pulses.      Heart sounds: Normal heart sounds.   Pulmonary:      Effort: Pulmonary effort is normal.      Breath sounds: Normal breath sounds.   Abdominal:      General: Abdomen is flat. Bowel sounds are normal.      Palpations: Abdomen is soft.   Musculoskeletal:         General: Tenderness present.      Comments: Uses the walker to walk but able to stand and move her leg and normal tandem   Skin:     General: Skin is warm.   Neurological:      General: No focal deficit present.      Mental Status: She is alert and oriented to person, place, and time.   Psychiatric:         Mood and Affect: Mood normal.         Behavior: Behavior normal.            Assessment  Pleasant 64 years old who has significant history of chronic pain that was treated with the chronic Percocet 7.5 mg almost 8 of them per day and after her back surgery the hospital team found it difficult to control her pain and the decided that she has opioid use disorder and the get her enrolled in the drug rehab in addition to Suboxone 2 mg.  The patient has been moved after her hospitalization to SNF where she continued with the Suboxone therapy and she is here to discuss her future plan.  The patient still has significant pain in the midline back in addition to radiation to her anterior thigh.  I told her that I am happy despite the fact that she is upset of how she was labeled but we discussed opioid use disorder again and I told her that for now I am going to keep her Suboxone 2 mg but will increase it to twice a day and that will calm down significantly her erratic pain responses.  In addition I would like her to increase her gabapentin she is on 300 mg 3 times daily  I told her to increase it according to titration schedule but be aware of not causing her sedation I do want her to fall again.  Finally I added baclofen 10 mg 3 times a day for her we may increase it to 20 3 times daily.  In the future may discuss opioid sparing infusion.  I am worried about the levels above and below the fusion and when she is ready we can maybe order a new MRI with her to see if there is any progression of problems in the areas above or below.  Finally I reviewed the MRI prior to her surgery from 3/2/2025 and I noticed that the patient has significant foraminal stenosis at the right L1-2 and L2-3 which could explain the pain in her anterior thigh and in the knee and may be why her knee may give out on her.  The L5-S1 is silent but has also foraminal stenosis worse on the left.  Consider right L1-2 and L2-3 TFESI to help with the right leg numbness and pain         Plan  At least 50% of the visit was involved in the discussion of the options for treatment. We discussed exercises, medication, interventional therapies and surgery. Healthy life style is essential with patient hard work to achieve the wellness. In addition; discussion with the patient and/or family about any of the diagnostic results, impressions and/or recommended diagnostic studies, prognosis, risks and benefits of treatment options, instructions for treatment and/or follow-up, importance of compliance with chosen treatment options, risk-factor reduction, and patient/family education.           Recommend self-directed physical therapy with at least daily exercises for minimum of 20-minute  Suboxone 2 mg twice daily  Continue gabapentin 300 mg 3 times daily but increased to titration titration schedule was given to the patient  Baclofen 10 mg 3 times daily  Consider right L1-2 and L2-3 TFESI if her symptoms in the leg does not improve  Healthy lifestyle and anti-inflammatory diet in addition to weight control discussed with the  patient  Alternative chronic pain therapies was discussed, encouraged and information was handed  Return to Clinic 6 weeks       *Please note this report has been produced using speech recognition software and may contain errors related to that system including grammar, punctuation and spelling as well as words and phrases that may be inappropriate. If there are questions or concerns, please feel free to contact me to clarify.    Shiv Bryson MD

## 2025-05-21 NOTE — PATIENT INSTRUCTIONS
Gabapentin titration     Please take Gabapentin 300 mg capsules as follows:              AM         PM    Bedtime       Day 1 0 0 1   Day 2 0 0 1   Day 3 0 1 1   Day 4 0 1 1   Day 5 1 1 1   Day 6 1 1 1   Day 7 1 1 1   Day 8 1 1 2   Day 9 1 1 2   Day 10 1 2 2   Day 11 1 2 2   Day 12 2 2 2   Day 13 2 2 2   Day 14 2 2 3   Day 15 2 2 3   Day 16 2 3 3   Day 17 2 3 3   Day 18 3 3 3   Day 19 3 3 3   Day 20 3 3 4   Day 21 3 3 4   Day 22 3 4 4   Day 23 3 4 4   Day 24 4 4 4     Do not exceed 3600mg per day.   Stop and maintain dose when symptoms resolve.  Reduce as instructed if side effects not tolerated.

## 2025-05-21 NOTE — PROGRESS NOTES
Chief Complaint   Patient presents with    New Patient Visit     History of Present Complaint:  The patient was referred to us by Referring Provider: Arnulfo Reynolds. . this is 64 y.o.  female  with a past history of anxiety/depression, PTSD, obesity BMI 28, diabetes, hypertension, GERD, cervical ACDF with residual occipital neuralgia and headaches  presenting with back and right leg .    Pain started lumbar fusion on 3/5/25   Pain is better with Nothing .  Pain is worst with moving .    She was released Yesterday , from the [facility], was only given 6 Suboxone pills . Needs someone to take over her care .   The pain is described as achiness, constant, sharp, and stabbing and is relieved by Medications Suboxone just takes the edge off.       Prior Pain Therapies: Physical therapy, opioid therapy,    Past surgical history:  Back surgery, neck surgery, tumor removal of the heart, umbilical hernia repair,    Employment/disability/litigation: retired , ran nonprofit programs one  specifically here for     Social history:  and living with boyfriend , has 2 two grown daughter , one grandson , high school graduate , bachelors in art .    Diagnostic studies: X-rays of the lumbar spine thoracic spine CAT scan of the lumbar and thoracic spine MRI of the lumbar spine done in March       Qasim Each Box that Applies Female Male   FAMILY HISTORY OF SUBSTANCE ABUSE  Qasim the boxes that applies   Alcohol ?  1    ? 3   Illegal drugs ?  2 ? 3   Rx drugs ?  4 ? 4   PERSONAL HISTORY OF SUBSTNACE ABUSE   Alcohol ?  3 ?  3   Illegal drugs ?  4 ?  4    Rx drugs ?  5 ?  5   Age Between 16-45 years ?  1 ?  1   History of Preadolescent Sexual Abuse ?  3 ?  0   PSYCHOLOGIC DISEASE   ADD, OCD, bipolar, schizophrenia  Opioid Risk Assessment  ?  2 ?  2   Depression x  1 ?  1   Scoring Totals 1        0-3 - Low  4-7 - Moderate  8 - High  Opioid Risk Assessment Score 1/26   Yes

## 2025-05-21 NOTE — LETTER
May 21, 2025     Arnulfo Reynolds MD  [address]  [facility] [address]    Patient: Cristal Pollard   YOB: 1960   Date of Visit: 5/21/2025       Dear Dr. Arnulfo Reynolds MD:    Thank you for referring Cristal Pollard to me for evaluation. Below are my notes for this consultation.  If you have questions, please do not hesitate to call me. I look forward to following your patient along with you.       Sincerely,     Shiv Bryson MD      CC: No Recipients  ______________________________________________________________________________________      History of Present Complaint:  The patient was referred to us by Referring Provider: Arnulfo Reynolds. this is 64 y.o.  female with a past history of anxiety/depression, PTSD, obesity BMI 28, diabetes, hypertension, CAD s/p CABG CABG, GERD, cervical ACDF with residual occipital neuralgia and headaches presenting with significant lower back pain residual from surgery and sometime anterior right thigh pain.  The patient also experienced significant incontinence after her surgery in February of this year.  The patient stated that in February 2023 she had symptoms of severe spinal stenosis of the cervical spine and then having anterior and posterior decompression and those symptoms recovered and improved gradually very well.  The patient has been on Percocet 7.5 mg x 5 daily for many years and in February of this year suddenly she started to have shooting pain down to her back and then to her leg and eventually the pain was severe she ended up going to the emergency room and Dr. Reynolds was consulted and recommended immediately fusion and decompression on hard and she ended up with L3-5 decompression and fusion with a spacer in between.  The patient postoperatively required large amount of opioid and it looks like the doctors at [facility] recommended against the opioid therapy and they put her on Suboxone and recommended for her to stay off the  Suboxone and put her on a drug rehab program which she followed and she has now very good idea about opioid therapy and the danger of it and the side effect.  She is here to manage her pain.  Currently her pain is only in the middle back from the incisional pain with radiation to her leg intermittent.       Surgical history:  2/28/2024 anterior C4-5, C5-6 and C6-7 discectomy and fusion with plating  3/5/2025 L3-4 and L4-5 minimally invasive robotic transforaminal lumbar interbody fusion with L3-5 pedicle screws instrumentation    Procedures:   None    Portions of record reviewed for pertinent issues: active problem list, medication list, allergies, family history, social history, notes from last encounter, encounters, lab results, imaging and other available records.    I have personally reviewed the OARRS report for this patient. This report is scanned into the electronic medical record. I have considered the risks of abuse, dependence, addiction and diversion. It showed: Was on Percocet 7.5 mg x 6 daily now only on Suboxone 2 mg daily  OPIOID RISK ASSESSMENT SCORE 6/26 opioid use disorder  Opioid agreement: 5/21/2025  Activities of daily living: Limited at this time just discharged from SNF  Adverse effects: None  Analgesia: W/O 8/10, W 4-5/10 buprenorphine 2 mg  Toxicology screen: **  Aberrant behavior: None  My patient has no underlying substance abuse or alcohol abuse and there's no mental health conditions contributing to the patient's pain.  Patient is being treated with opioid therapy for pain and is responding appropriately.  There are NO signs of opioid intoxication, abuse, addiction or withdrawal.  Pupils are equal, reactive to light bilateral, appropriate speech and cognition. Patient denies any opioid induced constipation. The OARRS registry followed periodically, urine toxicology completed and appropriate.     Patient is advised and warned  in specific detail about potential benefits of opioids along  with risks and side effects including, but not limited to, dependency, addiction, tolerance, hyperalgesia, anxiety, depression, insomnia, endocrine changes, immunologic disturbances, respiratory depression and death.     Caution advised regarding the use of medications prescribed at this office and specific mention made regarding not to drive or operate heavy machinery if feeling side effects from this the medications. Patient  expressed an understanding in regards to these particular concerns.     Discussed non-opioid options including (But no limited), physical wellness, antiinflammatory diet, icing and heating, meditation, relaxation, massage and acupuncture.    We will continue to monitor and adjust medications as needed.          Diagnostic studies:  3/26/2025 lumbar x-ray showed L3-5 posterior fusion with interbody spacers with significant disease above and below the fusion:        5/10/2024 cervical x-ray showed stable C4-C7 anterior ACDF        8/2/2025 MRI lumbar spine showed multiple level bone marrow edema and endplate changes L4-L5 and S1 in addition to severe desiccation of the L4-5 and L5-S1 disc with grade 1 spondylolisthesis at L4-5 with multiple disc herniation and facet joint hypertrophy:  Bone marrow edema and endplate changes L4, L5 and S1  Right L1-2 disc herniation affecting the right nerve root  Moderate to severe right L2-3 foraminal stenosis worse than the left  Severe canal stenosis at L3-4 and moderate to severe foraminal stenosis  Severe canal stenosis at the L4-5 affecting the bilateral neural foramina  Left more than right foraminal stenosis at the L5-S1    T12-L1:    L-2:    L3-4:    L4-5:    L5-S1:    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  Finished college with bachelors in art .  Denies smoking drinking or use of illicit  drugs      Review of Systems   HENT: Negative.     Eyes: Negative.    Respiratory: Negative.     Cardiovascular: Negative.    Gastrointestinal: Negative.    Endocrine: Negative.    Genitourinary: Negative.    Musculoskeletal:  Positive for back pain, gait problem, myalgias and neck pain.   Skin: Negative.    Neurological:  Positive for weakness and numbness.   Hematological: Negative.    Psychiatric/Behavioral: Negative.            Physical Exam  Vitals and nursing note reviewed.   Constitutional:       Appearance: Normal appearance.   HENT:      Head: Normocephalic and atraumatic.      Nose: Nose normal.   Eyes:      Extraocular Movements: Extraocular movements intact.      Conjunctiva/sclera: Conjunctivae normal.      Pupils: Pupils are equal, round, and reactive to light.   Cardiovascular:      Rate and Rhythm: Normal rate and regular rhythm.      Pulses: Normal pulses.      Heart sounds: Normal heart sounds.   Pulmonary:      Effort: Pulmonary effort is normal.      Breath sounds: Normal breath sounds.   Abdominal:      General: Abdomen is flat. Bowel sounds are normal.      Palpations: Abdomen is soft.   Musculoskeletal:         General: Tenderness present.      Comments: Uses the walker to walk but able to stand and move her leg and normal tandem   Skin:     General: Skin is warm.   Neurological:      General: No focal deficit present.      Mental Status: She is alert and oriented to person, place, and time.   Psychiatric:         Mood and Affect: Mood normal.         Behavior: Behavior normal.            Assessment  Pleasant 64 years old who has significant history of chronic pain that was treated with the chronic Percocet 7.5 mg almost 8 of them per day and after her back surgery the hospital team found it difficult to control her pain and the decided that she has opioid use disorder and the get her enrolled in the drug rehab in addition to Suboxone 2 mg.  The patient has been moved after her hospitalization  to [facility] where she continued with the Suboxone therapy and she is here to discuss her future plan.  The patient still has significant pain in the midline back in addition to radiation to her anterior thigh.  I told her that I am happy despite the fact that she is upset of how she was labeled but we discussed opioid use disorder again and I told her that for now I am going to keep her Suboxone 2 mg but will increase it to twice a day and that will calm down significantly her erratic pain responses.  In addition I would like her to increase her gabapentin she is on 300 mg 3 times daily I told her to increase it according to titration schedule but be aware of not causing her sedation I do want her to fall again.  Finally I added baclofen 10 mg 3 times a day for her we may increase it to 20 3 times daily.  In the future may discuss opioid sparing infusion.  I am worried about the levels above and below the fusion and when she is ready we can maybe order a new MRI with her to see if there is any progression of problems in the areas above or below.  Finally I reviewed the MRI prior to her surgery from 3/2/2025 and I noticed that the patient has significant foraminal stenosis at the right L1-2 and L2-3 which could explain the pain in her anterior thigh and in the knee and may be why her knee may give out on her.  The L5-S1 is silent but has also foraminal stenosis worse on the left.  Consider right L1-2 and L2-3 TFESI to help with the right leg numbness and pain         Plan  At least 50% of the visit was involved in the discussion of the options for treatment. We discussed exercises, medication, interventional therapies and surgery. Healthy life style is essential with patient hard work to achieve the wellness. In addition; discussion with the patient and/or family about any of the diagnostic results, impressions and/or recommended diagnostic studies, prognosis, risks and benefits of treatment options, instructions for  treatment and/or follow-up, importance of compliance with chosen treatment options, risk-factor reduction, and patient/family education.           Recommend self-directed physical therapy with at least daily exercises for minimum of 20-minute  Suboxone 2 mg twice daily  Continue gabapentin 300 mg 3 times daily but increased to titration titration schedule was given to the patient  Baclofen 10 mg 3 times daily  Consider right L1-2 and L2-3 TFESI if her symptoms in the leg does not improve  Healthy lifestyle and anti-inflammatory diet in addition to weight control discussed with the patient  Alternative chronic pain therapies was discussed, encouraged and information was handed  Return to Clinic 6 weeks       *Please note this report has been produced using speech recognition software and may contain errors related to that system including grammar, punctuation and spelling as well as words and phrases that may be inappropriate. If there are questions or concerns, please feel free to contact me to clarify.    Shiv Bryson MD

## 2025-05-21 NOTE — LETTER
May 21, 2025     Jessica Diehl DO  [address] [facility] [address]    Patient: Cristal Pollard   YOB: 1960   Date of Visit: 5/21/2025       Dear Dr. Jessica Diehl DO:    Thank you for referring Cristal Pollard to me for evaluation. Below are my notes for this consultation.  If you have questions, please do not hesitate to call me. I look forward to following your patient along with you.       Sincerely,     Shiv Bryson MD      CC: No Recipients  ______________________________________________________________________________________      History of Present Complaint:  The patient was referred to us by Referring Provider: Arnulfo Reynolds. this is 64 y.o.  female with a past history of anxiety/depression, PTSD, obesity BMI 28, diabetes, hypertension, CAD s/p CABG CABG, GERD, cervical ACDF with residual occipital neuralgia and headaches presenting with significant lower back pain residual from surgery and sometime anterior right thigh pain.  The patient also experienced significant incontinence after her surgery in February of this year.  The patient stated that in February 2023 she had symptoms of severe spinal stenosis of the cervical spine and then having anterior and posterior decompression and those symptoms recovered and improved gradually very well.  The patient has been on Percocet 7.5 mg x 5 daily for many years and in February of this year suddenly she started to have shooting pain down to her back and then to her leg and eventually the pain was severe she ended up going to the emergency room and Dr. Reynolds was consulted and recommended immediately fusion and decompression on hard and she ended up with L3-5 decompression and fusion with a spacer in between.  The patient postoperatively required large amount of opioid and it looks like the doctors at [facility] recommended against the opioid therapy and they put her on Suboxone and recommended for her to stay off the Suboxone and put her on a drug  rehab program which she followed and she has now very good idea about opioid therapy and the danger of it and the side effect.  She is here to manage her pain.  Currently her pain is only in the middle back from the incisional pain with radiation to her leg intermittent.       Surgical history:  2/28/2024 anterior C4-5, C5-6 and C6-7 discectomy and fusion with plating  3/5/2025 L3-4 and L4-5 minimally invasive robotic transforaminal lumbar interbody fusion with L3-5 pedicle screws instrumentation    Procedures:   None    Portions of record reviewed for pertinent issues: active problem list, medication list, allergies, family history, social history, notes from last encounter, encounters, lab results, imaging and other available records.    I have personally reviewed the OARRS report for this patient. This report is scanned into the electronic medical record. I have considered the risks of abuse, dependence, addiction and diversion. It showed: Was on Percocet 7.5 mg x 6 daily now only on Suboxone 2 mg daily  OPIOID RISK ASSESSMENT SCORE 6/26 opioid use disorder  Opioid agreement: 5/21/2025  Activities of daily living: Limited at this time just discharged from SNF  Adverse effects: None  Analgesia: W/O 8/10, W 4-5/10 buprenorphine 2 mg  Toxicology screen: **  Aberrant behavior: None  My patient has no underlying substance abuse or alcohol abuse and there's no mental health conditions contributing to the patient's pain.  Patient is being treated with opioid therapy for pain and is responding appropriately.  There are NO signs of opioid intoxication, abuse, addiction or withdrawal.  Pupils are equal, reactive to light bilateral, appropriate speech and cognition. Patient denies any opioid induced constipation. The OARRS registry followed periodically, urine toxicology completed and appropriate.     Patient is advised and warned  in specific detail about potential benefits of opioids along with risks and side effects  including, but not limited to, dependency, addiction, tolerance, hyperalgesia, anxiety, depression, insomnia, endocrine changes, immunologic disturbances, respiratory depression and death.     Caution advised regarding the use of medications prescribed at this office and specific mention made regarding not to drive or operate heavy machinery if feeling side effects from this the medications. Patient  expressed an understanding in regards to these particular concerns.     Discussed non-opioid options including (But no limited), physical wellness, antiinflammatory diet, icing and heating, meditation, relaxation, massage and acupuncture.    We will continue to monitor and adjust medications as needed.          Diagnostic studies:  3/26/2025 lumbar x-ray showed L3-5 posterior fusion with interbody spacers with significant disease above and below the fusion:        5/10/2024 cervical x-ray showed stable C4-C7 anterior ACDF        8/2/2025 MRI lumbar spine showed multiple level bone marrow edema and endplate changes L4-L5 and S1 in addition to severe desiccation of the L4-5 and L5-S1 disc with grade 1 spondylolisthesis at L4-5 with multiple disc herniation and facet joint hypertrophy:  Bone marrow edema and endplate changes L4, L5 and S1  Right L1-2 disc herniation affecting the right nerve root  Moderate to severe right L2-3 foraminal stenosis worse than the left  Severe canal stenosis at L3-4 and moderate to severe foraminal stenosis  Severe canal stenosis at the L4-5 affecting the bilateral neural foramina  Left more than right foraminal stenosis at the L5-S1    T12-L1:    L-2:    L3-4:    L4-5:    L5-S1:    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  Finished college with bachelors in art .  Denies smoking drinking or use of illicit drugs      Review of Systems   HENT:  Negative.     Eyes: Negative.    Respiratory: Negative.     Cardiovascular: Negative.    Gastrointestinal: Negative.    Endocrine: Negative.    Genitourinary: Negative.    Musculoskeletal:  Positive for back pain, gait problem, myalgias and neck pain.   Skin: Negative.    Neurological:  Positive for weakness and numbness.   Hematological: Negative.    Psychiatric/Behavioral: Negative.            Physical Exam  Vitals and nursing note reviewed.   Constitutional:       Appearance: Normal appearance.   HENT:      Head: Normocephalic and atraumatic.      Nose: Nose normal.   Eyes:      Extraocular Movements: Extraocular movements intact.      Conjunctiva/sclera: Conjunctivae normal.      Pupils: Pupils are equal, round, and reactive to light.   Cardiovascular:      Rate and Rhythm: Normal rate and regular rhythm.      Pulses: Normal pulses.      Heart sounds: Normal heart sounds.   Pulmonary:      Effort: Pulmonary effort is normal.      Breath sounds: Normal breath sounds.   Abdominal:      General: Abdomen is flat. Bowel sounds are normal.      Palpations: Abdomen is soft.   Musculoskeletal:         General: Tenderness present.      Comments: Uses the walker to walk but able to stand and move her leg and normal tandem   Skin:     General: Skin is warm.   Neurological:      General: No focal deficit present.      Mental Status: She is alert and oriented to person, place, and time.   Psychiatric:         Mood and Affect: Mood normal.         Behavior: Behavior normal.            Assessment  Pleasant 64 years old who has significant history of chronic pain that was treated with the chronic Percocet 7.5 mg almost 8 of them per day and after her back surgery the hospital team found it difficult to control her pain and the decided that she has opioid use disorder and the get her enrolled in the drug rehab in addition to Suboxone 2 mg.  The patient has been moved after her hospitalization to SNF where she continued with the  Suboxone therapy and she is here to discuss her future plan.  The patient still has significant pain in the midline back in addition to radiation to her anterior thigh.  I told her that I am happy despite the fact that she is upset of how she was labeled but we discussed opioid use disorder again and I told her that for now I am going to keep her Suboxone 2 mg but will increase it to twice a day and that will calm down significantly her erratic pain responses.  In addition I would like her to increase her gabapentin she is on 300 mg 3 times daily I told her to increase it according to titration schedule but be aware of not causing her sedation I do want her to fall again.  Finally I added baclofen 10 mg 3 times a day for her we may increase it to 20 3 times daily.  In the future may discuss opioid sparing infusion.  I am worried about the levels above and below the fusion and when she is ready we can maybe order a new MRI with her to see if there is any progression of problems in the areas above or below.  Finally I reviewed the MRI prior to her surgery from 3/2/2025 and I noticed that the patient has significant foraminal stenosis at the right L1-2 and L2-3 which could explain the pain in her anterior thigh and in the knee and may be why her knee may give out on her.  The L5-S1 is silent but has also foraminal stenosis worse on the left.  Consider right L1-2 and L2-3 TFESI to help with the right leg numbness and pain         Plan  At least 50% of the visit was involved in the discussion of the options for treatment. We discussed exercises, medication, interventional therapies and surgery. Healthy life style is essential with patient hard work to achieve the wellness. In addition; discussion with the patient and/or family about any of the diagnostic results, impressions and/or recommended diagnostic studies, prognosis, risks and benefits of treatment options, instructions for treatment and/or follow-up, importance  of compliance with chosen treatment options, risk-factor reduction, and patient/family education.           Recommend self-directed physical therapy with at least daily exercises for minimum of 20-minute  Suboxone 2 mg twice daily  Continue gabapentin 300 mg 3 times daily but increased to titration titration schedule was given to the patient  Baclofen 10 mg 3 times daily  Consider right L1-2 and L2-3 TFESI if her symptoms in the leg does not improve  Healthy lifestyle and anti-inflammatory diet in addition to weight control discussed with the patient  Alternative chronic pain therapies was discussed, encouraged and information was handed  Return to Clinic 6 weeks       *Please note this report has been produced using speech recognition software and may contain errors related to that system including grammar, punctuation and spelling as well as words and phrases that may be inappropriate. If there are questions or concerns, please feel free to contact me to clarify.    Sihv Bryson MD

## 2025-06-05 ENCOUNTER — TELEPHONE (OUTPATIENT)
Dept: PAIN MEDICINE | Facility: CLINIC | Age: 65
End: 2025-06-05
Payer: COMMERCIAL

## 2025-06-06 NOTE — TELEPHONE ENCOUNTER
Patient said that's not helping either, she requested to move her follow up 7/2 to sooner, she is on for 6/10 at 1pm

## 2025-06-09 DIAGNOSIS — F33.1 MODERATE EPISODE OF RECURRENT MAJOR DEPRESSIVE DISORDER: ICD-10-CM

## 2025-06-09 DIAGNOSIS — F41.9 ANXIETY DISORDER, UNSPECIFIED TYPE: ICD-10-CM

## 2025-06-09 RX ORDER — FLUOXETINE HYDROCHLORIDE 40 MG/1
40 CAPSULE ORAL NIGHTLY
Qty: 90 CAPSULE | Refills: 1 | Status: SHIPPED | OUTPATIENT
Start: 2025-06-09 | End: 2025-12-06

## 2025-06-10 ENCOUNTER — TELEPHONE (OUTPATIENT)
Dept: PAIN MEDICINE | Facility: CLINIC | Age: 65
End: 2025-06-10

## 2025-06-12 ENCOUNTER — ANESTHESIA EVENT (OUTPATIENT)
Dept: RADIOLOGY | Facility: HOSPITAL | Age: 65
End: 2025-06-12
Payer: COMMERCIAL

## 2025-06-13 ENCOUNTER — ANESTHESIA (OUTPATIENT)
Dept: RADIOLOGY | Facility: HOSPITAL | Age: 65
End: 2025-06-13
Payer: COMMERCIAL

## 2025-06-13 ENCOUNTER — HOSPITAL ENCOUNTER (OUTPATIENT)
Dept: RADIOLOGY | Facility: HOSPITAL | Age: 65
Discharge: HOME | End: 2025-06-13
Payer: COMMERCIAL

## 2025-06-13 VITALS
OXYGEN SATURATION: 96 % | TEMPERATURE: 97.3 F | RESPIRATION RATE: 16 BRPM | DIASTOLIC BLOOD PRESSURE: 56 MMHG | HEART RATE: 63 BPM | SYSTOLIC BLOOD PRESSURE: 112 MMHG

## 2025-06-13 DIAGNOSIS — M54.16 LUMBAR RADICULOPATHY: ICD-10-CM

## 2025-06-13 LAB — GLUCOSE BLD MANUAL STRIP-MCNC: 141 MG/DL (ref 74–99)

## 2025-06-13 PROCEDURE — 82947 ASSAY GLUCOSE BLOOD QUANT: CPT

## 2025-06-13 PROCEDURE — 2500000005 HC RX 250 GENERAL PHARMACY W/O HCPCS: Performed by: ANESTHESIOLOGY

## 2025-06-13 PROCEDURE — 7100000009 HC PHASE TWO TIME - INITIAL BASE CHARGE

## 2025-06-13 PROCEDURE — 72148 MRI LUMBAR SPINE W/O DYE: CPT

## 2025-06-13 PROCEDURE — 7100000010 HC PHASE TWO TIME - EACH INCREMENTAL 1 MINUTE

## 2025-06-13 PROCEDURE — 7100000002 HC RECOVERY ROOM TIME - EACH INCREMENTAL 1 MINUTE

## 2025-06-13 PROCEDURE — 2500000004 HC RX 250 GENERAL PHARMACY W/ HCPCS (ALT 636 FOR OP/ED)

## 2025-06-13 PROCEDURE — 2500000001 HC RX 250 WO HCPCS SELF ADMINISTERED DRUGS (ALT 637 FOR MEDICARE OP): Performed by: ANESTHESIOLOGY

## 2025-06-13 PROCEDURE — 3700000002 HC GENERAL ANESTHESIA TIME - EACH INCREMENTAL 1 MINUTE

## 2025-06-13 PROCEDURE — 7100000001 HC RECOVERY ROOM TIME - INITIAL BASE CHARGE

## 2025-06-13 PROCEDURE — 3700000001 HC GENERAL ANESTHESIA TIME - INITIAL BASE CHARGE

## 2025-06-13 RX ORDER — SODIUM CHLORIDE, SODIUM LACTATE, POTASSIUM CHLORIDE, CALCIUM CHLORIDE 600; 310; 30; 20 MG/100ML; MG/100ML; MG/100ML; MG/100ML
INJECTION, SOLUTION INTRAVENOUS CONTINUOUS PRN
Status: DISCONTINUED | OUTPATIENT
Start: 2025-06-13 | End: 2025-06-13

## 2025-06-13 RX ORDER — LIDOCAINE IN NACL,ISO-OSMOT/PF 30 MG/3 ML
0.1 SYRINGE (ML) INJECTION ONCE
Status: DISCONTINUED | OUTPATIENT
Start: 2025-06-13 | End: 2025-06-14 | Stop reason: HOSPADM

## 2025-06-13 RX ORDER — OXYCODONE HYDROCHLORIDE 5 MG/1
5 TABLET ORAL EVERY 4 HOURS PRN
Status: DISCONTINUED | OUTPATIENT
Start: 2025-06-13 | End: 2025-06-14 | Stop reason: HOSPADM

## 2025-06-13 RX ORDER — HYDRALAZINE HYDROCHLORIDE 20 MG/ML
5 INJECTION INTRAMUSCULAR; INTRAVENOUS EVERY 30 MIN PRN
Status: DISCONTINUED | OUTPATIENT
Start: 2025-06-13 | End: 2025-06-14 | Stop reason: HOSPADM

## 2025-06-13 RX ORDER — GLYCOPYRROLATE 0.2 MG/ML
INJECTION INTRAMUSCULAR; INTRAVENOUS AS NEEDED
Status: DISCONTINUED | OUTPATIENT
Start: 2025-06-13 | End: 2025-06-13

## 2025-06-13 RX ORDER — MIDAZOLAM HYDROCHLORIDE 1 MG/ML
INJECTION INTRAMUSCULAR; INTRAVENOUS AS NEEDED
Status: DISCONTINUED | OUTPATIENT
Start: 2025-06-13 | End: 2025-06-13

## 2025-06-13 RX ORDER — METHOCARBAMOL 100 MG/ML
1000 INJECTION, SOLUTION INTRAMUSCULAR; INTRAVENOUS ONCE
Status: DISCONTINUED | OUTPATIENT
Start: 2025-06-13 | End: 2025-06-14 | Stop reason: HOSPADM

## 2025-06-13 RX ORDER — MIDAZOLAM HYDROCHLORIDE 1 MG/ML
0.5 INJECTION INTRAMUSCULAR; INTRAVENOUS
Status: DISCONTINUED | OUTPATIENT
Start: 2025-06-13 | End: 2025-06-14 | Stop reason: HOSPADM

## 2025-06-13 RX ORDER — LABETALOL HYDROCHLORIDE 5 MG/ML
5 INJECTION, SOLUTION INTRAVENOUS ONCE AS NEEDED
Status: DISCONTINUED | OUTPATIENT
Start: 2025-06-13 | End: 2025-06-14 | Stop reason: HOSPADM

## 2025-06-13 RX ORDER — SODIUM CHLORIDE, SODIUM LACTATE, POTASSIUM CHLORIDE, CALCIUM CHLORIDE 600; 310; 30; 20 MG/100ML; MG/100ML; MG/100ML; MG/100ML
75 INJECTION, SOLUTION INTRAVENOUS CONTINUOUS
Status: SHIPPED | OUTPATIENT
Start: 2025-06-13 | End: 2025-06-13

## 2025-06-13 RX ORDER — LIDOCAINE HCL/PF 100 MG/5ML
SYRINGE (ML) INTRAVENOUS AS NEEDED
Status: DISCONTINUED | OUTPATIENT
Start: 2025-06-13 | End: 2025-06-13

## 2025-06-13 RX ORDER — PHENYLEPHRINE HCL IN 0.9% NACL 0.4MG/10ML
SYRINGE (ML) INTRAVENOUS AS NEEDED
Status: DISCONTINUED | OUTPATIENT
Start: 2025-06-13 | End: 2025-06-13

## 2025-06-13 RX ORDER — PROPOFOL 10 MG/ML
INJECTION, EMULSION INTRAVENOUS AS NEEDED
Status: DISCONTINUED | OUTPATIENT
Start: 2025-06-13 | End: 2025-06-13

## 2025-06-13 RX ORDER — ALBUTEROL SULFATE 0.83 MG/ML
2.5 SOLUTION RESPIRATORY (INHALATION) ONCE AS NEEDED
Status: DISCONTINUED | OUTPATIENT
Start: 2025-06-13 | End: 2025-06-14 | Stop reason: HOSPADM

## 2025-06-13 RX ORDER — DROPERIDOL 2.5 MG/ML
0.62 INJECTION, SOLUTION INTRAMUSCULAR; INTRAVENOUS ONCE AS NEEDED
Status: DISCONTINUED | OUTPATIENT
Start: 2025-06-13 | End: 2025-06-14 | Stop reason: HOSPADM

## 2025-06-13 RX ORDER — ACETAMINOPHEN 325 MG/1
650 TABLET ORAL EVERY 4 HOURS PRN
Status: DISCONTINUED | OUTPATIENT
Start: 2025-06-13 | End: 2025-06-14 | Stop reason: HOSPADM

## 2025-06-13 RX ORDER — ONDANSETRON HYDROCHLORIDE 2 MG/ML
4 INJECTION, SOLUTION INTRAVENOUS ONCE AS NEEDED
Status: DISCONTINUED | OUTPATIENT
Start: 2025-06-13 | End: 2025-06-14 | Stop reason: HOSPADM

## 2025-06-13 RX ORDER — HYDROMORPHONE HYDROCHLORIDE 1 MG/ML
0.4 INJECTION, SOLUTION INTRAMUSCULAR; INTRAVENOUS; SUBCUTANEOUS EVERY 5 MIN PRN
Status: DISCONTINUED | OUTPATIENT
Start: 2025-06-13 | End: 2025-06-14 | Stop reason: HOSPADM

## 2025-06-13 RX ORDER — HYDROMORPHONE HYDROCHLORIDE 1 MG/ML
0.2 INJECTION, SOLUTION INTRAMUSCULAR; INTRAVENOUS; SUBCUTANEOUS EVERY 5 MIN PRN
Status: DISCONTINUED | OUTPATIENT
Start: 2025-06-13 | End: 2025-06-14 | Stop reason: HOSPADM

## 2025-06-13 RX ORDER — ONDANSETRON HYDROCHLORIDE 2 MG/ML
INJECTION, SOLUTION INTRAVENOUS AS NEEDED
Status: DISCONTINUED | OUTPATIENT
Start: 2025-06-13 | End: 2025-06-13

## 2025-06-13 RX ORDER — DIPHENHYDRAMINE HYDROCHLORIDE 50 MG/ML
12.5 INJECTION, SOLUTION INTRAMUSCULAR; INTRAVENOUS ONCE AS NEEDED
Status: DISCONTINUED | OUTPATIENT
Start: 2025-06-13 | End: 2025-06-14 | Stop reason: HOSPADM

## 2025-06-13 RX ADMIN — PROPOFOL 150 MG: 10 INJECTION, EMULSION INTRAVENOUS at 08:24

## 2025-06-13 RX ADMIN — PROPOFOL 30 MG: 10 INJECTION, EMULSION INTRAVENOUS at 08:33

## 2025-06-13 RX ADMIN — GLYCOPYRROLATE 0.1 MG: 0.2 INJECTION, SOLUTION INTRAMUSCULAR; INTRAVENOUS at 08:21

## 2025-06-13 RX ADMIN — Medication 120 MCG: at 09:04

## 2025-06-13 RX ADMIN — SODIUM CHLORIDE, POTASSIUM CHLORIDE, SODIUM LACTATE AND CALCIUM CHLORIDE: 600; 310; 30; 20 INJECTION, SOLUTION INTRAVENOUS at 08:17

## 2025-06-13 RX ADMIN — DEXAMETHASONE SODIUM PHOSPHATE 6 MG: 4 INJECTION INTRA-ARTICULAR; INTRALESIONAL; INTRAMUSCULAR; INTRAVENOUS; SOFT TISSUE at 08:26

## 2025-06-13 RX ADMIN — ONDANSETRON 4 MG: 2 INJECTION INTRAMUSCULAR; INTRAVENOUS at 08:59

## 2025-06-13 RX ADMIN — PROPOFOL 100 MCG/KG/MIN: 10 INJECTION, EMULSION INTRAVENOUS at 08:38

## 2025-06-13 RX ADMIN — ACETAMINOPHEN 650 MG: 325 TABLET, FILM COATED ORAL at 10:54

## 2025-06-13 RX ADMIN — MIDAZOLAM HYDROCHLORIDE 2 MG: 2 INJECTION, SOLUTION INTRAMUSCULAR; INTRAVENOUS at 08:21

## 2025-06-13 RX ADMIN — Medication 6 L/MIN: at 09:37

## 2025-06-13 RX ADMIN — LIDOCAINE HYDROCHLORIDE 100 MG: 20 INJECTION INTRAVENOUS at 08:24

## 2025-06-13 SDOH — HEALTH STABILITY: MENTAL HEALTH: CURRENT SMOKER: 0

## 2025-06-13 ASSESSMENT — PAIN SCALES - GENERAL
PAINLEVEL_OUTOF10: 4
PAINLEVEL_OUTOF10: 4
PAINLEVEL_OUTOF10: 0 - NO PAIN
PAINLEVEL_OUTOF10: 0 - NO PAIN
PAINLEVEL_OUTOF10: 4

## 2025-06-13 ASSESSMENT — PAIN - FUNCTIONAL ASSESSMENT
PAIN_FUNCTIONAL_ASSESSMENT: 0-10

## 2025-06-13 NOTE — ANESTHESIA PROCEDURE NOTES
Airway  Date/Time: 6/13/2025 8:25 AM  Reason: elective    Airway not difficult    Staffing  Performed: ABEL   Authorized by: Mary Grant MD    Performed by: ABEL Walker  Patient location during procedure: OR    Patient Condition  Indications for airway management: anesthesia  Patient position: sniffing  Planned trial extubation  Sedation level: deep     Final Airway Details   Preoxygenated: yes  Final airway type: supraglottic airway  Successful airway: Supreme  Size: 4  Number of attempts at approach: 1

## 2025-06-13 NOTE — ANESTHESIA POSTPROCEDURE EVALUATION
Patient: Cristal Pollard    Procedure Summary       Date: 06/13/25 Room / Location: [facility]    Anesthesia Start: 0817 Anesthesia Stop: 0944    Procedure: MR LUMBAR SPINE WO CONTRAST Diagnosis:       Lumbar radiculopathy      (lumbar pain and radiculopathy)    Scheduled Providers: Mary Grant MD; ABEL Walker Responsible Provider: Mary Grant MD    Anesthesia Type: general ASA Status: 3            Anesthesia Type: general    Vitals Value Taken Time   /56 06/13/25 10:00   Temp 36 °C (96.8 °F) 06/13/25 09:44   Pulse 65 06/13/25 10:02   Resp 16 06/13/25 10:02   SpO2 100 % 06/13/25 10:02   Vitals shown include unfiled device data.    Anesthesia Post Evaluation    Patient location during evaluation: PACU  Patient participation: complete - patient participated  Level of consciousness: sleepy but conscious  Pain management: adequate  Airway patency: patent  Cardiovascular status: acceptable  Respiratory status: acceptable  Hydration status: acceptable  Postoperative Nausea and Vomiting: none        No notable events documented.

## 2025-06-13 NOTE — ANESTHESIA PROCEDURE NOTES
Peripheral IV  Date/Time: 6/13/2025 7:52 AM  Inserted by: ABEL Walker    Placement  Needle size: 20 G  Laterality: right  Location: hand  Local anesthetic: none  Site prep: alcohol  Technique: anatomical landmarks  Attempts: 1

## 2025-06-13 NOTE — ANESTHESIA PREPROCEDURE EVALUATION
Patient: Cristal Pollard    Procedure Information       Date/Time: 06/13/25 0800    Scheduled providers: Mary Grant MD; ABEL Walker    Procedure: MR LUMBAR SPINE WO CONTRAST    Location: [facility]            Relevant Problems   Anesthesia   (+) PONV (postoperative nausea and vomiting)      Cardiac   (+) Chest pain   (+) Hypercholesteremia   (+) Hypertension      Pulmonary (within normal limits)      Neuro   (+) Anxiety disorder   (+) Carpal tunnel syndrome on right   (+) Cervical radiculopathy   (+) Cervical radiculopathy due to trauma   (+) Depression, recurrent   (+) Depressive personality disorder   (+) Generalized tonic clonic epilepsy (Multi)   (+) Occipital neuralgia   (+) Panic attacks   (+) Post traumatic stress disorder (PTSD)   (+) Right lumbosacral radiculopathy   (+) Seizure disorder (Multi)   (+) Stress reaction, chronic      GI (within normal limits)      /Renal (within normal limits)      Liver (within normal limits)      Endocrine   (+) Class 1 obesity due to excess calories without serious comorbidity with body mass index (BMI) of 30.0 to 30.9 in adult   (+) Type 2 diabetes mellitus      Hematology (within normal limits)      Musculoskeletal   (+) Carpal tunnel syndrome on right   (+) Central stenosis of spinal canal   (+) Chronic neck pain   (+) DDD (degenerative disc disease), lumbosacral   (+) Spinal stenosis of lumbar region with neurogenic claudication      ID   (+) Sepsis without septic shock (Multi)       Clinical information reviewed:   Tobacco  Allergies  Meds  Problems  Med Hx  Surg Hx   Fam Hx          NPO Detail:  No data recorded     Physical Exam    Airway  Mallampati: II  TM distance: >3 FB  Neck ROM: limited  Mouth opening: 3 or more finger widths  Comments: Very decreased neck motion after cervical fusion     Cardiovascular - normal exam  Rhythm: regular  Rate: normal     Dental    Pulmonary - normal exam   Abdominal - normal exam            Anesthesia Plan    History of general anesthesia?: yes  History of complications of general anesthesia?: no    ASA 3     general     The patient is not a current smoker.    intravenous induction   Anesthetic plan and risks discussed with patient.    Plan discussed with CAA and attending.

## 2025-06-17 NOTE — PROGRESS NOTES
SUBJECTIVE:  This is 65 y.o.  female with PMH of nxiety/depression, PTSD, obesity BMI 28, diabetes, hypertension, CAD s/p CABG CABG, GERD, S/P cervical C4-C7ACDF, L3-5 posterior fusion with residual occipital neuralgia and headaches and significant incisional pain with lower back pain and sometimes anterior thigh pain which treated with continuation of Suboxone 2 mg twice daily, gabapentin 300 mg 3 times daily, baclofen 10 mg 3 times daily and recommended right L1-2 and L2-3 TFESI who is here for follow-up.  She still have a lot of pain in her incisional area and there is no radiation to the lower extremities.  The patient had a new MRI done which showed moderate to severe stenosis at the L1-2 with foraminal stenosis in addition to moderate stenosis of foraminal and mild canal stenosis at L2-3.  The patient's symptoms only incisional pain and she does not have radicular symptoms correlate with the L1-2.  I do not see any injection at this time of her value.  We are going to order increase of her Suboxone to 3 times daily and I told her to cut her baclofen to 5 mg since the 10 mg makes her very sleepy     Prior office visit:  5/21/2025: The patient was referred to us by Referring Provider: Arnulfo Reynolds. this is 64 y.o.  female with a past history of anxiety/depression, PTSD, obesity BMI 28, diabetes, hypertension, CAD s/p CABG CABG, GERD, cervical ACDF with residual occipital neuralgia and headaches presenting with significant lower back pain residual from surgery and sometime anterior right thigh pain.  The patient also experienced significant incontinence after her surgery in February of this year.  The patient stated that in February 2023 she had symptoms of severe spinal stenosis of the cervical spine and then having anterior and posterior decompression and those symptoms recovered and improved gradually very well.  The patient has been on Percocet 7.5 mg x 5 daily for many years and in February of this year  suddenly she started to have shooting pain down to her back and then to her leg and eventually the pain was severe she ended up going to the emergency room and Dr. Reynolds was consulted and recommended immediately fusion and decompression on hard and she ended up with L3-5 decompression and fusion with a spacer in between.  The patient postoperatively required large amount of opioid and it looks like the doctors at [facility] recommended against the opioid therapy and they put her on Suboxone and recommended for her to stay off the Suboxone and put her on a drug rehab program which she followed and she has now very good idea about opioid therapy and the danger of it and the side effect.  She is here to manage her pain.  Currently her pain is only in the middle back from the incisional pain with radiation to her leg intermittent.   Surgical history:  2/28/2024 anterior C4-5, C5-6 and C6-7 discectomy and fusion with plating  3/5/2025 L3-4 and L4-5 minimally invasive robotic transforaminal lumbar interbody fusion with L3-5 pedicle screws instrumentation  Assessment  Finally I reviewed the MRI prior to her surgery from 3/2/2025 and I noticed that the patient has significant foraminal stenosis at the right L1-2 and L2-3 which could explain the pain in her anterior thigh and in the knee and may be why her knee may give out on her.  The L5-S1 is silent but has also foraminal stenosis worse on the left.  Consider right L1-2 and L2-3 TFESI to help with the right leg numbness and pain     Plan  Suboxone 2 mg twice daily  Continue gabapentin 300 mg 3 times daily but increased to titration titration schedule was given to the patient  Baclofen 10 mg 3 times daily  Consider right L1-2 and L2-3 TFESI if her symptoms in the leg does not improve      Procedures:   None     Portions of record reviewed for pertinent issues: active problem list, medication list, allergies, family history, social history, notes from last encounter,  encounters, lab results, imaging and other available records.     I have personally reviewed the OARRS report for this patient. This report is scanned into the electronic medical record. I have considered the risks of abuse, dependence, addiction and diversion. It showed: Was on Percocet 7.5 mg x 6 daily now only on Suboxone 2 mg daily  OPIOID RISK ASSESSMENT SCORE 6/26 opioid use disorder  Opioid agreement: 5/21/2025  Activities of daily living: Limited at this time just discharged from SNF  Adverse effects: None  Analgesia: W/O 8/10, W 4-5/10 buprenorphine 2 mg  Toxicology screen: 6/18/2025 oral  Aberrant behavior: None  My patient has no underlying substance abuse or alcohol abuse and there's no mental health conditions contributing to the patient's pain.  Patient is being treated with opioid therapy for pain and is responding appropriately.  There are NO signs of opioid intoxication, abuse, addiction or withdrawal.  Pupils are equal, reactive to light bilateral, appropriate speech and cognition. Patient denies any opioid induced constipation. The OARRS registry followed periodically, urine toxicology completed and appropriate.      Patient is advised and warned  in specific detail about potential benefits of opioids along with risks and side effects including, but not limited to, dependency, addiction, tolerance, hyperalgesia, anxiety, depression, insomnia, endocrine changes, immunologic disturbances, respiratory depression and death.      Caution advised regarding the use of medications prescribed at this office and specific mention made regarding not to drive or operate heavy machinery if feeling side effects from this the medications. Patient  expressed an understanding in regards to these particular concerns.      Discussed non-opioid options including (But no limited), physical wellness, antiinflammatory diet, icing and heating, meditation, relaxation, massage and acupuncture.     We will continue to  monitor and adjust medications as needed.              Diagnostic studies:  6/13/2025 MRI of the lumbar spine showed wide decompression and fusion L3-4 and L5 with moderate stenosis at L1-2 affecting left neuroforaminal and moderate stenosis L2-3 affecting the left neuroforamina more than right:  FINDINGS:  For counting purposes the last lumbarized vertebral body is labeled  L5.      There are postsurgical changes of posterolateral fusion at L3 through  L5.      Status post discectomy with intervertebral disc spacer at L3-L4 and  L4-L5.      There is trace retrolisthesis of T12 on L1, L1 on L2 and L2 on L3.  There is trace anterolisthesis of L3 on L4 and L4 on L5.      Vertebral body heights are maintained.      There is desiccated disc signal throughout the lower thoracic and  lumbar spine.      The conus terminates at L1 and is unremarkable.      Prevertebral soft tissues are not thickened. There is fatty atrophy  of the lower paraspinal muscles.      Evaluation by level:      T11-T12: Disc bulge with a superimposed left paracentral disc  protrusion and bilateral facet arthrosis. Mild spinal canal stenosis.  Mild neural foraminal stenosis.      T12-L1: Disc bulge with a superimposed left paracentral disc  protrusion and bilateral facet arthrosis. Mild spinal canal stenosis.  Mild bilateral neural foraminal stenosis.      L1-L2: Disc bulge with a superimposed central/right subarticular disc  extrusion measuring 1.1 cm in craniocaudal dimension and 0.7 cm in AP  dimension. There is moderate spinal canal stenosis, marked narrowing  of the right subarticular recess and moderate to severe neural  foraminal stenosis.      L2-L3: Disc bulge and facet arthrosis. Mild spinal canal stenosis,  narrowing of the bilateral subarticular recess, moderate right and  mild left neural foraminal stenosis.      L3-L4: Status post laminectomy and facetectomy on the right. There is  fluid signal within the surgical bed. No spinal canal  stenosis.  Moderate to severe right and mild-to-moderate left neural foraminal  stenosis.      L4-L5: Status post laminectomy and facetectomy on the left. There is  fluid signal within the surgical bed. Mild spinal canal stenosis.  Moderate to severe left and moderate right neural foraminal stenosis.      L5-S1: Disc bulge and facet arthrosis. No spinal canal stenosis.  Mild-to-moderate right and moderate to severe left neural foraminal  stenosis.      IMPRESSION:  There are postsurgical changes of posterolateral fusion and  discectomy at L3-L4 and L4-L5.      Status post laminectomy and facetectomy on the right at L3-L4 and  L4-L5 on the left. There is nonspecific fluid signal within the  surgical bed.      Degenerative changes of the lumbar spine most pronounced at L1-L2  with moderate spinal canal stenosis and moderate to severe neural  foraminal stenosis.      I personally reviewed the images/study and I agree with the findings  as stated. This study was interpreted at [place], [place].      MACRO:  None      Signed by: Alicja Morrissey 6/13/2025 1:13 PM    L1-2:    L2-3:        3/26/2025 lumbar x-ray showed L3-5 posterior fusion with interbody spacers with significant disease above and below the fusion:         5/10/2024 cervical x-ray showed stable C4-C7 anterior ACDF         8/2/2025 MRI lumbar spine showed multiple level bone marrow edema and endplate changes L4-L5 and S1 in addition to severe desiccation of the L4-5 and L5-S1 disc with grade 1 spondylolisthesis at L4-5 with multiple disc herniation and facet joint hypertrophy:  Bone marrow edema and endplate changes L4, L5 and S1  Right L1-2 disc herniation affecting the right nerve root  Moderate to severe right L2-3 foraminal stenosis worse than the left  Severe canal stenosis at L3-4 and moderate to severe foraminal stenosis  Severe canal stenosis at the L4-5 affecting the bilateral neural foramina  Left more  than right foraminal stenosis at the L5-S1    T12-L1:    L-2:    L3-4:    L4-5:    L5-S1:    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  Finished college with bachelors in art .  Denies smoking drinking or use of illicit drugs        Review of Systems   HENT: Negative.     Eyes: Negative.    Respiratory: Negative.     Cardiovascular: Negative.    Gastrointestinal: Negative.    Endocrine: Negative.    Genitourinary: Negative.    Musculoskeletal:  Positive for back pain, gait problem, myalgias and neck pain.   Skin: Negative.    Neurological:  Positive for weakness and numbness.   Hematological: Negative.    Psychiatric/Behavioral: Negative.                         Assessment  I really feel that for  since the new MRI showed moderate stenosis at L1-2 with severe foraminal stenosis and mild to moderate stenosis at L2-3 with moderate foraminal stenosis.  Currently she is asymptomatic from those she still has only lower back incisional pain.  The Suboxone helps but does not last so we are going to increase it to 3 times a day and we can stay away from the Percocet like we discussed with her since it became opioid tolerance with it.  We discussed nevro spinal cord stimulator and gave her information about it.  She is going to follow-up with her neurosurgeon sometime soon                         Physical Exam  Vitals and nursing note reviewed.   Constitutional:       Appearance: Normal appearance.          Comments: Uses the walker to walk but able to stand and move her leg and normal tandem      HENT:      Head: Normocephalic and atraumatic.      Nose: Nose normal.   Eyes:      Extraocular Movements: Extraocular movements intact.      Conjunctiva/sclera: Conjunctivae normal.      Pupils: Pupils are equal, round, and reactive to light.   Cardiovascular:      Rate and  Rhythm: Normal rate and regular rhythm.      Pulses: Normal pulses.      Heart sounds: Normal heart sounds.   Pulmonary:      Effort: Pulmonary effort is normal.      Breath sounds: Normal breath sounds.   Abdominal:      General: Abdomen is flat. Bowel sounds are normal.      Palpations: Abdomen is soft.   Skin:     General: Skin is warm.   Neurological:      General: No focal deficit present.      Mental Status: She is alert and oriented to person, place, and time.   Psychiatric:         Mood and Affect: Mood normal.         Behavior: Behavior normal.                      Plan  At least 50% of the visit was involved in the discussion of the options for treatment. We discussed exercises, medication, interventional therapies and surgery. Healthy life style is essential with patient hard work to achieve the wellness. In addition; discussion with the patient and/or family about any of the diagnostic results, impressions and/or recommended diagnostic studies, prognosis, risks and benefits of treatment options, instructions for treatment and/or follow-up, importance of compliance with chosen treatment options, risk-factor reduction, and patient/family education.         Continue self-directed physical therapy at least daily exercises for minimum of 20-minute  Discussed with the patient the stenosis at the L1-2 and L2-3 level and I told her to follow-up with her neurosurgeon but I still recommend injection or SCS  Nevro spinal cord stimulator information given to the patient  Suboxone 2 mg increased to 3 times daily  Decrease baclofen to 5 mg 3 times daily since the 10 mg made her sleepy  Healthy lifestyle and anti-inflammatory diet in addition to weight control discussed with the patient  Alternative chronic pain therapies was discussed, encouraged and information was handed  Return to Clinic 3 months     *Please note this report has been produced using speech recognition software and may contain errors related to that  system including grammar, punctuation and spelling as well as words and phrases that may be inappropriate. If there are questions or concerns, please feel free to contact me to clarify.    Shiv Bryson MD     Patient was identified as a fall risk. Risk prevention instructions provided.

## 2025-06-18 ENCOUNTER — OFFICE VISIT (OUTPATIENT)
Dept: PAIN MEDICINE | Facility: CLINIC | Age: 65
End: 2025-06-18
Payer: COMMERCIAL

## 2025-06-18 VITALS
OXYGEN SATURATION: 96 % | SYSTOLIC BLOOD PRESSURE: 122 MMHG | DIASTOLIC BLOOD PRESSURE: 60 MMHG | WEIGHT: 167 LBS | BODY MASS INDEX: 27.82 KG/M2 | TEMPERATURE: 97.3 F | RESPIRATION RATE: 16 BRPM | HEART RATE: 80 BPM | HEIGHT: 65 IN

## 2025-06-18 DIAGNOSIS — Z79.899 MEDICATION MANAGEMENT: Primary | ICD-10-CM

## 2025-06-18 DIAGNOSIS — G89.4 CHRONIC PAIN SYNDROME: ICD-10-CM

## 2025-06-18 DIAGNOSIS — M96.1 CERVICAL POST-LAMINECTOMY SYNDROME: ICD-10-CM

## 2025-06-18 DIAGNOSIS — M96.1 POSTLAMINECTOMY SYNDROME OF LUMBAR REGION: ICD-10-CM

## 2025-06-18 PROCEDURE — 99214 OFFICE O/P EST MOD 30 MIN: CPT | Performed by: ANESTHESIOLOGY

## 2025-06-18 PROCEDURE — 3008F BODY MASS INDEX DOCD: CPT | Performed by: ANESTHESIOLOGY

## 2025-06-18 PROCEDURE — 3074F SYST BP LT 130 MM HG: CPT | Performed by: ANESTHESIOLOGY

## 2025-06-18 PROCEDURE — 1125F AMNT PAIN NOTED PAIN PRSNT: CPT | Performed by: ANESTHESIOLOGY

## 2025-06-18 PROCEDURE — 1159F MED LIST DOCD IN RCRD: CPT | Performed by: ANESTHESIOLOGY

## 2025-06-18 PROCEDURE — 3078F DIAST BP <80 MM HG: CPT | Performed by: ANESTHESIOLOGY

## 2025-06-18 PROCEDURE — 3044F HG A1C LEVEL LT 7.0%: CPT | Performed by: ANESTHESIOLOGY

## 2025-06-18 RX ORDER — NALOXONE HYDROCHLORIDE 4 MG/.1ML
1 SPRAY NASAL AS NEEDED
Qty: 2 EACH | Refills: 0 | Status: SHIPPED | OUTPATIENT
Start: 2025-06-18

## 2025-06-18 RX ORDER — SULFAMETHOXAZOLE AND TRIMETHOPRIM 800; 160 MG/1; MG/1
TABLET ORAL
COMMUNITY
Start: 2025-06-18

## 2025-06-18 RX ORDER — BUPRENORPHINE AND NALOXONE 2; .5 MG/1; MG/1
1 FILM, SOLUBLE BUCCAL; SUBLINGUAL 3 TIMES DAILY PRN
Qty: 90 FILM | Refills: 2 | Status: SHIPPED | OUTPATIENT
Start: 2025-06-18 | End: 2025-07-18

## 2025-06-18 ASSESSMENT — ENCOUNTER SYMPTOMS
OCCASIONAL FEELINGS OF UNSTEADINESS: 1
LOSS OF SENSATION IN FEET: 0
DEPRESSION: 0

## 2025-06-18 ASSESSMENT — PAIN DESCRIPTION - DESCRIPTORS: DESCRIPTORS: ACHING;SHARP;STABBING

## 2025-06-18 ASSESSMENT — PAIN SCALES - GENERAL
PAINLEVEL_OUTOF10: 3
PAINLEVEL_OUTOF10: 3

## 2025-06-18 ASSESSMENT — PAIN - FUNCTIONAL ASSESSMENT: PAIN_FUNCTIONAL_ASSESSMENT: 0-10

## 2025-06-18 NOTE — PROGRESS NOTES
This is 65 y.o.  female with who has been treated for Postlaminectomy syndrome of lumbar region . Pain is 60 %  betterl lasting six hours. The pain is described as achiness, sharp, and stabbing and is relieved by Medications . Here for follow-up   Chief Complaint   Patient presents with    Follow-up     [facility] pain management        Pain Therapies:   buprenorphine-naloxone (Suboxone) 2-0.5 mg per sublingual film 1 Film sublingual Daily     baclofen (Lioresal) 10 mg tablet        Sig: Take 1 tablet (10 mg) by mouth 3 times a day.          Patient states that the current pain regimen is no managing her pain enough.  Patient was inquiring about IV Infusions.

## 2025-06-18 NOTE — PATIENT INSTRUCTIONS
[email or web address]          Ways to Help Prevent Falls at Home    Quick Tips   ? Ask for help if you need it. Most people want to help!   ? Get up slowly after sitting or laying down   ? Wear a medical alert device or keep cell phone in your pocket   ? Use night lights, especially areas near a bathroom   ? Keep the items you use often within reach on a small stool or end table   ? Use an assistive device such as walker or cane, as directed by provider/physical therapy   ? Use a non-slip mat and grab bars in your bathroom. Look for home health sections for best options     Other Areas to Focus On   ? Exercise and nutrition: Regular exercise or taking a falls prevention class are great ways improve strength and balance. Don’t forget to stay hydrated and bring a snack!   ? Medicine side effects: Some medicines can make you sleepy or dizzy, which could cause a fall. Ask your healthcare provider about the side effects your medicines could cause. Be sure to let them know if you take any vitamins or supplements as well.   ? Tripping hazards: Remove items you could trip on, such as loose mats, rugs, cords, and clutter. Wear closed toe shoes with rubber soles.   ? Health and wellness: Get regular checkups with your healthcare provider, plus routine vision and hearing screenings. Talk with your healthcare provider about:   o Your medicines and the possible side effects - bring them in a bag if that is easier!   o Problems with balance or feeling dizzy   o Ways to promote bone health, such as Vitamin D and calcium supplements   o Questions or concerns about falling     *Ask your healthcare team if you have questions     ©[facility], 2022

## 2025-06-25 ENCOUNTER — APPOINTMENT (OUTPATIENT)
Dept: PRIMARY CARE | Facility: CLINIC | Age: 65
End: 2025-06-25
Payer: COMMERCIAL

## 2025-06-25 VITALS
HEIGHT: 65 IN | HEART RATE: 91 BPM | SYSTOLIC BLOOD PRESSURE: 120 MMHG | DIASTOLIC BLOOD PRESSURE: 64 MMHG | BODY MASS INDEX: 27.66 KG/M2 | WEIGHT: 166 LBS | OXYGEN SATURATION: 96 %

## 2025-06-25 DIAGNOSIS — M54.12 CERVICAL RADICULOPATHY DUE TO TRAUMA: Primary | ICD-10-CM

## 2025-06-25 DIAGNOSIS — M62.89 PELVIC FLOOR DYSFUNCTION IN FEMALE: ICD-10-CM

## 2025-06-25 DIAGNOSIS — M54.10 BACK PAIN WITH RADICULOPATHY: ICD-10-CM

## 2025-06-25 DIAGNOSIS — E11.9 TYPE 2 DIABETES MELLITUS WITHOUT COMPLICATION, WITHOUT LONG-TERM CURRENT USE OF INSULIN: ICD-10-CM

## 2025-06-25 DIAGNOSIS — F33.1 MODERATE EPISODE OF RECURRENT MAJOR DEPRESSIVE DISORDER: ICD-10-CM

## 2025-06-25 DIAGNOSIS — G89.29 CHRONIC NECK PAIN: ICD-10-CM

## 2025-06-25 DIAGNOSIS — F45.42 PAIN DISORDER ASSOCIATED WITH PSYCHOLOGICAL AND PHYSICAL FACTORS: ICD-10-CM

## 2025-06-25 DIAGNOSIS — F43.10 POST TRAUMATIC STRESS DISORDER (PTSD): ICD-10-CM

## 2025-06-25 DIAGNOSIS — R39.198 URINARY DYSFUNCTION: ICD-10-CM

## 2025-06-25 DIAGNOSIS — M54.2 CHRONIC NECK PAIN: ICD-10-CM

## 2025-06-25 DIAGNOSIS — Z79.891 CHRONIC PRESCRIPTION OPIATE USE: ICD-10-CM

## 2025-06-25 DIAGNOSIS — G40.909 SEIZURE DISORDER (MULTI): ICD-10-CM

## 2025-06-25 LAB
6MAM SAL QL SCN: NEGATIVE NG/ML
AMPHETAMINES SAL QL SCN: NEGATIVE NG/ML
BARBITURATES SAL QL SCN: NEGATIVE NG/ML
BENZODIAZ SAL QL SCN: NEGATIVE NG/ML
BUPRENORPHINE SAL CFM-MCNC: >25 NG/ML
BUPRENORPHINE SAL QL SCN: POSITIVE NG/ML
CANNABINOIDS SAL QL SCN: NEGATIVE NG/ML
COCAINE SAL QL SCN: NEGATIVE NG/ML
COTININE SAL QL SCN: NEGATIVE NG/ML
FENTANYL SAL QL SCN: NEGATIVE NG/ML
MDMA SAL QL SCN: NEGATIVE NG/ML
MEPROBAMATE SAL QL SCN: NEGATIVE NG/ML
METHADONE SAL QL SCN: NEGATIVE NG/ML
NALOXONE [MASS/VOLUME] IN SALIVA (ORAL FLUID) BY CONFIRMATORY METHOD: >25 NG/ML
NORBUPRENORPHINE SAL CFM-MCNC: 2.5 NG/ML
OPIATES SAL QL SCN: NEGATIVE NG/ML
PCP SAL QL SCN: NEGATIVE NG/ML
TAPENTADOL SAL QL SCN: NEGATIVE NG/ML
TRAMADOL SAL QL SCN: NEGATIVE NG/ML
ZOLPIDEM SAL QL SCN: NEGATIVE NG/ML

## 2025-06-25 PROCEDURE — 99214 OFFICE O/P EST MOD 30 MIN: CPT | Performed by: STUDENT IN AN ORGANIZED HEALTH CARE EDUCATION/TRAINING PROGRAM

## 2025-06-25 PROCEDURE — 3044F HG A1C LEVEL LT 7.0%: CPT | Performed by: STUDENT IN AN ORGANIZED HEALTH CARE EDUCATION/TRAINING PROGRAM

## 2025-06-25 PROCEDURE — 1159F MED LIST DOCD IN RCRD: CPT | Performed by: STUDENT IN AN ORGANIZED HEALTH CARE EDUCATION/TRAINING PROGRAM

## 2025-06-25 PROCEDURE — 1160F RVW MEDS BY RX/DR IN RCRD: CPT | Performed by: STUDENT IN AN ORGANIZED HEALTH CARE EDUCATION/TRAINING PROGRAM

## 2025-06-25 PROCEDURE — 3074F SYST BP LT 130 MM HG: CPT | Performed by: STUDENT IN AN ORGANIZED HEALTH CARE EDUCATION/TRAINING PROGRAM

## 2025-06-25 PROCEDURE — 3008F BODY MASS INDEX DOCD: CPT | Performed by: STUDENT IN AN ORGANIZED HEALTH CARE EDUCATION/TRAINING PROGRAM

## 2025-06-25 PROCEDURE — 3078F DIAST BP <80 MM HG: CPT | Performed by: STUDENT IN AN ORGANIZED HEALTH CARE EDUCATION/TRAINING PROGRAM

## 2025-06-25 PROCEDURE — 1036F TOBACCO NON-USER: CPT | Performed by: STUDENT IN AN ORGANIZED HEALTH CARE EDUCATION/TRAINING PROGRAM

## 2025-06-25 PROCEDURE — G2211 COMPLEX E/M VISIT ADD ON: HCPCS | Performed by: STUDENT IN AN ORGANIZED HEALTH CARE EDUCATION/TRAINING PROGRAM

## 2025-06-25 RX ORDER — LAMOTRIGINE 100 MG/1
TABLET ORAL
Qty: 225 TABLET | Refills: 1 | Status: SHIPPED | OUTPATIENT
Start: 2025-06-25

## 2025-06-25 NOTE — PROGRESS NOTES
Subjective   Patient ID: Cristal Pollard is a 65 y.o. female who presents for Follow-up (Follow up since back surgery. Has not been doing well since. Wanted to talk about pain medication, wants to catch you up to speed on things/She has had a few UTI's and said she was septic- wants bloodwork to see if she is still septic).  History of Present Illness  The patient presents for evaluation of urinary issues, diabetes, and medication management.    She has been experiencing persistent urinary issues since her surgery, including urgency, pressure, and occasional spasms. Despite the urge to urinate, she is often unable to do so. She also reports numbness in her pelvic floor, which her neurosurgeon Dr. Reynolds is aware of. There was possible concern for a nerve issue and has consulted a neurologist who advised against Flomax. An MRI was conducted, but the results have not yet been discussed with her. She has an upcoming appointment with a urogynecologist on 08/01/2025. She recalls a previous episode of sepsis and a urinary tract infection during her rehabilitation period, for which she completed a course of antibiotics two days ago.    She has not been monitoring her blood sugar levels regularly but continues to take her medication. She has lost approximately 20 pounds and maintains a diet rich in vegetables. She was provided with vitamins during her stay at the nursing home.    She has been under the care of a pain management specialist and is currently on Suboxone treatment. She reports feeling stable at present.     She describes her experience in the hospital when she was started on suboxone while inpatient and states this was very upsetting to her. She is concerned that the transition was not done properly as she was still currently on prescription percocet. She is concerned this worsened her withdrawal effects. She had been on prescription percocet for several years with goal to work on decreasing dosage. She is upset  "that she was not given the choice to start suboxone as well as the involvement of her daughter in her treatment plan. Per review of hospital records she was altered however timeline is not fully clear.     She does report that her current suboxone regimen seems appropriate for her pain and at mitigating withdrawal symptoms. She also participated [facility] after discharge from the hospital.     She has a history of falls, including a recent incident where she fell down a flight of stairs, resulting in a laceration on her lip and scrapes on her shins. She has been performing home exercises as part of her physical therapy regimen. She is not experiencing any respiratory symptoms such as coughing or wheezing. She has an active handicap placard for her car, which expires in 07/2025, and she is requesting a renewal.    She is currently on metformin, lamotrigine, and lisinopril, and does not require any refills at this time.    Review of Systems  ROS otherwise negative aside from what was mentioned above in HPI.    Objective     /64 (BP Location: Left arm, Patient Position: Sitting, BP Cuff Size: Adult)   Pulse 91   Ht 1.651 m (5' 5\")   Wt 75.3 kg (166 lb)   LMP  (LMP Unknown)   SpO2 96%   BMI 27.62 kg/m²      Current Outpatient Medications   Medication Instructions    acetaminophen (TYLENOL) 975 mg, oral, Every 8 hours    baclofen (LIORESAL) 10 mg, oral, 3 times daily    bisacodyl (DULCOLAX) 10 mg, rectal, Daily PRN    buprenorphine-naloxone (Suboxone) 2-0.5 mg per sublingual film 1 Film, sublingual, 3 times daily PRN    calcium carbonate-vitamin D3 600 mg-10 mcg (400 unit) chewable tablet 1 tablet, Daily    FLUoxetine (PROZAC) 40 mg, oral, Nightly, To start after completing 10mg and 20mg doses    gabapentin (NEURONTIN) 300 mg, oral, 3 times daily    gabapentin (NEURONTIN) 600 mg, oral, Nightly    lamoTRIgine (LaMICtal) 100 mg tablet Take 1 tablet by mouth in the Morning  and " 1.5 tablets by mouth at bedtime    lisinopriL-hydrochlorothiazide 10-12.5 mg tablet 1 tablet, oral, Daily    metFORMIN XR (GLUCOPHAGE-XR) 500 mg, oral, Daily with evening meal    multivitamin tablet 1 tablet, Daily    naloxone (NARCAN) 4 mg, nasal, As needed, May repeat every 2-3 minutes if needed, alternating nostrils, until medical assistance becomes available.    nitrofurantoin, macrocrystal-monohydrate, (Macrobid) 100 mg capsule 100 mg, oral, 2 times daily    omeprazole (PRILOSEC) 40 mg, Daily PRN    OneTouch Delica Plus Lancet 30 gauge misc USE TO TEST ONCE DAILY    OneTouch Verio test strips strip USE TO TEST ONCE DAILY    sennosides-docusate sodium (Marilyn-Colace) 8.6-50 mg tablet 1 tablet, Daily    sulfamethoxazole-trimethoprim (Bactrim DS) 800-160 mg tablet     tamsulosin (FLOMAX) 0.4 mg, Nightly       Physical Exam  Respiratory: Clear to auscultation, no wheezing, rales or rhonchi  Cardiovascular: Regular rate and rhythm, no murmurs, rubs, or gallops  MSK: ambulating with walker    Results  Labs   - A1c: 6.9   - Vitamin D level: 03/2025, 26    Assessment & Plan  1. Urinary issues.  -suspect multifactorial in setting of severe spinal stenosis and multiple spine surgeries along with likely pelvic floor dysfunction  - Reports urgency and pain without the sensation of burning, along with pressure and occasional spasms.  - Difficulty urinating despite the urge to go.  - MRI results pending; follow-up with Dr. Reynolds in 2 weeks.  - Appointment with urogynecologist scheduled for 08/01/2025.    2. Diabetes Mellitus.  - Most recent A1c was 6.9, indicating good control.  - Currently on metformin; advised to check blood sugar levels if feeling lightheaded or unwell.  - Urine test for diabetes with protein ordered for next visit.    3. Medication Management.  - Currently taking metformin, lisinopril, HCTZ, and lamotrigine.  - Blood pressure well-controlled with current regimen.  - Refills for lamotrigine added; advised  to inform if additional refills are needed.    4. Vitamin D deficiency.  - Vitamin D level was slightly low at 26 (normal is 30).  - Advised to continue taking vitamin D supplements, especially given spinal issues, to prevent bone thinning.    5. Handicap placard renewal.  - Prescription for a handicap placard provided as the current one expires in July.    6. Chronic opioid use  -continue following with pain management specialist for suboxone management    Jessica Diehl,      This medical note was created with the assistance of artificial intelligence (AI) for documentation purposes. The content has been reviewed and confirmed by the healthcare provider for accuracy and completeness. Patient consented to the use of audio recording and use of AI during their visit.

## 2025-07-02 ENCOUNTER — APPOINTMENT (OUTPATIENT)
Dept: PAIN MEDICINE | Facility: CLINIC | Age: 65
End: 2025-07-02
Payer: COMMERCIAL

## 2025-07-03 ENCOUNTER — OFFICE VISIT (OUTPATIENT)
Dept: UROLOGY | Facility: CLINIC | Age: 65
End: 2025-07-03
Payer: COMMERCIAL

## 2025-07-03 VITALS
HEART RATE: 80 BPM | BODY MASS INDEX: 27.79 KG/M2 | TEMPERATURE: 97.8 F | DIASTOLIC BLOOD PRESSURE: 63 MMHG | WEIGHT: 167 LBS | SYSTOLIC BLOOD PRESSURE: 110 MMHG

## 2025-07-03 DIAGNOSIS — R32 URINARY INCONTINENCE, UNSPECIFIED TYPE: ICD-10-CM

## 2025-07-03 ASSESSMENT — ENCOUNTER SYMPTOMS
DEPRESSION: 0
OCCASIONAL FEELINGS OF UNSTEADINESS: 1
LOSS OF SENSATION IN FEET: 1

## 2025-07-03 NOTE — PROGRESS NOTES
HISTORY OF PRESENT ILLNESS:  This is a  65 y.o. female  who presents as a new patient for urinary incontinence. Kindly referred by Dr. Arnulfo Reynolds.     She is here after extensive lumbar surgery with Dr. Reynolds. After surgery she was hospitalized with a UTI. Since then she has been treated for urinary incontinence. She reports that her voiding prior to surgery was normal. She had 1x nocturia. She has had persistent back pain and leg pain.     After surgery she had a fall and presented to the ER where she was found to be septic.     She explains that ever since her surgery with Dr. Reynolds she has not been able to sit on the toilet and void. She will feel the urge but is unable to void. When she stands up after trying to void she will spontaneously empty. She has leakage that varies in severity. She goes through 8+ depends daily. She reports nocturia 2x now. She is not aware when she voids some nights.           PHYSICAL EXAMINATION:  No LMP recorded (lmp unknown). Patient is postmenopausal.  Body mass index is 27.79 kg/m².  Visit Vitals  /63 (BP Location: Right arm, Patient Position: Sitting, BP Cuff Size: Adult)   Pulse 80   Temp 36.6 °C (97.8 °F) (Temporal)   Wt 75.8 kg (167 lb)   LMP  (LMP Unknown)   BMI 27.79 kg/m²   OB Status Postmenopausal   Smoking Status Never   BSA 1.86 m²     General Appearance: well appearing  Neuro: Alert and oriented     Pelvic:  Genitourinary: decreased sensation to the labia. Bartholin's glands negative, Witmer's glands negative  Urethra:  normal meatus, non-tender, no periurethral mass  Vagina: mucosa  normal  Cervix: normal  Uterus:  normal size, nontender  Adnexae: negative nontender, no masses  Atrophy positive  Perianal: decreased sensation to touch  CST negative  Pelvic floor muscle contraction  1/5    POP-Q (in supine position):       Aa +1     Ba +1     C -2              gh 4.5     pb 4     tvl 8              Ap -2.5     Bp -2.5     D -3.5    Rectal: deferred    PVR (by  Ultrasound):    Urine dip: No results found for this or any previous visit (from the past 6 hours).      IMPRESSION AND PLAN:  Cristal Pollard is a 65 y.o. No obstetric history on file. who presents with likely neurogenic bladder and stage 2 uterogenic organ prolapse     Problem List Items Addressed This Visit    None  Visit Diagnoses         Urinary incontinence, unspecified type        Relevant Orders    POCT UA Automated manually resulted    Post-Void Residual (Completed)             We discussed options for her prolapse, including observation or pessary use and follow up to check retention with pessary. We discussed pessary in great detail, including cleaning schedules and considerations for pessary use. We discussed prolapse repair in great detail, including two approaches: (1) Vaginal hysterectomy,  uterosacral ligament suspension, anterior and possible posterior repair or (2) Supracervical hysterectomy, robotically-assisted laparoscopic sacrocolpopexy.      Explained each surgery to the patient. We discussed the risks, benefits, and alternatives to the procedure. We also discussed the postop care and recovery expectations.      -Vaginal repair was discussed as a surgical option to repair her prolapse. Patient was advised on the prolapse recurrence rate at 5 years for this procedure being 40%. The surgical recurrence rate at 5 years is 10%.      - Robotically-assisted laparoscopic sacrocolpopexy was also discussed as a surgical option to repair her prolapse. Patient was advised on the prolapse recurrence rate at 7 years for this procedure being 20%. The surgical recurrence rate at 7 years is around 5%. This procedure does use a foreign body, which the patient was advised on. Both of these procedures would be followed by a perineoplasty.    She was provided information on both the procedures.  We discussed no lifting more than 10 to 15 pounds for 4 to 6 weeks, nothing in the vagina, no bathing or soaking during  this time. We also reviewed that there is a possibility of pain.      We also discussed that should the patient  not have a preference regarding the surgical approach between the vaginal or robotic for the management of her pelvic organ prolapse. She can opt to be a part of the PREMIER trial in which she would be  randomized to either 1 of these 2 options.  I will have her talk to our research coordinator for the Premier trial.  .  If she does have a preference or develops a preference after what we have told her today then we can proceed with that.    We would like the patient to undergo urodynamic testing with reduction of prolapse prior to settling on surgery.       All questions were answered, patient understands and agreeable.    Follow up virtually for urodynamic testing results. If UDS shows she is not emptying her bladder and has over activity, we will consider CIC.    All questions and concerns were answered and addressed.  The patient expressed understanding and agrees with the plan.     Colt Vicente MD    Scribe Attestation  By signing my name below, IJessica Scribe attest that this documentation has been prepared under the direction and in the presence of Colt Vicente MD.

## 2025-07-07 DIAGNOSIS — G40.909 SEIZURE DISORDER (MULTI): ICD-10-CM

## 2025-07-07 DIAGNOSIS — I10 ESSENTIAL (PRIMARY) HYPERTENSION: ICD-10-CM

## 2025-07-07 DIAGNOSIS — F41.9 ANXIETY DISORDER, UNSPECIFIED TYPE: ICD-10-CM

## 2025-07-07 DIAGNOSIS — F33.1 MODERATE EPISODE OF RECURRENT MAJOR DEPRESSIVE DISORDER: ICD-10-CM

## 2025-07-07 DIAGNOSIS — E11.9 TYPE 2 DIABETES MELLITUS WITHOUT COMPLICATIONS: ICD-10-CM

## 2025-07-07 RX ORDER — FLUOXETINE HYDROCHLORIDE 40 MG/1
40 CAPSULE ORAL NIGHTLY
Qty: 90 CAPSULE | Refills: 3 | Status: SHIPPED | OUTPATIENT
Start: 2025-07-07 | End: 2026-01-03

## 2025-07-07 RX ORDER — METFORMIN HYDROCHLORIDE 500 MG/1
500 TABLET, EXTENDED RELEASE ORAL
Qty: 90 TABLET | Refills: 3 | Status: SHIPPED | OUTPATIENT
Start: 2025-07-07

## 2025-07-07 RX ORDER — LAMOTRIGINE 100 MG/1
TABLET ORAL
Qty: 225 TABLET | Refills: 1 | Status: SHIPPED | OUTPATIENT
Start: 2025-07-07

## 2025-07-07 RX ORDER — LISINOPRIL AND HYDROCHLOROTHIAZIDE 10; 12.5 MG/1; MG/1
1 TABLET ORAL DAILY
Qty: 90 TABLET | Refills: 3 | Status: SHIPPED | OUTPATIENT
Start: 2025-07-07

## 2025-07-11 DIAGNOSIS — M96.1 POSTLAMINECTOMY SYNDROME OF LUMBAR REGION: ICD-10-CM

## 2025-07-11 DIAGNOSIS — M96.1 CERVICAL POST-LAMINECTOMY SYNDROME: ICD-10-CM

## 2025-07-11 RX ORDER — BUPRENORPHINE AND NALOXONE 2; .5 MG/1; MG/1
1 FILM, SOLUBLE BUCCAL; SUBLINGUAL 3 TIMES DAILY PRN
Qty: 90 FILM | Refills: 2 | Status: SHIPPED | OUTPATIENT
Start: 2025-07-11 | End: 2025-08-10

## 2025-07-11 NOTE — TELEPHONE ENCOUNTER
Orders Placed This Encounter      buprenorphine-naloxone (Suboxone) 2-0.5 mg per sublingual film

## 2025-07-17 ENCOUNTER — OFFICE VISIT (OUTPATIENT)
Dept: PAIN MEDICINE | Facility: CLINIC | Age: 65
End: 2025-07-17
Payer: COMMERCIAL

## 2025-07-17 VITALS
HEART RATE: 66 BPM | SYSTOLIC BLOOD PRESSURE: 114 MMHG | BODY MASS INDEX: 27.99 KG/M2 | RESPIRATION RATE: 18 BRPM | HEIGHT: 65 IN | WEIGHT: 168 LBS | OXYGEN SATURATION: 94 % | DIASTOLIC BLOOD PRESSURE: 56 MMHG | TEMPERATURE: 97.9 F

## 2025-07-17 DIAGNOSIS — M96.1 CERVICAL POSTLAMINECTOMY SYNDROME: ICD-10-CM

## 2025-07-17 DIAGNOSIS — M47.27 LUMBOSACRAL SPONDYLOSIS WITH RADICULOPATHY: Primary | ICD-10-CM

## 2025-07-17 DIAGNOSIS — M79.7 SECONDARY FIBROMYALGIA: ICD-10-CM

## 2025-07-17 PROCEDURE — 3078F DIAST BP <80 MM HG: CPT | Performed by: ANESTHESIOLOGY

## 2025-07-17 PROCEDURE — 3008F BODY MASS INDEX DOCD: CPT | Performed by: ANESTHESIOLOGY

## 2025-07-17 PROCEDURE — 99214 OFFICE O/P EST MOD 30 MIN: CPT | Performed by: ANESTHESIOLOGY

## 2025-07-17 PROCEDURE — 3074F SYST BP LT 130 MM HG: CPT | Performed by: ANESTHESIOLOGY

## 2025-07-17 PROCEDURE — 1159F MED LIST DOCD IN RCRD: CPT | Performed by: ANESTHESIOLOGY

## 2025-07-17 PROCEDURE — 1125F AMNT PAIN NOTED PAIN PRSNT: CPT | Performed by: ANESTHESIOLOGY

## 2025-07-17 PROCEDURE — 1036F TOBACCO NON-USER: CPT | Performed by: ANESTHESIOLOGY

## 2025-07-17 ASSESSMENT — ENCOUNTER SYMPTOMS
OCCASIONAL FEELINGS OF UNSTEADINESS: 1
LOSS OF SENSATION IN FEET: 1
DEPRESSION: 0

## 2025-07-17 ASSESSMENT — PAIN - FUNCTIONAL ASSESSMENT: PAIN_FUNCTIONAL_ASSESSMENT: 0-10

## 2025-07-17 ASSESSMENT — PAIN SCALES - GENERAL
PAINLEVEL_OUTOF10: 3
PAINLEVEL_OUTOF10: 3

## 2025-07-17 ASSESSMENT — PAIN DESCRIPTION - DESCRIPTORS: DESCRIPTORS: ACHING;SHARP;STABBING

## 2025-07-17 NOTE — PROGRESS NOTES
SUBJECTIVE:  This is 65 y.o.  female with PMH of nxiety/depression, PTSD, obesity BMI 28, diabetes, hypertension, CAD s/p CABG CABG, GERD, S/P cervical C4-C7ACDF, L3-5 posterior fusion with residual occipital neuralgia and headaches and significant incisional pain with lower back pain and sometimes anterior thigh pain which treated with continuation of Suboxone 2 mg twice daily, gabapentin 300 mg 3 times daily, baclofen 10 mg 3 times daily and recommended right L1-2 and L2-3 TFESI who is here for follow-up stating that she is doing really good she still have her numbness but her pain is much better under control and she is only taking gabapentin 300 mg in a.m., 300 mg in the afternoon, 600 mg at night and the Suboxone 2 mg she rarely uses it she use it once a day sometimes twice a day.  She is walking a lot she is doing a lot of her exercises that she learned in physical therapy.  I suggested aquatic therapy for her and she agreed and we will place an order for that.  We will continue with the same regiment.      Prior office visit:  6/18/2025: This is 65 y.o.  female with PMH of nxiety/depression, PTSD, obesity BMI 28, diabetes, hypertension, CAD s/p CABG CABG, GERD, S/P cervical C4-C7ACDF, L3-5 posterior fusion with residual occipital neuralgia and headaches and significant incisional pain with lower back pain and sometimes anterior thigh pain which treated with continuation of Suboxone 2 mg twice daily, gabapentin 300 mg 3 times daily, baclofen 10 mg 3 times daily and recommended right L1-2 and L2-3 TFESI who is here for follow-up.  She still have a lot of pain in her incisional area and there is no radiation to the lower extremities.  The patient had a new MRI done which showed moderate to severe stenosis at the L1-2 with foraminal stenosis in addition to moderate stenosis of foraminal and mild canal stenosis at L2-3.  The patient's symptoms only incisional pain and she does not have radicular symptoms correlate  with the L1-2.  I do not see any injection at this time of her value.  We are going to order increase of her Suboxone to 3 times daily and I told her to cut her baclofen to 5 mg since the 10 mg makes her very sleepy    Plan  Discussed with the patient the stenosis at the L1-2 and L2-3 level and I told her to follow-up with her neurosurgeon but I still recommend injection or SCS  Nevro spinal cord stimulator information given to the patient  Suboxone 2 mg increased to 3 times daily  Decrease baclofen to 5 mg 3 times daily since the 10 mg made her sleepy  Surgical history:  2/28/2024 anterior C4-5, C5-6 and C6-7 discectomy and fusion with plating  3/5/2025 L3-4 and L4-5 minimally invasive robotic transforaminal lumbar interbody fusion with L3-5 pedicle screws instrumentation     Procedures:   None     Portions of record reviewed for pertinent issues: active problem list, medication list, allergies, family history, social history, notes from last encounter, encounters, lab results, imaging and other available records.     I have personally reviewed the O[facility] report for this patient. This report is scanned into the electronic medical record. I have considered the risks of abuse, dependence, addiction and diversion. It showed: Was on Percocet 7.5 mg x 6 daily now only on Suboxone 2 mg daily  OPIOID RISK ASSESSMENT SCORE 6/26 opioid use disorder  Opioid agreement: 5/21/2025  Activities of daily living: Limited at this time just discharged from SNF  Adverse effects: None  Analgesia: W/O 8/10, W 4-5/10 buprenorphine 2 mg  Toxicology screen: 6/18/2025 oral  Aberrant behavior: None  My patient has no underlying substance abuse or alcohol abuse and there's no mental health conditions contributing to the patient's pain.  Patient is being treated with opioid therapy for pain and is responding appropriately.  There are NO signs of opioid intoxication, abuse, addiction or withdrawal.  Pupils are equal, reactive to light bilateral,  appropriate speech and cognition. Patient denies any opioid induced constipation. The OARRS registry followed periodically, urine toxicology completed and appropriate.      Patient is advised and warned  in specific detail about potential benefits of opioids along with risks and side effects including, but not limited to, dependency, addiction, tolerance, hyperalgesia, anxiety, depression, insomnia, endocrine changes, immunologic disturbances, respiratory depression and death.      Caution advised regarding the use of medications prescribed at this office and specific mention made regarding not to drive or operate heavy machinery if feeling side effects from this the medications. Patient  expressed an understanding in regards to these particular concerns.      Discussed non-opioid options including (But no limited), physical wellness, antiinflammatory diet, icing and heating, meditation, relaxation, massage and acupuncture.     We will continue to monitor and adjust medications as needed.              Diagnostic studies:  6/13/2025 MRI of the lumbar spine showed wide decompression and fusion L3-4 and L5 with moderate stenosis at L1-2 affecting left neuroforaminal and moderate stenosis L2-3 affecting the left neuroforamina more than right:  FINDINGS:  For counting purposes the last lumbarized vertebral body is labeled  L5.      There are postsurgical changes of posterolateral fusion at L3 through  L5.      Status post discectomy with intervertebral disc spacer at L3-L4 and  L4-L5.      There is trace retrolisthesis of T12 on L1, L1 on L2 and L2 on L3.  There is trace anterolisthesis of L3 on L4 and L4 on L5.      Vertebral body heights are maintained.      There is desiccated disc signal throughout the lower thoracic and  lumbar spine.      The conus terminates at L1 and is unremarkable.      Prevertebral soft tissues are not thickened. There is fatty atrophy  of the lower paraspinal muscles.      Evaluation by  level:      T11-T12: Disc bulge with a superimposed left paracentral disc  protrusion and bilateral facet arthrosis. Mild spinal canal stenosis.  Mild neural foraminal stenosis.      T12-L1: Disc bulge with a superimposed left paracentral disc  protrusion and bilateral facet arthrosis. Mild spinal canal stenosis.  Mild bilateral neural foraminal stenosis.      L1-L2: Disc bulge with a superimposed central/right subarticular disc  extrusion measuring 1.1 cm in craniocaudal dimension and 0.7 cm in AP  dimension. There is moderate spinal canal stenosis, marked narrowing  of the right subarticular recess and moderate to severe neural  foraminal stenosis.      L2-L3: Disc bulge and facet arthrosis. Mild spinal canal stenosis,  narrowing of the bilateral subarticular recess, moderate right and  mild left neural foraminal stenosis.      L3-L4: Status post laminectomy and facetectomy on the right. There is  fluid signal within the surgical bed. No spinal canal stenosis.  Moderate to severe right and mild-to-moderate left neural foraminal  stenosis.      L4-L5: Status post laminectomy and facetectomy on the left. There is  fluid signal within the surgical bed. Mild spinal canal stenosis.  Moderate to severe left and moderate right neural foraminal stenosis.      L5-S1: Disc bulge and facet arthrosis. No spinal canal stenosis.  Mild-to-moderate right and moderate to severe left neural foraminal  stenosis.      IMPRESSION:  There are postsurgical changes of posterolateral fusion and  discectomy at L3-L4 and L4-L5.      Status post laminectomy and facetectomy on the right at L3-L4 and  L4-L5 on the left. There is nonspecific fluid signal within the  surgical bed.      Degenerative changes of the lumbar spine most pronounced at L1-L2  with moderate spinal canal stenosis and moderate to severe neural  foraminal stenosis.      I personally reviewed the images/study and I agree with the findings  as stated. This study was  interpreted at [facility], [place], [place].      MACRO:  None      Signed by: Alicjanile Morrissey 6/13/2025 1:13 PM    L1-2:    L2-3:          3/26/2025 lumbar x-ray showed L3-5 posterior fusion with interbody spacers with significant disease above and below the fusion:         5/10/2024 cervical x-ray showed stable C4-C7 anterior ACDF         8/2/2025 MRI lumbar spine showed multiple level bone marrow edema and endplate changes L4-L5 and S1 in addition to severe desiccation of the L4-5 and L5-S1 disc with grade 1 spondylolisthesis at L4-5 with multiple disc herniation and facet joint hypertrophy:  Bone marrow edema and endplate changes L4, L5 and S1  Right L1-2 disc herniation affecting the right nerve root  Moderate to severe right L2-3 foraminal stenosis worse than the left  Severe canal stenosis at L3-4 and moderate to severe foraminal stenosis  Severe canal stenosis at the L4-5 affecting the bilateral neural foramina  Left more than right foraminal stenosis at the L5-S1    T12-L1:    L-2:    L3-4:    L4-5:    L5-S1:    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  Finished college with bachelors in art .  Denies smoking drinking or use of illicit drugs        Review of Systems   HENT: Negative.     Eyes: Negative.    Respiratory: Negative.     Cardiovascular: Negative.    Gastrointestinal: Negative.    Endocrine: Negative.    Genitourinary: Negative.    Musculoskeletal:  Positive for back pain, gait problem, myalgias and neck pain.   Skin: Negative.    Neurological:  Positive for weakness and numbness.   Hematological: Negative.    Psychiatric/Behavioral: Negative.            Physical Exam  Vitals and nursing note reviewed.   Constitutional:       Appearance: Normal appearance.           Comments: Uses the walker to walk but now doing  a lot better able to stand straight and move her leg and normal tandem      HENT:      Head: Normocephalic and atraumatic.      Nose: Nose normal.   Eyes:      Extraocular Movements: Extraocular movements intact.      Conjunctiva/sclera: Conjunctivae normal.      Pupils: Pupils are equal, round, and reactive to light.   Cardiovascular:      Rate and Rhythm: Normal rate and regular rhythm.      Pulses: Normal pulses.      Heart sounds: Normal heart sounds.   Pulmonary:      Effort: Pulmonary effort is normal.      Breath sounds: Normal breath sounds.   Abdominal:      General: Abdomen is flat. Bowel sounds are normal.      Palpations: Abdomen is soft.   Skin:     General: Skin is warm.   Neurological:      General: No focal deficit present.      Mental Status: She is alert and oriented to person, place, and time.   Psychiatric:         Mood and Affect: Mood normal.         Behavior: Behavior normal.             Plan  At least 50% of the visit was involved in the discussion of the options for treatment. We discussed exercises, medication, interventional therapies and surgery. Healthy life style is essential with patient hard work to achieve the wellness. In addition; discussion with the patient and/or family about any of the diagnostic results, impressions and/or recommended diagnostic studies, prognosis, risks and benefits of treatment options, instructions for treatment and/or follow-up, importance of compliance with chosen treatment options, risk-factor reduction, and patient/family education.         Continue self-directed physical therapy at least daily exercises for minimum of 20-minute  Aquatic therapy ordered today  Continue gabapentin 300 mg 1 AM 1 afternoon and 2 PM  Continue buprenorphine 2 mg as needed for pain  Healthy lifestyle and anti-inflammatory diet in addition to weight control discussed with the patient  Alternative chronic pain therapies was discussed, encouraged and information was  handed  Return to Clinic 3 months     *Please note this report has been produced using speech recognition software and may contain errors related to that system including grammar, punctuation and spelling as well as words and phrases that may be inappropriate. If there are questions or concerns, please feel free to contact me to clarify.    Shiv Bryson MD

## 2025-07-17 NOTE — PROGRESS NOTES
This is 65 y.o.  female with who has been treated for Postlaminectomy syndrome of lumbar region, and Cervical post-laminectomy syndrome, Back and right leg  Pain is 10 % better, The pain is described as continues to have numbness hand and feet  and right leg which is currently not relieved with anything . with who is here for follow-up   Chief Complaint   Patient presents with    Follow-up    Med Management     Pain Therapies: 6/18/2025  Suboxone 2 mg twice daily  Continue gabapentin 300 mg 3 times daily but increased to titration titration schedule was given to the patient  Baclofen 10 mg 3 times daily  Consider right L1-2 and L2-3 TFESI if her symptoms in the leg does not improve     Patient takes the suboxone 2 mg twice a day , gabapentin 300 mg one tablet in the am , one afternoon and two at night . She also takes 5 mg baclofen at night.

## 2025-07-18 ENCOUNTER — APPOINTMENT (OUTPATIENT)
Dept: NEUROSURGERY | Facility: CLINIC | Age: 65
End: 2025-07-18
Payer: COMMERCIAL

## 2025-07-18 VITALS
HEART RATE: 71 BPM | TEMPERATURE: 98 F | WEIGHT: 169.3 LBS | DIASTOLIC BLOOD PRESSURE: 64 MMHG | SYSTOLIC BLOOD PRESSURE: 104 MMHG | HEIGHT: 65 IN | BODY MASS INDEX: 28.21 KG/M2

## 2025-07-18 DIAGNOSIS — Z98.1 S/P CERVICAL SPINAL FUSION: ICD-10-CM

## 2025-07-18 DIAGNOSIS — M54.16 LUMBAR RADICULOPATHY: Primary | ICD-10-CM

## 2025-07-18 PROCEDURE — 1125F AMNT PAIN NOTED PAIN PRSNT: CPT | Performed by: STUDENT IN AN ORGANIZED HEALTH CARE EDUCATION/TRAINING PROGRAM

## 2025-07-18 PROCEDURE — 3074F SYST BP LT 130 MM HG: CPT | Performed by: STUDENT IN AN ORGANIZED HEALTH CARE EDUCATION/TRAINING PROGRAM

## 2025-07-18 PROCEDURE — 3008F BODY MASS INDEX DOCD: CPT | Performed by: STUDENT IN AN ORGANIZED HEALTH CARE EDUCATION/TRAINING PROGRAM

## 2025-07-18 PROCEDURE — 1159F MED LIST DOCD IN RCRD: CPT | Performed by: STUDENT IN AN ORGANIZED HEALTH CARE EDUCATION/TRAINING PROGRAM

## 2025-07-18 PROCEDURE — 1036F TOBACCO NON-USER: CPT | Performed by: STUDENT IN AN ORGANIZED HEALTH CARE EDUCATION/TRAINING PROGRAM

## 2025-07-18 PROCEDURE — 3078F DIAST BP <80 MM HG: CPT | Performed by: STUDENT IN AN ORGANIZED HEALTH CARE EDUCATION/TRAINING PROGRAM

## 2025-07-18 PROCEDURE — 99214 OFFICE O/P EST MOD 30 MIN: CPT | Performed by: STUDENT IN AN ORGANIZED HEALTH CARE EDUCATION/TRAINING PROGRAM

## 2025-07-18 ASSESSMENT — PAIN SCALES - GENERAL: PAINLEVEL_OUTOF10: 2

## 2025-07-18 NOTE — PROGRESS NOTES
[facility] [place]  Department of Neurological Surgery  Established Patient Visit    History of Present Illness:  Cristal Pollard is a 65 y.o. year old female who presents to the spine clinic in follow up with C4-7 ACDF; L3-5 fusion in March 2025.     Has most recently seen Dr. Bryson. The patient is on suboxone, gabapentin and baclofen. He had recommended L1-2 and L2-3 TFESI versus SCS trial, however she states at the current time will not be pursuing these.    She does feel her RLE radiculopathy is stable to worse. Additionally reports BLE swelling that is intermittent that she feels is coming from her spine.    Additionally RE: her bladder incontinence, will be undergoing further testing with Dr. Vicente (urology) and she did state she has prolapse for which surgical repair may be an option pending urodynamic studies.      Review of Systems:  14/14 systems reviewed and negative other than what is listed in the history of present illness    Problem List[1]  Medical History[2]  Surgical History[3]  Social History     Tobacco Use    Smoking status: Never    Smokeless tobacco: Never   Substance Use Topics    Alcohol use: Yes     Comment: RARELY     family history includes Cancer in her father, mother, and sister; Colon cancer in her father; Diabetes in her maternal grandfather, sister, and sister; Glaucoma in her father; Heart attack in her father; Heart disease in her father; Hypertension in her father and mother; Macular degeneration in her father; Mental illness in her sister.  Current Medications[4]  Allergies[5]    Physical Examination:    General: Well developed, awake/alert/oriented x3, no distress, alert and cooperative  Skin: Warm and dry, no lesions, no rashes  ENMT: Mucous membranes moist, no apparent injury, no lesions seen  Head/Neck: Neck Supple, no apparent injury  Respiratory/Thorax: Normal breath sounds with good chest expansion, thorax symmetric  Cardiovascular: No pitting edema, no  JVD    Motor Strength: 5/5 Throughout all extremities    Muscle Bulk: Normal and symmetric in all extremities    Posture:   -- Cervical: Normal  -- Thoracic: Normal  -- Lumbar : Normal  Paraspinal muscle spasm/tenderness absent.     Sensation: intact to light touch    RLE lateral thigh and leg numbness is present      Results:  I personally reviewed and interpreted the imaging results which included MRI lumbar spine:    L1-2 and L2-3 stenosis above prior construct which is moderate to severe in nature. Hardware is in position without evidence of pseudoarthrosis.         Assessment and Plan:      Cristal Pollard is a 65 y.o. year old female who presents to the spine clinic in follow up after C4-7 ACDF; L3-5 fusion in March 2025.     Has most recently seen Dr. Bryson. The patient is on suboxone, gabapentin and baclofen. He had recommended L1-2 and L2-3 TFESI versus SCS trial, however she states at the current time will not be pursuing these.    She does feel her RLE radiculopathy is stable to worse. Additionally reports BLE swelling that is intermittent that she feels is coming from her spine.    Additionally RE: her bladder incontinence, will be undergoing further testing with Dr. Vicente (urology) and she did state she has prolapse for which surgical repair may be an option pending urodynamic studies.    At this point, her main issues are pain and urinary incontinence. Will have her undergoing continued pain management with Dr. Bryson. Will also look forward to urodynamic studies with Dr. Vicente.      I have reviewed all prior documentation and reviewed the electronic medical record since admission. I have personally have reviewed all advanced imaging not just the reports and used my interpretation as documented as the relevant findings. I have reviewed the risks and benefits of all treatment recommendations listed in this note with the patient and family.       The above clinical summary has been dictated with voice  recognition software. It has not been proofread for grammatical errors, typographical mistakes, or other semantic inconsistencies.    Thank you for visiting our office today. It was our pleasure to take part in your healthcare.     Do not hesitate to call with any questions regarding your plan of care after leaving at [phone] M-F 8am-4pm.     To clinicians, thank you very much for this kind referral. It is a privilege to partner with you in the care of your patients. My office would be delighted to assist you with any further consultations or with questions regarding the plan of care outlined. Do not hesitate to call the office or contact me directly.       Sincerely,      Arnulfo Reynolds MD, [facility]  Spine , [facility]  Qasim Bardales Chair in Spinal Neurosurgery  Complex Spine Surgery Fellowship Director   of Neurological Surgery  [facility] [employer]  Phone: [phone]  Fax: [phone]        Scribe Attestation  By signing my name below, I, Tavia Tianna , Ivy   attest that this documentation has been prepared under the direction and in the presence of Arnulfo Reynolds MD.             [1]   Patient Active Problem List  Diagnosis    Anxiety disorder    Depression, recurrent    Depressive personality disorder    Neck strain    Post traumatic stress disorder (PTSD)    Whiplash injury to neck    Stress reaction, chronic    Benign paroxysmal positional vertigo due to bilateral vestibular disorder    Chronic neck pain    Cervical radiculopathy due to trauma    Bulging of cervical intervertebral disc    Cervical paraspinous muscle spasm    Bilateral dry eyes    Soft tissue mass    Grief reaction    Hypercholesteremia    Hypertension    Insomnia    Vestibular dysfunction of both ears    Visual changes    Vitamin D deficiency    Chest pain    Headache    Central stenosis of spinal canal    DJD  (degenerative joint disease) of cervical spine    Facet arthropathy, cervical    HNP (herniated nucleus pulposus), cervical    Lumbar facet arthropathy    Right lumbosacral radiculopathy    Spinal stenosis of lumbar region with neurogenic claudication    Xerosis cutis    Weakness of both upper extremities    Type 2 diabetes mellitus    Status post surgery    Shoulder tendinitis, right    Rotator cuff syndrome of right shoulder    Short-term memory loss    Seizure disorder (Multi)    Rosacea, unspecified    PVD (posterior vitreous detachment), left eye    Psychological factor affecting physical condition    Poikiloderma of [place]    Panic attacks    Palpitations    Pain disorder associated with psychological and physical factors    Other melanin hyperpigmentation    Occipital neuralgia    Neoplasm of unspecified behavior of bone, soft tissue, and skin    MGD (meibomian gland disease)    Melanocytic nevi of unspecified eyelid, including canthus    Melanocytic nevi of unspecified ear and external auricular canal    Melanocytic nevi of unspecified upper limb, including shoulder    Melanocytic nevi of unspecified part of face    Melanocytic nevi of unspecified lower limb, including hip    Melanocytic nevi of trunk    Melanocytic nevi of scalp and neck    Medial epicondylitis of right elbow    Late effects of CVA (cerebrovascular accident)    Internal impingement of right shoulder    History of colon polyps    Hemangioma of skin and subcutaneous tissue    Benign lipomatous neoplasm of skin and subcutaneous tissue of right arm    Generalized tonic clonic epilepsy (Multi)    Fatigue    Elevated blood sugar    Dyspepsia    DDD (degenerative disc disease), lumbosacral    Carpal tunnel syndrome on right    Atrial myxoma (HHS-HCC)    Other skin changes due to chronic exposure to nonionizing radiation    Other seborrheic keratosis    Erythema intertrigo    Actinic keratosis    Class 1 obesity due to excess calories without  serious comorbidity with body mass index (BMI) of 30.0 to 30.9 in adult    Cervical radiculopathy    Chronic right hip pain    Difficulty in walking    Diabetes mellitus (Multi)    Right hip pain    Back pain with radiculopathy    Back pain of thoracolumbar region    PONV (postoperative nausea and vomiting)    Acute postoperative pain    Sepsis without septic shock (Multi)    History of lumbar fusion    Cystitis    Urinary retention    Back pain, unspecified back location, unspecified back pain laterality, unspecified chronicity    Moderate episode of recurrent major depressive disorder   [2]   Past Medical History:  Diagnosis Date    Abnormal ECG 10/26/2023    Sinus rhythm LVH by voltage Inferior infarct, old Anterior Q waves, possibly due to LVH    Allergic 20 years    Angina pectoris     Anxiety     Cataract 2019    Removed    Cervical disc disease     Cervical radiculopathy     Neuro: Arnulfo PEREZ 1/15/24    Depression     Diabetes mellitus (Multi)     A1C: 2/6/24 -7.6%    GERD (gastroesophageal reflux disease)     History of Holter monitoring 10/2023    24 -48 hour monitor on 10/31/23, No abnormal findings    Hypertension     Incisional hernia with obstruction, without gangrene 05/24/2017    Incarcerated incisional hernia    Joint pain     Mastodynia 05/14/2020    Breast pain in female    Palpitations     Stress test scheduled for 2/9/24    PONV (postoperative nausea and vomiting)     PTSD (post-traumatic stress disorder)     Seizure disorder (Multi)     Last seizure 2/2023    Spinal stenosis     TIA (transient ischemic attack)     Several years ago, no residual symptoms    Type 2 diabetes mellitus 2022    Non insulin    Vertigo     Vision loss    [3]   Past Surgical History:  Procedure Laterality Date    CARPAL TUNNEL RELEASE Right     CATARACT EXTRACTION      CHOLECYSTECTOMY  11/14/2014    Cholecystectomy    COLONOSCOPY  05/17/2018    Complete Colonoscopy    CT CHEST WO IV CONTRAST  04/21/2023    No  acute intrathoracic abnormalities. No thoracic aortic aneurysm or subintimal hematoma.    ECHOCARDIOGRAM 2 D M MODE PANEL  02/17/2023    Left ventricular systolic function is normal with a 60-65% estimated ejection fraction.    HERNIA REPAIR  05/24/2017    Hernia Repair    MANDIBLE SURGERY Bilateral     OTHER SURGICAL HISTORY  11/14/2014    Surgery Intracardiac Mass Removal Left Atrial Myxoma    OTHER SURGICAL HISTORY      Xiphoidectomy    STERNOTOMY  2011   [4]   Current Outpatient Medications:     acetaminophen (Tylenol) 325 mg tablet, Take 3 tablets (975 mg) by mouth every 8 hours., Disp: , Rfl:     buprenorphine-naloxone (Suboxone) 2-0.5 mg per sublingual film, Place 1 Film under the tongue 3 times a day as needed for withdrawal., Disp: 90 Film, Rfl: 2    FLUoxetine (PROzac) 40 mg capsule, Take 1 capsule (40 mg) by mouth once daily at bedtime. To start after completing 10mg and 20mg doses, Disp: 90 capsule, Rfl: 3    gabapentin (Neurontin) 300 mg capsule, Take 1 capsule (300 mg) by mouth 3 times a day., Disp: , Rfl:     lamoTRIgine (LaMICtal) 100 mg tablet, Take 1 tablet by mouth in the Morning  and 1.5 tablets by mouth at bedtime, Disp: 225 tablet, Rfl: 1    lisinopriL-hydrochlorothiazide 10-12.5 mg tablet, Take 1 tablet by mouth once daily., Disp: 90 tablet, Rfl: 3    metFORMIN  mg 24 hr tablet, Take 1 tablet (500 mg) by mouth once daily in the evening. Take with meals., Disp: 90 tablet, Rfl: 3    multivitamin tablet, Take 1 tablet by mouth once daily., Disp: , Rfl:     omeprazole (PriLOSEC) 20 mg DR capsule, Take 2 capsules (40 mg) by mouth once daily as needed (dypepsia)., Disp: , Rfl:     OneTouch Delica Plus Lancet 30 gauge misc, USE TO TEST ONCE DAILY, Disp: 90 each, Rfl: 1    OneTouch Verio test strips strip, USE TO TEST ONCE DAILY, Disp: 100 strip, Rfl: 1    bisacodyl (Dulcolax) 10 mg suppository, Insert 1 suppository (10 mg) into the rectum once daily as needed for constipation. (Patient not  taking: Reported on 7/18/2025), Disp: , Rfl:     calcium carbonate-vitamin D3 600 mg-10 mcg (400 unit) chewable tablet, Chew and swallow 1 tablet once daily. (Patient not taking: Reported on 7/18/2025), Disp: , Rfl:     gabapentin (Neurontin) 300 mg capsule, Take 2 capsules (600 mg) by mouth once daily at bedtime. (Patient not taking: Reported on 7/3/2025), Disp: 180 capsule, Rfl: 2    naloxone (Narcan) 4 mg/0.1 mL nasal spray, Administer 1 spray (4 mg) into affected nostril(s) if needed for opioid reversal. May repeat every 2-3 minutes if needed, alternating nostrils, until medical assistance becomes available. (Patient not taking: Reported on 7/18/2025), Disp: 2 each, Rfl: 0    nitrofurantoin, macrocrystal-monohydrate, (Macrobid) 100 mg capsule, Take 1 capsule (100 mg) by mouth 2 times a day. (Patient not taking: Reported on 7/18/2025), Disp: , Rfl:     sennosides-docusate sodium (Marilyn-Colace) 8.6-50 mg tablet, Take 1 tablet by mouth once daily. (Patient not taking: Reported on 7/18/2025), Disp: , Rfl:     sulfamethoxazole-trimethoprim (Bactrim DS) 800-160 mg tablet, , Disp: , Rfl:     tamsulosin (Flomax) 0.4 mg 24 hr capsule, Take 1 capsule (0.4 mg) by mouth once daily at bedtime. Do not crush, chew, or split. (Patient not taking: Reported on 7/18/2025), Disp: , Rfl:   No current facility-administered medications for this visit.  [5]   Allergies  Allergen Reactions    Tramadol Seizure     seizure while hospitalized. Tolerates Percocet per hx    Amoxicillin Rash    Ampicillin Hives     shakey    Lidocaine Hives, Rash and Nausea/vomiting     patch    Meperidine Hcl Unknown

## 2025-07-22 ENCOUNTER — EVALUATION (OUTPATIENT)
Dept: PHYSICAL THERAPY | Facility: CLINIC | Age: 65
End: 2025-07-22
Payer: COMMERCIAL

## 2025-07-22 DIAGNOSIS — M54.2 CHRONIC NECK PAIN: Primary | ICD-10-CM

## 2025-07-22 DIAGNOSIS — G89.29 CHRONIC NECK PAIN: Primary | ICD-10-CM

## 2025-07-22 DIAGNOSIS — M79.7 FIBROMYALGIA: ICD-10-CM

## 2025-07-22 DIAGNOSIS — M48.062 SPINAL STENOSIS OF LUMBAR REGION WITH NEUROGENIC CLAUDICATION: ICD-10-CM

## 2025-07-22 PROCEDURE — 97535 SELF CARE MNGMENT TRAINING: CPT | Mod: GP | Performed by: PHYSICAL THERAPIST

## 2025-07-22 PROCEDURE — 97161 PT EVAL LOW COMPLEX 20 MIN: CPT | Mod: GP | Performed by: PHYSICAL THERAPIST

## 2025-07-22 ASSESSMENT — ENCOUNTER SYMPTOMS
LOSS OF SENSATION IN FEET: 1
DEPRESSION: 0
OCCASIONAL FEELINGS OF UNSTEADINESS: 1

## 2025-07-22 NOTE — PROGRESS NOTES
Patient Name Cristal Pollard  MRN: [id number]  Today's Date: 7/22/2025  Time Calculation  Start Time: 1145  Stop Time: 1245  Time Calculation (min): 60 min    Insurance:   Visit #: 1  Insurance Reviewed  (per information provided by  pre-cert team)   [employer]/VISITS ARE MED NEC NEEDS AUTH [employer] PAYS %   Authorization required:  Y(patient is aware that she needs to call before next appointment to ensure that we have gotten approval for PT visits)  Approved # of visits: NV  Authorization date range:  NV    Therapy Diagnoses:   Problem List Items Addressed This Visit           ICD-10-CM    Chronic neck pain - Primary M54.2, G89.29    Spinal stenosis of lumbar region with neurogenic claudication M48.062    Fibromyalgia M79.7     General:  Reason for visit: CS/LS pain and FM   Referred by: Shiv Bryson MD Next MD appt:  10-16-25  Preferred Name:  Lacey  Script:  PT for aquatics 2-3 x/week for 6-8 weeks  Onset Date:  3-3-25  Assessment/re-assessment dates: 7-22-25  Email/phone #:  [email or web address]    Subjective:  HPI:  Patient with chronic CS issues with history of CS fusion 2 years ago with some relief with more recent onset of LS pain with onset of R LE and LS pain and was found to have disc issues and ultimately had LS fusion 3-3-25.  Patient was doing fairly well with rehab.  Patient had fall at home due to R LE collapsing.  Then, patient was in wc/and walker with severe LE weakness after surgery and progressed to progressed with ambulation. Patient has some complications with UTI and sepsis.  Patient was initially discharged home and after second hospitalization went to a rehab facility until early May and continues with HEP.  Patient has recently had appointment with Dr. Reynolds and post-op MRI showed disc herniations in the two levels above the recent LS surgery.  She is discussing options with him.  Patient also has a history of Fibromyalgia.  In rehab, patient had some issues with R LE  collapsing, but this has not happened recently.  Patient has seated HEP from [facility].  MD is also assessing recent onset of L LE swelling. Patient will be getting CS x-rays due to CS pain laterally on the R as well as posteriorly.     Pain:  4/10   Type of pain:  constant ache/B LS and numbness in LS and buttock and occasional numbness in B toes and fingertips, increased with bending.  R LE ant thigh numbness more recently since new disc herniations and into R proximal calf at times.    What increases pain: bending   What decreases pain:  meds    Medical Hx/  Fall Risk:  low  Steadi:  7 yes   Precautions: Type II DM, HTN, Neuropathy, Seizure Disorder/under control, Bladder voiding issues since LS surgery/being evaluated, anxiety disorder, CS fusion a  few years ago, see meds in chart/patient reports that per her activity level, Dr. Reynolds has lifted restrictions other than 10# lifting limit.     Patient goal:  Decreased pain and increased function.   Patient is aware of diagnosis and prognosis.    Living Environment:  ranch with basement laundry, with rail  Social Support:  lives with:  significant other /cat  DME: grab bars in shower, shower stool, ww(uses for longer community level distances), straight cane.     Prior Function:   chronic CS pain and prior to recent LS surgery and onset WNL    Function:    A and O x 3  Sleep: fair, muscle relaxer helps her to sleep, instructed in proper postures.  ADL's:  pain with putting head backs to rinse, uses hand held shower and sits.  Chores:  needs to go with someone to grocery shop, unable to vacuum, light cleaning.  Likes to garden/tries to do this as much as she can.    Driving:  WFL.  Work: retired/disability since the CS surgery.    Recreation: Hobby [employer] making and painting/used to play tennis/discussed community pool options if aquatics is helpful.  Sitting:  Unlimted   Standing: 10'     Walkin'     Objective:        Outcome Measures:  Other Measures  Neck  "Disability Index: 23  Oswestry Disablity Index (ACACIA): 24 /48% limitation of function.   NDI:  46% limitation of function    Posture:  min for head and rounded shoulders and mod increased lumbar lordosis R pelvic landmarks high and has developed scoliosis which was partially corrected with most recent surgery.  Advised patient to get half inch heel lift for L shoe.      Gait/Stairs:  min decreased trunk rotation and hip extension.     TU.47\" without devise, B UE on arm rests   normals:  60-69 years of age (7.1 - 9.0 seconds) **  70 - 79 years of age: (8.2 - 10.2 seconds)  80 - 99 years of age (10 - 12.7 seconds)    Palpation: mod/severe tenderness B CS/LS paraspinals, upper traps/lev scap, pecs/interscap mm and piriformis.     ROM:  Cervical   flexion: 0/22  Ext: 0/15  RSB: 0/22  LSB: 0/25  RR: 0/43  LR: 0/47    Trunk:  Flexion:  1\" 3rd to floor  Extension: 0/10  RSB:  7\" 3rd to fibular head  LSB:  5\" 3rd to fibular head  RR: 10%  LR: 10%    MMT:  Cervical: 4/5   B UE myotomes: WNL some functional weakness with opening jars    Abd: 1+/5  Bridge: 10%  B LE myotomes:  WFL and symmetrical, except for R hip flexors:  4-/5 Quads:  4/5    Flexibility:  Pectorals:  mod  Upper trapezius: mod  Levator Scapulae: mod    Hamstrings:  90/90:  R: -12  L: -15  Heel cords: 0 B  Hip flexors: mod/sev B  Gluts: mod/sev B  Piriformis: mod/sev B    Treatment:    Evaluation:  30 minutes    **= HEP progression today NV= Next visit  np= not performed  nb= non-billable  G= group HEP= discharged to HEP    Self Care Home Management:  10 minutes  Education:  poc, anatomy, physiology, posture, body mechanics, safety awareness, HEP(see below).  Preferred learning:  pictures, demonstration.  Demonstrated good understanding.                            Assessment:    Evolving with changing characteristics  Level of complexity:  low    Patient presents with history of CS/LS fusions and more recently diagnosed disc issues in LS in the two levels " above the 3-3-25 fusion and FM, , signs and symptoms are consistent with diagnosis and patient is an excellent candidate for Physical Therapy and needs work on ROM, flexibility, posture, scapular and dynamic stabilization, strength,closed chain activities, soft tissue mobility, gait and stairs and balance for improved overall function.    Problems:    Pain:  _x__  Posture/Body mechanics deficits:  _x__  Decreased knowledge HEP:  __x_  Decreased knowledge of Precautions:  ___  Activity Limitations:   _x__  ADL's/IADL's/Self-care skills:  _x__  ROM/Joint Mobility:  _x__  Strength:  _x__  Decreased functional level:   _x__  Flexibility:  _x__  Tenderness/decreased soft tissue mobility:  __x_  Gait/Stairs:  _x__  Fall Risk:  _x__  Balance:  __x_  Edema:  __x_  Participation restrictions:  ___  Sensory:  ___  Transfers:  ___  Decreased knowledge of brace:   ___  Other:  ___    X Indicates included in problem list    Goals:    ST weeks  -Increased postural awareness  -Compliant with HEP.   LTG:  by discharge   -Increased postural awareness and posture WFL.   -Decreased pain to:  2/10 and patient I with self-management of symptoms.    -Decreased pain and increased function with ADL's and IADL's.  Improve Oswestry /NDI to: 15% limitation of function.   -Normal gait on level and uneven surfaces community level distances for improved function in the community.   -Normal reciprocal pattern on stairs with one rail for improved function to all levels of home.    -Improve TUG time with within normal limits for age range for decreased risk for falls.   -Increase Cervical and trunk AROM to WFL for improved function with dressing and driving.    -Increase Cervical and trunk strength and stability and R/LUE LE strength to WFL for improved function with chores and recreational activities.    -Increase B upper quarter/LE flexibility to WFL.    -Decrease B upper/lower quarter tenderness 50-75% per patient report.  -Decrease R/L  UE/LE radicular symptoms 50-75% per patient report.    -I and compliant with HEP and proper neck and back and overall body care.       Rehab potential to achieve the above goals is good.    Patient is aware of diagnosis and prognosis and agrees with established plan of care and goals.    Plan:   Skilled PT 2 x/week for 12 weeks( Until goals achieved, maximum rehab potential has been achieved, or patient has plateaued)  for:    Aquatics  __x___with progression to land as appropriate  CP   _x___  Dry needling  __x__prn(would need to transfer care)  Education  __x__  Electrical Stimulation  __x__  Gait training  __x__  HEP  __x__  Manual  _x___  Mechanical Traction  ____  NMR  __x__  Self-care/home management  __x__  Therapeutic Exercise   __x__  Therapeutic Activities  _x___  US  __x__  Work Conditioning  ____  Re-assessment  __x__  Other  ____    X Indicates included in treatment plan    PT for Nu-step for functional mobility and soft tissue warm up for more efficient stretching, work on  ROM, flexibility, posture, scapular and dynamic stabilization, strength,closed chain activities, soft tissue mobility, gait and stairs and balance for improved overall function.  Manual and modalities prn.    HEP:    Continue with current HEP and progress as appropriate

## 2025-07-23 DIAGNOSIS — Z12.31 ENCOUNTER FOR SCREENING MAMMOGRAM FOR BREAST CANCER: ICD-10-CM

## 2025-07-25 ENCOUNTER — APPOINTMENT (OUTPATIENT)
Dept: PHYSICAL THERAPY | Facility: CLINIC | Age: 65
End: 2025-07-25
Payer: COMMERCIAL

## 2025-07-25 DIAGNOSIS — M79.7 FIBROMYALGIA: ICD-10-CM

## 2025-07-25 DIAGNOSIS — G89.29 CHRONIC NECK PAIN: Primary | ICD-10-CM

## 2025-07-25 DIAGNOSIS — M54.2 CHRONIC NECK PAIN: Primary | ICD-10-CM

## 2025-07-25 DIAGNOSIS — M48.062 SPINAL STENOSIS OF LUMBAR REGION WITH NEUROGENIC CLAUDICATION: ICD-10-CM

## 2025-07-29 ENCOUNTER — APPOINTMENT (OUTPATIENT)
Dept: PHYSICAL THERAPY | Facility: CLINIC | Age: 65
End: 2025-07-29
Payer: COMMERCIAL

## 2025-07-29 ENCOUNTER — DOCUMENTATION (OUTPATIENT)
Dept: PHYSICAL THERAPY | Facility: CLINIC | Age: 65
End: 2025-07-29
Payer: COMMERCIAL

## 2025-07-29 DIAGNOSIS — M48.062 SPINAL STENOSIS OF LUMBAR REGION WITH NEUROGENIC CLAUDICATION: ICD-10-CM

## 2025-07-29 DIAGNOSIS — G89.29 CHRONIC NECK PAIN: Primary | ICD-10-CM

## 2025-07-29 DIAGNOSIS — M79.7 FIBROMYALGIA: ICD-10-CM

## 2025-07-29 DIAGNOSIS — M54.2 CHRONIC NECK PAIN: Primary | ICD-10-CM

## 2025-07-29 NOTE — PROGRESS NOTES
Physical Therapy                 Therapy Communication Note    Patient Name: Cristal Pollard  MRN: [id number]  Department:   Room: Room/bed info not found  Today's Date: 7/29/2025     Discipline: Physical Therapy    Missed Visit Reason: awaiting auth, patient wants to wait to come in until official auth is received.     Missed Time: Cancel

## 2025-07-31 ENCOUNTER — APPOINTMENT (OUTPATIENT)
Dept: PHYSICAL THERAPY | Facility: CLINIC | Age: 65
End: 2025-07-31
Payer: COMMERCIAL

## 2025-07-31 DIAGNOSIS — M79.7 FIBROMYALGIA: ICD-10-CM

## 2025-07-31 DIAGNOSIS — M54.2 CHRONIC NECK PAIN: Primary | ICD-10-CM

## 2025-07-31 DIAGNOSIS — M48.062 SPINAL STENOSIS OF LUMBAR REGION WITH NEUROGENIC CLAUDICATION: ICD-10-CM

## 2025-07-31 DIAGNOSIS — G89.29 CHRONIC NECK PAIN: Primary | ICD-10-CM

## 2025-08-01 ENCOUNTER — APPOINTMENT (OUTPATIENT)
Dept: UROLOGY | Facility: CLINIC | Age: 65
End: 2025-08-01
Payer: COMMERCIAL

## 2025-08-05 ENCOUNTER — TREATMENT (OUTPATIENT)
Dept: PHYSICAL THERAPY | Facility: CLINIC | Age: 65
End: 2025-08-05
Payer: COMMERCIAL

## 2025-08-05 DIAGNOSIS — M48.062 SPINAL STENOSIS OF LUMBAR REGION WITH NEUROGENIC CLAUDICATION: ICD-10-CM

## 2025-08-05 DIAGNOSIS — G89.29 CHRONIC NECK PAIN: Primary | ICD-10-CM

## 2025-08-05 DIAGNOSIS — M54.2 CHRONIC NECK PAIN: Primary | ICD-10-CM

## 2025-08-05 DIAGNOSIS — M79.7 FIBROMYALGIA: ICD-10-CM

## 2025-08-05 PROCEDURE — 97113 AQUATIC THERAPY/EXERCISES: CPT | Mod: GP,CQ

## 2025-08-05 NOTE — PROGRESS NOTES
Physical Therapy  Physical Therapy Progress Note    Patient Name Cristal Pollard   MRN: [id number]  Today's Date: 8/5/2025  Time Calculation  Start Time: 1005  Stop Time: 1100  Time Calculation (min): 55 min    Insurance:    (per information provided by  pre-cert team)  Visit #2  Insurance Reviewed  (per information provided by  pre-cert team)   [employer]/VISITS ARE MED NEC NEEDS AUTH [employer] PAYS %   Authorization required:  Y(patient is aware that she needs to call before next appointment to ensure that we have gotten approval for PT visits)  Approved # of visits: 11  Authorization date range:  7/22/2025-10/31/2025     Therapy Diagnoses:   1. Chronic neck pain  Follow Up In Physical Therapy      2. Spinal stenosis of lumbar region with neurogenic claudication  Follow Up In Physical Therapy      3. Fibromyalgia  Follow Up In Physical Therapy          Date of Last Surgery: 3/5/2025  Procedure: Fusion, Spine, Lumbar, Tlif, Robot-assisted L3-5  Post Op Days: 153     General:  Reason for visit: CS/LS pain and FM   Referred by: Shiv Bryson MD Next MD appt:  10-16-25  Preferred Name:  Lacey  Script:  PT for aquatics 2-3 x/week for 6-8 weeks  Onset Date:  3-3-25  Assessment/re-assessment dates: 7-22-25  Email/phone #:  [email or web address]      Subjective:   Patient reports: she is stiff and sore. Dr. Bryson discussed with her that she has Fibromyalgia. States that she is in fight or flight constantly.     Have you fallen since last visit:  no    Medical Hx/  Fall Risk:  low  Steadi:  7 yes   Precautions: Type II DM, HTN, Neuropathy, Seizure Disorder/under control, Bladder voiding issues since LS surgery/being evaluated, anxiety disorder, CS fusion a  few years ago, see meds in chart/patient reports that per her activity level, Dr. Reynolds has lifted restrictions other than 10# lifting limit.     Pain:  6/10   Type of pain:R lumbar, RLE ache  What increases pain: bending   What decreases pain:  meds    HEP  "compliance/understanding:  yes    Objective:   Objective Measurements:    Function:  Uses cart to offload when grocery shopping at it helps to walk the further distance while shopping. Grounding in her garden with bare feet calms her nervous system  Posture: min for head and rounded shoulders and mod increased lumbar lordosis R > L ASIS, scoliosis - increased left lateral flexion  Gait: Ambulates on pool deck without assistive device with decreased trunk rotation and decreased reciprocal arm swing with step through gait with decreased hip extension.with [place]   Stairs: Descends/ascends pool stairs w/ step to gait using bilateral hand rails with [place]   [place] heel wedge provided for LLE leg length discrepancy - patient instructed to contact Orthotist.     Treatment:   **= HEP progression today NV= Next visit  np= not performed  nb= non-billable  G= group HEP= discharged to [facility]    Aquatics:          55 minutes  **Plans to go to [address] or [facility] for AQUA HEP**  * Floater**    TM=Treadmill, T=Speed, C=Current, BTW=Back To Wall, NV=Next Visit, NP=Not Performed    TM FWD T=1  C=0 3 minutes  TM Retro T=0 C=0 10 seconds - stopped - too fast  Lateral Ambulation at wall 4 laps  BTW upper trap/lev scap/scalene stretches 20\" x 3 maile  BTW chin tucks 5\" x 10  BTW scap squeeze 5\" x 10  L lateral trunk flexion w/ LUE overhead reach 10\" x 5  [place] in TM bars 2x10  HR/TR w/ TM bar support  x 15   3 Way hip AROM w/ TM bar support  x 10 each  Hip circumduction in TfM bars CW/CCW x 10 each maile  WBOS BUE circumduction while abducted CW/CCW x 10 each    Deep End w/  noodle:  Cycle w/ 5 senses grounding technique 3 minutes  Scissors, Cross Country x 20 each  Hang w/ box breathing 3 minutes    Stair stretches: Hamstrings/hip flexors/calves 30\"x3 each       Education:  Benefits of Aquatics including buoyancy, resistance ,unloading and hydrostatic pressure postural awareness, pacing in pain free " ROM, hydration and rest post initial treatment, balance strategies, community pool resources, vagus nerve stimulation, mindfulness meditation, grounding meditation, box breathing, Orthotist for leg length discrepancy,, self compassion handout, Wellness Wheel handout      Assessment:  Patient tolerated initial aquatics visit well, with no adverse effects.  Patient needs continued work on/skilled PT for: ROM, flexibility, posture, scapular and dynamic stabilization, strength,closed chain activities, soft tissue mobility, gait and stairs and balance  to address remaining functional, objective and subjective deficits to allow them to return to prior /optimal level of function with ADLs.  Patient is progressing with goals: mobility, pain reduction, body awareness, TA activation, posture  Skilled care:  Aquatics, patient education     Plan:    Continue to progress per poc:   NV add WATSU and drag resistive strengthening per tolerance.     HEP:     Continue with current HEP and progress as appropriate  Look into community pool settings  Anxiety Journal Prompt  Wellness Wheel   Self Compassion Handout  Earthing Documentary

## 2025-08-07 ENCOUNTER — TREATMENT (OUTPATIENT)
Dept: PHYSICAL THERAPY | Facility: CLINIC | Age: 65
End: 2025-08-07
Payer: COMMERCIAL

## 2025-08-07 DIAGNOSIS — M54.2 CHRONIC NECK PAIN: Primary | ICD-10-CM

## 2025-08-07 DIAGNOSIS — G89.29 CHRONIC NECK PAIN: Primary | ICD-10-CM

## 2025-08-07 DIAGNOSIS — M48.062 SPINAL STENOSIS OF LUMBAR REGION WITH NEUROGENIC CLAUDICATION: ICD-10-CM

## 2025-08-07 DIAGNOSIS — M79.7 FIBROMYALGIA: ICD-10-CM

## 2025-08-07 PROCEDURE — 97113 AQUATIC THERAPY/EXERCISES: CPT | Mod: GP,CQ

## 2025-08-07 NOTE — PROGRESS NOTES
Physical Therapy Treatment    Patient Name: Cristal Pollard  MRN: [id number]  YOB: 1960  Encounter Date: 8/7/2025    Time Entry:  Time Calculation  Start Time: 1140  Stop Time: 1220  Time Calculation (min): 40 min     PT Therapeutic Procedures Time Entry  Aquatic Therapy Time Entry: 40                   Rehab Insurance Information:   Visit Count: 3  Auth Required: Yes  Authorized Visits: 11  Auth Date Range: 07/22/25  Through: 10/31/25        Additional Authorization/Insurance Information: Insurance Reviewed (per information provided by  pre-cert team)  [employer]/VISITS ARE MED NEC NEEDS AUTH [employer] PAYS %     [place]hab [place] Risk Assessment:  Fall Risk Indicated: Yes (Fall Risk:  low Steadi:  7 yes)      Problem List Items Addressed This Visit           ICD-10-CM    Chronic neck pain - Primary M54.2, G89.29    Spinal stenosis of lumbar region with neurogenic claudication M48.062    Fibromyalgia M79.7       Precautions       Additional Precautions and Protocol Details: Type II DM, HTN, Neuropathy, Seizure Disorder/under control, Bladder voiding issues since LS surgery/being evaluated, anxiety disorder, CS fusion a  few years ago, see meds in chart/patient reports that per her activity level, Dr. Reynolds has lifted restrictions other than 10# lifting limit.     Subjective   Patient reprots she was sore after her first Aquatics visit, but expected it.Patient compliant with PNE HEP..  Pain reported as 6. Right lumbar, maile glutes         Objective            Gait Analysis  Gait Analysis Details  Gait: Ambulates on pool deck without assistive device with decreased trunk rotation and decreased reciprocal arm swing with step through gait with decreased hip extension.with [place]                                                                                                                                                                               Stairs: Descends/ascends pool stairs w/ step to gait  "using bilateral hand rails with [place]          Activities          Aquatics:            Other Activity  Other Activity Performed: Yes (**= HEP progression today, TM=Treadmill, T=Speed, C=Current, BTW=Back To Wall, NV=Next Visit, NP=Not Performed)  Other Activity 1: **Plans to go to [place]/[address] [place] [address] or [facility] for AQUA HEP**   * Floater**  Other Activity 2: TM FWD T=1  C=0 3 minutes  Other Activity 3: Lateral Ambulation at wall 4 laps  Other Activity 4: AQUA Stretch at wall to maile lumbar, maile glutes 3 minutes  Other Activity 5:  BTW chin tucks 5\" x 10   BTW scap squeeze 5\" x 10  Other Activity 6: BTW open board push/pull, up/down x 10 each  Other Activity 7: Deep End with noodle cycle with grounding meditation 3 minutes  Other Activity 8: Deep End with noodle cross country/scissors 1 minute each  Other Activity 9: Deep End with noodle Hang with box breathing 3 minutes  Other Activity 10: Deep End with noodle piriformis stretch w/ MFR 20\" x 3 maile    Education  Education was done with Patient. The patient's learning style includes Demonstration, Listening, and Tactile. The patient Requires assistance and Requires continuing/additional education.         Control against buoyancy, body awareness, aquatic exercise technique, TA activation, progressed wellness Wheel HEP with SMART Goals daily morning gratitude journaling, benefits of SMART goals, benefits of AQUA stretch, PNE, \"motion is lotion\" mantra, lymphatic drainage      Assessment/Plan   Assessment: Patient required tactiel and verbla cueing for postural awareness and for TA activation with aquatic exercise progression of added open board drag ressitve strengthening. Positive response to addition of deep end piriformis stretching and AQUA stretch, palpating improved soft tissue pliability, and patient displaying iproved trunk and BLE mobility. Patient needs continued work on/skilled PT for: ROM, flexibility, posture, scapular and dynamic " stabilization, strength,closed chain activities, soft tissue mobility, gait and stairs and balance to address remaining functional, objective and subjective deficits to allow them to return to prior /optimal level of function with ADLs. Patient is progressing with goals: mobility, pain reduction, body awareness, TA activation, posture Skilled care: Aquatics, patient educatio Patient tolerated treatment well      Plan: Continue per POC.. Add open paddle flex/ext, ABD,ADD, horizontal ADD/ABD NV.

## 2025-08-15 ENCOUNTER — TREATMENT (OUTPATIENT)
Dept: PHYSICAL THERAPY | Facility: CLINIC | Age: 65
End: 2025-08-15
Payer: COMMERCIAL

## 2025-08-15 DIAGNOSIS — M48.062 SPINAL STENOSIS OF LUMBAR REGION WITH NEUROGENIC CLAUDICATION: ICD-10-CM

## 2025-08-15 DIAGNOSIS — G89.29 CHRONIC NECK PAIN: Primary | ICD-10-CM

## 2025-08-15 DIAGNOSIS — M54.2 CHRONIC NECK PAIN: Primary | ICD-10-CM

## 2025-08-15 DIAGNOSIS — M79.7 FIBROMYALGIA: ICD-10-CM

## 2025-08-15 PROCEDURE — 97113 AQUATIC THERAPY/EXERCISES: CPT | Mod: GP | Performed by: PHYSICAL THERAPIST

## 2025-08-22 ENCOUNTER — OFFICE VISIT (OUTPATIENT)
Dept: URGENT CARE | Age: 65
End: 2025-08-22
Payer: COMMERCIAL

## 2025-08-22 VITALS
DIASTOLIC BLOOD PRESSURE: 76 MMHG | SYSTOLIC BLOOD PRESSURE: 121 MMHG | WEIGHT: 168 LBS | HEIGHT: 65 IN | BODY MASS INDEX: 27.99 KG/M2 | RESPIRATION RATE: 16 BRPM | HEART RATE: 70 BPM | OXYGEN SATURATION: 99 %

## 2025-08-22 DIAGNOSIS — R30.0 DYSURIA: ICD-10-CM

## 2025-08-22 LAB
POC APPEARANCE, URINE: ABNORMAL
POC BILIRUBIN, URINE: NEGATIVE
POC BLOOD, URINE: ABNORMAL
POC COLOR, URINE: ABNORMAL
POC GLUCOSE, URINE: NEGATIVE MG/DL
POC KETONES, URINE: NEGATIVE MG/DL
POC LEUKOCYTES, URINE: ABNORMAL
POC NITRITE,URINE: POSITIVE
POC PH, URINE: 5.5 PH
POC PROTEIN, URINE: ABNORMAL MG/DL
POC SPECIFIC GRAVITY, URINE: 1.02
POC UROBILINOGEN, URINE: 0.2 EU/DL

## 2025-08-22 PROCEDURE — 1159F MED LIST DOCD IN RCRD: CPT | Performed by: PHYSICIAN ASSISTANT

## 2025-08-22 PROCEDURE — 3008F BODY MASS INDEX DOCD: CPT | Performed by: PHYSICIAN ASSISTANT

## 2025-08-22 PROCEDURE — 3078F DIAST BP <80 MM HG: CPT | Performed by: PHYSICIAN ASSISTANT

## 2025-08-22 PROCEDURE — 1036F TOBACCO NON-USER: CPT | Performed by: PHYSICIAN ASSISTANT

## 2025-08-22 PROCEDURE — 99203 OFFICE O/P NEW LOW 30 MIN: CPT | Performed by: PHYSICIAN ASSISTANT

## 2025-08-22 PROCEDURE — 1125F AMNT PAIN NOTED PAIN PRSNT: CPT | Performed by: PHYSICIAN ASSISTANT

## 2025-08-22 PROCEDURE — 3074F SYST BP LT 130 MM HG: CPT | Performed by: PHYSICIAN ASSISTANT

## 2025-08-22 PROCEDURE — 81003 URINALYSIS AUTO W/O SCOPE: CPT | Performed by: PHYSICIAN ASSISTANT

## 2025-08-22 RX ORDER — CIPROFLOXACIN 250 MG/1
250 TABLET, FILM COATED ORAL 2 TIMES DAILY
Qty: 10 TABLET | Refills: 0 | Status: SHIPPED | OUTPATIENT
Start: 2025-08-22 | End: 2025-08-27

## 2025-08-22 ASSESSMENT — ENCOUNTER SYMPTOMS
HEMATURIA: 0
DIFFICULTY URINATING: 0
DYSURIA: 1
MUSCULOSKELETAL NEGATIVE: 1
FREQUENCY: 1
FLANK PAIN: 0
RESPIRATORY NEGATIVE: 1
CONSTITUTIONAL NEGATIVE: 1
EYES NEGATIVE: 1

## 2025-08-22 ASSESSMENT — PATIENT HEALTH QUESTIONNAIRE - PHQ9
SUM OF ALL RESPONSES TO PHQ9 QUESTIONS 1 AND 2: 0
2. FEELING DOWN, DEPRESSED OR HOPELESS: NOT AT ALL
1. LITTLE INTEREST OR PLEASURE IN DOING THINGS: NOT AT ALL

## 2025-08-22 ASSESSMENT — PAIN SCALES - GENERAL: PAINLEVEL_OUTOF10: 4

## 2025-08-24 LAB — BACTERIA UR CULT: ABNORMAL

## 2025-08-26 ENCOUNTER — APPOINTMENT (OUTPATIENT)
Dept: PHYSICAL THERAPY | Facility: CLINIC | Age: 65
End: 2025-08-26
Payer: COMMERCIAL

## 2025-08-26 ENCOUNTER — DOCUMENTATION (OUTPATIENT)
Dept: PHYSICAL THERAPY | Facility: CLINIC | Age: 65
End: 2025-08-26
Payer: COMMERCIAL

## 2025-08-26 DIAGNOSIS — M54.2 CHRONIC NECK PAIN: Primary | ICD-10-CM

## 2025-08-26 DIAGNOSIS — M48.062 SPINAL STENOSIS OF LUMBAR REGION WITH NEUROGENIC CLAUDICATION: ICD-10-CM

## 2025-08-26 DIAGNOSIS — G89.29 CHRONIC NECK PAIN: Primary | ICD-10-CM

## 2025-08-26 DIAGNOSIS — M79.7 FIBROMYALGIA: ICD-10-CM

## 2025-08-29 ENCOUNTER — TREATMENT (OUTPATIENT)
Dept: PHYSICAL THERAPY | Facility: CLINIC | Age: 65
End: 2025-08-29
Payer: COMMERCIAL

## 2025-08-29 DIAGNOSIS — G89.29 CHRONIC NECK PAIN: Primary | ICD-10-CM

## 2025-08-29 DIAGNOSIS — M79.7 FIBROMYALGIA: ICD-10-CM

## 2025-08-29 DIAGNOSIS — M54.2 CHRONIC NECK PAIN: Primary | ICD-10-CM

## 2025-08-29 DIAGNOSIS — M48.062 SPINAL STENOSIS OF LUMBAR REGION WITH NEUROGENIC CLAUDICATION: ICD-10-CM

## 2025-08-29 PROCEDURE — 97113 AQUATIC THERAPY/EXERCISES: CPT | Mod: GP | Performed by: PHYSICAL THERAPIST

## 2025-09-02 ENCOUNTER — DOCUMENTATION (OUTPATIENT)
Dept: PHYSICAL THERAPY | Facility: CLINIC | Age: 65
End: 2025-09-02
Payer: COMMERCIAL

## 2025-09-02 DIAGNOSIS — G89.29 CHRONIC NECK PAIN: Primary | ICD-10-CM

## 2025-09-02 DIAGNOSIS — M48.062 SPINAL STENOSIS OF LUMBAR REGION WITH NEUROGENIC CLAUDICATION: ICD-10-CM

## 2025-09-02 DIAGNOSIS — M54.2 CHRONIC NECK PAIN: Primary | ICD-10-CM

## 2025-09-02 DIAGNOSIS — M79.7 FIBROMYALGIA: ICD-10-CM

## 2025-09-04 ENCOUNTER — APPOINTMENT (OUTPATIENT)
Dept: UROLOGY | Facility: CLINIC | Age: 65
End: 2025-09-04
Payer: COMMERCIAL

## 2025-09-04 ENCOUNTER — TREATMENT (OUTPATIENT)
Dept: PHYSICAL THERAPY | Facility: CLINIC | Age: 65
End: 2025-09-04
Payer: COMMERCIAL

## 2025-09-04 DIAGNOSIS — M79.7 FIBROMYALGIA: ICD-10-CM

## 2025-09-04 DIAGNOSIS — M54.2 CHRONIC NECK PAIN: Primary | ICD-10-CM

## 2025-09-04 DIAGNOSIS — G89.29 CHRONIC NECK PAIN: Primary | ICD-10-CM

## 2025-09-04 DIAGNOSIS — M48.062 SPINAL STENOSIS OF LUMBAR REGION WITH NEUROGENIC CLAUDICATION: ICD-10-CM

## 2025-09-04 PROCEDURE — 97113 AQUATIC THERAPY/EXERCISES: CPT | Mod: GP,CQ

## 2025-09-08 ENCOUNTER — APPOINTMENT (OUTPATIENT)
Dept: RADIOLOGY | Facility: HOSPITAL | Age: 65
End: 2025-09-08
Payer: COMMERCIAL

## 2025-09-16 ENCOUNTER — APPOINTMENT (OUTPATIENT)
Dept: PHYSICAL THERAPY | Facility: CLINIC | Age: 65
End: 2025-09-16
Payer: COMMERCIAL

## 2025-09-16 DIAGNOSIS — M79.7 FIBROMYALGIA: ICD-10-CM

## 2025-09-16 DIAGNOSIS — M48.062 SPINAL STENOSIS OF LUMBAR REGION WITH NEUROGENIC CLAUDICATION: ICD-10-CM

## 2025-09-16 DIAGNOSIS — M54.2 CHRONIC NECK PAIN: Primary | ICD-10-CM

## 2025-09-16 DIAGNOSIS — G89.29 CHRONIC NECK PAIN: Primary | ICD-10-CM

## 2025-10-31 ENCOUNTER — APPOINTMENT (OUTPATIENT)
Dept: NEUROSURGERY | Facility: CLINIC | Age: 65
End: 2025-10-31
Payer: COMMERCIAL

## 2025-12-29 ENCOUNTER — APPOINTMENT (OUTPATIENT)
Dept: PRIMARY CARE | Facility: CLINIC | Age: 65
End: 2025-12-29
Payer: COMMERCIAL

## 2026-02-05 ENCOUNTER — APPOINTMENT (OUTPATIENT)
Dept: UROLOGY | Facility: CLINIC | Age: 66
End: 2026-02-05
Payer: COMMERCIAL

## 2026-03-05 ENCOUNTER — APPOINTMENT (OUTPATIENT)
Dept: UROLOGY | Facility: CLINIC | Age: 66
End: 2026-03-05
Payer: COMMERCIAL

## (undated) DEVICE — BUR, MR8 14CM 3MM DIA, MATCH HEAD, LOW PROFILE SP SYM-TRI

## (undated) DEVICE — TAPE, SILK, DURAPORE, 3 IN X 10 YD, LF

## (undated) DEVICE — CATHETER TRAY, SURESTEP, 16FR, URINE METER W/STATLOCK

## (undated) DEVICE — ADHESIVE, SKIN, LIQUIBAND EXCEED

## (undated) DEVICE — DRAPE COVER, C ARM, FLOUROSCAN IMAGING SYS

## (undated) DEVICE — PAD, GROUNDING, ELECTROSURGICAL, W/9 FT CABLE, POLYHESIVE II, ADULT, LF

## (undated) DEVICE — BUR, 3MM X 3.8MM, PRECISION, NEURO DRILL

## (undated) DEVICE — SEALANT, DURASEAL EXACT, 5ML

## (undated) DEVICE — APPLICATOR, PREP, CHLORAPREP, W/ORANGE TINT, 10.5ML

## (undated) DEVICE — EVACUATOR, WOUND, SUCTION, CLOSED, JACKSON-PRATT, 100 CC, SILICONE

## (undated) DEVICE — APPLICATOR, CHLORAPREP, W/ORANGE TINT, 26ML

## (undated) DEVICE — COVER, CART, 45 X 27 X 48 IN, CLEAR

## (undated) DEVICE — CAUTERY, PENCIL, PUSH BUTTON, SMOKE EVAC, 70MM

## (undated) DEVICE — MANIFOLD, 4 PORT NEPTUNE STANDARD

## (undated) DEVICE — SUTURE, VICRYL, 4-0, 18 IN, UNDYED BR PS-2

## (undated) DEVICE — CLOSURE SYSTEM, DERMABOND, PRINEO, 22CM, STERILE

## (undated) DEVICE — EXTENDER, SV TAB, 5.5/6.0

## (undated) DEVICE — SUTURE, MONOCRYL, 4-0, 18 IN, PS2, UNDYED

## (undated) DEVICE — SUTURE, VICRYL, 3-0,18 IN, SH, UNDYED

## (undated) DEVICE — SEALANT, HEMOSTATIC, FLOSEAL, 10 ML

## (undated) DEVICE — DRAPE, INCISE, DIRECTIONAL, FENESTRATED, PEDIATRIC, STERILE

## (undated) DEVICE — DRAPE, C-ARM IMAGE

## (undated) DEVICE — ELECTRODE, ELECTROSURGICAL, BLADE, INSULATED, ENT/IMA, STERILE

## (undated) DEVICE — Device

## (undated) DEVICE — DRAPE, SHEET, FAN FOLDED, HALF, 44 X 58 IN, DISPOSABLE, LF, STERILE

## (undated) DEVICE — DRESSING, MEPILEX, BORDER FLEX, 3 X 3

## (undated) DEVICE — DRESSING, MEPILEX, POST OP, 8 X 4

## (undated) DEVICE — DRESSING, MEPILEX, BORDER FLEX, 6 X 8

## (undated) DEVICE — DRAPE, C-ARMOR KIT

## (undated) DEVICE — GEL, ECOVUE, ULTRASOUND, 20GRAM, STERILE

## (undated) DEVICE — TUBING, SMOKE EVAC, 3/8 X 10 FT

## (undated) DEVICE — KIT, MAZOR X SPINE, DISPOSABLE

## (undated) DEVICE — ELECTRODE, ELECTROSURGICAL, BLADE EXT 4 INCH, INSULATED

## (undated) DEVICE — SPONGE, DISSECTOR, PEANUT, 3/8, STERILE 5 FOAM HOLDER"

## (undated) DEVICE — MARKER, SKIN, RULER AND LABEL PACK, CUSTOM

## (undated) DEVICE — COVER, C-ARM W/CLIPS, OEC GE

## (undated) DEVICE — TIP,  ELECTRODE COATED INSULATED, EXTENDED, LF

## (undated) DEVICE — TUBING, SUCTION, CONNECTING, STERILE 0.25 X 120 IN., LF

## (undated) DEVICE — DRAIN, WOUND, ROUND, W/TROCAR, HOLE PATTERN, 10 IN, MEDIUM, 1/8 X 49 IN

## (undated) DEVICE — TUBING, MIDAS MR8 IRRIGATION TUBING, CLEARVIEW

## (undated) DEVICE — SUTURE, SILK, 3-0, 18 IN, MULTIPACK, BLACK

## (undated) DEVICE — SUTURE, VICRYL, 0, 18 IN, UNDYED

## (undated) DEVICE — SPHERE, STEALTHSTATION, 5-PK

## (undated) DEVICE — REST, HEAD, BAGEL, 9 IN

## (undated) DEVICE — GOWN, SURGICAL, SMARTGOWN, XLARGE, STERILE

## (undated) DEVICE — KIT, PATIENT CARE, JACKSON TABLE W/PRONE-SAFE HEADREST

## (undated) DEVICE — DISSECTING TOOL, 31CM, MAZOR X STEALTH, MIDAS REX

## (undated) DEVICE — DRAPE, MICROSCOPE, FOR ZEISS 65MM, VARI-LENS II, 52 X 150

## (undated) DEVICE — BONE VAC, AUTOLOGOUS DUST COLLECTOR